# Patient Record
Sex: MALE | Race: ASIAN | NOT HISPANIC OR LATINO
[De-identification: names, ages, dates, MRNs, and addresses within clinical notes are randomized per-mention and may not be internally consistent; named-entity substitution may affect disease eponyms.]

---

## 2023-01-01 ENCOUNTER — RX RENEWAL (OUTPATIENT)
Age: 43
End: 2023-01-01

## 2023-01-01 ENCOUNTER — APPOINTMENT (OUTPATIENT)
Dept: NEUROLOGY | Facility: CLINIC | Age: 43
End: 2023-01-01
Payer: COMMERCIAL

## 2023-01-01 ENCOUNTER — INPATIENT (INPATIENT)
Facility: HOSPITAL | Age: 43
LOS: 21 days | Discharge: HOME | DRG: 57 | End: 2023-08-18
Attending: PSYCHIATRY & NEUROLOGY | Admitting: PSYCHIATRY & NEUROLOGY
Payer: COMMERCIAL

## 2023-01-01 ENCOUNTER — NON-APPOINTMENT (OUTPATIENT)
Age: 43
End: 2023-01-01

## 2023-01-01 ENCOUNTER — INPATIENT (INPATIENT)
Facility: HOSPITAL | Age: 43
LOS: 21 days | Discharge: HOME | DRG: 57 | End: 2023-12-19
Attending: STUDENT IN AN ORGANIZED HEALTH CARE EDUCATION/TRAINING PROGRAM | Admitting: STUDENT IN AN ORGANIZED HEALTH CARE EDUCATION/TRAINING PROGRAM
Payer: COMMERCIAL

## 2023-01-01 ENCOUNTER — APPOINTMENT (OUTPATIENT)
Dept: ENDOCRINOLOGY | Facility: CLINIC | Age: 43
End: 2023-01-01
Payer: COMMERCIAL

## 2023-01-01 ENCOUNTER — APPOINTMENT (OUTPATIENT)
Dept: INTERNAL MEDICINE | Facility: CLINIC | Age: 43
End: 2023-01-01
Payer: COMMERCIAL

## 2023-01-01 ENCOUNTER — APPOINTMENT (OUTPATIENT)
Dept: NEUROSURGERY | Facility: HOSPITAL | Age: 43
End: 2023-01-01

## 2023-01-01 ENCOUNTER — TRANSCRIPTION ENCOUNTER (OUTPATIENT)
Age: 43
End: 2023-01-01

## 2023-01-01 ENCOUNTER — INPATIENT (INPATIENT)
Facility: HOSPITAL | Age: 43
LOS: 2 days | Discharge: NURSING, REHAB OR LONG-TERM CARE | DRG: 65 | End: 2023-07-27
Attending: PSYCHIATRY & NEUROLOGY | Admitting: PSYCHIATRY & NEUROLOGY
Payer: COMMERCIAL

## 2023-01-01 ENCOUNTER — APPOINTMENT (OUTPATIENT)
Dept: NEUROSURGERY | Facility: CLINIC | Age: 43
End: 2023-01-01

## 2023-01-01 ENCOUNTER — INPATIENT (INPATIENT)
Facility: HOSPITAL | Age: 43
LOS: 10 days | Discharge: NURSING, REHAB OR LONG-TERM CARE | DRG: 23 | End: 2023-11-27
Attending: PSYCHIATRY & NEUROLOGY | Admitting: PSYCHIATRY & NEUROLOGY
Payer: COMMERCIAL

## 2023-01-01 ENCOUNTER — APPOINTMENT (OUTPATIENT)
Dept: MRI IMAGING | Facility: HOSPITAL | Age: 43
End: 2023-01-01

## 2023-01-01 ENCOUNTER — APPOINTMENT (OUTPATIENT)
Dept: INTERNAL MEDICINE | Facility: CLINIC | Age: 43
End: 2023-01-01

## 2023-01-01 VITALS
RESPIRATION RATE: 18 BRPM | DIASTOLIC BLOOD PRESSURE: 90 MMHG | TEMPERATURE: 100 F | OXYGEN SATURATION: 96 % | HEART RATE: 84 BPM | SYSTOLIC BLOOD PRESSURE: 138 MMHG | WEIGHT: 264.33 LBS | HEIGHT: 70 IN

## 2023-01-01 VITALS
DIASTOLIC BLOOD PRESSURE: 85 MMHG | WEIGHT: 230 LBS | HEIGHT: 69 IN | HEART RATE: 69 BPM | BODY MASS INDEX: 34.07 KG/M2 | SYSTOLIC BLOOD PRESSURE: 133 MMHG

## 2023-01-01 VITALS
HEART RATE: 76 BPM | TEMPERATURE: 98 F | OXYGEN SATURATION: 96 % | SYSTOLIC BLOOD PRESSURE: 155 MMHG | DIASTOLIC BLOOD PRESSURE: 93 MMHG | RESPIRATION RATE: 16 BRPM

## 2023-01-01 VITALS
SYSTOLIC BLOOD PRESSURE: 128 MMHG | OXYGEN SATURATION: 96 % | HEART RATE: 61 BPM | TEMPERATURE: 98 F | RESPIRATION RATE: 16 BRPM | DIASTOLIC BLOOD PRESSURE: 74 MMHG

## 2023-01-01 VITALS
OXYGEN SATURATION: 97 % | DIASTOLIC BLOOD PRESSURE: 92 MMHG | SYSTOLIC BLOOD PRESSURE: 158 MMHG | TEMPERATURE: 98 F | WEIGHT: 240.08 LBS | HEART RATE: 79 BPM | RESPIRATION RATE: 18 BRPM

## 2023-01-01 VITALS
SYSTOLIC BLOOD PRESSURE: 176 MMHG | HEART RATE: 75 BPM | BODY MASS INDEX: 36.73 KG/M2 | WEIGHT: 248 LBS | DIASTOLIC BLOOD PRESSURE: 94 MMHG | TEMPERATURE: 97.5 F | RESPIRATION RATE: 16 BRPM | HEIGHT: 69 IN | OXYGEN SATURATION: 98 %

## 2023-01-01 VITALS
HEART RATE: 72 BPM | RESPIRATION RATE: 16 BRPM | BODY MASS INDEX: 36.29 KG/M2 | SYSTOLIC BLOOD PRESSURE: 132 MMHG | HEIGHT: 69 IN | OXYGEN SATURATION: 97 % | WEIGHT: 245 LBS | TEMPERATURE: 97.9 F | DIASTOLIC BLOOD PRESSURE: 87 MMHG

## 2023-01-01 VITALS
WEIGHT: 248 LBS | HEART RATE: 77 BPM | SYSTOLIC BLOOD PRESSURE: 163 MMHG | BODY MASS INDEX: 36.73 KG/M2 | DIASTOLIC BLOOD PRESSURE: 102 MMHG | HEIGHT: 69 IN

## 2023-01-01 VITALS
HEART RATE: 85 BPM | DIASTOLIC BLOOD PRESSURE: 69 MMHG | RESPIRATION RATE: 22 BRPM | SYSTOLIC BLOOD PRESSURE: 145 MMHG | OXYGEN SATURATION: 96 %

## 2023-01-01 VITALS — SYSTOLIC BLOOD PRESSURE: 179 MMHG | DIASTOLIC BLOOD PRESSURE: 98 MMHG

## 2023-01-01 VITALS
RESPIRATION RATE: 18 BRPM | HEART RATE: 77 BPM | SYSTOLIC BLOOD PRESSURE: 144 MMHG | TEMPERATURE: 99 F | DIASTOLIC BLOOD PRESSURE: 87 MMHG | OXYGEN SATURATION: 97 %

## 2023-01-01 VITALS
RESPIRATION RATE: 16 BRPM | DIASTOLIC BLOOD PRESSURE: 75 MMHG | SYSTOLIC BLOOD PRESSURE: 117 MMHG | OXYGEN SATURATION: 95 % | HEART RATE: 67 BPM | TEMPERATURE: 98 F

## 2023-01-01 VITALS
RESPIRATION RATE: 20 BRPM | HEART RATE: 103 BPM | WEIGHT: 229.94 LBS | DIASTOLIC BLOOD PRESSURE: 111 MMHG | OXYGEN SATURATION: 98 % | TEMPERATURE: 98 F | SYSTOLIC BLOOD PRESSURE: 220 MMHG

## 2023-01-01 DIAGNOSIS — I69.314 FRONTAL LOBE AND EXECUTIVE FUNCTION DEFICIT FOLLOWING CEREBRAL INFARCTION: ICD-10-CM

## 2023-01-01 DIAGNOSIS — Z51.89 ENCOUNTER FOR OTHER SPECIFIED AFTERCARE: ICD-10-CM

## 2023-01-01 DIAGNOSIS — R47.1 DYSARTHRIA AND ANARTHRIA: ICD-10-CM

## 2023-01-01 DIAGNOSIS — R50.9 FEVER, UNSPECIFIED: ICD-10-CM

## 2023-01-01 DIAGNOSIS — I63.9 CEREBRAL INFARCTION, UNSPECIFIED: ICD-10-CM

## 2023-01-01 DIAGNOSIS — K59.00 CONSTIPATION, UNSPECIFIED: ICD-10-CM

## 2023-01-01 DIAGNOSIS — F32.9 MAJOR DEPRESSIVE DISORDER, SINGLE EPISODE, UNSPECIFIED: ICD-10-CM

## 2023-01-01 DIAGNOSIS — I69.351 HEMIPLEGIA AND HEMIPARESIS FOLLOWING CEREBRAL INFARCTION AFFECTING RIGHT DOMINANT SIDE: ICD-10-CM

## 2023-01-01 DIAGNOSIS — I69.322 DYSARTHRIA FOLLOWING CEREBRAL INFARCTION: ICD-10-CM

## 2023-01-01 DIAGNOSIS — I10 ESSENTIAL (PRIMARY) HYPERTENSION: ICD-10-CM

## 2023-01-01 DIAGNOSIS — I69.391 DYSPHAGIA FOLLOWING CEREBRAL INFARCTION: ICD-10-CM

## 2023-01-01 DIAGNOSIS — F43.23 ADJUSTMENT DISORDER WITH MIXED ANXIETY AND DEPRESSED MOOD: ICD-10-CM

## 2023-01-01 DIAGNOSIS — E11.9 TYPE 2 DIABETES MELLITUS WITHOUT COMPLICATIONS: ICD-10-CM

## 2023-01-01 DIAGNOSIS — I16.0 HYPERTENSIVE URGENCY: ICD-10-CM

## 2023-01-01 DIAGNOSIS — R20.0 ANESTHESIA OF SKIN: ICD-10-CM

## 2023-01-01 DIAGNOSIS — R27.8 OTHER LACK OF COORDINATION: ICD-10-CM

## 2023-01-01 DIAGNOSIS — Z86.79 PERSONAL HISTORY OF OTHER DISEASES OF THE CIRCULATORY SYSTEM: ICD-10-CM

## 2023-01-01 DIAGNOSIS — E78.5 HYPERLIPIDEMIA, UNSPECIFIED: ICD-10-CM

## 2023-01-01 DIAGNOSIS — R79.89 OTHER SPECIFIED ABNORMAL FINDINGS OF BLOOD CHEMISTRY: ICD-10-CM

## 2023-01-01 DIAGNOSIS — I25.10 ATHEROSCLEROTIC HEART DISEASE OF NATIVE CORONARY ARTERY WITHOUT ANGINA PECTORIS: ICD-10-CM

## 2023-01-01 DIAGNOSIS — Z86.39 PERSONAL HISTORY OF OTHER ENDOCRINE, NUTRITIONAL AND METABOLIC DISEASE: ICD-10-CM

## 2023-01-01 DIAGNOSIS — I69.310 ATTENTION AND CONCENTRATION DEFICIT FOLLOWING CEREBRAL INFARCTION: ICD-10-CM

## 2023-01-01 DIAGNOSIS — I69.398 OTHER SEQUELAE OF CEREBRAL INFARCTION: ICD-10-CM

## 2023-01-01 DIAGNOSIS — R33.9 RETENTION OF URINE, UNSPECIFIED: ICD-10-CM

## 2023-01-01 DIAGNOSIS — R53.83 OTHER FATIGUE: ICD-10-CM

## 2023-01-01 DIAGNOSIS — F41.9 ANXIETY DISORDER, UNSPECIFIED: ICD-10-CM

## 2023-01-01 DIAGNOSIS — I69.392 FACIAL WEAKNESS FOLLOWING CEREBRAL INFARCTION: ICD-10-CM

## 2023-01-01 DIAGNOSIS — G47.00 INSOMNIA, UNSPECIFIED: ICD-10-CM

## 2023-01-01 DIAGNOSIS — Z87.891 PERSONAL HISTORY OF NICOTINE DEPENDENCE: ICD-10-CM

## 2023-01-01 DIAGNOSIS — F17.210 NICOTINE DEPENDENCE, CIGARETTES, UNCOMPLICATED: ICD-10-CM

## 2023-01-01 DIAGNOSIS — G47.33 OBSTRUCTIVE SLEEP APNEA (ADULT) (PEDIATRIC): ICD-10-CM

## 2023-01-01 DIAGNOSIS — F12.90 CANNABIS USE, UNSPECIFIED, UNCOMPLICATED: ICD-10-CM

## 2023-01-01 DIAGNOSIS — Z79.82 LONG TERM (CURRENT) USE OF ASPIRIN: ICD-10-CM

## 2023-01-01 DIAGNOSIS — Z00.00 ENCOUNTER FOR GENERAL ADULT MEDICAL EXAMINATION W/OUT ABNORMAL FINDINGS: ICD-10-CM

## 2023-01-01 DIAGNOSIS — Z79.84 LONG TERM (CURRENT) USE OF ORAL HYPOGLYCEMIC DRUGS: ICD-10-CM

## 2023-01-01 DIAGNOSIS — Z86.73 PERSONAL HISTORY OF TRANSIENT ISCHEMIC ATTACK (TIA), AND CEREBRAL INFARCTION W/OUT RESIDUAL DEFICITS: ICD-10-CM

## 2023-01-01 DIAGNOSIS — I69.312 VISUOSPATIAL DEFICIT AND SPATIAL NEGLECT FOLLOWING CEREBRAL INFARCTION: ICD-10-CM

## 2023-01-01 DIAGNOSIS — I69.311 MEMORY DEFICIT FOLLOWING CEREBRAL INFARCTION: ICD-10-CM

## 2023-01-01 DIAGNOSIS — R26.89 OTHER ABNORMALITIES OF GAIT AND MOBILITY: ICD-10-CM

## 2023-01-01 DIAGNOSIS — I69.354 HEMIPLEGIA AND HEMIPARESIS FOLLOWING CEREBRAL INFARCTION AFFECTING LEFT NON-DOMINANT SIDE: ICD-10-CM

## 2023-01-01 DIAGNOSIS — Z79.02 LONG TERM (CURRENT) USE OF ANTITHROMBOTICS/ANTIPLATELETS: ICD-10-CM

## 2023-01-01 DIAGNOSIS — R45.851 SUICIDAL IDEATIONS: ICD-10-CM

## 2023-01-01 LAB
% ALBUMIN: 61.4 % — SIGNIFICANT CHANGE UP
% ALBUMIN: 61.4 % — SIGNIFICANT CHANGE UP
% ALPHA 1: 4.6 % — SIGNIFICANT CHANGE UP
% ALPHA 1: 4.6 % — SIGNIFICANT CHANGE UP
% ALPHA 2: 12.1 % — SIGNIFICANT CHANGE UP
% ALPHA 2: 12.1 % — SIGNIFICANT CHANGE UP
% BETA: 10.5 % — SIGNIFICANT CHANGE UP
% BETA: 10.5 % — SIGNIFICANT CHANGE UP
% GAMMA: 11.4 % — SIGNIFICANT CHANGE UP
% GAMMA: 11.4 % — SIGNIFICANT CHANGE UP
A1C WITH ESTIMATED AVERAGE GLUCOSE RESULT: 5.5 % — SIGNIFICANT CHANGE UP (ref 4–5.6)
A1C WITH ESTIMATED AVERAGE GLUCOSE RESULT: 5.5 % — SIGNIFICANT CHANGE UP (ref 4–5.6)
A1C WITH ESTIMATED AVERAGE GLUCOSE RESULT: 6.8 % — HIGH (ref 4–5.6)
A1C WITH ESTIMATED AVERAGE GLUCOSE RESULT: 7 % — HIGH (ref 4–5.6)
ALBUMIN SERPL ELPH-MCNC: 2.8 G/DL — LOW (ref 3.3–5)
ALBUMIN SERPL ELPH-MCNC: 2.9 G/DL — LOW (ref 3.3–5)
ALBUMIN SERPL ELPH-MCNC: 2.9 G/DL — LOW (ref 3.3–5)
ALBUMIN SERPL ELPH-MCNC: 3 G/DL — LOW (ref 3.3–5)
ALBUMIN SERPL ELPH-MCNC: 3 G/DL — LOW (ref 3.3–5)
ALBUMIN SERPL ELPH-MCNC: 3.2 G/DL — LOW (ref 3.3–5)
ALBUMIN SERPL ELPH-MCNC: 3.3 G/DL — SIGNIFICANT CHANGE UP (ref 3.3–5)
ALBUMIN SERPL ELPH-MCNC: 3.4 G/DL — SIGNIFICANT CHANGE UP (ref 3.3–5)
ALBUMIN SERPL ELPH-MCNC: 3.5 G/DL — SIGNIFICANT CHANGE UP (ref 3.3–5)
ALBUMIN SERPL ELPH-MCNC: 3.8 G/DL — SIGNIFICANT CHANGE UP (ref 3.3–5)
ALBUMIN SERPL ELPH-MCNC: 3.8 G/DL — SIGNIFICANT CHANGE UP (ref 3.6–5.5)
ALBUMIN SERPL ELPH-MCNC: 3.8 G/DL — SIGNIFICANT CHANGE UP (ref 3.6–5.5)
ALBUMIN SERPL ELPH-MCNC: 3.9 G/DL — SIGNIFICANT CHANGE UP (ref 3.3–5)
ALBUMIN SERPL ELPH-MCNC: 4.7 G/DL — SIGNIFICANT CHANGE UP (ref 3.3–5)
ALBUMIN SERPL ELPH-MCNC: 4.8 G/DL — SIGNIFICANT CHANGE UP (ref 3.3–5)
ALBUMIN SERPL ELPH-MCNC: 4.8 G/DL — SIGNIFICANT CHANGE UP (ref 3.3–5)
ALBUMIN/GLOB SERPL ELPH: 1.6 RATIO — SIGNIFICANT CHANGE UP
ALBUMIN/GLOB SERPL ELPH: 1.6 RATIO — SIGNIFICANT CHANGE UP
ALP SERPL-CCNC: 44 U/L — SIGNIFICANT CHANGE UP (ref 40–120)
ALP SERPL-CCNC: 46 U/L — SIGNIFICANT CHANGE UP (ref 40–120)
ALP SERPL-CCNC: 46 U/L — SIGNIFICANT CHANGE UP (ref 40–120)
ALP SERPL-CCNC: 47 U/L — SIGNIFICANT CHANGE UP (ref 40–120)
ALP SERPL-CCNC: 47 U/L — SIGNIFICANT CHANGE UP (ref 40–120)
ALP SERPL-CCNC: 48 U/L — SIGNIFICANT CHANGE UP (ref 40–120)
ALP SERPL-CCNC: 50 U/L — SIGNIFICANT CHANGE UP (ref 40–120)
ALP SERPL-CCNC: 51 U/L — SIGNIFICANT CHANGE UP (ref 40–120)
ALP SERPL-CCNC: 51 U/L — SIGNIFICANT CHANGE UP (ref 40–120)
ALP SERPL-CCNC: 52 U/L — SIGNIFICANT CHANGE UP (ref 40–120)
ALP SERPL-CCNC: 53 U/L — SIGNIFICANT CHANGE UP (ref 40–120)
ALP SERPL-CCNC: 53 U/L — SIGNIFICANT CHANGE UP (ref 40–120)
ALP SERPL-CCNC: 57 U/L — SIGNIFICANT CHANGE UP (ref 40–120)
ALP SERPL-CCNC: 57 U/L — SIGNIFICANT CHANGE UP (ref 40–120)
ALP SERPL-CCNC: 58 U/L — SIGNIFICANT CHANGE UP (ref 40–120)
ALP SERPL-CCNC: 58 U/L — SIGNIFICANT CHANGE UP (ref 40–120)
ALP SERPL-CCNC: 65 U/L — SIGNIFICANT CHANGE UP (ref 40–120)
ALPHA1 GLOB SERPL ELPH-MCNC: 0.3 G/DL — SIGNIFICANT CHANGE UP (ref 0.1–0.4)
ALPHA1 GLOB SERPL ELPH-MCNC: 0.3 G/DL — SIGNIFICANT CHANGE UP (ref 0.1–0.4)
ALPHA2 GLOB SERPL ELPH-MCNC: 0.8 G/DL — SIGNIFICANT CHANGE UP (ref 0.5–1)
ALPHA2 GLOB SERPL ELPH-MCNC: 0.8 G/DL — SIGNIFICANT CHANGE UP (ref 0.5–1)
ALT FLD-CCNC: 17 U/L — SIGNIFICANT CHANGE UP (ref 10–45)
ALT FLD-CCNC: 17 U/L — SIGNIFICANT CHANGE UP (ref 10–45)
ALT FLD-CCNC: 21 U/L — SIGNIFICANT CHANGE UP (ref 10–45)
ALT FLD-CCNC: 21 U/L — SIGNIFICANT CHANGE UP (ref 10–45)
ALT FLD-CCNC: 23 U/L — SIGNIFICANT CHANGE UP (ref 10–45)
ALT FLD-CCNC: 25 U/L — SIGNIFICANT CHANGE UP (ref 10–45)
ALT FLD-CCNC: 25 U/L — SIGNIFICANT CHANGE UP (ref 10–45)
ALT FLD-CCNC: 27 U/L — SIGNIFICANT CHANGE UP (ref 10–45)
ALT FLD-CCNC: 27 U/L — SIGNIFICANT CHANGE UP (ref 10–45)
ALT FLD-CCNC: 28 U/L — SIGNIFICANT CHANGE UP (ref 10–45)
ALT FLD-CCNC: 33 U/L — SIGNIFICANT CHANGE UP (ref 10–45)
ALT FLD-CCNC: 33 U/L — SIGNIFICANT CHANGE UP (ref 10–45)
ALT FLD-CCNC: 34 U/L — SIGNIFICANT CHANGE UP (ref 10–45)
ALT FLD-CCNC: 36 U/L — SIGNIFICANT CHANGE UP (ref 10–45)
ALT FLD-CCNC: 36 U/L — SIGNIFICANT CHANGE UP (ref 10–45)
ALT FLD-CCNC: 41 U/L — SIGNIFICANT CHANGE UP (ref 10–45)
ALT FLD-CCNC: 45 U/L — SIGNIFICANT CHANGE UP (ref 10–45)
ALT FLD-CCNC: 45 U/L — SIGNIFICANT CHANGE UP (ref 10–45)
ALT FLD-CCNC: 51 U/L — HIGH (ref 10–45)
ALT FLD-CCNC: 74 U/L — HIGH (ref 10–45)
ALT FLD-CCNC: 74 U/L — HIGH (ref 10–45)
ALT FLD-CCNC: 83 U/L — HIGH (ref 10–45)
ALT FLD-CCNC: 83 U/L — HIGH (ref 10–45)
AMPHET UR-MCNC: NEGATIVE NG/ML — SIGNIFICANT CHANGE UP
AMPHET UR-MCNC: NEGATIVE — SIGNIFICANT CHANGE UP
AMPHET UR-MCNC: NEGATIVE — SIGNIFICANT CHANGE UP
ANA TITR SER: NEGATIVE — SIGNIFICANT CHANGE UP
ANA TITR SER: NEGATIVE — SIGNIFICANT CHANGE UP
ANION GAP SERPL CALC-SCNC: 10 MMOL/L — SIGNIFICANT CHANGE UP (ref 5–17)
ANION GAP SERPL CALC-SCNC: 11 MMOL/L — SIGNIFICANT CHANGE UP (ref 5–17)
ANION GAP SERPL CALC-SCNC: 12 MMOL/L — SIGNIFICANT CHANGE UP (ref 5–17)
ANION GAP SERPL CALC-SCNC: 13 MMOL/L — SIGNIFICANT CHANGE UP (ref 5–17)
ANION GAP SERPL CALC-SCNC: 13 MMOL/L — SIGNIFICANT CHANGE UP (ref 5–17)
ANION GAP SERPL CALC-SCNC: 15 MMOL/L — SIGNIFICANT CHANGE UP (ref 5–17)
ANION GAP SERPL CALC-SCNC: 17 MMOL/L — SIGNIFICANT CHANGE UP (ref 5–17)
ANION GAP SERPL CALC-SCNC: 8 MMOL/L — SIGNIFICANT CHANGE UP (ref 5–17)
ANION GAP SERPL CALC-SCNC: 9 MMOL/L — SIGNIFICANT CHANGE UP (ref 5–17)
ANTI-RIBONUCLEAR PROTEIN: <0.2 AI — SIGNIFICANT CHANGE UP
ANTI-RIBONUCLEAR PROTEIN: <0.2 AI — SIGNIFICANT CHANGE UP
APAP SERPL-MCNC: <15 UG/ML — SIGNIFICANT CHANGE UP (ref 10–30)
APPEARANCE UR: CLEAR — SIGNIFICANT CHANGE UP
APTT BLD: 30.6 SEC — SIGNIFICANT CHANGE UP (ref 27.5–35.5)
APTT BLD: 31 SEC — SIGNIFICANT CHANGE UP (ref 24.5–35.6)
APTT BLD: 31 SEC — SIGNIFICANT CHANGE UP (ref 24.5–35.6)
APTT BLD: 32.2 SEC — SIGNIFICANT CHANGE UP (ref 24.5–35.6)
APTT BLD: 32.2 SEC — SIGNIFICANT CHANGE UP (ref 24.5–35.6)
APTT BLD: 32.3 SEC — SIGNIFICANT CHANGE UP (ref 24.5–35.6)
APTT BLD: 32.3 SEC — SIGNIFICANT CHANGE UP (ref 24.5–35.6)
APTT BLD: 32.8 SEC — SIGNIFICANT CHANGE UP (ref 24.5–35.6)
APTT BLD: 32.8 SEC — SIGNIFICANT CHANGE UP (ref 24.5–35.6)
APTT BLD: 33.2 SEC — SIGNIFICANT CHANGE UP (ref 24.5–35.6)
APTT BLD: 33.2 SEC — SIGNIFICANT CHANGE UP (ref 24.5–35.6)
APTT BLD: 44.3 SEC — HIGH (ref 24.5–35.6)
APTT BLD: 44.3 SEC — HIGH (ref 24.5–35.6)
APTT BLD: 49 SEC — HIGH (ref 24.5–35.6)
APTT BLD: 49 SEC — HIGH (ref 24.5–35.6)
APTT BLD: 54.6 SEC — HIGH (ref 24.5–35.6)
APTT BLD: 54.6 SEC — HIGH (ref 24.5–35.6)
APTT BLD: 69.8 SEC — HIGH (ref 24.5–35.6)
APTT BLD: 69.8 SEC — HIGH (ref 24.5–35.6)
AST SERPL-CCNC: 11 U/L — SIGNIFICANT CHANGE UP (ref 10–40)
AST SERPL-CCNC: 11 U/L — SIGNIFICANT CHANGE UP (ref 10–40)
AST SERPL-CCNC: 14 U/L — SIGNIFICANT CHANGE UP (ref 10–40)
AST SERPL-CCNC: 14 U/L — SIGNIFICANT CHANGE UP (ref 10–40)
AST SERPL-CCNC: 16 U/L — SIGNIFICANT CHANGE UP (ref 10–40)
AST SERPL-CCNC: 16 U/L — SIGNIFICANT CHANGE UP (ref 10–40)
AST SERPL-CCNC: 17 U/L — SIGNIFICANT CHANGE UP (ref 10–40)
AST SERPL-CCNC: 18 U/L — SIGNIFICANT CHANGE UP (ref 10–40)
AST SERPL-CCNC: 18 U/L — SIGNIFICANT CHANGE UP (ref 10–40)
AST SERPL-CCNC: 19 U/L — SIGNIFICANT CHANGE UP (ref 10–40)
AST SERPL-CCNC: 19 U/L — SIGNIFICANT CHANGE UP (ref 10–40)
AST SERPL-CCNC: 20 U/L — SIGNIFICANT CHANGE UP (ref 10–40)
AST SERPL-CCNC: 21 U/L — SIGNIFICANT CHANGE UP (ref 10–40)
AST SERPL-CCNC: 24 U/L — SIGNIFICANT CHANGE UP (ref 10–40)
AST SERPL-CCNC: 26 U/L — SIGNIFICANT CHANGE UP (ref 10–40)
AST SERPL-CCNC: 26 U/L — SIGNIFICANT CHANGE UP (ref 10–40)
AST SERPL-CCNC: 30 U/L — SIGNIFICANT CHANGE UP (ref 10–40)
AST SERPL-CCNC: 30 U/L — SIGNIFICANT CHANGE UP (ref 10–40)
AST SERPL-CCNC: 36 U/L — SIGNIFICANT CHANGE UP (ref 10–40)
AST SERPL-CCNC: 36 U/L — SIGNIFICANT CHANGE UP (ref 10–40)
B-GLOBULIN SERPL ELPH-MCNC: 0.7 G/DL — SIGNIFICANT CHANGE UP (ref 0.5–1)
B-GLOBULIN SERPL ELPH-MCNC: 0.7 G/DL — SIGNIFICANT CHANGE UP (ref 0.5–1)
BACTERIA # UR AUTO: NEGATIVE /HPF — SIGNIFICANT CHANGE UP
BARBITURATES UR QL SCN: NEGATIVE NG/ML — SIGNIFICANT CHANGE UP
BARBITURATES UR SCN-MCNC: NEGATIVE — SIGNIFICANT CHANGE UP
BARBITURATES UR SCN-MCNC: NEGATIVE — SIGNIFICANT CHANGE UP
BARBITURATES UR-MCNC: NEGATIVE NG/ML — SIGNIFICANT CHANGE UP
BASE EXCESS BLDV CALC-SCNC: 1.1 MMOL/L — SIGNIFICANT CHANGE UP (ref -2–3)
BASOPHILS # BLD AUTO: 0.04 K/UL — SIGNIFICANT CHANGE UP (ref 0–0.2)
BASOPHILS # BLD AUTO: 0.05 K/UL — SIGNIFICANT CHANGE UP (ref 0–0.2)
BASOPHILS # BLD AUTO: 0.05 K/UL — SIGNIFICANT CHANGE UP (ref 0–0.2)
BASOPHILS # BLD AUTO: 0.06 K/UL — SIGNIFICANT CHANGE UP (ref 0–0.2)
BASOPHILS # BLD AUTO: 0.06 K/UL — SIGNIFICANT CHANGE UP (ref 0–0.2)
BASOPHILS # BLD AUTO: 0.07 K/UL — SIGNIFICANT CHANGE UP (ref 0–0.2)
BASOPHILS # BLD AUTO: 0.07 K/UL — SIGNIFICANT CHANGE UP (ref 0–0.2)
BASOPHILS NFR BLD AUTO: 0.4 % — SIGNIFICANT CHANGE UP (ref 0–2)
BASOPHILS NFR BLD AUTO: 0.6 % — SIGNIFICANT CHANGE UP (ref 0–2)
BASOPHILS NFR BLD AUTO: 0.7 % — SIGNIFICANT CHANGE UP (ref 0–2)
BASOPHILS NFR BLD AUTO: 0.9 % — SIGNIFICANT CHANGE UP (ref 0–2)
BASOPHILS NFR BLD AUTO: 1.2 % — SIGNIFICANT CHANGE UP (ref 0–2)
BENZODIAZ UR-MCNC: NEGATIVE NG/ML — SIGNIFICANT CHANGE UP
BENZODIAZ UR-MCNC: NEGATIVE — SIGNIFICANT CHANGE UP
BENZODIAZ UR-MCNC: NEGATIVE — SIGNIFICANT CHANGE UP
BILIRUB SERPL-MCNC: 0.3 MG/DL — SIGNIFICANT CHANGE UP (ref 0.2–1.2)
BILIRUB SERPL-MCNC: 0.4 MG/DL — SIGNIFICANT CHANGE UP (ref 0.2–1.2)
BILIRUB SERPL-MCNC: 0.5 MG/DL — SIGNIFICANT CHANGE UP (ref 0.2–1.2)
BILIRUB SERPL-MCNC: 0.6 MG/DL — SIGNIFICANT CHANGE UP (ref 0.2–1.2)
BILIRUB SERPL-MCNC: 0.7 MG/DL — SIGNIFICANT CHANGE UP (ref 0.2–1.2)
BILIRUB SERPL-MCNC: 0.8 MG/DL — SIGNIFICANT CHANGE UP (ref 0.2–1.2)
BILIRUB SERPL-MCNC: 0.8 MG/DL — SIGNIFICANT CHANGE UP (ref 0.2–1.2)
BILIRUB UR-MCNC: NEGATIVE — SIGNIFICANT CHANGE UP
BLD GP AB SCN SERPL QL: NEGATIVE — SIGNIFICANT CHANGE UP
BUN SERPL-MCNC: 10 MG/DL — SIGNIFICANT CHANGE UP (ref 7–23)
BUN SERPL-MCNC: 10 MG/DL — SIGNIFICANT CHANGE UP (ref 7–23)
BUN SERPL-MCNC: 11 MG/DL — SIGNIFICANT CHANGE UP (ref 7–23)
BUN SERPL-MCNC: 12 MG/DL — SIGNIFICANT CHANGE UP (ref 7–23)
BUN SERPL-MCNC: 13 MG/DL — SIGNIFICANT CHANGE UP (ref 7–23)
BUN SERPL-MCNC: 13 MG/DL — SIGNIFICANT CHANGE UP (ref 7–23)
BUN SERPL-MCNC: 15 MG/DL — SIGNIFICANT CHANGE UP (ref 7–23)
BUN SERPL-MCNC: 17 MG/DL — SIGNIFICANT CHANGE UP (ref 7–23)
BUN SERPL-MCNC: 18 MG/DL — SIGNIFICANT CHANGE UP (ref 7–23)
BUN SERPL-MCNC: 19 MG/DL — SIGNIFICANT CHANGE UP (ref 7–23)
BUN SERPL-MCNC: 21 MG/DL — SIGNIFICANT CHANGE UP (ref 7–23)
BUN SERPL-MCNC: 21 MG/DL — SIGNIFICANT CHANGE UP (ref 7–23)
BUN SERPL-MCNC: 22 MG/DL — SIGNIFICANT CHANGE UP (ref 7–23)
BUN SERPL-MCNC: 22 MG/DL — SIGNIFICANT CHANGE UP (ref 7–23)
BUN SERPL-MCNC: 23 MG/DL — SIGNIFICANT CHANGE UP (ref 7–23)
BUN SERPL-MCNC: 24 MG/DL — HIGH (ref 7–23)
BUN SERPL-MCNC: 25 MG/DL — HIGH (ref 7–23)
BUN SERPL-MCNC: 25 MG/DL — HIGH (ref 7–23)
BUN SERPL-MCNC: 26 MG/DL — HIGH (ref 7–23)
BUN SERPL-MCNC: 27 MG/DL — HIGH (ref 7–23)
BUN SERPL-MCNC: 28 MG/DL — HIGH (ref 7–23)
BUN SERPL-MCNC: 28 MG/DL — HIGH (ref 7–23)
C3 SERPL-MCNC: 123 MG/DL — SIGNIFICANT CHANGE UP (ref 81–157)
C3 SERPL-MCNC: 123 MG/DL — SIGNIFICANT CHANGE UP (ref 81–157)
C4 SERPL-MCNC: 35 MG/DL — SIGNIFICANT CHANGE UP (ref 13–39)
C4 SERPL-MCNC: 35 MG/DL — SIGNIFICANT CHANGE UP (ref 13–39)
CA-I SERPL-SCNC: 1.2 MMOL/L — SIGNIFICANT CHANGE UP (ref 1.15–1.33)
CALCIUM SERPL-MCNC: 8.2 MG/DL — LOW (ref 8.4–10.5)
CALCIUM SERPL-MCNC: 8.2 MG/DL — LOW (ref 8.4–10.5)
CALCIUM SERPL-MCNC: 8.7 MG/DL — SIGNIFICANT CHANGE UP (ref 8.4–10.5)
CALCIUM SERPL-MCNC: 8.8 MG/DL — SIGNIFICANT CHANGE UP (ref 8.4–10.5)
CALCIUM SERPL-MCNC: 8.8 MG/DL — SIGNIFICANT CHANGE UP (ref 8.4–10.5)
CALCIUM SERPL-MCNC: 8.9 MG/DL — SIGNIFICANT CHANGE UP (ref 8.4–10.5)
CALCIUM SERPL-MCNC: 9 MG/DL — SIGNIFICANT CHANGE UP (ref 8.4–10.5)
CALCIUM SERPL-MCNC: 9 MG/DL — SIGNIFICANT CHANGE UP (ref 8.4–10.5)
CALCIUM SERPL-MCNC: 9.1 MG/DL — SIGNIFICANT CHANGE UP (ref 8.4–10.5)
CALCIUM SERPL-MCNC: 9.2 MG/DL — SIGNIFICANT CHANGE UP (ref 8.4–10.5)
CALCIUM SERPL-MCNC: 9.3 MG/DL — SIGNIFICANT CHANGE UP (ref 8.4–10.5)
CALCIUM SERPL-MCNC: 9.3 MG/DL — SIGNIFICANT CHANGE UP (ref 8.4–10.5)
CALCIUM SERPL-MCNC: 9.4 MG/DL — SIGNIFICANT CHANGE UP (ref 8.4–10.5)
CALCIUM SERPL-MCNC: 9.5 MG/DL — SIGNIFICANT CHANGE UP (ref 8.4–10.5)
CALCIUM SERPL-MCNC: 9.5 MG/DL — SIGNIFICANT CHANGE UP (ref 8.4–10.5)
CALCIUM SERPL-MCNC: 9.6 MG/DL — SIGNIFICANT CHANGE UP (ref 8.4–10.5)
CAST: 1 /LPF — SIGNIFICANT CHANGE UP (ref 0–4)
CAST: 1 /LPF — SIGNIFICANT CHANGE UP (ref 0–4)
CHLORIDE BLDV-SCNC: 104 MMOL/L — SIGNIFICANT CHANGE UP (ref 96–108)
CHLORIDE SERPL-SCNC: 102 MMOL/L — SIGNIFICANT CHANGE UP (ref 96–108)
CHLORIDE SERPL-SCNC: 103 MMOL/L — SIGNIFICANT CHANGE UP (ref 96–108)
CHLORIDE SERPL-SCNC: 104 MMOL/L — SIGNIFICANT CHANGE UP (ref 96–108)
CHLORIDE SERPL-SCNC: 105 MMOL/L — SIGNIFICANT CHANGE UP (ref 96–108)
CHLORIDE SERPL-SCNC: 106 MMOL/L — SIGNIFICANT CHANGE UP (ref 96–108)
CHLORIDE SERPL-SCNC: 107 MMOL/L — SIGNIFICANT CHANGE UP (ref 96–108)
CHLORIDE SERPL-SCNC: 108 MMOL/L — SIGNIFICANT CHANGE UP (ref 96–108)
CHLORIDE SERPL-SCNC: 109 MMOL/L — HIGH (ref 96–108)
CHLORIDE SERPL-SCNC: 110 MMOL/L — HIGH (ref 96–108)
CHOLEST SERPL-MCNC: 230 MG/DL — HIGH
CHOLEST SERPL-MCNC: 80 MG/DL — SIGNIFICANT CHANGE UP
CHOLEST SERPL-MCNC: 80 MG/DL — SIGNIFICANT CHANGE UP
CHOLEST SERPL-MCNC: 91 MG/DL — SIGNIFICANT CHANGE UP
CHOLEST SERPL-MCNC: 91 MG/DL — SIGNIFICANT CHANGE UP
CO2 BLDV-SCNC: 28 MMOL/L — HIGH (ref 22–26)
CO2 SERPL-SCNC: 19 MMOL/L — LOW (ref 22–31)
CO2 SERPL-SCNC: 21 MMOL/L — LOW (ref 22–31)
CO2 SERPL-SCNC: 22 MMOL/L — SIGNIFICANT CHANGE UP (ref 22–31)
CO2 SERPL-SCNC: 23 MMOL/L — SIGNIFICANT CHANGE UP (ref 22–31)
CO2 SERPL-SCNC: 25 MMOL/L — SIGNIFICANT CHANGE UP (ref 22–31)
CO2 SERPL-SCNC: 25 MMOL/L — SIGNIFICANT CHANGE UP (ref 22–31)
CO2 SERPL-SCNC: 26 MMOL/L — SIGNIFICANT CHANGE UP (ref 22–31)
CO2 SERPL-SCNC: 27 MMOL/L — SIGNIFICANT CHANGE UP (ref 22–31)
CO2 SERPL-SCNC: 28 MMOL/L — SIGNIFICANT CHANGE UP (ref 22–31)
CO2 SERPL-SCNC: 29 MMOL/L — SIGNIFICANT CHANGE UP (ref 22–31)
COCAINE METAB.OTHER UR-MCNC: NEGATIVE NG/ML — SIGNIFICANT CHANGE UP
COCAINE METAB.OTHER UR-MCNC: NEGATIVE — SIGNIFICANT CHANGE UP
COCAINE METAB.OTHER UR-MCNC: NEGATIVE — SIGNIFICANT CHANGE UP
COLOR SPEC: YELLOW — SIGNIFICANT CHANGE UP
CREAT SERPL-MCNC: 0.99 MG/DL — SIGNIFICANT CHANGE UP (ref 0.5–1.3)
CREAT SERPL-MCNC: 1.08 MG/DL — SIGNIFICANT CHANGE UP (ref 0.5–1.3)
CREAT SERPL-MCNC: 1.08 MG/DL — SIGNIFICANT CHANGE UP (ref 0.5–1.3)
CREAT SERPL-MCNC: 1.1 MG/DL — SIGNIFICANT CHANGE UP (ref 0.5–1.3)
CREAT SERPL-MCNC: 1.11 MG/DL — SIGNIFICANT CHANGE UP (ref 0.5–1.3)
CREAT SERPL-MCNC: 1.11 MG/DL — SIGNIFICANT CHANGE UP (ref 0.5–1.3)
CREAT SERPL-MCNC: 1.12 MG/DL — SIGNIFICANT CHANGE UP (ref 0.5–1.3)
CREAT SERPL-MCNC: 1.12 MG/DL — SIGNIFICANT CHANGE UP (ref 0.5–1.3)
CREAT SERPL-MCNC: 1.14 MG/DL — SIGNIFICANT CHANGE UP (ref 0.5–1.3)
CREAT SERPL-MCNC: 1.15 MG/DL — SIGNIFICANT CHANGE UP (ref 0.5–1.3)
CREAT SERPL-MCNC: 1.16 MG/DL — SIGNIFICANT CHANGE UP (ref 0.5–1.3)
CREAT SERPL-MCNC: 1.17 MG/DL — SIGNIFICANT CHANGE UP (ref 0.5–1.3)
CREAT SERPL-MCNC: 1.18 MG/DL — SIGNIFICANT CHANGE UP (ref 0.5–1.3)
CREAT SERPL-MCNC: 1.21 MG/DL — SIGNIFICANT CHANGE UP (ref 0.5–1.3)
CREAT SERPL-MCNC: 1.22 MG/DL — SIGNIFICANT CHANGE UP (ref 0.5–1.3)
CREAT SERPL-MCNC: 1.22 MG/DL — SIGNIFICANT CHANGE UP (ref 0.5–1.3)
CREAT SERPL-MCNC: 1.23 MG/DL — SIGNIFICANT CHANGE UP (ref 0.5–1.3)
CREAT SERPL-MCNC: 1.24 MG/DL — SIGNIFICANT CHANGE UP (ref 0.5–1.3)
CREAT SERPL-MCNC: 1.25 MG/DL — SIGNIFICANT CHANGE UP (ref 0.5–1.3)
CREAT SERPL-MCNC: 1.28 MG/DL — SIGNIFICANT CHANGE UP (ref 0.5–1.3)
CREAT SERPL-MCNC: 1.29 MG/DL — SIGNIFICANT CHANGE UP (ref 0.5–1.3)
CREAT SERPL-MCNC: 1.29 MG/DL — SIGNIFICANT CHANGE UP (ref 0.5–1.3)
CREAT SERPL-MCNC: 1.31 MG/DL — HIGH (ref 0.5–1.3)
CREAT SERPL-MCNC: 1.33 MG/DL — HIGH (ref 0.5–1.3)
CREAT SERPL-MCNC: 1.33 MG/DL — HIGH (ref 0.5–1.3)
CREAT SERPL-MCNC: 1.34 MG/DL — HIGH (ref 0.5–1.3)
CREAT SERPL-MCNC: 1.34 MG/DL — HIGH (ref 0.5–1.3)
CREAT SERPL-MCNC: 1.4 MG/DL — HIGH (ref 0.5–1.3)
CREAT SERPL-MCNC: 1.4 MG/DL — HIGH (ref 0.5–1.3)
CREATININE, URINE THERAPEUTIC: 217.9 MG/DL — SIGNIFICANT CHANGE UP (ref 20–300)
CRP SERPL-MCNC: <3 MG/L — SIGNIFICANT CHANGE UP (ref 0–4)
CRP SERPL-MCNC: <3 MG/L — SIGNIFICANT CHANGE UP (ref 0–4)
CRYOGLOB SERPL-MCNC: NEGATIVE — SIGNIFICANT CHANGE UP
CRYOGLOB SERPL-MCNC: NEGATIVE — SIGNIFICANT CHANGE UP
CULTURE RESULTS: SIGNIFICANT CHANGE UP
DIFF PNL FLD: NEGATIVE — SIGNIFICANT CHANGE UP
DRUG SCREEN, SERUM: ABNORMAL
DRUG SCREEN, SERUM: ABNORMAL
DSDNA AB FLD-ACNC: <0.2 AI — SIGNIFICANT CHANGE UP
DSDNA AB FLD-ACNC: <0.2 AI — SIGNIFICANT CHANGE UP
DSDNA AB SER-ACNC: <12 IU/ML — SIGNIFICANT CHANGE UP
DSDNA AB SER-ACNC: <12 IU/ML — SIGNIFICANT CHANGE UP
EGFR: 63 ML/MIN/1.73M2 — SIGNIFICANT CHANGE UP
EGFR: 63 ML/MIN/1.73M2 — SIGNIFICANT CHANGE UP
EGFR: 67 ML/MIN/1.73M2 — SIGNIFICANT CHANGE UP
EGFR: 68 ML/MIN/1.73M2 — SIGNIFICANT CHANGE UP
EGFR: 70 ML/MIN/1.73M2 — SIGNIFICANT CHANGE UP
EGFR: 72 ML/MIN/1.73M2 — SIGNIFICANT CHANGE UP
EGFR: 73 ML/MIN/1.73M2 — SIGNIFICANT CHANGE UP
EGFR: 74 ML/MIN/1.73M2 — SIGNIFICANT CHANGE UP
EGFR: 75 ML/MIN/1.73M2 — SIGNIFICANT CHANGE UP
EGFR: 78 ML/MIN/1.73M2 — SIGNIFICANT CHANGE UP
EGFR: 79 ML/MIN/1.73M2 — SIGNIFICANT CHANGE UP
EGFR: 79 ML/MIN/1.73M2 — SIGNIFICANT CHANGE UP
EGFR: 80 ML/MIN/1.73M2 — SIGNIFICANT CHANGE UP
EGFR: 81 ML/MIN/1.73M2 — SIGNIFICANT CHANGE UP
EGFR: 83 ML/MIN/1.73M2 — SIGNIFICANT CHANGE UP
EGFR: 84 ML/MIN/1.73M2 — SIGNIFICANT CHANGE UP
EGFR: 86 ML/MIN/1.73M2 — SIGNIFICANT CHANGE UP
EGFR: 86 ML/MIN/1.73M2 — SIGNIFICANT CHANGE UP
EGFR: 96 ML/MIN/1.73M2 — SIGNIFICANT CHANGE UP
ENA SM AB FLD QL: <0.2 AI — SIGNIFICANT CHANGE UP
ENA SM AB FLD QL: <0.2 AI — SIGNIFICANT CHANGE UP
ENA SS-A AB FLD IA-ACNC: <0.2 AI — SIGNIFICANT CHANGE UP
ENA SS-A AB FLD IA-ACNC: <0.2 AI — SIGNIFICANT CHANGE UP
EOSINOPHIL # BLD AUTO: 0.13 K/UL — SIGNIFICANT CHANGE UP (ref 0–0.5)
EOSINOPHIL # BLD AUTO: 0.13 K/UL — SIGNIFICANT CHANGE UP (ref 0–0.5)
EOSINOPHIL # BLD AUTO: 0.14 K/UL — SIGNIFICANT CHANGE UP (ref 0–0.5)
EOSINOPHIL # BLD AUTO: 0.14 K/UL — SIGNIFICANT CHANGE UP (ref 0–0.5)
EOSINOPHIL # BLD AUTO: 0.15 K/UL — SIGNIFICANT CHANGE UP (ref 0–0.5)
EOSINOPHIL # BLD AUTO: 0.15 K/UL — SIGNIFICANT CHANGE UP (ref 0–0.5)
EOSINOPHIL # BLD AUTO: 0.19 K/UL — SIGNIFICANT CHANGE UP (ref 0–0.5)
EOSINOPHIL # BLD AUTO: 0.23 K/UL — SIGNIFICANT CHANGE UP (ref 0–0.5)
EOSINOPHIL # BLD AUTO: 0.28 K/UL — SIGNIFICANT CHANGE UP (ref 0–0.5)
EOSINOPHIL # BLD AUTO: 0.31 K/UL — SIGNIFICANT CHANGE UP (ref 0–0.5)
EOSINOPHIL # BLD AUTO: 0.36 K/UL — SIGNIFICANT CHANGE UP (ref 0–0.5)
EOSINOPHIL # BLD AUTO: 0.37 K/UL — SIGNIFICANT CHANGE UP (ref 0–0.5)
EOSINOPHIL NFR BLD AUTO: 1.2 % — SIGNIFICANT CHANGE UP (ref 0–6)
EOSINOPHIL NFR BLD AUTO: 1.2 % — SIGNIFICANT CHANGE UP (ref 0–6)
EOSINOPHIL NFR BLD AUTO: 1.5 % — SIGNIFICANT CHANGE UP (ref 0–6)
EOSINOPHIL NFR BLD AUTO: 2.1 % — SIGNIFICANT CHANGE UP (ref 0–6)
EOSINOPHIL NFR BLD AUTO: 3.4 % — SIGNIFICANT CHANGE UP (ref 0–6)
EOSINOPHIL NFR BLD AUTO: 4.2 % — SIGNIFICANT CHANGE UP (ref 0–6)
EOSINOPHIL NFR BLD AUTO: 4.8 % — SIGNIFICANT CHANGE UP (ref 0–6)
EOSINOPHIL NFR BLD AUTO: 5.4 % — SIGNIFICANT CHANGE UP (ref 0–6)
EOSINOPHIL NFR BLD AUTO: 5.6 % — SIGNIFICANT CHANGE UP (ref 0–6)
EPI CELLS # UR: 0 — SIGNIFICANT CHANGE UP
EPI CELLS # UR: 0 — SIGNIFICANT CHANGE UP
ERYTHROCYTE [SEDIMENTATION RATE] IN BLOOD: 10 MM/HR — SIGNIFICANT CHANGE UP (ref 0–15)
ERYTHROCYTE [SEDIMENTATION RATE] IN BLOOD: 10 MM/HR — SIGNIFICANT CHANGE UP (ref 0–15)
ESTIMATED AVERAGE GLUCOSE: 111 MG/DL — SIGNIFICANT CHANGE UP (ref 68–114)
ESTIMATED AVERAGE GLUCOSE: 111 MG/DL — SIGNIFICANT CHANGE UP (ref 68–114)
ESTIMATED AVERAGE GLUCOSE: 148 MG/DL — HIGH (ref 68–114)
ESTIMATED AVERAGE GLUCOSE: 154 MG/DL — HIGH (ref 68–114)
ETHANOL SERPL-MCNC: <10 MG/DL — SIGNIFICANT CHANGE UP (ref 0–10)
FENTANYL RESULT, UR: NEGATIVE NG/ML — SIGNIFICANT CHANGE UP
FENTANYL UR QL SCN: NEGATIVE NG/ML — SIGNIFICANT CHANGE UP
FERRITIN SERPL-MCNC: 174 NG/ML — SIGNIFICANT CHANGE UP (ref 30–400)
FERRITIN SERPL-MCNC: 174 NG/ML — SIGNIFICANT CHANGE UP (ref 30–400)
GAMMA GLOBULIN: 0.7 G/DL — SIGNIFICANT CHANGE UP (ref 0.6–1.6)
GAMMA GLOBULIN: 0.7 G/DL — SIGNIFICANT CHANGE UP (ref 0.6–1.6)
GAMMA INTERFERON BACKGROUND BLD IA-ACNC: 0.01 IU/ML — SIGNIFICANT CHANGE UP
GAMMA INTERFERON BACKGROUND BLD IA-ACNC: 0.01 IU/ML — SIGNIFICANT CHANGE UP
GAS PNL BLDV: 135 MMOL/L — LOW (ref 136–145)
GAS PNL BLDV: SIGNIFICANT CHANGE UP
GAS PNL BLDV: SIGNIFICANT CHANGE UP
GLUCOSE BLDC GLUCOMTR-MCNC: 100 MG/DL — HIGH (ref 70–99)
GLUCOSE BLDC GLUCOMTR-MCNC: 100 MG/DL — HIGH (ref 70–99)
GLUCOSE BLDC GLUCOMTR-MCNC: 101 MG/DL — HIGH (ref 70–99)
GLUCOSE BLDC GLUCOMTR-MCNC: 101 MG/DL — HIGH (ref 70–99)
GLUCOSE BLDC GLUCOMTR-MCNC: 103 MG/DL — HIGH (ref 70–99)
GLUCOSE BLDC GLUCOMTR-MCNC: 104 MG/DL — HIGH (ref 70–99)
GLUCOSE BLDC GLUCOMTR-MCNC: 104 MG/DL — HIGH (ref 70–99)
GLUCOSE BLDC GLUCOMTR-MCNC: 106 MG/DL — HIGH (ref 70–99)
GLUCOSE BLDC GLUCOMTR-MCNC: 107 MG/DL — HIGH (ref 70–99)
GLUCOSE BLDC GLUCOMTR-MCNC: 107 MG/DL — HIGH (ref 70–99)
GLUCOSE BLDC GLUCOMTR-MCNC: 109 MG/DL — HIGH (ref 70–99)
GLUCOSE BLDC GLUCOMTR-MCNC: 110 MG/DL — HIGH (ref 70–99)
GLUCOSE BLDC GLUCOMTR-MCNC: 111 MG/DL — HIGH (ref 70–99)
GLUCOSE BLDC GLUCOMTR-MCNC: 111 MG/DL — HIGH (ref 70–99)
GLUCOSE BLDC GLUCOMTR-MCNC: 112 MG/DL — HIGH (ref 70–99)
GLUCOSE BLDC GLUCOMTR-MCNC: 113 MG/DL — HIGH (ref 70–99)
GLUCOSE BLDC GLUCOMTR-MCNC: 114 MG/DL — HIGH (ref 70–99)
GLUCOSE BLDC GLUCOMTR-MCNC: 115 MG/DL — HIGH (ref 70–99)
GLUCOSE BLDC GLUCOMTR-MCNC: 115 MG/DL — HIGH (ref 70–99)
GLUCOSE BLDC GLUCOMTR-MCNC: 116 MG/DL — HIGH (ref 70–99)
GLUCOSE BLDC GLUCOMTR-MCNC: 118 MG/DL — HIGH (ref 70–99)
GLUCOSE BLDC GLUCOMTR-MCNC: 120 MG/DL — HIGH (ref 70–99)
GLUCOSE BLDC GLUCOMTR-MCNC: 120 MG/DL — HIGH (ref 70–99)
GLUCOSE BLDC GLUCOMTR-MCNC: 121 MG/DL — HIGH (ref 70–99)
GLUCOSE BLDC GLUCOMTR-MCNC: 121 MG/DL — HIGH (ref 70–99)
GLUCOSE BLDC GLUCOMTR-MCNC: 122 MG/DL — HIGH (ref 70–99)
GLUCOSE BLDC GLUCOMTR-MCNC: 122 MG/DL — HIGH (ref 70–99)
GLUCOSE BLDC GLUCOMTR-MCNC: 123 MG/DL — HIGH (ref 70–99)
GLUCOSE BLDC GLUCOMTR-MCNC: 124 MG/DL — HIGH (ref 70–99)
GLUCOSE BLDC GLUCOMTR-MCNC: 126 MG/DL — HIGH (ref 70–99)
GLUCOSE BLDC GLUCOMTR-MCNC: 128 MG/DL — HIGH (ref 70–99)
GLUCOSE BLDC GLUCOMTR-MCNC: 129 MG/DL — HIGH (ref 70–99)
GLUCOSE BLDC GLUCOMTR-MCNC: 130 MG/DL — HIGH (ref 70–99)
GLUCOSE BLDC GLUCOMTR-MCNC: 131 MG/DL — HIGH (ref 70–99)
GLUCOSE BLDC GLUCOMTR-MCNC: 133 MG/DL — HIGH (ref 70–99)
GLUCOSE BLDC GLUCOMTR-MCNC: 133 MG/DL — HIGH (ref 70–99)
GLUCOSE BLDC GLUCOMTR-MCNC: 136 MG/DL — HIGH (ref 70–99)
GLUCOSE BLDC GLUCOMTR-MCNC: 136 MG/DL — HIGH (ref 70–99)
GLUCOSE BLDC GLUCOMTR-MCNC: 138 MG/DL — HIGH (ref 70–99)
GLUCOSE BLDC GLUCOMTR-MCNC: 138 MG/DL — HIGH (ref 70–99)
GLUCOSE BLDC GLUCOMTR-MCNC: 139 MG/DL — HIGH (ref 70–99)
GLUCOSE BLDC GLUCOMTR-MCNC: 139 MG/DL — HIGH (ref 70–99)
GLUCOSE BLDC GLUCOMTR-MCNC: 141 MG/DL — HIGH (ref 70–99)
GLUCOSE BLDC GLUCOMTR-MCNC: 141 MG/DL — HIGH (ref 70–99)
GLUCOSE BLDC GLUCOMTR-MCNC: 143 MG/DL — HIGH (ref 70–99)
GLUCOSE BLDC GLUCOMTR-MCNC: 143 MG/DL — HIGH (ref 70–99)
GLUCOSE BLDC GLUCOMTR-MCNC: 144 MG/DL — HIGH (ref 70–99)
GLUCOSE BLDC GLUCOMTR-MCNC: 146 MG/DL — HIGH (ref 70–99)
GLUCOSE BLDC GLUCOMTR-MCNC: 147 MG/DL — HIGH (ref 70–99)
GLUCOSE BLDC GLUCOMTR-MCNC: 149 MG/DL — HIGH (ref 70–99)
GLUCOSE BLDC GLUCOMTR-MCNC: 151 MG/DL — HIGH (ref 70–99)
GLUCOSE BLDC GLUCOMTR-MCNC: 151 MG/DL — HIGH (ref 70–99)
GLUCOSE BLDC GLUCOMTR-MCNC: 152 MG/DL — HIGH (ref 70–99)
GLUCOSE BLDC GLUCOMTR-MCNC: 153 MG/DL — HIGH (ref 70–99)
GLUCOSE BLDC GLUCOMTR-MCNC: 154 MG/DL — HIGH (ref 70–99)
GLUCOSE BLDC GLUCOMTR-MCNC: 155 MG/DL — HIGH (ref 70–99)
GLUCOSE BLDC GLUCOMTR-MCNC: 156 MG/DL — HIGH (ref 70–99)
GLUCOSE BLDC GLUCOMTR-MCNC: 157 MG/DL — HIGH (ref 70–99)
GLUCOSE BLDC GLUCOMTR-MCNC: 158 MG/DL — HIGH (ref 70–99)
GLUCOSE BLDC GLUCOMTR-MCNC: 159 MG/DL — HIGH (ref 70–99)
GLUCOSE BLDC GLUCOMTR-MCNC: 159 MG/DL — HIGH (ref 70–99)
GLUCOSE BLDC GLUCOMTR-MCNC: 164 MG/DL — HIGH (ref 70–99)
GLUCOSE BLDC GLUCOMTR-MCNC: 167 MG/DL — HIGH (ref 70–99)
GLUCOSE BLDC GLUCOMTR-MCNC: 167 MG/DL — HIGH (ref 70–99)
GLUCOSE BLDC GLUCOMTR-MCNC: 172 MG/DL — HIGH (ref 70–99)
GLUCOSE BLDC GLUCOMTR-MCNC: 174 MG/DL — HIGH (ref 70–99)
GLUCOSE BLDC GLUCOMTR-MCNC: 175 MG/DL — HIGH (ref 70–99)
GLUCOSE BLDC GLUCOMTR-MCNC: 178 MG/DL — HIGH (ref 70–99)
GLUCOSE BLDC GLUCOMTR-MCNC: 192 MG/DL — HIGH (ref 70–99)
GLUCOSE BLDC GLUCOMTR-MCNC: 215 MG/DL — HIGH (ref 70–99)
GLUCOSE BLDC GLUCOMTR-MCNC: 248 MG/DL — HIGH (ref 70–99)
GLUCOSE BLDC GLUCOMTR-MCNC: 86 MG/DL — SIGNIFICANT CHANGE UP (ref 70–99)
GLUCOSE BLDC GLUCOMTR-MCNC: 88 MG/DL — SIGNIFICANT CHANGE UP (ref 70–99)
GLUCOSE BLDC GLUCOMTR-MCNC: 90 MG/DL — SIGNIFICANT CHANGE UP (ref 70–99)
GLUCOSE BLDC GLUCOMTR-MCNC: 90 MG/DL — SIGNIFICANT CHANGE UP (ref 70–99)
GLUCOSE BLDC GLUCOMTR-MCNC: 92 MG/DL — SIGNIFICANT CHANGE UP (ref 70–99)
GLUCOSE BLDC GLUCOMTR-MCNC: 92 MG/DL — SIGNIFICANT CHANGE UP (ref 70–99)
GLUCOSE BLDC GLUCOMTR-MCNC: 94 MG/DL — SIGNIFICANT CHANGE UP (ref 70–99)
GLUCOSE BLDC GLUCOMTR-MCNC: 95 MG/DL — SIGNIFICANT CHANGE UP (ref 70–99)
GLUCOSE BLDC GLUCOMTR-MCNC: 95 MG/DL — SIGNIFICANT CHANGE UP (ref 70–99)
GLUCOSE BLDC GLUCOMTR-MCNC: 96 MG/DL — SIGNIFICANT CHANGE UP (ref 70–99)
GLUCOSE BLDC GLUCOMTR-MCNC: 97 MG/DL — SIGNIFICANT CHANGE UP (ref 70–99)
GLUCOSE BLDC GLUCOMTR-MCNC: 99 MG/DL — SIGNIFICANT CHANGE UP (ref 70–99)
GLUCOSE BLDV-MCNC: 162 MG/DL — HIGH (ref 70–99)
GLUCOSE SERPL-MCNC: 103 MG/DL — HIGH (ref 70–99)
GLUCOSE SERPL-MCNC: 103 MG/DL — HIGH (ref 70–99)
GLUCOSE SERPL-MCNC: 106 MG/DL — HIGH (ref 70–99)
GLUCOSE SERPL-MCNC: 108 MG/DL — HIGH (ref 70–99)
GLUCOSE SERPL-MCNC: 109 MG/DL — HIGH (ref 70–99)
GLUCOSE SERPL-MCNC: 110 MG/DL — HIGH (ref 70–99)
GLUCOSE SERPL-MCNC: 110 MG/DL — HIGH (ref 70–99)
GLUCOSE SERPL-MCNC: 113 MG/DL — HIGH (ref 70–99)
GLUCOSE SERPL-MCNC: 114 MG/DL — HIGH (ref 70–99)
GLUCOSE SERPL-MCNC: 116 MG/DL — HIGH (ref 70–99)
GLUCOSE SERPL-MCNC: 116 MG/DL — HIGH (ref 70–99)
GLUCOSE SERPL-MCNC: 119 MG/DL — HIGH (ref 70–99)
GLUCOSE SERPL-MCNC: 121 MG/DL — HIGH (ref 70–99)
GLUCOSE SERPL-MCNC: 121 MG/DL — HIGH (ref 70–99)
GLUCOSE SERPL-MCNC: 122 MG/DL — HIGH (ref 70–99)
GLUCOSE SERPL-MCNC: 123 MG/DL — HIGH (ref 70–99)
GLUCOSE SERPL-MCNC: 123 MG/DL — HIGH (ref 70–99)
GLUCOSE SERPL-MCNC: 125 MG/DL — HIGH (ref 70–99)
GLUCOSE SERPL-MCNC: 127 MG/DL — HIGH (ref 70–99)
GLUCOSE SERPL-MCNC: 127 MG/DL — HIGH (ref 70–99)
GLUCOSE SERPL-MCNC: 128 MG/DL — HIGH (ref 70–99)
GLUCOSE SERPL-MCNC: 129 MG/DL — HIGH (ref 70–99)
GLUCOSE SERPL-MCNC: 156 MG/DL — HIGH (ref 70–99)
GLUCOSE SERPL-MCNC: 167 MG/DL — HIGH (ref 70–99)
GLUCOSE SERPL-MCNC: 167 MG/DL — HIGH (ref 70–99)
GLUCOSE SERPL-MCNC: 223 MG/DL — HIGH (ref 70–99)
GLUCOSE SERPL-MCNC: 86 MG/DL — SIGNIFICANT CHANGE UP (ref 70–99)
GLUCOSE SERPL-MCNC: 86 MG/DL — SIGNIFICANT CHANGE UP (ref 70–99)
GLUCOSE SERPL-MCNC: 98 MG/DL — SIGNIFICANT CHANGE UP (ref 70–99)
GLUCOSE SERPL-MCNC: 98 MG/DL — SIGNIFICANT CHANGE UP (ref 70–99)
GLUCOSE UR QL: NEGATIVE MG/DL — SIGNIFICANT CHANGE UP
HAV IGM SER-ACNC: SIGNIFICANT CHANGE UP
HAV IGM SER-ACNC: SIGNIFICANT CHANGE UP
HBV CORE IGM SER-ACNC: SIGNIFICANT CHANGE UP
HBV CORE IGM SER-ACNC: SIGNIFICANT CHANGE UP
HBV SURFACE AG SER-ACNC: SIGNIFICANT CHANGE UP
HBV SURFACE AG SER-ACNC: SIGNIFICANT CHANGE UP
HCO3 BLDV-SCNC: 27 MMOL/L — SIGNIFICANT CHANGE UP (ref 22–29)
HCT VFR BLD CALC: 34 % — LOW (ref 39–50)
HCT VFR BLD CALC: 34 % — LOW (ref 39–50)
HCT VFR BLD CALC: 34.6 % — LOW (ref 39–50)
HCT VFR BLD CALC: 35 % — LOW (ref 39–50)
HCT VFR BLD CALC: 35 % — LOW (ref 39–50)
HCT VFR BLD CALC: 35.3 % — LOW (ref 39–50)
HCT VFR BLD CALC: 35.3 % — LOW (ref 39–50)
HCT VFR BLD CALC: 35.6 % — LOW (ref 39–50)
HCT VFR BLD CALC: 35.6 % — LOW (ref 39–50)
HCT VFR BLD CALC: 35.7 % — LOW (ref 39–50)
HCT VFR BLD CALC: 36 % — LOW (ref 39–50)
HCT VFR BLD CALC: 36 % — LOW (ref 39–50)
HCT VFR BLD CALC: 36.4 % — LOW (ref 39–50)
HCT VFR BLD CALC: 36.4 % — LOW (ref 39–50)
HCT VFR BLD CALC: 36.6 % — LOW (ref 39–50)
HCT VFR BLD CALC: 36.6 % — LOW (ref 39–50)
HCT VFR BLD CALC: 36.7 % — LOW (ref 39–50)
HCT VFR BLD CALC: 36.7 % — LOW (ref 39–50)
HCT VFR BLD CALC: 37.5 % — LOW (ref 39–50)
HCT VFR BLD CALC: 37.5 % — LOW (ref 39–50)
HCT VFR BLD CALC: 38.4 % — LOW (ref 39–50)
HCT VFR BLD CALC: 38.4 % — LOW (ref 39–50)
HCT VFR BLD CALC: 38.8 % — LOW (ref 39–50)
HCT VFR BLD CALC: 38.8 % — LOW (ref 39–50)
HCT VFR BLD CALC: 39.4 % — SIGNIFICANT CHANGE UP (ref 39–50)
HCT VFR BLD CALC: 39.4 % — SIGNIFICANT CHANGE UP (ref 39–50)
HCT VFR BLD CALC: 40.3 % — SIGNIFICANT CHANGE UP (ref 39–50)
HCT VFR BLD CALC: 40.5 % — SIGNIFICANT CHANGE UP (ref 39–50)
HCT VFR BLD CALC: 41.1 % — SIGNIFICANT CHANGE UP (ref 39–50)
HCT VFR BLD CALC: 41.4 % — SIGNIFICANT CHANGE UP (ref 39–50)
HCT VFR BLD CALC: 41.8 % — SIGNIFICANT CHANGE UP (ref 39–50)
HCT VFR BLD CALC: 42 % — SIGNIFICANT CHANGE UP (ref 39–50)
HCT VFR BLD CALC: 42.8 % — SIGNIFICANT CHANGE UP (ref 39–50)
HCT VFR BLD CALC: 43 % — SIGNIFICANT CHANGE UP (ref 39–50)
HCT VFR BLD CALC: 43 % — SIGNIFICANT CHANGE UP (ref 39–50)
HCT VFR BLD CALC: 43.6 % — SIGNIFICANT CHANGE UP (ref 39–50)
HCT VFR BLD CALC: 43.6 % — SIGNIFICANT CHANGE UP (ref 39–50)
HCT VFR BLD CALC: 43.8 % — SIGNIFICANT CHANGE UP (ref 39–50)
HCT VFR BLD CALC: 43.8 % — SIGNIFICANT CHANGE UP (ref 39–50)
HCT VFR BLD CALC: 45.3 % — SIGNIFICANT CHANGE UP (ref 39–50)
HCT VFR BLD CALC: 45.3 % — SIGNIFICANT CHANGE UP (ref 39–50)
HCT VFR BLD CALC: 45.6 % — SIGNIFICANT CHANGE UP (ref 39–50)
HCT VFR BLD CALC: 45.8 % — SIGNIFICANT CHANGE UP (ref 39–50)
HCT VFR BLD CALC: 48.1 % — SIGNIFICANT CHANGE UP (ref 39–50)
HCT VFR BLD CALC: 48.3 % — SIGNIFICANT CHANGE UP (ref 39–50)
HCT VFR BLDA CALC: 51 % — SIGNIFICANT CHANGE UP (ref 39–51)
HCV AB S/CO SERPL IA: 0.05 S/CO — SIGNIFICANT CHANGE UP (ref 0–0.99)
HCV AB S/CO SERPL IA: 0.05 S/CO — SIGNIFICANT CHANGE UP (ref 0–0.99)
HCV AB SERPL-IMP: SIGNIFICANT CHANGE UP
HCV AB SERPL-IMP: SIGNIFICANT CHANGE UP
HDLC SERPL-MCNC: 28 MG/DL — LOW
HDLC SERPL-MCNC: 28 MG/DL — LOW
HDLC SERPL-MCNC: 32 MG/DL — LOW
HGB BLD CALC-MCNC: 17.1 G/DL — SIGNIFICANT CHANGE UP (ref 12.6–17.4)
HGB BLD-MCNC: 11.6 G/DL — LOW (ref 13–17)
HGB BLD-MCNC: 11.6 G/DL — LOW (ref 13–17)
HGB BLD-MCNC: 11.7 G/DL — LOW (ref 13–17)
HGB BLD-MCNC: 11.8 G/DL — LOW (ref 13–17)
HGB BLD-MCNC: 11.8 G/DL — LOW (ref 13–17)
HGB BLD-MCNC: 12 G/DL — LOW (ref 13–17)
HGB BLD-MCNC: 12.1 G/DL — LOW (ref 13–17)
HGB BLD-MCNC: 12.4 G/DL — LOW (ref 13–17)
HGB BLD-MCNC: 12.5 G/DL — LOW (ref 13–17)
HGB BLD-MCNC: 12.5 G/DL — LOW (ref 13–17)
HGB BLD-MCNC: 12.7 G/DL — LOW (ref 13–17)
HGB BLD-MCNC: 12.7 G/DL — LOW (ref 13–17)
HGB BLD-MCNC: 13 G/DL — SIGNIFICANT CHANGE UP (ref 13–17)
HGB BLD-MCNC: 13 G/DL — SIGNIFICANT CHANGE UP (ref 13–17)
HGB BLD-MCNC: 13.1 G/DL — SIGNIFICANT CHANGE UP (ref 13–17)
HGB BLD-MCNC: 13.1 G/DL — SIGNIFICANT CHANGE UP (ref 13–17)
HGB BLD-MCNC: 13.3 G/DL — SIGNIFICANT CHANGE UP (ref 13–17)
HGB BLD-MCNC: 13.3 G/DL — SIGNIFICANT CHANGE UP (ref 13–17)
HGB BLD-MCNC: 13.7 G/DL — SIGNIFICANT CHANGE UP (ref 13–17)
HGB BLD-MCNC: 13.8 G/DL — SIGNIFICANT CHANGE UP (ref 13–17)
HGB BLD-MCNC: 14.1 G/DL — SIGNIFICANT CHANGE UP (ref 13–17)
HGB BLD-MCNC: 14.2 G/DL — SIGNIFICANT CHANGE UP (ref 13–17)
HGB BLD-MCNC: 14.2 G/DL — SIGNIFICANT CHANGE UP (ref 13–17)
HGB BLD-MCNC: 14.4 G/DL — SIGNIFICANT CHANGE UP (ref 13–17)
HGB BLD-MCNC: 14.4 G/DL — SIGNIFICANT CHANGE UP (ref 13–17)
HGB BLD-MCNC: 14.6 G/DL — SIGNIFICANT CHANGE UP (ref 13–17)
HGB BLD-MCNC: 14.9 G/DL — SIGNIFICANT CHANGE UP (ref 13–17)
HGB BLD-MCNC: 14.9 G/DL — SIGNIFICANT CHANGE UP (ref 13–17)
HGB BLD-MCNC: 15.3 G/DL — SIGNIFICANT CHANGE UP (ref 13–17)
HGB BLD-MCNC: 15.3 G/DL — SIGNIFICANT CHANGE UP (ref 13–17)
HGB BLD-MCNC: 15.6 G/DL — SIGNIFICANT CHANGE UP (ref 13–17)
HGB BLD-MCNC: 15.7 G/DL — SIGNIFICANT CHANGE UP (ref 13–17)
HGB BLD-MCNC: 15.8 G/DL — SIGNIFICANT CHANGE UP (ref 13–17)
HGB BLD-MCNC: 16.9 G/DL — SIGNIFICANT CHANGE UP (ref 13–17)
IGA FLD-MCNC: 116 MG/DL — SIGNIFICANT CHANGE UP (ref 84–499)
IGA FLD-MCNC: 116 MG/DL — SIGNIFICANT CHANGE UP (ref 84–499)
IGG FLD-MCNC: 673 MG/DL — SIGNIFICANT CHANGE UP (ref 610–1660)
IGG FLD-MCNC: 673 MG/DL — SIGNIFICANT CHANGE UP (ref 610–1660)
IGM SERPL-MCNC: 52 MG/DL — SIGNIFICANT CHANGE UP (ref 35–242)
IGM SERPL-MCNC: 52 MG/DL — SIGNIFICANT CHANGE UP (ref 35–242)
IMM GRANULOCYTES NFR BLD AUTO: 0.1 % — SIGNIFICANT CHANGE UP (ref 0–0.9)
IMM GRANULOCYTES NFR BLD AUTO: 0.2 % — SIGNIFICANT CHANGE UP (ref 0–0.9)
IMM GRANULOCYTES NFR BLD AUTO: 0.3 % — SIGNIFICANT CHANGE UP (ref 0–0.9)
INR BLD: 0.99 RATIO — SIGNIFICANT CHANGE UP (ref 0.88–1.16)
INR BLD: 1.05 RATIO — SIGNIFICANT CHANGE UP (ref 0.85–1.18)
INR BLD: 1.05 RATIO — SIGNIFICANT CHANGE UP (ref 0.85–1.18)
INR BLD: 1.14 RATIO — SIGNIFICANT CHANGE UP (ref 0.85–1.18)
INR BLD: 1.14 RATIO — SIGNIFICANT CHANGE UP (ref 0.85–1.18)
INTERPRETATION SERPL IFE-IMP: SIGNIFICANT CHANGE UP
INTERPRETATION SERPL IFE-IMP: SIGNIFICANT CHANGE UP
IRON SATN MFR SERPL: 23 % — SIGNIFICANT CHANGE UP (ref 16–55)
IRON SATN MFR SERPL: 23 % — SIGNIFICANT CHANGE UP (ref 16–55)
IRON SATN MFR SERPL: 54 UG/DL — SIGNIFICANT CHANGE UP (ref 45–165)
IRON SATN MFR SERPL: 54 UG/DL — SIGNIFICANT CHANGE UP (ref 45–165)
KAPPA LC SER QL IFE: 2.52 MG/DL — HIGH (ref 0.33–1.94)
KAPPA LC SER QL IFE: 2.52 MG/DL — HIGH (ref 0.33–1.94)
KAPPA/LAMBDA FREE LIGHT CHAIN RATIO, SERUM: 1.76 RATIO — HIGH (ref 0.26–1.65)
KAPPA/LAMBDA FREE LIGHT CHAIN RATIO, SERUM: 1.76 RATIO — HIGH (ref 0.26–1.65)
KETONES UR-MCNC: 15 MG/DL
KETONES UR-MCNC: 15 MG/DL
KETONES UR-MCNC: NEGATIVE MG/DL — SIGNIFICANT CHANGE UP
KETONES UR-MCNC: NEGATIVE MG/DL — SIGNIFICANT CHANGE UP
LACTATE BLDV-MCNC: 1.2 MMOL/L — SIGNIFICANT CHANGE UP (ref 0.5–2)
LACTATE SERPL-SCNC: 0.6 MMOL/L — LOW (ref 0.7–2)
LACTATE SERPL-SCNC: 0.6 MMOL/L — LOW (ref 0.7–2)
LAMBDA LC SER QL IFE: 1.43 MG/DL — SIGNIFICANT CHANGE UP (ref 0.57–2.63)
LAMBDA LC SER QL IFE: 1.43 MG/DL — SIGNIFICANT CHANGE UP (ref 0.57–2.63)
LDH SERPL L TO P-CCNC: 189 U/L — SIGNIFICANT CHANGE UP (ref 50–242)
LDH SERPL L TO P-CCNC: 189 U/L — SIGNIFICANT CHANGE UP (ref 50–242)
LEUKOCYTE ESTERASE UR-ACNC: ABNORMAL
LEUKOCYTE ESTERASE UR-ACNC: ABNORMAL
LEUKOCYTE ESTERASE UR-ACNC: NEGATIVE — SIGNIFICANT CHANGE UP
LEUKOCYTE ESTERASE UR-ACNC: NEGATIVE — SIGNIFICANT CHANGE UP
LIPID PNL WITH DIRECT LDL SERPL: 154 MG/DL — HIGH
LIPID PNL WITH DIRECT LDL SERPL: 36 MG/DL — SIGNIFICANT CHANGE UP
LIPID PNL WITH DIRECT LDL SERPL: 36 MG/DL — SIGNIFICANT CHANGE UP
LIPID PNL WITH DIRECT LDL SERPL: 37 MG/DL — SIGNIFICANT CHANGE UP
LIPID PNL WITH DIRECT LDL SERPL: 37 MG/DL — SIGNIFICANT CHANGE UP
LYMPHOCYTES # BLD AUTO: 1.03 K/UL — SIGNIFICANT CHANGE UP (ref 1–3.3)
LYMPHOCYTES # BLD AUTO: 1.03 K/UL — SIGNIFICANT CHANGE UP (ref 1–3.3)
LYMPHOCYTES # BLD AUTO: 1.74 K/UL — SIGNIFICANT CHANGE UP (ref 1–3.3)
LYMPHOCYTES # BLD AUTO: 1.79 K/UL — SIGNIFICANT CHANGE UP (ref 1–3.3)
LYMPHOCYTES # BLD AUTO: 1.79 K/UL — SIGNIFICANT CHANGE UP (ref 1–3.3)
LYMPHOCYTES # BLD AUTO: 1.89 K/UL — SIGNIFICANT CHANGE UP (ref 1–3.3)
LYMPHOCYTES # BLD AUTO: 1.97 K/UL — SIGNIFICANT CHANGE UP (ref 1–3.3)
LYMPHOCYTES # BLD AUTO: 1.98 K/UL — SIGNIFICANT CHANGE UP (ref 1–3.3)
LYMPHOCYTES # BLD AUTO: 18.9 % — SIGNIFICANT CHANGE UP (ref 13–44)
LYMPHOCYTES # BLD AUTO: 18.9 % — SIGNIFICANT CHANGE UP (ref 13–44)
LYMPHOCYTES # BLD AUTO: 2.07 K/UL — SIGNIFICANT CHANGE UP (ref 1–3.3)
LYMPHOCYTES # BLD AUTO: 2.22 K/UL — SIGNIFICANT CHANGE UP (ref 1–3.3)
LYMPHOCYTES # BLD AUTO: 2.22 K/UL — SIGNIFICANT CHANGE UP (ref 1–3.3)
LYMPHOCYTES # BLD AUTO: 2.26 K/UL — SIGNIFICANT CHANGE UP (ref 1–3.3)
LYMPHOCYTES # BLD AUTO: 21.2 % — SIGNIFICANT CHANGE UP (ref 13–44)
LYMPHOCYTES # BLD AUTO: 22.5 % — SIGNIFICANT CHANGE UP (ref 13–44)
LYMPHOCYTES # BLD AUTO: 22.5 % — SIGNIFICANT CHANGE UP (ref 13–44)
LYMPHOCYTES # BLD AUTO: 23.5 % — SIGNIFICANT CHANGE UP (ref 13–44)
LYMPHOCYTES # BLD AUTO: 29.6 % — SIGNIFICANT CHANGE UP (ref 13–44)
LYMPHOCYTES # BLD AUTO: 31.6 % — SIGNIFICANT CHANGE UP (ref 13–44)
LYMPHOCYTES # BLD AUTO: 33.8 % — SIGNIFICANT CHANGE UP (ref 13–44)
LYMPHOCYTES # BLD AUTO: 33.8 % — SIGNIFICANT CHANGE UP (ref 13–44)
LYMPHOCYTES # BLD AUTO: 9.3 % — LOW (ref 13–44)
LYMPHOCYTES # BLD AUTO: 9.3 % — LOW (ref 13–44)
Lab: SIGNIFICANT CHANGE UP
M TB IFN-G BLD-IMP: NEGATIVE — SIGNIFICANT CHANGE UP
M TB IFN-G BLD-IMP: NEGATIVE — SIGNIFICANT CHANGE UP
M TB IFN-G CD4+ BCKGRND COR BLD-ACNC: 0 IU/ML — SIGNIFICANT CHANGE UP
M TB IFN-G CD4+ BCKGRND COR BLD-ACNC: 0 IU/ML — SIGNIFICANT CHANGE UP
M TB IFN-G CD4+CD8+ BCKGRND COR BLD-ACNC: 0 IU/ML — SIGNIFICANT CHANGE UP
M TB IFN-G CD4+CD8+ BCKGRND COR BLD-ACNC: 0 IU/ML — SIGNIFICANT CHANGE UP
MAGNESIUM SERPL-MCNC: 1.9 MG/DL — SIGNIFICANT CHANGE UP (ref 1.6–2.6)
MAGNESIUM SERPL-MCNC: 2 MG/DL — SIGNIFICANT CHANGE UP (ref 1.6–2.6)
MAGNESIUM SERPL-MCNC: 2 MG/DL — SIGNIFICANT CHANGE UP (ref 1.6–2.6)
MAGNESIUM SERPL-MCNC: 2.2 MG/DL — SIGNIFICANT CHANGE UP (ref 1.6–2.6)
MCHC RBC-ENTMCNC: 28.4 PG — SIGNIFICANT CHANGE UP (ref 27–34)
MCHC RBC-ENTMCNC: 28.7 PG — SIGNIFICANT CHANGE UP (ref 27–34)
MCHC RBC-ENTMCNC: 28.8 PG — SIGNIFICANT CHANGE UP (ref 27–34)
MCHC RBC-ENTMCNC: 28.9 PG — SIGNIFICANT CHANGE UP (ref 27–34)
MCHC RBC-ENTMCNC: 29 PG — SIGNIFICANT CHANGE UP (ref 27–34)
MCHC RBC-ENTMCNC: 29.1 PG — SIGNIFICANT CHANGE UP (ref 27–34)
MCHC RBC-ENTMCNC: 29.1 PG — SIGNIFICANT CHANGE UP (ref 27–34)
MCHC RBC-ENTMCNC: 29.2 PG — SIGNIFICANT CHANGE UP (ref 27–34)
MCHC RBC-ENTMCNC: 29.2 PG — SIGNIFICANT CHANGE UP (ref 27–34)
MCHC RBC-ENTMCNC: 29.3 PG — SIGNIFICANT CHANGE UP (ref 27–34)
MCHC RBC-ENTMCNC: 29.4 PG — SIGNIFICANT CHANGE UP (ref 27–34)
MCHC RBC-ENTMCNC: 29.5 PG — SIGNIFICANT CHANGE UP (ref 27–34)
MCHC RBC-ENTMCNC: 29.5 PG — SIGNIFICANT CHANGE UP (ref 27–34)
MCHC RBC-ENTMCNC: 29.6 PG — SIGNIFICANT CHANGE UP (ref 27–34)
MCHC RBC-ENTMCNC: 29.7 PG — SIGNIFICANT CHANGE UP (ref 27–34)
MCHC RBC-ENTMCNC: 29.8 PG — SIGNIFICANT CHANGE UP (ref 27–34)
MCHC RBC-ENTMCNC: 29.8 PG — SIGNIFICANT CHANGE UP (ref 27–34)
MCHC RBC-ENTMCNC: 29.9 PG — SIGNIFICANT CHANGE UP (ref 27–34)
MCHC RBC-ENTMCNC: 30 PG — SIGNIFICANT CHANGE UP (ref 27–34)
MCHC RBC-ENTMCNC: 30.2 PG — SIGNIFICANT CHANGE UP (ref 27–34)
MCHC RBC-ENTMCNC: 30.3 PG — SIGNIFICANT CHANGE UP (ref 27–34)
MCHC RBC-ENTMCNC: 32.6 GM/DL — SIGNIFICANT CHANGE UP (ref 32–36)
MCHC RBC-ENTMCNC: 32.8 GM/DL — SIGNIFICANT CHANGE UP (ref 32–36)
MCHC RBC-ENTMCNC: 32.9 GM/DL — SIGNIFICANT CHANGE UP (ref 32–36)
MCHC RBC-ENTMCNC: 33.1 GM/DL — SIGNIFICANT CHANGE UP (ref 32–36)
MCHC RBC-ENTMCNC: 33.1 GM/DL — SIGNIFICANT CHANGE UP (ref 32–36)
MCHC RBC-ENTMCNC: 33.3 GM/DL — SIGNIFICANT CHANGE UP (ref 32–36)
MCHC RBC-ENTMCNC: 33.5 GM/DL — SIGNIFICANT CHANGE UP (ref 32–36)
MCHC RBC-ENTMCNC: 33.5 GM/DL — SIGNIFICANT CHANGE UP (ref 32–36)
MCHC RBC-ENTMCNC: 34 GM/DL — SIGNIFICANT CHANGE UP (ref 32–36)
MCHC RBC-ENTMCNC: 34.1 GM/DL — SIGNIFICANT CHANGE UP (ref 32–36)
MCHC RBC-ENTMCNC: 34.2 GM/DL — SIGNIFICANT CHANGE UP (ref 32–36)
MCHC RBC-ENTMCNC: 34.3 GM/DL — SIGNIFICANT CHANGE UP (ref 32–36)
MCHC RBC-ENTMCNC: 34.4 GM/DL — SIGNIFICANT CHANGE UP (ref 32–36)
MCHC RBC-ENTMCNC: 34.4 GM/DL — SIGNIFICANT CHANGE UP (ref 32–36)
MCHC RBC-ENTMCNC: 34.5 GM/DL — SIGNIFICANT CHANGE UP (ref 32–36)
MCHC RBC-ENTMCNC: 34.6 GM/DL — SIGNIFICANT CHANGE UP (ref 32–36)
MCHC RBC-ENTMCNC: 34.6 GM/DL — SIGNIFICANT CHANGE UP (ref 32–36)
MCHC RBC-ENTMCNC: 34.7 GM/DL — SIGNIFICANT CHANGE UP (ref 32–36)
MCHC RBC-ENTMCNC: 34.8 GM/DL — SIGNIFICANT CHANGE UP (ref 32–36)
MCHC RBC-ENTMCNC: 34.9 GM/DL — SIGNIFICANT CHANGE UP (ref 32–36)
MCHC RBC-ENTMCNC: 34.9 GM/DL — SIGNIFICANT CHANGE UP (ref 32–36)
MCHC RBC-ENTMCNC: 35 GM/DL — SIGNIFICANT CHANGE UP (ref 32–36)
MCHC RBC-ENTMCNC: 35.6 GM/DL — SIGNIFICANT CHANGE UP (ref 32–36)
MCHC RBC-ENTMCNC: 35.6 GM/DL — SIGNIFICANT CHANGE UP (ref 32–36)
MCV RBC AUTO: 83 FL — SIGNIFICANT CHANGE UP (ref 80–100)
MCV RBC AUTO: 83.1 FL — SIGNIFICANT CHANGE UP (ref 80–100)
MCV RBC AUTO: 83.3 FL — SIGNIFICANT CHANGE UP (ref 80–100)
MCV RBC AUTO: 84.4 FL — SIGNIFICANT CHANGE UP (ref 80–100)
MCV RBC AUTO: 84.6 FL — SIGNIFICANT CHANGE UP (ref 80–100)
MCV RBC AUTO: 84.6 FL — SIGNIFICANT CHANGE UP (ref 80–100)
MCV RBC AUTO: 85.4 FL — SIGNIFICANT CHANGE UP (ref 80–100)
MCV RBC AUTO: 85.4 FL — SIGNIFICANT CHANGE UP (ref 80–100)
MCV RBC AUTO: 85.5 FL — SIGNIFICANT CHANGE UP (ref 80–100)
MCV RBC AUTO: 85.6 FL — SIGNIFICANT CHANGE UP (ref 80–100)
MCV RBC AUTO: 85.6 FL — SIGNIFICANT CHANGE UP (ref 80–100)
MCV RBC AUTO: 85.8 FL — SIGNIFICANT CHANGE UP (ref 80–100)
MCV RBC AUTO: 85.8 FL — SIGNIFICANT CHANGE UP (ref 80–100)
MCV RBC AUTO: 85.9 FL — SIGNIFICANT CHANGE UP (ref 80–100)
MCV RBC AUTO: 86.4 FL — SIGNIFICANT CHANGE UP (ref 80–100)
MCV RBC AUTO: 86.6 FL — SIGNIFICANT CHANGE UP (ref 80–100)
MCV RBC AUTO: 86.6 FL — SIGNIFICANT CHANGE UP (ref 80–100)
MCV RBC AUTO: 86.7 FL — SIGNIFICANT CHANGE UP (ref 80–100)
MCV RBC AUTO: 86.7 FL — SIGNIFICANT CHANGE UP (ref 80–100)
MCV RBC AUTO: 86.8 FL — SIGNIFICANT CHANGE UP (ref 80–100)
MCV RBC AUTO: 86.8 FL — SIGNIFICANT CHANGE UP (ref 80–100)
MCV RBC AUTO: 86.9 FL — SIGNIFICANT CHANGE UP (ref 80–100)
MCV RBC AUTO: 86.9 FL — SIGNIFICANT CHANGE UP (ref 80–100)
MCV RBC AUTO: 87 FL — SIGNIFICANT CHANGE UP (ref 80–100)
MCV RBC AUTO: 87 FL — SIGNIFICANT CHANGE UP (ref 80–100)
MCV RBC AUTO: 87.2 FL — SIGNIFICANT CHANGE UP (ref 80–100)
MCV RBC AUTO: 87.4 FL — SIGNIFICANT CHANGE UP (ref 80–100)
MCV RBC AUTO: 87.4 FL — SIGNIFICANT CHANGE UP (ref 80–100)
MCV RBC AUTO: 87.7 FL — SIGNIFICANT CHANGE UP (ref 80–100)
MCV RBC AUTO: 87.7 FL — SIGNIFICANT CHANGE UP (ref 80–100)
MCV RBC AUTO: 87.9 FL — SIGNIFICANT CHANGE UP (ref 80–100)
MCV RBC AUTO: 87.9 FL — SIGNIFICANT CHANGE UP (ref 80–100)
MCV RBC AUTO: 88.3 FL — SIGNIFICANT CHANGE UP (ref 80–100)
MCV RBC AUTO: 88.7 FL — SIGNIFICANT CHANGE UP (ref 80–100)
MCV RBC AUTO: 88.7 FL — SIGNIFICANT CHANGE UP (ref 80–100)
MCV RBC AUTO: 89 FL — SIGNIFICANT CHANGE UP (ref 80–100)
MCV RBC AUTO: 90 FL — SIGNIFICANT CHANGE UP (ref 80–100)
MCV RBC AUTO: 90 FL — SIGNIFICANT CHANGE UP (ref 80–100)
MCV RBC AUTO: 90.1 FL — SIGNIFICANT CHANGE UP (ref 80–100)
MCV RBC AUTO: 90.1 FL — SIGNIFICANT CHANGE UP (ref 80–100)
MCV RBC AUTO: 90.4 FL — SIGNIFICANT CHANGE UP (ref 80–100)
MCV RBC AUTO: 90.4 FL — SIGNIFICANT CHANGE UP (ref 80–100)
MCV RBC AUTO: 90.6 FL — SIGNIFICANT CHANGE UP (ref 80–100)
MCV RBC AUTO: 90.6 FL — SIGNIFICANT CHANGE UP (ref 80–100)
METHADONE UR-MCNC: NEGATIVE NG/ML — SIGNIFICANT CHANGE UP
METHADONE UR-MCNC: NEGATIVE — SIGNIFICANT CHANGE UP
METHADONE UR-MCNC: NEGATIVE — SIGNIFICANT CHANGE UP
MONOCYTES # BLD AUTO: 0.37 K/UL — SIGNIFICANT CHANGE UP (ref 0–0.9)
MONOCYTES # BLD AUTO: 0.37 K/UL — SIGNIFICANT CHANGE UP (ref 0–0.9)
MONOCYTES # BLD AUTO: 0.4 K/UL — SIGNIFICANT CHANGE UP (ref 0–0.9)
MONOCYTES # BLD AUTO: 0.42 K/UL — SIGNIFICANT CHANGE UP (ref 0–0.9)
MONOCYTES # BLD AUTO: 0.47 K/UL — SIGNIFICANT CHANGE UP (ref 0–0.9)
MONOCYTES # BLD AUTO: 0.47 K/UL — SIGNIFICANT CHANGE UP (ref 0–0.9)
MONOCYTES # BLD AUTO: 0.61 K/UL — SIGNIFICANT CHANGE UP (ref 0–0.9)
MONOCYTES # BLD AUTO: 0.61 K/UL — SIGNIFICANT CHANGE UP (ref 0–0.9)
MONOCYTES # BLD AUTO: 0.79 K/UL — SIGNIFICANT CHANGE UP (ref 0–0.9)
MONOCYTES # BLD AUTO: 0.79 K/UL — SIGNIFICANT CHANGE UP (ref 0–0.9)
MONOCYTES # BLD AUTO: 0.81 K/UL — SIGNIFICANT CHANGE UP (ref 0–0.9)
MONOCYTES # BLD AUTO: 0.81 K/UL — SIGNIFICANT CHANGE UP (ref 0–0.9)
MONOCYTES NFR BLD AUTO: 4.7 % — SIGNIFICANT CHANGE UP (ref 2–14)
MONOCYTES NFR BLD AUTO: 5 % — SIGNIFICANT CHANGE UP (ref 2–14)
MONOCYTES NFR BLD AUTO: 6 % — SIGNIFICANT CHANGE UP (ref 2–14)
MONOCYTES NFR BLD AUTO: 6.2 % — SIGNIFICANT CHANGE UP (ref 2–14)
MONOCYTES NFR BLD AUTO: 6.2 % — SIGNIFICANT CHANGE UP (ref 2–14)
MONOCYTES NFR BLD AUTO: 6.3 % — SIGNIFICANT CHANGE UP (ref 2–14)
MONOCYTES NFR BLD AUTO: 7 % — SIGNIFICANT CHANGE UP (ref 2–14)
MONOCYTES NFR BLD AUTO: 7.2 % — SIGNIFICANT CHANGE UP (ref 2–14)
MONOCYTES NFR BLD AUTO: 7.3 % — SIGNIFICANT CHANGE UP (ref 2–14)
MONOCYTES NFR BLD AUTO: 7.3 % — SIGNIFICANT CHANGE UP (ref 2–14)
MONOCYTES NFR BLD AUTO: 8.4 % — SIGNIFICANT CHANGE UP (ref 2–14)
MONOCYTES NFR BLD AUTO: 8.4 % — SIGNIFICANT CHANGE UP (ref 2–14)
NEUTROPHILS # BLD AUTO: 3.13 K/UL — SIGNIFICANT CHANGE UP (ref 1.8–7.4)
NEUTROPHILS # BLD AUTO: 3.53 K/UL — SIGNIFICANT CHANGE UP (ref 1.8–7.4)
NEUTROPHILS # BLD AUTO: 3.58 K/UL — SIGNIFICANT CHANGE UP (ref 1.8–7.4)
NEUTROPHILS # BLD AUTO: 4 K/UL — SIGNIFICANT CHANGE UP (ref 1.8–7.4)
NEUTROPHILS # BLD AUTO: 4.88 K/UL — SIGNIFICANT CHANGE UP (ref 1.8–7.4)
NEUTROPHILS # BLD AUTO: 6.34 K/UL — SIGNIFICANT CHANGE UP (ref 1.8–7.4)
NEUTROPHILS # BLD AUTO: 6.66 K/UL — SIGNIFICANT CHANGE UP (ref 1.8–7.4)
NEUTROPHILS # BLD AUTO: 6.66 K/UL — SIGNIFICANT CHANGE UP (ref 1.8–7.4)
NEUTROPHILS # BLD AUTO: 6.82 K/UL — SIGNIFICANT CHANGE UP (ref 1.8–7.4)
NEUTROPHILS # BLD AUTO: 6.82 K/UL — SIGNIFICANT CHANGE UP (ref 1.8–7.4)
NEUTROPHILS # BLD AUTO: 9 K/UL — HIGH (ref 1.8–7.4)
NEUTROPHILS # BLD AUTO: 9 K/UL — HIGH (ref 1.8–7.4)
NEUTROPHILS NFR BLD AUTO: 52.8 % — SIGNIFICANT CHANGE UP (ref 43–77)
NEUTROPHILS NFR BLD AUTO: 53.7 % — SIGNIFICANT CHANGE UP (ref 43–77)
NEUTROPHILS NFR BLD AUTO: 54.5 % — SIGNIFICANT CHANGE UP (ref 43–77)
NEUTROPHILS NFR BLD AUTO: 60 % — SIGNIFICANT CHANGE UP (ref 43–77)
NEUTROPHILS NFR BLD AUTO: 66.1 % — SIGNIFICANT CHANGE UP (ref 43–77)
NEUTROPHILS NFR BLD AUTO: 69.1 % — SIGNIFICANT CHANGE UP (ref 43–77)
NEUTROPHILS NFR BLD AUTO: 69.1 % — SIGNIFICANT CHANGE UP (ref 43–77)
NEUTROPHILS NFR BLD AUTO: 70.5 % — SIGNIFICANT CHANGE UP (ref 43–77)
NEUTROPHILS NFR BLD AUTO: 70.5 % — SIGNIFICANT CHANGE UP (ref 43–77)
NEUTROPHILS NFR BLD AUTO: 71.2 % — SIGNIFICANT CHANGE UP (ref 43–77)
NEUTROPHILS NFR BLD AUTO: 81.5 % — HIGH (ref 43–77)
NEUTROPHILS NFR BLD AUTO: 81.5 % — HIGH (ref 43–77)
NITRITE UR-MCNC: NEGATIVE — SIGNIFICANT CHANGE UP
NON HDL CHOLESTEROL: 198 MG/DL — HIGH
NON HDL CHOLESTEROL: 52 MG/DL — SIGNIFICANT CHANGE UP
NON HDL CHOLESTEROL: 52 MG/DL — SIGNIFICANT CHANGE UP
NON HDL CHOLESTEROL: 59 MG/DL — SIGNIFICANT CHANGE UP
NON HDL CHOLESTEROL: 59 MG/DL — SIGNIFICANT CHANGE UP
NRBC # BLD: 0 /100 WBCS — SIGNIFICANT CHANGE UP (ref 0–0)
OPIATES UR-MCNC: NEGATIVE NG/ML — SIGNIFICANT CHANGE UP
OPIATES UR-MCNC: NEGATIVE — SIGNIFICANT CHANGE UP
OPIATES UR-MCNC: NEGATIVE — SIGNIFICANT CHANGE UP
OXYCODONE UR QL SCN: NEGATIVE NG/ML — SIGNIFICANT CHANGE UP
OXYCODONE UR-MCNC: NEGATIVE — SIGNIFICANT CHANGE UP
OXYCODONE UR-MCNC: NEGATIVE — SIGNIFICANT CHANGE UP
PCO2 BLDV: 44 MMHG — SIGNIFICANT CHANGE UP (ref 42–55)
PCP SPEC-MCNC: SIGNIFICANT CHANGE UP
PCP SPEC-MCNC: SIGNIFICANT CHANGE UP
PCP UR-MCNC: NEGATIVE NG/ML — SIGNIFICANT CHANGE UP
PCP UR-MCNC: NEGATIVE — SIGNIFICANT CHANGE UP
PCP UR-MCNC: NEGATIVE — SIGNIFICANT CHANGE UP
PH BLDV: 7.39 — SIGNIFICANT CHANGE UP (ref 7.32–7.43)
PH UR: 5.5 — SIGNIFICANT CHANGE UP (ref 5–8)
PH, URINE RESULT: 5.4 — SIGNIFICANT CHANGE UP (ref 4.5–8.9)
PHOSPHATE SERPL-MCNC: 2.5 MG/DL — SIGNIFICANT CHANGE UP (ref 2.5–4.5)
PHOSPHATE SERPL-MCNC: 2.5 MG/DL — SIGNIFICANT CHANGE UP (ref 2.5–4.5)
PHOSPHATE SERPL-MCNC: 3.5 MG/DL — SIGNIFICANT CHANGE UP (ref 2.5–4.5)
PHOSPHATE SERPL-MCNC: 3.5 MG/DL — SIGNIFICANT CHANGE UP (ref 2.5–4.5)
PHOSPHATE SERPL-MCNC: 3.8 MG/DL — SIGNIFICANT CHANGE UP (ref 2.5–4.5)
PHOSPHATE SERPL-MCNC: 3.8 MG/DL — SIGNIFICANT CHANGE UP (ref 2.5–4.5)
PHOSPHATE SERPL-MCNC: 4 MG/DL — SIGNIFICANT CHANGE UP (ref 2.5–4.5)
PHOSPHATE SERPL-MCNC: 4.6 MG/DL — HIGH (ref 2.5–4.5)
PHOSPHATE SERPL-MCNC: 4.6 MG/DL — HIGH (ref 2.5–4.5)
PLATELET # BLD AUTO: 114 K/UL — LOW (ref 150–400)
PLATELET # BLD AUTO: 177 K/UL — SIGNIFICANT CHANGE UP (ref 150–400)
PLATELET # BLD AUTO: 177 K/UL — SIGNIFICANT CHANGE UP (ref 150–400)
PLATELET # BLD AUTO: 180 K/UL — SIGNIFICANT CHANGE UP (ref 150–400)
PLATELET # BLD AUTO: 180 K/UL — SIGNIFICANT CHANGE UP (ref 150–400)
PLATELET # BLD AUTO: 181 K/UL — SIGNIFICANT CHANGE UP (ref 150–400)
PLATELET # BLD AUTO: 184 K/UL — SIGNIFICANT CHANGE UP (ref 150–400)
PLATELET # BLD AUTO: 184 K/UL — SIGNIFICANT CHANGE UP (ref 150–400)
PLATELET # BLD AUTO: 187 K/UL — SIGNIFICANT CHANGE UP (ref 150–400)
PLATELET # BLD AUTO: 187 K/UL — SIGNIFICANT CHANGE UP (ref 150–400)
PLATELET # BLD AUTO: 190 K/UL — SIGNIFICANT CHANGE UP (ref 150–400)
PLATELET # BLD AUTO: 190 K/UL — SIGNIFICANT CHANGE UP (ref 150–400)
PLATELET # BLD AUTO: 191 K/UL — SIGNIFICANT CHANGE UP (ref 150–400)
PLATELET # BLD AUTO: 191 K/UL — SIGNIFICANT CHANGE UP (ref 150–400)
PLATELET # BLD AUTO: 193 K/UL — SIGNIFICANT CHANGE UP (ref 150–400)
PLATELET # BLD AUTO: 196 K/UL — SIGNIFICANT CHANGE UP (ref 150–400)
PLATELET # BLD AUTO: 196 K/UL — SIGNIFICANT CHANGE UP (ref 150–400)
PLATELET # BLD AUTO: 198 K/UL — SIGNIFICANT CHANGE UP (ref 150–400)
PLATELET # BLD AUTO: 198 K/UL — SIGNIFICANT CHANGE UP (ref 150–400)
PLATELET # BLD AUTO: 200 K/UL — SIGNIFICANT CHANGE UP (ref 150–400)
PLATELET # BLD AUTO: 200 K/UL — SIGNIFICANT CHANGE UP (ref 150–400)
PLATELET # BLD AUTO: 205 K/UL — SIGNIFICANT CHANGE UP (ref 150–400)
PLATELET # BLD AUTO: 206 K/UL — SIGNIFICANT CHANGE UP (ref 150–400)
PLATELET # BLD AUTO: 209 K/UL — SIGNIFICANT CHANGE UP (ref 150–400)
PLATELET # BLD AUTO: 209 K/UL — SIGNIFICANT CHANGE UP (ref 150–400)
PLATELET # BLD AUTO: 210 K/UL — SIGNIFICANT CHANGE UP (ref 150–400)
PLATELET # BLD AUTO: 211 K/UL — SIGNIFICANT CHANGE UP (ref 150–400)
PLATELET # BLD AUTO: 211 K/UL — SIGNIFICANT CHANGE UP (ref 150–400)
PLATELET # BLD AUTO: 213 K/UL — SIGNIFICANT CHANGE UP (ref 150–400)
PLATELET # BLD AUTO: 213 K/UL — SIGNIFICANT CHANGE UP (ref 150–400)
PLATELET # BLD AUTO: 214 K/UL — SIGNIFICANT CHANGE UP (ref 150–400)
PLATELET # BLD AUTO: 214 K/UL — SIGNIFICANT CHANGE UP (ref 150–400)
PLATELET # BLD AUTO: 216 K/UL — SIGNIFICANT CHANGE UP (ref 150–400)
PLATELET # BLD AUTO: 222 K/UL — SIGNIFICANT CHANGE UP (ref 150–400)
PLATELET # BLD AUTO: 230 K/UL — SIGNIFICANT CHANGE UP (ref 150–400)
PLATELET # BLD AUTO: 231 K/UL — SIGNIFICANT CHANGE UP (ref 150–400)
PLATELET # BLD AUTO: 231 K/UL — SIGNIFICANT CHANGE UP (ref 150–400)
PLATELET # BLD AUTO: 232 K/UL — SIGNIFICANT CHANGE UP (ref 150–400)
PLATELET # BLD AUTO: 236 K/UL — SIGNIFICANT CHANGE UP (ref 150–400)
PLATELET # BLD AUTO: 236 K/UL — SIGNIFICANT CHANGE UP (ref 150–400)
PLATELET # BLD AUTO: 238 K/UL — SIGNIFICANT CHANGE UP (ref 150–400)
PLATELET # BLD AUTO: 238 K/UL — SIGNIFICANT CHANGE UP (ref 150–400)
PLATELET # BLD AUTO: 239 K/UL — SIGNIFICANT CHANGE UP (ref 150–400)
PLATELET # BLD AUTO: 249 K/UL — SIGNIFICANT CHANGE UP (ref 150–400)
PLATELET # BLD AUTO: 262 K/UL — SIGNIFICANT CHANGE UP (ref 150–400)
PLATELET # BLD AUTO: 262 K/UL — SIGNIFICANT CHANGE UP (ref 150–400)
PO2 BLDV: 49 MMHG — HIGH (ref 25–45)
POTASSIUM BLDV-SCNC: 4 MMOL/L — SIGNIFICANT CHANGE UP (ref 3.5–5.1)
POTASSIUM SERPL-MCNC: 3.5 MMOL/L — SIGNIFICANT CHANGE UP (ref 3.5–5.3)
POTASSIUM SERPL-MCNC: 3.5 MMOL/L — SIGNIFICANT CHANGE UP (ref 3.5–5.3)
POTASSIUM SERPL-MCNC: 3.7 MMOL/L — SIGNIFICANT CHANGE UP (ref 3.5–5.3)
POTASSIUM SERPL-MCNC: 3.8 MMOL/L — SIGNIFICANT CHANGE UP (ref 3.5–5.3)
POTASSIUM SERPL-MCNC: 3.9 MMOL/L — SIGNIFICANT CHANGE UP (ref 3.5–5.3)
POTASSIUM SERPL-MCNC: 3.9 MMOL/L — SIGNIFICANT CHANGE UP (ref 3.5–5.3)
POTASSIUM SERPL-MCNC: 4 MMOL/L — SIGNIFICANT CHANGE UP (ref 3.5–5.3)
POTASSIUM SERPL-MCNC: 4.1 MMOL/L — SIGNIFICANT CHANGE UP (ref 3.5–5.3)
POTASSIUM SERPL-MCNC: 4.2 MMOL/L — SIGNIFICANT CHANGE UP (ref 3.5–5.3)
POTASSIUM SERPL-MCNC: 4.3 MMOL/L — SIGNIFICANT CHANGE UP (ref 3.5–5.3)
POTASSIUM SERPL-MCNC: 4.4 MMOL/L — SIGNIFICANT CHANGE UP (ref 3.5–5.3)
POTASSIUM SERPL-MCNC: 4.5 MMOL/L — SIGNIFICANT CHANGE UP (ref 3.5–5.3)
POTASSIUM SERPL-MCNC: 4.7 MMOL/L — SIGNIFICANT CHANGE UP (ref 3.5–5.3)
POTASSIUM SERPL-SCNC: 3.5 MMOL/L — SIGNIFICANT CHANGE UP (ref 3.5–5.3)
POTASSIUM SERPL-SCNC: 3.5 MMOL/L — SIGNIFICANT CHANGE UP (ref 3.5–5.3)
POTASSIUM SERPL-SCNC: 3.7 MMOL/L — SIGNIFICANT CHANGE UP (ref 3.5–5.3)
POTASSIUM SERPL-SCNC: 3.8 MMOL/L — SIGNIFICANT CHANGE UP (ref 3.5–5.3)
POTASSIUM SERPL-SCNC: 3.9 MMOL/L — SIGNIFICANT CHANGE UP (ref 3.5–5.3)
POTASSIUM SERPL-SCNC: 3.9 MMOL/L — SIGNIFICANT CHANGE UP (ref 3.5–5.3)
POTASSIUM SERPL-SCNC: 4 MMOL/L — SIGNIFICANT CHANGE UP (ref 3.5–5.3)
POTASSIUM SERPL-SCNC: 4.1 MMOL/L — SIGNIFICANT CHANGE UP (ref 3.5–5.3)
POTASSIUM SERPL-SCNC: 4.2 MMOL/L — SIGNIFICANT CHANGE UP (ref 3.5–5.3)
POTASSIUM SERPL-SCNC: 4.3 MMOL/L — SIGNIFICANT CHANGE UP (ref 3.5–5.3)
POTASSIUM SERPL-SCNC: 4.4 MMOL/L — SIGNIFICANT CHANGE UP (ref 3.5–5.3)
POTASSIUM SERPL-SCNC: 4.5 MMOL/L — SIGNIFICANT CHANGE UP (ref 3.5–5.3)
POTASSIUM SERPL-SCNC: 4.7 MMOL/L — SIGNIFICANT CHANGE UP (ref 3.5–5.3)
PROCALCITONIN SERPL-MCNC: 0.04 NG/ML — SIGNIFICANT CHANGE UP
PROCALCITONIN SERPL-MCNC: 0.04 NG/ML — SIGNIFICANT CHANGE UP
PROT PATTERN SERPL ELPH-IMP: SIGNIFICANT CHANGE UP
PROT PATTERN SERPL ELPH-IMP: SIGNIFICANT CHANGE UP
PROT SERPL-MCNC: 6.1 G/DL — SIGNIFICANT CHANGE UP (ref 6–8.3)
PROT SERPL-MCNC: 6.1 G/DL — SIGNIFICANT CHANGE UP (ref 6–8.3)
PROT SERPL-MCNC: 6.2 G/DL — SIGNIFICANT CHANGE UP (ref 6–8.3)
PROT SERPL-MCNC: 6.4 G/DL — SIGNIFICANT CHANGE UP (ref 6–8.3)
PROT SERPL-MCNC: 6.5 G/DL — SIGNIFICANT CHANGE UP (ref 6–8.3)
PROT SERPL-MCNC: 6.6 G/DL — SIGNIFICANT CHANGE UP (ref 6–8.3)
PROT SERPL-MCNC: 6.7 G/DL — SIGNIFICANT CHANGE UP (ref 6–8.3)
PROT SERPL-MCNC: 6.7 G/DL — SIGNIFICANT CHANGE UP (ref 6–8.3)
PROT SERPL-MCNC: 6.8 G/DL — SIGNIFICANT CHANGE UP (ref 6–8.3)
PROT SERPL-MCNC: 7 G/DL — SIGNIFICANT CHANGE UP (ref 6–8.3)
PROT SERPL-MCNC: 7.2 G/DL — SIGNIFICANT CHANGE UP (ref 6–8.3)
PROT SERPL-MCNC: 7.4 G/DL — SIGNIFICANT CHANGE UP (ref 6–8.3)
PROT SERPL-MCNC: 7.4 G/DL — SIGNIFICANT CHANGE UP (ref 6–8.3)
PROT SERPL-MCNC: 7.5 G/DL — SIGNIFICANT CHANGE UP (ref 6–8.3)
PROT UR-MCNC: NEGATIVE MG/DL — SIGNIFICANT CHANGE UP
PROT UR-MCNC: NEGATIVE MG/DL — SIGNIFICANT CHANGE UP
PROT UR-MCNC: SIGNIFICANT CHANGE UP MG/DL
PROT UR-MCNC: SIGNIFICANT CHANGE UP MG/DL
PROTHROM AB SERPL-ACNC: 11 SEC — SIGNIFICANT CHANGE UP (ref 9.5–13)
PROTHROM AB SERPL-ACNC: 11 SEC — SIGNIFICANT CHANGE UP (ref 9.5–13)
PROTHROM AB SERPL-ACNC: 11.4 SEC — SIGNIFICANT CHANGE UP (ref 10.5–13.4)
PROTHROM AB SERPL-ACNC: 12.5 SEC — SIGNIFICANT CHANGE UP (ref 9.5–13)
PROTHROM AB SERPL-ACNC: 12.5 SEC — SIGNIFICANT CHANGE UP (ref 9.5–13)
QUANT TB PLUS MITOGEN MINUS NIL: >10 IU/ML — SIGNIFICANT CHANGE UP
QUANT TB PLUS MITOGEN MINUS NIL: >10 IU/ML — SIGNIFICANT CHANGE UP
RBC # BLD: 3.89 M/UL — LOW (ref 4.2–5.8)
RBC # BLD: 3.89 M/UL — LOW (ref 4.2–5.8)
RBC # BLD: 3.9 M/UL — LOW (ref 4.2–5.8)
RBC # BLD: 3.9 M/UL — LOW (ref 4.2–5.8)
RBC # BLD: 3.94 M/UL — LOW (ref 4.2–5.8)
RBC # BLD: 4.03 M/UL — LOW (ref 4.2–5.8)
RBC # BLD: 4.03 M/UL — LOW (ref 4.2–5.8)
RBC # BLD: 4.04 M/UL — LOW (ref 4.2–5.8)
RBC # BLD: 4.04 M/UL — LOW (ref 4.2–5.8)
RBC # BLD: 4.06 M/UL — LOW (ref 4.2–5.8)
RBC # BLD: 4.06 M/UL — LOW (ref 4.2–5.8)
RBC # BLD: 4.09 M/UL — LOW (ref 4.2–5.8)
RBC # BLD: 4.09 M/UL — LOW (ref 4.2–5.8)
RBC # BLD: 4.13 M/UL — LOW (ref 4.2–5.8)
RBC # BLD: 4.13 M/UL — LOW (ref 4.2–5.8)
RBC # BLD: 4.14 M/UL — LOW (ref 4.2–5.8)
RBC # BLD: 4.14 M/UL — LOW (ref 4.2–5.8)
RBC # BLD: 4.18 M/UL — LOW (ref 4.2–5.8)
RBC # BLD: 4.18 M/UL — LOW (ref 4.2–5.8)
RBC # BLD: 4.21 M/UL — SIGNIFICANT CHANGE UP (ref 4.2–5.8)
RBC # BLD: 4.21 M/UL — SIGNIFICANT CHANGE UP (ref 4.2–5.8)
RBC # BLD: 4.37 M/UL — SIGNIFICANT CHANGE UP (ref 4.2–5.8)
RBC # BLD: 4.37 M/UL — SIGNIFICANT CHANGE UP (ref 4.2–5.8)
RBC # BLD: 4.42 M/UL — SIGNIFICANT CHANGE UP (ref 4.2–5.8)
RBC # BLD: 4.42 M/UL — SIGNIFICANT CHANGE UP (ref 4.2–5.8)
RBC # BLD: 4.54 M/UL — SIGNIFICANT CHANGE UP (ref 4.2–5.8)
RBC # BLD: 4.54 M/UL — SIGNIFICANT CHANGE UP (ref 4.2–5.8)
RBC # BLD: 4.67 M/UL — SIGNIFICANT CHANGE UP (ref 4.2–5.8)
RBC # BLD: 4.67 M/UL — SIGNIFICANT CHANGE UP (ref 4.2–5.8)
RBC # BLD: 4.69 M/UL — SIGNIFICANT CHANGE UP (ref 4.2–5.8)
RBC # BLD: 4.71 M/UL — SIGNIFICANT CHANGE UP (ref 4.2–5.8)
RBC # BLD: 4.71 M/UL — SIGNIFICANT CHANGE UP (ref 4.2–5.8)
RBC # BLD: 4.74 M/UL — SIGNIFICANT CHANGE UP (ref 4.2–5.8)
RBC # BLD: 4.81 M/UL — SIGNIFICANT CHANGE UP (ref 4.2–5.8)
RBC # BLD: 4.82 M/UL — SIGNIFICANT CHANGE UP (ref 4.2–5.8)
RBC # BLD: 4.82 M/UL — SIGNIFICANT CHANGE UP (ref 4.2–5.8)
RBC # BLD: 4.91 M/UL — SIGNIFICANT CHANGE UP (ref 4.2–5.8)
RBC # BLD: 4.94 M/UL — SIGNIFICANT CHANGE UP (ref 4.2–5.8)
RBC # BLD: 4.94 M/UL — SIGNIFICANT CHANGE UP (ref 4.2–5.8)
RBC # BLD: 4.97 M/UL — SIGNIFICANT CHANGE UP (ref 4.2–5.8)
RBC # BLD: 4.97 M/UL — SIGNIFICANT CHANGE UP (ref 4.2–5.8)
RBC # BLD: 5.06 M/UL — SIGNIFICANT CHANGE UP (ref 4.2–5.8)
RBC # BLD: 5.06 M/UL — SIGNIFICANT CHANGE UP (ref 4.2–5.8)
RBC # BLD: 5.22 M/UL — SIGNIFICANT CHANGE UP (ref 4.2–5.8)
RBC # BLD: 5.22 M/UL — SIGNIFICANT CHANGE UP (ref 4.2–5.8)
RBC # BLD: 5.45 M/UL — SIGNIFICANT CHANGE UP (ref 4.2–5.8)
RBC # BLD: 5.49 M/UL — SIGNIFICANT CHANGE UP (ref 4.2–5.8)
RBC # BLD: 5.5 M/UL — SIGNIFICANT CHANGE UP (ref 4.2–5.8)
RBC # BLD: 5.82 M/UL — HIGH (ref 4.2–5.8)
RBC # FLD: 12.1 % — SIGNIFICANT CHANGE UP (ref 10.3–14.5)
RBC # FLD: 12.2 % — SIGNIFICANT CHANGE UP (ref 10.3–14.5)
RBC # FLD: 12.3 % — SIGNIFICANT CHANGE UP (ref 10.3–14.5)
RBC # FLD: 12.4 % — SIGNIFICANT CHANGE UP (ref 10.3–14.5)
RBC # FLD: 12.5 % — SIGNIFICANT CHANGE UP (ref 10.3–14.5)
RBC # FLD: 12.6 % — SIGNIFICANT CHANGE UP (ref 10.3–14.5)
RBC # FLD: 12.7 % — SIGNIFICANT CHANGE UP (ref 10.3–14.5)
RBC # FLD: 12.8 % — SIGNIFICANT CHANGE UP (ref 10.3–14.5)
RBC # FLD: 12.9 % — SIGNIFICANT CHANGE UP (ref 10.3–14.5)
RBC # FLD: 12.9 % — SIGNIFICANT CHANGE UP (ref 10.3–14.5)
RBC # FLD: 13 % — SIGNIFICANT CHANGE UP (ref 10.3–14.5)
RBC # FLD: 13 % — SIGNIFICANT CHANGE UP (ref 10.3–14.5)
RBC # FLD: 13.2 % — SIGNIFICANT CHANGE UP (ref 10.3–14.5)
RBC CASTS # UR COMP ASSIST: 1 /HPF — SIGNIFICANT CHANGE UP (ref 0–4)
RBC CASTS # UR COMP ASSIST: 1 /HPF — SIGNIFICANT CHANGE UP (ref 0–4)
RBC CASTS # UR COMP ASSIST: 2 /HPF — SIGNIFICANT CHANGE UP (ref 0–4)
RBC CASTS # UR COMP ASSIST: 2 /HPF — SIGNIFICANT CHANGE UP (ref 0–4)
RH IG SCN BLD-IMP: POSITIVE — SIGNIFICANT CHANGE UP
SALICYLATES SERPL-MCNC: <2 MG/DL — LOW (ref 15–30)
SAO2 % BLDV: 82.7 % — SIGNIFICANT CHANGE UP (ref 67–88)
SARS-COV-2 RNA SPEC QL NAA+PROBE: SIGNIFICANT CHANGE UP
SARS-COV-2 RNA SPEC QL NAA+PROBE: SIGNIFICANT CHANGE UP
SODIUM SERPL-SCNC: 136 MMOL/L — SIGNIFICANT CHANGE UP (ref 135–145)
SODIUM SERPL-SCNC: 138 MMOL/L — SIGNIFICANT CHANGE UP (ref 135–145)
SODIUM SERPL-SCNC: 138 MMOL/L — SIGNIFICANT CHANGE UP (ref 135–145)
SODIUM SERPL-SCNC: 140 MMOL/L — SIGNIFICANT CHANGE UP (ref 135–145)
SODIUM SERPL-SCNC: 141 MMOL/L — SIGNIFICANT CHANGE UP (ref 135–145)
SODIUM SERPL-SCNC: 142 MMOL/L — SIGNIFICANT CHANGE UP (ref 135–145)
SODIUM SERPL-SCNC: 143 MMOL/L — SIGNIFICANT CHANGE UP (ref 135–145)
SODIUM SERPL-SCNC: 144 MMOL/L — SIGNIFICANT CHANGE UP (ref 135–145)
SODIUM SERPL-SCNC: 144 MMOL/L — SIGNIFICANT CHANGE UP (ref 135–145)
SODIUM SERPL-SCNC: 145 MMOL/L — SIGNIFICANT CHANGE UP (ref 135–145)
SODIUM SERPL-SCNC: 145 MMOL/L — SIGNIFICANT CHANGE UP (ref 135–145)
SP GR SPEC: 1.02 — SIGNIFICANT CHANGE UP (ref 1–1.03)
SP GR SPEC: 1.02 — SIGNIFICANT CHANGE UP (ref 1–1.03)
SP GR SPEC: >1.03 — HIGH (ref 1–1.03)
SP GR SPEC: >1.03 — HIGH (ref 1–1.03)
SPECIMEN SOURCE: SIGNIFICANT CHANGE UP
SQUAMOUS # UR AUTO: 0 /HPF — SIGNIFICANT CHANGE UP (ref 0–5)
SQUAMOUS # UR AUTO: 0 /HPF — SIGNIFICANT CHANGE UP (ref 0–5)
THC UR QL: POSITIVE
THC UR QL: POSITIVE
THC UR QL: SIGNIFICANT CHANGE UP NG/ML
TIBC SERPL-MCNC: 234 UG/DL — SIGNIFICANT CHANGE UP (ref 220–430)
TIBC SERPL-MCNC: 234 UG/DL — SIGNIFICANT CHANGE UP (ref 220–430)
TRIGL SERPL-MCNC: 119 MG/DL — SIGNIFICANT CHANGE UP
TRIGL SERPL-MCNC: 119 MG/DL — SIGNIFICANT CHANGE UP
TRIGL SERPL-MCNC: 240 MG/DL — HIGH
TRIGL SERPL-MCNC: 73 MG/DL — SIGNIFICANT CHANGE UP
TRIGL SERPL-MCNC: 73 MG/DL — SIGNIFICANT CHANGE UP
TROPONIN T, HIGH SENSITIVITY RESULT: 12 NG/L — SIGNIFICANT CHANGE UP (ref 0–51)
TROPONIN T, HIGH SENSITIVITY RESULT: 12 NG/L — SIGNIFICANT CHANGE UP (ref 0–51)
TROPONIN T, HIGH SENSITIVITY RESULT: 8 NG/L — SIGNIFICANT CHANGE UP (ref 0–51)
TROPONIN T, HIGH SENSITIVITY RESULT: 8 NG/L — SIGNIFICANT CHANGE UP (ref 0–51)
TSH SERPL-MCNC: 1.17 UIU/ML — SIGNIFICANT CHANGE UP (ref 0.27–4.2)
TSH SERPL-MCNC: 1.33 UIU/ML — SIGNIFICANT CHANGE UP (ref 0.27–4.2)
TSH SERPL-MCNC: 1.33 UIU/ML — SIGNIFICANT CHANGE UP (ref 0.27–4.2)
UFH PPP CHRO-ACNC: <0.04 IU/ML — LOW (ref 0.3–0.7)
UFH PPP CHRO-ACNC: <0.04 IU/ML — LOW (ref 0.3–0.7)
UIBC SERPL-MCNC: 180 UG/DL — SIGNIFICANT CHANGE UP (ref 110–370)
UIBC SERPL-MCNC: 180 UG/DL — SIGNIFICANT CHANGE UP (ref 110–370)
UROBILINOGEN FLD QL: 1 E.U./DL — SIGNIFICANT CHANGE UP (ref 0.2–1)
UROBILINOGEN FLD QL: 1 E.U./DL — SIGNIFICANT CHANGE UP (ref 0.2–1)
UROBILINOGEN FLD QL: 1 MG/DL — SIGNIFICANT CHANGE UP (ref 0.2–1)
UROBILINOGEN FLD QL: 1 MG/DL — SIGNIFICANT CHANGE UP (ref 0.2–1)
VEGF SERPL-MCNC: <9 PG/ML — SIGNIFICANT CHANGE UP (ref 0–115)
VEGF SERPL-MCNC: <9 PG/ML — SIGNIFICANT CHANGE UP (ref 0–115)
WBC # BLD: 11.04 K/UL — HIGH (ref 3.8–10.5)
WBC # BLD: 11.04 K/UL — HIGH (ref 3.8–10.5)
WBC # BLD: 5.44 K/UL — SIGNIFICANT CHANGE UP (ref 3.8–10.5)
WBC # BLD: 5.44 K/UL — SIGNIFICANT CHANGE UP (ref 3.8–10.5)
WBC # BLD: 5.83 K/UL — SIGNIFICANT CHANGE UP (ref 3.8–10.5)
WBC # BLD: 6.05 K/UL — SIGNIFICANT CHANGE UP (ref 3.8–10.5)
WBC # BLD: 6.05 K/UL — SIGNIFICANT CHANGE UP (ref 3.8–10.5)
WBC # BLD: 6.14 K/UL — SIGNIFICANT CHANGE UP (ref 3.8–10.5)
WBC # BLD: 6.14 K/UL — SIGNIFICANT CHANGE UP (ref 3.8–10.5)
WBC # BLD: 6.25 K/UL — SIGNIFICANT CHANGE UP (ref 3.8–10.5)
WBC # BLD: 6.25 K/UL — SIGNIFICANT CHANGE UP (ref 3.8–10.5)
WBC # BLD: 6.28 K/UL — SIGNIFICANT CHANGE UP (ref 3.8–10.5)
WBC # BLD: 6.4 K/UL — SIGNIFICANT CHANGE UP (ref 3.8–10.5)
WBC # BLD: 6.4 K/UL — SIGNIFICANT CHANGE UP (ref 3.8–10.5)
WBC # BLD: 6.53 K/UL — SIGNIFICANT CHANGE UP (ref 3.8–10.5)
WBC # BLD: 6.53 K/UL — SIGNIFICANT CHANGE UP (ref 3.8–10.5)
WBC # BLD: 6.56 K/UL — SIGNIFICANT CHANGE UP (ref 3.8–10.5)
WBC # BLD: 6.68 K/UL — SIGNIFICANT CHANGE UP (ref 3.8–10.5)
WBC # BLD: 6.69 K/UL — SIGNIFICANT CHANGE UP (ref 3.8–10.5)
WBC # BLD: 7.24 K/UL — SIGNIFICANT CHANGE UP (ref 3.8–10.5)
WBC # BLD: 7.24 K/UL — SIGNIFICANT CHANGE UP (ref 3.8–10.5)
WBC # BLD: 7.26 K/UL — SIGNIFICANT CHANGE UP (ref 3.8–10.5)
WBC # BLD: 7.26 K/UL — SIGNIFICANT CHANGE UP (ref 3.8–10.5)
WBC # BLD: 7.33 K/UL — SIGNIFICANT CHANGE UP (ref 3.8–10.5)
WBC # BLD: 7.33 K/UL — SIGNIFICANT CHANGE UP (ref 3.8–10.5)
WBC # BLD: 7.39 K/UL — SIGNIFICANT CHANGE UP (ref 3.8–10.5)
WBC # BLD: 7.64 K/UL — SIGNIFICANT CHANGE UP (ref 3.8–10.5)
WBC # BLD: 7.67 K/UL — SIGNIFICANT CHANGE UP (ref 3.8–10.5)
WBC # BLD: 7.67 K/UL — SIGNIFICANT CHANGE UP (ref 3.8–10.5)
WBC # BLD: 7.71 K/UL — SIGNIFICANT CHANGE UP (ref 3.8–10.5)
WBC # BLD: 7.71 K/UL — SIGNIFICANT CHANGE UP (ref 3.8–10.5)
WBC # BLD: 7.75 K/UL — SIGNIFICANT CHANGE UP (ref 3.8–10.5)
WBC # BLD: 7.75 K/UL — SIGNIFICANT CHANGE UP (ref 3.8–10.5)
WBC # BLD: 8.09 K/UL — SIGNIFICANT CHANGE UP (ref 3.8–10.5)
WBC # BLD: 8.51 K/UL — SIGNIFICANT CHANGE UP (ref 3.8–10.5)
WBC # BLD: 8.63 K/UL — SIGNIFICANT CHANGE UP (ref 3.8–10.5)
WBC # BLD: 8.63 K/UL — SIGNIFICANT CHANGE UP (ref 3.8–10.5)
WBC # BLD: 8.88 K/UL — SIGNIFICANT CHANGE UP (ref 3.8–10.5)
WBC # BLD: 8.88 K/UL — SIGNIFICANT CHANGE UP (ref 3.8–10.5)
WBC # BLD: 8.91 K/UL — SIGNIFICANT CHANGE UP (ref 3.8–10.5)
WBC # BLD: 9.45 K/UL — SIGNIFICANT CHANGE UP (ref 3.8–10.5)
WBC # BLD: 9.45 K/UL — SIGNIFICANT CHANGE UP (ref 3.8–10.5)
WBC # BLD: 9.81 K/UL — SIGNIFICANT CHANGE UP (ref 3.8–10.5)
WBC # BLD: 9.81 K/UL — SIGNIFICANT CHANGE UP (ref 3.8–10.5)
WBC # BLD: 9.82 K/UL — SIGNIFICANT CHANGE UP (ref 3.8–10.5)
WBC # BLD: 9.87 K/UL — SIGNIFICANT CHANGE UP (ref 3.8–10.5)
WBC # BLD: 9.87 K/UL — SIGNIFICANT CHANGE UP (ref 3.8–10.5)
WBC # FLD AUTO: 11.04 K/UL — HIGH (ref 3.8–10.5)
WBC # FLD AUTO: 11.04 K/UL — HIGH (ref 3.8–10.5)
WBC # FLD AUTO: 5.44 K/UL — SIGNIFICANT CHANGE UP (ref 3.8–10.5)
WBC # FLD AUTO: 5.44 K/UL — SIGNIFICANT CHANGE UP (ref 3.8–10.5)
WBC # FLD AUTO: 5.83 K/UL — SIGNIFICANT CHANGE UP (ref 3.8–10.5)
WBC # FLD AUTO: 6.05 K/UL — SIGNIFICANT CHANGE UP (ref 3.8–10.5)
WBC # FLD AUTO: 6.05 K/UL — SIGNIFICANT CHANGE UP (ref 3.8–10.5)
WBC # FLD AUTO: 6.14 K/UL — SIGNIFICANT CHANGE UP (ref 3.8–10.5)
WBC # FLD AUTO: 6.14 K/UL — SIGNIFICANT CHANGE UP (ref 3.8–10.5)
WBC # FLD AUTO: 6.25 K/UL — SIGNIFICANT CHANGE UP (ref 3.8–10.5)
WBC # FLD AUTO: 6.25 K/UL — SIGNIFICANT CHANGE UP (ref 3.8–10.5)
WBC # FLD AUTO: 6.28 K/UL — SIGNIFICANT CHANGE UP (ref 3.8–10.5)
WBC # FLD AUTO: 6.4 K/UL — SIGNIFICANT CHANGE UP (ref 3.8–10.5)
WBC # FLD AUTO: 6.4 K/UL — SIGNIFICANT CHANGE UP (ref 3.8–10.5)
WBC # FLD AUTO: 6.53 K/UL — SIGNIFICANT CHANGE UP (ref 3.8–10.5)
WBC # FLD AUTO: 6.53 K/UL — SIGNIFICANT CHANGE UP (ref 3.8–10.5)
WBC # FLD AUTO: 6.56 K/UL — SIGNIFICANT CHANGE UP (ref 3.8–10.5)
WBC # FLD AUTO: 6.68 K/UL — SIGNIFICANT CHANGE UP (ref 3.8–10.5)
WBC # FLD AUTO: 6.69 K/UL — SIGNIFICANT CHANGE UP (ref 3.8–10.5)
WBC # FLD AUTO: 7.24 K/UL — SIGNIFICANT CHANGE UP (ref 3.8–10.5)
WBC # FLD AUTO: 7.24 K/UL — SIGNIFICANT CHANGE UP (ref 3.8–10.5)
WBC # FLD AUTO: 7.26 K/UL — SIGNIFICANT CHANGE UP (ref 3.8–10.5)
WBC # FLD AUTO: 7.26 K/UL — SIGNIFICANT CHANGE UP (ref 3.8–10.5)
WBC # FLD AUTO: 7.33 K/UL — SIGNIFICANT CHANGE UP (ref 3.8–10.5)
WBC # FLD AUTO: 7.33 K/UL — SIGNIFICANT CHANGE UP (ref 3.8–10.5)
WBC # FLD AUTO: 7.39 K/UL — SIGNIFICANT CHANGE UP (ref 3.8–10.5)
WBC # FLD AUTO: 7.64 K/UL — SIGNIFICANT CHANGE UP (ref 3.8–10.5)
WBC # FLD AUTO: 7.67 K/UL — SIGNIFICANT CHANGE UP (ref 3.8–10.5)
WBC # FLD AUTO: 7.67 K/UL — SIGNIFICANT CHANGE UP (ref 3.8–10.5)
WBC # FLD AUTO: 7.71 K/UL — SIGNIFICANT CHANGE UP (ref 3.8–10.5)
WBC # FLD AUTO: 7.71 K/UL — SIGNIFICANT CHANGE UP (ref 3.8–10.5)
WBC # FLD AUTO: 7.75 K/UL — SIGNIFICANT CHANGE UP (ref 3.8–10.5)
WBC # FLD AUTO: 7.75 K/UL — SIGNIFICANT CHANGE UP (ref 3.8–10.5)
WBC # FLD AUTO: 8.09 K/UL — SIGNIFICANT CHANGE UP (ref 3.8–10.5)
WBC # FLD AUTO: 8.51 K/UL — SIGNIFICANT CHANGE UP (ref 3.8–10.5)
WBC # FLD AUTO: 8.63 K/UL — SIGNIFICANT CHANGE UP (ref 3.8–10.5)
WBC # FLD AUTO: 8.63 K/UL — SIGNIFICANT CHANGE UP (ref 3.8–10.5)
WBC # FLD AUTO: 8.88 K/UL — SIGNIFICANT CHANGE UP (ref 3.8–10.5)
WBC # FLD AUTO: 8.88 K/UL — SIGNIFICANT CHANGE UP (ref 3.8–10.5)
WBC # FLD AUTO: 8.91 K/UL — SIGNIFICANT CHANGE UP (ref 3.8–10.5)
WBC # FLD AUTO: 9.45 K/UL — SIGNIFICANT CHANGE UP (ref 3.8–10.5)
WBC # FLD AUTO: 9.45 K/UL — SIGNIFICANT CHANGE UP (ref 3.8–10.5)
WBC # FLD AUTO: 9.81 K/UL — SIGNIFICANT CHANGE UP (ref 3.8–10.5)
WBC # FLD AUTO: 9.81 K/UL — SIGNIFICANT CHANGE UP (ref 3.8–10.5)
WBC # FLD AUTO: 9.82 K/UL — SIGNIFICANT CHANGE UP (ref 3.8–10.5)
WBC # FLD AUTO: 9.87 K/UL — SIGNIFICANT CHANGE UP (ref 3.8–10.5)
WBC # FLD AUTO: 9.87 K/UL — SIGNIFICANT CHANGE UP (ref 3.8–10.5)
WBC UR QL: 0 /HPF — SIGNIFICANT CHANGE UP (ref 0–5)
WBC UR QL: 0 /HPF — SIGNIFICANT CHANGE UP (ref 0–5)
WBC UR QL: 5 /HPF — SIGNIFICANT CHANGE UP (ref 0–5)
WBC UR QL: 5 /HPF — SIGNIFICANT CHANGE UP (ref 0–5)

## 2023-01-01 PROCEDURE — 97530 THERAPEUTIC ACTIVITIES: CPT

## 2023-01-01 PROCEDURE — 82947 ASSAY GLUCOSE BLOOD QUANT: CPT

## 2023-01-01 PROCEDURE — 84100 ASSAY OF PHOSPHORUS: CPT

## 2023-01-01 PROCEDURE — 82803 BLOOD GASES ANY COMBINATION: CPT

## 2023-01-01 PROCEDURE — 70496 CT ANGIOGRAPHY HEAD: CPT | Mod: MA

## 2023-01-01 PROCEDURE — 85025 COMPLETE CBC W/AUTO DIFF WBC: CPT

## 2023-01-01 PROCEDURE — 99232 SBSQ HOSP IP/OBS MODERATE 35: CPT | Mod: GC

## 2023-01-01 PROCEDURE — 93970 EXTREMITY STUDY: CPT | Mod: 26

## 2023-01-01 PROCEDURE — 99232 SBSQ HOSP IP/OBS MODERATE 35: CPT

## 2023-01-01 PROCEDURE — 99291 CRITICAL CARE FIRST HOUR: CPT

## 2023-01-01 PROCEDURE — 99239 HOSP IP/OBS DSCHRG MGMT >30: CPT

## 2023-01-01 PROCEDURE — 97116 GAIT TRAINING THERAPY: CPT

## 2023-01-01 PROCEDURE — 85730 THROMBOPLASTIN TIME PARTIAL: CPT

## 2023-01-01 PROCEDURE — 92611 MOTION FLUOROSCOPY/SWALLOW: CPT

## 2023-01-01 PROCEDURE — 99205 OFFICE O/P NEW HI 60 MIN: CPT | Mod: 25,95

## 2023-01-01 PROCEDURE — 85027 COMPLETE CBC AUTOMATED: CPT

## 2023-01-01 PROCEDURE — 99233 SBSQ HOSP IP/OBS HIGH 50: CPT | Mod: GC

## 2023-01-01 PROCEDURE — 86900 BLOOD TYPING SEROLOGIC ABO: CPT

## 2023-01-01 PROCEDURE — 94660 CPAP INITIATION&MGMT: CPT

## 2023-01-01 PROCEDURE — 36415 COLL VENOUS BLD VENIPUNCTURE: CPT

## 2023-01-01 PROCEDURE — 84145 PROCALCITONIN (PCT): CPT

## 2023-01-01 PROCEDURE — 71045 X-RAY EXAM CHEST 1 VIEW: CPT

## 2023-01-01 PROCEDURE — 99233 SBSQ HOSP IP/OBS HIGH 50: CPT

## 2023-01-01 PROCEDURE — 70544 MR ANGIOGRAPHY HEAD W/O DYE: CPT

## 2023-01-01 PROCEDURE — 70450 CT HEAD/BRAIN W/O DYE: CPT | Mod: 26,MA,59

## 2023-01-01 PROCEDURE — C8929: CPT

## 2023-01-01 PROCEDURE — 90832 PSYTX W PT 30 MINUTES: CPT

## 2023-01-01 PROCEDURE — 87040 BLOOD CULTURE FOR BACTERIA: CPT

## 2023-01-01 PROCEDURE — 86334 IMMUNOFIX E-PHORESIS SERUM: CPT

## 2023-01-01 PROCEDURE — 92523 SPEECH SOUND LANG COMPREHEN: CPT

## 2023-01-01 PROCEDURE — 80053 COMPREHEN METABOLIC PANEL: CPT

## 2023-01-01 PROCEDURE — 70544 MR ANGIOGRAPHY HEAD W/O DYE: CPT | Mod: 26,59

## 2023-01-01 PROCEDURE — 97110 THERAPEUTIC EXERCISES: CPT

## 2023-01-01 PROCEDURE — 76380 CAT SCAN FOLLOW-UP STUDY: CPT | Mod: 59

## 2023-01-01 PROCEDURE — 99205 OFFICE O/P NEW HI 60 MIN: CPT

## 2023-01-01 PROCEDURE — 93880 EXTRACRANIAL BILAT STUDY: CPT

## 2023-01-01 PROCEDURE — 70450 CT HEAD/BRAIN W/O DYE: CPT | Mod: 26

## 2023-01-01 PROCEDURE — 70498 CT ANGIOGRAPHY NECK: CPT | Mod: MA

## 2023-01-01 PROCEDURE — 36227 PLACE CATH XTRNL CAROTID: CPT

## 2023-01-01 PROCEDURE — 97535 SELF CARE MNGMENT TRAINING: CPT

## 2023-01-01 PROCEDURE — 86901 BLOOD TYPING SEROLOGIC RH(D): CPT

## 2023-01-01 PROCEDURE — 99223 1ST HOSP IP/OBS HIGH 75: CPT

## 2023-01-01 PROCEDURE — 99214 OFFICE O/P EST MOD 30 MIN: CPT

## 2023-01-01 PROCEDURE — 99203 OFFICE O/P NEW LOW 30 MIN: CPT

## 2023-01-01 PROCEDURE — 0042T: CPT | Mod: MA

## 2023-01-01 PROCEDURE — 70551 MRI BRAIN STEM W/O DYE: CPT | Mod: 26

## 2023-01-01 PROCEDURE — 92526 ORAL FUNCTION THERAPY: CPT

## 2023-01-01 PROCEDURE — 72141 MRI NECK SPINE W/O DYE: CPT | Mod: MD

## 2023-01-01 PROCEDURE — 97112 NEUROMUSCULAR REEDUCATION: CPT

## 2023-01-01 PROCEDURE — 70551 MRI BRAIN STEM W/O DYE: CPT | Mod: MD

## 2023-01-01 PROCEDURE — 83735 ASSAY OF MAGNESIUM: CPT

## 2023-01-01 PROCEDURE — C1887: CPT

## 2023-01-01 PROCEDURE — 97163 PT EVAL HIGH COMPLEX 45 MIN: CPT

## 2023-01-01 PROCEDURE — 74230 X-RAY XM SWLNG FUNCJ C+: CPT

## 2023-01-01 PROCEDURE — 82330 ASSAY OF CALCIUM: CPT

## 2023-01-01 PROCEDURE — 71045 X-RAY EXAM CHEST 1 VIEW: CPT | Mod: 26

## 2023-01-01 PROCEDURE — 93010 ELECTROCARDIOGRAM REPORT: CPT

## 2023-01-01 PROCEDURE — 84443 ASSAY THYROID STIM HORMONE: CPT

## 2023-01-01 PROCEDURE — 86235 NUCLEAR ANTIGEN ANTIBODY: CPT

## 2023-01-01 PROCEDURE — 83036 HEMOGLOBIN GLYCOSYLATED A1C: CPT

## 2023-01-01 PROCEDURE — 99255 IP/OBS CONSLTJ NEW/EST HI 80: CPT

## 2023-01-01 PROCEDURE — 84295 ASSAY OF SERUM SODIUM: CPT

## 2023-01-01 PROCEDURE — 82435 ASSAY OF BLOOD CHLORIDE: CPT

## 2023-01-01 PROCEDURE — C1760: CPT

## 2023-01-01 PROCEDURE — 93892 TCD EMBOLI DETECT W/O INJ: CPT

## 2023-01-01 PROCEDURE — 70496 CT ANGIOGRAPHY HEAD: CPT | Mod: 26,MA

## 2023-01-01 PROCEDURE — 86480 TB TEST CELL IMMUN MEASURE: CPT

## 2023-01-01 PROCEDURE — 84165 PROTEIN E-PHORESIS SERUM: CPT

## 2023-01-01 PROCEDURE — 93886 INTRACRANIAL COMPLETE STUDY: CPT

## 2023-01-01 PROCEDURE — 74230 X-RAY XM SWLNG FUNCJ C+: CPT | Mod: 26

## 2023-01-01 PROCEDURE — 83540 ASSAY OF IRON: CPT

## 2023-01-01 PROCEDURE — 70460 CT HEAD/BRAIN W/DYE: CPT | Mod: 26

## 2023-01-01 PROCEDURE — 70498 CT ANGIOGRAPHY NECK: CPT | Mod: 26,MA

## 2023-01-01 PROCEDURE — 70450 CT HEAD/BRAIN W/O DYE: CPT

## 2023-01-01 PROCEDURE — 83550 IRON BINDING TEST: CPT

## 2023-01-01 PROCEDURE — 80061 LIPID PANEL: CPT

## 2023-01-01 PROCEDURE — 99285 EMERGENCY DEPT VISIT HI MDM: CPT | Mod: 25

## 2023-01-01 PROCEDURE — 93005 ELECTROCARDIOGRAM TRACING: CPT

## 2023-01-01 PROCEDURE — 70547 MR ANGIOGRAPHY NECK W/O DYE: CPT | Mod: 26

## 2023-01-01 PROCEDURE — 76377 3D RENDER W/INTRP POSTPROCES: CPT | Mod: 26

## 2023-01-01 PROCEDURE — 36226 PLACE CATH VERTEBRAL ART: CPT

## 2023-01-01 PROCEDURE — 96116 NUBHVL XM PHYS/QHP 1ST HR: CPT

## 2023-01-01 PROCEDURE — 85014 HEMATOCRIT: CPT

## 2023-01-01 PROCEDURE — 74018 RADEX ABDOMEN 1 VIEW: CPT | Mod: 26

## 2023-01-01 PROCEDURE — 81001 URINALYSIS AUTO W/SCOPE: CPT

## 2023-01-01 PROCEDURE — C1874: CPT

## 2023-01-01 PROCEDURE — 84484 ASSAY OF TROPONIN QUANT: CPT

## 2023-01-01 PROCEDURE — 92507 TX SP LANG VOICE COMM INDIV: CPT

## 2023-01-01 PROCEDURE — 97167 OT EVAL HIGH COMPLEX 60 MIN: CPT

## 2023-01-01 PROCEDURE — 74018 RADEX ABDOMEN 1 VIEW: CPT

## 2023-01-01 PROCEDURE — 99253 IP/OBS CNSLTJ NEW/EST LOW 45: CPT

## 2023-01-01 PROCEDURE — 90792 PSYCH DIAG EVAL W/MED SRVCS: CPT

## 2023-01-01 PROCEDURE — 36226 PLACE CATH VERTEBRAL ART: CPT | Mod: 50

## 2023-01-01 PROCEDURE — 96372 THER/PROPH/DIAG INJ SC/IM: CPT

## 2023-01-01 PROCEDURE — 82728 ASSAY OF FERRITIN: CPT

## 2023-01-01 PROCEDURE — 86225 DNA ANTIBODY NATIVE: CPT

## 2023-01-01 PROCEDURE — 86850 RBC ANTIBODY SCREEN: CPT

## 2023-01-01 PROCEDURE — 61645 PERQ ART M-THROMBECT &/NFS: CPT

## 2023-01-01 PROCEDURE — C1725: CPT

## 2023-01-01 PROCEDURE — 72148 MRI LUMBAR SPINE W/O DYE: CPT | Mod: 26

## 2023-01-01 PROCEDURE — 86038 ANTINUCLEAR ANTIBODIES: CPT

## 2023-01-01 PROCEDURE — C9399: CPT

## 2023-01-01 PROCEDURE — 99213 OFFICE O/P EST LOW 20 MIN: CPT | Mod: 95

## 2023-01-01 PROCEDURE — 92610 EVALUATE SWALLOWING FUNCTION: CPT

## 2023-01-01 PROCEDURE — C1769: CPT

## 2023-01-01 PROCEDURE — 84155 ASSAY OF PROTEIN SERUM: CPT

## 2023-01-01 PROCEDURE — 82784 ASSAY IGA/IGD/IGG/IGM EACH: CPT

## 2023-01-01 PROCEDURE — 80048 BASIC METABOLIC PNL TOTAL CA: CPT

## 2023-01-01 PROCEDURE — 70547 MR ANGIOGRAPHY NECK W/O DYE: CPT

## 2023-01-01 PROCEDURE — 99239 HOSP IP/OBS DSCHRG MGMT >30: CPT | Mod: GC

## 2023-01-01 PROCEDURE — 36224 PLACE CATH CAROTD ART: CPT | Mod: 50

## 2023-01-01 PROCEDURE — 83605 ASSAY OF LACTIC ACID: CPT

## 2023-01-01 PROCEDURE — 85652 RBC SED RATE AUTOMATED: CPT

## 2023-01-01 PROCEDURE — 93306 TTE W/DOPPLER COMPLETE: CPT | Mod: 26

## 2023-01-01 PROCEDURE — C1894: CPT

## 2023-01-01 PROCEDURE — 80074 ACUTE HEPATITIS PANEL: CPT

## 2023-01-01 PROCEDURE — 96376 TX/PRO/DX INJ SAME DRUG ADON: CPT

## 2023-01-01 PROCEDURE — 82962 GLUCOSE BLOOD TEST: CPT

## 2023-01-01 PROCEDURE — 82595 ASSAY OF CRYOGLOBULIN: CPT

## 2023-01-01 PROCEDURE — 84132 ASSAY OF SERUM POTASSIUM: CPT

## 2023-01-01 PROCEDURE — 83615 LACTATE (LD) (LDH) ENZYME: CPT

## 2023-01-01 PROCEDURE — 85610 PROTHROMBIN TIME: CPT

## 2023-01-01 PROCEDURE — 36227 PLACE CATH XTRNL CAROTID: CPT | Mod: 50

## 2023-01-01 PROCEDURE — 97165 OT EVAL LOW COMPLEX 30 MIN: CPT

## 2023-01-01 PROCEDURE — 99223 1ST HOSP IP/OBS HIGH 75: CPT | Mod: GC

## 2023-01-01 PROCEDURE — 72148 MRI LUMBAR SPINE W/O DYE: CPT | Mod: MD

## 2023-01-01 PROCEDURE — 83520 IMMUNOASSAY QUANT NOS NONAB: CPT

## 2023-01-01 PROCEDURE — 97166 OT EVAL MOD COMPLEX 45 MIN: CPT

## 2023-01-01 PROCEDURE — 72141 MRI NECK SPINE W/O DYE: CPT | Mod: 26

## 2023-01-01 PROCEDURE — 86140 C-REACTIVE PROTEIN: CPT

## 2023-01-01 PROCEDURE — 80069 RENAL FUNCTION PANEL: CPT

## 2023-01-01 PROCEDURE — 36224 PLACE CATH CAROTD ART: CPT

## 2023-01-01 PROCEDURE — 93306 TTE W/DOPPLER COMPLETE: CPT

## 2023-01-01 PROCEDURE — 85520 HEPARIN ASSAY: CPT

## 2023-01-01 PROCEDURE — 93970 EXTREMITY STUDY: CPT

## 2023-01-01 PROCEDURE — 97161 PT EVAL LOW COMPLEX 20 MIN: CPT

## 2023-01-01 PROCEDURE — 99386 PREV VISIT NEW AGE 40-64: CPT | Mod: 25

## 2023-01-01 PROCEDURE — 96374 THER/PROPH/DIAG INJ IV PUSH: CPT

## 2023-01-01 PROCEDURE — 85018 HEMOGLOBIN: CPT

## 2023-01-01 PROCEDURE — 87635 SARS-COV-2 COVID-19 AMP PRB: CPT

## 2023-01-01 PROCEDURE — 92522 EVALUATE SPEECH PRODUCTION: CPT

## 2023-01-01 PROCEDURE — 86160 COMPLEMENT ANTIGEN: CPT

## 2023-01-01 PROCEDURE — 61635 INTRACRAN ANGIOPLSTY W/STENT: CPT

## 2023-01-01 PROCEDURE — 99215 OFFICE O/P EST HI 40 MIN: CPT

## 2023-01-01 PROCEDURE — 61645 PERQ ART M-THROMBECT &/NFS: CPT | Mod: 59,LT

## 2023-01-01 PROCEDURE — 70551 MRI BRAIN STEM W/O DYE: CPT

## 2023-01-01 PROCEDURE — 99285 EMERGENCY DEPT VISIT HI MDM: CPT

## 2023-01-01 PROCEDURE — 80307 DRUG TEST PRSMV CHEM ANLYZR: CPT

## 2023-01-01 RX ORDER — INSULIN LISPRO 100/ML
VIAL (ML) SUBCUTANEOUS
Refills: 0 | Status: DISCONTINUED | OUTPATIENT
Start: 2023-01-01 | End: 2023-01-01

## 2023-01-01 RX ORDER — PANTOPRAZOLE SODIUM 20 MG/1
40 TABLET, DELAYED RELEASE ORAL
Refills: 0 | Status: DISCONTINUED | OUTPATIENT
Start: 2023-01-01 | End: 2023-01-01

## 2023-01-01 RX ORDER — HYDRALAZINE HCL 50 MG
50 TABLET ORAL
Refills: 0 | Status: DISCONTINUED | OUTPATIENT
Start: 2023-01-01 | End: 2023-01-01

## 2023-01-01 RX ORDER — AMLODIPINE BESYLATE 2.5 MG/1
10 TABLET ORAL DAILY
Refills: 0 | Status: DISCONTINUED | OUTPATIENT
Start: 2023-01-01 | End: 2023-01-01

## 2023-01-01 RX ORDER — LACTULOSE 10 G/15ML
20 SOLUTION ORAL ONCE
Refills: 0 | Status: COMPLETED | OUTPATIENT
Start: 2023-01-01 | End: 2023-01-01

## 2023-01-01 RX ORDER — CARVEDILOL PHOSPHATE 80 MG/1
1 CAPSULE, EXTENDED RELEASE ORAL
Qty: 0 | Refills: 0 | DISCHARGE
Start: 2023-01-01

## 2023-01-01 RX ORDER — ACETAMINOPHEN 500 MG
2 TABLET ORAL
Qty: 0 | Refills: 0 | DISCHARGE
Start: 2023-01-01

## 2023-01-01 RX ORDER — NICOTINE POLACRILEX 2 MG
1 GUM BUCCAL DAILY
Refills: 0 | Status: DISCONTINUED | OUTPATIENT
Start: 2023-01-01 | End: 2023-01-01

## 2023-01-01 RX ORDER — HEPARIN SODIUM 5000 [USP'U]/ML
1000 INJECTION INTRAVENOUS; SUBCUTANEOUS
Qty: 25000 | Refills: 0 | Status: DISCONTINUED | OUTPATIENT
Start: 2023-01-01 | End: 2023-01-01

## 2023-01-01 RX ORDER — SODIUM,POTASSIUM PHOSPHATES 278-250MG
1 POWDER IN PACKET (EA) ORAL ONCE
Refills: 0 | Status: COMPLETED | OUTPATIENT
Start: 2023-01-01 | End: 2023-01-01

## 2023-01-01 RX ORDER — ASPIRIN/CALCIUM CARB/MAGNESIUM 324 MG
81 TABLET ORAL
Refills: 0 | Status: DISCONTINUED | OUTPATIENT
Start: 2023-01-01 | End: 2023-01-01

## 2023-01-01 RX ORDER — NIFEDIPINE 30 MG
60 TABLET, EXTENDED RELEASE 24 HR ORAL DAILY
Refills: 0 | Status: DISCONTINUED | OUTPATIENT
Start: 2023-01-01 | End: 2023-01-01

## 2023-01-01 RX ORDER — DEXTROSE 50 % IN WATER 50 %
15 SYRINGE (ML) INTRAVENOUS ONCE
Refills: 0 | Status: DISCONTINUED | OUTPATIENT
Start: 2023-01-01 | End: 2023-01-01

## 2023-01-01 RX ORDER — HYDRALAZINE HCL 50 MG
50 TABLET ORAL EVERY 8 HOURS
Refills: 0 | Status: DISCONTINUED | OUTPATIENT
Start: 2023-01-01 | End: 2023-01-01

## 2023-01-01 RX ORDER — SENNA PLUS 8.6 MG/1
2 TABLET ORAL AT BEDTIME
Refills: 0 | Status: DISCONTINUED | OUTPATIENT
Start: 2023-01-01 | End: 2023-01-01

## 2023-01-01 RX ORDER — ESCITALOPRAM OXALATE 10 MG/1
10 TABLET, FILM COATED ORAL DAILY
Refills: 0 | Status: DISCONTINUED | OUTPATIENT
Start: 2023-01-01 | End: 2023-01-01

## 2023-01-01 RX ORDER — LISINOPRIL 2.5 MG/1
40 TABLET ORAL DAILY
Refills: 0 | Status: DISCONTINUED | OUTPATIENT
Start: 2023-01-01 | End: 2023-01-01

## 2023-01-01 RX ORDER — AMLODIPINE BESYLATE 2.5 MG/1
5 TABLET ORAL DAILY
Refills: 0 | Status: DISCONTINUED | OUTPATIENT
Start: 2023-01-01 | End: 2023-01-01

## 2023-01-01 RX ORDER — INSULIN LISPRO 100/ML
VIAL (ML) SUBCUTANEOUS AT BEDTIME
Refills: 0 | Status: DISCONTINUED | OUTPATIENT
Start: 2023-01-01 | End: 2023-01-01

## 2023-01-01 RX ORDER — ATORVASTATIN CALCIUM 80 MG/1
80 TABLET, FILM COATED ORAL AT BEDTIME
Refills: 0 | Status: DISCONTINUED | OUTPATIENT
Start: 2023-01-01 | End: 2023-01-01

## 2023-01-01 RX ORDER — NICOTINE POLACRILEX 2 MG
1 GUM BUCCAL
Qty: 30 | Refills: 0
Start: 2023-01-01 | End: 2023-01-01

## 2023-01-01 RX ORDER — LISINOPRIL 2.5 MG/1
1 TABLET ORAL
Refills: 0 | DISCHARGE

## 2023-01-01 RX ORDER — LISINOPRIL 2.5 MG/1
20 TABLET ORAL ONCE
Refills: 0 | Status: COMPLETED | OUTPATIENT
Start: 2023-01-01 | End: 2023-01-01

## 2023-01-01 RX ORDER — HYDRALAZINE HCL 50 MG
25 TABLET ORAL
Refills: 0 | Status: DISCONTINUED | OUTPATIENT
Start: 2023-01-01 | End: 2023-01-01

## 2023-01-01 RX ORDER — LISINOPRIL 2.5 MG/1
1 TABLET ORAL
Qty: 0 | Refills: 0 | DISCHARGE
Start: 2023-01-01

## 2023-01-01 RX ORDER — TICAGRELOR 90 MG/1
1 TABLET ORAL
Qty: 60 | Refills: 0
Start: 2023-01-01 | End: 2024-01-01

## 2023-01-01 RX ORDER — ASPIRIN/CALCIUM CARB/MAGNESIUM 324 MG
81 TABLET ORAL DAILY
Refills: 0 | Status: DISCONTINUED | OUTPATIENT
Start: 2023-01-01 | End: 2023-01-01

## 2023-01-01 RX ORDER — TICAGRELOR 90 MG/1
90 TABLET ORAL
Refills: 0 | Status: DISCONTINUED | OUTPATIENT
Start: 2023-01-01 | End: 2023-01-01

## 2023-01-01 RX ORDER — ENOXAPARIN SODIUM 100 MG/ML
30 INJECTION SUBCUTANEOUS
Refills: 0 | Status: DISCONTINUED | OUTPATIENT
Start: 2023-01-01 | End: 2023-01-01

## 2023-01-01 RX ORDER — DEXTROSE 50 % IN WATER 50 %
25 SYRINGE (ML) INTRAVENOUS ONCE
Refills: 0 | Status: DISCONTINUED | OUTPATIENT
Start: 2023-01-01 | End: 2023-01-01

## 2023-01-01 RX ORDER — CLOPIDOGREL BISULFATE 75 MG/1
1 TABLET, FILM COATED ORAL
Qty: 0 | Refills: 0 | DISCHARGE
Start: 2023-01-01

## 2023-01-01 RX ORDER — AMLODIPINE BESYLATE 2.5 MG/1
1 TABLET ORAL
Qty: 30 | Refills: 0
Start: 2023-01-01 | End: 2023-01-01

## 2023-01-01 RX ORDER — POLYETHYLENE GLYCOL 3350 17 G/17G
17 POWDER, FOR SOLUTION ORAL
Qty: 0 | Refills: 0 | DISCHARGE
Start: 2023-01-01

## 2023-01-01 RX ORDER — LABETALOL HCL 100 MG
200 TABLET ORAL EVERY 8 HOURS
Refills: 0 | Status: DISCONTINUED | OUTPATIENT
Start: 2023-01-01 | End: 2023-01-01

## 2023-01-01 RX ORDER — POLYETHYLENE GLYCOL 3350 17 G/17G
17 POWDER, FOR SOLUTION ORAL DAILY
Refills: 0 | Status: DISCONTINUED | OUTPATIENT
Start: 2023-01-01 | End: 2023-01-01

## 2023-01-01 RX ORDER — SENNA PLUS 8.6 MG/1
2 TABLET ORAL
Qty: 0 | Refills: 0 | DISCHARGE
Start: 2023-01-01

## 2023-01-01 RX ORDER — ENOXAPARIN SODIUM 100 MG/ML
40 INJECTION SUBCUTANEOUS EVERY 24 HOURS
Refills: 0 | Status: DISCONTINUED | OUTPATIENT
Start: 2023-01-01 | End: 2023-01-01

## 2023-01-01 RX ORDER — POLYETHYLENE GLYCOL 3350 17 G/17G
17 POWDER, FOR SOLUTION ORAL
Refills: 0 | Status: DISCONTINUED | OUTPATIENT
Start: 2023-01-01 | End: 2023-01-01

## 2023-01-01 RX ORDER — ESCITALOPRAM OXALATE 10 MG/1
20 TABLET, FILM COATED ORAL DAILY
Refills: 0 | Status: DISCONTINUED | OUTPATIENT
Start: 2023-01-01 | End: 2023-01-01

## 2023-01-01 RX ORDER — CHLORHEXIDINE GLUCONATE 213 G/1000ML
1 SOLUTION TOPICAL DAILY
Refills: 0 | Status: DISCONTINUED | OUTPATIENT
Start: 2023-01-01 | End: 2023-01-01

## 2023-01-01 RX ORDER — NICOTINE 21 MG/24HR
14 PATCH, TRANSDERMAL 24 HOURS TRANSDERMAL
Qty: 28 | Refills: 0 | Status: DISCONTINUED | COMMUNITY
Start: 2023-01-01 | End: 2023-01-01

## 2023-01-01 RX ORDER — LANOLIN ALCOHOL/MO/W.PET/CERES
6 CREAM (GRAM) TOPICAL AT BEDTIME
Refills: 0 | Status: DISCONTINUED | OUTPATIENT
Start: 2023-01-01 | End: 2023-01-01

## 2023-01-01 RX ORDER — ACETAMINOPHEN 500 MG
650 TABLET ORAL EVERY 6 HOURS
Refills: 0 | Status: DISCONTINUED | OUTPATIENT
Start: 2023-01-01 | End: 2023-01-01

## 2023-01-01 RX ORDER — HEPARIN SODIUM 5000 [USP'U]/ML
1400 INJECTION INTRAVENOUS; SUBCUTANEOUS
Qty: 25000 | Refills: 0 | Status: DISCONTINUED | OUTPATIENT
Start: 2023-01-01 | End: 2023-01-01

## 2023-01-01 RX ORDER — AMLODIPINE BESYLATE 2.5 MG/1
1 TABLET ORAL
Qty: 0 | Refills: 0 | DISCHARGE
Start: 2023-01-01

## 2023-01-01 RX ORDER — ENOXAPARIN SODIUM 100 MG/ML
40 INJECTION SUBCUTANEOUS
Qty: 0 | Refills: 0 | DISCHARGE
Start: 2023-01-01

## 2023-01-01 RX ORDER — ESCITALOPRAM OXALATE 10 MG/1
1 TABLET, FILM COATED ORAL
Qty: 30 | Refills: 0
Start: 2023-01-01 | End: 2024-01-01

## 2023-01-01 RX ORDER — MAGNESIUM SULFATE 500 MG/ML
1 VIAL (ML) INJECTION ONCE
Refills: 0 | Status: COMPLETED | OUTPATIENT
Start: 2023-01-01 | End: 2023-01-01

## 2023-01-01 RX ORDER — ATORVASTATIN CALCIUM 80 MG/1
1 TABLET, FILM COATED ORAL
Qty: 30 | Refills: 0
Start: 2023-01-01 | End: 2024-01-01

## 2023-01-01 RX ORDER — LABETALOL HCL 100 MG
300 TABLET ORAL EVERY 8 HOURS
Refills: 0 | Status: DISCONTINUED | OUTPATIENT
Start: 2023-01-01 | End: 2023-01-01

## 2023-01-01 RX ORDER — SODIUM CHLORIDE 9 MG/ML
10 INJECTION INTRAMUSCULAR; INTRAVENOUS; SUBCUTANEOUS ONCE
Refills: 0 | Status: COMPLETED | OUTPATIENT
Start: 2023-01-01 | End: 2023-01-01

## 2023-01-01 RX ORDER — AMLODIPINE BESYLATE 2.5 MG/1
1 TABLET ORAL
Refills: 0 | DISCHARGE

## 2023-01-01 RX ORDER — TICAGRELOR 90 MG/1
1 TABLET ORAL
Qty: 0 | Refills: 0 | DISCHARGE
Start: 2023-01-01

## 2023-01-01 RX ORDER — AMLODIPINE BESYLATE 2.5 MG/1
10 TABLET ORAL ONCE
Refills: 0 | Status: COMPLETED | OUTPATIENT
Start: 2023-01-01 | End: 2023-01-01

## 2023-01-01 RX ORDER — SODIUM CHLORIDE 9 MG/ML
1000 INJECTION, SOLUTION INTRAVENOUS
Refills: 0 | Status: DISCONTINUED | OUTPATIENT
Start: 2023-01-01 | End: 2023-01-01

## 2023-01-01 RX ORDER — SODIUM CHLORIDE 9 MG/ML
1000 INJECTION INTRAMUSCULAR; INTRAVENOUS; SUBCUTANEOUS
Refills: 0 | Status: DISCONTINUED | OUTPATIENT
Start: 2023-01-01 | End: 2023-01-01

## 2023-01-01 RX ORDER — TAMSULOSIN HYDROCHLORIDE 0.4 MG/1
1 CAPSULE ORAL
Qty: 30 | Refills: 0
Start: 2023-01-01 | End: 2024-01-01

## 2023-01-01 RX ORDER — CARVEDILOL PHOSPHATE 80 MG/1
25 CAPSULE, EXTENDED RELEASE ORAL ONCE
Refills: 0 | Status: COMPLETED | OUTPATIENT
Start: 2023-01-01 | End: 2023-01-01

## 2023-01-01 RX ORDER — CARVEDILOL PHOSPHATE 80 MG/1
25 CAPSULE, EXTENDED RELEASE ORAL
Refills: 0 | Status: DISCONTINUED | OUTPATIENT
Start: 2023-01-01 | End: 2023-01-01

## 2023-01-01 RX ORDER — ONDANSETRON 8 MG/1
4 TABLET, FILM COATED ORAL ONCE
Refills: 0 | Status: COMPLETED | OUTPATIENT
Start: 2023-01-01 | End: 2023-01-01

## 2023-01-01 RX ORDER — TRAZODONE HCL 50 MG
0.5 TABLET ORAL
Qty: 15 | Refills: 0
Start: 2023-01-01

## 2023-01-01 RX ORDER — TRAZODONE HCL 50 MG
50 TABLET ORAL AT BEDTIME
Refills: 0 | Status: DISCONTINUED | OUTPATIENT
Start: 2023-01-01 | End: 2023-01-01

## 2023-01-01 RX ORDER — HYDRALAZINE HCL 50 MG
5 TABLET ORAL EVERY 4 HOURS
Refills: 0 | Status: DISCONTINUED | OUTPATIENT
Start: 2023-01-01 | End: 2023-01-01

## 2023-01-01 RX ORDER — AMLODIPINE BESYLATE 2.5 MG/1
1 TABLET ORAL
Qty: 30 | Refills: 0
Start: 2023-01-01 | End: 2024-01-01

## 2023-01-01 RX ORDER — HYDRALAZINE HCL 50 MG
1 TABLET ORAL
Qty: 60 | Refills: 0
Start: 2023-01-01 | End: 2023-01-01

## 2023-01-01 RX ORDER — CARVEDILOL PHOSPHATE 80 MG/1
1 CAPSULE, EXTENDED RELEASE ORAL
Refills: 0 | DISCHARGE

## 2023-01-01 RX ORDER — INSULIN LISPRO 100/ML
VIAL (ML) SUBCUTANEOUS EVERY 6 HOURS
Refills: 0 | Status: DISCONTINUED | OUTPATIENT
Start: 2023-01-01 | End: 2023-01-01

## 2023-01-01 RX ORDER — CLOPIDOGREL BISULFATE 75 MG/1
1 TABLET, FILM COATED ORAL
Qty: 30 | Refills: 0
Start: 2023-01-01 | End: 2023-01-01

## 2023-01-01 RX ORDER — HYDRALAZINE HCL 50 MG
1 TABLET ORAL
Qty: 0 | Refills: 0 | DISCHARGE
Start: 2023-01-01

## 2023-01-01 RX ORDER — HYDRALAZINE HCL 50 MG
5 TABLET ORAL EVERY 6 HOURS
Refills: 0 | Status: DISCONTINUED | OUTPATIENT
Start: 2023-01-01 | End: 2023-01-01

## 2023-01-01 RX ORDER — CARVEDILOL PHOSPHATE 80 MG/1
25 CAPSULE, EXTENDED RELEASE ORAL EVERY 12 HOURS
Refills: 0 | Status: DISCONTINUED | OUTPATIENT
Start: 2023-01-01 | End: 2023-01-01

## 2023-01-01 RX ORDER — BLOOD SUGAR DIAGNOSTIC
STRIP MISCELLANEOUS
Qty: 100 | Refills: 0 | Status: ACTIVE | COMMUNITY
Start: 2023-01-01 | End: 1900-01-01

## 2023-01-01 RX ORDER — LISINOPRIL 2.5 MG/1
40 TABLET ORAL
Refills: 0 | Status: DISCONTINUED | OUTPATIENT
Start: 2023-01-01 | End: 2023-01-01

## 2023-01-01 RX ORDER — HEPARIN SODIUM 5000 [USP'U]/ML
1200 INJECTION INTRAVENOUS; SUBCUTANEOUS
Qty: 25000 | Refills: 0 | Status: DISCONTINUED | OUTPATIENT
Start: 2023-01-01 | End: 2023-01-01

## 2023-01-01 RX ORDER — TRAZODONE HCL 50 MG
1 TABLET ORAL
Qty: 0 | Refills: 0 | DISCHARGE
Start: 2023-01-01

## 2023-01-01 RX ORDER — CLOPIDOGREL BISULFATE 75 MG/1
75 TABLET, FILM COATED ORAL DAILY
Refills: 0 | Status: DISCONTINUED | OUTPATIENT
Start: 2023-01-01 | End: 2023-01-01

## 2023-01-01 RX ORDER — POTASSIUM CHLORIDE 20 MEQ
20 PACKET (EA) ORAL ONCE
Refills: 0 | Status: COMPLETED | OUTPATIENT
Start: 2023-01-01 | End: 2023-01-01

## 2023-01-01 RX ORDER — HYDRALAZINE HYDROCHLORIDE 50 MG/1
TABLET ORAL
Refills: 0 | Status: ACTIVE | COMMUNITY

## 2023-01-01 RX ORDER — ESCITALOPRAM OXALATE 10 MG/1
1 TABLET, FILM COATED ORAL
Qty: 0 | Refills: 0 | DISCHARGE
Start: 2023-01-01

## 2023-01-01 RX ORDER — ASPIRIN/CALCIUM CARB/MAGNESIUM 324 MG
1 TABLET ORAL
Qty: 0 | Refills: 0 | DISCHARGE
Start: 2023-01-01

## 2023-01-01 RX ORDER — ATORVASTATIN CALCIUM 80 MG/1
1 TABLET, FILM COATED ORAL
Qty: 0 | Refills: 0 | DISCHARGE
Start: 2023-01-01

## 2023-01-01 RX ORDER — TRAZODONE HCL 50 MG
25 TABLET ORAL AT BEDTIME
Refills: 0 | Status: DISCONTINUED | OUTPATIENT
Start: 2023-01-01 | End: 2023-01-01

## 2023-01-01 RX ORDER — EPTIFIBATIDE 2 MG/ML
1 INJECTION, SOLUTION INTRAVENOUS
Qty: 75 | Refills: 0 | Status: DISCONTINUED | OUTPATIENT
Start: 2023-01-01 | End: 2023-01-01

## 2023-01-01 RX ORDER — CLOPIDOGREL BISULFATE 75 MG/1
300 TABLET, FILM COATED ORAL ONCE
Refills: 0 | Status: COMPLETED | OUTPATIENT
Start: 2023-01-01 | End: 2023-01-01

## 2023-01-01 RX ORDER — METOCLOPRAMIDE HCL 10 MG
5 TABLET ORAL ONCE
Refills: 0 | Status: COMPLETED | OUTPATIENT
Start: 2023-01-01 | End: 2023-01-01

## 2023-01-01 RX ORDER — TENECTEPLASE 50 MG
25 KIT INTRAVENOUS ONCE
Refills: 0 | Status: COMPLETED | OUTPATIENT
Start: 2023-01-01 | End: 2023-01-01

## 2023-01-01 RX ORDER — LABETALOL HCL 100 MG
200 TABLET ORAL THREE TIMES A DAY
Refills: 0 | Status: DISCONTINUED | OUTPATIENT
Start: 2023-01-01 | End: 2023-01-01

## 2023-01-01 RX ORDER — LABETALOL HCL 100 MG
1 TABLET ORAL
Qty: 0 | Refills: 0 | DISCHARGE
Start: 2023-01-01

## 2023-01-01 RX ORDER — IBUPROFEN 200 MG
400 TABLET ORAL ONCE
Refills: 0 | Status: COMPLETED | OUTPATIENT
Start: 2023-01-01 | End: 2023-01-01

## 2023-01-01 RX ORDER — HEPARIN SODIUM 5000 [USP'U]/ML
1300 INJECTION INTRAVENOUS; SUBCUTANEOUS
Qty: 25000 | Refills: 0 | Status: DISCONTINUED | OUTPATIENT
Start: 2023-01-01 | End: 2023-01-01

## 2023-01-01 RX ORDER — DEXTROSE 50 % IN WATER 50 %
12.5 SYRINGE (ML) INTRAVENOUS ONCE
Refills: 0 | Status: DISCONTINUED | OUTPATIENT
Start: 2023-01-01 | End: 2023-01-01

## 2023-01-01 RX ORDER — LISINOPRIL 2.5 MG/1
1 TABLET ORAL
Qty: 30 | Refills: 0
Start: 2023-01-01 | End: 2024-01-01

## 2023-01-01 RX ORDER — POTASSIUM CHLORIDE 20 MEQ
40 PACKET (EA) ORAL ONCE
Refills: 0 | Status: COMPLETED | OUTPATIENT
Start: 2023-01-01 | End: 2023-01-01

## 2023-01-01 RX ORDER — TAMSULOSIN HYDROCHLORIDE 0.4 MG/1
0.4 CAPSULE ORAL AT BEDTIME
Refills: 0 | Status: DISCONTINUED | OUTPATIENT
Start: 2023-01-01 | End: 2023-01-01

## 2023-01-01 RX ORDER — LABETALOL HCL 100 MG
1 TABLET ORAL
Qty: 90 | Refills: 0
Start: 2023-01-01 | End: 2024-01-01

## 2023-01-01 RX ORDER — NICARDIPINE HYDROCHLORIDE 30 MG/1
5 CAPSULE, EXTENDED RELEASE ORAL
Qty: 40 | Refills: 0 | Status: DISCONTINUED | OUTPATIENT
Start: 2023-01-01 | End: 2023-01-01

## 2023-01-01 RX ORDER — LABETALOL HCL 100 MG
100 TABLET ORAL THREE TIMES A DAY
Refills: 0 | Status: DISCONTINUED | OUTPATIENT
Start: 2023-01-01 | End: 2023-01-01

## 2023-01-01 RX ORDER — PANTOPRAZOLE SODIUM 20 MG/1
1 TABLET, DELAYED RELEASE ORAL
Qty: 0 | Refills: 0 | DISCHARGE
Start: 2023-01-01

## 2023-01-01 RX ORDER — INFLUENZA VIRUS VACCINE 15; 15; 15; 15 UG/.5ML; UG/.5ML; UG/.5ML; UG/.5ML
0.5 SUSPENSION INTRAMUSCULAR ONCE
Refills: 0 | Status: DISCONTINUED | OUTPATIENT
Start: 2023-01-01 | End: 2023-01-01

## 2023-01-01 RX ORDER — LABETALOL HCL 100 MG
10 TABLET ORAL ONCE
Refills: 0 | Status: COMPLETED | OUTPATIENT
Start: 2023-01-01 | End: 2023-01-01

## 2023-01-01 RX ORDER — NICOTINE POLACRILEX 2 MG
1 GUM BUCCAL
Qty: 0 | Refills: 0 | DISCHARGE
Start: 2023-01-01

## 2023-01-01 RX ORDER — BLOOD SUGAR DIAGNOSTIC
STRIP MISCELLANEOUS
Qty: 1 | Refills: 0 | Status: ACTIVE | COMMUNITY
Start: 2023-01-01 | End: 1900-01-01

## 2023-01-01 RX ORDER — CEFTRIAXONE 500 MG/1
1000 INJECTION, POWDER, FOR SOLUTION INTRAMUSCULAR; INTRAVENOUS EVERY 24 HOURS
Refills: 0 | Status: DISCONTINUED | OUTPATIENT
Start: 2023-01-01 | End: 2023-01-01

## 2023-01-01 RX ORDER — HYDRALAZINE HCL 50 MG
25 TABLET ORAL ONCE
Refills: 0 | Status: COMPLETED | OUTPATIENT
Start: 2023-01-01 | End: 2023-01-01

## 2023-01-01 RX ORDER — METFORMIN HYDROCHLORIDE 850 MG/1
1 TABLET ORAL
Refills: 0 | DISCHARGE

## 2023-01-01 RX ORDER — TICAGRELOR 90 MG/1
180 TABLET ORAL ONCE
Refills: 0 | Status: COMPLETED | OUTPATIENT
Start: 2023-01-01 | End: 2023-01-01

## 2023-01-01 RX ORDER — ASPIRIN/CALCIUM CARB/MAGNESIUM 324 MG
300 TABLET ORAL ONCE
Refills: 0 | Status: COMPLETED | OUTPATIENT
Start: 2023-01-01 | End: 2023-01-01

## 2023-01-01 RX ORDER — INSULIN LISPRO 100/ML
0 VIAL (ML) SUBCUTANEOUS
Qty: 0 | Refills: 0 | DISCHARGE
Start: 2023-01-01

## 2023-01-01 RX ORDER — LABETALOL HCL 100 MG
100 TABLET ORAL ONCE
Refills: 0 | Status: DISCONTINUED | OUTPATIENT
Start: 2023-01-01 | End: 2023-01-01

## 2023-01-01 RX ORDER — ACETAMINOPHEN 500 MG
1000 TABLET ORAL ONCE
Refills: 0 | Status: COMPLETED | OUTPATIENT
Start: 2023-01-01 | End: 2023-01-01

## 2023-01-01 RX ORDER — LANOLIN ALCOHOL/MO/W.PET/CERES
1 CREAM (GRAM) TOPICAL
Qty: 0 | Refills: 0 | DISCHARGE
Start: 2023-01-01

## 2023-01-01 RX ORDER — LANOLIN ALCOHOL/MO/W.PET/CERES
5 CREAM (GRAM) TOPICAL AT BEDTIME
Refills: 0 | Status: DISCONTINUED | OUTPATIENT
Start: 2023-01-01 | End: 2023-01-01

## 2023-01-01 RX ORDER — SODIUM CHLORIDE 5 G/100ML
500 INJECTION, SOLUTION INTRAVENOUS
Refills: 0 | Status: DISCONTINUED | OUTPATIENT
Start: 2023-01-01 | End: 2023-01-01

## 2023-01-01 RX ORDER — METFORMIN HYDROCHLORIDE 850 MG/1
500 TABLET ORAL
Refills: 0 | Status: DISCONTINUED | OUTPATIENT
Start: 2023-01-01 | End: 2023-01-01

## 2023-01-01 RX ORDER — SOD SULF/SODIUM/NAHCO3/KCL/PEG
4000 SOLUTION, RECONSTITUTED, ORAL ORAL ONCE
Refills: 0 | Status: COMPLETED | OUTPATIENT
Start: 2023-01-01 | End: 2023-01-01

## 2023-01-01 RX ORDER — LISINOPRIL 2.5 MG/1
1 TABLET ORAL
Qty: 30 | Refills: 0
Start: 2023-01-01 | End: 2023-01-01

## 2023-01-01 RX ORDER — ESCITALOPRAM OXALATE 10 MG/1
1 TABLET, FILM COATED ORAL
Qty: 30 | Refills: 0
Start: 2023-01-01 | End: 2023-01-01

## 2023-01-01 RX ORDER — TICAGRELOR 90 MG/1
90 TABLET ORAL EVERY 12 HOURS
Refills: 0 | Status: DISCONTINUED | OUTPATIENT
Start: 2023-01-01 | End: 2023-01-01

## 2023-01-01 RX ORDER — ESCITALOPRAM OXALATE 10 MG/1
5 TABLET, FILM COATED ORAL DAILY
Refills: 0 | Status: DISCONTINUED | OUTPATIENT
Start: 2023-01-01 | End: 2023-01-01

## 2023-01-01 RX ORDER — METFORMIN HYDROCHLORIDE 850 MG/1
1 TABLET ORAL
Qty: 60 | Refills: 0
Start: 2023-01-01 | End: 2023-01-01

## 2023-01-01 RX ORDER — SODIUM CHLORIDE 9 MG/ML
500 INJECTION INTRAMUSCULAR; INTRAVENOUS; SUBCUTANEOUS ONCE
Refills: 0 | Status: COMPLETED | OUTPATIENT
Start: 2023-01-01 | End: 2023-01-01

## 2023-01-01 RX ORDER — ENOXAPARIN SODIUM 100 MG/ML
40 INJECTION SUBCUTANEOUS
Refills: 0 | Status: DISCONTINUED | OUTPATIENT
Start: 2023-01-01 | End: 2023-01-01

## 2023-01-01 RX ORDER — HYDRALAZINE HCL 50 MG
10 TABLET ORAL
Refills: 0 | Status: DISCONTINUED | OUTPATIENT
Start: 2023-01-01 | End: 2023-01-01

## 2023-01-01 RX ORDER — GLUCAGON INJECTION, SOLUTION 0.5 MG/.1ML
1 INJECTION, SOLUTION SUBCUTANEOUS ONCE
Refills: 0 | Status: DISCONTINUED | OUTPATIENT
Start: 2023-01-01 | End: 2023-01-01

## 2023-01-01 RX ORDER — PANTOPRAZOLE SODIUM 20 MG/1
40 TABLET, DELAYED RELEASE ORAL ONCE
Refills: 0 | Status: COMPLETED | OUTPATIENT
Start: 2023-01-01 | End: 2023-01-01

## 2023-01-01 RX ORDER — LABETALOL HCL 100 MG
400 TABLET ORAL EVERY 8 HOURS
Refills: 0 | Status: DISCONTINUED | OUTPATIENT
Start: 2023-01-01 | End: 2023-01-01

## 2023-01-01 RX ORDER — ENOXAPARIN SODIUM 100 MG/ML
30 INJECTION SUBCUTANEOUS
Qty: 0 | Refills: 0 | DISCHARGE
Start: 2023-01-01

## 2023-01-01 RX ORDER — HEPARIN SODIUM 5000 [USP'U]/ML
1450 INJECTION INTRAVENOUS; SUBCUTANEOUS
Qty: 25000 | Refills: 0 | Status: DISCONTINUED | OUTPATIENT
Start: 2023-01-01 | End: 2023-01-01

## 2023-01-01 RX ORDER — LABETALOL HCL 100 MG
300 TABLET ORAL THREE TIMES A DAY
Refills: 0 | Status: DISCONTINUED | OUTPATIENT
Start: 2023-01-01 | End: 2023-01-01

## 2023-01-01 RX ORDER — AMLODIPINE BESYLATE 2.5 MG/1
10 TABLET ORAL
Refills: 0 | Status: DISCONTINUED | OUTPATIENT
Start: 2023-01-01 | End: 2023-01-01

## 2023-01-01 RX ORDER — TRAZODONE HCL 50 MG
50 TABLET ORAL
Qty: 15 | Refills: 0 | DISCHARGE
Start: 2023-01-01 | End: 2023-01-01

## 2023-01-01 RX ORDER — LANOLIN ALCOHOL/MO/W.PET/CERES
2 CREAM (GRAM) TOPICAL
Qty: 0 | Refills: 0 | DISCHARGE
Start: 2023-01-01

## 2023-01-01 RX ORDER — MINERAL OIL
133 OIL (ML) MISCELLANEOUS ONCE
Refills: 0 | Status: COMPLETED | OUTPATIENT
Start: 2023-01-01 | End: 2023-01-01

## 2023-01-01 RX ORDER — LISINOPRIL 2.5 MG/1
20 TABLET ORAL
Refills: 0 | Status: DISCONTINUED | OUTPATIENT
Start: 2023-01-01 | End: 2023-01-01

## 2023-01-01 RX ORDER — CLOPIDOGREL BISULFATE 75 MG/1
75 TABLET, FILM COATED ORAL EVERY 24 HOURS
Refills: 0 | Status: DISCONTINUED | OUTPATIENT
Start: 2023-01-01 | End: 2023-01-01

## 2023-01-01 RX ORDER — HYDRALAZINE HCL 50 MG
50 TABLET ORAL EVERY 12 HOURS
Refills: 0 | Status: DISCONTINUED | OUTPATIENT
Start: 2023-01-01 | End: 2023-01-01

## 2023-01-01 RX ORDER — HYDRALAZINE HYDROCHLORIDE 50 MG/1
50 TABLET ORAL 3 TIMES DAILY
Qty: 90 | Refills: 3 | Status: ACTIVE | COMMUNITY
Start: 2023-01-01 | End: 1900-01-01

## 2023-01-01 RX ORDER — ATORVASTATIN CALCIUM 80 MG/1
1 TABLET, FILM COATED ORAL
Qty: 30 | Refills: 0
Start: 2023-01-01 | End: 2023-01-01

## 2023-01-01 RX ORDER — TRAZODONE HCL 50 MG
25 TABLET ORAL
Qty: 15 | Refills: 0
Start: 2023-01-01 | End: 2023-01-01

## 2023-01-01 RX ORDER — METFORMIN HYDROCHLORIDE 500 MG/1
500 TABLET, COATED ORAL
Refills: 0 | Status: ACTIVE | COMMUNITY

## 2023-01-01 RX ORDER — HYDRALAZINE HCL 50 MG
5 TABLET ORAL ONCE
Refills: 0 | Status: COMPLETED | OUTPATIENT
Start: 2023-01-01 | End: 2023-01-01

## 2023-01-01 RX ORDER — FENTANYL CITRATE 50 UG/ML
25 INJECTION INTRAVENOUS ONCE
Refills: 0 | Status: DISCONTINUED | OUTPATIENT
Start: 2023-01-01 | End: 2023-01-01

## 2023-01-01 RX ORDER — CARVEDILOL PHOSPHATE 80 MG/1
1 CAPSULE, EXTENDED RELEASE ORAL
Qty: 60 | Refills: 0
Start: 2023-01-01 | End: 2023-01-01

## 2023-01-01 RX ORDER — PHENYLEPHRINE HCL 10 MG
7 TABLET ORAL DAILY
Qty: 30 | Refills: 1 | Status: ACTIVE | COMMUNITY
Start: 2023-01-01 | End: 1900-01-01

## 2023-01-01 RX ADMIN — ATORVASTATIN CALCIUM 80 MILLIGRAM(S): 80 TABLET, FILM COATED ORAL at 21:28

## 2023-01-01 RX ADMIN — LISINOPRIL 40 MILLIGRAM(S): 2.5 TABLET ORAL at 06:55

## 2023-01-01 RX ADMIN — TICAGRELOR 90 MILLIGRAM(S): 90 TABLET ORAL at 06:16

## 2023-01-01 RX ADMIN — Medication 81 MILLIGRAM(S): at 12:27

## 2023-01-01 RX ADMIN — ENOXAPARIN SODIUM 30 MILLIGRAM(S): 100 INJECTION SUBCUTANEOUS at 17:56

## 2023-01-01 RX ADMIN — SENNA PLUS 2 TABLET(S): 8.6 TABLET ORAL at 21:36

## 2023-01-01 RX ADMIN — Medication 2: at 12:17

## 2023-01-01 RX ADMIN — ENOXAPARIN SODIUM 40 MILLIGRAM(S): 100 INJECTION SUBCUTANEOUS at 17:56

## 2023-01-01 RX ADMIN — Medication 400 MILLIGRAM(S): at 14:09

## 2023-01-01 RX ADMIN — TICAGRELOR 90 MILLIGRAM(S): 90 TABLET ORAL at 05:55

## 2023-01-01 RX ADMIN — ENOXAPARIN SODIUM 30 MILLIGRAM(S): 100 INJECTION SUBCUTANEOUS at 17:55

## 2023-01-01 RX ADMIN — CLOPIDOGREL BISULFATE 75 MILLIGRAM(S): 75 TABLET, FILM COATED ORAL at 11:53

## 2023-01-01 RX ADMIN — LISINOPRIL 40 MILLIGRAM(S): 2.5 TABLET ORAL at 08:00

## 2023-01-01 RX ADMIN — TICAGRELOR 90 MILLIGRAM(S): 90 TABLET ORAL at 17:20

## 2023-01-01 RX ADMIN — Medication 200 MILLIGRAM(S): at 06:09

## 2023-01-01 RX ADMIN — Medication 1 ENEMA: at 13:05

## 2023-01-01 RX ADMIN — CLOPIDOGREL BISULFATE 75 MILLIGRAM(S): 75 TABLET, FILM COATED ORAL at 11:20

## 2023-01-01 RX ADMIN — TICAGRELOR 90 MILLIGRAM(S): 90 TABLET ORAL at 17:28

## 2023-01-01 RX ADMIN — TICAGRELOR 90 MILLIGRAM(S): 90 TABLET ORAL at 05:54

## 2023-01-01 RX ADMIN — Medication 300 MILLIGRAM(S): at 06:08

## 2023-01-01 RX ADMIN — TICAGRELOR 90 MILLIGRAM(S): 90 TABLET ORAL at 17:09

## 2023-01-01 RX ADMIN — EPTIFIBATIDE 8.94 MICROGRAM(S)/KG/MIN: 2 INJECTION, SOLUTION INTRAVENOUS at 07:15

## 2023-01-01 RX ADMIN — ATORVASTATIN CALCIUM 80 MILLIGRAM(S): 80 TABLET, FILM COATED ORAL at 21:37

## 2023-01-01 RX ADMIN — METFORMIN HYDROCHLORIDE 500 MILLIGRAM(S): 850 TABLET ORAL at 07:25

## 2023-01-01 RX ADMIN — ATORVASTATIN CALCIUM 80 MILLIGRAM(S): 80 TABLET, FILM COATED ORAL at 22:02

## 2023-01-01 RX ADMIN — CLOPIDOGREL BISULFATE 75 MILLIGRAM(S): 75 TABLET, FILM COATED ORAL at 13:05

## 2023-01-01 RX ADMIN — Medication 10 MILLIGRAM(S): at 21:28

## 2023-01-01 RX ADMIN — ESCITALOPRAM OXALATE 10 MILLIGRAM(S): 10 TABLET, FILM COATED ORAL at 11:50

## 2023-01-01 RX ADMIN — HEPARIN SODIUM 10 UNIT(S)/HR: 5000 INJECTION INTRAVENOUS; SUBCUTANEOUS at 16:30

## 2023-01-01 RX ADMIN — Medication 81 MILLIGRAM(S): at 12:12

## 2023-01-01 RX ADMIN — CARVEDILOL PHOSPHATE 25 MILLIGRAM(S): 80 CAPSULE, EXTENDED RELEASE ORAL at 17:20

## 2023-01-01 RX ADMIN — ENOXAPARIN SODIUM 30 MILLIGRAM(S): 100 INJECTION SUBCUTANEOUS at 17:26

## 2023-01-01 RX ADMIN — PANTOPRAZOLE SODIUM 40 MILLIGRAM(S): 20 TABLET, DELAYED RELEASE ORAL at 06:36

## 2023-01-01 RX ADMIN — Medication 81 MILLIGRAM(S): at 06:52

## 2023-01-01 RX ADMIN — SENNA PLUS 2 TABLET(S): 8.6 TABLET ORAL at 21:24

## 2023-01-01 RX ADMIN — TICAGRELOR 90 MILLIGRAM(S): 90 TABLET ORAL at 06:52

## 2023-01-01 RX ADMIN — LISINOPRIL 40 MILLIGRAM(S): 2.5 TABLET ORAL at 09:04

## 2023-01-01 RX ADMIN — Medication 81 MILLIGRAM(S): at 11:55

## 2023-01-01 RX ADMIN — Medication 200 MILLIGRAM(S): at 06:26

## 2023-01-01 RX ADMIN — ENOXAPARIN SODIUM 30 MILLIGRAM(S): 100 INJECTION SUBCUTANEOUS at 06:16

## 2023-01-01 RX ADMIN — Medication 200 MILLIGRAM(S): at 14:21

## 2023-01-01 RX ADMIN — ATORVASTATIN CALCIUM 80 MILLIGRAM(S): 80 TABLET, FILM COATED ORAL at 22:39

## 2023-01-01 RX ADMIN — CLOPIDOGREL BISULFATE 75 MILLIGRAM(S): 75 TABLET, FILM COATED ORAL at 11:38

## 2023-01-01 RX ADMIN — Medication 1 PATCH: at 12:17

## 2023-01-01 RX ADMIN — Medication 50 MILLIGRAM(S): at 08:30

## 2023-01-01 RX ADMIN — Medication 1 PATCH: at 11:52

## 2023-01-01 RX ADMIN — Medication 25 MILLIGRAM(S): at 22:18

## 2023-01-01 RX ADMIN — SENNA PLUS 2 TABLET(S): 8.6 TABLET ORAL at 22:52

## 2023-01-01 RX ADMIN — Medication 300 MILLIGRAM(S): at 06:47

## 2023-01-01 RX ADMIN — CLOPIDOGREL BISULFATE 75 MILLIGRAM(S): 75 TABLET, FILM COATED ORAL at 11:55

## 2023-01-01 RX ADMIN — Medication 1 PATCH: at 19:25

## 2023-01-01 RX ADMIN — LISINOPRIL 40 MILLIGRAM(S): 2.5 TABLET ORAL at 05:54

## 2023-01-01 RX ADMIN — Medication 81 MILLIGRAM(S): at 06:20

## 2023-01-01 RX ADMIN — ENOXAPARIN SODIUM 30 MILLIGRAM(S): 100 INJECTION SUBCUTANEOUS at 23:22

## 2023-01-01 RX ADMIN — Medication 1 PATCH: at 08:34

## 2023-01-01 RX ADMIN — CARVEDILOL PHOSPHATE 25 MILLIGRAM(S): 80 CAPSULE, EXTENDED RELEASE ORAL at 05:53

## 2023-01-01 RX ADMIN — TICAGRELOR 90 MILLIGRAM(S): 90 TABLET ORAL at 09:11

## 2023-01-01 RX ADMIN — TENECTEPLASE 3600 MILLIGRAM(S): KIT at 19:47

## 2023-01-01 RX ADMIN — Medication 50 MILLIGRAM(S): at 05:19

## 2023-01-01 RX ADMIN — Medication 650 MILLIGRAM(S): at 00:12

## 2023-01-01 RX ADMIN — METFORMIN HYDROCHLORIDE 500 MILLIGRAM(S): 850 TABLET ORAL at 17:38

## 2023-01-01 RX ADMIN — CLOPIDOGREL BISULFATE 75 MILLIGRAM(S): 75 TABLET, FILM COATED ORAL at 11:27

## 2023-01-01 RX ADMIN — SENNA PLUS 2 TABLET(S): 8.6 TABLET ORAL at 21:14

## 2023-01-01 RX ADMIN — POLYETHYLENE GLYCOL 3350 17 GRAM(S): 17 POWDER, FOR SOLUTION ORAL at 19:01

## 2023-01-01 RX ADMIN — Medication 200 MILLIGRAM(S): at 06:06

## 2023-01-01 RX ADMIN — Medication 1 PATCH: at 12:00

## 2023-01-01 RX ADMIN — TAMSULOSIN HYDROCHLORIDE 0.4 MILLIGRAM(S): 0.4 CAPSULE ORAL at 21:38

## 2023-01-01 RX ADMIN — PANTOPRAZOLE SODIUM 40 MILLIGRAM(S): 20 TABLET, DELAYED RELEASE ORAL at 05:55

## 2023-01-01 RX ADMIN — Medication 6 MILLIGRAM(S): at 22:16

## 2023-01-01 RX ADMIN — Medication 25 MILLIGRAM(S): at 21:23

## 2023-01-01 RX ADMIN — ATORVASTATIN CALCIUM 80 MILLIGRAM(S): 80 TABLET, FILM COATED ORAL at 21:55

## 2023-01-01 RX ADMIN — POLYETHYLENE GLYCOL 3350 17 GRAM(S): 17 POWDER, FOR SOLUTION ORAL at 17:50

## 2023-01-01 RX ADMIN — Medication 81 MILLIGRAM(S): at 06:16

## 2023-01-01 RX ADMIN — EPTIFIBATIDE 8.94 MICROGRAM(S)/KG/MIN: 2 INJECTION, SOLUTION INTRAVENOUS at 20:53

## 2023-01-01 RX ADMIN — Medication 25 MILLIGRAM(S): at 22:03

## 2023-01-01 RX ADMIN — ATORVASTATIN CALCIUM 80 MILLIGRAM(S): 80 TABLET, FILM COATED ORAL at 21:23

## 2023-01-01 RX ADMIN — POLYETHYLENE GLYCOL 3350 17 GRAM(S): 17 POWDER, FOR SOLUTION ORAL at 06:12

## 2023-01-01 RX ADMIN — Medication 50 MILLIGRAM(S): at 20:13

## 2023-01-01 RX ADMIN — CLOPIDOGREL BISULFATE 75 MILLIGRAM(S): 75 TABLET, FILM COATED ORAL at 12:51

## 2023-01-01 RX ADMIN — METFORMIN HYDROCHLORIDE 500 MILLIGRAM(S): 850 TABLET ORAL at 16:34

## 2023-01-01 RX ADMIN — TAMSULOSIN HYDROCHLORIDE 0.4 MILLIGRAM(S): 0.4 CAPSULE ORAL at 22:52

## 2023-01-01 RX ADMIN — Medication 6 MILLIGRAM(S): at 22:09

## 2023-01-01 RX ADMIN — Medication 100 GRAM(S): at 01:16

## 2023-01-01 RX ADMIN — Medication 81 MILLIGRAM(S): at 05:56

## 2023-01-01 RX ADMIN — POLYETHYLENE GLYCOL 3350 17 GRAM(S): 17 POWDER, FOR SOLUTION ORAL at 08:28

## 2023-01-01 RX ADMIN — Medication 6 MILLIGRAM(S): at 22:51

## 2023-01-01 RX ADMIN — ENOXAPARIN SODIUM 30 MILLIGRAM(S): 100 INJECTION SUBCUTANEOUS at 17:21

## 2023-01-01 RX ADMIN — Medication 50 MILLIGRAM(S): at 21:38

## 2023-01-01 RX ADMIN — Medication 1 PATCH: at 19:42

## 2023-01-01 RX ADMIN — Medication 1 PATCH: at 12:47

## 2023-01-01 RX ADMIN — CARVEDILOL PHOSPHATE 25 MILLIGRAM(S): 80 CAPSULE, EXTENDED RELEASE ORAL at 21:10

## 2023-01-01 RX ADMIN — LISINOPRIL 20 MILLIGRAM(S): 2.5 TABLET ORAL at 06:47

## 2023-01-01 RX ADMIN — AMLODIPINE BESYLATE 10 MILLIGRAM(S): 2.5 TABLET ORAL at 05:16

## 2023-01-01 RX ADMIN — SENNA PLUS 2 TABLET(S): 8.6 TABLET ORAL at 21:45

## 2023-01-01 RX ADMIN — Medication 200 MILLIGRAM(S): at 22:07

## 2023-01-01 RX ADMIN — ENOXAPARIN SODIUM 30 MILLIGRAM(S): 100 INJECTION SUBCUTANEOUS at 18:04

## 2023-01-01 RX ADMIN — Medication 1 PATCH: at 12:15

## 2023-01-01 RX ADMIN — Medication 81 MILLIGRAM(S): at 13:05

## 2023-01-01 RX ADMIN — Medication 200 MILLIGRAM(S): at 13:21

## 2023-01-01 RX ADMIN — METFORMIN HYDROCHLORIDE 500 MILLIGRAM(S): 850 TABLET ORAL at 10:05

## 2023-01-01 RX ADMIN — ATORVASTATIN CALCIUM 80 MILLIGRAM(S): 80 TABLET, FILM COATED ORAL at 22:44

## 2023-01-01 RX ADMIN — Medication 2: at 08:07

## 2023-01-01 RX ADMIN — Medication 25 MILLIGRAM(S): at 22:38

## 2023-01-01 RX ADMIN — Medication 650 MILLIGRAM(S): at 22:43

## 2023-01-01 RX ADMIN — TICAGRELOR 90 MILLIGRAM(S): 90 TABLET ORAL at 17:57

## 2023-01-01 RX ADMIN — POLYETHYLENE GLYCOL 3350 17 GRAM(S): 17 POWDER, FOR SOLUTION ORAL at 11:37

## 2023-01-01 RX ADMIN — Medication 650 MILLIGRAM(S): at 22:01

## 2023-01-01 RX ADMIN — CARVEDILOL PHOSPHATE 25 MILLIGRAM(S): 80 CAPSULE, EXTENDED RELEASE ORAL at 22:51

## 2023-01-01 RX ADMIN — Medication 200 MILLIGRAM(S): at 05:55

## 2023-01-01 RX ADMIN — Medication 50 MILLIGRAM(S): at 21:37

## 2023-01-01 RX ADMIN — Medication 25 MILLIGRAM(S): at 21:32

## 2023-01-01 RX ADMIN — AMLODIPINE BESYLATE 10 MILLIGRAM(S): 2.5 TABLET ORAL at 05:21

## 2023-01-01 RX ADMIN — Medication 50 MILLIGRAM(S): at 20:04

## 2023-01-01 RX ADMIN — LISINOPRIL 20 MILLIGRAM(S): 2.5 TABLET ORAL at 06:03

## 2023-01-01 RX ADMIN — LISINOPRIL 40 MILLIGRAM(S): 2.5 TABLET ORAL at 06:09

## 2023-01-01 RX ADMIN — CARVEDILOL PHOSPHATE 25 MILLIGRAM(S): 80 CAPSULE, EXTENDED RELEASE ORAL at 18:58

## 2023-01-01 RX ADMIN — Medication 200 MILLIGRAM(S): at 13:42

## 2023-01-01 RX ADMIN — Medication 650 MILLIGRAM(S): at 14:30

## 2023-01-01 RX ADMIN — TICAGRELOR 90 MILLIGRAM(S): 90 TABLET ORAL at 06:08

## 2023-01-01 RX ADMIN — Medication 1 PATCH: at 07:00

## 2023-01-01 RX ADMIN — LISINOPRIL 20 MILLIGRAM(S): 2.5 TABLET ORAL at 08:29

## 2023-01-01 RX ADMIN — Medication 200 MILLIGRAM(S): at 05:29

## 2023-01-01 RX ADMIN — ENOXAPARIN SODIUM 40 MILLIGRAM(S): 100 INJECTION SUBCUTANEOUS at 21:57

## 2023-01-01 RX ADMIN — ENOXAPARIN SODIUM 30 MILLIGRAM(S): 100 INJECTION SUBCUTANEOUS at 19:00

## 2023-01-01 RX ADMIN — Medication 10 MILLIGRAM(S): at 12:54

## 2023-01-01 RX ADMIN — Medication 6 MILLIGRAM(S): at 21:45

## 2023-01-01 RX ADMIN — ATORVASTATIN CALCIUM 80 MILLIGRAM(S): 80 TABLET, FILM COATED ORAL at 22:22

## 2023-01-01 RX ADMIN — AMLODIPINE BESYLATE 10 MILLIGRAM(S): 2.5 TABLET ORAL at 06:04

## 2023-01-01 RX ADMIN — TICAGRELOR 90 MILLIGRAM(S): 90 TABLET ORAL at 06:15

## 2023-01-01 RX ADMIN — TICAGRELOR 90 MILLIGRAM(S): 90 TABLET ORAL at 11:10

## 2023-01-01 RX ADMIN — ENOXAPARIN SODIUM 30 MILLIGRAM(S): 100 INJECTION SUBCUTANEOUS at 17:42

## 2023-01-01 RX ADMIN — CLOPIDOGREL BISULFATE 75 MILLIGRAM(S): 75 TABLET, FILM COATED ORAL at 12:05

## 2023-01-01 RX ADMIN — ENOXAPARIN SODIUM 40 MILLIGRAM(S): 100 INJECTION SUBCUTANEOUS at 18:17

## 2023-01-01 RX ADMIN — LISINOPRIL 40 MILLIGRAM(S): 2.5 TABLET ORAL at 06:50

## 2023-01-01 RX ADMIN — Medication 81 MILLIGRAM(S): at 11:24

## 2023-01-01 RX ADMIN — Medication 6 MILLIGRAM(S): at 22:18

## 2023-01-01 RX ADMIN — POLYETHYLENE GLYCOL 3350 17 GRAM(S): 17 POWDER, FOR SOLUTION ORAL at 20:13

## 2023-01-01 RX ADMIN — CARVEDILOL PHOSPHATE 25 MILLIGRAM(S): 80 CAPSULE, EXTENDED RELEASE ORAL at 08:30

## 2023-01-01 RX ADMIN — ATORVASTATIN CALCIUM 80 MILLIGRAM(S): 80 TABLET, FILM COATED ORAL at 22:17

## 2023-01-01 RX ADMIN — METFORMIN HYDROCHLORIDE 500 MILLIGRAM(S): 850 TABLET ORAL at 18:07

## 2023-01-01 RX ADMIN — ESCITALOPRAM OXALATE 10 MILLIGRAM(S): 10 TABLET, FILM COATED ORAL at 12:32

## 2023-01-01 RX ADMIN — AMLODIPINE BESYLATE 5 MILLIGRAM(S): 2.5 TABLET ORAL at 06:08

## 2023-01-01 RX ADMIN — Medication 5 MILLIGRAM(S): at 01:03

## 2023-01-01 RX ADMIN — ONDANSETRON 4 MILLIGRAM(S): 8 TABLET, FILM COATED ORAL at 01:30

## 2023-01-01 RX ADMIN — ENOXAPARIN SODIUM 30 MILLIGRAM(S): 100 INJECTION SUBCUTANEOUS at 06:15

## 2023-01-01 RX ADMIN — Medication 81 MILLIGRAM(S): at 12:30

## 2023-01-01 RX ADMIN — ATORVASTATIN CALCIUM 80 MILLIGRAM(S): 80 TABLET, FILM COATED ORAL at 21:57

## 2023-01-01 RX ADMIN — PANTOPRAZOLE SODIUM 40 MILLIGRAM(S): 20 TABLET, DELAYED RELEASE ORAL at 06:09

## 2023-01-01 RX ADMIN — Medication 1 PATCH: at 11:53

## 2023-01-01 RX ADMIN — TICAGRELOR 90 MILLIGRAM(S): 90 TABLET ORAL at 17:56

## 2023-01-01 RX ADMIN — ATORVASTATIN CALCIUM 80 MILLIGRAM(S): 80 TABLET, FILM COATED ORAL at 22:04

## 2023-01-01 RX ADMIN — POLYETHYLENE GLYCOL 3350 17 GRAM(S): 17 POWDER, FOR SOLUTION ORAL at 07:01

## 2023-01-01 RX ADMIN — AMLODIPINE BESYLATE 10 MILLIGRAM(S): 2.5 TABLET ORAL at 05:54

## 2023-01-01 RX ADMIN — AMLODIPINE BESYLATE 10 MILLIGRAM(S): 2.5 TABLET ORAL at 08:33

## 2023-01-01 RX ADMIN — ATORVASTATIN CALCIUM 80 MILLIGRAM(S): 80 TABLET, FILM COATED ORAL at 21:19

## 2023-01-01 RX ADMIN — Medication 81 MILLIGRAM(S): at 06:05

## 2023-01-01 RX ADMIN — ENOXAPARIN SODIUM 30 MILLIGRAM(S): 100 INJECTION SUBCUTANEOUS at 18:16

## 2023-01-01 RX ADMIN — CARVEDILOL PHOSPHATE 25 MILLIGRAM(S): 80 CAPSULE, EXTENDED RELEASE ORAL at 20:04

## 2023-01-01 RX ADMIN — Medication 50 MILLIGRAM(S): at 21:29

## 2023-01-01 RX ADMIN — Medication 25 MILLIGRAM(S): at 21:51

## 2023-01-01 RX ADMIN — POLYETHYLENE GLYCOL 3350 17 GRAM(S): 17 POWDER, FOR SOLUTION ORAL at 17:56

## 2023-01-01 RX ADMIN — ENOXAPARIN SODIUM 30 MILLIGRAM(S): 100 INJECTION SUBCUTANEOUS at 17:34

## 2023-01-01 RX ADMIN — ENOXAPARIN SODIUM 30 MILLIGRAM(S): 100 INJECTION SUBCUTANEOUS at 06:21

## 2023-01-01 RX ADMIN — Medication 650 MILLIGRAM(S): at 22:39

## 2023-01-01 RX ADMIN — ATORVASTATIN CALCIUM 80 MILLIGRAM(S): 80 TABLET, FILM COATED ORAL at 21:16

## 2023-01-01 RX ADMIN — Medication 50 MILLIGRAM(S): at 08:33

## 2023-01-01 RX ADMIN — AMLODIPINE BESYLATE 10 MILLIGRAM(S): 2.5 TABLET ORAL at 05:02

## 2023-01-01 RX ADMIN — TICAGRELOR 90 MILLIGRAM(S): 90 TABLET ORAL at 06:54

## 2023-01-01 RX ADMIN — Medication 650 MILLIGRAM(S): at 23:30

## 2023-01-01 RX ADMIN — Medication 50 MILLIGRAM(S): at 05:16

## 2023-01-01 RX ADMIN — PANTOPRAZOLE SODIUM 40 MILLIGRAM(S): 20 TABLET, DELAYED RELEASE ORAL at 03:39

## 2023-01-01 RX ADMIN — Medication 200 MILLIGRAM(S): at 06:55

## 2023-01-01 RX ADMIN — ESCITALOPRAM OXALATE 20 MILLIGRAM(S): 10 TABLET, FILM COATED ORAL at 12:05

## 2023-01-01 RX ADMIN — Medication 81 MILLIGRAM(S): at 12:05

## 2023-01-01 RX ADMIN — ATORVASTATIN CALCIUM 80 MILLIGRAM(S): 80 TABLET, FILM COATED ORAL at 21:36

## 2023-01-01 RX ADMIN — Medication 6 MILLIGRAM(S): at 21:24

## 2023-01-01 RX ADMIN — POLYETHYLENE GLYCOL 3350 17 GRAM(S): 17 POWDER, FOR SOLUTION ORAL at 11:51

## 2023-01-01 RX ADMIN — TICAGRELOR 90 MILLIGRAM(S): 90 TABLET ORAL at 19:01

## 2023-01-01 RX ADMIN — TICAGRELOR 90 MILLIGRAM(S): 90 TABLET ORAL at 05:29

## 2023-01-01 RX ADMIN — ENOXAPARIN SODIUM 40 MILLIGRAM(S): 100 INJECTION SUBCUTANEOUS at 18:26

## 2023-01-01 RX ADMIN — ATORVASTATIN CALCIUM 80 MILLIGRAM(S): 80 TABLET, FILM COATED ORAL at 22:07

## 2023-01-01 RX ADMIN — LISINOPRIL 40 MILLIGRAM(S): 2.5 TABLET ORAL at 06:16

## 2023-01-01 RX ADMIN — Medication 400 MILLIGRAM(S): at 17:37

## 2023-01-01 RX ADMIN — ESCITALOPRAM OXALATE 20 MILLIGRAM(S): 10 TABLET, FILM COATED ORAL at 13:11

## 2023-01-01 RX ADMIN — METFORMIN HYDROCHLORIDE 500 MILLIGRAM(S): 850 TABLET ORAL at 17:29

## 2023-01-01 RX ADMIN — Medication 1 PATCH: at 08:17

## 2023-01-01 RX ADMIN — Medication 10 MILLIGRAM(S): at 05:07

## 2023-01-01 RX ADMIN — CARVEDILOL PHOSPHATE 25 MILLIGRAM(S): 80 CAPSULE, EXTENDED RELEASE ORAL at 05:02

## 2023-01-01 RX ADMIN — Medication 650 MILLIGRAM(S): at 23:05

## 2023-01-01 RX ADMIN — Medication 25 MILLIGRAM(S): at 06:21

## 2023-01-01 RX ADMIN — SODIUM CHLORIDE 10 MILLILITER(S): 9 INJECTION INTRAMUSCULAR; INTRAVENOUS; SUBCUTANEOUS at 19:47

## 2023-01-01 RX ADMIN — METFORMIN HYDROCHLORIDE 500 MILLIGRAM(S): 850 TABLET ORAL at 07:39

## 2023-01-01 RX ADMIN — POLYETHYLENE GLYCOL 3350 17 GRAM(S): 17 POWDER, FOR SOLUTION ORAL at 17:43

## 2023-01-01 RX ADMIN — TAMSULOSIN HYDROCHLORIDE 0.4 MILLIGRAM(S): 0.4 CAPSULE ORAL at 22:02

## 2023-01-01 RX ADMIN — POLYETHYLENE GLYCOL 3350 17 GRAM(S): 17 POWDER, FOR SOLUTION ORAL at 08:17

## 2023-01-01 RX ADMIN — METFORMIN HYDROCHLORIDE 500 MILLIGRAM(S): 850 TABLET ORAL at 08:08

## 2023-01-01 RX ADMIN — ESCITALOPRAM OXALATE 20 MILLIGRAM(S): 10 TABLET, FILM COATED ORAL at 14:20

## 2023-01-01 RX ADMIN — ATORVASTATIN CALCIUM 80 MILLIGRAM(S): 80 TABLET, FILM COATED ORAL at 21:29

## 2023-01-01 RX ADMIN — METFORMIN HYDROCHLORIDE 500 MILLIGRAM(S): 850 TABLET ORAL at 08:59

## 2023-01-01 RX ADMIN — Medication 1 PATCH: at 13:32

## 2023-01-01 RX ADMIN — PANTOPRAZOLE SODIUM 40 MILLIGRAM(S): 20 TABLET, DELAYED RELEASE ORAL at 06:31

## 2023-01-01 RX ADMIN — AMLODIPINE BESYLATE 5 MILLIGRAM(S): 2.5 TABLET ORAL at 06:10

## 2023-01-01 RX ADMIN — Medication 200 MILLIGRAM(S): at 22:02

## 2023-01-01 RX ADMIN — Medication 81 MILLIGRAM(S): at 05:55

## 2023-01-01 RX ADMIN — Medication 0.5 MILLIGRAM(S): at 01:05

## 2023-01-01 RX ADMIN — Medication 200 MILLIGRAM(S): at 21:29

## 2023-01-01 RX ADMIN — CARVEDILOL PHOSPHATE 25 MILLIGRAM(S): 80 CAPSULE, EXTENDED RELEASE ORAL at 05:59

## 2023-01-01 RX ADMIN — Medication 0.5 MILLIGRAM(S): at 00:40

## 2023-01-01 RX ADMIN — CLOPIDOGREL BISULFATE 75 MILLIGRAM(S): 75 TABLET, FILM COATED ORAL at 12:39

## 2023-01-01 RX ADMIN — ATORVASTATIN CALCIUM 80 MILLIGRAM(S): 80 TABLET, FILM COATED ORAL at 21:46

## 2023-01-01 RX ADMIN — METFORMIN HYDROCHLORIDE 500 MILLIGRAM(S): 850 TABLET ORAL at 17:31

## 2023-01-01 RX ADMIN — AMLODIPINE BESYLATE 5 MILLIGRAM(S): 2.5 TABLET ORAL at 06:36

## 2023-01-01 RX ADMIN — Medication 1 PATCH: at 11:19

## 2023-01-01 RX ADMIN — ESCITALOPRAM OXALATE 10 MILLIGRAM(S): 10 TABLET, FILM COATED ORAL at 11:21

## 2023-01-01 RX ADMIN — ENOXAPARIN SODIUM 30 MILLIGRAM(S): 100 INJECTION SUBCUTANEOUS at 06:20

## 2023-01-01 RX ADMIN — AMLODIPINE BESYLATE 10 MILLIGRAM(S): 2.5 TABLET ORAL at 09:04

## 2023-01-01 RX ADMIN — LISINOPRIL 20 MILLIGRAM(S): 2.5 TABLET ORAL at 17:41

## 2023-01-01 RX ADMIN — ONDANSETRON 4 MILLIGRAM(S): 8 TABLET, FILM COATED ORAL at 01:52

## 2023-01-01 RX ADMIN — TAMSULOSIN HYDROCHLORIDE 0.4 MILLIGRAM(S): 0.4 CAPSULE ORAL at 22:03

## 2023-01-01 RX ADMIN — AMLODIPINE BESYLATE 5 MILLIGRAM(S): 2.5 TABLET ORAL at 05:55

## 2023-01-01 RX ADMIN — ENOXAPARIN SODIUM 30 MILLIGRAM(S): 100 INJECTION SUBCUTANEOUS at 06:53

## 2023-01-01 RX ADMIN — Medication 1 PATCH: at 09:54

## 2023-01-01 RX ADMIN — ATORVASTATIN CALCIUM 80 MILLIGRAM(S): 80 TABLET, FILM COATED ORAL at 21:38

## 2023-01-01 RX ADMIN — Medication 50 MILLIGRAM(S): at 05:37

## 2023-01-01 RX ADMIN — Medication 300 MILLIGRAM(S): at 06:13

## 2023-01-01 RX ADMIN — Medication 1000 MILLIGRAM(S): at 18:40

## 2023-01-01 RX ADMIN — Medication 81 MILLIGRAM(S): at 12:14

## 2023-01-01 RX ADMIN — Medication 81 MILLIGRAM(S): at 13:42

## 2023-01-01 RX ADMIN — AMLODIPINE BESYLATE 10 MILLIGRAM(S): 2.5 TABLET ORAL at 05:44

## 2023-01-01 RX ADMIN — Medication 1 PATCH: at 12:20

## 2023-01-01 RX ADMIN — AMLODIPINE BESYLATE 5 MILLIGRAM(S): 2.5 TABLET ORAL at 06:06

## 2023-01-01 RX ADMIN — CLOPIDOGREL BISULFATE 75 MILLIGRAM(S): 75 TABLET, FILM COATED ORAL at 11:17

## 2023-01-01 RX ADMIN — METFORMIN HYDROCHLORIDE 500 MILLIGRAM(S): 850 TABLET ORAL at 08:00

## 2023-01-01 RX ADMIN — SENNA PLUS 2 TABLET(S): 8.6 TABLET ORAL at 22:01

## 2023-01-01 RX ADMIN — CLOPIDOGREL BISULFATE 75 MILLIGRAM(S): 75 TABLET, FILM COATED ORAL at 12:31

## 2023-01-01 RX ADMIN — Medication 200 MILLIGRAM(S): at 14:33

## 2023-01-01 RX ADMIN — ESCITALOPRAM OXALATE 10 MILLIGRAM(S): 10 TABLET, FILM COATED ORAL at 11:51

## 2023-01-01 RX ADMIN — TAMSULOSIN HYDROCHLORIDE 0.4 MILLIGRAM(S): 0.4 CAPSULE ORAL at 22:17

## 2023-01-01 RX ADMIN — METFORMIN HYDROCHLORIDE 500 MILLIGRAM(S): 850 TABLET ORAL at 16:49

## 2023-01-01 RX ADMIN — Medication 25 MILLIGRAM(S): at 21:24

## 2023-01-01 RX ADMIN — ENOXAPARIN SODIUM 40 MILLIGRAM(S): 100 INJECTION SUBCUTANEOUS at 18:03

## 2023-01-01 RX ADMIN — Medication 81 MILLIGRAM(S): at 06:36

## 2023-01-01 RX ADMIN — Medication 81 MILLIGRAM(S): at 06:08

## 2023-01-01 RX ADMIN — Medication 200 MILLIGRAM(S): at 07:02

## 2023-01-01 RX ADMIN — Medication 1 PATCH: at 13:03

## 2023-01-01 RX ADMIN — Medication 1000 MILLIGRAM(S): at 14:20

## 2023-01-01 RX ADMIN — Medication 650 MILLIGRAM(S): at 21:21

## 2023-01-01 RX ADMIN — PANTOPRAZOLE SODIUM 40 MILLIGRAM(S): 20 TABLET, DELAYED RELEASE ORAL at 06:20

## 2023-01-01 RX ADMIN — Medication 2: at 17:43

## 2023-01-01 RX ADMIN — PANTOPRAZOLE SODIUM 40 MILLIGRAM(S): 20 TABLET, DELAYED RELEASE ORAL at 06:12

## 2023-01-01 RX ADMIN — Medication 25 MILLIGRAM(S): at 05:23

## 2023-01-01 RX ADMIN — ENOXAPARIN SODIUM 30 MILLIGRAM(S): 100 INJECTION SUBCUTANEOUS at 06:10

## 2023-01-01 RX ADMIN — Medication 25 MILLIGRAM(S): at 06:09

## 2023-01-01 RX ADMIN — Medication 25 MILLIGRAM(S): at 05:59

## 2023-01-01 RX ADMIN — AMLODIPINE BESYLATE 10 MILLIGRAM(S): 2.5 TABLET ORAL at 06:09

## 2023-01-01 RX ADMIN — CEFTRIAXONE 100 MILLIGRAM(S): 500 INJECTION, POWDER, FOR SOLUTION INTRAMUSCULAR; INTRAVENOUS at 06:32

## 2023-01-01 RX ADMIN — ATORVASTATIN CALCIUM 80 MILLIGRAM(S): 80 TABLET, FILM COATED ORAL at 21:45

## 2023-01-01 RX ADMIN — Medication 200 MILLIGRAM(S): at 13:26

## 2023-01-01 RX ADMIN — Medication 6 MILLIGRAM(S): at 21:14

## 2023-01-01 RX ADMIN — POLYETHYLENE GLYCOL 3350 17 GRAM(S): 17 POWDER, FOR SOLUTION ORAL at 18:02

## 2023-01-01 RX ADMIN — ENOXAPARIN SODIUM 30 MILLIGRAM(S): 100 INJECTION SUBCUTANEOUS at 18:17

## 2023-01-01 RX ADMIN — CARVEDILOL PHOSPHATE 25 MILLIGRAM(S): 80 CAPSULE, EXTENDED RELEASE ORAL at 18:02

## 2023-01-01 RX ADMIN — ENOXAPARIN SODIUM 30 MILLIGRAM(S): 100 INJECTION SUBCUTANEOUS at 06:08

## 2023-01-01 RX ADMIN — ESCITALOPRAM OXALATE 10 MILLIGRAM(S): 10 TABLET, FILM COATED ORAL at 12:30

## 2023-01-01 RX ADMIN — Medication 1 PATCH: at 12:34

## 2023-01-01 RX ADMIN — SODIUM CHLORIDE 100 MILLILITER(S): 9 INJECTION INTRAMUSCULAR; INTRAVENOUS; SUBCUTANEOUS at 13:31

## 2023-01-01 RX ADMIN — CARVEDILOL PHOSPHATE 25 MILLIGRAM(S): 80 CAPSULE, EXTENDED RELEASE ORAL at 06:13

## 2023-01-01 RX ADMIN — Medication 50 MILLIGRAM(S): at 09:04

## 2023-01-01 RX ADMIN — ESCITALOPRAM OXALATE 5 MILLIGRAM(S): 10 TABLET, FILM COATED ORAL at 11:40

## 2023-01-01 RX ADMIN — Medication 25 MILLIGRAM(S): at 21:45

## 2023-01-01 RX ADMIN — LISINOPRIL 40 MILLIGRAM(S): 2.5 TABLET ORAL at 05:55

## 2023-01-01 RX ADMIN — ESCITALOPRAM OXALATE 10 MILLIGRAM(S): 10 TABLET, FILM COATED ORAL at 12:39

## 2023-01-01 RX ADMIN — POLYETHYLENE GLYCOL 3350 17 GRAM(S): 17 POWDER, FOR SOLUTION ORAL at 12:06

## 2023-01-01 RX ADMIN — Medication 1 PATCH: at 11:27

## 2023-01-01 RX ADMIN — Medication 81 MILLIGRAM(S): at 11:53

## 2023-01-01 RX ADMIN — Medication 50 MILLIGRAM(S): at 21:36

## 2023-01-01 RX ADMIN — ENOXAPARIN SODIUM 40 MILLIGRAM(S): 100 INJECTION SUBCUTANEOUS at 17:26

## 2023-01-01 RX ADMIN — PANTOPRAZOLE SODIUM 40 MILLIGRAM(S): 20 TABLET, DELAYED RELEASE ORAL at 06:54

## 2023-01-01 RX ADMIN — Medication 1 ENEMA: at 19:02

## 2023-01-01 RX ADMIN — Medication 81 MILLIGRAM(S): at 12:32

## 2023-01-01 RX ADMIN — ESCITALOPRAM OXALATE 10 MILLIGRAM(S): 10 TABLET, FILM COATED ORAL at 13:08

## 2023-01-01 RX ADMIN — Medication 200 MILLIGRAM(S): at 22:01

## 2023-01-01 RX ADMIN — Medication 1 PATCH: at 12:14

## 2023-01-01 RX ADMIN — Medication 200 MILLIGRAM(S): at 22:45

## 2023-01-01 RX ADMIN — CEFTRIAXONE 100 MILLIGRAM(S): 500 INJECTION, POWDER, FOR SOLUTION INTRAMUSCULAR; INTRAVENOUS at 06:21

## 2023-01-01 RX ADMIN — POLYETHYLENE GLYCOL 3350 17 GRAM(S): 17 POWDER, FOR SOLUTION ORAL at 12:29

## 2023-01-01 RX ADMIN — Medication 50 MILLIGRAM(S): at 20:03

## 2023-01-01 RX ADMIN — POLYETHYLENE GLYCOL 3350 17 GRAM(S): 17 POWDER, FOR SOLUTION ORAL at 17:27

## 2023-01-01 RX ADMIN — ATORVASTATIN CALCIUM 80 MILLIGRAM(S): 80 TABLET, FILM COATED ORAL at 22:54

## 2023-01-01 RX ADMIN — ATORVASTATIN CALCIUM 80 MILLIGRAM(S): 80 TABLET, FILM COATED ORAL at 22:03

## 2023-01-01 RX ADMIN — ESCITALOPRAM OXALATE 10 MILLIGRAM(S): 10 TABLET, FILM COATED ORAL at 12:17

## 2023-01-01 RX ADMIN — SODIUM CHLORIDE 75 MILLILITER(S): 9 INJECTION INTRAMUSCULAR; INTRAVENOUS; SUBCUTANEOUS at 05:12

## 2023-01-01 RX ADMIN — POLYETHYLENE GLYCOL 3350 17 GRAM(S): 17 POWDER, FOR SOLUTION ORAL at 05:30

## 2023-01-01 RX ADMIN — CARVEDILOL PHOSPHATE 25 MILLIGRAM(S): 80 CAPSULE, EXTENDED RELEASE ORAL at 17:40

## 2023-01-01 RX ADMIN — Medication 1 PATCH: at 11:24

## 2023-01-01 RX ADMIN — ESCITALOPRAM OXALATE 20 MILLIGRAM(S): 10 TABLET, FILM COATED ORAL at 11:46

## 2023-01-01 RX ADMIN — Medication 1 PATCH: at 11:28

## 2023-01-01 RX ADMIN — Medication 1 PATCH: at 11:21

## 2023-01-01 RX ADMIN — Medication 1 ENEMA: at 19:00

## 2023-01-01 RX ADMIN — ESCITALOPRAM OXALATE 20 MILLIGRAM(S): 10 TABLET, FILM COATED ORAL at 12:53

## 2023-01-01 RX ADMIN — Medication 400 MILLIGRAM(S): at 06:47

## 2023-01-01 RX ADMIN — POLYETHYLENE GLYCOL 3350 17 GRAM(S): 17 POWDER, FOR SOLUTION ORAL at 12:03

## 2023-01-01 RX ADMIN — Medication 1 PATCH: at 20:16

## 2023-01-01 RX ADMIN — ENOXAPARIN SODIUM 30 MILLIGRAM(S): 100 INJECTION SUBCUTANEOUS at 05:55

## 2023-01-01 RX ADMIN — Medication 1 PATCH: at 11:31

## 2023-01-01 RX ADMIN — LISINOPRIL 40 MILLIGRAM(S): 2.5 TABLET ORAL at 08:32

## 2023-01-01 RX ADMIN — ESCITALOPRAM OXALATE 20 MILLIGRAM(S): 10 TABLET, FILM COATED ORAL at 12:32

## 2023-01-01 RX ADMIN — AMLODIPINE BESYLATE 5 MILLIGRAM(S): 2.5 TABLET ORAL at 06:51

## 2023-01-01 RX ADMIN — LISINOPRIL 20 MILLIGRAM(S): 2.5 TABLET ORAL at 06:08

## 2023-01-01 RX ADMIN — ESCITALOPRAM OXALATE 20 MILLIGRAM(S): 10 TABLET, FILM COATED ORAL at 11:26

## 2023-01-01 RX ADMIN — AMLODIPINE BESYLATE 5 MILLIGRAM(S): 2.5 TABLET ORAL at 12:29

## 2023-01-01 RX ADMIN — ESCITALOPRAM OXALATE 10 MILLIGRAM(S): 10 TABLET, FILM COATED ORAL at 13:07

## 2023-01-01 RX ADMIN — SODIUM CHLORIDE 125 MILLILITER(S): 9 INJECTION INTRAMUSCULAR; INTRAVENOUS; SUBCUTANEOUS at 22:50

## 2023-01-01 RX ADMIN — AMLODIPINE BESYLATE 10 MILLIGRAM(S): 2.5 TABLET ORAL at 08:29

## 2023-01-01 RX ADMIN — LISINOPRIL 20 MILLIGRAM(S): 2.5 TABLET ORAL at 06:21

## 2023-01-01 RX ADMIN — TICAGRELOR 90 MILLIGRAM(S): 90 TABLET ORAL at 06:36

## 2023-01-01 RX ADMIN — Medication 50 MILLIGRAM(S): at 12:06

## 2023-01-01 RX ADMIN — Medication 25 MILLIGRAM(S): at 06:08

## 2023-01-01 RX ADMIN — METFORMIN HYDROCHLORIDE 500 MILLIGRAM(S): 850 TABLET ORAL at 08:09

## 2023-01-01 RX ADMIN — Medication 300 MILLIGRAM(S): at 09:30

## 2023-01-01 RX ADMIN — ENOXAPARIN SODIUM 30 MILLIGRAM(S): 100 INJECTION SUBCUTANEOUS at 17:57

## 2023-01-01 RX ADMIN — Medication 1 PATCH: at 11:22

## 2023-01-01 RX ADMIN — TICAGRELOR 90 MILLIGRAM(S): 90 TABLET ORAL at 18:13

## 2023-01-01 RX ADMIN — Medication 650 MILLIGRAM(S): at 21:36

## 2023-01-01 RX ADMIN — Medication 81 MILLIGRAM(S): at 12:31

## 2023-01-01 RX ADMIN — ATORVASTATIN CALCIUM 80 MILLIGRAM(S): 80 TABLET, FILM COATED ORAL at 21:50

## 2023-01-01 RX ADMIN — AMLODIPINE BESYLATE 10 MILLIGRAM(S): 2.5 TABLET ORAL at 08:30

## 2023-01-01 RX ADMIN — Medication 50 MILLIGRAM(S): at 21:57

## 2023-01-01 RX ADMIN — CHLORHEXIDINE GLUCONATE 1 APPLICATION(S): 213 SOLUTION TOPICAL at 22:09

## 2023-01-01 RX ADMIN — CARVEDILOL PHOSPHATE 25 MILLIGRAM(S): 80 CAPSULE, EXTENDED RELEASE ORAL at 17:11

## 2023-01-01 RX ADMIN — SENNA PLUS 2 TABLET(S): 8.6 TABLET ORAL at 21:38

## 2023-01-01 RX ADMIN — AMLODIPINE BESYLATE 10 MILLIGRAM(S): 2.5 TABLET ORAL at 05:19

## 2023-01-01 RX ADMIN — POLYETHYLENE GLYCOL 3350 17 GRAM(S): 17 POWDER, FOR SOLUTION ORAL at 17:11

## 2023-01-01 RX ADMIN — Medication 6 MILLIGRAM(S): at 21:32

## 2023-01-01 RX ADMIN — NICARDIPINE HYDROCHLORIDE 25 MG/HR: 30 CAPSULE, EXTENDED RELEASE ORAL at 19:48

## 2023-01-01 RX ADMIN — CARVEDILOL PHOSPHATE 25 MILLIGRAM(S): 80 CAPSULE, EXTENDED RELEASE ORAL at 08:20

## 2023-01-01 RX ADMIN — CARVEDILOL PHOSPHATE 25 MILLIGRAM(S): 80 CAPSULE, EXTENDED RELEASE ORAL at 14:15

## 2023-01-01 RX ADMIN — Medication 1 PATCH: at 11:54

## 2023-01-01 RX ADMIN — TICAGRELOR 90 MILLIGRAM(S): 90 TABLET ORAL at 18:04

## 2023-01-01 RX ADMIN — Medication 50 MILLIGRAM(S): at 20:40

## 2023-01-01 RX ADMIN — Medication 50 MILLIGRAM(S): at 17:50

## 2023-01-01 RX ADMIN — Medication 50 MILLIGRAM(S): at 22:35

## 2023-01-01 RX ADMIN — Medication 81 MILLIGRAM(S): at 06:27

## 2023-01-01 RX ADMIN — LISINOPRIL 40 MILLIGRAM(S): 2.5 TABLET ORAL at 08:17

## 2023-01-01 RX ADMIN — CEFTRIAXONE 100 MILLIGRAM(S): 500 INJECTION, POWDER, FOR SOLUTION INTRAMUSCULAR; INTRAVENOUS at 08:46

## 2023-01-01 RX ADMIN — ATORVASTATIN CALCIUM 80 MILLIGRAM(S): 80 TABLET, FILM COATED ORAL at 21:04

## 2023-01-01 RX ADMIN — TICAGRELOR 90 MILLIGRAM(S): 90 TABLET ORAL at 06:09

## 2023-01-01 RX ADMIN — LISINOPRIL 40 MILLIGRAM(S): 2.5 TABLET ORAL at 06:30

## 2023-01-01 RX ADMIN — Medication 1 PATCH: at 08:42

## 2023-01-01 RX ADMIN — Medication 25 MILLIGRAM(S): at 22:23

## 2023-01-01 RX ADMIN — LISINOPRIL 40 MILLIGRAM(S): 2.5 TABLET ORAL at 05:16

## 2023-01-01 RX ADMIN — CARVEDILOL PHOSPHATE 25 MILLIGRAM(S): 80 CAPSULE, EXTENDED RELEASE ORAL at 17:26

## 2023-01-01 RX ADMIN — Medication 200 MILLIGRAM(S): at 06:50

## 2023-01-01 RX ADMIN — SENNA PLUS 2 TABLET(S): 8.6 TABLET ORAL at 20:03

## 2023-01-01 RX ADMIN — TICAGRELOR 90 MILLIGRAM(S): 90 TABLET ORAL at 17:47

## 2023-01-01 RX ADMIN — CLOPIDOGREL BISULFATE 75 MILLIGRAM(S): 75 TABLET, FILM COATED ORAL at 11:19

## 2023-01-01 RX ADMIN — TICAGRELOR 90 MILLIGRAM(S): 90 TABLET ORAL at 18:01

## 2023-01-01 RX ADMIN — Medication 2: at 08:25

## 2023-01-01 RX ADMIN — Medication 81 MILLIGRAM(S): at 11:38

## 2023-01-01 RX ADMIN — CARVEDILOL PHOSPHATE 25 MILLIGRAM(S): 80 CAPSULE, EXTENDED RELEASE ORAL at 20:12

## 2023-01-01 RX ADMIN — Medication 1 PATCH: at 08:43

## 2023-01-01 RX ADMIN — Medication 6 MILLIGRAM(S): at 21:16

## 2023-01-01 RX ADMIN — ENOXAPARIN SODIUM 30 MILLIGRAM(S): 100 INJECTION SUBCUTANEOUS at 17:40

## 2023-01-01 RX ADMIN — NICARDIPINE HYDROCHLORIDE 25 MG/HR: 30 CAPSULE, EXTENDED RELEASE ORAL at 20:53

## 2023-01-01 RX ADMIN — Medication 25 MILLIGRAM(S): at 18:23

## 2023-01-01 RX ADMIN — PANTOPRAZOLE SODIUM 40 MILLIGRAM(S): 20 TABLET, DELAYED RELEASE ORAL at 06:50

## 2023-01-01 RX ADMIN — ESCITALOPRAM OXALATE 20 MILLIGRAM(S): 10 TABLET, FILM COATED ORAL at 11:14

## 2023-01-01 RX ADMIN — LISINOPRIL 40 MILLIGRAM(S): 2.5 TABLET ORAL at 05:29

## 2023-01-01 RX ADMIN — CARVEDILOL PHOSPHATE 25 MILLIGRAM(S): 80 CAPSULE, EXTENDED RELEASE ORAL at 05:07

## 2023-01-01 RX ADMIN — HEPARIN SODIUM 14.5 UNIT(S)/HR: 5000 INJECTION INTRAVENOUS; SUBCUTANEOUS at 22:35

## 2023-01-01 RX ADMIN — LISINOPRIL 20 MILLIGRAM(S): 2.5 TABLET ORAL at 17:18

## 2023-01-01 RX ADMIN — POLYETHYLENE GLYCOL 3350 17 GRAM(S): 17 POWDER, FOR SOLUTION ORAL at 17:25

## 2023-01-01 RX ADMIN — AMLODIPINE BESYLATE 10 MILLIGRAM(S): 2.5 TABLET ORAL at 06:03

## 2023-01-01 RX ADMIN — Medication 1 PATCH: at 11:47

## 2023-01-01 RX ADMIN — ESCITALOPRAM OXALATE 20 MILLIGRAM(S): 10 TABLET, FILM COATED ORAL at 12:03

## 2023-01-01 RX ADMIN — SODIUM CHLORIDE 100 MILLILITER(S): 9 INJECTION INTRAMUSCULAR; INTRAVENOUS; SUBCUTANEOUS at 05:53

## 2023-01-01 RX ADMIN — SENNA PLUS 2 TABLET(S): 8.6 TABLET ORAL at 21:10

## 2023-01-01 RX ADMIN — Medication 1 PATCH: at 12:01

## 2023-01-01 RX ADMIN — CARVEDILOL PHOSPHATE 25 MILLIGRAM(S): 80 CAPSULE, EXTENDED RELEASE ORAL at 08:33

## 2023-01-01 RX ADMIN — Medication 1 PATCH: at 12:05

## 2023-01-01 RX ADMIN — ENOXAPARIN SODIUM 30 MILLIGRAM(S): 100 INJECTION SUBCUTANEOUS at 07:01

## 2023-01-01 RX ADMIN — METFORMIN HYDROCHLORIDE 500 MILLIGRAM(S): 850 TABLET ORAL at 09:03

## 2023-01-01 RX ADMIN — Medication 6 MILLIGRAM(S): at 21:55

## 2023-01-01 RX ADMIN — ENOXAPARIN SODIUM 30 MILLIGRAM(S): 100 INJECTION SUBCUTANEOUS at 06:51

## 2023-01-01 RX ADMIN — CARVEDILOL PHOSPHATE 25 MILLIGRAM(S): 80 CAPSULE, EXTENDED RELEASE ORAL at 18:46

## 2023-01-01 RX ADMIN — AMLODIPINE BESYLATE 10 MILLIGRAM(S): 2.5 TABLET ORAL at 06:41

## 2023-01-01 RX ADMIN — LISINOPRIL 40 MILLIGRAM(S): 2.5 TABLET ORAL at 06:20

## 2023-01-01 RX ADMIN — HEPARIN SODIUM 12 UNIT(S)/HR: 5000 INJECTION INTRAVENOUS; SUBCUTANEOUS at 23:30

## 2023-01-01 RX ADMIN — PANTOPRAZOLE SODIUM 40 MILLIGRAM(S): 20 TABLET, DELAYED RELEASE ORAL at 06:18

## 2023-01-01 RX ADMIN — Medication 25 MILLIGRAM(S): at 06:03

## 2023-01-01 RX ADMIN — LISINOPRIL 20 MILLIGRAM(S): 2.5 TABLET ORAL at 05:44

## 2023-01-01 RX ADMIN — LISINOPRIL 40 MILLIGRAM(S): 2.5 TABLET ORAL at 06:17

## 2023-01-01 RX ADMIN — ENOXAPARIN SODIUM 40 MILLIGRAM(S): 100 INJECTION SUBCUTANEOUS at 17:12

## 2023-01-01 RX ADMIN — ENOXAPARIN SODIUM 40 MILLIGRAM(S): 100 INJECTION SUBCUTANEOUS at 17:13

## 2023-01-01 RX ADMIN — Medication 1 PATCH: at 19:33

## 2023-01-01 RX ADMIN — LISINOPRIL 20 MILLIGRAM(S): 2.5 TABLET ORAL at 17:31

## 2023-01-01 RX ADMIN — Medication 50 MILLIGRAM(S): at 06:04

## 2023-01-01 RX ADMIN — Medication 1 PATCH: at 12:03

## 2023-01-01 RX ADMIN — AMLODIPINE BESYLATE 5 MILLIGRAM(S): 2.5 TABLET ORAL at 06:20

## 2023-01-01 RX ADMIN — CARVEDILOL PHOSPHATE 25 MILLIGRAM(S): 80 CAPSULE, EXTENDED RELEASE ORAL at 05:16

## 2023-01-01 RX ADMIN — ATORVASTATIN CALCIUM 80 MILLIGRAM(S): 80 TABLET, FILM COATED ORAL at 21:24

## 2023-01-01 RX ADMIN — Medication 50 MILLIGRAM(S): at 17:25

## 2023-01-01 RX ADMIN — ATORVASTATIN CALCIUM 80 MILLIGRAM(S): 80 TABLET, FILM COATED ORAL at 22:01

## 2023-01-01 RX ADMIN — CLOPIDOGREL BISULFATE 75 MILLIGRAM(S): 75 TABLET, FILM COATED ORAL at 11:24

## 2023-01-01 RX ADMIN — ENOXAPARIN SODIUM 30 MILLIGRAM(S): 100 INJECTION SUBCUTANEOUS at 05:29

## 2023-01-01 RX ADMIN — Medication 50 MILLIGRAM(S): at 06:41

## 2023-01-01 RX ADMIN — METFORMIN HYDROCHLORIDE 500 MILLIGRAM(S): 850 TABLET ORAL at 08:20

## 2023-01-01 RX ADMIN — ATORVASTATIN CALCIUM 80 MILLIGRAM(S): 80 TABLET, FILM COATED ORAL at 21:14

## 2023-01-01 RX ADMIN — ESCITALOPRAM OXALATE 10 MILLIGRAM(S): 10 TABLET, FILM COATED ORAL at 11:37

## 2023-01-01 RX ADMIN — ENOXAPARIN SODIUM 30 MILLIGRAM(S): 100 INJECTION SUBCUTANEOUS at 17:09

## 2023-01-01 RX ADMIN — Medication 300 MILLIGRAM(S): at 06:41

## 2023-01-01 RX ADMIN — LISINOPRIL 20 MILLIGRAM(S): 2.5 TABLET ORAL at 05:07

## 2023-01-01 RX ADMIN — Medication 1 PATCH: at 11:45

## 2023-01-01 RX ADMIN — Medication 81 MILLIGRAM(S): at 12:17

## 2023-01-01 RX ADMIN — CLOPIDOGREL BISULFATE 75 MILLIGRAM(S): 75 TABLET, FILM COATED ORAL at 12:16

## 2023-01-01 RX ADMIN — POLYETHYLENE GLYCOL 3350 17 GRAM(S): 17 POWDER, FOR SOLUTION ORAL at 17:54

## 2023-01-01 RX ADMIN — Medication 5 MILLIGRAM(S): at 02:45

## 2023-01-01 RX ADMIN — Medication 200 MILLIGRAM(S): at 06:16

## 2023-01-01 RX ADMIN — SENNA PLUS 2 TABLET(S): 8.6 TABLET ORAL at 21:32

## 2023-01-01 RX ADMIN — Medication 1 PATCH: at 07:01

## 2023-01-01 RX ADMIN — POLYETHYLENE GLYCOL 3350 17 GRAM(S): 17 POWDER, FOR SOLUTION ORAL at 20:03

## 2023-01-01 RX ADMIN — Medication 650 MILLIGRAM(S): at 23:03

## 2023-01-01 RX ADMIN — Medication 200 MILLIGRAM(S): at 22:04

## 2023-01-01 RX ADMIN — ATORVASTATIN CALCIUM 80 MILLIGRAM(S): 80 TABLET, FILM COATED ORAL at 21:09

## 2023-01-01 RX ADMIN — Medication 1 PATCH: at 19:38

## 2023-01-01 RX ADMIN — ATORVASTATIN CALCIUM 80 MILLIGRAM(S): 80 TABLET, FILM COATED ORAL at 22:06

## 2023-01-01 RX ADMIN — SENNA PLUS 2 TABLET(S): 8.6 TABLET ORAL at 21:55

## 2023-01-01 RX ADMIN — METFORMIN HYDROCHLORIDE 500 MILLIGRAM(S): 850 TABLET ORAL at 16:17

## 2023-01-01 RX ADMIN — Medication 200 MILLIGRAM(S): at 13:25

## 2023-01-01 RX ADMIN — CARVEDILOL PHOSPHATE 25 MILLIGRAM(S): 80 CAPSULE, EXTENDED RELEASE ORAL at 21:38

## 2023-01-01 RX ADMIN — SENNA PLUS 2 TABLET(S): 8.6 TABLET ORAL at 21:23

## 2023-01-01 RX ADMIN — PANTOPRAZOLE SODIUM 40 MILLIGRAM(S): 20 TABLET, DELAYED RELEASE ORAL at 06:10

## 2023-01-01 RX ADMIN — ENOXAPARIN SODIUM 40 MILLIGRAM(S): 100 INJECTION SUBCUTANEOUS at 17:46

## 2023-01-01 RX ADMIN — Medication 81 MILLIGRAM(S): at 13:30

## 2023-01-01 RX ADMIN — LISINOPRIL 20 MILLIGRAM(S): 2.5 TABLET ORAL at 18:22

## 2023-01-01 RX ADMIN — Medication 2: at 07:58

## 2023-01-01 RX ADMIN — Medication 6 MILLIGRAM(S): at 21:28

## 2023-01-01 RX ADMIN — Medication 300 MILLIGRAM(S): at 21:37

## 2023-01-01 RX ADMIN — ENOXAPARIN SODIUM 40 MILLIGRAM(S): 100 INJECTION SUBCUTANEOUS at 17:48

## 2023-01-01 RX ADMIN — Medication 650 MILLIGRAM(S): at 07:55

## 2023-01-01 RX ADMIN — Medication 25 MILLIGRAM(S): at 21:29

## 2023-01-01 RX ADMIN — Medication 25 MILLIGRAM(S): at 21:55

## 2023-01-01 RX ADMIN — Medication 81 MILLIGRAM(S): at 12:39

## 2023-01-01 RX ADMIN — SENNA PLUS 2 TABLET(S): 8.6 TABLET ORAL at 22:44

## 2023-01-01 RX ADMIN — Medication 81 MILLIGRAM(S): at 13:07

## 2023-01-01 RX ADMIN — TICAGRELOR 90 MILLIGRAM(S): 90 TABLET ORAL at 17:34

## 2023-01-01 RX ADMIN — Medication 650 MILLIGRAM(S): at 22:09

## 2023-01-01 RX ADMIN — Medication 1 PATCH: at 19:43

## 2023-01-01 RX ADMIN — Medication 50 MILLIGRAM(S): at 21:20

## 2023-01-01 RX ADMIN — SODIUM CHLORIDE 75 MILLILITER(S): 9 INJECTION INTRAMUSCULAR; INTRAVENOUS; SUBCUTANEOUS at 17:04

## 2023-01-01 RX ADMIN — ESCITALOPRAM OXALATE 20 MILLIGRAM(S): 10 TABLET, FILM COATED ORAL at 11:44

## 2023-01-01 RX ADMIN — Medication 81 MILLIGRAM(S): at 11:17

## 2023-01-01 RX ADMIN — POLYETHYLENE GLYCOL 3350 17 GRAM(S): 17 POWDER, FOR SOLUTION ORAL at 17:58

## 2023-01-01 RX ADMIN — Medication 200 MILLIGRAM(S): at 06:22

## 2023-01-01 RX ADMIN — Medication 133 MILLILITER(S): at 17:09

## 2023-01-01 RX ADMIN — ESCITALOPRAM OXALATE 20 MILLIGRAM(S): 10 TABLET, FILM COATED ORAL at 12:28

## 2023-01-01 RX ADMIN — POLYETHYLENE GLYCOL 3350 17 GRAM(S): 17 POWDER, FOR SOLUTION ORAL at 13:08

## 2023-01-01 RX ADMIN — Medication 1 PATCH: at 07:38

## 2023-01-01 RX ADMIN — ENOXAPARIN SODIUM 30 MILLIGRAM(S): 100 INJECTION SUBCUTANEOUS at 06:11

## 2023-01-01 RX ADMIN — ENOXAPARIN SODIUM 30 MILLIGRAM(S): 100 INJECTION SUBCUTANEOUS at 17:47

## 2023-01-01 RX ADMIN — TICAGRELOR 90 MILLIGRAM(S): 90 TABLET ORAL at 20:14

## 2023-01-01 RX ADMIN — ENOXAPARIN SODIUM 40 MILLIGRAM(S): 100 INJECTION SUBCUTANEOUS at 17:33

## 2023-01-01 RX ADMIN — LISINOPRIL 20 MILLIGRAM(S): 2.5 TABLET ORAL at 14:15

## 2023-01-01 RX ADMIN — Medication 81 MILLIGRAM(S): at 11:51

## 2023-01-01 RX ADMIN — ATORVASTATIN CALCIUM 80 MILLIGRAM(S): 80 TABLET, FILM COATED ORAL at 22:52

## 2023-01-01 RX ADMIN — Medication 50 MILLIGRAM(S): at 22:04

## 2023-01-01 RX ADMIN — Medication 300 MILLIGRAM(S): at 22:02

## 2023-01-01 RX ADMIN — Medication 50 MILLIGRAM(S): at 08:20

## 2023-01-01 RX ADMIN — Medication 650 MILLIGRAM(S): at 08:55

## 2023-01-01 RX ADMIN — Medication 25 MILLIGRAM(S): at 22:01

## 2023-01-01 RX ADMIN — PANTOPRAZOLE SODIUM 40 MILLIGRAM(S): 20 TABLET, DELAYED RELEASE ORAL at 09:12

## 2023-01-01 RX ADMIN — CARVEDILOL PHOSPHATE 25 MILLIGRAM(S): 80 CAPSULE, EXTENDED RELEASE ORAL at 06:21

## 2023-01-01 RX ADMIN — ENOXAPARIN SODIUM 30 MILLIGRAM(S): 100 INJECTION SUBCUTANEOUS at 17:14

## 2023-01-01 RX ADMIN — PANTOPRAZOLE SODIUM 40 MILLIGRAM(S): 20 TABLET, DELAYED RELEASE ORAL at 06:06

## 2023-01-01 RX ADMIN — Medication 200 MILLIGRAM(S): at 22:03

## 2023-01-01 RX ADMIN — Medication 81 MILLIGRAM(S): at 12:53

## 2023-01-01 RX ADMIN — Medication 1 PATCH: at 19:21

## 2023-01-01 RX ADMIN — SENNA PLUS 2 TABLET(S): 8.6 TABLET ORAL at 21:20

## 2023-01-01 RX ADMIN — ONDANSETRON 4 MILLIGRAM(S): 8 TABLET, FILM COATED ORAL at 21:29

## 2023-01-01 RX ADMIN — TICAGRELOR 90 MILLIGRAM(S): 90 TABLET ORAL at 08:09

## 2023-01-01 RX ADMIN — CLOPIDOGREL BISULFATE 75 MILLIGRAM(S): 75 TABLET, FILM COATED ORAL at 12:03

## 2023-01-01 RX ADMIN — TICAGRELOR 90 MILLIGRAM(S): 90 TABLET ORAL at 05:36

## 2023-01-01 RX ADMIN — ENOXAPARIN SODIUM 40 MILLIGRAM(S): 100 INJECTION SUBCUTANEOUS at 19:43

## 2023-01-01 RX ADMIN — Medication 2: at 12:05

## 2023-01-01 RX ADMIN — ENOXAPARIN SODIUM 40 MILLIGRAM(S): 100 INJECTION SUBCUTANEOUS at 19:37

## 2023-01-01 RX ADMIN — ESCITALOPRAM OXALATE 5 MILLIGRAM(S): 10 TABLET, FILM COATED ORAL at 13:04

## 2023-01-01 RX ADMIN — Medication 81 MILLIGRAM(S): at 12:50

## 2023-01-01 RX ADMIN — Medication 200 MILLIGRAM(S): at 22:18

## 2023-01-01 RX ADMIN — CARVEDILOL PHOSPHATE 25 MILLIGRAM(S): 80 CAPSULE, EXTENDED RELEASE ORAL at 17:41

## 2023-01-01 RX ADMIN — METFORMIN HYDROCHLORIDE 500 MILLIGRAM(S): 850 TABLET ORAL at 17:32

## 2023-01-01 RX ADMIN — CARVEDILOL PHOSPHATE 25 MILLIGRAM(S): 80 CAPSULE, EXTENDED RELEASE ORAL at 17:32

## 2023-01-01 RX ADMIN — TICAGRELOR 90 MILLIGRAM(S): 90 TABLET ORAL at 17:30

## 2023-01-01 RX ADMIN — ESCITALOPRAM OXALATE 20 MILLIGRAM(S): 10 TABLET, FILM COATED ORAL at 11:34

## 2023-01-01 RX ADMIN — ENOXAPARIN SODIUM 40 MILLIGRAM(S): 100 INJECTION SUBCUTANEOUS at 17:27

## 2023-01-01 RX ADMIN — Medication 1 PATCH: at 12:25

## 2023-01-01 RX ADMIN — PANTOPRAZOLE SODIUM 40 MILLIGRAM(S): 20 TABLET, DELAYED RELEASE ORAL at 06:52

## 2023-01-01 RX ADMIN — TICAGRELOR 90 MILLIGRAM(S): 90 TABLET ORAL at 17:31

## 2023-01-01 RX ADMIN — ENOXAPARIN SODIUM 30 MILLIGRAM(S): 100 INJECTION SUBCUTANEOUS at 17:27

## 2023-01-01 RX ADMIN — PANTOPRAZOLE SODIUM 40 MILLIGRAM(S): 20 TABLET, DELAYED RELEASE ORAL at 06:17

## 2023-01-01 RX ADMIN — Medication 1 PATCH: at 19:32

## 2023-01-01 RX ADMIN — LISINOPRIL 20 MILLIGRAM(S): 2.5 TABLET ORAL at 05:03

## 2023-01-01 RX ADMIN — ENOXAPARIN SODIUM 30 MILLIGRAM(S): 100 INJECTION SUBCUTANEOUS at 05:54

## 2023-01-01 RX ADMIN — Medication 1 PATCH: at 08:04

## 2023-01-01 RX ADMIN — CARVEDILOL PHOSPHATE 25 MILLIGRAM(S): 80 CAPSULE, EXTENDED RELEASE ORAL at 05:44

## 2023-01-01 RX ADMIN — TICAGRELOR 90 MILLIGRAM(S): 90 TABLET ORAL at 06:05

## 2023-01-01 RX ADMIN — ATORVASTATIN CALCIUM 80 MILLIGRAM(S): 80 TABLET, FILM COATED ORAL at 21:44

## 2023-01-01 RX ADMIN — Medication 2: at 08:01

## 2023-01-01 RX ADMIN — Medication 1: at 12:47

## 2023-01-01 RX ADMIN — LISINOPRIL 40 MILLIGRAM(S): 2.5 TABLET ORAL at 06:13

## 2023-01-01 RX ADMIN — Medication 1 PATCH: at 19:01

## 2023-01-01 RX ADMIN — Medication 81 MILLIGRAM(S): at 11:27

## 2023-01-01 RX ADMIN — Medication 100 GRAM(S): at 00:58

## 2023-01-01 RX ADMIN — Medication 50 MILLIGRAM(S): at 22:06

## 2023-01-01 RX ADMIN — METFORMIN HYDROCHLORIDE 500 MILLIGRAM(S): 850 TABLET ORAL at 08:17

## 2023-01-01 RX ADMIN — Medication 25 MILLIGRAM(S): at 05:02

## 2023-01-01 RX ADMIN — Medication 200 MILLIGRAM(S): at 22:06

## 2023-01-01 RX ADMIN — Medication 50 MILLIGRAM(S): at 21:13

## 2023-01-01 RX ADMIN — TICAGRELOR 90 MILLIGRAM(S): 90 TABLET ORAL at 06:23

## 2023-01-01 RX ADMIN — AMLODIPINE BESYLATE 5 MILLIGRAM(S): 2.5 TABLET ORAL at 06:55

## 2023-01-01 RX ADMIN — Medication 1 PATCH: at 12:16

## 2023-01-01 RX ADMIN — AMLODIPINE BESYLATE 5 MILLIGRAM(S): 2.5 TABLET ORAL at 06:52

## 2023-01-01 RX ADMIN — ESCITALOPRAM OXALATE 10 MILLIGRAM(S): 10 TABLET, FILM COATED ORAL at 11:54

## 2023-01-01 RX ADMIN — Medication 81 MILLIGRAM(S): at 12:47

## 2023-01-01 RX ADMIN — ENOXAPARIN SODIUM 30 MILLIGRAM(S): 100 INJECTION SUBCUTANEOUS at 06:09

## 2023-01-01 RX ADMIN — ATORVASTATIN CALCIUM 80 MILLIGRAM(S): 80 TABLET, FILM COATED ORAL at 21:30

## 2023-01-01 RX ADMIN — ENOXAPARIN SODIUM 30 MILLIGRAM(S): 100 INJECTION SUBCUTANEOUS at 18:14

## 2023-01-01 RX ADMIN — Medication 5 MILLIGRAM(S): at 21:28

## 2023-01-01 RX ADMIN — ENOXAPARIN SODIUM 30 MILLIGRAM(S): 100 INJECTION SUBCUTANEOUS at 06:50

## 2023-01-01 RX ADMIN — SODIUM CHLORIDE 100 MILLILITER(S): 9 INJECTION INTRAMUSCULAR; INTRAVENOUS; SUBCUTANEOUS at 17:27

## 2023-01-01 RX ADMIN — PANTOPRAZOLE SODIUM 40 MILLIGRAM(S): 20 TABLET, DELAYED RELEASE ORAL at 06:16

## 2023-01-01 RX ADMIN — Medication 81 MILLIGRAM(S): at 12:16

## 2023-01-01 RX ADMIN — POLYETHYLENE GLYCOL 3350 17 GRAM(S): 17 POWDER, FOR SOLUTION ORAL at 18:22

## 2023-01-01 RX ADMIN — POLYETHYLENE GLYCOL 3350 17 GRAM(S): 17 POWDER, FOR SOLUTION ORAL at 18:27

## 2023-01-01 RX ADMIN — SENNA PLUS 2 TABLET(S): 8.6 TABLET ORAL at 22:04

## 2023-01-01 RX ADMIN — LISINOPRIL 40 MILLIGRAM(S): 2.5 TABLET ORAL at 06:06

## 2023-01-01 RX ADMIN — Medication 1 PATCH: at 08:01

## 2023-01-01 RX ADMIN — Medication 25 MILLIGRAM(S): at 22:16

## 2023-01-01 RX ADMIN — Medication 1 PATCH: at 07:19

## 2023-01-01 RX ADMIN — LISINOPRIL 20 MILLIGRAM(S): 2.5 TABLET ORAL at 17:39

## 2023-01-01 RX ADMIN — Medication 1 PATCH: at 07:20

## 2023-01-01 RX ADMIN — LISINOPRIL 40 MILLIGRAM(S): 2.5 TABLET ORAL at 06:08

## 2023-01-01 RX ADMIN — AMLODIPINE BESYLATE 5 MILLIGRAM(S): 2.5 TABLET ORAL at 06:16

## 2023-01-01 RX ADMIN — LISINOPRIL 20 MILLIGRAM(S): 2.5 TABLET ORAL at 18:45

## 2023-01-01 RX ADMIN — TAMSULOSIN HYDROCHLORIDE 0.4 MILLIGRAM(S): 0.4 CAPSULE ORAL at 21:58

## 2023-01-01 RX ADMIN — Medication 1 PATCH: at 19:40

## 2023-01-01 RX ADMIN — ESCITALOPRAM OXALATE 20 MILLIGRAM(S): 10 TABLET, FILM COATED ORAL at 12:29

## 2023-01-01 RX ADMIN — Medication 25 MILLIGRAM(S): at 09:39

## 2023-01-01 RX ADMIN — Medication 1 PATCH: at 21:04

## 2023-01-01 RX ADMIN — METFORMIN HYDROCHLORIDE 500 MILLIGRAM(S): 850 TABLET ORAL at 17:41

## 2023-01-01 RX ADMIN — Medication 2: at 11:25

## 2023-01-01 RX ADMIN — Medication 1 PATCH: at 11:30

## 2023-01-01 RX ADMIN — Medication 2: at 08:31

## 2023-01-01 RX ADMIN — Medication 200 MILLIGRAM(S): at 13:34

## 2023-01-01 RX ADMIN — CARVEDILOL PHOSPHATE 25 MILLIGRAM(S): 80 CAPSULE, EXTENDED RELEASE ORAL at 06:47

## 2023-01-01 RX ADMIN — ATORVASTATIN CALCIUM 80 MILLIGRAM(S): 80 TABLET, FILM COATED ORAL at 20:03

## 2023-01-01 RX ADMIN — Medication 1 PATCH: at 11:01

## 2023-01-01 RX ADMIN — TICAGRELOR 90 MILLIGRAM(S): 90 TABLET ORAL at 06:27

## 2023-01-01 RX ADMIN — SENNA PLUS 2 TABLET(S): 8.6 TABLET ORAL at 22:07

## 2023-01-01 RX ADMIN — PANTOPRAZOLE SODIUM 40 MILLIGRAM(S): 20 TABLET, DELAYED RELEASE ORAL at 06:23

## 2023-01-01 RX ADMIN — Medication 6 MILLIGRAM(S): at 21:50

## 2023-01-01 RX ADMIN — ATORVASTATIN CALCIUM 80 MILLIGRAM(S): 80 TABLET, FILM COATED ORAL at 21:10

## 2023-01-01 RX ADMIN — Medication 1 PATCH: at 12:32

## 2023-01-01 RX ADMIN — ESCITALOPRAM OXALATE 10 MILLIGRAM(S): 10 TABLET, FILM COATED ORAL at 11:01

## 2023-01-01 RX ADMIN — Medication 1 PATCH: at 11:50

## 2023-01-01 RX ADMIN — Medication 50 MILLIGRAM(S): at 21:10

## 2023-01-01 RX ADMIN — PANTOPRAZOLE SODIUM 40 MILLIGRAM(S): 20 TABLET, DELAYED RELEASE ORAL at 06:13

## 2023-01-01 RX ADMIN — Medication 50 MILLIGRAM(S): at 21:04

## 2023-01-01 RX ADMIN — SENNA PLUS 2 TABLET(S): 8.6 TABLET ORAL at 21:50

## 2023-01-01 RX ADMIN — TICAGRELOR 90 MILLIGRAM(S): 90 TABLET ORAL at 17:11

## 2023-01-01 RX ADMIN — AMLODIPINE BESYLATE 10 MILLIGRAM(S): 2.5 TABLET ORAL at 05:38

## 2023-01-01 RX ADMIN — AMLODIPINE BESYLATE 10 MILLIGRAM(S): 2.5 TABLET ORAL at 06:16

## 2023-01-01 RX ADMIN — Medication 25 MILLIGRAM(S): at 06:13

## 2023-01-01 RX ADMIN — ENOXAPARIN SODIUM 40 MILLIGRAM(S): 100 INJECTION SUBCUTANEOUS at 18:46

## 2023-01-01 RX ADMIN — AMLODIPINE BESYLATE 10 MILLIGRAM(S): 2.5 TABLET ORAL at 05:23

## 2023-01-01 RX ADMIN — CARVEDILOL PHOSPHATE 25 MILLIGRAM(S): 80 CAPSULE, EXTENDED RELEASE ORAL at 08:17

## 2023-01-01 RX ADMIN — Medication 650 MILLIGRAM(S): at 20:14

## 2023-01-01 RX ADMIN — POLYETHYLENE GLYCOL 3350 17 GRAM(S): 17 POWDER, FOR SOLUTION ORAL at 18:04

## 2023-01-01 RX ADMIN — CARVEDILOL PHOSPHATE 25 MILLIGRAM(S): 80 CAPSULE, EXTENDED RELEASE ORAL at 09:56

## 2023-01-01 RX ADMIN — Medication 650 MILLIGRAM(S): at 22:03

## 2023-01-01 RX ADMIN — LISINOPRIL 20 MILLIGRAM(S): 2.5 TABLET ORAL at 06:13

## 2023-01-01 RX ADMIN — SENNA PLUS 2 TABLET(S): 8.6 TABLET ORAL at 21:09

## 2023-01-01 RX ADMIN — CARVEDILOL PHOSPHATE 25 MILLIGRAM(S): 80 CAPSULE, EXTENDED RELEASE ORAL at 09:04

## 2023-01-01 RX ADMIN — METFORMIN HYDROCHLORIDE 500 MILLIGRAM(S): 850 TABLET ORAL at 17:00

## 2023-01-01 RX ADMIN — SODIUM CHLORIDE 60 MILLILITER(S): 9 INJECTION INTRAMUSCULAR; INTRAVENOUS; SUBCUTANEOUS at 18:27

## 2023-01-01 RX ADMIN — LISINOPRIL 40 MILLIGRAM(S): 2.5 TABLET ORAL at 06:40

## 2023-01-01 RX ADMIN — Medication 200 MILLIGRAM(S): at 21:10

## 2023-01-01 RX ADMIN — Medication 2: at 12:15

## 2023-01-01 RX ADMIN — METFORMIN HYDROCHLORIDE 500 MILLIGRAM(S): 850 TABLET ORAL at 18:27

## 2023-01-01 RX ADMIN — METFORMIN HYDROCHLORIDE 500 MILLIGRAM(S): 850 TABLET ORAL at 18:21

## 2023-01-01 RX ADMIN — AMLODIPINE BESYLATE 10 MILLIGRAM(S): 2.5 TABLET ORAL at 05:15

## 2023-01-01 RX ADMIN — Medication 50 MILLIGRAM(S): at 08:17

## 2023-01-01 RX ADMIN — POLYETHYLENE GLYCOL 3350 17 GRAM(S): 17 POWDER, FOR SOLUTION ORAL at 06:06

## 2023-01-01 RX ADMIN — METFORMIN HYDROCHLORIDE 500 MILLIGRAM(S): 850 TABLET ORAL at 18:02

## 2023-01-01 RX ADMIN — ESCITALOPRAM OXALATE 10 MILLIGRAM(S): 10 TABLET, FILM COATED ORAL at 12:05

## 2023-01-01 RX ADMIN — Medication 200 MILLIGRAM(S): at 06:53

## 2023-01-01 RX ADMIN — ENOXAPARIN SODIUM 40 MILLIGRAM(S): 100 INJECTION SUBCUTANEOUS at 16:17

## 2023-01-01 RX ADMIN — NICARDIPINE HYDROCHLORIDE 25 MG/HR: 30 CAPSULE, EXTENDED RELEASE ORAL at 07:15

## 2023-01-01 RX ADMIN — Medication 1 PATCH: at 11:51

## 2023-01-01 RX ADMIN — POLYETHYLENE GLYCOL 3350 17 GRAM(S): 17 POWDER, FOR SOLUTION ORAL at 08:30

## 2023-01-01 RX ADMIN — Medication 81 MILLIGRAM(S): at 12:21

## 2023-01-01 RX ADMIN — ESCITALOPRAM OXALATE 5 MILLIGRAM(S): 10 TABLET, FILM COATED ORAL at 12:03

## 2023-01-01 RX ADMIN — Medication 1 PATCH: at 18:00

## 2023-01-01 RX ADMIN — Medication 81 MILLIGRAM(S): at 12:02

## 2023-01-01 RX ADMIN — Medication 25 MILLIGRAM(S): at 05:26

## 2023-01-01 RX ADMIN — CARVEDILOL PHOSPHATE 25 MILLIGRAM(S): 80 CAPSULE, EXTENDED RELEASE ORAL at 20:40

## 2023-01-01 RX ADMIN — LISINOPRIL 40 MILLIGRAM(S): 2.5 TABLET ORAL at 06:23

## 2023-01-01 RX ADMIN — TICAGRELOR 90 MILLIGRAM(S): 90 TABLET ORAL at 18:16

## 2023-01-01 RX ADMIN — ENOXAPARIN SODIUM 30 MILLIGRAM(S): 100 INJECTION SUBCUTANEOUS at 06:06

## 2023-01-01 RX ADMIN — SENNA PLUS 2 TABLET(S): 8.6 TABLET ORAL at 22:54

## 2023-01-01 RX ADMIN — METFORMIN HYDROCHLORIDE 500 MILLIGRAM(S): 850 TABLET ORAL at 08:33

## 2023-01-01 RX ADMIN — ENOXAPARIN SODIUM 30 MILLIGRAM(S): 100 INJECTION SUBCUTANEOUS at 17:28

## 2023-01-01 RX ADMIN — TICAGRELOR 90 MILLIGRAM(S): 90 TABLET ORAL at 22:05

## 2023-01-01 RX ADMIN — TICAGRELOR 90 MILLIGRAM(S): 90 TABLET ORAL at 06:21

## 2023-01-01 RX ADMIN — Medication 50 MILLIGRAM(S): at 18:02

## 2023-01-01 RX ADMIN — AMLODIPINE BESYLATE 10 MILLIGRAM(S): 2.5 TABLET ORAL at 22:03

## 2023-01-01 RX ADMIN — Medication 200 MILLIGRAM(S): at 06:10

## 2023-01-01 RX ADMIN — TAMSULOSIN HYDROCHLORIDE 0.4 MILLIGRAM(S): 0.4 CAPSULE ORAL at 21:29

## 2023-01-01 RX ADMIN — Medication 1 PATCH: at 07:23

## 2023-01-01 RX ADMIN — CARVEDILOL PHOSPHATE 25 MILLIGRAM(S): 80 CAPSULE, EXTENDED RELEASE ORAL at 06:05

## 2023-01-01 RX ADMIN — Medication 2: at 12:03

## 2023-01-01 RX ADMIN — Medication 650 MILLIGRAM(S): at 01:06

## 2023-01-01 RX ADMIN — ESCITALOPRAM OXALATE 10 MILLIGRAM(S): 10 TABLET, FILM COATED ORAL at 11:10

## 2023-01-01 RX ADMIN — Medication 1 PATCH: at 18:29

## 2023-01-01 RX ADMIN — Medication 200 MILLIGRAM(S): at 14:25

## 2023-01-01 RX ADMIN — Medication 1 PATCH: at 07:05

## 2023-01-01 RX ADMIN — CARVEDILOL PHOSPHATE 25 MILLIGRAM(S): 80 CAPSULE, EXTENDED RELEASE ORAL at 17:39

## 2023-01-01 RX ADMIN — Medication 81 MILLIGRAM(S): at 05:54

## 2023-01-01 RX ADMIN — Medication 1 PATCH: at 18:59

## 2023-01-01 RX ADMIN — Medication 81 MILLIGRAM(S): at 11:01

## 2023-01-01 RX ADMIN — TAMSULOSIN HYDROCHLORIDE 0.4 MILLIGRAM(S): 0.4 CAPSULE ORAL at 21:24

## 2023-01-01 RX ADMIN — Medication 50 MILLIGRAM(S): at 21:09

## 2023-01-01 RX ADMIN — LISINOPRIL 20 MILLIGRAM(S): 2.5 TABLET ORAL at 05:59

## 2023-01-01 RX ADMIN — Medication 1 PATCH: at 19:47

## 2023-01-01 RX ADMIN — METFORMIN HYDROCHLORIDE 500 MILLIGRAM(S): 850 TABLET ORAL at 16:55

## 2023-01-01 RX ADMIN — Medication 200 MILLIGRAM(S): at 15:17

## 2023-01-01 RX ADMIN — Medication 1 PATCH: at 19:59

## 2023-01-01 RX ADMIN — LISINOPRIL 40 MILLIGRAM(S): 2.5 TABLET ORAL at 06:27

## 2023-01-01 RX ADMIN — TICAGRELOR 180 MILLIGRAM(S): 90 TABLET ORAL at 12:32

## 2023-01-01 RX ADMIN — Medication 81 MILLIGRAM(S): at 12:29

## 2023-01-01 RX ADMIN — AMLODIPINE BESYLATE 10 MILLIGRAM(S): 2.5 TABLET ORAL at 05:07

## 2023-01-01 RX ADMIN — Medication 6 MILLIGRAM(S): at 22:22

## 2023-01-01 RX ADMIN — POLYETHYLENE GLYCOL 3350 17 GRAM(S): 17 POWDER, FOR SOLUTION ORAL at 06:09

## 2023-01-01 RX ADMIN — ESCITALOPRAM OXALATE 10 MILLIGRAM(S): 10 TABLET, FILM COATED ORAL at 11:27

## 2023-01-01 RX ADMIN — ENOXAPARIN SODIUM 30 MILLIGRAM(S): 100 INJECTION SUBCUTANEOUS at 17:31

## 2023-01-01 RX ADMIN — SODIUM CHLORIDE 100 MILLILITER(S): 9 INJECTION INTRAMUSCULAR; INTRAVENOUS; SUBCUTANEOUS at 20:53

## 2023-01-01 RX ADMIN — Medication 81 MILLIGRAM(S): at 06:09

## 2023-01-01 RX ADMIN — SODIUM CHLORIDE 75 MILLILITER(S): 9 INJECTION INTRAMUSCULAR; INTRAVENOUS; SUBCUTANEOUS at 06:10

## 2023-01-01 RX ADMIN — ATORVASTATIN CALCIUM 80 MILLIGRAM(S): 80 TABLET, FILM COATED ORAL at 21:56

## 2023-01-01 RX ADMIN — Medication 200 MILLIGRAM(S): at 06:08

## 2023-01-01 RX ADMIN — Medication 50 MILLIGRAM(S): at 22:01

## 2023-01-01 RX ADMIN — TICAGRELOR 90 MILLIGRAM(S): 90 TABLET ORAL at 06:50

## 2023-01-01 RX ADMIN — PANTOPRAZOLE SODIUM 40 MILLIGRAM(S): 20 TABLET, DELAYED RELEASE ORAL at 07:02

## 2023-01-01 RX ADMIN — Medication 650 MILLIGRAM(S): at 17:49

## 2023-01-01 RX ADMIN — Medication 650 MILLIGRAM(S): at 16:49

## 2023-01-01 RX ADMIN — Medication 2: at 12:04

## 2023-01-01 RX ADMIN — SODIUM CHLORIDE 1000 MILLILITER(S): 9 INJECTION INTRAMUSCULAR; INTRAVENOUS; SUBCUTANEOUS at 14:53

## 2023-01-01 RX ADMIN — Medication 50 MILLIGRAM(S): at 21:56

## 2023-01-01 RX ADMIN — Medication 1 PATCH: at 12:40

## 2023-01-01 RX ADMIN — Medication 200 MILLIGRAM(S): at 17:54

## 2023-01-01 RX ADMIN — ENOXAPARIN SODIUM 30 MILLIGRAM(S): 100 INJECTION SUBCUTANEOUS at 06:40

## 2023-01-01 RX ADMIN — TICAGRELOR 90 MILLIGRAM(S): 90 TABLET ORAL at 17:54

## 2023-01-01 RX ADMIN — CARVEDILOL PHOSPHATE 25 MILLIGRAM(S): 80 CAPSULE, EXTENDED RELEASE ORAL at 20:03

## 2023-01-01 RX ADMIN — Medication 650 MILLIGRAM(S): at 23:00

## 2023-01-01 RX ADMIN — ENOXAPARIN SODIUM 40 MILLIGRAM(S): 100 INJECTION SUBCUTANEOUS at 17:53

## 2023-01-01 RX ADMIN — LISINOPRIL 40 MILLIGRAM(S): 2.5 TABLET ORAL at 06:15

## 2023-01-01 RX ADMIN — Medication 650 MILLIGRAM(S): at 22:46

## 2023-01-01 RX ADMIN — CEFTRIAXONE 100 MILLIGRAM(S): 500 INJECTION, POWDER, FOR SOLUTION INTRAMUSCULAR; INTRAVENOUS at 06:55

## 2023-01-01 RX ADMIN — Medication 25 MILLIGRAM(S): at 17:12

## 2023-01-01 RX ADMIN — ENOXAPARIN SODIUM 30 MILLIGRAM(S): 100 INJECTION SUBCUTANEOUS at 05:36

## 2023-01-01 RX ADMIN — Medication 6 MILLIGRAM(S): at 22:38

## 2023-01-01 RX ADMIN — Medication 200 MILLIGRAM(S): at 13:11

## 2023-01-01 RX ADMIN — ATORVASTATIN CALCIUM 80 MILLIGRAM(S): 80 TABLET, FILM COATED ORAL at 21:33

## 2023-01-01 RX ADMIN — LISINOPRIL 40 MILLIGRAM(S): 2.5 TABLET ORAL at 06:36

## 2023-01-01 RX ADMIN — Medication 200 MILLIGRAM(S): at 14:10

## 2023-01-01 RX ADMIN — ESCITALOPRAM OXALATE 20 MILLIGRAM(S): 10 TABLET, FILM COATED ORAL at 11:13

## 2023-01-01 RX ADMIN — Medication 50 MILLIGRAM(S): at 20:12

## 2023-01-01 RX ADMIN — Medication 50 MILLIGRAM(S): at 08:00

## 2023-01-01 RX ADMIN — Medication 81 MILLIGRAM(S): at 11:19

## 2023-01-01 RX ADMIN — Medication 25 MILLIGRAM(S): at 22:07

## 2023-01-01 RX ADMIN — CLOPIDOGREL BISULFATE 75 MILLIGRAM(S): 75 TABLET, FILM COATED ORAL at 13:34

## 2023-01-01 RX ADMIN — POLYETHYLENE GLYCOL 3350 17 GRAM(S): 17 POWDER, FOR SOLUTION ORAL at 05:59

## 2023-01-01 RX ADMIN — Medication 25 MILLIGRAM(S): at 17:11

## 2023-01-01 RX ADMIN — LISINOPRIL 40 MILLIGRAM(S): 2.5 TABLET ORAL at 06:52

## 2023-01-01 RX ADMIN — METFORMIN HYDROCHLORIDE 500 MILLIGRAM(S): 850 TABLET ORAL at 09:39

## 2023-01-01 RX ADMIN — ENOXAPARIN SODIUM 40 MILLIGRAM(S): 100 INJECTION SUBCUTANEOUS at 19:27

## 2023-01-01 RX ADMIN — ESCITALOPRAM OXALATE 10 MILLIGRAM(S): 10 TABLET, FILM COATED ORAL at 11:24

## 2023-01-01 RX ADMIN — Medication 50 MILLIGRAM(S): at 08:28

## 2023-01-01 RX ADMIN — Medication 50 MILLIGRAM(S): at 17:40

## 2023-01-01 RX ADMIN — Medication 50 MILLIGRAM(S): at 22:53

## 2023-01-01 RX ADMIN — Medication 200 MILLIGRAM(S): at 15:06

## 2023-01-01 RX ADMIN — METFORMIN HYDROCHLORIDE 500 MILLIGRAM(S): 850 TABLET ORAL at 08:30

## 2023-01-01 RX ADMIN — CARVEDILOL PHOSPHATE 25 MILLIGRAM(S): 80 CAPSULE, EXTENDED RELEASE ORAL at 17:42

## 2023-01-01 RX ADMIN — Medication 50 MILLIGRAM(S): at 14:09

## 2023-01-01 RX ADMIN — Medication 6 MILLIGRAM(S): at 21:23

## 2023-01-01 RX ADMIN — AMLODIPINE BESYLATE 5 MILLIGRAM(S): 2.5 TABLET ORAL at 06:15

## 2023-01-01 RX ADMIN — ONDANSETRON 4 MILLIGRAM(S): 8 TABLET, FILM COATED ORAL at 21:20

## 2023-01-01 RX ADMIN — Medication 5 MILLIGRAM(S): at 14:15

## 2023-01-01 RX ADMIN — Medication 200 MILLIGRAM(S): at 14:54

## 2023-01-01 RX ADMIN — AMLODIPINE BESYLATE 10 MILLIGRAM(S): 2.5 TABLET ORAL at 06:22

## 2023-01-01 RX ADMIN — ENOXAPARIN SODIUM 40 MILLIGRAM(S): 100 INJECTION SUBCUTANEOUS at 17:40

## 2023-01-01 RX ADMIN — Medication 25 MILLIGRAM(S): at 22:22

## 2023-01-01 RX ADMIN — AMLODIPINE BESYLATE 5 MILLIGRAM(S): 2.5 TABLET ORAL at 06:22

## 2023-01-01 RX ADMIN — Medication 25 MILLIGRAM(S): at 05:44

## 2023-01-01 RX ADMIN — LISINOPRIL 40 MILLIGRAM(S): 2.5 TABLET ORAL at 08:19

## 2023-01-01 RX ADMIN — ESCITALOPRAM OXALATE 20 MILLIGRAM(S): 10 TABLET, FILM COATED ORAL at 13:42

## 2023-01-01 RX ADMIN — TICAGRELOR 90 MILLIGRAM(S): 90 TABLET ORAL at 17:42

## 2023-01-01 RX ADMIN — LISINOPRIL 40 MILLIGRAM(S): 2.5 TABLET ORAL at 11:37

## 2023-01-01 RX ADMIN — Medication 25 MILLIGRAM(S): at 18:45

## 2023-01-01 RX ADMIN — SENNA PLUS 2 TABLET(S): 8.6 TABLET ORAL at 20:12

## 2023-01-01 RX ADMIN — Medication 1 PATCH: at 11:55

## 2023-01-01 RX ADMIN — Medication 400 MILLIGRAM(S): at 22:16

## 2023-01-01 RX ADMIN — PANTOPRAZOLE SODIUM 40 MILLIGRAM(S): 20 TABLET, DELAYED RELEASE ORAL at 06:08

## 2023-01-01 RX ADMIN — Medication 1: at 12:33

## 2023-01-01 RX ADMIN — Medication 200 MILLIGRAM(S): at 21:38

## 2023-01-01 RX ADMIN — CLOPIDOGREL BISULFATE 75 MILLIGRAM(S): 75 TABLET, FILM COATED ORAL at 11:50

## 2023-01-01 RX ADMIN — Medication 1 PATCH: at 19:51

## 2023-01-01 RX ADMIN — ATORVASTATIN CALCIUM 80 MILLIGRAM(S): 80 TABLET, FILM COATED ORAL at 20:12

## 2023-01-01 RX ADMIN — METFORMIN HYDROCHLORIDE 500 MILLIGRAM(S): 850 TABLET ORAL at 17:25

## 2023-01-01 RX ADMIN — POLYETHYLENE GLYCOL 3350 17 GRAM(S): 17 POWDER, FOR SOLUTION ORAL at 17:12

## 2023-01-01 RX ADMIN — Medication 40 MILLIEQUIVALENT(S): at 06:41

## 2023-01-01 RX ADMIN — CARVEDILOL PHOSPHATE 25 MILLIGRAM(S): 80 CAPSULE, EXTENDED RELEASE ORAL at 06:03

## 2023-01-01 RX ADMIN — Medication 1: at 17:05

## 2023-01-01 RX ADMIN — TICAGRELOR 90 MILLIGRAM(S): 90 TABLET ORAL at 18:38

## 2023-01-01 RX ADMIN — ESCITALOPRAM OXALATE 20 MILLIGRAM(S): 10 TABLET, FILM COATED ORAL at 12:07

## 2023-01-01 RX ADMIN — Medication 60 MILLIGRAM(S): at 09:10

## 2023-01-01 RX ADMIN — Medication 6 MILLIGRAM(S): at 22:00

## 2023-01-01 RX ADMIN — LISINOPRIL 40 MILLIGRAM(S): 2.5 TABLET ORAL at 06:05

## 2023-01-01 RX ADMIN — ENOXAPARIN SODIUM 30 MILLIGRAM(S): 100 INJECTION SUBCUTANEOUS at 06:13

## 2023-01-01 RX ADMIN — POLYETHYLENE GLYCOL 3350 17 GRAM(S): 17 POWDER, FOR SOLUTION ORAL at 08:00

## 2023-01-01 RX ADMIN — Medication 200 MILLIGRAM(S): at 06:36

## 2023-01-01 RX ADMIN — Medication 25 MILLIGRAM(S): at 17:39

## 2023-01-01 RX ADMIN — ESCITALOPRAM OXALATE 10 MILLIGRAM(S): 10 TABLET, FILM COATED ORAL at 16:09

## 2023-01-01 RX ADMIN — SODIUM CHLORIDE 50 MILLILITER(S): 9 INJECTION INTRAMUSCULAR; INTRAVENOUS; SUBCUTANEOUS at 23:43

## 2023-01-01 RX ADMIN — ENOXAPARIN SODIUM 40 MILLIGRAM(S): 100 INJECTION SUBCUTANEOUS at 18:06

## 2023-01-01 RX ADMIN — ESCITALOPRAM OXALATE 10 MILLIGRAM(S): 10 TABLET, FILM COATED ORAL at 12:14

## 2023-01-01 RX ADMIN — Medication 200 MILLIGRAM(S): at 21:56

## 2023-01-01 RX ADMIN — CHLORHEXIDINE GLUCONATE 1 APPLICATION(S): 213 SOLUTION TOPICAL at 13:39

## 2023-01-01 RX ADMIN — LACTULOSE 20 GRAM(S): 10 SOLUTION ORAL at 06:14

## 2023-01-01 RX ADMIN — TICAGRELOR 90 MILLIGRAM(S): 90 TABLET ORAL at 06:14

## 2023-01-01 RX ADMIN — Medication 81 MILLIGRAM(S): at 13:33

## 2023-01-01 RX ADMIN — TAMSULOSIN HYDROCHLORIDE 0.4 MILLIGRAM(S): 0.4 CAPSULE ORAL at 22:45

## 2023-01-01 RX ADMIN — AMLODIPINE BESYLATE 10 MILLIGRAM(S): 2.5 TABLET ORAL at 08:01

## 2023-01-01 RX ADMIN — ATORVASTATIN CALCIUM 80 MILLIGRAM(S): 80 TABLET, FILM COATED ORAL at 22:35

## 2023-01-01 RX ADMIN — TAMSULOSIN HYDROCHLORIDE 0.4 MILLIGRAM(S): 0.4 CAPSULE ORAL at 21:56

## 2023-01-01 RX ADMIN — AMLODIPINE BESYLATE 10 MILLIGRAM(S): 2.5 TABLET ORAL at 06:47

## 2023-01-01 RX ADMIN — Medication 6 MILLIGRAM(S): at 21:46

## 2023-01-01 RX ADMIN — POLYETHYLENE GLYCOL 3350 17 GRAM(S): 17 POWDER, FOR SOLUTION ORAL at 18:38

## 2023-01-01 RX ADMIN — Medication 200 MILLIGRAM(S): at 14:34

## 2023-01-01 RX ADMIN — CARVEDILOL PHOSPHATE 25 MILLIGRAM(S): 80 CAPSULE, EXTENDED RELEASE ORAL at 06:09

## 2023-01-01 RX ADMIN — Medication 50 MILLIGRAM(S): at 22:03

## 2023-01-01 RX ADMIN — POLYETHYLENE GLYCOL 3350 17 GRAM(S): 17 POWDER, FOR SOLUTION ORAL at 05:37

## 2023-01-01 RX ADMIN — Medication 650 MILLIGRAM(S): at 21:46

## 2023-01-01 RX ADMIN — AMLODIPINE BESYLATE 10 MILLIGRAM(S): 2.5 TABLET ORAL at 08:20

## 2023-01-01 RX ADMIN — Medication 10 MILLIGRAM(S): at 15:09

## 2023-01-01 RX ADMIN — Medication 5 MILLIGRAM(S): at 22:21

## 2023-01-01 RX ADMIN — Medication 50 MILLIGRAM(S): at 20:43

## 2023-01-01 RX ADMIN — CLOPIDOGREL BISULFATE 75 MILLIGRAM(S): 75 TABLET, FILM COATED ORAL at 12:17

## 2023-01-01 RX ADMIN — TICAGRELOR 90 MILLIGRAM(S): 90 TABLET ORAL at 17:40

## 2023-01-01 RX ADMIN — TICAGRELOR 90 MILLIGRAM(S): 90 TABLET ORAL at 06:12

## 2023-01-01 RX ADMIN — CEFTRIAXONE 100 MILLIGRAM(S): 500 INJECTION, POWDER, FOR SOLUTION INTRAMUSCULAR; INTRAVENOUS at 06:13

## 2023-01-01 RX ADMIN — POLYETHYLENE GLYCOL 3350 17 GRAM(S): 17 POWDER, FOR SOLUTION ORAL at 21:08

## 2023-01-01 RX ADMIN — ATORVASTATIN CALCIUM 80 MILLIGRAM(S): 80 TABLET, FILM COATED ORAL at 20:43

## 2023-01-01 RX ADMIN — ENOXAPARIN SODIUM 30 MILLIGRAM(S): 100 INJECTION SUBCUTANEOUS at 06:36

## 2023-01-01 RX ADMIN — Medication 50 MILLIGRAM(S): at 21:23

## 2023-01-01 RX ADMIN — Medication 650 MILLIGRAM(S): at 21:51

## 2023-01-01 RX ADMIN — Medication 1 PATCH: at 09:26

## 2023-01-01 RX ADMIN — Medication 650 MILLIGRAM(S): at 23:01

## 2023-01-01 RX ADMIN — Medication 650 MILLIGRAM(S): at 15:25

## 2023-01-01 RX ADMIN — CLOPIDOGREL BISULFATE 75 MILLIGRAM(S): 75 TABLET, FILM COATED ORAL at 11:01

## 2023-01-01 RX ADMIN — Medication 81 MILLIGRAM(S): at 11:20

## 2023-01-01 RX ADMIN — Medication 1 PATCH: at 20:01

## 2023-01-01 RX ADMIN — Medication 81 MILLIGRAM(S): at 12:04

## 2023-01-01 RX ADMIN — Medication 25 MILLIGRAM(S): at 17:32

## 2023-01-01 RX ADMIN — LISINOPRIL 40 MILLIGRAM(S): 2.5 TABLET ORAL at 05:37

## 2023-01-01 RX ADMIN — Medication 650 MILLIGRAM(S): at 01:36

## 2023-01-01 RX ADMIN — POLYETHYLENE GLYCOL 3350 17 GRAM(S): 17 POWDER, FOR SOLUTION ORAL at 17:40

## 2023-01-01 RX ADMIN — ENOXAPARIN SODIUM 40 MILLIGRAM(S): 100 INJECTION SUBCUTANEOUS at 21:36

## 2023-01-01 RX ADMIN — CARVEDILOL PHOSPHATE 25 MILLIGRAM(S): 80 CAPSULE, EXTENDED RELEASE ORAL at 18:22

## 2023-01-01 RX ADMIN — CARVEDILOL PHOSPHATE 25 MILLIGRAM(S): 80 CAPSULE, EXTENDED RELEASE ORAL at 17:50

## 2023-01-01 RX ADMIN — SODIUM CHLORIDE 100 MILLILITER(S): 9 INJECTION INTRAMUSCULAR; INTRAVENOUS; SUBCUTANEOUS at 19:48

## 2023-01-01 RX ADMIN — METFORMIN HYDROCHLORIDE 500 MILLIGRAM(S): 850 TABLET ORAL at 17:33

## 2023-01-01 RX ADMIN — POLYETHYLENE GLYCOL 3350 17 GRAM(S): 17 POWDER, FOR SOLUTION ORAL at 20:04

## 2023-01-01 RX ADMIN — Medication 650 MILLIGRAM(S): at 06:00

## 2023-01-01 RX ADMIN — Medication 650 MILLIGRAM(S): at 22:35

## 2023-01-01 RX ADMIN — ESCITALOPRAM OXALATE 5 MILLIGRAM(S): 10 TABLET, FILM COATED ORAL at 12:51

## 2023-01-01 RX ADMIN — ESCITALOPRAM OXALATE 10 MILLIGRAM(S): 10 TABLET, FILM COATED ORAL at 11:17

## 2023-01-01 RX ADMIN — TICAGRELOR 90 MILLIGRAM(S): 90 TABLET ORAL at 17:21

## 2023-01-01 RX ADMIN — Medication 20 MILLIEQUIVALENT(S): at 01:17

## 2023-01-01 RX ADMIN — ATORVASTATIN CALCIUM 80 MILLIGRAM(S): 80 TABLET, FILM COATED ORAL at 22:20

## 2023-01-01 RX ADMIN — ENOXAPARIN SODIUM 40 MILLIGRAM(S): 100 INJECTION SUBCUTANEOUS at 17:32

## 2023-01-01 RX ADMIN — SENNA PLUS 2 TABLET(S): 8.6 TABLET ORAL at 22:03

## 2023-01-01 RX ADMIN — ESCITALOPRAM OXALATE 10 MILLIGRAM(S): 10 TABLET, FILM COATED ORAL at 11:52

## 2023-01-01 RX ADMIN — CARVEDILOL PHOSPHATE 25 MILLIGRAM(S): 80 CAPSULE, EXTENDED RELEASE ORAL at 08:28

## 2023-01-01 RX ADMIN — Medication 81 MILLIGRAM(S): at 11:10

## 2023-01-01 RX ADMIN — Medication 200 MILLIGRAM(S): at 22:23

## 2023-01-01 RX ADMIN — TAMSULOSIN HYDROCHLORIDE 0.4 MILLIGRAM(S): 0.4 CAPSULE ORAL at 22:06

## 2023-01-01 RX ADMIN — LISINOPRIL 40 MILLIGRAM(S): 2.5 TABLET ORAL at 07:02

## 2023-01-01 RX ADMIN — Medication 650 MILLIGRAM(S): at 23:12

## 2023-01-01 RX ADMIN — ENOXAPARIN SODIUM 30 MILLIGRAM(S): 100 INJECTION SUBCUTANEOUS at 17:48

## 2023-01-01 RX ADMIN — Medication 50 MILLIGRAM(S): at 22:45

## 2023-01-01 RX ADMIN — AMLODIPINE BESYLATE 5 MILLIGRAM(S): 2.5 TABLET ORAL at 06:26

## 2023-01-01 RX ADMIN — POLYETHYLENE GLYCOL 3350 17 GRAM(S): 17 POWDER, FOR SOLUTION ORAL at 12:28

## 2023-01-01 RX ADMIN — Medication 50 MILLIGRAM(S): at 22:18

## 2023-01-01 RX ADMIN — POLYETHYLENE GLYCOL 3350 17 GRAM(S): 17 POWDER, FOR SOLUTION ORAL at 20:42

## 2023-01-01 RX ADMIN — AMLODIPINE BESYLATE 10 MILLIGRAM(S): 2.5 TABLET ORAL at 14:15

## 2023-01-01 RX ADMIN — Medication 25 MILLIGRAM(S): at 17:57

## 2023-01-01 RX ADMIN — Medication 200 MILLIGRAM(S): at 21:23

## 2023-01-01 RX ADMIN — POLYETHYLENE GLYCOL 3350 17 GRAM(S): 17 POWDER, FOR SOLUTION ORAL at 08:33

## 2023-01-01 RX ADMIN — CARVEDILOL PHOSPHATE 25 MILLIGRAM(S): 80 CAPSULE, EXTENDED RELEASE ORAL at 05:38

## 2023-01-01 RX ADMIN — Medication 4000 MILLILITER(S): at 17:47

## 2023-01-01 RX ADMIN — SODIUM CHLORIDE 500 MILLILITER(S): 9 INJECTION INTRAMUSCULAR; INTRAVENOUS; SUBCUTANEOUS at 09:24

## 2023-01-01 RX ADMIN — Medication 50 MILLIGRAM(S): at 21:30

## 2023-01-01 RX ADMIN — Medication 1 PATCH: at 07:28

## 2023-01-01 RX ADMIN — HEPARIN SODIUM 13 UNIT(S)/HR: 5000 INJECTION INTRAVENOUS; SUBCUTANEOUS at 06:20

## 2023-01-01 RX ADMIN — AMLODIPINE BESYLATE 5 MILLIGRAM(S): 2.5 TABLET ORAL at 07:01

## 2023-01-01 RX ADMIN — EPTIFIBATIDE 8.94 MICROGRAM(S)/KG/MIN: 2 INJECTION, SOLUTION INTRAVENOUS at 13:30

## 2023-01-01 RX ADMIN — Medication 200 MILLIGRAM(S): at 21:27

## 2023-01-01 RX ADMIN — Medication 6 MILLIGRAM(S): at 22:59

## 2023-01-01 RX ADMIN — SODIUM CHLORIDE 100 MILLILITER(S): 9 INJECTION INTRAMUSCULAR; INTRAVENOUS; SUBCUTANEOUS at 07:15

## 2023-01-01 RX ADMIN — SENNA PLUS 2 TABLET(S): 8.6 TABLET ORAL at 21:43

## 2023-01-01 RX ADMIN — Medication 400 MILLIGRAM(S): at 13:51

## 2023-01-01 RX ADMIN — AMLODIPINE BESYLATE 10 MILLIGRAM(S): 2.5 TABLET ORAL at 06:13

## 2023-01-01 RX ADMIN — Medication 650 MILLIGRAM(S): at 22:00

## 2023-01-01 RX ADMIN — ESCITALOPRAM OXALATE 10 MILLIGRAM(S): 10 TABLET, FILM COATED ORAL at 13:31

## 2023-01-01 RX ADMIN — ONDANSETRON 4 MILLIGRAM(S): 8 TABLET, FILM COATED ORAL at 06:00

## 2023-01-01 RX ADMIN — CLOPIDOGREL BISULFATE 75 MILLIGRAM(S): 75 TABLET, FILM COATED ORAL at 12:04

## 2023-01-01 RX ADMIN — TICAGRELOR 90 MILLIGRAM(S): 90 TABLET ORAL at 17:10

## 2023-01-01 RX ADMIN — Medication 1 PATCH: at 08:06

## 2023-01-01 RX ADMIN — CARVEDILOL PHOSPHATE 25 MILLIGRAM(S): 80 CAPSULE, EXTENDED RELEASE ORAL at 17:18

## 2023-01-01 RX ADMIN — TICAGRELOR 90 MILLIGRAM(S): 90 TABLET ORAL at 06:30

## 2023-01-01 RX ADMIN — CARVEDILOL PHOSPHATE 25 MILLIGRAM(S): 80 CAPSULE, EXTENDED RELEASE ORAL at 17:28

## 2023-01-01 RX ADMIN — ESCITALOPRAM OXALATE 10 MILLIGRAM(S): 10 TABLET, FILM COATED ORAL at 11:19

## 2023-01-01 RX ADMIN — TICAGRELOR 180 MILLIGRAM(S): 90 TABLET ORAL at 12:11

## 2023-01-01 RX ADMIN — CARVEDILOL PHOSPHATE 25 MILLIGRAM(S): 80 CAPSULE, EXTENDED RELEASE ORAL at 06:16

## 2023-01-01 RX ADMIN — TAMSULOSIN HYDROCHLORIDE 0.4 MILLIGRAM(S): 0.4 CAPSULE ORAL at 22:01

## 2023-01-01 RX ADMIN — CHLORHEXIDINE GLUCONATE 1 APPLICATION(S): 213 SOLUTION TOPICAL at 21:14

## 2023-01-01 RX ADMIN — Medication 650 MILLIGRAM(S): at 06:30

## 2023-01-01 RX ADMIN — ENOXAPARIN SODIUM 40 MILLIGRAM(S): 100 INJECTION SUBCUTANEOUS at 18:23

## 2023-01-01 RX ADMIN — Medication 6 MILLIGRAM(S): at 21:35

## 2023-01-01 RX ADMIN — CLOPIDOGREL BISULFATE 75 MILLIGRAM(S): 75 TABLET, FILM COATED ORAL at 12:21

## 2023-01-01 RX ADMIN — CARVEDILOL PHOSPHATE 25 MILLIGRAM(S): 80 CAPSULE, EXTENDED RELEASE ORAL at 05:23

## 2023-01-01 RX ADMIN — Medication 50 MILLIGRAM(S): at 22:02

## 2023-01-01 RX ADMIN — TAMSULOSIN HYDROCHLORIDE 0.4 MILLIGRAM(S): 0.4 CAPSULE ORAL at 22:18

## 2023-01-01 RX ADMIN — LISINOPRIL 40 MILLIGRAM(S): 2.5 TABLET ORAL at 05:19

## 2023-01-01 RX ADMIN — Medication 81 MILLIGRAM(S): at 06:22

## 2023-01-01 RX ADMIN — CLOPIDOGREL BISULFATE 75 MILLIGRAM(S): 75 TABLET, FILM COATED ORAL at 12:47

## 2023-01-01 RX ADMIN — Medication 6 MILLIGRAM(S): at 22:03

## 2023-01-01 RX ADMIN — Medication 50 MILLIGRAM(S): at 09:03

## 2023-01-01 RX ADMIN — Medication 81 MILLIGRAM(S): at 06:54

## 2023-01-01 RX ADMIN — CLOPIDOGREL BISULFATE 75 MILLIGRAM(S): 75 TABLET, FILM COATED ORAL at 12:14

## 2023-01-01 RX ADMIN — ATORVASTATIN CALCIUM 80 MILLIGRAM(S): 80 TABLET, FILM COATED ORAL at 22:00

## 2023-01-01 RX ADMIN — POLYETHYLENE GLYCOL 3350 17 GRAM(S): 17 POWDER, FOR SOLUTION ORAL at 20:40

## 2023-01-01 RX ADMIN — PANTOPRAZOLE SODIUM 40 MILLIGRAM(S): 20 TABLET, DELAYED RELEASE ORAL at 05:36

## 2023-01-01 RX ADMIN — CARVEDILOL PHOSPHATE 25 MILLIGRAM(S): 80 CAPSULE, EXTENDED RELEASE ORAL at 05:24

## 2023-01-01 RX ADMIN — ENOXAPARIN SODIUM 30 MILLIGRAM(S): 100 INJECTION SUBCUTANEOUS at 06:22

## 2023-01-01 RX ADMIN — POLYETHYLENE GLYCOL 3350 17 GRAM(S): 17 POWDER, FOR SOLUTION ORAL at 09:03

## 2023-01-01 RX ADMIN — Medication 200 MILLIGRAM(S): at 06:20

## 2023-01-01 RX ADMIN — TICAGRELOR 90 MILLIGRAM(S): 90 TABLET ORAL at 20:20

## 2023-01-01 RX ADMIN — Medication 400 MILLIGRAM(S): at 08:00

## 2023-01-01 RX ADMIN — AMLODIPINE BESYLATE 10 MILLIGRAM(S): 2.5 TABLET ORAL at 05:59

## 2023-01-01 RX ADMIN — Medication 1 PATCH: at 13:06

## 2023-01-01 RX ADMIN — AMLODIPINE BESYLATE 10 MILLIGRAM(S): 2.5 TABLET ORAL at 08:17

## 2023-01-01 RX ADMIN — ENOXAPARIN SODIUM 30 MILLIGRAM(S): 100 INJECTION SUBCUTANEOUS at 18:37

## 2023-01-01 RX ADMIN — ENOXAPARIN SODIUM 40 MILLIGRAM(S): 100 INJECTION SUBCUTANEOUS at 17:20

## 2023-01-01 RX ADMIN — Medication 25 MILLIGRAM(S): at 05:16

## 2023-01-01 RX ADMIN — LISINOPRIL 40 MILLIGRAM(S): 2.5 TABLET ORAL at 05:23

## 2023-01-01 RX ADMIN — Medication 1 PATCH: at 11:39

## 2023-01-01 RX ADMIN — METFORMIN HYDROCHLORIDE 500 MILLIGRAM(S): 850 TABLET ORAL at 17:11

## 2023-01-01 RX ADMIN — Medication 200 MILLIGRAM(S): at 06:31

## 2023-01-01 RX ADMIN — POLYETHYLENE GLYCOL 3350 17 GRAM(S): 17 POWDER, FOR SOLUTION ORAL at 13:03

## 2023-01-01 RX ADMIN — Medication 200 MILLIGRAM(S): at 13:29

## 2023-01-01 RX ADMIN — Medication 1 PATCH: at 18:46

## 2023-01-01 RX ADMIN — Medication 25 MILLIGRAM(S): at 21:15

## 2023-01-01 RX ADMIN — Medication 50 MILLIGRAM(S): at 21:14

## 2023-01-01 RX ADMIN — Medication 650 MILLIGRAM(S): at 16:00

## 2023-01-01 RX ADMIN — Medication 200 MILLIGRAM(S): at 22:52

## 2023-01-01 RX ADMIN — Medication 1 PATCH: at 17:33

## 2023-01-01 RX ADMIN — SENNA PLUS 2 TABLET(S): 8.6 TABLET ORAL at 22:06

## 2023-01-01 RX ADMIN — POLYETHYLENE GLYCOL 3350 17 GRAM(S): 17 POWDER, FOR SOLUTION ORAL at 05:44

## 2023-01-01 RX ADMIN — ENOXAPARIN SODIUM 40 MILLIGRAM(S): 100 INJECTION SUBCUTANEOUS at 18:07

## 2023-01-01 RX ADMIN — CARVEDILOL PHOSPHATE 25 MILLIGRAM(S): 80 CAPSULE, EXTENDED RELEASE ORAL at 18:27

## 2023-01-01 RX ADMIN — METFORMIN HYDROCHLORIDE 500 MILLIGRAM(S): 850 TABLET ORAL at 08:28

## 2023-01-01 RX ADMIN — LISINOPRIL 20 MILLIGRAM(S): 2.5 TABLET ORAL at 05:24

## 2023-01-01 RX ADMIN — ATORVASTATIN CALCIUM 80 MILLIGRAM(S): 80 TABLET, FILM COATED ORAL at 21:20

## 2023-01-01 RX ADMIN — ESCITALOPRAM OXALATE 20 MILLIGRAM(S): 10 TABLET, FILM COATED ORAL at 13:23

## 2023-01-01 RX ADMIN — ENOXAPARIN SODIUM 40 MILLIGRAM(S): 100 INJECTION SUBCUTANEOUS at 17:25

## 2023-01-01 RX ADMIN — Medication 1 PACKET(S): at 06:41

## 2023-01-01 RX ADMIN — Medication 650 MILLIGRAM(S): at 12:56

## 2023-01-01 RX ADMIN — Medication 1 PATCH: at 12:51

## 2023-01-01 RX ADMIN — TAMSULOSIN HYDROCHLORIDE 0.4 MILLIGRAM(S): 0.4 CAPSULE ORAL at 22:05

## 2023-01-01 RX ADMIN — TAMSULOSIN HYDROCHLORIDE 0.4 MILLIGRAM(S): 0.4 CAPSULE ORAL at 21:11

## 2023-01-01 RX ADMIN — LISINOPRIL 40 MILLIGRAM(S): 2.5 TABLET ORAL at 08:30

## 2023-01-01 RX ADMIN — LISINOPRIL 20 MILLIGRAM(S): 2.5 TABLET ORAL at 17:28

## 2023-01-01 RX ADMIN — ENOXAPARIN SODIUM 40 MILLIGRAM(S): 100 INJECTION SUBCUTANEOUS at 17:50

## 2023-01-01 RX ADMIN — ENOXAPARIN SODIUM 40 MILLIGRAM(S): 100 INJECTION SUBCUTANEOUS at 18:54

## 2023-01-01 RX ADMIN — SENNA PLUS 2 TABLET(S): 8.6 TABLET ORAL at 22:34

## 2023-01-01 RX ADMIN — Medication 1 PATCH: at 19:08

## 2023-01-01 RX ADMIN — POLYETHYLENE GLYCOL 3350 17 GRAM(S): 17 POWDER, FOR SOLUTION ORAL at 06:31

## 2023-01-01 RX ADMIN — Medication 25 MILLIGRAM(S): at 17:42

## 2023-01-01 RX ADMIN — LACTULOSE 20 GRAM(S): 10 SOLUTION ORAL at 09:09

## 2023-01-01 RX ADMIN — POLYETHYLENE GLYCOL 3350 17 GRAM(S): 17 POWDER, FOR SOLUTION ORAL at 12:52

## 2023-01-01 RX ADMIN — LISINOPRIL 40 MILLIGRAM(S): 2.5 TABLET ORAL at 08:28

## 2023-01-01 RX ADMIN — CLOPIDOGREL BISULFATE 75 MILLIGRAM(S): 75 TABLET, FILM COATED ORAL at 11:51

## 2023-01-01 RX ADMIN — Medication 200 MILLIGRAM(S): at 22:17

## 2023-01-01 RX ADMIN — Medication 1 PATCH: at 07:30

## 2023-01-01 RX ADMIN — LISINOPRIL 40 MILLIGRAM(S): 2.5 TABLET ORAL at 22:03

## 2023-01-01 RX ADMIN — Medication 25 MILLIGRAM(S): at 22:51

## 2023-01-01 RX ADMIN — TICAGRELOR 90 MILLIGRAM(S): 90 TABLET ORAL at 20:06

## 2023-01-01 RX ADMIN — TICAGRELOR 90 MILLIGRAM(S): 90 TABLET ORAL at 07:49

## 2023-01-01 RX ADMIN — TICAGRELOR 90 MILLIGRAM(S): 90 TABLET ORAL at 17:27

## 2023-01-01 RX ADMIN — Medication 25 MILLIGRAM(S): at 21:37

## 2023-01-01 RX ADMIN — ENOXAPARIN SODIUM 30 MILLIGRAM(S): 100 INJECTION SUBCUTANEOUS at 06:31

## 2023-01-01 RX ADMIN — POLYETHYLENE GLYCOL 3350 17 GRAM(S): 17 POWDER, FOR SOLUTION ORAL at 05:21

## 2023-01-01 RX ADMIN — ESCITALOPRAM OXALATE 10 MILLIGRAM(S): 10 TABLET, FILM COATED ORAL at 12:21

## 2023-01-01 RX ADMIN — Medication 1: at 11:56

## 2023-01-01 RX ADMIN — Medication 1 PATCH: at 20:10

## 2023-01-01 RX ADMIN — ESCITALOPRAM OXALATE 20 MILLIGRAM(S): 10 TABLET, FILM COATED ORAL at 14:35

## 2023-01-01 RX ADMIN — TICAGRELOR 90 MILLIGRAM(S): 90 TABLET ORAL at 07:02

## 2023-01-01 RX ADMIN — Medication 50 MILLIGRAM(S): at 21:24

## 2023-01-01 RX ADMIN — CARVEDILOL PHOSPHATE 25 MILLIGRAM(S): 80 CAPSULE, EXTENDED RELEASE ORAL at 20:14

## 2023-01-01 RX ADMIN — Medication 25 MILLIGRAM(S): at 18:27

## 2023-01-01 RX ADMIN — POLYETHYLENE GLYCOL 3350 17 GRAM(S): 17 POWDER, FOR SOLUTION ORAL at 17:38

## 2023-01-01 RX ADMIN — Medication 25 MILLIGRAM(S): at 17:17

## 2023-01-01 RX ADMIN — ENOXAPARIN SODIUM 30 MILLIGRAM(S): 100 INJECTION SUBCUTANEOUS at 18:00

## 2023-01-01 RX ADMIN — SENNA PLUS 2 TABLET(S): 8.6 TABLET ORAL at 21:28

## 2023-01-01 RX ADMIN — TAMSULOSIN HYDROCHLORIDE 0.4 MILLIGRAM(S): 0.4 CAPSULE ORAL at 21:23

## 2023-01-01 RX ADMIN — ESCITALOPRAM OXALATE 10 MILLIGRAM(S): 10 TABLET, FILM COATED ORAL at 12:06

## 2023-01-01 RX ADMIN — Medication 650 MILLIGRAM(S): at 00:05

## 2023-01-01 RX ADMIN — Medication 10 MILLIGRAM(S): at 11:37

## 2023-01-01 RX ADMIN — CLOPIDOGREL BISULFATE 300 MILLIGRAM(S): 75 TABLET, FILM COATED ORAL at 17:53

## 2023-01-01 RX ADMIN — Medication 81 MILLIGRAM(S): at 07:02

## 2023-01-01 RX ADMIN — SODIUM CHLORIDE 1000 MILLILITER(S): 9 INJECTION INTRAMUSCULAR; INTRAVENOUS; SUBCUTANEOUS at 13:32

## 2023-01-01 RX ADMIN — LISINOPRIL 20 MILLIGRAM(S): 2.5 TABLET ORAL at 17:57

## 2023-01-01 RX ADMIN — Medication 50 MILLIGRAM(S): at 06:16

## 2023-01-01 RX ADMIN — Medication 50 MILLIGRAM(S): at 22:16

## 2023-01-01 RX ADMIN — ESCITALOPRAM OXALATE 20 MILLIGRAM(S): 10 TABLET, FILM COATED ORAL at 11:42

## 2023-01-01 RX ADMIN — ESCITALOPRAM OXALATE 20 MILLIGRAM(S): 10 TABLET, FILM COATED ORAL at 12:02

## 2023-01-01 RX ADMIN — Medication 1 PATCH: at 07:51

## 2023-01-01 RX ADMIN — ESCITALOPRAM OXALATE 10 MILLIGRAM(S): 10 TABLET, FILM COATED ORAL at 13:33

## 2023-01-01 RX ADMIN — Medication 1: at 08:18

## 2023-01-01 RX ADMIN — Medication 650 MILLIGRAM(S): at 23:13

## 2023-01-01 RX ADMIN — ESCITALOPRAM OXALATE 5 MILLIGRAM(S): 10 TABLET, FILM COATED ORAL at 12:06

## 2023-01-01 RX ADMIN — ESCITALOPRAM OXALATE 10 MILLIGRAM(S): 10 TABLET, FILM COATED ORAL at 12:12

## 2023-01-01 RX ADMIN — POLYETHYLENE GLYCOL 3350 17 GRAM(S): 17 POWDER, FOR SOLUTION ORAL at 12:05

## 2023-01-01 RX ADMIN — PANTOPRAZOLE SODIUM 40 MILLIGRAM(S): 20 TABLET, DELAYED RELEASE ORAL at 06:21

## 2023-01-01 RX ADMIN — METFORMIN HYDROCHLORIDE 500 MILLIGRAM(S): 850 TABLET ORAL at 08:02

## 2023-01-01 RX ADMIN — CARVEDILOL PHOSPHATE 25 MILLIGRAM(S): 80 CAPSULE, EXTENDED RELEASE ORAL at 05:19

## 2023-01-01 RX ADMIN — Medication 50 MILLIGRAM(S): at 17:42

## 2023-01-01 RX ADMIN — Medication 25 MILLIGRAM(S): at 21:16

## 2023-01-12 ENCOUNTER — NON-APPOINTMENT (OUTPATIENT)
Age: 43
End: 2023-01-12

## 2023-07-24 NOTE — H&P ADULT - ASSESSMENT
ROB OSHEA is a 43y (1980) man with a PMHx significant for T2DM, HTN, previous CVA (per patient 8 years ago, treated w/ TPA at [facility], p/w L hemiplegia, facial droop and dysarthria w/ minimal residual deficits), current smoker who presented to [facility] ED after two weeks of intermittent left lower extremity weakness with accompanying back pain similar to patient's sciatica pain, and 25+ hours of dysarthria.     Impression: dysarthria and L lower extremity weakness/discomfort (subjective but difficult to appreciate on exam for true motor weakness) ongoing for over 25 hours possibly 2/2 to new stroke vs recrudescence of prior stroke vs toxic/metabolic/infectious etiology vs sciatica. Given hyperreflexia and +crisostomo, will also eval for cervical stenosis.     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 Severe stenosis of proximal L vertebral artery segment. No core/penumbra.     LKW: 7/10 (2 weeks ago)  NIHSS: 1  mRS: 0  Not a Tenecteplase candidate given out of time window.   Not thrombectomy candidate given suspicion occlusion is chronic and no current significant acute neurologic life disabiling deficits that would be explained by CTA findings.     Recommendations:  [ ] Recommend aspirin 81mg daily and 3 weeks of plavix 75mg daily (one time 300mg load) for secondary stroke prevention   [ ] Atorvastatin 40-80mg daily (titrate to LDL <70)  [x] CTH, CTA, CTP   [ ] HbA1C and Lipid Panel.  [ ] TTE with bubble  [ ] MRI brain w/o con, MR lumbar spine w/o con, MR cervical spine w/o con    [ ] Permissive HTN up to 220/120 mmHg for first 24 hours after symptom onset followed by gradual normotension.   [ ] BG goal <180, avoid hypoglycemia  [ ] NPO until clears dysphagia screen, otherwise swallow evaluation  [ ] Fall, aspiration precautions  [ ] PT/OT eval  [ ] DVT ppx lovenox    Patient case discussed with stroke fellow under the supervision of stroke attending at time of patient evaluation. Patient care discussed with ED team as well. Final recommendations regarding management to be confirmed upon attending attestation/ sign of note.

## 2023-07-24 NOTE — ED PROVIDER NOTE - CLINICAL SUMMARY MEDICAL DECISION MAKING FREE TEXT BOX
Medical decision making:   History and physical concerning for missed stroke outside of tPA window.  Code stroke will not be called at this time.      But patient will still need stroke work-up with CTA head and neck imaging.  Likely MRI as an inpatient.      Blood pressure 220/110 warrants control.  10 mg labetalol IV x1.  Neurology team paged for consult.    ECG directly visualized by me and shows normal sinus rhythm, rate 86, , , QTc 466.  No ST elevations or depressions.

## 2023-07-24 NOTE — CONSULT NOTE ADULT - ASSESSMENT
ROB OSHEA is a 43y (1980) man with a PMHx significant for T2DM, HTN, previous CVA (per patient 8 years ago, treated w/ TPA at [facility], p/w L hemiplegia, facial droop and dysarthria w/ minimal residual deficits), current smoker who presented to [facility] ED after two weeks of intermittent left lower extremity weakness with accompanying back pain similar to patient's sciatica pain, and 25+ hours of dysarthria.     Impression: dysarthria and L lower extremity weakness/discomfort ongoing for over 25 hours possibly 2/2 to new stroke vs recrudescence of prior stroke vs toxic/metabolic/infectious etiology vs sciatica    Patient is not a Tenecteplase candidate given out of time window.     Recommendations:    [] Obtain CT Head Non Con to rule out acute intracranial pathology  [] CTA Head & Neck to look at the cerebral vasculature   [] HbA1C and Lipid Panel.  [] Permissive HTN up to 220/120 mmHg for first 24 hours after symptom onset followed by gradual normotension. Patient s/p 1 push of 10 mg labetalol, will continue to monitor and edit BP guidelines pending CT/CTA Head and Neck   [] BG goal <180, avoid hypoglycemia  [] NPO until clears dysphagia screen, otherwise swallow evaluation  [] Fall, aspiration precautions  [] PT/OT eval  [] Stroke education provided    Final recommendations regarding management to be confirmed upon attending attestation.      ROB OSHEA is a 43y (1980) man with a PMHx significant for T2DM, HTN, previous CVA (per patient 8 years ago, treated w/ TPA at [facility], p/w L hemiplegia, facial droop and dysarthria w/ minimal residual deficits), current smoker who presented to [facility] ED after two weeks of intermittent left lower extremity weakness with accompanying back pain similar to patient's sciatica pain, and 25+ hours of dysarthria.     Impression: dysarthria and L lower extremity weakness/discomfort ongoing for over 25 hours possibly 2/2 to new stroke vs recrudescence of prior stroke vs toxic/metabolic/infectious etiology vs sciatica    Patient is not a Tenecteplase candidate given out of time window.     Initial NIHSS: 1    Recommendations:    [] Obtain CT Head Non Con to rule out acute intracranial pathology  [] CTA Head & Neck to look at the cerebral vasculature   [] HbA1C and Lipid Panel.  [] Permissive HTN up to 220/120 mmHg for first 24 hours after symptom onset followed by gradual normotension. Patient s/p 1 push of 10 mg labetalol, will continue to monitor and edit BP guidelines pending CT/CTA Head and Neck   [] BG goal <180, avoid hypoglycemia  [] NPO until clears dysphagia screen, otherwise swallow evaluation  [] Fall, aspiration precautions  [] PT/OT eval  [] Stroke education provided    Final recommendations regarding management to be confirmed upon attending attestation.      ROB OSHEA is a 43y (1980) man with a PMHx significant for T2DM, HTN, previous CVA (per patient 8 years ago, treated w/ TPA at [facility], p/w L hemiplegia, facial droop and dysarthria w/ minimal residual deficits), current smoker who presented to [facility] ED after two weeks of intermittent left lower extremity weakness with accompanying back pain similar to patient's sciatica pain, and 25+ hours of dysarthria.     Impression: dysarthria and L lower extremity weakness/discomfort ongoing for over 25 hours possibly 2/2 to new stroke vs recrudescence of prior stroke vs toxic/metabolic/infectious etiology vs sciatica    Patient is not a Tenecteplase candidate given out of time window.     Initial NIHSS: 1    Recommendations:    [] Obtain CT Head Non Con to rule out acute intracranial pathology  [] CTA Head & Neck to look at the cerebral vasculature   [] HbA1C and Lipid Panel.  [] Permissive HTN up to 220/120 mmHg for first 24 hours after symptom onset followed by gradual normotension. Patient s/p 1 push of 10 mg labetalol, will continue to monitor and edit BP guidelines pending CT/CTA Head and Neck   [] BG goal <180, avoid hypoglycemia  [] NPO until clears dysphagia screen, otherwise swallow evaluation  [] Fall, aspiration precautions  [] PT/OT eval  [] If pain persists, consider MR Lumbar spine     Final recommendations regarding management to be confirmed upon attending attestation.      ROB OSHEA is a 43y (1980) man with a PMHx significant for T2DM, HTN, previous CVA (per patient 8 years ago, treated w/ TPA at [facility], p/w L hemiplegia, facial droop and dysarthria w/ minimal residual deficits), current smoker who presented to [facility] ED after two weeks of intermittent left lower extremity weakness with accompanying back pain similar to patient's sciatica pain, and 25+ hours of dysarthria.     Impression: dysarthria and L lower extremity weakness/discomfort ongoing for over 25 hours possibly 2/2 to new stroke vs recrudescence of prior stroke vs toxic/metabolic/infectious etiology vs sciatica    Initial NIHSS: 1  mRS: 0  Not a Tenecteplase candidate given out of time window.     Recommendations:    [] Obtain CT Head Non Con to rule out acute intracranial pathology  [] CTA Head & Neck to look at the cerebral vasculature   [] HbA1C and Lipid Panel.  [] Permissive HTN up to 220/120 mmHg for first 24 hours after symptom onset followed by gradual normotension. Patient s/p 1 push of 10 mg labetalol, will continue to monitor and edit BP guidelines pending CT/CTA Head and Neck   [] BG goal <180, avoid hypoglycemia  [] NPO until clears dysphagia screen, otherwise swallow evaluation  [] Fall, aspiration precautions  [] PT/OT eval  [] If pain persists, consider MR Lumbar spine     Final recommendations regarding management to be confirmed upon attending attestation.      ROB OSHEA is a 43y (1980) man with a PMHx significant for T2DM, HTN, previous CVA (per patient 8 years ago, treated w/ TPA at [facility], p/w L hemiplegia, facial droop and dysarthria w/ minimal residual deficits), current smoker who presented to [facility] ED after two weeks of intermittent left lower extremity weakness with accompanying back pain similar to patient's sciatica pain, and 25+ hours of dysarthria.     Impression: dysarthria and L lower extremity weakness/discomfort ongoing for over 25 hours possibly 2/2 to new stroke vs recrudescence of prior stroke vs toxic/metabolic/infectious etiology vs sciatica    Initial NIHSS: 1  mRS: 0  Not a Tenecteplase candidate given out of time window.     Recommendations:    [] Obtain CT Head Non Con to rule out acute intracranial pathology  [] CTA Head & Neck to look at the cerebral vasculature   [] HbA1C and Lipid Panel.  [] Permissive HTN up to 220/120 mmHg for first 24 hours after symptom onset followed by gradual normotension.   [] BG goal <180, avoid hypoglycemia  [] NPO until clears dysphagia screen, otherwise swallow evaluation  [] Fall, aspiration precautions  [] PT/OT eval  [] If pain persists, consider MR Lumbar spine     Final recommendations regarding management to be confirmed upon attending attestation.      ROB OSHEA is a 43y (1980) man with a PMHx significant for T2DM, HTN, previous CVA (per patient 8 years ago, treated w/ TPA at [facility], p/w L hemiplegia, facial droop and dysarthria w/ minimal residual deficits), current smoker who presented to [facility] ED after two weeks of intermittent left lower extremity weakness with accompanying back pain similar to patient's sciatica pain, and 25+ hours of dysarthria.     Impression: dysarthria and L lower extremity weakness/discomfort ongoing for over 25 hours possibly 2/2 to new stroke vs recrudescence of prior stroke vs toxic/metabolic/infectious etiology vs sciatica    LKW: 7/10 (2 weeks ago)  Initial NIHSS: 1  mRS: 0  Not a Tenecteplase candidate given out of time window.     Recommendations:    [] Obtain CT Head Non Con to rule out acute intracranial pathology  [] CTA Head & Neck to look at the cerebral vasculature   [] HbA1C and Lipid Panel.  [] Permissive HTN up to 220/120 mmHg for first 24 hours after symptom onset followed by gradual normotension.   [] BG goal <180, avoid hypoglycemia  [] NPO until clears dysphagia screen, otherwise swallow evaluation  [] Fall, aspiration precautions  [] PT/OT eval  [] If pain persists, consider MR Lumbar spine     Final recommendations regarding management to be confirmed upon attending attestation.      ROB OSHEA is a 43y (1980) man with a PMHx significant for T2DM, HTN, previous CVA (per patient 8 years ago, treated w/ TPA at [facility], p/w L hemiplegia, facial droop and dysarthria w/ minimal residual deficits), current smoker who presented to [facility] ED after two weeks of intermittent left lower extremity weakness with accompanying back pain similar to patient's sciatica pain, and 25+ hours of dysarthria.     Impression: dysarthria and L lower extremity weakness/discomfort ongoing for over 25 hours possibly 2/2 to new stroke vs recrudescence of prior stroke vs toxic/metabolic/infectious etiology vs sciatica    LKW: 7/10 (2 weeks ago)  Initial NIHSS: 1  mRS: 0  Not a Tenecteplase candidate given out of time window.     Recommendations:    [] Obtain CT Head Non Con to rule out acute intracranial pathology  [] CTA Head & Neck to look at the cerebral vasculature   [] HbA1C and Lipid Panel.  [] Permissive HTN up to 220/120 mmHg for first 24 hours after symptom onset followed by gradual normotension.   [] BG goal <180, avoid hypoglycemia  [] NPO until clears dysphagia screen, otherwise swallow evaluation  [] Fall, aspiration precautions  [] PT/OT eval  [] If pain persists, consider MR Lumbar spine   [] Given hx of prior stroke and workup pending for possible 2nd stroke, patient recommended to be started on ASA and statin    Final recommendations regarding management to be confirmed upon attending attestation.      ROB OSHEA is a 43y (1980) man with a PMHx significant for T2DM, HTN, previous CVA (per patient 8 years ago, treated w/ TPA at [facility], p/w L hemiplegia, facial droop and dysarthria w/ minimal residual deficits), current smoker who presented to [facility] ED after two weeks of intermittent left lower extremity weakness with accompanying back pain similar to patient's sciatica pain, and 25+ hours of dysarthria.     Impression: dysarthria and L lower extremity weakness/discomfort ongoing for over 25 hours possibly 2/2 to new stroke vs recrudescence of prior stroke vs toxic/metabolic/infectious etiology vs sciatica    LKW: 7/10 (2 weeks ago)  Initial NIHSS: 1  mRS: 0  Not a Tenecteplase candidate given out of time window.     Recommendations:    [] Obtain CT Head Non Con to rule out acute intracranial pathology  [] CTA Head & Neck to look at the cerebral vasculature   [] HbA1C and Lipid Panel.  [] Permissive HTN up to 220/120 mmHg for first 24 hours after symptom onset followed by gradual normotension.   [] BG goal <180, avoid hypoglycemia  [] NPO until clears dysphagia screen, otherwise swallow evaluation  [] Fall, aspiration precautions  [] PT/OT eval  [] If pain persists, consider MR Lumbar spine   [] Given hx of prior stroke and workup pending for possible 2nd stroke, patient recommended to be started on ASA and statin unless acute bleed is found.     Final recommendations regarding management to be confirmed upon attending attestation.      ROB OSHEA is a 43y (1980) man with a PMHx significant for T2DM, HTN, previous CVA (per patient 8 years ago, treated w/ TPA at [facility], p/w L hemiplegia, facial droop and dysarthria w/ minimal residual deficits), current smoker who presented to [facility] ED after two weeks of intermittent left lower extremity weakness with accompanying back pain similar to patient's sciatica pain, and 25+ hours of dysarthria.     Impression: dysarthria and L lower extremity weakness/discomfort ongoing for over 25 hours possibly 2/2 to new stroke vs recrudescence of prior stroke vs toxic/metabolic/infectious etiology vs sciatica    LKW: 7/10 (2 weeks ago)  Initial NIHSS: 1  mRS: 0  Not a Tenecteplase candidate given out of time window.     Recommendations:  [] Given CTA revealing right sided vessel occlusion will admit to stroke service, as discussed with stroke fellow  [x] CT Head Non Con pending official read  [x] CTA Head & Neck to look at the cerebral vasculature   [] HbA1C and Lipid Panel.  [] Permissive HTN up to 220/120 mmHg for first 24 hours after symptom onset followed by gradual normotension.   [] BG goal <180, avoid hypoglycemia  [] NPO until clears dysphagia screen, otherwise swallow evaluation  [] Fall, aspiration precautions  [] PT/OT eval  [] If pain persists, consider MR Lumbar spine   [] Given hx of prior stroke and workup pending for possible 2nd stroke, patient recommended to be started on ASA, Plavix and statin unless acute bleed is found.   [] Consider hypercoagulability workup given patient had stroke at young age (31)  [] TTE with bubble    Patient case discussed with stroke fellow under the supervision of stroke attending.  Final recommendations regarding management to be confirmed upon attending attestation.   Final recommendations regarding management to be confirmed upon attending attestation.      ROB OSHEA is a 43y (1980) man with a PMHx significant for T2DM, HTN, previous CVA (per patient 8 years ago, treated w/ TPA at [facility], p/w L hemiplegia, facial droop and dysarthria w/ minimal residual deficits), current smoker who presented to [facility] ED after two weeks of intermittent left lower extremity weakness with accompanying back pain similar to patient's sciatica pain, and 25+ hours of dysarthria.     Impression: dysarthria and L lower extremity weakness/discomfort ongoing for over 25 hours possibly 2/2 to new stroke vs recrudescence of prior stroke vs toxic/metabolic/infectious etiology vs sciatica    LKW: 7/10 (2 weeks ago)  Initial NIHSS: 1  mRS: 0  Not a Tenecteplase candidate given out of time window.     Recommendations:  [] CT Head Non Con pending official read  [] CTA Head & Neck to look at the cerebral vasculature   [] HbA1C and Lipid Panel.  [] Permissive HTN up to 220/120 mmHg for first 24 hours after symptom onset followed by gradual normotension.   [] BG goal <180, avoid hypoglycemia  [] NPO until clears dysphagia screen, otherwise swallow evaluation  [] Fall, aspiration precautions  [] PT/OT eval  [] If pain persists, consider MR Lumbar spine   [] Given hx of prior stroke and workup pending for possible 2nd stroke, patient recommended to be started on ASA, Plavix and statin unless acute bleed is found.   [] Consider hypercoagulability workup given patient had stroke at young age (31)  [] TTE with bubble    Patient case discussed with stroke fellow under the supervision of stroke attending.  Final recommendations regarding management to be confirmed upon attending attestation.   Final recommendations regarding management to be confirmed upon attending attestation.      ORB OSHEA is a 43y (1980) man with a PMHx significant for T2DM, HTN, previous CVA (per patient 8 years ago, treated w/ TPA at [facility], p/w L hemiplegia, facial droop and dysarthria w/ minimal residual deficits), current smoker who presented to [facility] ED after two weeks of intermittent left lower extremity weakness with accompanying back pain similar to patient's sciatica pain, and 25+ hours of dysarthria.     Impression: dysarthria and L lower extremity weakness/discomfort (subjective but difficult to appreciate on exam for true motor weakness) ongoing for over 25 hours possibly 2/2 to new stroke vs recrudescence of prior stroke vs toxic/metabolic/infectious etiology vs sciatica    CTH/ CTA/ CTP:  occlusion of proximal intradural R vertebral artery segment. Severe stenosis of proximal L vertebral artery segment. No core/penumbra.     LKW: 7/10 (2 weeks ago)  NIHSS: 1  mRS: 0  Not a Tenecteplase candidate given out of time window.   Not thrombectomy candidate given suspicion occlusion is chronic and no current significant acute neurologic life disabiling deficits that would be explained by CTA findings.     Recommendations:  [ ] Recommend aspirin 81mg daily and 3 weeks of plavix 75mg daily for secondary stroke prevention   [ ] Atorvastatin 40-80mg daily (titrate to LDL <70)  [x] CTH, CTA, CTP   [ ] HbA1C and Lipid Panel.  [ ] TTE with bubble  [ ] MRI brain w/o con, MR lumbar spine w/o con    [ ] Permissive HTN up to 220/120 mmHg for first 24 hours after symptom onset followed by gradual normotension.   [ ] BG goal <180, avoid hypoglycemia  [ ] NPO until clears dysphagia screen, otherwise swallow evaluation  [ ] Fall, aspiration precautions  [ ] PT/OT eval    Patient case discussed with stroke fellow under the supervision of stroke attending at time of patient evaluation. Patient care discussed with ED team as well. Final recommendations regarding management to be confirmed upon attending attestation/ sign of note.

## 2023-07-24 NOTE — ED PROVIDER NOTE - ATTENDING APP SHARED VISIT CONTRIBUTION OF CARE
MD Mcdonald:  patient seen and evaluated with the PA.  I was present for key portions of the History and Physical, and I agree with the Impression and Plan.      Patient is a 43-year-old male complaining of 2 weeks intermittent slurred speech episodes.  Quality similar to when he had a stroke many years ago that presented with slurred speech and hemiplegia.  Patient delayed coming to the ED until the symptoms persisted for 24 hours, onset yesterday at 11 AM.    Quality: Slurred speech left facial droop left lower extremity weakness.    Associated symptoms: No blurry vision, no double vision, no neck pain, no headache    Vital signs: Hypertension appreciated, heart rate 103  General: Adult male, obvious slurred speech appreciated.  Neurologic: Awake alert and oriented x3, strength 5 out of 5 bilaterally throughout, gait is unstable.  No dysmetria appreciated  VS: wnl.   Resp: No distress.  Ext: no deformities.  Skin: warm and dry as visualized.    Psych: normal affect    Medical decision making:   History and physical concerning for missed stroke outside of tPA window.  Code stroke will not be called at this time.  But patient will still need stroke work-up with CTA head and neck imaging.  Likely MRI as an inpatient.  Blood pressure 220/110 warrants control.  10 mg labetalol IV x1.  Neurology team paged for consult. MD Mcdonald:  patient seen and evaluated with the PA.  I was present for key portions of the History and Physical, and I agree with the Impression and Plan.      Patient is a 43-year-old male complaining of 2 weeks intermittent slurred speech episodes.  Quality similar to when he had a stroke many years ago that presented with slurred speech and hemiplegia.  Patient delayed coming to the ED until the symptoms persisted for 24 hours, onset yesterday at 11 AM.    Quality: Slurred speech left facial droop left lower extremity weakness.    Associated symptoms: No blurry vision, no double vision, no neck pain, no headache    Vital signs: Hypertension appreciated, heart rate 103  General: Adult male, obvious slurred speech appreciated.  Neurologic: Awake alert and oriented x3, strength 5 out of 5 bilaterally throughout, gait is unstable.  No dysmetria appreciated  VS: wnl.   Resp: No distress.  Ext: no deformities.  Skin: warm and dry as visualized.    Psych: normal affect    Medical decision making:   History and physical concerning for missed stroke outside of tPA window.  Code stroke will not be called at this time.  But patient will still need stroke work-up with CTA head and neck imaging.  Likely MRI as an inpatient.  Blood pressure 220/110 warrants control.  10 mg labetalol IV x1.  Neurology team paged for consult.    ECG directly visualized by me and shows normal sinus rhythm, rate 86, , , QTc 466.  No ST elevations or depressions. MD Mcdonald:  patient seen and evaluated with the PA.  I was present for key portions of the History and Physical, and I agree with the Impression and Plan.      Patient is a 43-year-old male complaining of 2 weeks intermittent slurred speech episodes.  Quality similar to when he had a stroke many years ago that presented with slurred speech and hemiplegia.  Patient delayed coming to the ED until the symptoms persisted for 24 hours, onset yesterday at 11 AM.    Quality: Slurred speech left facial droop left lower extremity weakness.    Associated symptoms: No blurry vision, no double vision, no neck pain, no headache    Vital signs: Hypertension appreciated, heart rate 103  General: Adult male, obvious slurred speech appreciated.  Neurologic: Awake alert and oriented x3, strength 5 out of 5 bilaterally throughout, gait is unstable.  No dysmetria appreciated  VS: wnl.   Resp: No distress.  Ext: no deformities.  Skin: warm and dry as visualized.    Psych: normal affect    Medical decision making:   History and physical concerning for missed stroke outside of tPA window.  Code stroke will not be called at this time.  But patient will still need stroke work-up with CTA head and neck imaging.  Likely MRI as an inpatient.  Blood pressure 220/110 warrants control.  10 mg labetalol IV x1.      Neurology team paged for consult.    ECG directly visualized by me and shows normal sinus rhythm, rate 86, , , QTc 466.  No ST elevations or depressions.

## 2023-07-24 NOTE — ED ADULT NURSE NOTE - NS ED NURSE DISCH DISPOSITION
Scheduled Colonoscopy on 2.24.2023 at [facility] on [address]  Admitted

## 2023-07-24 NOTE — ED ADULT NURSE REASSESSMENT NOTE - NS ED NURSE REASSESS COMMENT FT1
Belongings secured by security.
pt pending CT >2 hours now, remains hypertensive despite multiple medications, pt now more drowsy, PA Whitten at bedside. PT telepacked and brought to CT for STAT CT head.

## 2023-07-24 NOTE — H&P ADULT - NSHPPHYSICALEXAM_GEN_ALL_CORE
Physical Examination:   General - NAD   Head normocephalic  Eyes: clear sclera  Resp breathing regularly     Neurologic Exam:  Mental status - Awake, Alert, Oriented to person, place, time and situation. Speech dysarthric more gutteral. Repetition and naming intact. Follows simple and complex commands. Attention/concentration, recent and remote memory (including registration and recall), and fund of knowledge intact    Cranial nerves - PERRL no apd, VFF, EOMI, face sensation (V1-V3) intact b/l, facial strength intact without asymmetry b/l, hearing intact b/l, palate with symmetric elevation, trapezius 5/5 strength b/l, tongue midline on protrusion with full lateral movement    Motor - Normal bulk and tone throughout. No pronator drift.  Strength testing            Deltoid      Biceps      Triceps     Wrist Extension    Wrist Flexion     Interossei         R            5                 5               5                     5                              5                        5                 5  L             5                 5               5                     5                              5                        5                 5              Hip Flexion    Hip Extension    Knee Flexion         Dorsiflexion    Plantar Flexion  R              4                           4 +                     5                           -                            5                          5  L              4+                          4+                        5                          -                            5                          5    Sensation - Light touch, vib, temp intact throughout    DTR's -             Biceps      Triceps     Brachioradialis             Patellar    Ankle    Toes/plantar response  R             3+             2+                  3+                       3+            3+                 Up  L             3+             2+                 3+                        3+           3+                 Up    +crisostomo b/l  + pectoral b/l  Coordination - Finger to Nose intact b/l. HTS unable to test.   Gait and station - Appears to have a lurching gait with preference slightly towards right side. Physical Examination:   General - NAD   Head normocephalic  Eyes: clear sclera  Resp breathing regularly     Neurologic Exam:  Mental status - Awake, Alert, Oriented to person, place, time and situation. Speech dysarthric more gutteral. Repetition and naming intact. Follows simple and complex commands. Attention/concentration, recent and remote memory (including registration and recall), and fund of knowledge intact    Cranial nerves - PERRL no apd, VFF, EOMI, face sensation (V1-V3) intact b/l, facial strength intact without asymmetry b/l, hearing intact b/l, palate with symmetric elevation, trapezius 5/5 strength b/l, tongue midline on protrusion with full lateral movement    Motor - Normal bulk and tone throughout. No pronator drift.  Strength testing            Deltoid      Biceps      Triceps     Wrist Extension    Wrist Flexion     Interossei         R            5                 5               5                     5                              5                        5                 5  L             5                 5               5                     5                              5                        5                 5              Hip Flexion    Hip Extension    Knee Flexion         Dorsiflexion    Plantar Flexion  R              4                           4 +                     5                           -                            5                          5  L              4+                          4+                        5                          -                            5                          5    Sensation - Light touch, vib, temp intact throughout    DTR's -             Biceps      Triceps     Brachioradialis             Patellar    Ankle    Toes/plantar response  R             3+             2+                  3+                       3+            3+                 Up  L             3+             2+                 3+                        3+           3+                 Up    +crisostomo L  +Cross adductors   Coordination - Finger to Nose intact b/l. HTS unable to test.   Gait and station - Appears to have a lurching gait with preference slightly towards right side.

## 2023-07-24 NOTE — CONSULT NOTE ADULT - SUBJECTIVE AND OBJECTIVE BOX
Neurology - Consult Note    -  Spectra: [phone] ([facility]), [phone] ([facility])  -    HPI: Patient ROB OSHEA is a 43y (1980) man with a PMHx significant for T2DM, HTN, previous CVA (per patient 8 years ago, treated w/ TPA at [facility] [place], p/w L hemiplegia, facial droop and dysarthria w/ minimal residual deficits), current smoker who presented to [facility] ED after two weeks of intermittent left lower extremity weakness with accompanying back pain similar to patient's sciatica pain, and 25+ hours of dysarthria.     On examination, patient has mild dysarthria, accompanied by wife who states that patient's speech is noticeably more slurred than his baseline. Says that his previous stroke 8 years ago presented similarly, but was much more severe (weakness + speech changes/aphasia). Patient denies any recent fevers/chills, dysuria/hematuria, signs of infection, travel, sick contacts or trauma.     Review of Systems:     CONSTITUTIONAL: No fevers or chills  EYES AND ENT: No visual changes or no throat pain   NECK: No pain or stiffness  RESPIRATORY: No hemoptysis or shortness of breath  CARDIOVASCULAR: No chest pain or palpitations  GASTROINTESTINAL: No melena or hematochezia  GENITOURINARY: No dysuria or hematuria  NEUROLOGICAL: +As stated in HPI above  SKIN: No itching, burning, rashes, or lesions   All other review of systems is negative unless indicated above.    Allergies:  No Known Allergies      PMHx/PSHx/Family Hx: As above, otherwise see below       Social Hx:  Daily smoker, 1/2 ppd for over 25 years  Daily marijuana user (joints)  Lives with wife  Works as an     Home Medications:  Lisinopril 20 mg, 1 tablet twice a day  Metformin HCL  mg tablet, 1 tablet once a day with dinner  Amlodipine Besylate 10 mg tab, 1 tablet once a day  Carvedilol 25 mg tablet, 1 tablet twice a day        Vitals:  T(C): 36.7 (07-24-23 @ 12:50), Max: 36.7 (07-24-23 @ 12:16)  HR: 89 (07-24-23 @ 13:10) (89 - 104)  BP: 201/145 (07-24-23 @ 13:20) (194/122 - 220/111)  RR: 23 (07-24-23 @ 13:10) (20 - 34)  SpO2: 96% (07-24-23 @ 13:10) (96% - 100%)    Physical Examination:   General - NAD  Cardiovascular - Peripheral pulses palpable, no edema    Neurologic Exam:  Mental status - Awake, Alert, Oriented to person, place, time and situation. Speech dysarthric. Repetition and naming intact. Follows simple and complex commands. Attention/concentration, recent and remote memory (including registration and recall), and fund of knowledge intact    Cranial nerves - PERRLA, VFF, EOMI, face sensation (V1-V3) intact b/l, facial strength intact without asymmetry b/l, hearing intact b/l, palate with symmetric elevation, trapezius 5/5 strength b/l, tongue midline on protrusion with full lateral movement    Motor - Normal bulk and tone throughout. No pronator drift.  Strength testing            Deltoid      Biceps      Triceps     Wrist Extension    Wrist Flexion     Interossei         R            5                 5               5                     5                              5                        5                 5  L             5                 5               5                     5                              5                        5                 5              Hip Flexion    Hip Extension    Knee Flexion    Knee Extension    Dorsiflexion    Plantar Flexion  R              5                           5                       5                           5                            5                          5  L              5                           5                        5                           5                            5                          5    Sensation - Light touch intact throughout    DTR's -             Biceps      Triceps     Brachioradialis             Patellar    Ankle    Toes/plantar response  R             3+             2+                  2+                       3+            3+                 Down  L             3+             2+                 2+                        3+           3+                 Down    Coordination - Finger to Nose intact b/l. No tremors appreciated    Gait and station - Appears to have a lurching gait with preference slightly towards right side.  Labs:                        16.9   8.91  )-----------( 249      ( 24 Jul 2023 12:59 )             48.3           CAPILLARY BLOOD GLUCOSE      POCT Blood Glucose.: 166 mg/dL (24 Jul 2023 12:14)        PT/INR - ( 24 Jul 2023 12:59 )   PT: 11.4 sec;   INR: 0.99 ratio         PTT - ( 24 Jul 2023 12:59 )  PTT:30.6 sec  CSF:                  Radiology:     Neurology - Consult Note    -  Spectra: [phone] ([facility]), [phone] ([facility])  -    HPI: Patient ROB OSHEA is a 43y (1980) man with a PMHx significant for T2DM, HTN, previous CVA (per patient 8 years ago, treated w/ TPA at [facility], p/w L hemiplegia, facial droop and dysarthria w/ minimal residual deficits), current smoker who presented to [facility] ED after two weeks of intermittent left lower extremity weakness with accompanying back pain similar to patient's sciatica pain, and 25+ hours of dysarthria.     On examination, patient has mild dysarthria, accompanied by wife who states that patient's speech is noticeably more slurred than his baseline. Says that his previous stroke 8 years ago presented similarly, but was much more severe (weakness + speech changes/aphasia). Patient denies any recent fevers/chills, dysuria/hematuria, signs of infection, travel, sick contacts or trauma.     Initial NIHSS: 1    Review of Systems:     CONSTITUTIONAL: No fevers or chills  EYES AND ENT: No visual changes or no throat pain   NECK: No pain or stiffness  RESPIRATORY: No hemoptysis or shortness of breath  CARDIOVASCULAR: No chest pain or palpitations  GASTROINTESTINAL: No melena or hematochezia  GENITOURINARY: No dysuria or hematuria  NEUROLOGICAL: +As stated in HPI above  SKIN: No itching, burning, rashes, or lesions   All other review of systems is negative unless indicated above.    Allergies:  No Known Allergies      PMHx/PSHx/Family Hx: As above, otherwise see below       Social Hx:  Daily smoker, 1/2 ppd for over 25 years  Daily marijuana user (joints)  Lives with wife  Works as an     Home Medications:  Lisinopril 20 mg, 1 tablet twice a day  Metformin HCL  mg tablet, 1 tablet once a day with dinner  Amlodipine Besylate 10 mg tab, 1 tablet once a day  Carvedilol 25 mg tablet, 1 tablet twice a day        Vitals:  T(C): 36.7 (07-24-23 @ 12:50), Max: 36.7 (07-24-23 @ 12:16)  HR: 89 (07-24-23 @ 13:10) (89 - 104)  BP: 201/145 (07-24-23 @ 13:20) (194/122 - 220/111)  RR: 23 (07-24-23 @ 13:10) (20 - 34)  SpO2: 96% (07-24-23 @ 13:10) (96% - 100%)    Physical Examination:   General - NAD  Cardiovascular - Peripheral pulses palpable, no edema    Neurologic Exam:  Mental status - Awake, Alert, Oriented to person, place, time and situation. Speech dysarthric. Repetition and naming intact. Follows simple and complex commands. Attention/concentration, recent and remote memory (including registration and recall), and fund of knowledge intact    Cranial nerves - PERRLA, VFF, EOMI, face sensation (V1-V3) intact b/l, facial strength intact without asymmetry b/l, hearing intact b/l, palate with symmetric elevation, trapezius 5/5 strength b/l, tongue midline on protrusion with full lateral movement    Motor - Normal bulk and tone throughout. No pronator drift.  Strength testing            Deltoid      Biceps      Triceps     Wrist Extension    Wrist Flexion     Interossei         R            5                 5               5                     5                              5                        5                 5  L             5                 5               5                     5                              5                        5                 5              Hip Flexion    Hip Extension    Knee Flexion    Knee Extension    Dorsiflexion    Plantar Flexion  R              5                           5                       5                           5                            5                          5  L              5                           5                        5                           5                            5                          5    Sensation - Light touch intact throughout    DTR's -             Biceps      Triceps     Brachioradialis             Patellar    Ankle    Toes/plantar response  R             3+             2+                  2+                       3+            3+                 Down  L             3+             2+                 2+                        3+           3+                 Down    Coordination - Finger to Nose intact b/l. No tremors appreciated    Gait and station - Appears to have a lurching gait with preference slightly towards right side.  Labs:                        16.9   8.91  )-----------( 249      ( 24 Jul 2023 12:59 )             48.3           CAPILLARY BLOOD GLUCOSE      POCT Blood Glucose.: 166 mg/dL (24 Jul 2023 12:14)        PT/INR - ( 24 Jul 2023 12:59 )   PT: 11.4 sec;   INR: 0.99 ratio         PTT - ( 24 Jul 2023 12:59 )  PTT:30.6 sec  CSF:                  Radiology:     Neurology - Consult Note    -  Spectra: [phone] ([facility]), [phone] ([facility])  -    HPI: Patient ROB OSHEA is a 43y (1980) man with a PMHx significant for T2DM, HTN, previous CVA (per patient 8 years ago, treated w/ TPA at [facility], p/w L hemiplegia, facial droop and dysarthria w/ minimal residual deficits), current smoker who presented to [facility] ED after two weeks of intermittent left lower extremity weakness with accompanying back pain similar to patient's sciatica pain, and 25+ hours of dysarthria.     On examination, patient has mild dysarthria, accompanied by wife who states that patient's speech is noticeably more slurred than his baseline. Says that his previous stroke 8 years ago presented similarly, but was much more severe (weakness + speech changes/aphasia). Patient denies any recent fevers/chills, dysuria/hematuria, signs of infection, travel, sick contacts or trauma.     v  Review of Systems:     CONSTITUTIONAL: No fevers or chills  EYES AND ENT: No visual changes or no throat pain   NECK: No pain or stiffness  RESPIRATORY: No hemoptysis or shortness of breath  CARDIOVASCULAR: No chest pain or palpitations  GASTROINTESTINAL: No melena or hematochezia  GENITOURINARY: No dysuria or hematuria  NEUROLOGICAL: +As stated in HPI above  SKIN: No itching, burning, rashes, or lesions   All other review of systems is negative unless indicated above.    Allergies:  No Known Allergies      PMHx/PSHx/Family Hx: As above, otherwise see below       Social Hx:  Daily smoker, 1/2 ppd for over 25 years  Daily marijuana user (joints)  Lives with wife  Works as an     Home Medications:  Lisinopril 20 mg, 1 tablet twice a day  Metformin HCL  mg tablet, 1 tablet once a day with dinner  Amlodipine Besylate 10 mg tab, 1 tablet once a day  Carvedilol 25 mg tablet, 1 tablet twice a day        Vitals:  T(C): 36.7 (07-24-23 @ 12:50), Max: 36.7 (07-24-23 @ 12:16)  HR: 89 (07-24-23 @ 13:10) (89 - 104)  BP: 201/145 (07-24-23 @ 13:20) (194/122 - 220/111)  RR: 23 (07-24-23 @ 13:10) (20 - 34)  SpO2: 96% (07-24-23 @ 13:10) (96% - 100%)    Physical Examination:   General - NAD  Cardiovascular - Peripheral pulses palpable, no edema    Neurologic Exam:  Mental status - Awake, Alert, Oriented to person, place, time and situation. Speech dysarthric. Repetition and naming intact. Follows simple and complex commands. Attention/concentration, recent and remote memory (including registration and recall), and fund of knowledge intact    Cranial nerves - PERRLA, VFF, EOMI, face sensation (V1-V3) intact b/l, facial strength intact without asymmetry b/l, hearing intact b/l, palate with symmetric elevation, trapezius 5/5 strength b/l, tongue midline on protrusion with full lateral movement    Motor - Normal bulk and tone throughout. No pronator drift.  Strength testing            Deltoid      Biceps      Triceps     Wrist Extension    Wrist Flexion     Interossei         R            5                 5               5                     5                              5                        5                 5  L             5                 5               5                     5                              5                        5                 5              Hip Flexion    Hip Extension    Knee Flexion    Knee Extension    Dorsiflexion    Plantar Flexion  R              5                           5                       5                           5                            5                          5  L              5                           5                        5                           5                            5                          5    Sensation - Light touch intact throughout    DTR's -             Biceps      Triceps     Brachioradialis             Patellar    Ankle    Toes/plantar response  R             3+             2+                  2+                       3+            3+                 Down  L             3+             2+                 2+                        3+           3+                 Down    Coordination - Finger to Nose intact b/l. No tremors appreciated    Gait and station - Appears to have a lurching gait with preference slightly towards right side.  Labs:                        16.9   8.91  )-----------( 249      ( 24 Jul 2023 12:59 )             48.3           CAPILLARY BLOOD GLUCOSE      POCT Blood Glucose.: 166 mg/dL (24 Jul 2023 12:14)        PT/INR - ( 24 Jul 2023 12:59 )   PT: 11.4 sec;   INR: 0.99 ratio         PTT - ( 24 Jul 2023 12:59 )  PTT:30.6 sec  CSF:                  Radiology:     Neurology - Consult Note    -  Spectra: [phone] ([facility]), [phone] ([facility])  -    HPI: Patient ROB OSHEA is a 43y (1980) man with a PMHx significant for T2DM, HTN, previous CVA (per patient 8 years ago, treated w/ TPA at [facility], p/w L hemiplegia, facial droop and dysarthria w/ minimal residual deficits), current smoker who presented to [facility] ED after two weeks of intermittent left lower extremity weakness with accompanying back pain similar to patient's sciatica pain, and 25+ hours of dysarthria.     On examination, patient has mild dysarthria, accompanied by wife who states that patient's speech is noticeably more slurred than his baseline. Says that his previous stroke 8 years ago presented similarly, but was much more severe (weakness + speech changes/aphasia). Patient denies any recent fevers/chills, dysuria/hematuria, signs of infection, travel, sick contacts or trauma.     LKW: 7/10 (2 weeks ago)  Initial NIHSS: 1  mRS: 0  Not a Tenecteplase candidate given out of time window.     Review of Systems:     CONSTITUTIONAL: No fevers or chills  EYES AND ENT: No visual changes or no throat pain   NECK: No pain or stiffness  RESPIRATORY: No hemoptysis or shortness of breath  CARDIOVASCULAR: No chest pain or palpitations  GASTROINTESTINAL: No melena or hematochezia  GENITOURINARY: No dysuria or hematuria  NEUROLOGICAL: +As stated in HPI above  SKIN: No itching, burning, rashes, or lesions   All other review of systems is negative unless indicated above.    Allergies:  No Known Allergies      PMHx/PSHx/Family Hx: As above, otherwise see below       Social Hx:  Daily smoker, 1/2 ppd for over 25 years  Daily marijuana user (joints)  Lives with wife  Works as an     Home Medications:  Lisinopril 20 mg, 1 tablet twice a day  Metformin HCL  mg tablet, 1 tablet once a day with dinner  Amlodipine Besylate 10 mg tab, 1 tablet once a day  Carvedilol 25 mg tablet, 1 tablet twice a day        Vitals:  T(C): 36.7 (07-24-23 @ 12:50), Max: 36.7 (07-24-23 @ 12:16)  HR: 89 (07-24-23 @ 13:10) (89 - 104)  BP: 201/145 (07-24-23 @ 13:20) (194/122 - 220/111)  RR: 23 (07-24-23 @ 13:10) (20 - 34)  SpO2: 96% (07-24-23 @ 13:10) (96% - 100%)    Physical Examination:   General - NAD  Cardiovascular - Peripheral pulses palpable, no edema    Neurologic Exam:  Mental status - Awake, Alert, Oriented to person, place, time and situation. Speech dysarthric. Repetition and naming intact. Follows simple and complex commands. Attention/concentration, recent and remote memory (including registration and recall), and fund of knowledge intact    Cranial nerves - PERRLA, VFF, EOMI, face sensation (V1-V3) intact b/l, facial strength intact without asymmetry b/l, hearing intact b/l, palate with symmetric elevation, trapezius 5/5 strength b/l, tongue midline on protrusion with full lateral movement    Motor - Normal bulk and tone throughout. No pronator drift.  Strength testing            Deltoid      Biceps      Triceps     Wrist Extension    Wrist Flexion     Interossei         R            5                 5               5                     5                              5                        5                 5  L             5                 5               5                     5                              5                        5                 5              Hip Flexion    Hip Extension    Knee Flexion    Knee Extension    Dorsiflexion    Plantar Flexion  R              5                           5                       5                           5                            5                          5  L              5                           5                        5                           5                            5                          5    Sensation - Light touch intact throughout    DTR's -             Biceps      Triceps     Brachioradialis             Patellar    Ankle    Toes/plantar response  R             3+             2+                  2+                       3+            3+                 Down  L             3+             2+                 2+                        3+           3+                 Down    Coordination - Finger to Nose intact b/l. No tremors appreciated    Gait and station - Appears to have a lurching gait with preference slightly towards right side.  Labs:                        16.9   8.91  )-----------( 249      ( 24 Jul 2023 12:59 )             48.3           CAPILLARY BLOOD GLUCOSE      POCT Blood Glucose.: 166 mg/dL (24 Jul 2023 12:14)        PT/INR - ( 24 Jul 2023 12:59 )   PT: 11.4 sec;   INR: 0.99 ratio         PTT - ( 24 Jul 2023 12:59 )  PTT:30.6 sec  CSF:                  Radiology:     Neurology - Consult Note    -  Spectra: [phone] ([facility]), [phone] ([facility])  -    HPI: Patient ROB OSHEA is a 43y (1980) man with a PMHx significant for T2DM, HTN, previous CVA (per patient 8 years ago, treated w/ TPA at [facility], p/w L hemiplegia, facial droop and dysarthria w/ minimal residual deficits), current smoker who presented to [facility] ED after two weeks of intermittent left lower extremity weakness with accompanying back pain similar to patient's sciatica pain, and 25+ hours of dysarthria.     On examination, patient has mild dysarthria, accompanied by wife who states that patient's speech is noticeably more slurred than his baseline. Says that his previous stroke 8 years ago presented similarly, but was much more severe (weakness + speech changes/aphasia). Patient denies any recent fevers/chills, dysuria/hematuria, signs of infection, travel, sick contacts or trauma.     LKW: 7/10 (2 weeks ago)  Initial NIHSS: 1  mRS: 0  Not a Tenecteplase candidate given out of time window.     Review of Systems:     CONSTITUTIONAL: No fevers or chills  EYES AND ENT: No visual changes or no throat pain   NECK: No pain or stiffness  RESPIRATORY: No hemoptysis or shortness of breath  CARDIOVASCULAR: No chest pain or palpitations  GASTROINTESTINAL: No melena or hematochezia  GENITOURINARY: No dysuria or hematuria  NEUROLOGICAL: +As stated in HPI above  SKIN: No itching, burning, rashes, or lesions   All other review of systems is negative unless indicated above.    Allergies:  No Known Allergies      PMHx/PSHx/Family Hx: As above, otherwise see below       Social Hx:  Daily smoker, 1/2 ppd for over 25 years  Daily marijuana user (joints)  Lives with wife  Works as an     Home Medications:  Lisinopril 20 mg, 1 tablet twice a day  Metformin HCL  mg tablet, 1 tablet once a day with dinner  Amlodipine Besylate 10 mg tab, 1 tablet once a day  Carvedilol 25 mg tablet, 1 tablet twice a day    Allergies:  No known drug allergies.       Vitals:  T(C): 36.7 (07-24-23 @ 12:50), Max: 36.7 (07-24-23 @ 12:16)  HR: 89 (07-24-23 @ 13:10) (89 - 104)  BP: 201/145 (07-24-23 @ 13:20) (194/122 - 220/111)  RR: 23 (07-24-23 @ 13:10) (20 - 34)  SpO2: 96% (07-24-23 @ 13:10) (96% - 100%)    Physical Examination:   General - NAD  Cardiovascular - Peripheral pulses palpable, no edema    Neurologic Exam:  Mental status - Awake, Alert, Oriented to person, place, time and situation. Speech dysarthric. Repetition and naming intact. Follows simple and complex commands. Attention/concentration, recent and remote memory (including registration and recall), and fund of knowledge intact    Cranial nerves - PERRLA, VFF, EOMI, face sensation (V1-V3) intact b/l, facial strength intact without asymmetry b/l, hearing intact b/l, palate with symmetric elevation, trapezius 5/5 strength b/l, tongue midline on protrusion with full lateral movement    Motor - Normal bulk and tone throughout. No pronator drift.  Strength testing            Deltoid      Biceps      Triceps     Wrist Extension    Wrist Flexion     Interossei         R            5                 5               5                     5                              5                        5                 5  L             5                 5               5                     5                              5                        5                 5              Hip Flexion    Hip Extension    Knee Flexion    Knee Extension    Dorsiflexion    Plantar Flexion  R              5                           5                       5                           5                            5                          5  L              5                           5                        5                           5                            5                          5    Sensation - Light touch intact throughout    DTR's -             Biceps      Triceps     Brachioradialis             Patellar    Ankle    Toes/plantar response  R             3+             2+                  2+                       3+            3+                 Down  L             3+             2+                 2+                        3+           3+                 Down    Coordination - Finger to Nose intact b/l. No tremors appreciated    Gait and station - Appears to have a lurching gait with preference slightly towards right side.  Labs:                        16.9   8.91  )-----------( 249      ( 24 Jul 2023 12:59 )             48.3           CAPILLARY BLOOD GLUCOSE      POCT Blood Glucose.: 166 mg/dL (24 Jul 2023 12:14)        PT/INR - ( 24 Jul 2023 12:59 )   PT: 11.4 sec;   INR: 0.99 ratio         PTT - ( 24 Jul 2023 12:59 )  PTT:30.6 sec  CSF:                  Radiology:     Neurology - Consult Note    -  Spectra: [phone] ([facility]), [phone] ([facility])  -    HPI: Patient ROB OSHEA is a 43y (1980) man with a PMHx significant for T2DM, HTN, previous CVA (per patient 8 years ago, treated w/ TPA at [facility], p/w L hemiplegia, facial droop and dysarthria w/ minimal residual deficits), current smoker who presented to [facility] ED after two weeks of intermittent left lower extremity weakness with accompanying back pain similar to patient's sciatica pain, and 25+ hours of dysarthria.     On examination, patient has mild dysarthria, accompanied by wife who states that patient's speech is noticeably more slurred than his baseline. Says that his previous stroke 8 years ago presented similarly, but was much more severe (weakness + speech changes/aphasia). Patient denies any recent fevers/chills, dysuria/hematuria, signs of infection, travel, sick contacts or trauma.     LKW: 7/10 (2 weeks ago)  Initial NIHSS: 1  mRS: 0  Not a Tenecteplase candidate given out of time window.     Review of Systems:     CONSTITUTIONAL: No fevers or chills  EYES AND ENT: No visual changes or no throat pain   NECK: No pain or stiffness  RESPIRATORY: No hemoptysis or shortness of breath  CARDIOVASCULAR: No chest pain or palpitations  GASTROINTESTINAL: No melena or hematochezia  GENITOURINARY: No dysuria or hematuria  NEUROLOGICAL: +As stated in HPI above  SKIN: No itching, burning, rashes, or lesions   All other review of systems is negative unless indicated above.    Allergies:  No Known Allergies      PMHx/PSHx/Family Hx: As above, otherwise see below       Social Hx:  Daily smoker, 1/2 ppd for over 25 years  Daily marijuana user (joints)  Lives with wife  Works as an     Home Medications:  Lisinopril 20 mg, 1 tablet twice a day  Metformin HCL  mg tablet, 1 tablet once a day with dinner  Amlodipine Besylate 10 mg tab, 1 tablet once a day  Carvedilol 25 mg tablet, 1 tablet twice a day    Allergies:  No known drug allergies.       Vitals:  T(C): 36.7 (07-24-23 @ 12:50), Max: 36.7 (07-24-23 @ 12:16)  HR: 89 (07-24-23 @ 13:10) (89 - 104)  BP: 201/145 (07-24-23 @ 13:20) (194/122 - 220/111)  RR: 23 (07-24-23 @ 13:10) (20 - 34)  SpO2: 96% (07-24-23 @ 13:10) (96% - 100%)    Physical Examination:   General - NAD  Cardiovascular - Peripheral pulses palpable, no edema    Neurologic Exam:  Mental status - Awake, Alert, Oriented to person, place, time and situation. Speech dysarthric. Repetition and naming intact. Follows simple and complex commands. Attention/concentration, recent and remote memory (including registration and recall), and fund of knowledge intact    Cranial nerves - PERRLA, VFF, EOMI, face sensation (V1-V3) intact b/l, facial strength intact without asymmetry b/l, hearing intact b/l, palate with symmetric elevation, trapezius 5/5 strength b/l, tongue midline on protrusion with full lateral movement    Motor - Normal bulk and tone throughout. No pronator drift.  Strength testing            Deltoid      Biceps      Triceps     Wrist Extension    Wrist Flexion     Interossei         R            5                 5               5                     5                              5                        5                 5  L             5                 5               5                     5                              5                        5                 5              Hip Flexion    Hip Extension    Knee Flexion    Knee Extension    Dorsiflexion    Plantar Flexion  R              5                           5                       5                           5                            5                          5  L              5                           5                        5                           5                            5                          5    Sensation - Light touch intact throughout    DTR's -             Biceps      Triceps     Brachioradialis             Patellar    Ankle    Toes/plantar response  R             3+             2+                  2+                       3+            3+                 Down  L             3+             2+                 2+                        3+           3+                 Down    Coordination - Finger to Nose intact b/l. No tremors appreciated    Gait and station - Appears to have a lurching gait with preference slightly towards right side.  Labs:                        16.9   8.91  )-----------( 249      ( 24 Jul 2023 12:59 )             48.3           CAPILLARY BLOOD GLUCOSE      POCT Blood Glucose.: 166 mg/dL (24 Jul 2023 12:14)        PT/INR - ( 24 Jul 2023 12:59 )   PT: 11.4 sec;   INR: 0.99 ratio         PTT - ( 24 Jul 2023 12:59 )  PTT:30.6 sec  CSF:    Radiology:    < from: CT Angio Neck w/ IV Cont (07.24.23 @ 16:06) >    ACC: [id number] EXAM:  CT ANGIO BRAIN (W)AW IC   ORDERED BY: OPAL COLEMAN     ACC: [id number] EXAM:  CT ANGIO NECK (W)AW IC   ORDERED BY: OPAL COLEMAN     ACC: [id number] EXAM:  CT BRAIN   ORDERED BY: OPAL COLEMAN     ACC: [id number] EXAM:  CT BRAIN PERFUSION MAPS STROKE   ORDERED BY:   OPAL COLEMAN     PROCEDURE DATE:  07/24/2023          INTERPRETATION:  HEAD CT, CT PERFUSION, CTA OF THE [place] AND   NECK:    INDICATIONS: Slurred speech. Left-sided weakness.    TECHNIQUE:    HEAD CT:    Serial axial images were obtained from the skull base to the vertex   without the use of intravenous contrast. RAPID artificial intelligence   was used for perfusion analysis and for preliminary evaluation of   intracranial hemorrhage.    CTA [place]:    After the intravenous power injection of non-ionic contrast material,   serial thin sections were obtained through the intracranial circulation   on a multislice CT scanner.  Images were reformatted using a dedicated 3D   software package and viewed on a dedicated workstation in multiple   planes. MIP image analysis was performed on a separate workstation.    CTA NECK:    After the intravenous power injection of non-ionic contrast material,   serial thin sections were obtained through the cervical circulation on a   multislice CT scanner.  Images were reformatted using a dedicated 3D   software package and viewed on a dedicated workstation in multiple   planes. MIP image analysis was performed on a separate workstation.    CONTRAST: 120 cc Omnipaque 350 was administered. 80 cc was discarded.      COMPARISON EXAMINATION: None.    FINDINGS:    VENTRICLES AND SULCI: Ventricles and sulci are unremarkable for patient   age.  INTRA-AXIAL: No intracranial mass, acute hemorrhage, or midline shift is   present.  EXTRA-AXIAL: No extra-axial fluid collection is present.  INTRACRANIAL HEMORRHAGE: None.    VISUALIZED SINUSES: No air-fluid levels are identified.  VISUALIZED MASTOIDS:  Clear  CALVARIUM:  Intact      CT RAPID PERFUSION:  None.    INFARCT CORE: 0 mL.    TISSUE AT RISK: 0 mL.    MISMATCH RATIO: None.      CTA [place]:    ANTERIOR CIRCULATION    ICA  CAVERNOUS, SUPRACLINOID, BIFURCATION SEGMENTS: Patent without flow   limiting stenosis.    ANTERIOR CEREBRAL ARTERIES: Bilateral A1, anterior communicating and A2   anterior cerebral arteries are unremarkable in course and caliber without   flow limiting stenosis.    MIDDLE CEREBRAL ARTERIES: Patent bilateral M1, M2, and distal MCA   branches without flow limiting stenosis.    POSTERIOR CIRCULATION:    VERTEBRAL ARTERIES: There is abrupt loss of opacification at the proximal   intradural right vertebral artery segment consistent with occlusion. Flow   is present within the right posterior inferiorcerebellar artery. There   is atherosclerotic calcification at the proximal intradural left   vertebral artery segment which contributes to severe stenosis.    BASILAR ARTERY: Patent no flow limiting stenosis.    POSTERIOR CEREBRAL ARTERIES: Patent without flow limiting stenosis.    CTA NECK:    GREAT VESSELS: Visualized segments are patent, no flow limiting stenosis.    COMMON CAROTID ARTERIES:  RIGHT: Patent without flow limiting stenosis  LEFT: Patent without flow limiting stenosis    INTERNALCAROTID ARTERIES: Calcified scattered plaque at the carotid   bifurcations bilaterally. RIGHT: Patent no evidence for any   hemodynamically significant stenosis at the ICA origin by NASCET criteria.  LEFT: Patent no evidence for any hemodynamically significant stenosis at   the ICA origin by NASCET criteria.    VERTEBRAL ARTERIES:    RIGHT: Patent no evidence for any flow limiting stenosis.  LEFT: Patent no evidence for any flow limiting stenosis.      SOFT TISSUES: Unremarkable    BONES: Unremarkable    IMPRESSION:    HEAD CT: No acute intracranial hemorrhage or acute territorial infarction.    CT PERFUSION demonstrated: No asymmetric or territorial perfusion   abnormality.    If symptoms persist consider follow-up imaging with MRI of the brain if   no contraindications.    CTA COW: There is occlusion of the intradural right vertebral artery   segment and high-grade stenosis involving the intradural left vertebral   artery segment. Posterior circulation appears intact distally.    CTA NECK: Patent, ECAs, ICAs, no  hemodynamically significant stenosis at    ICA origins by NASCET criteria.  Bilateral vertebral arteries are patent without flow limiting stenosis.      Notification to clinician of alert:  Physician assistant Opal Coleman was notified about the above findings   at 4:18 PM on 7/24/2020 with readback confirmation. The opportunity for   questions was provided and all questions asked were answered.    --- End of Report ---

## 2023-07-24 NOTE — ED PROVIDER NOTE - PROGRESS NOTE DETAILS
pt endorsing feeling depressed today, "does not wish to live like this any more" no plan for self harm or harm for others, has never harmed himself previously. d/w neuro, asking for psych consult, will get psych labs, keep 1:1 and call psych. -Opal Whitten PA-C paged psych 2x. -CHARLES MendietaC spoke with psych, will evaluate pt. -Opal Whitten PA-C

## 2023-07-24 NOTE — ED ADULT NURSE NOTE - OBJECTIVE STATEMENT
42 y/o male with PMH CVA, HTN presenting to ED for slurred speech since yesterday 11AM. pt reports that he noted slurred speech and left arm weakness for the past couple of days intermittently, but came back yesterday and did not resolve. Upon exam pt A&Ox3, speech is slurred but answering questions appropriately, flat affect noted, minimal eye contact, s1s2 heart sounds heard, pulses x 4, padilla x4, abdomen soft nontender nondistended, skin intact, strength equal in all extremities, no loss in sensation, 4mm PERRLA. pt denies chest pain, sob, ha, n/v/d, abdominal pain, f/c, urinary symptoms, hematuria. pt endorsed SI with RN, 1:1 initiated, pending security to wand/ secure belongings. Wife at bedside.

## 2023-07-24 NOTE — ED PROVIDER NOTE - OBJECTIVE STATEMENT
44 yo male with pmh htn, CVA 7-8 years ago on aspirin 81 mg here for intermittent slurred speech and L sided weakness x 2 weeks. Pt states episodes would like approx 1 hour and then he would return completely back to baseline. Has been endorsing generalized weakness. Yesterday he forgot to take his HTN meds, 25 hours PTA he again noticed L sided facial droop, slurred speech, and L sided weakness which has not yet resolved. NO change sin symptoms, no headaches, changes in vision, numbness or tingling. No recent fevers, chills, chest pain or shortness of breath.

## 2023-07-24 NOTE — ED PROVIDER NOTE - PHYSICAL EXAMINATION
A&Ox3, NAD, well appearing  NCAT. PERRL, EOMI.  Lungs CTAB. No w/r/r  Cardiac +S1S2, RRR,  Abd soft, NT/ND,   Extremities: cap refill <2, pulses in distal extremities 4+, no edema.   Skin without rash.   No focal Deficits, Strength 5/5 UE/LE. unsteady gait, no pronator drift, no dysmetria.

## 2023-07-24 NOTE — H&P ADULT - ATTENDING COMMENTS
I reviewed available diagnostic studies, and reviewed images personally. I agree with resident's history, exam, orders placed, and plan of care. Medical issues needing to be addressed include: Evaluation for cerebral ischemia, B/L lower ext weakness, dysarthria, back pain, hyperreflexia. Pt reports some worsening today of dysarthria, will obtain CTH. MRI planned for tomorrow. DAPT loaded, cont. atorvastatin. SBP parameter 120-190 now. Exam as above. mild weakness in R side, R hip flexor limited by pain, hyperreflexia on L. Reports increasing difficulty walking for the past few weeks. No fasciculations or atrophy of muscles noted. Utox, TSH, A1C, Lipids, echo, MRI brain and CTL. Service [id number] on 7/25/2023. I reviewed available diagnostic studies, and reviewed images personally. I agree with resident's history, exam, orders placed, and plan of care. Medical issues needing to be addressed include: HTN, DM2, Evaluation for cerebral ischemia, B/L lower ext weakness, dysarthria, back pain, hyperreflexia. Pt reports some worsening today of dysarthria, will obtain CTH. MRI planned for tomorrow. DAPT loaded, cont. atorvastatin. SBP parameter 120-190 now. Exam as above. mild weakness in R side, R hip flexor limited by pain, hyperreflexia on L. Reports increasing difficulty walking for the past few weeks. No fasciculations or atrophy of muscles noted. Utox, TSH, A1C, Lipids, echo, MRI brain and CTL. Service provided on 7/25/2023.

## 2023-07-24 NOTE — H&P ADULT - HISTORY OF PRESENT ILLNESS
HPI: Patient ROB OSHEA is a 43y (1980) man with a PMHx significant for T2DM, HTN, previous CVA (per patient 8 years ago, treated w/ TPA at [facility], p/w L hemiplegia, facial droop and dysarthria w/ minimal residual deficits), current smoker who presented to [facility] ED after two weeks of intermittent left lower extremity weakness with accompanying back pain similar to patient's sciatica pain, and 25+ hours of dysarthria. He stated he had episode of similar dysarthria 2 weeks ago as well that improved.     On examination, patient has mild dysarthria, accompanied by wife who states that patient's speech is noticeably more slurred than his baseline. Says that his previous stroke 8 years ago presented similarly, but was much more severe (weakness + speech changes/aphasia). Patient denies any recent fevers/chills, dysuria/hematuria, signs of infection, travel, sick contacts or trauma.     LKW: 7/10 (2 weeks ago)  Initial NIHSS: 1  mRS: 0  Not a Tenecteplase candidate given out of time window.

## 2023-07-24 NOTE — ED ADULT TRIAGE NOTE - CHIEF COMPLAINT QUOTE
pt presents to ED after 24 hours of slurred speech and left sided weakness.  per pt spouse on and off for weeks.  Pt .  Outside window of 24 hours.

## 2023-07-24 NOTE — H&P ADULT - NSHPREVIEWOFSYSTEMS_GEN_ALL_CORE
otherwise no headaches, dizziness, diplopia, word finding difficulty, neck pain, extremity numbness/weakness that is new.

## 2023-07-24 NOTE — ED PROVIDER NOTE - NS ED ATTENDING STATEMENT MOD
This was a shared visit with the DAE. I reviewed and verified the documentation and independently performed the documented:

## 2023-07-24 NOTE — ED ADULT NURSE NOTE - NSFALLRISKINTERV_ED_ALL_ED

## 2023-07-25 NOTE — BH CONSULTATION LIAISON ASSESSMENT NOTE - CURRENT MEDICATION
MEDICATIONS  (STANDING):  amLODIPine   Tablet 10 milliGRAM(s) Oral daily  aspirin enteric coated 81 milliGRAM(s) Oral daily  atorvastatin 80 milliGRAM(s) Oral at bedtime  carvedilol 25 milliGRAM(s) Oral every 12 hours  clopidogrel Tablet 75 milliGRAM(s) Oral every 24 hours  dextrose 5%. 1000 milliLiter(s) (50 mL/Hr) IV Continuous <Continuous>  dextrose 5%. 1000 milliLiter(s) (100 mL/Hr) IV Continuous <Continuous>  dextrose 50% Injectable 25 Gram(s) IV Push once  dextrose 50% Injectable 12.5 Gram(s) IV Push once  dextrose 50% Injectable 25 Gram(s) IV Push once  enoxaparin Injectable 40 milliGRAM(s) SubCutaneous every 24 hours  glucagon  Injectable 1 milliGRAM(s) IntraMuscular once  insulin lispro (ADMELOG) corrective regimen sliding scale   SubCutaneous three times a day before meals  insulin lispro (ADMELOG) corrective regimen sliding scale   SubCutaneous at bedtime  lisinopril 20 milliGRAM(s) Oral two times a day  melatonin 5 milliGRAM(s) Oral at bedtime  nicotine -  14 mG/24Hr(s) Patch 1 Patch Transdermal daily    MEDICATIONS  (PRN):  acetaminophen     Tablet .. 650 milliGRAM(s) Oral every 6 hours PRN Moderate Pain (4 - 6), Severe Pain (7 - 10)  dextrose Oral Gel 15 Gram(s) Oral once PRN Blood Glucose LESS THAN 70 milliGRAM(s)/deciliter  hydrALAZINE Injectable 5 milliGRAM(s) IV Push every 6 hours PRN SBP > 180

## 2023-07-25 NOTE — PHYSICAL THERAPY INITIAL EVALUATION ADULT - GAIT DEVIATIONS NOTED, PT EVAL
decrease in heel strike and decrease in knee extension on the RLE/decreased tahira/decreased step length

## 2023-07-25 NOTE — BH CONSULTATION LIAISON ASSESSMENT NOTE - DIFFERENTIAL
Adjustment Disorder with anxiety and depression  Acute stress disorder Adjustment Disorder with anxiety and depression

## 2023-07-25 NOTE — BH CONSULTATION LIAISON ASSESSMENT NOTE - HPI (INCLUDE ILLNESS QUALITY, SEVERITY, DURATION, TIMING, CONTEXT, MODIFYING FACTORS, ASSOCIATED SIGNS AND SYMPTOMS)
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 Psych consulted on 7/25 for SI and depression.    On interview, patient is AOx4 and cooperative. Patient expresses sad mood after having suffered a stroke yesterday at home. He feels stressed by what will happen to his family since he is the breadwinner. He has had the chance to process the aftermath of the stroke with his wife. At times, he feels upset/irritated when having conversations with others due to his dysarthric speech. Patient clarified that his residual speech deficits from stroke 8 years prior were mild. He also suffered from poor sleep last night; before hospitalization, his sleep was adequate. His appetite is mildly reduced. He still endorses volition and hope to get better for his daughter, Oralia, and his wife. He expressed passive SI to staff yesterday as the physical and emotional stress from the stroke yesterday was too much too bear; but currently he denies SI/plan and expresses his desire to live for his family. He denies prior attempts. He denies thoughts of wanting to harm himself. He expresses that things that can help him through this difficult time include his Judaism gume, listening to music including rap, aromatherapy, and meditation. Patient also denies HI and psychotic sx including AVH and paranoia.    Furthermore, patient endorses some anxiety sx. He is worried about his debt, his prognosis, and how his family will manage financially given his currently hospitalization. He had insomnia yesterday night due to his inability to stop worrying. He endorses frequent restlessness and increased muscle tension throughout the day. He denies psychosomatic pain including chest pain, abdominal pain and headache. Denies tremors. 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 Psych consulted on 7/25 for SI and depression.    On interview, patient is AOx4 and cooperative. Patient expresses sad mood after having suffered a stroke yesterday at home. He feels stressed by what will happen to his family since he is the breadwinner. He has had the chance to process the aftermath of the stroke with his wife. At times, he feels upset/irritated when having conversations with others due to his dysarthric speech. Patient clarified that his residual speech deficits from stroke 8 years prior were mild. He also suffered from poor sleep last night; before hospitalization, his sleep was adequate. His appetite is mildly reduced. He still endorses volition and hope to get better for his daughter, Oralia, and his wife. He expressed passive SI to staff and wife yesterday stating "I can't do this anymore, you deserve better"; but currently he denies SI/plan and expresses his desire to live for his family. He denies prior attempts. He denies thoughts of wanting to harm himself. Denies access to guns. He expresses things that can help him through this difficult time include his loving family, U[place] gume, listening to music including rap, aromatherapy, and meditation. Patient also denies HI and psychotic sx including AVH and paranoia.    Furthermore, patient endorses some anxiety sx. He is worried about his debt, his prognosis, and how his family will manage financially given his currently hospitalization. He had insomnia yesterday night due to his inability to stop worrying. He endorses frequent restlessness and increased muscle tension throughout the day. He denies psychosomatic pain including chest pain, abdominal pain and headache. Denies tremors.    Spoke with pt's wife with permission from patient. Wife's account is consistent with patient's account; she says he never made statements that he would kill himself. Patient expressed passive SI yesterday as he felt like a burden to his family. Pt had made a suicidal statement during his last stroke 8 years ago but no SI since then. Wife has no acute safety concerns for patient. Denies pt has access to guns. 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 Psych consulted on 7/25 for SI and depression.    On interview, patient is AOx4 and cooperative. Patient expresses sad mood after having suffered a stroke yesterday at home. He feels stressed by what will happen to his family since he is the breadwinner. He has had the chance to process the aftermath of the stroke with his wife. At times, he feels upset/irritated when having conversations with others due to his dysarthric speech. Patient clarified that his residual speech deficits from stroke 8 years prior were mild. He also suffered from poor sleep last night; before hospitalization, his sleep was adequate. His appetite is mildly reduced. He still endorses volition and hope to get better for his daughter, Oralia, and his wife. He expressed passive SI to staff and wife yesterday stating "I can't do this anymore, you deserve better"; but currently he denies SI/plan and expresses his desire to live for his family. He denies prior attempts. He denies thoughts of wanting to harm himself. Denies access to guns. He expresses things that can help him through this difficult time include his loving family, Zoroastrian gume, listening to music including rap, aromatherapy, and meditation. Patient also denies HI and psychotic sx including AVH and paranoia.    Furthermore, patient endorses some anxiety sx. He is worried about his debt, his prognosis, and how his family will manage financially given his currently hospitalization. He had insomnia yesterday night due to his inability to stop worrying. He endorses frequent restlessness and increased muscle tension throughout the day. He denies psychosomatic pain including chest pain, abdominal pain and headache. Denies tremors.    Spoke with pt's wife with permission from patient. Wife's account is consistent with patient's account; she says he never made statements that he would kill himself. Patient expressed passive SI yesterday as he felt like a burden to his family. Pt had made a suicidal statement during his last stroke 8 years ago but no SI since then. Wife has no acute safety concerns for patient, does not believe pt would harm himself. Denies pt has access to guns.

## 2023-07-25 NOTE — SPEECH LANGUAGE PATHOLOGY EVALUATION - SLP GENERAL OBSERVATIONS
Patient received seated upright in chair at bedside, on room air, A&Ox4, with wife present at bedside. Patient presents with hypophonic vocal quality with 2-3 words per breath group. Patient required frequent breaks throughout evaluation due to fatigue from speaking.

## 2023-07-25 NOTE — BH CONSULTATION LIAISON ASSESSMENT NOTE - NSUNABLEASSESSPROTRISKCOMMENT_PSY_ALL_CORE
multiple protective factors including loving wife and daughter, Oralia, stable housing, support from extended family (mom/dad)

## 2023-07-25 NOTE — BH CONSULTATION LIAISON ASSESSMENT NOTE - DESCRIPTION
Patient identifies with Zoroastrianism gume and characterizes himself as spiritual. Hobbies including playing tennis, listening to music, and playing with daughter. Patient has had to deal with debt with thought of filing for bankruptcy in the past. He receives financial support from his parents to pay off debt. He is employed as a  is electrical supply sales.

## 2023-07-25 NOTE — BH CONSULTATION LIAISON ASSESSMENT NOTE - NSBHATTESTCOMMENTATTENDFT_PSY_A_CORE
43M , domiciled with wife and 7y/o dtr, employed, with no formal PPHx, no prior SA or psych admissions, active cannabis use, with PMH significant for  T2DM, HTN, previous CVA, current smoker who presented to [facility] ED after two weeks of intermittent left lower extremity weakness with accompanying back pain similar to patient's sciatica pain, and 25+ hours of dysarthria, psychiatry consulted after pt reported SI in s/o CVA yesterday.  Pt states he is feeling much better today, but yesterday was upset and depressed due to his medical condition and told staff "I want to harm myself."  States he is feeling much better today, denies active or passive SI/HI, cites multiple protective factors including his young daughter and family to live for.  Denies access to guns or prior SA/SIB.  Denies substance abuse apart from cannabis.  Denies legal issues.  Denies depression, andrea, or psychosis.  Collateral obtained with permission from wife at bedside who states pt was similarly upset after his first stroke years ago, and feelings of sadness resolved with time.  Denies concerns for pt being a risk of harm to self/others or access to guns.  Dx: Adjustment disorder.  Recs: Can d/c 1:1, start Ativan 0.5mg PO TID PRN anxiety/insomnia.  Agree with trainee's assessment and plan as above.

## 2023-07-25 NOTE — BH CONSULTATION LIAISON ASSESSMENT NOTE - VIOLENCE PROTECTIVE FACTORS:
Residential stability/Relationship stability/Employment stability Residential stability/Relationship stability/Employment stability/Sobriety

## 2023-07-25 NOTE — SPEECH LANGUAGE PATHOLOGY EVALUATION - COMMENTS
HEAD CT (7/24) No acute intracranial hemorrhage or acute territorial infarction.  CTA COW: There is occlusion of the intradural right vertebral artery segment and high-grade stenosis involving the intradural left vertebral artery segment. Posterior circulation appears intact distally.  Passed dysphagia screen on 7/24/23 at 20:56.   Consulted (7/25) for evaluation of suicidality, depression. Patient on 1:1 due to suicidal ideation  Cardiology (7/25) consulted due to hypertensive to 190s. Patient presenting with dysarthria and L lower extremity weakness/discomfort ongoing for over 25 hours possibly 2/2 to new stroke vs recrudescence of prior stroke vs toxic/metabolic/infectious etiology vs sciatica.

## 2023-07-25 NOTE — SPEECH LANGUAGE PATHOLOGY EVALUATION - H & P REVIEW
; 43y man with a PMHx significant for T2DM, HTN, previous CVA (per patient 8 years ago, treated w/ TPA at [facility], p/w L hemiplegia, facial droop and dysarthria w/ minimal residual deficits), and smoking. Patient presented to [facility] ED after two weeks of intermittent left lower extremity weakness with accompanying back pain similar to patient's sciatica pain, and 25+ hours of dysarthria. He stated he had episode of similar dysarthria 2 weeks ago as well that improved./yes

## 2023-07-25 NOTE — OCCUPATIONAL THERAPY INITIAL EVALUATION ADULT - FINE MOTOR COORDINATION, RIGHT HAND, MANIPULATE OBJECTS, OT EVAL
Pharmacy Heart Failure Clinic Evaluation - Transplant    Bhavesh Bishop, MRN: [id number]   68 year old Male  : 1954   Author: Alec Eduardo RPH     Primary Assessment  Assessment date: 2023   Person instructed: Patient  Primary Language: English  Method of Instruction: Explanation  Topic: Medication  Evaluation: Able to verbalize  Instructor: Alec Eduardo RPH     Reason for pharmacy consult:  Transplant follow up  12 week pharmacist follow-up  Date of transplant: 23  Current immunosuppression goals: tacrolimus goal 9-12, mycophenolate 1000mg po BID.    Tacrolimus (ng/mL)   Date Value   2023 14.1     No results found for: WBCYCL  No results found for: SIROLIMUS  Recent medication changes: stopped prednisone (therapy completed)    Compliance and strategies:  Pharmacy for prescription fills: [facility] on [address] for acute fills, [facility] required by insurance.  Adherence method: Patient using 7x4 pill box.  Patient reported compliance: Denies any missed doses.  No financial barriers to medications identified  No compliance issues identified    Medication issues identified/recommendations:  OHT 23.     Immunosuppression:  Mycophenolate 1000mg po BID, tacrolimus 5mg BID, level (p) today.  Scr up to 2.29.  CrCl ~ 33ml/min.    CAV px: asa 81mg daily and pravastatin 20mg daily  Infection px:  PJP - bactrim SS 1 po daily x 6 months (stop in August)             CMV R+/D+ - valganciclovir 450mg daily x 3 months -  stop today           Hep B / C donor +.  entecavir 0.5mg every other day.  Followed up with Dr Burns's office (hepatology) . Messaged hepatology clinic to discuss dose adjustment considerations with CrCl ~33 ml/min.  Will recheck Scr next week, if worsened will change entecavir to q72 hours.       /77,  - on amlodipine 10mg daily     GI prophylaxis - on famotidine 20mg po daily.  Patient states no issues with GERD prior to transplant however patient on  apixaban and aspirin so will continue famotidine per Dr Ruggiero.     Steroid induced hyperglycemia - started on insulin NPH 18 units qam and humalog 4 units TID meals - adjusting based on BG, Now off steroids.   denied any lows.   Patient to follow up with Dr. Mayfield's office on 5/15.     Hx of PE / antiphospholipid syndrome / + lupus anticoagulant - switched from warfarin to apixaban 5mg po BID after transplant.  Plan indefinitely    Hypomagnesia - Mg level 2.1 today on magnesium oxide 400mg daily (patient taking at noon)    - Education: Counseled patient on all heart transplant medications including indications, dosing, timing, side effects, and importance of compliance.  - All recommendations discussed with heart failure team.  - Verified that AVS was accurate prior to patient leaving clinic.  - Total face-to-face time spent with patient: 15 minutes    Follow up:  Patient was instructed to call with further medication questions. Pharmacist will continue to see.   normal performance

## 2023-07-25 NOTE — PHYSICAL THERAPY INITIAL EVALUATION ADULT - PLANNED THERAPY INTERVENTIONS, PT EVAL
GOAL: Stair Negotiation Training: Patient will be able to negotiate up & down 1 flight of stairs with unilateral rail, step through gait pattern independently, in 4 weeks./balance training/bed mobility training/gait training/strengthening/transfer training

## 2023-07-25 NOTE — CONSULT NOTE ADULT - ASSESSMENT
43y (1980) man with a PMHx significant for T2DM, HTN, previous CVA (per patient 8 years ago, treated w/ TPA at [facility], p/w L hemiplegia, facial droop and dysarthria w/ minimal residual deficits), current smoker who presented to [facility] ED after two weeks of intermittent left lower extremity weakness with accompanying back pain similar to patient's sciatica pain, and 25+ hours of dysarthria.     Impression: dysarthria and L lower extremity weakness/discomfort (subjective but difficult to appreciate on exam for true motor weakness) ongoing for over 25 hours possibly 2/2 to new stroke vs recrudescence of prior stroke vs toxic/metabolic/infectious etiology vs sciatica. Given hyperreflexia and +crisostomo, will also eval for cervical stenosis.     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 Severe stenosis of proximal L vertebral artery segment. No core/penumbra.     LKW: 7/10 (2 weeks ago)  NIHSS: 1  mRS: 0  Not a Tenecteplase candidate given out of time window.   Has been hypertensive to 190s

## 2023-07-25 NOTE — BH CONSULTATION LIAISON ASSESSMENT NOTE - NSBHCHARTREVIEWVS_PSY_A_CORE FT
Vital Signs Last 24 Hrs  T(C): 36.7 (25 Jul 2023 09:02), Max: 36.9 (25 Jul 2023 00:00)  T(F): 98 (25 Jul 2023 09:02), Max: 98.4 (25 Jul 2023 00:00)  HR: 77 (25 Jul 2023 09:08) (77 - 104)  BP: 158/95 (25 Jul 2023 09:08) (144/99 - 220/111)  BP(mean): 114 (24 Jul 2023 18:42) (114 - 133)  RR: 18 (25 Jul 2023 09:02) (18 - 34)  SpO2: 96% (25 Jul 2023 09:08) (96% - 100%)    Parameters below as of 25 Jul 2023 09:08  Patient On (Oxygen Delivery Method): room air

## 2023-07-25 NOTE — SPEECH LANGUAGE PATHOLOGY EVALUATION - SLP DIAGNOSIS
43y man with a PMHx significant for T2DM, HTN, smoking, previous CVA (8 years ago w/ minimal residual deficits). Presenting to [facility] ED after two weeks of intermittent left lower extremity weakness, back pain, and dysarthria. Speech language evaluation completed revealing cognitive, expressive, and receptive skills to be within functional limits and moderate-severe dysarthria. Patient presents with hypophonic vocal quality, slurred speech, and poor respiratory support and coordination. Patient with good insight to speech intelligibility as evidenced by attempts made to repeat utterances slower with louder speech. 43y man with a PMHx significant for T2DM, HTN, smoking, previous CVA (8 years ago w/ minimal residual deficits). Presenting to [facility] ED after two weeks of intermittent left lower extremity weakness, back pain, and dysarthria. Speech language evaluation completed revealing cognitive and receptive skills to be within functional limits, mild expressive deficits, and moderate-severe dysarthria. Expressive deficits noted on fluency and repetition at the sentence level. Patient presents with hypophonic vocal quality, slurred speech, and poor respiratory support and coordination resulting in reduced overall speech intelligibility. Patient with good insight to speech intelligibility as evidenced by attempts made to repeat utterances slower with louder speech.

## 2023-07-25 NOTE — PHYSICAL THERAPY INITIAL EVALUATION ADULT - ADDITIONAL COMMENTS
Pt states he lives on the first floor of a house with 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 with bilateral hand rails. Pt states he owns no assistive devices and his wife is present at home to provide assistance for ADLs and functional mobility if necessary. Pt states he drives and wears eyeglasses.

## 2023-07-25 NOTE — PHYSICAL THERAPY INITIAL EVALUATION ADULT - PERTINENT HX OF CURRENT PROBLEM, REHAB EVAL
Pt  is a 43y man with a PMHx significant for T2DM, HTN, previous CVA (per patient 8 years ago, treated w/ TPA at [facility], p/w L hemiplegia, facial droop and dysarthria w/ minimal residual deficits), current smoker who presented to [facility] ED after two weeks of intermittent left lower extremity weakness with accompanying back pain similar to patient's sciatica pain, and 25+ hours of dysarthria. He stated he had episode of similar dysarthria 2 weeks ago as well that improved. On examination, patient has mild dysarthria, accompanied by wife who states that patient's speech is noticeably more slurred than his baseline. Says that his previous stroke 8 years ago presented similarly, but was much more severe (weakness + speech changes/aphasia). Patient denies any recent fevers/chills, dysuria/hematuria, signs of infection, travel, sick contacts or trauma. 7/24 CTA COW: There is occlusion of the intradural right vertebral artery segment and high-grade stenosis involving the intradural left vertebral artery segment. Posterior circulation appears intact distally. 7/24 CT Head: (-). 7/24 CT Perfusion: (-). 7/24 CT Neck: (-).

## 2023-07-25 NOTE — CHART NOTE - NSCHARTNOTEFT_GEN_A_CORE
***Vascular Neurology Follow-up Chart Note***    Patient seen today with attending on rounds. Please also refer to attending addendum on initial H&P note dated 7/24/23    Exam stable except:   INCOMPLETE      Impression:   High risk TIA with ABCD2=2, stroke recrudescence in setting of hypertensive emergency, possible acute infarct  Weakness ddx    Plan:    [x] aspirin 81mg daily and 3 weeks of plavix 75mg daily (one time 300mg load) for secondary stroke prevention   [x ] Atorvastatin 80mg daily (titrate to LDL <70)  [ ] MRI brain w/o con, MR lumbar spine w/o con, MR cervical spine w/o con  [x] CTH, CTA, CTP   [ ] HbA1C and Lipid Panel  [ ] TTE with bubble  [ ] SBP goal <140  [ ] BG goal <180, avoid hypoglycemia  [ ] Fall, aspiration precautions  [x] DVT ppx lovenox        [x]  Physical therapy/OT/: Acute inpatient rehab   [] Speech Language. NPO until clears dysphagia screen, otherwise swallow evaluation  [] Psych consulted     Case & Plan discussed with patient. All questions answered.     CORE MEASURES:         Admission NIHSS: 1     ICH score: 0     Courtney and Ruiz Score:  N/A     Tenecteplase: [] YES [X] NO     Statin Therapy: [X] YES [] NO     Smoking [X] YES [] NO     Afib [] YES [X] NO     Stroke Education [x] YES [] NO    Dysphagia screen : [] Passed [] Failed- S&S pending [] Pending  (Ensure medications are ordered via appropriate route)    DVT ppx: Venodynes [] Heparin s.c [x] LMWH [] Not on chemical DVT ppx due to:

## 2023-07-25 NOTE — OCCUPATIONAL THERAPY INITIAL EVALUATION ADULT - PERTINENT HX OF CURRENT PROBLEM, REHAB EVAL
43y man with a PMHx significant for T2DM, HTN, previous CVA (per patient 8 years ago, treated w/ TPA at [facility], p/w L hemiplegia, facial droop and dysarthria w/ minimal residual deficits), current smoker who presented to [facility] ED after two weeks of intermittent left lower extremity weakness with accompanying back pain similar to patient's sciatica pain, and 25+ hours of dysarthria. He stated he had episode of similar dysarthria 2 weeks ago as well that improved. On examination, patient has mild dysarthria, accompanied by wife who states that patient's speech is noticeably more slurred than his baseline. Says that his previous stroke 8 years ago presented similarly, but was much more severe (weakness + speech changes/aphasia). Patient denies any recent fevers/chills, dysuria/hematuria, signs of infection, travel, sick contacts or trauma. 7/24 CTA COW: There is occlusion of the intradural right vertebral artery segment and high-grade stenosis involving the intradural left vertebral artery segment. Posterior circulation appears intact distally. 7/24 CT Head: (-). 7/24 CT Perfusion: (-). 7/24 CT Neck: (-). MRI Pending.

## 2023-07-25 NOTE — BH CONSULTATION LIAISON ASSESSMENT NOTE - DETAILS
pt expressed passive SI to staff yesterday due to physical/emotional stress from stroke yesterday. Thoughts of SI have no recurred. pt expressed passive SI to staff yesterday due to physical/emotional stress from stroke yesterday. Thoughts of SI have resolved.

## 2023-07-25 NOTE — BH CONSULTATION LIAISON ASSESSMENT NOTE - NSBHCONSULTFOLLOWAFTERCARE_PSY_A_CORE FT
f/u at [facility] if needed ([phone]) Pt may f/u at [facility] Adult Outpatient Psychiatry Department- [phone] or [facility] Outpatient [facility]iatry- [phone]  [facility] Crisis clinic - [phone]

## 2023-07-25 NOTE — PHYSICAL THERAPY INITIAL EVALUATION ADULT - GENERAL OBSERVATIONS, REHAB EVAL
Pt received seated in recliner, on a one to one, A&Ox4, +IVL, +cardiac monitor, +pulse ox and in NAD.

## 2023-07-25 NOTE — BH CONSULTATION LIAISON ASSESSMENT NOTE - NSBHCONSULTRECOMMENDOTHER_PSY_A_CORE FT
Plan:  -Can discontinue 1:1 observation  -Begin Remeron 7.5 mg qhs for insomnia  -Risks, benefits, and alternatives for pharmacologic measures explained  -Recommend aromatherapy and pet therapy  -Provided supportive psychotherapy and psychoeducation   Plan:  -Recommend aromatherapy and pet therapy  -Provided supportive psychotherapy and psychoeducation   Plan:  -Can discontinue 1:1 observation  -Can start Ativan 0.5 mg TID PO prn for anxiety  -Risks/benefits and alternative for medications explained  -Recommend aromatherapy and pet therapy  -Provided supportive psychotherapy and psychoeducation   Plan:  -Can discontinue 1:1 observation  -Can start Ativan 0.5mg PO TID prn anxiety  -Risks/benefits and alternative for medications explained  -Recommend aromatherapy and pet therapy  -Provided supportive psychotherapy and psychoeducation

## 2023-07-25 NOTE — BH CONSULTATION LIAISON ASSESSMENT NOTE - NSBHCHARTREVIEWLAB_PSY_A_CORE FT
Comprehensive Metabolic Panel (07.24.23 @ 12:59)   Sodium: 138 mmol/L  Potassium: 4.5 mmol/L  Chloride: 104 mmol/L  Carbon Dioxide: 19 mmol/L  Anion Gap: 15 mmol/L  Blood Urea Nitrogen: 18 mg/dL  Creatinine: 0.99 mg/dL  Glucose: 167 mg/dL  Calcium: 9.6 mg/dL  Protein Total: 7.5 g/dL  Albumin: 4.7 g/dL  Bilirubin Total: 0.6 mg/dL  Alkaline Phosphatase: 65 U/L  Aspartate Aminotransferase (AST/SGOT): 24: Mild hemolysis results may be falsely elevated U/L  Alanine Aminotransferase (ALT/SGPT): 23 U/L  eGFR: 96    Complete Blood Count + Automated Diff (07.24.23 @ 12:59)   WBC Count: 8.91 K/uL  RBC Count: 5.82 M/uL  Hemoglobin: 16.9 g/dL  Hematocrit: 48.3 %  Mean Cell Volume: 83.0 fl  Mean Cell Hemoglobin: 29.0 pg  Mean Cell Hemoglobin Conc: 35.0 gm/dL  Red Cell Distrib Width: 13.2 %  Platelet Count - Automated: 249 K/uL

## 2023-07-25 NOTE — BH CONSULTATION LIAISON ASSESSMENT NOTE - RISK ASSESSMENT
Risk factors: static (male, medical illness), modifiable (depressed mood)  Protective factors: responsibility to family including daughter, financial and emotional support from family, stable housing/employment, spiritual gume, and motivation to recover.   Access to lethal means: no  Overall risk: low acute suicide risk Risk factors: static (male, medical illness), modifiable (depressed mood)  Protective factors: responsibility to family including daughter, financial and emotional support from family, stable housing/employment, spiritual gume, and motivation to recover.   Access to lethal means: no  Overall risk: low acute suicide risk given multiple protective factors and no acute safety concerns expressed by collateral

## 2023-07-25 NOTE — PHYSICAL THERAPY INITIAL EVALUATION ADULT - MANUAL MUSCLE TESTING RESULTS, REHAB EVAL
3/5 for RUE, LUE and LLE t/o and 2+/5 for the RLE hip flexion, knee extension and dorsiflexion 3/5 for RUE, LUE and LLE throughout and 2+/5 for the RLE hip flexion, knee extension and dorsiflexion

## 2023-07-25 NOTE — SPEECH LANGUAGE PATHOLOGY EVALUATION - SLP REPETITION
Intact at the word and phrase level. Impaired at the sentence level. Patient noted to produce 2-3 words per breath group.

## 2023-07-25 NOTE — SPEECH LANGUAGE PATHOLOGY EVALUATION - SLP CONVERSATIONAL SPEECH
Patient responded to conversational prompts via complete sentences. Patient noted to produce 2-3 words per breath group.

## 2023-07-25 NOTE — CONSULT NOTE ADULT - SUBJECTIVE AND OBJECTIVE BOX
HPI:  HPI: Patient ROB OSHEA is a 43y (1980) man with a PMHx significant for T2DM, HTN, previous CVA (per patient 8 years ago, treated w/ TPA at [facility] [place], p/w L hemiplegia, facial droop and dysarthria w/ minimal residual deficits), current smoker who presented to [facility] ED after two weeks of intermittent left lower extremity weakness with accompanying back pain similar to patient's sciatica pain, and 25+ hours of dysarthria. He stated he had episode of similar dysarthria 2 weeks ago as well that improved.     On examination, patient has mild dysarthria, accompanied by wife who states that patient's speech is noticeably more slurred than his baseline. Says that his previous stroke 8 years ago presented similarly, but was much more severe (weakness + speech changes/aphasia). Patient denies any recent fevers/chills, dysuria/hematuria, signs of infection, travel, sick contacts or trauma.     LKW: 7/10 (2 weeks ago)  Initial NIHSS: 1  mRS: 0  Not a Tenecteplase candidate given out of time window.      (24 Jul 2023 17:17)    Patient was admitted on 7/24, seen today, reports he went to inpatient rehab x one month after CVA 7 years ago.     REVIEW OF SYSTEMS  Constitutional - No fever, No weight loss, No fatigue  HEENT - No eye pain, No visual disturbances, No difficulty hearing, No tinnitus, No vertigo, No neck pain  Respiratory - No cough, No wheezing, No shortness of breath  Cardiovascular - No chest pain, No palpitations  Gastrointestinal - No abdominal pain, No nausea, No vomiting, No diarrhea, No constipation  Genitourinary - No dysuria, No frequency, No hematuria, No incontinence  Psychiatric - No depression, No anxiety    VITALS  T(C): 36.7 (07-25-23 @ 09:02), Max: 36.9 (07-25-23 @ 00:00)  HR: 77 (07-25-23 @ 09:08) (77 - 104)  BP: 158/95 (07-25-23 @ 09:08) (144/99 - 220/111)  RR: 18 (07-25-23 @ 09:02) (18 - 34)  SpO2: 96% (07-25-23 @ 09:08) (96% - 100%)  Wt(kg): --    PAST MEDICAL & SURGICAL HISTORY  see HPI    SOCIAL HISTORY  Smoking - see HPI   EtOH - Denied   Drugs - Denied    FUNCTIONAL HISTORY  Lives with wife, works in wholesale/sales, 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    Independent AMB and ADLs PTA     CURRENT FUNCTIONAL STATUS  7/25 PT  transfers min assist  gait min assist x 20 feet with RW  RLE buckling    FAMILY HISTORY   no pertinent history in first degree relatives     RECENT LABS/IMAGING  CBC Full  -  ( 24 Jul 2023 12:59 )  WBC Count : 8.91 K/uL  RBC Count : 5.82 M/uL  Hemoglobin : 16.9 g/dL  Hematocrit : 48.3 %  Platelet Count - Automated : 249 K/uL  Mean Cell Volume : 83.0 fl  Mean Cell Hemoglobin : 29.0 pg  Mean Cell Hemoglobin Concentration : 35.0 gm/dL  Auto Neutrophil # : 6.34 K/uL  Auto Lymphocyte # : 1.89 K/uL  Auto Monocyte # : 0.42 K/uL  Auto Eosinophil # : 0.19 K/uL  Auto Basophil # : 0.05 K/uL  Auto Neutrophil % : 71.2 %  Auto Lymphocyte % : 21.2 %  Auto Monocyte % : 4.7 %  Auto Eosinophil % : 2.1 %  Auto Basophil % : 0.6 %    07-24    138  |  104  |  18  ----------------------------<  167<H>  4.5   |  19<L>  |  0.99    Ca    9.6      24 Jul 2023 12:59    TPro  7.5  /  Alb  4.7  /  TBili  0.6  /  DBili  x   /  AST  24  /  ALT  23  /  AlkPhos  65  07-24    Urinalysis Basic - ( 24 Jul 2023 12:59 )    Color: x / Appearance: x / SG: x / pH: x  Gluc: 167 mg/dL / Ketone: x  / Bili: x / Urobili: x   Blood: x / Protein: x / Nitrite: x   Leuk Esterase: x / RBC: x / WBC x   Sq Epi: x / Non Sq Epi: x / Bacteria: x    < from: CT Head No Cont (07.24.23 @ 16:06) >    IMPRESSION:    HEAD CT: No acute intracranial hemorrhage or acute territorial infarction.    CT PERFUSION demonstrated: No asymmetric or territorial perfusion   abnormality.    If symptoms persist consider follow-up imaging with MRI of the brain if   no contraindications.    CTA COW: There is occlusion of the intradural right vertebral artery   segment and high-grade stenosis involving the intradural left vertebral   artery segment. Posterior circulation appears intact distally.    CTA NECK: Patent, ECAs, ICAs, no  hemodynamically significant stenosis at    ICA origins by NASCET criteria.  Bilateral vertebral arteries are patent without flow limiting stenosis.        < end of copied text >      ALLERGIES  No Known Allergies      MEDICATIONS   acetaminophen     Tablet .. 650 milliGRAM(s) Oral every 6 hours PRN  amLODIPine   Tablet 10 milliGRAM(s) Oral daily  aspirin enteric coated 81 milliGRAM(s) Oral daily  atorvastatin 80 milliGRAM(s) Oral at bedtime  carvedilol 25 milliGRAM(s) Oral every 12 hours  clopidogrel Tablet 75 milliGRAM(s) Oral every 24 hours  dextrose 5%. 1000 milliLiter(s) IV Continuous <Continuous>  dextrose 5%. 1000 milliLiter(s) IV Continuous <Continuous>  dextrose 50% Injectable 25 Gram(s) IV Push once  dextrose 50% Injectable 12.5 Gram(s) IV Push once  dextrose 50% Injectable 25 Gram(s) IV Push once  dextrose Oral Gel 15 Gram(s) Oral once PRN  enoxaparin Injectable 40 milliGRAM(s) SubCutaneous every 24 hours  glucagon  Injectable 1 milliGRAM(s) IntraMuscular once  hydrALAZINE Injectable 5 milliGRAM(s) IV Push every 6 hours PRN  insulin lispro (ADMELOG) corrective regimen sliding scale   SubCutaneous three times a day before meals  insulin lispro (ADMELOG) corrective regimen sliding scale   SubCutaneous at bedtime  lisinopril 20 milliGRAM(s) Oral two times a day  melatonin 5 milliGRAM(s) Oral at bedtime  nicotine -  14 mG/24Hr(s) Patch 1 Patch Transdermal daily      ----------------------------------------------------------------------------------------  PHYSICAL EXAM  Constitutional - NAD, Comfortable, sitting in chair   Chest - Breathing comfortably, room air   Cardiovascular - S1S2   Abdomen - Soft   Extremities - No C/C/E, No calf tenderness   Neurologic Exam -     follows commands                Cognitive - Awake, Alert, AAO to self, place, date, year, situation     Communication - Fluent, + dysarthria     Cranial Nerves - CN 2-12 intact     Motor - +RUE drift                     LEFT    UE - ShAB 5/5, EF 5/5, EE 5/5, WE 5/5,  5/5                    RIGHT UE - ShAB 5/5, EF 5/5, EE 5/5, WE 5/5,  5/5                    LEFT    LE - HF 5/5, KE 5/5, DF 5/5, PF 5/5                    RIGHT LE - HF 5/5, KE 5/5, DF 5/5, PF 5/5        Sensory - Intact to LT     Psychiatric - Mood stable, Affect WNL  ----------------------------------------------------------------------------------------  ASSESSMENT/PLAN  43yMale h/o DM, HTN, CVA with functional deficits after suspected CVA with dysarthria  MRI pending   Pain - Tylenol  DVT PPX - SCDs lovenox   Rehab - Will continue to follow for ongoing rehab needs and recommendations.   patient requires min assist with mobility, RLE buckling, awaiting OT/speech evals   Recommend ACUTE inpatient rehabilitation for the functional deficits consisting of 3 hours of therapy/day & 24 hour RN/daily PMR physician for comorbid medical management. Patient will be able to tolerate 3 hours a day.

## 2023-07-25 NOTE — BH CONSULTATION LIAISON ASSESSMENT NOTE - SUMMARY
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 Psych consulted on 7/25 for SI and depression.    Patient presents with depressive/anxiety sx having suffered from stroke with mild-moderate residual deficits including dysarthric speech and LLE weakness. Although patient expressed SI yesterday due to emotional/physical stress from stroke, he denies current SI/plan. Patient expresses multiple protective factors including responsibility to family especially daughter, financial and emotional support from family, stable housing/employment, spiritual gume, and motivation to recover. Patient has insomnia x 1 day, mildly reduced appetite, and depressed mood which can be addressed with Remeron. Patient also would benefit form aromatherapy and pet therapy to help manage anxiety/depressive sx.  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 Psych consulted on 7/25 for SI and depression.    Patient presents with depressive/anxiety sx having suffered from stroke with mild-moderate residual deficits including dysarthric speech and LLE weakness. Although patient expressed SI yesterday due to emotional/physical stress from stroke, he denies current SI/plan. Collateral is without safety concerns. Patient expresses multiple protective factors including responsibility to family especially daughter, financial and emotional support from family, stable housing/employment, spiritual gume, and motivation to recover. Patient has insomnia x 1 day, mildly reduced appetite, and depressed mood which can be addressed with Remeron. Patient also would benefit form aromatherapy and pet therapy to help manage anxiety/depressive sx.  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 Psych consulted on 7/25 for SI and depression.    Patient presents with depressive/anxiety sx having suffered from stroke with mild-moderate residual deficits including dysarthric speech and LLE weakness. Although patient expressed SI yesterday in s/o emotional distress from stroke, he denies current SI/plan. Collateral is without safety concerns. Patient expresses multiple protective factors including responsibility to family especially daughter, financial and emotional support from family, stable housing/employment, spiritual gume, and motivation to recover. Patient has insomnia x 1 day, mildly reduced appetite, and depressed mood which can be addressed with Remeron.

## 2023-07-25 NOTE — CONSULT NOTE ADULT - SUBJECTIVE AND OBJECTIVE BOX
CHIEF COMPLAINT:    HISTORY OF PRESENT ILLNESS:  43y (1980) man with a PMHx significant for T2DM, HTN, previous CVA (per patient 8 years ago, treated w/ TPA at [facility], p/w L hemiplegia, facial droop and dysarthria w/ minimal residual deficits), current smoker who presented to [facility] ED after two weeks of intermittent left lower extremity weakness with accompanying back pain similar to patient's sciatica pain, and 25+ hours of dysarthria. He stated he had episode of similar dysarthria 2 weeks ago as well that improved.     On examination, patient has mild dysarthria, accompanied by wife who states that patient's speech is noticeably more slurred than his baseline. Says that his previous stroke 8 years ago presented similarly, but was much more severe (weakness + speech changes/aphasia). Patient denies any recent fevers/chills, dysuria/hematuria, signs of infection, travel, sick contacts or trauma.     LKW: 7/10 (2 weeks ago)  Initial NIHSS: 1  mRS: 0  Not a Tenecteplase candidate given out of time window.     Has been hypertensive to 190s.   Hes currently on 1:1 due to suicidal ideation.      PAST MEDICAL & SURGICAL HISTORY:          MEDICATIONS:  amLODIPine   Tablet 10 milliGRAM(s) Oral daily  aspirin enteric coated 81 milliGRAM(s) Oral daily  carvedilol 25 milliGRAM(s) Oral every 12 hours  clopidogrel Tablet 75 milliGRAM(s) Oral every 24 hours  enoxaparin Injectable 40 milliGRAM(s) SubCutaneous every 24 hours  hydrALAZINE Injectable 5 milliGRAM(s) IV Push every 6 hours PRN  lisinopril 20 milliGRAM(s) Oral two times a day        acetaminophen     Tablet .. 650 milliGRAM(s) Oral every 6 hours PRN  melatonin 5 milliGRAM(s) Oral at bedtime      atorvastatin 80 milliGRAM(s) Oral at bedtime  dextrose 50% Injectable 25 Gram(s) IV Push once  dextrose 50% Injectable 12.5 Gram(s) IV Push once  dextrose 50% Injectable 25 Gram(s) IV Push once  dextrose Oral Gel 15 Gram(s) Oral once PRN  glucagon  Injectable 1 milliGRAM(s) IntraMuscular once  insulin lispro (ADMELOG) corrective regimen sliding scale   SubCutaneous three times a day before meals  insulin lispro (ADMELOG) corrective regimen sliding scale   SubCutaneous at bedtime    dextrose 5%. 1000 milliLiter(s) IV Continuous <Continuous>  dextrose 5%. 1000 milliLiter(s) IV Continuous <Continuous>      FAMILY HISTORY:      SOCIAL HISTORY:    [ ] Non-smoker  [ ] Smoker  [ ] Alcohol    Allergies    No Known Allergies    Intolerances    	    REVIEW OF SYSTEMS:  CONSTITUTIONAL: No fever, weight loss, + fatigue  EYES: No eye pain, visual disturbances, or discharge  ENMT:  No difficulty hearing, tinnitus, vertigo; No sinus or throat pain  NECK: No pain or stiffness  RESPIRATORY: No cough, wheezing, chills or hemoptysis; No Shortness of Breath  CARDIOVASCULAR: No chest pain, palpitations, passing out, dizziness, or leg swelling  GASTROINTESTINAL: No abdominal or epigastric pain. No nausea, vomiting, or hematemesis; No diarrhea or constipation. No melena or hematochezia.  GENITOURINARY: No dysuria, frequency, hematuria, or incontinence  NEUROLOGICAL: No headaches, memory loss, loss of strength, numbness, or tremors  SKIN: No itching, burning, rashes, or lesions   LYMPH Nodes: No enlarged glands  ENDOCRINE: No heat or cold intolerance; No hair loss  MUSCULOSKELETAL: No joint pain or swelling; No muscle, back, or extremity pain  PSYCHIATRIC: No depression, anxiety, mood swings, or difficulty sleeping  HEME/LYMPH: No easy bruising, or bleeding gums  ALLERY AND IMMUNOLOGIC: No hives or eczema	    [ ] All others negative	  [ ] Unable to obtain    PHYSICAL EXAM:  T(C): 36.7 (07-25-23 @ 09:02), Max: 36.9 (07-25-23 @ 00:00)  HR: 77 (07-25-23 @ 09:08) (77 - 104)  BP: 158/95 (07-25-23 @ 09:08) (144/99 - 220/111)  RR: 18 (07-25-23 @ 09:02) (18 - 34)  SpO2: 96% (07-25-23 @ 09:08) (96% - 100%)  Wt(kg): --  I&O's Summary    24 Jul 2023 07:01  -  25 Jul 2023 07:00  --------------------------------------------------------  IN: 120 mL / OUT: 600 mL / NET: -480 mL        Appearance: NAD  HEENT:   Dry oral mucosa, PERRL, EOMI	  Lymphatic: No lymphadenopathy  Cardiovascular: Normal S1 S2, No JVD, No murmurs, No edema  Respiratory: decreased bs   Psychiatry: A & O x 3, Mood & affect appropriate  Gastrointestinal:  Soft, Non-tender, + BS	  Skin: No rashes, No ecchymoses, No cyanosis	  Neurologic Exam:  Mental status - Awake, Alert, Oriented to person, place, time and situation. Speech dysarthric more gutteral. Repetition and naming intact. Follows simple and complex commands. Attention/concentration, recent and remote memory (including registration and recall), and fund of knowledge intact    Cranial nerves - PERRL no apd, VFF, EOMI, face sensation (V1-V3) intact b/l, facial strength intact without asymmetry b/l, hearing intact b/l, palate with symmetric elevation, trapezius 5/5 strength b/l, tongue midline on protrusion with full lateral movement    Motor - Normal bulk and tone throughout. No pronator drift.  Strength testing            Deltoid      Biceps      Triceps     Wrist Extension    Wrist Flexion     Interossei         R            5                 5               5                     5                              5                        5                 5  L             5                 5               5                     5                              5                        5                 5              Hip Flexion    Hip Extension    Knee Flexion         Dorsiflexion    Plantar Flexion  R              4                           4 +                     5                           -                            5                          5  L              4+                          4+                        5                          -                            5                          5    Sensation - Light touch, vib, temp intact throughout    DTR's -             Biceps      Triceps     Brachioradialis             Patellar    Ankle    Toes/plantar response  R             3+             2+                  3+                       3+            3+                 Up  L             3+             2+                 3+                        3+           3+                 Up    +crisostomo b/l  + pectoral b/l  Extremities: Normal range of motion, No clubbing, cyanosis or edema  Vascular: Peripheral pulses palpable 2+ bilaterally    TELEMETRY: 	SR    ECG:  	  RADIOLOGY:  < from: CT Brain Perfusion Maps Stroke (07.24.23 @ 16:06) >  ACC: [id number] EXAM:  CT ANGIO BRAIN (W)AW IC   ORDERED BY: OPAL COLEMAN     ACC: [id number] EXAM:  CT ANGIO NECK (W)AW IC   ORDERED BY: OPAL COLEMAN     ACC: [id number] EXAM:  CT BRAIN   ORDERED BY: OPAL COLEMAN     ACC: [id number] EXAM:  CT BRAIN PERFUSION MAPS STROKE   ORDERED BY:   OPAL COLEMAN     PROCEDURE DATE:  07/24/2023          INTERPRETATION:  HEAD CT, CT PERFUSION, CTA OF THE [place] OF GARCIA AND   NECK:    INDICATIONS: Slurred speech. Left-sided weakness.    TECHNIQUE:    HEAD CT:    Serial axial images were obtained from the skull base to the vertex   without the use of intravenous contrast. RAPID artificial intelligence   was used for perfusion analysis and for preliminary evaluation of   intracranial hemorrhage.    CTA [place] OF GARCIA:    After the intravenous power injection of non-ionic contrast material,   serial thin sections were obtained through the intracranial circulation   on a multislice CT scanner.  Images were reformatted using a dedicated 3D   software package and viewed on a dedicated workstation in multiple   planes. MIP image analysis was performed on a separate workstation.    CTA NECK:    After the intravenous power injection of non-ionic contrast material,   serial thin sections were obtained through the cervical circulation on a   multislice CT scanner.  Images were reformatted using a dedicated 3D   software package and viewed on a dedicated workstation in multiple   planes. MIP image analysis was performed on a separate workstation.    CONTRAST: 120 cc Omnipaque 350 was administered. 80 cc was discarded.      COMPARISON EXAMINATION: None.    FINDINGS:    VENTRICLES AND SULCI: Ventricles and sulci are unremarkable for patient   age.  INTRA-AXIAL: No intracranial mass, acute hemorrhage, or midline shift is   present.  EXTRA-AXIAL: No extra-axial fluid collection is present.  INTRACRANIAL HEMORRHAGE: None.    VISUALIZED SINUSES: No air-fluid levels are identified.  VISUALIZED MASTOIDS:  Clear  CALVARIUM:  Intact      CT RAPID PERFUSION:  None.    INFARCT CORE: 0 mL.    TISSUE AT RISK: 0 mL.    MISMATCH RATIO: None.      CTA [place] OF GARCIA:    ANTERIOR CIRCULATION    ICA  CAVERNOUS, SUPRACLINOID, BIFURCATION SEGMENTS: Patent without flow   limiting stenosis.    ANTERIOR CEREBRAL ARTERIES: Bilateral A1, anterior communicating and A2   anterior cerebral arteries are unremarkable in course and caliber without   flow limiting stenosis.    MIDDLE CEREBRAL ARTERIES: Patent bilateral M1, M2, and distal MCA   branches without flow limiting stenosis.    POSTERIOR CIRCULATION:    VERTEBRAL ARTERIES: There is abrupt loss of opacification at the proximal   intradural right vertebral artery segment consistent with occlusion. Flow   is present within the right posterior inferiorcerebellar artery. There   is atherosclerotic calcification at the proximal intradural left   vertebral artery segment which contributes to severe stenosis.    BASILAR ARTERY: Patent no flow limiting stenosis.    POSTERIOR CEREBRAL ARTERIES: Patent without flow limiting stenosis.    CTA NECK:    GREAT VESSELS: Visualized segments are patent, no flow limiting stenosis.    COMMON CAROTID ARTERIES:  RIGHT: Patent without flow limiting stenosis  LEFT: Patent without flow limiting stenosis    INTERNALCAROTID ARTERIES: Calcified scattered plaque at the carotid   bifurcations bilaterally. RIGHT: Patent no evidence for any   hemodynamically significant stenosis at the ICA origin by NASCET criteria.  LEFT: Patent no evidence for any hemodynamically significant stenosis at   the ICA origin by NASCET criteria.    VERTEBRAL ARTERIES:    RIGHT: Patent no evidence for any flow limiting stenosis.  LEFT: Patent no evidence for any flow limiting stenosis.      SOFT TISSUES: Unremarkable    BONES: Unremarkable    IMPRESSION:    HEAD CT: No acute intracranial hemorrhage or acute territorial infarction.    CT PERFUSION demonstrated: No asymmetric or territorial perfusion   abnormality.    If symptoms persist consider follow-up imaging with MRI of the brain if   no contraindications.    CTA COW: There is occlusion of the intradural right vertebral artery   segment and high-grade stenosis involving the intradural left vertebral   artery segment. Posterior circulation appears intact distally.    CTA NECK: Patent, ECAs, ICAs, no  hemodynamically significant stenosis at    ICA origins by NASCET criteria.  Bilateral vertebral arteries are patent without flow limiting stenosis.      Notification to clinician of alert:  Physician assistant Opal Coleman was notified about the above findings   at 4:18 PM on 7/24/2020 with readback confirmation. The opportunity for   questions was provided and all questions asked were answered.    --- End of Report ---              < end of copied text >    OTHER: 	  	  LABS:	 	    CARDIAC MARKERS:                                  16.9   8.91  )-----------( 249      ( 24 Jul 2023 12:59 )             48.3     07-24    138  |  104  |  18  ----------------------------<  167<H>  4.5   |  19<L>  |  0.99    Ca    9.6      24 Jul 2023 12:59    TPro  7.5  /  Alb  4.7  /  TBili  0.6  /  DBili  x   /  AST  24  /  ALT  23  /  AlkPhos  65  07-24    proBNP:   Lipid Profile:   HgA1c:   TSH:

## 2023-07-26 NOTE — DISCHARGE NOTE PROVIDER - NSDCMRMEDTOKEN_GEN_ALL_CORE_FT
amLODIPine 10 mg oral tablet: 1 tab(s) orally once a day  carvedilol 25 mg oral tablet: 1 tab(s) orally 2 times a day  lisinopril 20 mg oral tablet: 1 tab(s) orally 2 times a day  metFORMIN 500 mg oral tablet, extended release: 1 tab(s) orally once a day with dinner   amLODIPine 10 mg oral tablet: 1 tab(s) orally once a day  aspirin 81 mg oral delayed release tablet: 1 tab(s) orally once a day  atorvastatin 80 mg oral tablet: 1 tab(s) orally once a day (at bedtime)  carvedilol 25 mg oral tablet: 1 tab(s) orally every 12 hours  clopidogrel 75 mg oral tablet: 1 tab(s) orally every 24 hours  enoxaparin: 40 milligram(s) subcutaneous once a day (at bedtime)  hydrALAZINE 25 mg oral tablet: 1 tab(s) orally 2 times a day  insulin lispro 100 units/mL injectable solution: injectable 3 times a day (before meals) 1 Unit(s) if Glucose 151 - 200  2 Unit(s) if Glucose 201 - 250  3 Unit(s) if Glucose 251 - 300  4 Unit(s) if Glucose 301 - 350  5 Unit(s) if Glucose 351 - 400  6 Unit(s) if Glucose Greater Than 400  Three times a day before meals  insulin lispro 100 units/mL injectable solution: injectable once a day (at bedtime) 0 Unit(s) if Glucose 61 - 250  2 Unit(s) if Glucose 251 - 300  4 Unit(s) if Glucose 301 - 350  6 Unit(s) if Glucose 351 - 400  8 Unit(s) if Glucose Greater Than 400  before bedtime  lisinopril 20 mg oral tablet: 1 tab(s) orally 2 times a day  melatonin 5 mg oral tablet: 1 tab(s) orally once a day (at bedtime)  nicotine 14 mg/24 hr transdermal film, extended release: 1 patch transdermal once a day

## 2023-07-26 NOTE — PROGRESS NOTE ADULT - ASSESSMENT
43y (1980) man with a PMHx significant for T2DM, HTN, previous CVA (per patient 8 years ago, treated w/ TPA at [facility], p/w L hemiplegia, facial droop and dysarthria w/ minimal residual deficits), current smoker who presented to [facility] ED after two weeks of intermittent left lower extremity weakness with accompanying back pain similar to patient's sciatica pain, and 25+ hours of dysarthria.

## 2023-07-26 NOTE — DISCHARGE NOTE PROVIDER - PROVIDER TOKENS
PROVIDER:[TOKEN:[[id number]],FOLLOWUP:[2 weeks]],PROVIDER:[TOKEN:[[id number]],FOLLOWUP:[1 month]]

## 2023-07-26 NOTE — PROGRESS NOTE ADULT - SUBJECTIVE AND OBJECTIVE BOX
Subjective: Patient seen and examined. No new events except as noted.     REVIEW OF SYSTEMS:    CONSTITUTIONAL: + weakness, fevers or chills  EYES/ENT: No visual changes;  No vertigo or throat pain   NECK: No pain or stiffness  RESPIRATORY: No cough, wheezing, hemoptysis; No shortness of breath  CARDIOVASCULAR: No chest pain or palpitations  GASTROINTESTINAL: No abdominal or epigastric pain. No nausea, vomiting, or hematemesis; No diarrhea or constipation. No melena or hematochezia.  GENITOURINARY: No dysuria, frequency or hematuria  NEUROLOGICAL: No numbness or weakness  SKIN: No itching, burning, rashes, or lesions   All other review of systems is negative unless indicated above.    MEDICATIONS:  MEDICATIONS  (STANDING):  amLODIPine   Tablet 10 milliGRAM(s) Oral daily  aspirin enteric coated 81 milliGRAM(s) Oral daily  atorvastatin 80 milliGRAM(s) Oral at bedtime  carvedilol 25 milliGRAM(s) Oral every 12 hours  clopidogrel Tablet 75 milliGRAM(s) Oral every 24 hours  dextrose 5%. 1000 milliLiter(s) (100 mL/Hr) IV Continuous <Continuous>  dextrose 5%. 1000 milliLiter(s) (50 mL/Hr) IV Continuous <Continuous>  dextrose 50% Injectable 25 Gram(s) IV Push once  dextrose 50% Injectable 25 Gram(s) IV Push once  dextrose 50% Injectable 12.5 Gram(s) IV Push once  enoxaparin Injectable 40 milliGRAM(s) SubCutaneous every 24 hours  glucagon  Injectable 1 milliGRAM(s) IntraMuscular once  hydrALAZINE 10 milliGRAM(s) Oral two times a day  insulin lispro (ADMELOG) corrective regimen sliding scale   SubCutaneous at bedtime  insulin lispro (ADMELOG) corrective regimen sliding scale   SubCutaneous three times a day before meals  lisinopril 20 milliGRAM(s) Oral two times a day  melatonin 5 milliGRAM(s) Oral at bedtime  nicotine -  14 mG/24Hr(s) Patch 1 Patch Transdermal daily    PHYSICAL EXAM:  T(C): 36.6 (07-26-23 @ 04:57), Max: 37.2 (07-25-23 @ 12:43)  HR: 90 (07-26-23 @ 05:00) (67 - 90)  BP: 172/108 (07-26-23 @ 04:57) (154/99 - 172/108)  RR: 18 (07-26-23 @ 04:57) (17 - 18)  SpO2: 97% (07-26-23 @ 05:00) (96% - 98%)  Wt(kg): --  I&O's Summary    25 Jul 2023 07:01  -  26 Jul 2023 07:00  --------------------------------------------------------  IN: 240 mL / OUT: 1100 mL / NET: -860 mL    Appearance: Normal	  HEENT:   Normal oral mucosa, PERRL, EOMI	  Lymphatic: No lymphadenopathy , no edema  Cardiovascular: Normal S1 S2, No JVD, No murmurs , Peripheral pulses palpable 2+ bilaterally  Respiratory: Lungs clear to auscultation, normal effort 	  Gastrointestinal:  Soft, Non-tender, + BS	  Skin: No rashes, No ecchymoses, No cyanosis, warm to touch  NEUROLOGICAL EXAM:  Mental status: Awake, alert, oriented x3, hypophonic, repetition intact, no neglect, normal memory, follows simple and complex commands  Cranial Nerves: No facial asymmetry, no nystagmus, mild dysarthria with gurgled speech,  tongue midline  Motor exam: Normal tone, RUE and RLE drifts 4/5, LUE 5/5, LLE 5/5  Sensation: Intact to light touch   Coordination/ Gait: No dysmetria, GIOVANNI intact and symmetric bilaterally  Ext: No edema    LABS:    CARDIAC MARKERS:                        15.8   9.82  )-----------( 239      ( 26 Jul 2023 05:44 )             45.8     07-26    142  |  102  |  25<H>  ----------------------------<  125<H>  4.0   |  23  |  1.25    Ca    9.6      26 Jul 2023 05:45  Phos  4.0     07-25  Mg     2.2     07-25    TPro  7.5  /  Alb  4.7  /  TBili  0.6  /  DBili  x   /  AST  24  /  ALT  23  /  AlkPhos  65  07-24    proBNP:   Lipid Profile:   HgA1c:   TSH:     TELEMETRY: SR	    ECG:  	  RADIOLOGY:   DIAGNOSTIC TESTING:  [ ] Echocardiogram:  [ ]  Catheterization:  [ ] Stress Test:    OTHER:

## 2023-07-26 NOTE — PROGRESS NOTE ADULT - SUBJECTIVE AND OBJECTIVE BOX
THE PATIENT WAS SEEN AND EXAMINED BY ME WITH THE HOUSESTAFF AND STROKE TEAM DURING MORNING ROUNDS.   HPI: Patient ROB OSHEA is a 43y (1980) man with a PMHx significant for T2DM, HTN, previous CVA (per patient 8 years ago, treated w/ TPA at [facility], p/w L hemiplegia, facial droop and dysarthria w/ minimal residual deficits), current smoker who presented to [facility] ED after two weeks of intermittent left lower extremity weakness with accompanying back pain similar to patient's sciatica pain, and 25+ hours of dysarthria. He stated he had episode of similar dysarthria 2 weeks ago as well that improved.     On examination, patient has mild dysarthria, accompanied by wife who states that patient's speech is noticeably more slurred than his baseline. Says that his previous stroke 8 years ago presented similarly, but was much more severe (weakness + speech changes/aphasia). Patient denies any recent fevers/chills, dysuria/hematuria, signs of infection, travel, sick contacts or trauma.     LKW: 7/10  Initial NIHSS: 1  mRS: 0  Not a Tenecteplase candidate given out of time window.     SUBJECTIVE: No events overnight.  No new neurologic complaints.  ROS negative unless otherwise stated.    acetaminophen     Tablet .. 650 milliGRAM(s) Oral every 6 hours PRN  amLODIPine   Tablet 10 milliGRAM(s) Oral daily  aspirin enteric coated 81 milliGRAM(s) Oral daily  atorvastatin 80 milliGRAM(s) Oral at bedtime  carvedilol 25 milliGRAM(s) Oral every 12 hours  clopidogrel Tablet 75 milliGRAM(s) Oral every 24 hours  dextrose 5%. 1000 milliLiter(s) IV Continuous <Continuous>  dextrose 5%. 1000 milliLiter(s) IV Continuous <Continuous>  dextrose 50% Injectable 25 Gram(s) IV Push once  dextrose 50% Injectable 12.5 Gram(s) IV Push once  dextrose 50% Injectable 25 Gram(s) IV Push once  dextrose Oral Gel 15 Gram(s) Oral once PRN  enoxaparin Injectable 40 milliGRAM(s) SubCutaneous every 24 hours  glucagon  Injectable 1 milliGRAM(s) IntraMuscular once  hydrALAZINE 10 milliGRAM(s) Oral two times a day  hydrALAZINE Injectable 5 milliGRAM(s) IV Push every 6 hours PRN  insulin lispro (ADMELOG) corrective regimen sliding scale   SubCutaneous at bedtime  insulin lispro (ADMELOG) corrective regimen sliding scale   SubCutaneous three times a day before meals  lisinopril 20 milliGRAM(s) Oral two times a day  melatonin 5 milliGRAM(s) Oral at bedtime  nicotine -  14 mG/24Hr(s) Patch 1 Patch Transdermal daily      PHYSICAL EXAM:   Vital Signs Last 24 Hrs  T(C): 36.6 (26 Jul 2023 04:57), Max: 37.2 (25 Jul 2023 12:43)  T(F): 97.8 (26 Jul 2023 04:57), Max: 99 (25 Jul 2023 12:43)  HR: 90 (26 Jul 2023 05:00) (67 - 90)  BP: 172/108 (26 Jul 2023 04:57) (154/99 - 172/108)  BP(mean): --  RR: 18 (26 Jul 2023 04:57) (17 - 18)  SpO2: 97% (26 Jul 2023 05:00) (96% - 98%)    Parameters below as of 26 Jul 2023 04:57  Patient On (Oxygen Delivery Method): nasal cannula    General: No acute distress  HEENT: EOM intact, visual fields full  Abdomen: Soft, nontender, nondistended   Extremities: No edema    NEUROLOGICAL EXAM:  Mental status: Awake, alert, oriented x3, hypophonic, repetition intact, no neglect, normal memory, follows simple and complex commands  Cranial Nerves: No facial asymmetry, no nystagmus, mild dysarthria with gurgled speech,  tongue midline  Motor exam: Normal tone, RUE and RLE drifts 4/5, LUE 5/5, LLE 5/5  Sensation: Intact to light touch   Coordination/ Gait: No dysmetria, GIOVANNI intact and symmetric bilaterally    LABS:                        15.8   9.82  )-----------( 239      ( 26 Jul 2023 05:44 )             45.8    07-26    142  |  102  |  25<H>  ----------------------------<  125<H>  4.0   |  23  |  1.25    Ca    9.6      26 Jul 2023 05:45  Phos  4.0     07-25  Mg     2.2     07-25    TPro  7.5  /  Alb  4.7  /  TBili  0.6  /  DBili  x   /  AST  24  /  ALT  23  /  AlkPhos  65  07-24  PT/INR - ( 24 Jul 2023 12:59 )   PT: 11.4 sec;   INR: 0.99 ratio         PTT - ( 24 Jul 2023 12:59 )  PTT:30.6 sec      IMAGING: Reviewed by me.     CT Head 7/24: No acute intracranial hemorrhage or acute territorial infarction.    CT Perfusion 7/24: No asymmetric or territorial perfusion   abnormality.    CTA Head 7/24: There is occlusion of the intradural right vertebral artery   segment and high-grade stenosis involving the intradural left vertebral   artery segment. Posterior circulation appears intact distally.    CTA Neck 7/24: Patent, ECAs, ICAs, no  hemodynamically significant stenosis at    ICA origins by NASCET criteria.  Bilateral vertebral arteries are patent without flow limiting stenosis.   THE PATIENT WAS SEEN AND EXAMINED BY ME WITH THE HOUSESTAFF AND STROKE TEAM DURING MORNING ROUNDS.   HPI: Patient ROB OSHEA is a 43y (1980) man with a PMHx significant for T2DM, HTN, previous CVA (per patient 8 years ago, treated w/ TPA at [facility], p/w L hemiplegia, facial droop and dysarthria w/ minimal residual deficits), current smoker who presented to [facility] ED after two weeks of intermittent left lower extremity weakness with accompanying back pain similar to patient's sciatica pain, and 25+ hours of dysarthria. He stated he had episode of similar dysarthria 2 weeks ago as well that improved.     On examination, patient has mild dysarthria, accompanied by wife who states that patient's speech is noticeably more slurred than his baseline. Says that his previous stroke 8 years ago presented similarly, but was much more severe (weakness + speech changes/aphasia). Patient denies any recent fevers/chills, dysuria/hematuria, signs of infection, travel, sick contacts or trauma.     LKW: 7/10  Initial NIHSS: 1  mRS: 0  Not a Tenecteplase candidate given out of time window.     SUBJECTIVE: No events overnight.  No new neurologic complaints.  ROS negative unless otherwise stated.    acetaminophen     Tablet .. 650 milliGRAM(s) Oral every 6 hours PRN  amLODIPine   Tablet 10 milliGRAM(s) Oral daily  aspirin enteric coated 81 milliGRAM(s) Oral daily  atorvastatin 80 milliGRAM(s) Oral at bedtime  carvedilol 25 milliGRAM(s) Oral every 12 hours  clopidogrel Tablet 75 milliGRAM(s) Oral every 24 hours  dextrose 5%. 1000 milliLiter(s) IV Continuous <Continuous>  dextrose 5%. 1000 milliLiter(s) IV Continuous <Continuous>  dextrose 50% Injectable 25 Gram(s) IV Push once  dextrose 50% Injectable 12.5 Gram(s) IV Push once  dextrose 50% Injectable 25 Gram(s) IV Push once  dextrose Oral Gel 15 Gram(s) Oral once PRN  enoxaparin Injectable 40 milliGRAM(s) SubCutaneous every 24 hours  glucagon  Injectable 1 milliGRAM(s) IntraMuscular once  hydrALAZINE 10 milliGRAM(s) Oral two times a day  hydrALAZINE Injectable 5 milliGRAM(s) IV Push every 6 hours PRN  insulin lispro (ADMELOG) corrective regimen sliding scale   SubCutaneous at bedtime  insulin lispro (ADMELOG) corrective regimen sliding scale   SubCutaneous three times a day before meals  lisinopril 20 milliGRAM(s) Oral two times a day  melatonin 5 milliGRAM(s) Oral at bedtime  nicotine -  14 mG/24Hr(s) Patch 1 Patch Transdermal daily      PHYSICAL EXAM:   Vital Signs Last 24 Hrs  T(C): 36.6 (26 Jul 2023 04:57), Max: 37.2 (25 Jul 2023 12:43)  T(F): 97.8 (26 Jul 2023 04:57), Max: 99 (25 Jul 2023 12:43)  HR: 90 (26 Jul 2023 05:00) (67 - 90)  BP: 172/108 (26 Jul 2023 04:57) (154/99 - 172/108)  BP(mean): --  RR: 18 (26 Jul 2023 04:57) (17 - 18)  SpO2: 97% (26 Jul 2023 05:00) (96% - 98%)    Parameters below as of 26 Jul 2023 04:57  Patient On (Oxygen Delivery Method): nasal cannula    General: No acute distress  HEENT: EOM intact, visual fields full  Abdomen: Soft, nontender, nondistended   Extremities: No edema    NEUROLOGICAL EXAM:  Mental status: Awake, alert, oriented x3, hypophonic, repetition intact, no neglect, normal memory, follows simple and complex commands  Cranial Nerves: No facial asymmetry, no nystagmus, mild dysarthria with gurgled speech, tongue midline  Motor exam: Normal tone, RUE and RLE drifts 4/5, LUE 5/5, LLE 5/5  Sensation: Intact to light touch   Coordination/ Gait: No dysmetria, GIOVANNI intact and symmetric bilaterally    LABS:                        15.8   9.82  )-----------( 239      ( 26 Jul 2023 05:44 )             45.8    07-26    142  |  102  |  25<H>  ----------------------------<  125<H>  4.0   |  23  |  1.25    Ca    9.6      26 Jul 2023 05:45  Phos  4.0     07-25  Mg     2.2     07-25    TPro  7.5  /  Alb  4.7  /  TBili  0.6  /  DBili  x   /  AST  24  /  ALT  23  /  AlkPhos  65  07-24  PT/INR - ( 24 Jul 2023 12:59 )   PT: 11.4 sec;   INR: 0.99 ratio         PTT - ( 24 Jul 2023 12:59 )  PTT:30.6 sec      IMAGING: Reviewed by me.     CT Head 7/24: No acute intracranial hemorrhage or acute territorial infarction.    CT Perfusion 7/24: No asymmetric or territorial perfusion   abnormality.    CTA Head 7/24: There is occlusion of the intradural right vertebral artery   segment and high-grade stenosis involving the intradural left vertebral   artery segment. Posterior circulation appears intact distally.    CTA Neck 7/24: Patent, ECAs, ICAs, no  hemodynamically significant stenosis at    ICA origins by NASCET criteria.  Bilateral vertebral arteries are patent without flow limiting stenosis.   THE PATIENT WAS SEEN AND EXAMINED BY ME WITH THE HOUSESTAFF AND STROKE TEAM DURING MORNING ROUNDS.   HPI: Patient ROB OSHEA is a 43y (1980) man with a PMHx significant for T2DM, HTN, previous CVA (per patient 8 years ago, treated w/ TPA at [facility], p/w L hemiplegia, facial droop and dysarthria w/ minimal residual deficits), current smoker who presented to [facility] ED after two weeks of intermittent left lower extremity weakness with accompanying back pain similar to patient's sciatica pain, and 25+ hours of dysarthria. He stated he had episode of similar dysarthria 2 weeks ago as well that improved.     On examination, patient has mild dysarthria, accompanied by wife who states that patient's speech is noticeably more slurred than his baseline. Says that his previous stroke 8 years ago presented similarly, but was much more severe (weakness + speech changes/aphasia). Patient denies any recent fevers/chills, dysuria/hematuria, signs of infection, travel, sick contacts or trauma.     LKW: 7/10  Initial NIHSS: 1  mRS: 0  Not a Tenecteplase candidate given out of time window.     SUBJECTIVE: No events overnight.  No new neurologic complaints.  ROS negative unless otherwise stated.    acetaminophen     Tablet .. 650 milliGRAM(s) Oral every 6 hours PRN  amLODIPine   Tablet 10 milliGRAM(s) Oral daily  aspirin enteric coated 81 milliGRAM(s) Oral daily  atorvastatin 80 milliGRAM(s) Oral at bedtime  carvedilol 25 milliGRAM(s) Oral every 12 hours  clopidogrel Tablet 75 milliGRAM(s) Oral every 24 hours  dextrose 5%. 1000 milliLiter(s) IV Continuous <Continuous>  dextrose 5%. 1000 milliLiter(s) IV Continuous <Continuous>  dextrose 50% Injectable 25 Gram(s) IV Push once  dextrose 50% Injectable 12.5 Gram(s) IV Push once  dextrose 50% Injectable 25 Gram(s) IV Push once  dextrose Oral Gel 15 Gram(s) Oral once PRN  enoxaparin Injectable 40 milliGRAM(s) SubCutaneous every 24 hours  glucagon  Injectable 1 milliGRAM(s) IntraMuscular once  hydrALAZINE 10 milliGRAM(s) Oral two times a day  hydrALAZINE Injectable 5 milliGRAM(s) IV Push every 6 hours PRN  insulin lispro (ADMELOG) corrective regimen sliding scale   SubCutaneous at bedtime  insulin lispro (ADMELOG) corrective regimen sliding scale   SubCutaneous three times a day before meals  lisinopril 20 milliGRAM(s) Oral two times a day  melatonin 5 milliGRAM(s) Oral at bedtime  nicotine -  14 mG/24Hr(s) Patch 1 Patch Transdermal daily      PHYSICAL EXAM:   Vital Signs Last 24 Hrs  T(C): 36.6 (26 Jul 2023 04:57), Max: 37.2 (25 Jul 2023 12:43)  T(F): 97.8 (26 Jul 2023 04:57), Max: 99 (25 Jul 2023 12:43)  HR: 90 (26 Jul 2023 05:00) (67 - 90)  BP: 172/108 (26 Jul 2023 04:57) (154/99 - 172/108)  BP(mean): --  RR: 18 (26 Jul 2023 04:57) (17 - 18)  SpO2: 97% (26 Jul 2023 05:00) (96% - 98%)    Parameters below as of 26 Jul 2023 04:57  Patient On (Oxygen Delivery Method): nasal cannula    General: No acute distress  HEENT: EOM intact, visual fields full  Abdomen: Soft, nontender, nondistended   Extremities: No edema    NEUROLOGICAL EXAM:  Mental status: Awake, alert, oriented x3, hypophonic, repetition intact, no neglect, normal memory, follows simple and complex commands  Cranial Nerves: No facial asymmetry, no nystagmus, mild dysarthria with gurgled speech, tongue midline  Motor exam: Normal tone, RUE and RLE drifts 4/5, LUE 5/5, LLE 5/5  Reflexes: Hyperreflexia on the left  Sensation: Intact to light touch   Coordination/ Gait: No dysmetria, GIOVANNI intact and symmetric bilaterally    LABS:                        15.8   9.82  )-----------( 239      ( 26 Jul 2023 05:44 )             45.8    07-26    142  |  102  |  25<H>  ----------------------------<  125<H>  4.0   |  23  |  1.25    Ca    9.6      26 Jul 2023 05:45  Phos  4.0     07-25  Mg     2.2     07-25    TPro  7.5  /  Alb  4.7  /  TBili  0.6  /  DBili  x   /  AST  24  /  ALT  23  /  AlkPhos  65  07-24  PT/INR - ( 24 Jul 2023 12:59 )   PT: 11.4 sec;   INR: 0.99 ratio         PTT - ( 24 Jul 2023 12:59 )  PTT:30.6 sec      IMAGING: Reviewed by me.     MRI BRAIN 7/26  1. Evidence of a 1.1 cm acute infarct left inferior adrienne as well as smaller subcentimeter acute infarcts bilateral parieto-occipital juxtacortical white matter. No definitive evidence of associated hemorrhage.  2. Bilateral subcentimeter chronic lacunar infarcts with pontine and bihemispheric altered white matter signal; a nonspecific finding. Diagnostic considerations include, but are not limited to, chronic microvascular ischemic change, demyelinating and inflammatory etiologies.  3. Additional findings described in detail above.    MRI CERVICAL SPINE 7/26  1. No definitive evidence of demyelinating disease, intrathecal or paraspinal mass.  2. Straightening of the cervical lordosis can be seen in the setting of muscle spasm.  3. Multilevel cervical disc bulges. No cervical disc herniation or cord compression.  4. Additional findings above.    MRI LUMBAR SPINE 7/26  1. L4-L5 broad-based left foraminal disc herniation superimposed upon a disc bulge, resulting in mild central canal, bilateral lateral recess and left greater than right neural foramen stenosis with facet arthrosis, abutting the left L4 nerve roots.  2. Partially imaged T11-T12 broad-based right paracentral disc herniation, impinging upon the right ventral cord. Consider dedicated imaging of the thoracic spine as warranted.  3. No clumping of the cauda equina nerve roots, intrathecal or paraspinal mass visualized.  4. Additional findings above.    CT Head 7/24: No acute intracranial hemorrhage or acute territorial infarction.    CT Perfusion 7/24: No asymmetric or territorial perfusion   abnormality.    CTA Head 7/24: There is occlusion of the intradural right vertebral artery   segment and high-grade stenosis involving the intradural left vertebral   artery segment. Posterior circulation appears intact distally.    CTA Neck 7/24: Patent, ECAs, ICAs, no  hemodynamically significant stenosis at    ICA origins by NASCET criteria.  Bilateral vertebral arteries are patent without flow limiting stenosis.

## 2023-07-26 NOTE — PROGRESS NOTE ADULT - SUBJECTIVE AND OBJECTIVE BOX
no new complaints     REVIEW OF SYSTEMS  Constitutional - No fever,  No fatigue  HEENT - No vertigo, No neck pain  Neurological - No headaches, No memory loss, No loss of strength, No numbness, No tremors  Skin - No rashes, No lesions   Musculoskeletal - No joint pain, No joint swelling, No muscle pain  Psychiatric - No depression, No anxiety    FUNCTIONAL PROGRESS  7/25 SLP  mod to severe dysarthria    7/25 PT  min assist, right knee blocked  gait min assist x 20 feet with RW    7/25 OT  transfers min assist with RW      VITALS  T(C): 37.1 (07-26-23 @ 13:55), Max: 37.1 (07-26-23 @ 00:17)  HR: 86 (07-26-23 @ 13:55) (67 - 90)  BP: 165/102 (07-26-23 @ 13:55) (154/99 - 172/108)  RR: 18 (07-26-23 @ 13:55) (17 - 18)  SpO2: 100% (07-26-23 @ 13:55) (96% - 100%)  Wt(kg): --    MEDICATIONS   acetaminophen     Tablet .. 650 milliGRAM(s) every 6 hours PRN  amLODIPine   Tablet 10 milliGRAM(s) daily  aspirin enteric coated 81 milliGRAM(s) daily  atorvastatin 80 milliGRAM(s) at bedtime  carvedilol 25 milliGRAM(s) every 12 hours  clopidogrel Tablet 75 milliGRAM(s) every 24 hours  dextrose 5%. 1000 milliLiter(s) <Continuous>  dextrose 5%. 1000 milliLiter(s) <Continuous>  dextrose 50% Injectable 25 Gram(s) once  dextrose 50% Injectable 12.5 Gram(s) once  dextrose 50% Injectable 25 Gram(s) once  dextrose Oral Gel 15 Gram(s) once PRN  enoxaparin Injectable 40 milliGRAM(s) every 24 hours  glucagon  Injectable 1 milliGRAM(s) once  hydrALAZINE 10 milliGRAM(s) two times a day  hydrALAZINE Injectable 5 milliGRAM(s) every 6 hours PRN  insulin lispro (ADMELOG) corrective regimen sliding scale   at bedtime  insulin lispro (ADMELOG) corrective regimen sliding scale   three times a day before meals  lisinopril 20 milliGRAM(s) two times a day  melatonin 5 milliGRAM(s) at bedtime  nicotine -  14 mG/24Hr(s) Patch 1 Patch daily      RECENT LABS - Reviewed                        15.8   9.82  )-----------( 239      ( 26 Jul 2023 05:44 )             45.8     07-26    142  |  102  |  25<H>  ----------------------------<  125<H>  4.0   |  23  |  1.25    Ca    9.6      26 Jul 2023 05:45  Phos  4.0     07-25  Mg     2.2     07-25        Urinalysis Basic - ( 26 Jul 2023 05:45 )    Color: x / Appearance: x / SG: x / pH: x  Gluc: 125 mg/dL / Ketone: x  / Bili: x / Urobili: x   Blood: x / Protein: x / Nitrite: x   Leuk Esterase: x / RBC: x / WBC x   Sq Epi: x / Non Sq Epi: x / Bacteria: x      < from: CT Head No Cont (07.26.23 @ 10:04) >    IMPRESSION:  Mild chronic microvascular changes without evidence of an   acute transcortical infarction or hemorrhage.      < end of copied text >    ----------------------------------------------------------------------------------------  PHYSICAL EXAM  Constitutional - NAD, Comfortable, sitting in chair   Chest - Breathing comfortably, room air   Cardiovascular - S1S2   Abdomen - Soft   Extremities - No C/C/E, No calf tenderness   Neurologic Exam -     follows commands                Cognitive - Awake, Alert, AAO to self, place, date, year, situation     Communication - Fluent, + dysarthria     Cranial Nerves - CN 2-12 intact     Motor - +RUE drift                     LEFT    UE - ShAB 5/5, EF 5/5, EE 5/5, WE 5/5,  5/5                    RIGHT UE - ShAB 5/5, EF 5/5, EE 5/5, WE 5/5,  5/5                    LEFT    LE - HF 5/5, KE 5/5, DF 5/5, PF 5/5                    RIGHT LE - HF 5/5, KE 5/5, DF 5/5, PF 5/5        Sensory - Intact to LT     Psychiatric - Mood stable, Affect WNL  ----------------------------------------------------------------------------------------  ASSESSMENT/PLAN  43yMale h/o DM, HTN, CVA with functional deficits after suspected CVA with dysarthria  MRI pending   Pain - Tylenol  DVT PPX - SCDs lovenox   Rehab - Will continue to follow for ongoing rehab needs and recommendations.   patient requires min assist with mobility, RLE buckling, awaiting OT/speech evals   Recommend ACUTE inpatient rehabilitation for the functional deficits consisting of 3 hours of therapy/day & 24 hour RN/daily PMR physician for comorbid medical management. Patient will be able to tolerate 3 hours a day.

## 2023-07-26 NOTE — PROGRESS NOTE ADULT - NS ATTEND AMEND GEN_ALL_CORE FT
I reviewed available diagnostic studies, and reviewed images personally. I agree with resident's history, exam, orders placed, and plan of care. Medical issues needing to be addressed include: HTN, DM2, Cerebral infarct, B/L lower ext weakness, dysarthria, back pain, hyperreflexia.   DAPT loaded, cont. atorvastatin.SBP parameter no 100-180 syst. Exam improved today. mild weakness in R side, R hip flexor limited by pain, hyperreflexia on L. Reports increasing difficulty walking for the past couple weeks. MRI today.

## 2023-07-26 NOTE — DISCHARGE NOTE PROVIDER - CARE PROVIDER_API CALL
Colleen Benito  NP in Family Health  [address], [address]  [place] [address]  Phone: [phone]  Fax: [phone]  Follow Up Time: 2 weeks    Talib Dsouza  [address], [address]  [place] [address]  Phone: [phone]  Fax: [phone]  Follow Up Time: 1 month

## 2023-07-26 NOTE — DISCHARGE NOTE PROVIDER - NSFOLLOWUPCLINICS_GEN_ALL_ED_FT
[facility]  Psychiatry  [address] [place] [address]  Phone: [phone]  Fax:   Follow Up Time: 1 month

## 2023-07-26 NOTE — DISCHARGE NOTE PROVIDER - NSDCCPCAREPLAN_GEN_ALL_CORE_FT
PRINCIPAL DISCHARGE DIAGNOSIS  Diagnosis: Ischemic stroke  Assessment and Plan of Treatment: .Please follow up with neurologist in 1 week. Continue taking medications as prescribed. Monitor your blood pressure. Reduce fat, cholesterol and salt in your diet. Increase intake of fruits and vegetables. Limit alcohol to minimum and do not smoke. You may be at risk for falling, make changes to your home to help you walk easier. Keep up to date on vaccinations.  If you experience any symptoms of facial drooping, slurred speech, arm or leg weakness, severe headache, vision changes or any worsening symptoms, notify provider immediatley and return to ER.     PRINCIPAL DISCHARGE DIAGNOSIS  Diagnosis: Ischemic stroke  Assessment and Plan of Treatment: .Please follow up with neurologist after being discharged from rehab. Continue taking medications as prescribed. Monitor your blood pressure. Reduce fat, cholesterol and salt in your diet. Increase intake of fruits and vegetables. Limit alcohol to minimum and do not smoke. You may be at risk for falling, make changes to your home to help you walk easier. Keep up to date on vaccinations.  If you experience any symptoms of facial drooping, slurred speech, arm or leg weakness, severe headache, vision changes or any worsening symptoms, notify provider immediatley and return to ER.

## 2023-07-26 NOTE — PROGRESS NOTE ADULT - ASSESSMENT
ASSESSMENT: Patient ROB OSHEA is a 43y (1980) man with a PMHx significant for T2DM, HTN, previous CVA (per patient 8 years ago, treated w/ TPA at [facility], p/w L hemiplegia, facial droop and dysarthria w/ minimal residual deficits), current smoker who presented to [facility] ED after two weeks of intermittent left lower extremity weakness with accompanying back pain similar to patient's sciatica pain and 25+ hours of dysarthria. He stated he had episode of similar dysarthria 2 weeks ago as well that improved.     LKW: 7/10. Initial NIHSS: 1. mRS: 0. Not a Tenecteplase candidate given out of time window.     NEURO: Neurologic examination stable. Worsening dysarthria on 7/25, pending repeat CTH. Continue close monitoring for neurologic deterioration. Keep permissive HTN. , continue atorvastatin 80mg qhs,  titrate statin to LDL goal less than 70. MRI Brain w/o pending.  Physical therapy/OT/speech eval recommend AR.     ANTITHROMBOTIC THERAPY: Aspirin 81mg and Plavix 75mg daily x3 weeks, then Asa 81mg daily indefinitely     PULMONARY:  protecting airway, saturating well     CARDIOVASCULAR: TTE: EF 58%, normal LA. Continue cardiac monitoring with no events. Cardiology, Dr Dsouza, consulted for BP management, recommendations appreciated.                            SBP goal: 120-190    GASTROINTESTINAL:  dysphagia screen passed      Diet: pureed    RENAL: BUN/Cr stable, good urine output      Na Goal: Greater than 135    HEMATOLOGY: H/H stable, Platelets normal      DVT ppx: Heparin s.c [] LMWH []     ID: afebrile, no leukocytosis     OTHER: Plan discussed with patient and family (patient's wife) at bedside, all questions and concerns were addressed. Psych consulted for depression/suicidality, recommend ativan 0.5mg TID prn for anxiety and outpatient follow up.    DISPOSITION: Rehab or home depending on PT eval once stable and workup is complete      CORE MEASURES:        Admission NIHSS: 1     TPA: [] YES [x] NO      LDL/HDL: 154/32     Depression Screen: n/a     Statin Therapy: yes     Dysphagia Screen: [x] PASS [] FAIL     Smoking [x] YES [] NO      Afib [] YES [x] NO     Stroke Education [x] YES [] NO    Obtain screening lower extremity venous ultrasound in patients who meet 1 or more of the following criteria as patient is high risk for DVT/PE on admission:   [] History of DVT/PE  []Hypercoagulable states (Factor V Leiden, Cancer, OCP, etc. )  []Prolonged immobility (hemiplegia/hemiparesis/post operative or any other extended immobilization)  [] Transferred from outside facility (Rehab or Long term care)  [] Age </= to 50 ASSESSMENT: Patient ROB OSHEA is a 43y (1980) man with a PMHx significant for T2DM, HTN, previous CVA (per patient 8 years ago, treated w/ TPA at [facility], p/w L hemiplegia, facial droop and dysarthria w/ minimal residual deficits), current smoker who presented to [facility] ED after two weeks of intermittent left lower extremity weakness with accompanying back pain similar to patient's sciatica pain and 25+ hours of dysarthria. He stated he had episode of similar dysarthria 2 weeks ago as well that improved.     LKW: 7/10. Initial NIHSS: 1. mRS: 0. Not a Tenecteplase candidate given out of time window.     NEURO: Neurologic examination stable. Worsening dysarthria on 7/25, repeat CTH stable. Continue close monitoring for neurologic deterioration. Keep permissive HTN. , continue atorvastatin 80mg qhs,  titrate statin to LDL goal less than 70. MRI Brain w/o pending.  Physical therapy/OT/speech eval recommend AR.     ANTITHROMBOTIC THERAPY: Aspirin 81mg and Plavix 75mg daily x3 weeks, then Asa 81mg daily indefinitely     PULMONARY:  protecting airway, saturating well     CARDIOVASCULAR: TTE: EF 58%, normal LA. Continue cardiac monitoring with no events. Cardiology, Dr Dsouza, consulted for BP management, recommendations appreciated.                            SBP goal: 120-190    GASTROINTESTINAL:  dysphagia screen passed      Diet: pureed    RENAL: BUN/Cr stable, good urine output      Na Goal: Greater than 135    HEMATOLOGY: H/H stable, Platelets normal      DVT ppx:  LMWH     ID: afebrile, no leukocytosis     OTHER: Plan discussed with patient and family (patient's wife) at bedside, all questions and concerns were addressed. Psych consulted for depression/suicidality, recommend ativan 0.5mg TID prn for anxiety and outpatient follow up.    DISPOSITION: AR per PT/OT eval once stable and workup is complete      CORE MEASURES:        Admission NIHSS: 1     TPA: [] YES [x] NO      LDL/HDL: 154/32     Depression Screen: n/a     Statin Therapy: yes     Dysphagia Screen: [x] PASS [] FAIL     Smoking [x] YES [] NO      Afib [] YES [x] NO     Stroke Education [x] YES [] NO    Obtain screening lower extremity venous ultrasound in patients who meet 1 or more of the following criteria as patient is high risk for DVT/PE on admission:   [] History of DVT/PE  []Hypercoagulable states (Factor V Leiden, Cancer, OCP, etc. )  []Prolonged immobility (hemiplegia/hemiparesis/post operative or any other extended immobilization)  [] Transferred from outside facility (Rehab or Long term care)  [] Age </= to 50 ASSESSMENT: Patient ROB OSHEA is a 43y (1980) man with a PMHx significant for T2DM, HTN, previous CVA (per patient 8 years ago, treated w/ TPA at [facility], p/w L hemiplegia, facial droop and dysarthria w/ minimal residual deficits), current smoker who presented to [facility] ED after two weeks of intermittent left lower extremity weakness with accompanying back pain similar to patient's sciatica pain and 25+ hours of dysarthria. He stated he had episode of similar dysarthria 2 weeks ago as well that improved.     LKW: 7/10. Initial NIHSS: 1. mRS: 0. Not a Tenecteplase candidate given out of time window.     NEURO: Neurologic examination stable. Worsening dysarthria on 7/25, repeat CTH stable. Continue close monitoring for neurologic deterioration. Keep permissive HTN. , continue atorvastatin 80mg qhs,  titrate statin to LDL goal less than 70. MRI Brain w/o pending.  Physical therapy/OT/speech eval recommend AR.     ANTITHROMBOTIC THERAPY: Aspirin 81mg and Plavix 75mg daily x3 weeks, then Asa 81mg daily indefinitely     PULMONARY:  protecting airway, saturating well     CARDIOVASCULAR: TTE: EF 58%, normal LA. Continue cardiac monitoring with no events. Cardiology, Dr Dsouza, consulted for BP management, recommendations appreciated.                            SBP goal: 100-180    GASTROINTESTINAL:  dysphagia screen passed      Diet: pureed    RENAL: BUN/Cr stable, good urine output      Na Goal: Greater than 135    HEMATOLOGY: H/H stable, Platelets normal      DVT ppx:  LMWH     ID: afebrile, no leukocytosis     OTHER: Plan discussed with patient and family (patient's wife) at bedside, all questions and concerns were addressed. Psych consulted for depression/suicidality, recommend ativan 0.5mg TID prn for anxiety and outpatient follow up.    DISPOSITION: AR per PT/OT eval once stable and workup is complete      CORE MEASURES:        Admission NIHSS: 1     TPA: [] YES [x] NO      LDL/HDL: 154/32     Depression Screen: n/a     Statin Therapy: yes     Dysphagia Screen: [x] PASS [] FAIL     Smoking [x] YES [] NO      Afib [] YES [x] NO     Stroke Education [x] YES [] NO    Obtain screening lower extremity venous ultrasound in patients who meet 1 or more of the following criteria as patient is high risk for DVT/PE on admission:   [] History of DVT/PE  []Hypercoagulable states (Factor V Leiden, Cancer, OCP, etc. )  []Prolonged immobility (hemiplegia/hemiparesis/post operative or any other extended immobilization)  [] Transferred from outside facility (Rehab or Long term care)  [] Age </= to 50 ASSESSMENT: Patient ROB OSHEA is a 43y (1980) man with a PMHx significant for T2DM, HTN, previous CVA (per patient 8 years ago, treated w/ TPA at [facility], p/w L hemiplegia, facial droop and dysarthria w/ minimal residual deficits), current smoker who presented to [facility] ED after two weeks of intermittent left lower extremity weakness with accompanying back pain similar to patient's sciatica pain and 25+ hours of dysarthria. He stated he had episode of similar dysarthria 2 weeks ago as well that improved.     LKW: 7/10. Initial NIHSS: 1. mRS: 0. Not a Tenecteplase candidate given out of time window.     Impression: Dysarthria and RLE weakness due to acute infarct in left inferior adrienne and smaller acute infarcts in bilateral parieto-occipital juxtacortical. Mechanism large artery atherosclerosis    NEURO: Neurologic examination stable. Worsening dysarthria on 7/25, repeat CTH stable. Continue close monitoring for neurologic deterioration. Keep permissive HTN. , continue atorvastatin 80mg qhs,  titrate statin to LDL goal less than 70. MRI Brain w/o pending.  Physical therapy/OT/speech eval recommend AR.     ANTITHROMBOTIC THERAPY: Aspirin 81mg and Plavix 75mg daily x3 weeks, then Asa 81mg daily indefinitely     PULMONARY:  protecting airway, saturating well     CARDIOVASCULAR: TTE: EF 58%, normal LA. Continue cardiac monitoring with no events. Cardiology, Dr Dsouza, consulted for BP management, recommendations appreciated.                            SBP goal: 100-180    GASTROINTESTINAL:  dysphagia screen passed      Diet: pureed    RENAL: BUN/Cr stable, good urine output      Na Goal: Greater than 135    HEMATOLOGY: H/H stable, Platelets normal      DVT ppx:  LMWH     ID: afebrile, no leukocytosis     OTHER: Plan discussed with patient and family (patient's wife) at bedside, all questions and concerns were addressed. Psych consulted for depression/suicidality, recommend ativan 0.5mg TID prn for anxiety and outpatient follow up.    DISPOSITION: AR per PT/OT eval once stable and workup is complete      CORE MEASURES:        Admission NIHSS: 1     TPA: [] YES [x] NO      LDL/HDL: 154/32     Depression Screen: n/a     Statin Therapy: yes     Dysphagia Screen: [x] PASS [] FAIL     Smoking [x] YES [] NO      Afib [] YES [x] NO     Stroke Education [x] YES [] NO    Obtain screening lower extremity venous ultrasound in patients who meet 1 or more of the following criteria as patient is high risk for DVT/PE on admission:   [] History of DVT/PE  []Hypercoagulable states (Factor V Leiden, Cancer, OCP, etc. )  []Prolonged immobility (hemiplegia/hemiparesis/post operative or any other extended immobilization)  [] Transferred from outside facility (Rehab or Long term care)  [] Age </= to 50

## 2023-07-26 NOTE — DISCHARGE NOTE PROVIDER - HOSPITAL COURSE
HPI: Patient ROB OSHEA is a 43y (1980) man with a PMHx significant for T2DM, HTN, previous CVA (per patient 8 years ago, treated w/ TPA at [facility], p/w L hemiplegia, facial droop and dysarthria w/ minimal residual deficits), current smoker who presented to [facility] ED after two weeks of intermittent left lower extremity weakness with accompanying back pain similar to patient's sciatica pain, and 25+ hours of dysarthria. He stated he had episode of similar dysarthria 2 weeks ago as well that improved.     On examination, patient has mild dysarthria, accompanied by wife who states that patient's speech is noticeably more slurred than his baseline. Says that his previous stroke 8 years ago presented similarly, but was much more severe (weakness + speech changes/aphasia). Patient denies any recent fevers/chills, dysuria/hematuria, signs of infection, travel, sick contacts or trauma.   LKW: 7/10. Initial NIHSS: 1. mRS: 0  Not a Tenecteplase candidate given out of time window.     Imaging:     MRI Brain 7/26:     CT Head 7/24: No acute intracranial hemorrhage or acute territorial infarction.    CT Perfusion 7/24: No asymmetric or territorial perfusion   abnormality.    CTA Head 7/24: There is occlusion of the intradural right vertebral artery   segment and high-grade stenosis involving the intradural left vertebral   artery segment. Posterior circulation appears intact distally.    CTA Neck 7/24: Patent, ECAs, ICAs, no  hemodynamically significant stenosis at    ICA origins by NASCET criteria.  Bilateral vertebral arteries are patent without flow limiting stenosis.    Impression:   TTE: EF 58%, normal LA  , continue atorvastatin 80mg qhs,  titrate statin to LDL goal less than 70.    Cardiology consulted for blood pressure management, follow up outpatient to slowly taper from permissive hypertension to normotension (Lower SBP 10-20mmHg per week)    Seen by psychology for depression/suicidality (now resolved), f/u outpatient at [facility]     Disposition: Medically stable for acute rehab Patient ROB OSHEA is a 43y (1980) man with a PMHx significant for T2DM, HTN, previous CVA (per patient 8 years ago, treated w/ TPA at [facility], p/w L hemiplegia, facial droop and dysarthria w/ minimal residual deficits), current smoker who presented to [facility] ED after two weeks of intermittent left lower extremity weakness with accompanying back pain similar to patient's sciatica pain, and 25+ hours of dysarthria. He stated he had episode of similar dysarthria 2 weeks ago as well that improved.     On examination, patient has mild dysarthria, accompanied by wife who states that patient's speech is noticeably more slurred than his baseline. Says that his previous stroke 8 years ago presented similarly, but was much more severe (weakness + speech changes/aphasia). Patient denies any recent fevers/chills, dysuria/hematuria, signs of infection, travel, sick contacts or trauma.   LKW: 7/10. Initial NIHSS: 1. mRS: 0  Not a Tenecteplase candidate given out of time window.     Imaging:     MRI Brain 7/26:  MRI BRAIN  1. Evidence of a 1.1 cm acute infarct left inferior adrienne as well as   smaller subcentimeter acute infarcts bilateral parieto-occipital   juxtacortical white matter. No definitive evidence of associated   hemorrhage.  2. Bilateral subcentimeter chronic lacunar infarcts with pontine and   bihemispheric altered white matter signal; a nonspecific finding.   Diagnostic considerations include, but are not limited to, chronic   microvascular ischemic change, demyelinating and inflammatory etiologies.  3. Additional findings described in detail above.    MRI CERVICAL SPINE  1. No definitive evidence of demyelinating disease, intrathecal or   paraspinal mass.  2. Straightening of the cervical lordosis can be seen in the setting of   muscle spasm.  3. Multilevel cervical disc bulges. No cervical disc herniation or cord   compression.  4. Additional findings above.    MRI LUMBAR SPINE  1. L4-L5 broad-based left foraminal disc herniation superimposed upon a   disc bulge, resulting in mild central canal, bilateral lateral recess and   left greater than right neural foramen stenosis with facet arthrosis,   abutting the left L4 nerve roots.  2. Partially imaged T11-T12 broad-based right paracentral disc   herniation, impinging upon the right ventral cord. Consider dedicated   imaging of the thoracic spine as warranted.  3. No clumping of the cauda equina nerve roots, intrathecal or paraspinal   mass visualized.    CT Head 7/24: No acute intracranial hemorrhage or acute territorial infarction.    CT Perfusion 7/24: No asymmetric or territorial perfusion   abnormality.    CTA Head 7/24: There is occlusion of the intradural right vertebral artery   segment and high-grade stenosis involving the intradural left vertebral   artery segment. Posterior circulation appears intact distally.    CTA Neck 7/24: Patent, ECAs, ICAs, no  hemodynamically significant stenosis at    ICA origins by NASCET criteria.  Bilateral vertebral arteries are patent without flow limiting stenosis.    Impression:  Dysarthria and RLE weakness due to acute infarct in left inferior adrienne and smaller acute infarcts in bilateral parieto-occipital juxtacortical. Mechanism large artery atherosclerosis  ANTITHROMBOTIC THERAPY: Aspirin 81mg and Plavix 75mg daily for 3 months as per [facility],  then Asa 81mg daily indefinitely     TTE: EF 58%, normal LA  , continue atorvastatin 80mg qhs,  titrate statin to LDL goal less than 70.    Cardiology consulted for blood pressure management, follow up outpatient to slowly taper from permissive hypertension to normotension (Lower SBP 10-20mmHg per week)    Seen by psychology for depression/suicidality (now resolved), f/u outpatient at [facility]   smoking cessation education provided.   Disposition: Medically stable for acute rehab

## 2023-07-27 NOTE — PROGRESS NOTE ADULT - REASON FOR ADMISSION
speech and difficulty walking

## 2023-07-27 NOTE — H&P ADULT - ASSESSMENT
ASSESSMENT/PLAN  Patient  is a 44 YO man with a PMH of T2DM, HTN, previous CVA with minimal residual deficits, current smoker who presented to [facility] ED on 7/24 after two weeks of intermittent left lower extremity weakness with accompanying back pain,  and dysarthria. He was found to have acute infarct in left inferior adrienne and smaller acute infarcts in bilateral parieto-occipital juxtacortical . Hospital course s/f HTN, depression and suicidal ideation seen and cleared by psychologist. Patient now with gait Instability, ADL impairments and Functional impairments.    #CVA   - Dysarthria and RLE weakness due to acute infarct in left inferior adrienne and smaller acute infarcts in bilateral parieto-occipital juxtacortical. Mechanism large artery atherosclerosis  - Start Comprehensive Rehab Program: PT/OT/ST, 3hours daily and 5 days weekly  - PT: Focused on improving strength, endurance, coordination, balance, functional mobility, and transfers  - OT: Focused on improving strength, fine motor skills, coordination, posture and ADLs.    - ST: to diagnose and treat deficits in swallowing, cognition and communication.   - ASA 81mg and Plavix 75mg daily for 3 months, then ASA alone    #HTN  - Carvedilol 25mg Q 12hrs  - Amlodipine 10mg daily  - Hydralazine 25mg BID  - Lisinopril 20mg daily    #HLD  - Lipitor 80mg daily    #DM II  - ISS and FS  - Admelog and Lantus  - A1c 7.0 on 7/25     #Pain management  - Tylenol PRN    #DVT ppx  - Lovenox, SCD, TEDs    #Bowel Regimen  - Senna, miralax PRN    #Bladder management  - BS on admission, and q 8 hours (SC if > 400)  - Monitor UO    #FEN   - Diet: Pureed    #Skin:  - Skin on admission: ***  - Pressure injury/Skin: Turn Q2hrs while in bed, OOB to Chair, PT/OT     #Current Smoker  - Nicotine patch daily  - smoking cessation education    #Dysphagia    - SLP: evaluation and treatment    #Mood/Cognition  - Depression with suicidal ideation while in acute hospital  - Neuropsychology consult PRN  - outpatient at [facility].    #Sleep:   - Maintain quiet hours and low stim environment.  - Melatonin PRN to maximize participation in therapy during the day.     #Precaution  - Fall, Aspiration, cardiac    #GOC  CODE STATUS: FULL CODE    Outpatient Follow-up (Specialty/Name of physician):    Colleen Benito  NP in [facility]  [address], [address]  [place] [address]  Phone: [phone]  Fax: [phone]  Follow Up Time: 2 weeks    Talib Dosuza  Cardiology  [address], [address]  [place] [address]  Phone: [phone]  Fax: [phone]  Follow Up Time: 1 month    [facility]  Psychiatry  [address] [place] [address]  Phone: [phone]  Fax:   Follow Up Time: 1 month      MEDICAL PROGNOSIS: GOOD            REHAB POTENTIAL: GOOD             ESTIMATED DISPOSITION: HOME WITH HOME CARE            ELOS: 10-14 Days   EXPECTED THERAPY:     P.T. 1hr/day       O.T. 1hr/day      S.L.P. 1hr/day     P&O Unnecessary     EXP FREQUENCY: 5 days per 7 day period     PRESCREEN COMPARISON:   I have reviewed the prescreen information and I have found no relevant changes between the preadmission screening and my post admission evaluation     RATIONALE FOR INPATIENT ADMISSION - Patient demonstrates the following: (check all that apply)  [X] Medically appropriate for rehabilitation admission  [X] Has attainable rehab goals with an appropriate initial discharge plan  [X] Has rehabilitation potential (expected to make a significant improvement within a reasonable period of time)   [X] Requires close medical management by a rehab physician, rehab nursing care, Hospitalist and comprehensive interdisciplinary team (including PT, OT, & or SLP, Prosthetics and Orthotics)   ASSESSMENT/PLAN  Patient  is a 44 YO man with a PMH of T2DM, HTN, previous CVA with minimal residual deficits, current smoker who presented to [facility] ED on 7/24 after two weeks of intermittent left lower extremity weakness with accompanying back pain,  and dysarthria. He was found to have acute infarct in left inferior adrienne and smaller acute infarcts in bilateral parieto-occipital juxtacortical . Hospital course s/f HTN, depression and suicidal ideation seen and cleared by psychologist. Patient now with gait Instability, ADL impairments and Functional impairments.    #CVA   - Dysarthria and RLE weakness due to acute infarct in left inferior adrienne and smaller acute infarcts in bilateral parieto-occipital juxtacortical. Mechanism large artery atherosclerosis  - Start Comprehensive Rehab Program: PT/OT/ST, 3hours daily and 5 days weekly  - PT: Focused on improving strength, endurance, coordination, balance, functional mobility, and transfers  - OT: Focused on improving strength, fine motor skills, coordination, posture and ADLs.    - ST: to diagnose and treat deficits in swallowing, cognition and communication.   - ASA 81mg and Plavix 75mg daily for 3 months, then ASA alone    #HTN  - Carvedilol 25mg Q 12hrs  - Amlodipine 10mg daily  - Hydralazine 25mg BID  - Lisinopril 20mg daily    #HLD  - Lipitor 80mg daily    #DM II  - ISS and FS  - Admelog and Lantus  - A1c 7.0 on 7/25     #Pain management  - Tylenol PRN    #DVT ppx  - Lovenox, SCD, TEDs    #Bowel Regimen  - Senna, miralax PRN    #Bladder management  - BS on admission, and q 8 hours (SC if > 400)  - Monitor UO    #FEN   - Diet: Pureed    #Skin:  - Skin on admission: grossly intact  - Pressure injury/Skin: Turn Q2hrs while in bed, OOB to Chair, PT/OT     #Current Smoker  - Nicotine patch daily  - smoking cessation education    #Dysphagia    - SLP: evaluation and treatment    #Mood/Cognition  - Depression with suicidal ideation while in acute hospital  - Neuropsychology consult PRN  - outpatient at [facility].    #Sleep:   - Maintain quiet hours and low stim environment.  - Melatonin PRN to maximize participation in therapy during the day.     #Precaution  - Fall, Aspiration, cardiac    #GOC  CODE STATUS: FULL CODE    Outpatient Follow-up (Specialty/Name of physician):    Colleen Benito  NP in [facility]  [address], [address]  [place] [address]  Phone: [phone]  Fax: [phone]  Follow Up Time: 2 weeks    Talib Dsouza  Cardiology  [address], [address]  [place] [address]  Phone: [phone]  Fax: [phone]  Follow Up Time: 1 month    [facility]  Psychiatry  [address] [place] [address]  Phone: [phone]  Fax:   Follow Up Time: 1 month      MEDICAL PROGNOSIS: GOOD            REHAB POTENTIAL: GOOD             ESTIMATED DISPOSITION: HOME WITH HOME CARE            ELOS: 10-14 Days   EXPECTED THERAPY:     P.T. 1hr/day       O.T. 1hr/day      S.L.P. 1hr/day     P&O Unnecessary     EXP FREQUENCY: 5 days per 7 day period     PRESCREEN COMPARISON:   I have reviewed the prescreen information and I have found no relevant changes between the preadmission screening and my post admission evaluation     RATIONALE FOR INPATIENT ADMISSION - Patient demonstrates the following: (check all that apply)  [X] Medically appropriate for rehabilitation admission  [X] Has attainable rehab goals with an appropriate initial discharge plan  [X] Has rehabilitation potential (expected to make a significant improvement within a reasonable period of time)   [X] Requires close medical management by a rehab physician, rehab nursing care, Hospitalist and comprehensive interdisciplinary team (including PT, OT, & or SLP, Prosthetics and Orthotics)

## 2023-07-27 NOTE — H&P ADULT - NSHPSOCIALHISTORY_GEN_ALL_CORE
Smoking -  EtOH -   Drugs -     Marital status:   Occupation: Electrical [employer]    Patient lives on the 1st  floor of a house with 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 with bilateral hand rails.  PTA: Independent in ADLs and ambulation     CURRENT FUNCTIONAL STATUS  Date: 7/27  Transfers: Min A, 1 person  Gait: Min a, 1 person, 20ft x 2

## 2023-07-27 NOTE — PROGRESS NOTE ADULT - ASSESSMENT
Patient ROB OSHEA is a 43y (1980) man with a PMHx significant for T2DM, HTN, previous CVA (per patient 8 years ago, treated w/ TPA at [facility], p/w L hemiplegia, facial droop and dysarthria w/ minimal residual deficits), current smoker who presented to [facility] ED after two weeks of intermittent left lower extremity weakness with accompanying back pain similar to patient's sciatica pain and 25+ hours of dysarthria. He stated he had episode of similar dysarthria 2 weeks ago as well that improved.     LKW: 7/10. Initial NIHSS: 1. mRS: 0. Not a Tenecteplase candidate given out of time window.     Impression: Dysarthria and RLE weakness due to acute infarct in left inferior adrienne and smaller acute infarcts in bilateral parieto-occipital juxtacortical. Mechanism large artery atherosclerosis    NEURO: Neurologic examination stable. Worsening dysarthria on 7/25, repeat CTH stable. Continue close monitoring for neurologic deterioration. Keep permissive HTN. , continue atorvastatin 80mg qhs,  titrate statin to LDL goal less than 70. MRI Brain w/o pending.  Physical therapy/OT/speech eval recommend AR.     ANTITHROMBOTIC THERAPY: Aspirin 81mg and Plavix 75mg daily x3 weeks, then Asa 81mg daily indefinitely     PULMONARY:  protecting airway, saturating well     CARDIOVASCULAR: TTE: EF 58%, normal LA. Continue cardiac monitoring with no events. Cardiology, Dr Dsouza, consulted for BP management, recommendations appreciated.                            SBP goal: 100-180    GASTROINTESTINAL:  dysphagia screen passed      Diet: pureed    RENAL: BUN/Cr stable, good urine output      Na Goal: Greater than 135    HEMATOLOGY: H/H stable, Platelets normal      DVT ppx:  LMWH     ID: afebrile, no leukocytosis     OTHER: Plan discussed with patient and family (patient's wife) at bedside, all questions and concerns were addressed. Psych consulted for depression/suicidality, recommend ativan 0.5mg TID prn for anxiety and outpatient follow up.    DISPOSITION: AR, medically stable      CORE MEASURES:        Admission NIHSS: 1     TPA: [] YES [x] NO      LDL/HDL: 154/32     Depression Screen: n/a     Statin Therapy: yes     Dysphagia Screen: [x] PASS [] FAIL     Smoking [x] YES [] NO      Afib [] YES [x] NO     Stroke Education [x] YES [] NO    Obtain screening lower extremity venous ultrasound in patients who meet 1 or more of the following criteria as patient is high risk for DVT/PE on admission:   [] History of DVT/PE  []Hypercoagulable states (Factor V Leiden, Cancer, OCP, etc. )  []Prolonged immobility (hemiplegia/hemiparesis/post operative or any other extended immobilization)  [] Transferred from outside facility (Rehab or Long term care)  [] Age </= to 50 Patient ROB OSHEA is a 43y (1980) man with a PMHx significant for T2DM, HTN, previous CVA (per patient 8 years ago, treated w/ TPA at [facility], p/w L hemiplegia, facial droop and dysarthria w/ minimal residual deficits), current smoker who presented to [facility] ED after two weeks of intermittent left lower extremity weakness with accompanying back pain similar to patient's sciatica pain and 25+ hours of dysarthria. He stated he had episode of similar dysarthria 2 weeks ago as well that improved.     LKW: 7/10. Initial NIHSS: 1. mRS: 0. Not a Tenecteplase candidate given out of time window.     Impression: Dysarthria and RLE weakness due to acute infarct in left inferior adrienne and smaller acute infarcts in bilateral parieto-occipital juxtacortical. Mechanism large artery atherosclerosis    NEURO: Neurologic examination stable. Worsening dysarthria on 7/25, repeat CTH stable. Continue close monitoring for neurologic deterioration. Keep permissive HTN. , continue atorvastatin 80mg qhs,  titrate statin to LDL goal less than 70. MRI Brain w/o pending.  Physical therapy/OT/speech eval recommend AR.     ANTITHROMBOTIC THERAPY: Aspirin 81mg and Plavix 75mg daily x3 weeks, then Asa 81mg daily indefinitely     PULMONARY:  protecting airway, saturating well     CARDIOVASCULAR: TTE: EF 58%, normal LA. Continue cardiac monitoring with no events. Cardiology, Dr Dsouza, consulted for BP management, recommendations appreciated.                            SBP goal: 100-180    GASTROINTESTINAL:  dysphagia screen passed      Diet: pureed    RENAL: BUN/Cr stable, good urine output      Na Goal: Greater than 135    HEMATOLOGY: H/H stable, Platelets normal      DVT ppx:  LMWH     ID: afebrile, no leukocytosis     OTHER: Plan discussed with patient and family (patient's wife) at bedside, all questions and concerns were addressed. Psych consulted for depression/suicidality, recommend ativan 0.5mg TID prn for anxiety and outpatient follow up.  Current everyday smoker, nicotine patch ordered    DISPOSITION: AR, medically stable      CORE MEASURES:        Admission NIHSS: 1     TPA: [] YES [x] NO      LDL/HDL: 154/32     Depression Screen: n/a     Statin Therapy: yes     Dysphagia Screen: [x] PASS [] FAIL     Smoking [x] YES [] NO      Afib [] YES [x] NO     Stroke Education [x] YES [] NO    Obtain screening lower extremity venous ultrasound in patients who meet 1 or more of the following criteria as patient is high risk for DVT/PE on admission:   [] History of DVT/PE  []Hypercoagulable states (Factor V Leiden, Cancer, OCP, etc. )  []Prolonged immobility (hemiplegia/hemiparesis/post operative or any other extended immobilization)  [] Transferred from outside facility (Rehab or Long term care)  [] Age </= to 50

## 2023-07-27 NOTE — PROGRESS NOTE ADULT - PROBLEM SELECTOR PLAN 2
SBP   within parameters for now  c/w meds as ordered  low salt diet  continue to monitor
SBP   within parameters for now  c/w meds as ordered  low salt diet  continue to monitor

## 2023-07-27 NOTE — PROGRESS NOTE ADULT - SUBJECTIVE AND OBJECTIVE BOX
Subjective: Patient seen and examined. No new events except as noted.   OOB in chair.  Going home today.    REVIEW OF SYSTEMS:    CONSTITUTIONAL: + weakness, fevers or chills  EYES/ENT: No visual changes;  No vertigo or throat pain   NECK: No pain or stiffness  RESPIRATORY: No cough, wheezing, hemoptysis; No shortness of breath  CARDIOVASCULAR: No chest pain or palpitations  GASTROINTESTINAL: No abdominal or epigastric pain. No nausea, vomiting, or hematemesis; No diarrhea or constipation. No melena or hematochezia.  GENITOURINARY: No dysuria, frequency or hematuria  NEUROLOGICAL: No numbness or weakness  SKIN: No itching, burning, rashes, or lesions   All other review of systems is negative unless indicated above.    MEDICATIONS:  MEDICATIONS  (STANDING):  amLODIPine   Tablet 10 milliGRAM(s) Oral daily  aspirin enteric coated 81 milliGRAM(s) Oral daily  atorvastatin 80 milliGRAM(s) Oral at bedtime  carvedilol 25 milliGRAM(s) Oral every 12 hours  clopidogrel Tablet 75 milliGRAM(s) Oral every 24 hours  dextrose 5%. 1000 milliLiter(s) (100 mL/Hr) IV Continuous <Continuous>  dextrose 5%. 1000 milliLiter(s) (50 mL/Hr) IV Continuous <Continuous>  dextrose 50% Injectable 25 Gram(s) IV Push once  dextrose 50% Injectable 25 Gram(s) IV Push once  dextrose 50% Injectable 12.5 Gram(s) IV Push once  enoxaparin Injectable 40 milliGRAM(s) SubCutaneous every 24 hours  glucagon  Injectable 1 milliGRAM(s) IntraMuscular once  hydrALAZINE 10 milliGRAM(s) Oral two times a day  insulin lispro (ADMELOG) corrective regimen sliding scale   SubCutaneous at bedtime  insulin lispro (ADMELOG) corrective regimen sliding scale   SubCutaneous three times a day before meals  lisinopril 20 milliGRAM(s) Oral two times a day  melatonin 5 milliGRAM(s) Oral at bedtime  nicotine -  14 mG/24Hr(s) Patch 1 Patch Transdermal daily    PHYSICAL EXAM:  Vital Signs Last 24 Hrs  T(C): 36.4 (27 Jul 2023 11:31), Max: 37.1 (26 Jul 2023 13:55)  T(F): 97.6 (27 Jul 2023 11:31), Max: 98.8 (27 Jul 2023 00:17)  HR: 67 (27 Jul 2023 11:31) (66 - 92)  BP: 134/87 (27 Jul 2023 11:31) (134/87 - 177/102)  BP(mean): --  RR: 18 (27 Jul 2023 11:31) (17 - 20)  SpO2: 98% (27 Jul 2023 11:31) (93% - 100%)    Parameters below as of 27 Jul 2023 11:31  Patient On (Oxygen Delivery Method): room air    I&O's Summary    26 Jul 2023 07:01  -  27 Jul 2023 07:00  --------------------------------------------------------  IN: 0 mL / OUT: 520 mL / NET: -520 mL    Appearance: Normal	  HEENT:   Normal oral mucosa, PERRL, EOMI	  Lymphatic: No lymphadenopathy , no edema  Cardiovascular: Normal S1 S2, No JVD, No murmurs , Peripheral pulses palpable 2+ bilaterally  Respiratory: Lungs clear to auscultation, normal effort 	  Gastrointestinal:  Soft, Non-tender, + BS	  Skin: No rashes, No ecchymoses, No cyanosis, warm to touch  NEUROLOGICAL EXAM:  Mental status: Awake, alert, oriented x3, hypophonic, repetition intact, no neglect, normal memory, follows simple and complex commands  Cranial Nerves: No facial asymmetry, no nystagmus, mild dysarthria with gurgled speech,  tongue midline  Motor exam: Normal tone, RUE and RLE drifts 4/5, LUE 5/5, LLE 5/5  Sensation: Intact to light touch   Coordination/ Gait: No dysmetria, GIOVANNI intact and symmetric bilaterally  Ext: No edema    LABS:    CARDIAC MARKERS:                         15.7   7.64  )-----------( 230      ( 27 Jul 2023 05:11 )             48.1     07-27    141  |  106  |  25<H>  ----------------------------<  125<H>  3.8   |  23  |  1.28    Ca    9.0      27 Jul 2023 05:11    proBNP:   Lipid Profile:   HgA1c:   TSH:     TELEMETRY: SR	    ECG:  	  RADIOLOGY:   DIAGNOSTIC TESTING:  [ ] Echocardiogram:  [ ]  Catheterization:  [ ] Stress Test:    OTHER:

## 2023-07-27 NOTE — H&P ADULT - NS ATTEND AMEND GEN_ALL_CORE FT
I have personally seen and examined the patient with the NP. Medical records reviewed. I have made amendments to the documentation where necessary and adjusted the history, physical examination, and plan as documented by the NP.    44 yo morbidly obese RHM with multiple uncontrolled vascular risk factors - HTN, HLLD, DM, smoker, prior stroke, with recent acute left pontine infarct and right motor and sensory deficits - right sided hemiparesis, gait impairment , facial weakness with dysarthria and decreased sensation to LT on the right.     Admit to neurorehabilitation program  DAPT and statin for stroke ppx  Goal LDL<70  Goal HbA1c <7  BP control  Smoking cessation  Nutritional evaluation  Medicine input   Fall precautions

## 2023-07-27 NOTE — DISCHARGE NOTE NURSING/CASE MANAGEMENT/SOCIAL WORK - NSDCPEFALRISK_GEN_ALL_CORE
For information on Fall & Injury Prevention, visit: [email or web address] OR  [email or web address] OR  https://www.cdc.gov/steadi/patient.html

## 2023-07-27 NOTE — H&P ADULT - NSHPREVIEWOFSYSTEMS_GEN_ALL_CORE
REVIEW OF SYSTEMS  Constitutional: No fever, No Chills, No fatigue  HEENT: No eye pain, No visual disturbances, No difficulty hearing  Pulm: No cough,  No shortness of breath  Cardio: No chest pain, No palpitations  GI:  No abdominal pain, No nausea, No vomiting, No diarrhea, +constipation  : No dysuria, No frequency, No hematuria  Neuro: No headaches, No memory loss, +loss of strength, No numbness, No tremors  Skin: No itching, No rashes, No lesions   Endo: No temperature intolerance  MSK: No joint pain, No joint swelling, No muscle pain, No Neck or back pain  Psych:  No depression, No anxiety

## 2023-07-27 NOTE — H&P ADULT - NSHPLABSRESULTS_GEN_ALL_CORE
RECENT LABS/IMAGING                        15.7   7.64  )-----------( 230      ( 27 Jul 2023 05:11 )             48.1     07-27    141  |  106  |  25<H>  ----------------------------<  125<H>  3.8   |  23  |  1.28    Ca    9.0      27 Jul 2023 05:11        Urinalysis Basic - ( 27 Jul 2023 05:11 )    Color: x / Appearance: x / SG: x / pH: x  Gluc: 125 mg/dL / Ketone: x  / Bili: x / Urobili: x   Blood: x / Protein: x / Nitrite: x   Leuk Esterase: x / RBC: x / WBC x   Sq Epi: x / Non Sq Epi: x / Bacteria: x     MR Lumbar Spine No Cont (07.26.23 @ 17:06)     IMPRESSION:    MRI BRAIN  1. Evidence of a 1.1 cm acuteinfarct left inferior adrienne as well as smaller subcentimeter acute infarcts bilateral parieto-occipital juxtacortical white matter. No definitive evidence of associated hemorrhage.  2. Bilateral subcentimeter chronic lacunar infarcts with pontine and bihemispheric altered white matter signal; a nonspecific finding. Diagnostic considerations include, but are not limited to, chronic microvascular ischemic change, demyelinating and inflammatory etiologies.  3. Additional findings described in detail above.    MRI CERVICAL SPINE  1. No definitive evidence of demyelinating disease, intrathecal or paraspinal mass.  2. Straightening of the cervical lordosis can be seen in the setting of muscle spasm.  3. Multilevel cervical disc bulges. No cervical disc herniation or cord compression.  4. Additional findings above.    MRI LUMBAR SPINE  1. L4-L5 broad-based left foraminal disc herniation superimposed upon a   disc bulge, resulting in mild central canal, bilateral lateral recess and   left greater than right neural foramen stenosis with facet arthrosis,   abutting the left L4 nerve roots.  2. Partially imaged T11-T12 broad-based right paracentral disc   herniation, impinging upon the right ventral cord. Consider dedicated   imaging of the thoracic spine as warranted.  3. No clumping of the cauda equina nerve roots, intrathecal or paraspinal   mass visualized.  4. Additional findings above.

## 2023-07-27 NOTE — PROGRESS NOTE ADULT - PROBLEM SELECTOR PLAN 1
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 Severe stenosis of proximal L vertebral artery segment. No core/penumbra  pending MRI H  ASA/Plavix/Statin   ECHO - EF 58%  monitor on tele   management as per stroke team
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 Severe stenosis of proximal L vertebral artery segment. No core/penumbra  pending MRI H  ASA/Plavix/Statin   ECHO - EF 58%  monitor on tele   management as per stroke team

## 2023-07-27 NOTE — PROGRESS NOTE ADULT - SUBJECTIVE AND OBJECTIVE BOX
THE PATIENT WAS SEEN AND EXAMINED BY ME WITH THE HOUSESTAFF AND STROKE TEAM DURING MORNING ROUNDS.   HPI: Patient ROB OSHEA is a 43y (1980) man with a PMHx significant for T2DM, HTN, previous CVA (per patient 8 years ago, treated w/ TPA at [facility], p/w L hemiplegia, facial droop and dysarthria w/ minimal residual deficits), current smoker who presented to [facility] ED after two weeks of intermittent left lower extremity weakness with accompanying back pain similar to patient's sciatica pain, and 25+ hours of dysarthria. He stated he had episode of similar dysarthria 2 weeks ago as well that improved.     On examination, patient has mild dysarthria, accompanied by wife who states that patient's speech is noticeably more slurred than his baseline. Says that his previous stroke 8 years ago presented similarly, but was much more severe (weakness + speech changes/aphasia). Patient denies any recent fevers/chills, dysuria/hematuria, signs of infection, travel, sick contacts or trauma.     LKW: 7/10  Initial NIHSS: 1  mRS: 0  Not a Tenecteplase candidate given out of time window.       SUBJECTIVE: No events overnight.  No new neurologic complaints.      acetaminophen     Tablet .. 650 milliGRAM(s) Oral every 6 hours PRN  amLODIPine   Tablet 10 milliGRAM(s) Oral daily  aspirin enteric coated 81 milliGRAM(s) Oral daily  atorvastatin 80 milliGRAM(s) Oral at bedtime  carvedilol 25 milliGRAM(s) Oral every 12 hours  clopidogrel Tablet 75 milliGRAM(s) Oral every 24 hours  dextrose 5%. 1000 milliLiter(s) IV Continuous <Continuous>  dextrose 5%. 1000 milliLiter(s) IV Continuous <Continuous>  dextrose 50% Injectable 12.5 Gram(s) IV Push once  dextrose 50% Injectable 25 Gram(s) IV Push once  dextrose 50% Injectable 25 Gram(s) IV Push once  dextrose Oral Gel 15 Gram(s) Oral once PRN  enoxaparin Injectable 40 milliGRAM(s) SubCutaneous every 24 hours  glucagon  Injectable 1 milliGRAM(s) IntraMuscular once  hydrALAZINE 25 milliGRAM(s) Oral two times a day  insulin lispro (ADMELOG) corrective regimen sliding scale   SubCutaneous three times a day before meals  insulin lispro (ADMELOG) corrective regimen sliding scale   SubCutaneous at bedtime  lisinopril 20 milliGRAM(s) Oral two times a day  melatonin 5 milliGRAM(s) Oral at bedtime  nicotine -  14 mG/24Hr(s) Patch 1 Patch Transdermal daily      PHYSICAL EXAM:   Vital Signs Last 24 Hrs  T(C): 37 (27 Jul 2023 04:47), Max: 37.1 (26 Jul 2023 13:55)  T(F): 98.6 (27 Jul 2023 04:47), Max: 98.8 (27 Jul 2023 00:17)  HR: 87 (27 Jul 2023 06:00) (66 - 92)  BP: 160/89 (27 Jul 2023 04:47) (160/89 - 177/102)  RR: 18 (27 Jul 2023 04:47) (17 - 20)  SpO2: 96% (27 Jul 2023 06:00) (96% - 100%)    Parameters below as of 27 Jul 2023 04:47  Patient On (Oxygen Delivery Method): room air        General: No acute distress  HEENT: EOM intact, visual fields full  Abdomen: Soft, nontender, nondistended   Extremities: No edema    NEUROLOGICAL EXAM:  Mental status: Awake, alert, oriented x3, hypophonic, repetition intact, no neglect, normal memory, follows simple and complex commands  Cranial Nerves: No facial asymmetry, no nystagmus, mild dysarthria with gurgled speech, tongue midline  Motor exam: Normal tone, RUE and RLE drifts 4/5, LUE 5/5, LLE 5/5  Reflexes: Hyperreflexia on the left  Sensation: Intact to light touch   Coordination/ Gait: No dysmetria, GIOVANNI intact and symmetric bilaterally    LABS:                        15.7   7.64  )-----------( 230      ( 27 Jul 2023 05:11 )             48.1    07-27    141  |  106  |  25<H>  ----------------------------<  125<H>  3.8   |  23  |  1.28    Ca    9.0      27 Jul 2023 05:11  Phos  4.0     07-25  Mg     2.2     07-25          IMAGING: Reviewed by me.     MRI BRAIN 7/26  1. Evidence of a 1.1 cm acute infarct left inferior adrienne as well as smaller subcentimeter acute infarcts bilateral parieto-occipital juxtacortical white matter. No definitive evidence of associated hemorrhage.  2. Bilateral subcentimeter chronic lacunar infarcts with pontine and bihemispheric altered white matter signal; a nonspecific finding. Diagnostic considerations include, but are not limited to, chronic microvascular ischemic change, demyelinating and inflammatory etiologies.  3. Additional findings described in detail above.    MRI CERVICAL SPINE 7/26  1. No definitive evidence of demyelinating disease, intrathecal or paraspinal mass.  2. Straightening of the cervical lordosis can be seen in the setting of muscle spasm.  3. Multilevel cervical disc bulges. No cervical disc herniation or cord compression.  4. Additional findings above.    MRI LUMBAR SPINE 7/26  1. L4-L5 broad-based left foraminal disc herniation superimposed upon a disc bulge, resulting in mild central canal, bilateral lateral recess and left greater than right neural foramen stenosis with facet arthrosis, abutting the left L4 nerve roots.  2. Partially imaged T11-T12 broad-based right paracentral disc herniation, impinging upon the right ventral cord. Consider dedicated imaging of the thoracic spine as warranted.  3. No clumping of the cauda equina nerve roots, intrathecal or paraspinal mass visualized.  4. Additional findings above.    CT Head 7/24: No acute intracranial hemorrhage or acute territorial infarction.    CT Perfusion 7/24: No asymmetric or territorial perfusion   abnormality.    CTA Head 7/24: There is occlusion of the intradural right vertebral artery   segment and high-grade stenosis involving the intradural left vertebral   artery segment. Posterior circulation appears intact distally.    CTA Neck 7/24: Patent, ECAs, ICAs, no  hemodynamically significant stenosis at    ICA origins by NASCET criteria.  Bilateral vertebral arteries are patent without flow limiting stenosis.

## 2023-07-27 NOTE — PROGRESS NOTE ADULT - ASSESSMENT
43y (1980) man with a PMHx significant for T2DM, HTN, previous CVA (per patient 8 years ago, treated w/ TPA at [facility], p/w L hemiplegia, facial droop and dysarthria w/ minimal residual deficits), current smoker who presented to [facility] ED after two weeks of intermittent left lower extremity weakness with accompanying back pain similar to patient's sciatica pain, and 25+ hours of dysarthria.

## 2023-07-27 NOTE — H&P ADULT - HISTORY OF PRESENT ILLNESS
Patient  is a 44 YO man with a PMH of T2DM, HTN, previous CVA (per patient 8 years ago, treated with TPA at [facility], p/w L hemiplegia, facial droop and dysarthria with minimal residual deficits), current smoker who presented to [facility] ED on 7/24 after two weeks of intermittent left lower extremity weakness with accompanying back pain similar to patient's sciatica pain, and 25+ hours of dysarthria. He stated he had episode of similar dysarthria 2 weeks ago as well that improved.     On examination, patient has mild dysarthria, accompanied by wife who states that patient's speech is noticeably more slurred than his baseline. Says that his previous stroke 8 years ago presented similarly, but was much more severe (weakness + speech changes/aphasia).     Not a Tenecteplase candidate given out of time window. MRI Brain on 7/26 showed 1.1 cm acute infarct left inferior adrienne as well as smaller subcentimeter acute infarcts bilateral parieto-occipital juxtacortical white matter.    Impression:  Dysarthria and RLE weakness due to acute infarct in left inferior adrienne and smaller acute infarcts in bilateral parieto-occipital juxtacortical. Mechanism large artery atherosclerosis    Aspirin 81mg and Plavix 75mg daily for 3 months as per SAMMPRIS,  then Asa 81mg daily indefinitely     TTE: EF 58%, normal LA. , continue atorvastatin 80mg qhs,  titrate statin to LDL goal less than 70.  Cardiology consulted for blood pressure management, follow up outpatient to slowly taper from permissive hypertension to normotension (Lower SBP 10-20mmHg per week). Seen by psychology for depression/suicidality (now resolved), f/u outpatient at [facility].    Patient was evaluated by PM&R and therapy for functional deficits, gait/ADL impairments and acute rehabilitation was recommended. Patient was medically optimized for discharge to [facility] IRU on 7/27/23.     Patient  is a 42 YO man with a PMH of T2DM, HTN, previous CVA (per patient 8 years ago, treated with TPA at [facility], p/w L hemiplegia, facial droop and dysarthria with minimal residual deficits), current smoker who presented to [facility] ED on 7/24 after two weeks of intermittent left lower extremity weakness with accompanying back pain similar to patient's sciatica pain, and 25+ hours of dysarthria. He stated he had episode of similar dysarthria 2 weeks ago as well that improved.     On examination, patient has mild dysarthria, accompanied by wife who states that patient's speech is noticeably more slurred than his baseline. Says that his previous stroke 8 years ago presented similarly, but was much more severe (weakness + speech changes/aphasia).     Not a Tenecteplase candidate given out of time window. MRI Brain on 7/26 showed 1.1 cm acute infarct left inferior adrienne as well as smaller subcentimeter acute infarcts bilateral parieto-occipital juxtacortical white matter.    Impression:  Dysarthria and RLE weakness due to acute infarct in left inferior adrienne and smaller acute infarcts in bilateral parieto-occipital juxtacortical. Mechanism large artery atherosclerosis    Aspirin 81mg and Plavix 75mg daily for 3 months as per SAMMPRIS,  then Asa 81mg daily indefinitely     TTE: EF 58%, normal LA. , continue atorvastatin 80mg qhs,  titrate statin to LDL goal less than 70.  Cardiology consulted for blood pressure management, follow up outpatient to slowly taper from permissive hypertension to normotension (Lower SBP 10-20mmHg per week). Seen by psychology for depression/suicidality (now resolved), f/u outpatient at [facility].    Patient was evaluated by PM&R and therapy for functional deficits, gait/ADL impairments and acute rehabilitation was recommended. Patient was medically optimized for discharge to [facility] IRU on 7/27/23.

## 2023-07-27 NOTE — PATIENT PROFILE ADULT - FALL HARM RISK - RISK INTERVENTIONS
Assistance OOB with selected safe patient handling equipment/Assistance with ambulation/Communicate Fall Risk and Risk Factors to all staff, patient, and family/Discuss with provider need for PT consult/Monitor gait and stability/Provide patient with walking aids - walker, cane, crutches/Reinforce activity limits and safety measures with patient and family/Review medications for side effects contributing to fall risk/Sit up slowly, dangle for a short time, stand at bedside before walking/Toileting schedule using arm’s reach rule for commode and bathroom/Visual Cue: Yellow wristband/Bed in lowest position, wheels locked, appropriate side rails in place/Call bell, personal items and telephone in reach/Instruct patient to call for assistance before getting out of bed or chair/Non-slip footwear when patient is out of bed/[place] to call system/Physically safe environment - no spills, clutter or unnecessary equipment/Purposeful Proactive Rounding/Room/bathroom lighting operational, light cord in reach

## 2023-07-27 NOTE — H&P ADULT - NSHPPHYSICALEXAM_GEN_ALL_CORE
PHYSICAL EXAMINATION   VItals: T(C): 37.1 (07-27-23 @ 13:35), Max: 37.1 (07-27-23 @ 00:17)  HR: 77 (07-27-23 @ 13:35) (66 - 92)  BP: 144/87 (07-27-23 @ 13:35) (134/87 - 177/102)  RR: 18 (07-27-23 @ 13:35) (17 - 20)  SpO2: 97% (07-27-23 @ 13:35) (93% - 98%)    General: NAD, Resting Comfortable,                                  HEENT: NC/AT, EOM I, PERRLA, Normal Conjunctivae  Cardio: RRR, Normal S1-S2, No M/G/R                              Pulm: No Respiratory Distress,  Lungs CTAB                        Abdomen: ND/NT, Soft, BS+                                                MSK: No joint swelling, Decreased ROM  on right arm/leg                                  Ext: No C/C/E, Pulses 2+ throughout, No calf tenderness    Skin:  all skin intact                                                                 Wounds: none  Decubitus Ulcers: None Present     Neurological Examination    Cognitive: AAO x 3                                                                         Attention: Intact   Judgment: Good evidence of judgement                               Memory: Recall 3 objects immediate and only 2 objects at 3 min later      Mood/Affect: wnl                                                                           Communication:  Fluent,  +dysarthria, hypophonic   Swallow: intact  Coordination: FTN/HTS intact  on left arm/leg, impaired on right arm/leg                                                                            Sensory: Intact to light touch                                                                                             Tone: normal Throughout   Balance: impaired    Motor    LEFT    UE: SF [5/5], EF [5/5], EE [5/5], WE [5/5],  [wnl]  RIGHT UE: SF [4/5], EF [4/5], EE [4/5], WE [4/5],  4/5  LEFT    LE:  HF [5/5], KE [5/5], DF [5/5], EHL [5/5],  PF [5/5]  RIGHT LE:  HF [4/5], KE [4/5], DF [3/5], EHL [3/5],  PF [2/5]      Reflex:  2 + thoroughout

## 2023-07-27 NOTE — DISCHARGE NOTE NURSING/CASE MANAGEMENT/SOCIAL WORK - PATIENT PORTAL LINK FT
You can access the FollowMyHealth Patient Portal offered by [facility] by registering at the following website: [email or web address]. By joining [employer]’s FollowMyHealth portal, you will also be able to view your health information using other applications (apps) compatible with our system.

## 2023-07-28 NOTE — PROGRESS NOTE ADULT - ASSESSMENT
ASSESSMENT/PLAN  Patient  is a 44 YO man with a PMH of T2DM, HTN, previous CVA with minimal residual deficits, current smoker who presented to [facility] ED on 7/24 after two weeks of intermittent left lower extremity weakness with accompanying back pain,  and dysarthria. He was found to have acute infarct in left inferior adrienne and smaller acute infarcts in bilateral parieto-occipital juxtacortical . Hospital course s/f HTN, depression and suicidal ideation seen and cleared by psychologist. Patient now with gait Instability, ADL impairments and Functional impairments.    #CVA   - Dysarthria and RLE weakness due to acute infarct in left inferior adrienne and smaller acute infarcts in bilateral parieto-occipital juxtacortical. Mechanism large artery atherosclerosis  - Start Comprehensive Rehab Program: PT/OT/ST, 3hours daily and 5 days weekly  - PT: Focused on improving strength, endurance, coordination, balance, functional mobility, and transfers  - OT: Focused on improving strength, fine motor skills, coordination, posture and ADLs.    - ST: to diagnose and treat deficits in swallowing, cognition and communication.   - ASA 81mg and Plavix 75mg daily for 3 months, then ASA alone    #HTN  - Carvedilol 25mg Q 12hrs  - Amlodipine 10mg daily  - Hydralazine 25mg BID  - Lisinopril 20mg daily    #HLD  - Lipitor 80mg daily    #DM II  - ISS and FS  - Admelog and Lantus  - A1c 7.0 on 7/25     #Pain management  - Tylenol PRN    #DVT ppx  - Lovenox, SCD, TEDs    #Bowel Regimen  - Senna, miralax PRN    #Bladder management  - BS on admission, and q 8 hours (SC if > 400)  - Monitor UO    #FEN   - Diet: Pureed    #Skin:  - Skin on admission: grossly intact  - Pressure injury/Skin: Turn Q2hrs while in bed, OOB to Chair, PT/OT     #Current Smoker  - Nicotine patch daily  - smoking cessation education    #Dysphagia    - SLP: evaluation and treatment    #Mood/Cognition  - Depression with suicidal ideation while in acute hospital  - Neuropsychology consult   - Psychiatry evaluation called   - outpatient at [facility].    #Sleep:   - Maintain quiet hours and low stim environment.  - Melatonin PRN to maximize participation in therapy during the day.     #Precaution  - Fall, Aspiration, cardiac     ASSESSMENT/PLAN  Patient  is a 44 YO man with a PMH of T2DM, HTN, previous CVA with minimal residual deficits, current smoker who presented to [facility] ED on 7/24 after two weeks of intermittent left lower extremity weakness with accompanying back pain,  and dysarthria. He was found to have acute infarct in left inferior adrienne and smaller acute infarcts in bilateral parieto-occipital juxtacortical . Hospital course s/f HTN, depression and suicidal ideation seen and cleared by psychologist. Patient now with gait Instability, ADL impairments and Functional impairments.    #CVA   - Dysarthria and RLE weakness due to acute infarct in left inferior adrienne and smaller acute infarcts in bilateral parieto-occipital juxtacortical. Mechanism large artery atherosclerosis  - Start Comprehensive Rehab Program: PT/OT/ST, 3hours daily and 5 days weekly  - PT: Focused on improving strength, endurance, coordination, balance, functional mobility, and transfers  - OT: Focused on improving strength, fine motor skills, coordination, posture and ADLs.    - ST: to diagnose and treat deficits in swallowing, cognition and communication.   - ASA 81mg and Plavix 75mg daily for 3 months, then ASA alone    #HTN  - Carvedilol 25mg Q 12hrs  - Amlodipine 10mg daily  - Hydralazine 25mg BID  - Lisinopril 20mg daily    #HLD  - Lipitor 80mg daily    #DM II  - ISS and FS  - Admelog and Lantus  - A1c 7.0 on 7/25     #Pain management  - Tylenol PRN    #DVT ppx  - Lovenox, SCD, TEDs    #Bowel Regimen  - Senna, miralax PRN    #Bladder management  - BS on admission, and q 8 hours (SC if > 400)  - Monitor UO    #FEN   - Diet: Pureed    #Skin:  - Skin on admission: grossly intact  - Pressure injury/Skin: Turn Q2hrs while in bed, OOB to Chair, PT/OT     #Current Smoker  - Nicotine patch daily  - smoking cessation education    #Dysphagia    - SLP: evaluation and treatment    #Mood/Cognition  - Depression with suicidal ideation while in acute hospital  - Neuropsychology consult   - Psychiatry evaluation called - case discussed   - emotional support provided,  Lexapro started for enhanced motor recovery and mood modification   - outpatient at [facility].    #Sleep:   - Maintain quiet hours and low stim environment.  - Melatonin PRN to maximize participation in therapy during the day.     #Precaution  - Fall, Aspiration, cardiac      Case discussed with Medicine, input appreciated will follow recommendations

## 2023-07-28 NOTE — BH CONSULTATION LIAISON ASSESSMENT NOTE - NSSUICPROTFACT_PSY_ALL_CORE
Responsibility to children, family, or others/Identifies reasons for living/Supportive social network of family or friends/Cultural, spiritual and/or moral attitudes against suicide/Engaged in work or school/Positive therapeutic relationships/Ability to cope with stress/Adventism beliefs

## 2023-07-28 NOTE — DIETITIAN INITIAL EVALUATION ADULT - FLUID ACCUMULATION
No edema noted per nursing flowsheets  Reassured the patient that her acne looks to be well controlled. Explained the importance of being diligent with the topical medications. As the patient’s acne is well controlled, advised her to discontinue use of Bactrim DS as this time. Should the patient notice her acne getting worse she can initiate Bactrim DS until clear. The patient should not layer her topical medication. She will continue use of Benzaclin in am and Tretinoin at hs. Detail Level: Zone

## 2023-07-28 NOTE — BH CONSULTATION LIAISON ASSESSMENT NOTE - CURRENT MEDICATION
MEDICATIONS  (STANDING):  amLODIPine   Tablet 10 milliGRAM(s) Oral daily  aspirin enteric coated 81 milliGRAM(s) Oral daily  atorvastatin 80 milliGRAM(s) Oral at bedtime  carvedilol 25 milliGRAM(s) Oral every 12 hours  clopidogrel Tablet 75 milliGRAM(s) Oral daily  dextrose 5%. 1000 milliLiter(s) (50 mL/Hr) IV Continuous <Continuous>  dextrose 5%. 1000 milliLiter(s) (100 mL/Hr) IV Continuous <Continuous>  dextrose 50% Injectable 25 Gram(s) IV Push once  dextrose 50% Injectable 12.5 Gram(s) IV Push once  dextrose 50% Injectable 25 Gram(s) IV Push once  enoxaparin Injectable 40 milliGRAM(s) SubCutaneous every 24 hours  escitalopram 5 milliGRAM(s) Oral daily  glucagon  Injectable 1 milliGRAM(s) IntraMuscular once  hydrALAZINE 25 milliGRAM(s) Oral two times a day  insulin lispro (ADMELOG) corrective regimen sliding scale   SubCutaneous at bedtime  insulin lispro (ADMELOG) corrective regimen sliding scale   SubCutaneous three times a day before meals  lisinopril 20 milliGRAM(s) Oral two times a day  melatonin 6 milliGRAM(s) Oral at bedtime  nicotine -  14 mG/24Hr(s) Patch 1 Patch Transdermal daily  polyethylene glycol 3350 17 Gram(s) Oral daily  senna 2 Tablet(s) Oral at bedtime    MEDICATIONS  (PRN):  acetaminophen     Tablet .. 650 milliGRAM(s) Oral every 6 hours PRN Moderate Pain (4 - 6), Severe Pain (7 - 10)  dextrose Oral Gel 15 Gram(s) Oral once PRN Blood Glucose LESS THAN 70 milliGRAM(s)/deciliter

## 2023-07-28 NOTE — BH CONSULTATION LIAISON ASSESSMENT NOTE - NSBHCHARTREVIEWVS_PSY_A_CORE FT
OFFICE VISIT      Patient: Ammy Chris Date of Service: 2022    : 1967 MRN: [id number]     SUBJECTIVE:     Chief Complaint   Patient presents with   • Annual Exam     Physical w/pap       HISTORY OF PRESENT ILLNESS:  Ammy Chris is a 55 year old female who presents today for annual physical exam.    Patient has given consent to record this visit for documentation in their clinical record.    Screening for colorectal cancer   Due for colon cancer screening.    Visit for screening mammogram   Due for mammogram.    Polycystic kidney; Frequent UTI   Had recurrent 3 episodes of UTI recently. Had vomiting on 2022 followed by body aches. Had CVA tenderness. Had IV fluids, Zofran and antibiotics at the hospital. Baseline creatinine runs between 1.5-1.9. No new complaints now. Has scheduled appointment to see nephrologist. Has lost alot of weight.     Physical exam, routine   Cervical cancer screening: due for Pap smear.    Bipolar affective disorder, remission status unspecified (CMS/HCC)  Is on Bupropion 300 mg, Lamictal 300 mg, Seroquel 100 mg.      PAST MEDICAL HISTORY:  Past Medical History:   Diagnosis Date   • Polycystic kidney disease        MEDICATIONS:  Current Outpatient Medications   Medication Sig   • ondansetron (Zofran ODT) 4 MG disintegrating tablet Place 1 tablet onto the tongue every 6 hours.   • cholecalciferol (VITAMIN D) 25 mcg (1,000 units) tablet    • estradiol (ESTRACE) 0.1 MG/GM vaginal cream USE VAGINALLY 2 TIMES A WEEK AS DIRECTED   • nitrofurantoin, macrocrystal-monohydrate, (MACROBID) 100 MG capsule Take 1 capsule by mouth 2 times daily.   • LamoTRIgine (LAMICTAL XR) 300 MG 24 hr tablet    • buPROPion (WELLBUTRIN XL) 300 MG 24 hr tablet Take 300 mg by mouth daily.   • QUEtiapine (SEROQUEL) 100 MG tablet Take 100 mg by mouth 2 times daily.     No current facility-administered medications for this visit.       ALLERGIES:  ALLERGIES:  No Known Allergies    PAST SURGICAL HISTORY:  No  past surgical history on file.    FAMILY HISTORY:  No family history on file.    SOCIAL HISTORY:  Social History     Tobacco Use   Smoking Status Never Smoker   Smokeless Tobacco Never Used     Social History     Substance and Sexual Activity   Alcohol Use No       Review of Systems        OBJECTIVE:     Visit Vitals  /64   Pulse 92   Temp 96.9 °F (36.1 °C)   Ht 4' 11\"   Wt 70.3 kg (155 lb)   LMP  (LMP Unknown)   SpO2 99%   BMI 31.31 kg/m²       Physical Exam      Constitutional: alert, in no acute distress and current vital signs reviewed.   Head and Face: atraumatic, no deformities, normocephalic, normal facies.   Eyes: no discharge, normal conjunctiva, no eyelid swelling, no ptosis and the sclerae were normal. Pupils equal, round and reactive to light and accommodation, conjugate gaze and extraocular movements were intact.   ENT: normal appearing outer ear, normal appearing nose. Examination of the tympanic membrane showed normal landmarks, normal appearing external canal. Nasal mucosa moist and pink, no nasal discharge. Normal lips. Oral mucosa pink and moist, no oral lesions.   Neck: normal appearing neck, supple neck and no mass was seen. Thyroid not enlarged and no thyroid nodules. No lymphadenopathy.   Pulmonary: no respiratory distress, normal respiratory rate and effort and no accessory muscle use. Breath sounds clear to auscultation bilaterally.   Cardiovascular: normal rate, no murmurs were heard, regular rhythm, normal S1 and normal S2. Edema was not present in the lower extremities.   Abdomen: soft, nontender, nondistended, normal bowel sounds and no abdominal mass. No hepatomegaly and no splenomegaly. No umbilical hernia was discovered.   Musculoskeletal: normal gait. No musculoskeletal erythema was seen, no joint swelling seen and no joint tenderness was elicited. No scoliosis. Normal range of motion. There was no joint instability noted. Muscle strength and tone were normal.   Neurologic:  cranial nerves grossly intact. Normal DTRs. No sensory deficits noted. No coordination deficits.  Psychiatric: oriented to person, oriented to place and oriented to time. Alert and awake, interactive and mood/affect were appropriate. Judgment not impaired. Normal attention span. Short term memory intact.    Skin, Hair, Nails: normal skin color and pigmentation and no rash. No foot ulcers and no skin ulcer was seen. Normal skin turgor. No clubbing or cyanosis of the fingernails.  Pelvic: No inguinal adenopathy. External genitalia normal female, BUS are normal. Vagina without lesion, cervix without lesion. Bimanual exam reveals the uterus to be normal size, mobile, nontender. Ovaries palpably normal. No obvious adnexal masses or tenderness.      DIAGNOSTIC STUDIES:   LAB RESULTS:    History on 09/08/2022   Component Date Value Ref Range Status   • HM COLONOSCOPY 10/20/2016 Normal   Final         ASSESSMENT AND PLAN:   This is a 55 year old year-old female who presents with     1. Screening for colorectal cancer    2. Visit for screening mammogram    3. Polycystic kidney    4. Physical exam, routine    5. Bipolar affective disorder, remission status unspecified (CMS/HCC)    6. Frequent UTI      Screening for colorectal cancer   Advised undergo colonoscopy.    Visit for screening mammogram   Ordered bilateral screening mammogram.    Polycystic kidney; Frequent UTI   Reviewed and discussed previous reports.       Ordered USG kidney bilateral.  Referred to urologist.  Advised to drink plenty of water.  Follow-up with nephrologist.  After sexual activity urinate and clean it with wet wipes.  She is prone to UTI due to cyst in kidney.  Advised to be on low carb diet.    Physical exam, routine   Ordered and collected Pap smear.    Bipolar affective disorder, remission status unspecified (CMS/HCC)  Controlled.  Continue Bupropion 300 mg, Lamictal 300 mg, Seroquel 100 mg.       Return in about 1 year (around  9/8/2023).    Instructions provided as documented in the AVS.  Medication use,effects and side effects discussed in detail with patient.  The patient indicated understanding of the diagnosis and agreed with the plan of care.  Medical compliance with plan discussed and risks of non-compliance reviewed.    Patient education completed on disease process, etiology & prognosis.    Patient expresses understanding of the plan.    Proper usage and side effects of medications reviewed & discussed.    Refer to orders.    Return to clinic as clinically indicated as discussed with patient who verbalized understanding of & agreement with the plan.      IHannah, have created a visit summary document based on the audio recording between Teodora Parnell MD and this patient for the physician to review, edit as needed, and authenticate.  Creation Date: 9/9/2022  I have reviewed and edited the visit summary above and attest that it is accurate.  I am the author of this note       Vital Signs Last 24 Hrs  T(C): 36.7 (28 Jul 2023 09:15), Max: 37.1 (27 Jul 2023 13:35)  T(F): 98 (28 Jul 2023 09:15), Max: 98.7 (27 Jul 2023 13:35)  HR: 72 (28 Jul 2023 09:15) (65 - 90)  BP: 143/94 (28 Jul 2023 09:15) (135/85 - 155/93)  BP(mean): --  RR: 16 (28 Jul 2023 09:15) (16 - 18)  SpO2: 98% (28 Jul 2023 09:15) (95% - 98%)    Parameters below as of 28 Jul 2023 09:15  Patient On (Oxygen Delivery Method): room air

## 2023-07-28 NOTE — BH CONSULTATION LIAISON ASSESSMENT NOTE - NSICDXBHSECONDARYDX_PSY_ALL_CORE
Acute cerebrovascular accident (CVA)   I63.9   Acute cerebrovascular accident (CVA)   I63.9  Continuous cannabis use   F12.90  Cigarette smoker   F17.210

## 2023-07-28 NOTE — BH CONSULTATION LIAISON ASSESSMENT NOTE - NSBHCHARTREVIEWINVESTIGATE_PSY_A_CORE FT
< from: 12 Lead ECG (07.26.23 @ 11:47) >  Ventricular Rate 80 BPM  Atrial Rate 80 BPM  P-R Interval 194 ms  QRS Duration 104 ms  Q-T Interval 412 ms  QTC Calculation(Bazett) 475 ms  P Axis 42 degrees  R Axis 8 degrees  T Axis 65 degrees  Diagnosis Line NORMAL SINUS RHYTHM  POSSIBLE LEFT ATRIAL ENLARGEMENT  MINIMAL VOLTAGE CRITERIA FOR LVH, MAY BE NORMAL VARIANT ( [employer] product )  SEPTAL INFARCT , AGE UNDETERMINED  ABNORMAL ECG  WHEN COMPARED WITH ECG OF 24-JUL-2023 12:57, (UNCONFIRMED)  NO SIGNIFICANT CHANGE WAS FOUND  Confirmed by MD RASHI, FALLON ([id number]) on 7/27/2023 8:59:32 PM  < end of copied text >    < from: MR Head No Cont (07.26.23 @ 17:06) >  IMPRESSION:  MRI BRAIN  1. Evidence of a 1.1 cm acuteinfarct left inferior adrienne as well as   smaller subcentimeter acute infarcts bilateral parieto-occipital   juxtacortical white matter. No definitive evidence of associated   hemorrhage.  2. Bilateral subcentimeter chronic lacunar infarcts with pontine and   bihemispheric altered white matter signal; a nonspecific finding.   Diagnostic considerations include, but are not limited to, chronic   microvascular ischemic change, demyelinating and inflammatory etiologies.  3. Additional findings described in detail above.  < end of copied text >

## 2023-07-28 NOTE — BH CONSULTATION LIAISON ASSESSMENT NOTE - DESCRIPTION
Patient identifies with Taoist gume and characterizes himself as spiritual. Hobbies including playing tennis, listening to music and playing with daughter. Patient has had to deal with debt with thought of filing for bankruptcy in the past. He receives financial support from his parents to pay off debt. He is employed as a  in electrical supply sales.

## 2023-07-28 NOTE — BH CONSULTATION LIAISON ASSESSMENT NOTE - NSBHCONSULTRECOMMENDOTHER_PSY_A_CORE FT
Agree with Lexapro 5mg PO Q daily for brain recovery, depression and anxiety sx. Can increase to 10mg PO Q daily after 1 week if tolerated. Monitor for SE (nausea, hyponatremia). Provided supportive psychotherapy and psychoeducation.     Pt could benefit from recreational therapy if OKAY with primary rehab team.    Agree with Lexapro 5mg PO Q daily for brain recovery, depression and anxiety sx. Can increase to 10mg PO Q daily after 1 week if tolerated. Monitor for SE (nausea, hyponatremia). Trazodone 25mg-50mg PO Q HS PRN for insomnia. If no response to Trazodone, trial Mirtazapine 7.5mg PO Q HS Provided supportive psychotherapy and psychoeducation.     Pt could benefit from recreational therapy if OKAY with primary rehab team.    Agree with Nicotine patch.   Agree with Lexapro 5mg PO Q daily for brain recovery, depression and anxiety sx. Can increase to 10mg PO Q daily after 1 week if tolerated. Monitor for SE (nausea, hyponatremia). Trazodone 25mg-50mg PO Q HS PRN for insomnia. If no response to Trazodone, trial Mirtazapine 7.5mg PO Q HS Provided supportive psychotherapy and psychoeducation.     Pt could benefit from recreational therapy if OKAY with primary rehab team.

## 2023-07-28 NOTE — DIETITIAN INITIAL EVALUATION ADULT - ORAL INTAKE PTA/DIET HISTORY
PTA patient reports good appetite/intake eating 3 balanced meals a day. Does not follow a Consistent Carbohydrate diet at home. NKFA nor food intolerances reported

## 2023-07-28 NOTE — DIETITIAN INITIAL EVALUATION ADULT - PERTINENT LABORATORY DATA
07-27    141  |  106  |  25<H>  ----------------------------<  125<H>  3.8   |  23  |  1.28    Ca    9.0      27 Jul 2023 05:11    POCT Blood Glucose.: 146 mg/dL (07-28-23 @ 12:10)  A1C with Estimated Average Glucose Result: 7.0 % (07-25-23 @ 10:18)  A1C with Estimated Average Glucose Result: 6.8 % (07-25-23 @ 07:12)

## 2023-07-28 NOTE — CHART NOTE - NSCHARTNOTEFT_GEN_A_CORE
REHABILITATION DIAGNOSIS/IMPAIRMENT GROUP CODE:  CVA    COMORBIDITIES/COMPLICATING CONDITIONS IMPACTING REHABILITATION:  HEALTH ISSUES - PROBLEM Dx:        PAST MEDICAL & SURGICAL HISTORY:  DM (diabetes mellitus)      HTN (hypertension)      CVA (cerebrovascular accident)          Based upon consideration of the patient's impairments, functional status, complicating conditions and any other contributing factors and after information garnered from the assessments of all therapy disciplines involved in treating the patient and other pertinent clinicians:    INTERDISCIPLINARY REHABILITATION INTERVENTIONS:    [ X  ] Transfer Training  [X   ] Bed Mobility  [ X  ] Therapeutic Exercise  [ X  ] Balance/Coordination Exercises  [ X  ] Locomotion retraining  [ X  ] Stairs  [ X  ] Functional Transfer Training  [ X  ] Bowel/Bladder program  [  X ] Pain Management  [ X  ] Skin/Wound Care  [X   ] Visual/Perceptual Training  [X   ] Therapeutic Recreation Activities  [  X ] Neuromuscular Re-education  [  X ] Activities of Daily Living  [ X  ] Speech Exercise  [   ] Swallowing Exercises  [   ] Vital Stim  [   ] Dietary Supplements  [ X  ] Calorie Count  [X   ] Cognitive Exercises  [  X ] Congnitive/Linguistic Treatment  [   ] Behavior Program  [  X ] Neuropsych Therapy  [ X  ] Patient/Family Counseling  [ X  ] Family Training  [ X  ] Community Re-entry  [   ] Orthotic Evaluation  [   ] Prosthetic Eval/Training    MEDICAL PROGNOSIS:  Good     REHAB POTENTIAL:  Good     EXPECTED DAILY THERAPY:         PT: 1hr/day       OT: 1hr/day       ST: 1hr/day        EXPECTED INTENSITY OF PROGRAM:  3 hrs/day    EXPECTED FREQUENCY OF PROGRAM:  5 days/week    ESTIMATED LOS:  10-14 days    ESTIMATED DISPOSITION:  home    INTERDISCIPLINARY FUNCTIONAL OUTCOMES/GOALS:           Gait/Mobility: Mod I       Transfers: Supervision       ADLs: Supervision       Functional Transfers: Min Assist       Medication Management: Independent       Communication: Supervision       Cognitive: Min Assist       Dysphagia: Supervision       Bladder: Supervision       Bowel: Supervision

## 2023-07-28 NOTE — BH CONSULTATION LIAISON ASSESSMENT NOTE - RISK ASSESSMENT
Risk factors: static (male, medical illness), modifiable (depressed mood)  Protective factors: responsibility to family including daughter, financial and emotional support from family, stable housing/employment, spiritual gume, and motivation to recover.   Access to lethal means: no  Overall risk: low acute suicide risk given multiple protective factors and no acute safety concerns expressed by collateral

## 2023-07-28 NOTE — PROGRESS NOTE ADULT - SUBJECTIVE AND OBJECTIVE BOX
CHIEF COMPLAINT: no acute events overnight, emotional , no new complaints , adjusting well to the program       HISTORY OF PRESENT ILLNESS    Patient  is a 44 YO man with a PMH of T2DM, HTN, previous CVA (per patient 8 years ago, treated with TPA at [facility], p/w L hemiplegia, facial droop and dysarthria with minimal residual deficits), current smoker who presented to [facility] ED on 7/24 after two weeks of intermittent left lower extremity weakness with accompanying back pain similar to patient's sciatica pain, and 25+ hours of dysarthria. He stated he had episode of similar dysarthria 2 weeks ago as well that improved.     On examination, patient has mild dysarthria, accompanied by wife who states that patient's speech is noticeably more slurred than his baseline. Says that his previous stroke 8 years ago presented similarly, but was much more severe (weakness + speech changes/aphasia).     Not a Tenecteplase candidate given out of time window. MRI Brain on 7/26 showed 1.1 cm acute infarct left inferior adrienne as well as smaller subcentimeter acute infarcts bilateral parieto-occipital juxtacortical white matter.    Impression:  Dysarthria and RLE weakness due to acute infarct in left inferior adrienne and smaller acute infarcts in bilateral parieto-occipital juxtacortical. Mechanism large artery atherosclerosis    Aspirin 81mg and Plavix 75mg daily for 3 months as per AMARJITMPRIS,  then Asa 81mg daily indefinitely     TTE: EF 58%, normal LA. , continue atorvastatin 80mg qhs,  titrate statin to LDL goal less than 70.  Cardiology consulted for blood pressure management, follow up outpatient to slowly taper from permissive hypertension to normotension (Lower SBP 10-20mmHg per week). Seen by psychology for depression/suicidality (now resolved), f/u outpatient at [facility].    Patient was evaluated by PM&R and therapy for functional deficits, gait/ADL impairments and acute rehabilitation was recommended. Patient was medically optimized for discharge to [facility] IRU on 7/27/23.     (27 Jul 2023 13:26)        PAST MEDICAL & SURGICAL HISTORY:  DM (diabetes mellitus)      HTN (hypertension)      CVA (cerebrovascular accident)        VITALS  Vital Signs Last 24 Hrs  T(C): 36.7 (28 Jul 2023 09:15), Max: 37.1 (27 Jul 2023 13:35)  T(F): 98 (28 Jul 2023 09:15), Max: 98.7 (27 Jul 2023 13:35)  HR: 72 (28 Jul 2023 09:15) (65 - 90)  BP: 143/94 (28 Jul 2023 09:15) (134/87 - 155/93)  BP(mean): --  RR: 16 (28 Jul 2023 09:15) (16 - 18)  SpO2: 98% (28 Jul 2023 09:15) (95% - 98%)    Parameters below as of 28 Jul 2023 09:15  Patient On (Oxygen Delivery Method): room air          PHYSICAL EXAM  Constitutional - NAD, Comfortable  HEENT - NCAT, EOMI  Neck - Supple, No limited ROM  Chest - CTA bilaterally  Cardiovascular - RRR, S1S2  Abdomen -  Soft, NTND  Extremities - No calf tenderness   Skin-no rash  Neurologic Exam - no new focal deficits                       RECENT LABS                        15.7   7.64  )-----------( 230      ( 27 Jul 2023 05:11 )             48.1     07-27    141  |  106  |  25<H>  ----------------------------<  125<H>  3.8   |  23  |  1.28    Ca    9.0      27 Jul 2023 05:11          Urinalysis Basic - ( 27 Jul 2023 05:11 )    Color: x / Appearance: x / SG: x / pH: x  Gluc: 125 mg/dL / Ketone: x  / Bili: x / Urobili: x   Blood: x / Protein: x / Nitrite: x   Leuk Esterase: x / RBC: x / WBC x   Sq Epi: x / Non Sq Epi: x / Bacteria: x                  Urinalysis Basic - ( 27 Jul 2023 05:11 )    Color: x / Appearance: x / SG: x / pH: x  Gluc: 125 mg/dL / Ketone: x  / Bili: x / Urobili: x   Blood: x / Protein: x / Nitrite: x   Leuk Esterase: x / RBC: x / WBC x   Sq Epi: x / Non Sq Epi: x / Bacteria: x        CURRENT MEDICATIONS  MEDICATIONS  (STANDING):  amLODIPine   Tablet 10 milliGRAM(s) Oral daily  aspirin enteric coated 81 milliGRAM(s) Oral daily  atorvastatin 80 milliGRAM(s) Oral at bedtime  carvedilol 25 milliGRAM(s) Oral every 12 hours  clopidogrel Tablet 75 milliGRAM(s) Oral daily  dextrose 5%. 1000 milliLiter(s) (50 mL/Hr) IV Continuous <Continuous>  dextrose 5%. 1000 milliLiter(s) (100 mL/Hr) IV Continuous <Continuous>  dextrose 50% Injectable 25 Gram(s) IV Push once  dextrose 50% Injectable 12.5 Gram(s) IV Push once  dextrose 50% Injectable 25 Gram(s) IV Push once  enoxaparin Injectable 40 milliGRAM(s) SubCutaneous every 24 hours  glucagon  Injectable 1 milliGRAM(s) IntraMuscular once  hydrALAZINE 25 milliGRAM(s) Oral two times a day  insulin lispro (ADMELOG) corrective regimen sliding scale   SubCutaneous at bedtime  insulin lispro (ADMELOG) corrective regimen sliding scale   SubCutaneous three times a day before meals  lisinopril 20 milliGRAM(s) Oral two times a day  melatonin 6 milliGRAM(s) Oral at bedtime  nicotine -  14 mG/24Hr(s) Patch 1 Patch Transdermal daily  polyethylene glycol 3350 17 Gram(s) Oral daily  senna 2 Tablet(s) Oral at bedtime    MEDICATIONS  (PRN):  acetaminophen     Tablet .. 650 milliGRAM(s) Oral every 6 hours PRN Moderate Pain (4 - 6), Severe Pain (7 - 10)  dextrose Oral Gel 15 Gram(s) Oral once PRN Blood Glucose LESS THAN 70 milliGRAM(s)/deciliter

## 2023-07-28 NOTE — BH CONSULTATION LIAISON ASSESSMENT NOTE - SUMMARY
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 Psych consulted on 7/25 for SI and depression at [facility].   Pt now transferred to [facility] for acute rehab services and psychiatry was asked to see patient for depression.   Primary rehab team started patient on Lexapro 5mg, writer agrees with same.

## 2023-07-28 NOTE — CONSULT NOTE ADULT - SUBJECTIVE AND OBJECTIVE BOX
Patient  is a 42 YO man with a PMH of T2DM, HTN, previous CVA (per patient 8 years ago, treated with TPA at [facility], p/w L hemiplegia, facial droop and dysarthria with minimal residual deficits), current smoker who presented to [facility] ED on 7/24 after two weeks of intermittent left lower extremity weakness with accompanying back pain similar to patient's sciatica pain, and 25+ hours of dysarthria. He stated he had episode of similar dysarthria 2 weeks ago as well that improved.    MRI Brain on 7/26 showed 1.1 cm acute infarct left inferior adrienne as well as smaller subcentimeter acute infarcts bilateral parieto-occipital juxtacortical white matter.  Cardiology consulted for blood pressure management. Seen by psychology for depression/suicidality (now resolved), f/u outpatient at [facility].      PAST MEDICAL & SURGICAL HISTORY:  DM (diabetes mellitus)  HTN (hypertension)  CVA (cerebrovascular accident)    FAMILY HISTORY  Father: - at age - with history of   Mother: - at age - with history of     SOCIAL HISTORY  Substance Use (street drugs): (  ) never used  (  ) other:  Tobacco Usage:  (   ) never smoked   (   ) former smoker   (   ) current smoker  (     ) pack year  Alcohol Usage:  Sexual History:   Recent Travel:    Allergies  No Known Allergies  Intolerances    polyethylene glycol 3350 17 Gram(s) Oral daily    REVIEW OF SYSTEMS:  CONSTITUTIONAL: No fever, weight loss, or fatigue  EYES: No eye pain, visual disturbances, or discharge  ENMT:  No difficulty hearing, tinnitus, vertigo; No sinus or throat pain  NECK: No pain or stiffness  BREASTS: No pain, masses, or nipple discharge  RESPIRATORY: No cough, wheezing, chills or hemoptysis; No shortness of breath  CARDIOVASCULAR: No chest pain, palpitations, dizziness, or leg swelling  GASTROINTESTINAL: No abdominal or epigastric pain. No nausea, vomiting, or hematemesis; No diarrhea or constipation. No melena or hematochezia.  GENITOURINARY: No dysuria, frequency, hematuria, or incontinence  NEUROLOGICAL: No headaches, memory loss, loss of strength, numbness, or tremors  SKIN: No itching, burning, rashes, or lesions   LYMPH NODES: No enlarged glands  ENDOCRINE: No heat or cold intolerance; No hair loss  MUSCULOSKELETAL: No joint pain or swelling; No muscle, back, or extremity pain  PSYCHIATRIC: No depression, anxiety, mood swings, or difficulty sleeping  HEME/LYMPH: No easy bruising, or bleeding gums  ALLERY AND IMMUNOLOGIC: No hives or eczema    ALL ROS REVIEWED AND NORMAL EXCEPT AS STATED ABOVE    T(C): 36.7 (07-27-23 @ 20:07), Max: 37.1 (07-27-23 @ 13:35)  HR: 65 (07-28-23 @ 06:08) (65 - 90)  BP: 135/85 (07-28-23 @ 06:08) (134/87 - 155/93)  RR: 16 (07-27-23 @ 20:07) (16 - 18)  SpO2: 98% (07-28-23 @ 04:11) (93% - 98%)  Wt(kg): --Vital Signs Last 24 Hrs  T(C): 36.7 (27 Jul 2023 20:07), Max: 37.1 (27 Jul 2023 13:35)  T(F): 98 (27 Jul 2023 20:07), Max: 98.7 (27 Jul 2023 13:35)  HR: 65 (28 Jul 2023 06:08) (65 - 90)  BP: 135/85 (28 Jul 2023 06:08) (134/87 - 155/93)  BP(mean): --  RR: 16 (27 Jul 2023 20:07) (16 - 18)  SpO2: 98% (28 Jul 2023 04:11) (93% - 98%)    Parameters below as of 27 Jul 2023 20:07  Patient On (Oxygen Delivery Method): room air    PHYSICAL EXAM:  GENERAL: NAD, well-groomed, well-developed  HEAD:  Atraumatic, Normocephalic  EYES: EOMI, PERRLA, conjunctiva and sclera clear  ENMT: No tonsillar erythema, exudates, or enlargement; Moist mucous membranes, Good dentition, No lesions  NECK: Supple, No JVD, Normal thyroid  NERVOUS SYSTEM:  Alert & Oriented X3, Good concentration; Motor Strength 5/5 B/L upper and lower extremities; DTRs 2+ intact and symmetric  CHEST/LUNG: Clear to percussion bilaterally; No rales, rhonchi, wheezing, or rubs  HEART: Regular rate and rhythm; No murmurs, rubs, or gallops  ABDOMEN: Soft, Nontender, Nondistended; Bowel sounds present  EXTREMITIES:  2+ Peripheral Pulses, No clubbing, cyanosis, or edema  LYMPH: No lymphadenopathy noted  SKIN: No rashes or lesions    LABS:                        15.7   7.64  )-----------( 230      ( 27 Jul 2023 05:11 )             48.1     07-27    141  |  106  |  25<H>  ----------------------------<  125<H>  3.8   |  23  |  1.28    Ca    9.0      27 Jul 2023 05:11        Urinalysis Basic - ( 27 Jul 2023 05:11 )    Color: x / Appearance: x / SG: x / pH: x  Gluc: 125 mg/dL / Ketone: x  / Bili: x / Urobili: x   Blood: x / Protein: x / Nitrite: x   Leuk Esterase: x / RBC: x / WBC x   Sq Epi: x / Non Sq Epi: x / Bacteria: x    CAPILLARY BLOOD GLUCOSE    POCT Blood Glucose.: 144 mg/dL (27 Jul 2023 22:56)  POCT Blood Glucose.: 106 mg/dL (27 Jul 2023 15:59)  POCT Blood Glucose.: 215 mg/dL (27 Jul 2023 11:45)  POCT Blood Glucose.: 143 mg/dL (27 Jul 2023 07:38)    Urinalysis Basic - ( 27 Jul 2023 05:11 )    Color: x / Appearance: x / SG: x / pH: x  Gluc: 125 mg/dL / Ketone: x  / Bili: x / Urobili: x   Blood: x / Protein: x / Nitrite: x   Leuk Esterase: x / RBC: x / WBC x   Sq Epi: x / Non Sq Epi: x / Bacteria: x    RADIOLOGY & ADDITIONAL TESTS:   MR Lumbar Spine No Cont (07.26.23 @ 17:06) >  IMPRESSION:    MRI BRAIN  1. Evidence of a 1.1 cm acuteinfarct left inferior adrienne as well as   smaller subcentimeter acute infarcts bilateral parieto-occipital   juxtacortical white matter. No definitive evidence of associated   hemorrhage.  2. Bilateral subcentimeter chronic lacunar infarcts with pontine and   bihemispheric altered white matter signal; a nonspecific finding.   Diagnostic considerations include, but are not limited to, chronic   microvascular ischemic change, demyelinating and inflammatory etiologies.  3. Additional findings described in detail above.    MRI CERVICAL SPINE  1. No definitive evidence of demyelinating disease, intrathecal or   paraspinal mass.  2. Straightening of the cervical lordosis can be seen in the setting of   muscle spasm.  3. Multilevel cervical disc bulges. No cervical disc herniation or cord   compression.  4. Additional findings above.    MRI LUMBAR SPINE  1. L4-L5 broad-based left foraminal disc herniation superimposed upon a   disc bulge, resulting in mild central canal, bilateral lateral recess and   left greater than right neural foramen stenosis with facet arthrosis,   abutting the left L4 nerve roots.  2. Partially imaged T11-T12 broad-based right paracentral disc   herniation, impinging upon the right ventral cord. Consider dedicated   imaging of the thoracic spine as warranted.  3. No clumping of the cauda equina nerve roots, intrathecal or paraspinal   mass visualized.  4. Additional findings above.    Findings discussed in detail with TERI Belcher at the time of this   interpretation.    < end of copied text >      Consultant(s) Notes Reviewed:  [x ] YES  [ ] NO  Care Discussed with Consultants/Other Providers [ x] YES  [ ] NO  Imaging Personally Reviewed:  [ ] YES  [ x] NO Patient  is a 44 YO man with a PMH of T2DM, HTN, previous CVA (per patient 8 years ago, treated with TPA at [facility], p/w L hemiplegia, facial droop and dysarthria with minimal residual deficits), current smoker who presented to [facility] ED on 7/24 after two weeks of intermittent left lower extremity weakness with accompanying back pain similar to patient's sciatica pain, and 25+ hours of dysarthria. He stated he had episode of similar dysarthria 2 weeks ago as well that improved.    MRI Brain on 7/26 showed 1.1 cm acute infarct left inferior adrienne as well as smaller subcentimeter acute infarcts bilateral parieto-occipital juxtacortical white matter.  Cardiology consulted for blood pressure management. Seen by psychology for depression/suicidality (now resolved), f/u outpatient at [facility].    No events overnight  Denies chest pain, SOB      PAST MEDICAL & SURGICAL HISTORY:  DM (diabetes mellitus)  HTN (hypertension)  CVA (cerebrovascular accident)    FAMILY HISTORY  Denies FH of CAD, DM, HTN, HLD    SOCIAL HISTORY  Substance Use (street drugs): (  x) never used  (  ) other:  Tobacco Usage:  ( x  ) never smoked   (   ) former smoker   (   ) current smoker  (     ) pack year  Alcohol Usage:Denies  Sexual History: Denies  Recent Travel:Denies    Allergies  No Known Allergies  Intolerances    polyethylene glycol 3350 17 Gram(s) Oral daily    REVIEW OF SYSTEMS:  CONSTITUTIONAL: No fever, weight loss, or fatigue  EYES: No eye pain, visual disturbances, or discharge  ENMT:  No difficulty hearing, tinnitus, vertigo; No sinus or throat pain  NECK: No pain or stiffness  BREASTS: No pain, masses, or nipple discharge  RESPIRATORY: No cough, wheezing, chills or hemoptysis; No shortness of breath  CARDIOVASCULAR: No chest pain, palpitations, dizziness, or leg swelling  GASTROINTESTINAL: No abdominal or epigastric pain. No nausea, vomiting, or hematemesis; No diarrhea or constipation. No melena or hematochezia.  GENITOURINARY: No dysuria, frequency, hematuria, or incontinence  NEUROLOGICAL: No headaches, memory loss, loss of strength, numbness, or tremors  SKIN: No itching, burning, rashes, or lesions   LYMPH NODES: No enlarged glands  ENDOCRINE: No heat or cold intolerance; No hair loss  MUSCULOSKELETAL: No joint pain or swelling; No muscle, back, or extremity pain  PSYCHIATRIC: No depression, anxiety, mood swings, or difficulty sleeping  HEME/LYMPH: No easy bruising, or bleeding gums  ALLERY AND IMMUNOLOGIC: No hives or eczema    ALL ROS REVIEWED AND NORMAL EXCEPT AS STATED ABOVE    T(C): 36.7 (07-27-23 @ 20:07), Max: 37.1 (07-27-23 @ 13:35)  HR: 65 (07-28-23 @ 06:08) (65 - 90)  BP: 135/85 (07-28-23 @ 06:08) (134/87 - 155/93)  RR: 16 (07-27-23 @ 20:07) (16 - 18)  SpO2: 98% (07-28-23 @ 04:11) (93% - 98%)  Wt(kg): --Vital Signs Last 24 Hrs  T(C): 36.7 (27 Jul 2023 20:07), Max: 37.1 (27 Jul 2023 13:35)  T(F): 98 (27 Jul 2023 20:07), Max: 98.7 (27 Jul 2023 13:35)  HR: 65 (28 Jul 2023 06:08) (65 - 90)  BP: 135/85 (28 Jul 2023 06:08) (134/87 - 155/93)  BP(mean): --  RR: 16 (27 Jul 2023 20:07) (16 - 18)  SpO2: 98% (28 Jul 2023 04:11) (93% - 98%)    Parameters below as of 27 Jul 2023 20:07  Patient On (Oxygen Delivery Method): room air    PHYSICAL EXAM:  GENERAL: NAD, well-groomed, well-developed  HEAD:  Atraumatic, Normocephalic  EYES: EOMI, PERRLA, conjunctiva and sclera clear  ENMT: No tonsillar erythema, exudates, or enlargement; Moist mucous membranes, Good dentition, No lesions  NECK: Supple, No JVD, Normal thyroid  NERVOUS SYSTEM:  Alert & Oriented X3  CHEST/LUNG: Clear to percussion bilaterally; No rales, rhonchi, wheezing, or rubs  HEART: Regular rate and rhythm; No murmurs, rubs, or gallops  ABDOMEN: Soft, Nontender, Nondistended; Bowel sounds present  EXTREMITIES:  2+ Peripheral Pulses, No clubbing, cyanosis, or edema  LYMPH: No lymphadenopathy noted  SKIN: No rashes or lesions    LABS:                        15.7   7.64  )-----------( 230      ( 27 Jul 2023 05:11 )             48.1     07-27    141  |  106  |  25<H>  ----------------------------<  125<H>  3.8   |  23  |  1.28    Ca    9.0      27 Jul 2023 05:11        Urinalysis Basic - ( 27 Jul 2023 05:11 )    Color: x / Appearance: x / SG: x / pH: x  Gluc: 125 mg/dL / Ketone: x  / Bili: x / Urobili: x   Blood: x / Protein: x / Nitrite: x   Leuk Esterase: x / RBC: x / WBC x   Sq Epi: x / Non Sq Epi: x / Bacteria: x    CAPILLARY BLOOD GLUCOSE    POCT Blood Glucose.: 144 mg/dL (27 Jul 2023 22:56)  POCT Blood Glucose.: 106 mg/dL (27 Jul 2023 15:59)  POCT Blood Glucose.: 215 mg/dL (27 Jul 2023 11:45)  POCT Blood Glucose.: 143 mg/dL (27 Jul 2023 07:38)    Urinalysis Basic - ( 27 Jul 2023 05:11 )    Color: x / Appearance: x / SG: x / pH: x  Gluc: 125 mg/dL / Ketone: x  / Bili: x / Urobili: x   Blood: x / Protein: x / Nitrite: x   Leuk Esterase: x / RBC: x / WBC x   Sq Epi: x / Non Sq Epi: x / Bacteria: x    RADIOLOGY & ADDITIONAL TESTS:   MR Lumbar Spine No Cont (07.26.23 @ 17:06) >  IMPRESSION:    MRI BRAIN  1. Evidence of a 1.1 cm acuteinfarct left inferior adrienne as well as   smaller subcentimeter acute infarcts bilateral parieto-occipital   juxtacortical white matter. No definitive evidence of associated   hemorrhage.  2. Bilateral subcentimeter chronic lacunar infarcts with pontine and   bihemispheric altered white matter signal; a nonspecific finding.   Diagnostic considerations include, but are not limited to, chronic   microvascular ischemic change, demyelinating and inflammatory etiologies.  3. Additional findings described in detail above.    MRI CERVICAL SPINE  1. No definitive evidence of demyelinating disease, intrathecal or   paraspinal mass.  2. Straightening of the cervical lordosis can be seen in the setting of   muscle spasm.  3. Multilevel cervical disc bulges. No cervical disc herniation or cord   compression.  4. Additional findings above.    MRI LUMBAR SPINE  1. L4-L5 broad-based left foraminal disc herniation superimposed upon a   disc bulge, resulting in mild central canal, bilateral lateral recess and   left greater than right neural foramen stenosis with facet arthrosis,   abutting the left L4 nerve roots.  2. Partially imaged T11-T12 broad-based right paracentral disc   herniation, impinging upon the right ventral cord. Consider dedicated   imaging of the thoracic spine as warranted.  3. No clumping of the cauda equina nerve roots, intrathecal or paraspinal   mass visualized.  4. Additional findings above.    Findings discussed in detail with TERI Belcher at the time of this   interpretation.    Consultant(s) Notes Reviewed:  [x ] YES  [ ] NO  Care Discussed with Consultants/Other Providers [ x] YES  [ ] NO  Imaging Personally Reviewed:  [ ] YES  [ x] NO

## 2023-07-28 NOTE — BH CONSULTATION LIAISON ASSESSMENT NOTE - OTHER PAST PSYCHIATRIC HISTORY (INCLUDE DETAILS REGARDING ONSET, COURSE OF ILLNESS, INPATIENT/OUTPATIENT TREATMENT)
Was seen by the C/L team at [facility] for passive suicidal ideations that resolved during that evaluation.   Pt with no other psych hx. Denied previous SA or SIB or previous inpatient stays.

## 2023-07-28 NOTE — CONSULT NOTE ADULT - ASSESSMENT
44 YO man with a PMH of T2DM, HTN, previous CVA with minimal residual deficits, current smoker who presented to [facility] ED on 7/24 after two weeks of intermittent left lower extremity weakness with accompanying back pain,  and dysarthria. He was found to have acute infarct in left inferior adrienne and smaller acute infarcts in bilateral parieto-occipital juxtacortical . Hospital course s/f HTN, depression and suicidal ideation seen and cleared by psychologist. Patient now with gait Instability, ADL impairments and Functional impairments.    #H/o CVA, infarct in left inferior adrienne and smaller acute infarcts in bilateral parieto-occipital juxtacortical. Mechanism large artery atherosclerosis  - ASA 81mg and Plavix 75mg daily for 3 months, then ASA alone  - Lipitor 80mg qhs    #HTN  - Carvedilol 25mg Q 12hrs  - Amlodipine 10mg daily  - Hydralazine 25mg BID  - Lisinopril 20mg daily    #HLD  - Lipitor 80mg daily    #DM II  - ISS and FS  - A1c 7.0 on 7/25     DVT ppx Lovenox

## 2023-07-28 NOTE — BH CONSULTATION LIAISON ASSESSMENT NOTE - NSBHCHARTREVIEWLAB_PSY_A_CORE FT
15.7   7.64  )-----------( 230      ( 27 Jul 2023 05:11 )             48.1   07-27    141  |  106  |  25<H>  ----------------------------<  125<H>  3.8   |  23  |  1.28    Ca    9.0      27 Jul 2023 05:11

## 2023-07-28 NOTE — BH CONSULTATION LIAISON ASSESSMENT NOTE - NSBHCONSULTFOLLOWAFTERCARE_PSY_A_CORE FT
Pt may f/u at [facility] Adult [facility] Department- [phone] or [facility]- [phone]  [facility] Crisis clinic - [phone]

## 2023-07-28 NOTE — BH CONSULTATION LIAISON ASSESSMENT NOTE - HPI (INCLUDE ILLNESS QUALITY, SEVERITY, DURATION, TIMING, CONTEXT, MODIFYING FACTORS, ASSOCIATED SIGNS AND SYMPTOMS)
Patient  is a 42 YO man with a PMH of T2DM, HTN, previous CVA (per patient 8 years ago, treated with TPA at [facility], p/w L hemiplegia, facial droop and dysarthria with minimal residual deficits), current smoker who presented to [facility] ED on 7/24 after two weeks of intermittent left lower extremity weakness with accompanying back pain similar to patient's sciatica pain, and 25+ hours of dysarthria. He stated he had episode of similar dysarthria 2 weeks ago as well that improved. On examination, patient has mild dysarthria, accompanied by wife who states that patient's speech is noticeably more slurred than his baseline. Says that his previous stroke 8 years ago presented similarly, but was much more severe (weakness + speech changes/aphasia). Not a Tenecteplase candidate given out of time window. MRI Brain on 7/26 showed 1.1 cm acute infarct left inferior adrienne as well as smaller subcentimeter acute infarcts bilateral parieto-occipital juxtacortical white matter. Impression:  Dysarthria and RLE weakness due to acute infarct in left inferior adrienne and smaller acute infarcts in bilateral parieto-occipital juxtacortical. Mechanism large artery atherosclerosis. Aspirin 81mg and Plavix 75mg daily for 3 months as per [facility],  then Asa 81mg daily indefinitely   TTE: EF 58%, normal LA. , continue atorvastatin 80mg qhs,  titrate statin to LDL goal less than 70.  Cardiology consulted for blood pressure management, follow up outpatient to slowly taper from permissive hypertension to normotension (Lower SBP 10-20mmHg per week). Seen by psychology for depression/suicidality (now resolved), f/u outpatient at [facility].  Patient was evaluated by PM&R and therapy for functional deficits, gait/ADL impairments and acute rehabilitation was recommended. Patient was medically optimized for discharge to [facility] IRU on 7/27/23.  (27 Jul 2023 13:26)   Patient  is a 44 YO man with a PMH of T2DM, HTN, previous CVA (per patient 8 years ago, treated with TPA at [facility], p/w L hemiplegia, facial droop and dysarthria with minimal residual deficits), current smoker who presented to [facility] ED on 7/24 after two weeks of intermittent left lower extremity weakness with accompanying back pain similar to patient's sciatica pain, and 25+ hours of dysarthria. He stated he had episode of similar dysarthria 2 weeks ago as well that improved. On examination, patient has mild dysarthria, accompanied by wife who states that patient's speech is noticeably more slurred than his baseline. Says that his previous stroke 8 years ago presented similarly, but was much more severe (weakness + speech changes/aphasia). Not a Tenecteplase candidate given out of time window. MRI Brain on 7/26 showed 1.1 cm acute infarct left inferior adrienne as well as smaller subcentimeter acute infarcts bilateral parieto-occipital juxtacortical white matter. Impression:  Dysarthria and RLE weakness due to acute infarct in left inferior adrienne and smaller acute infarcts in bilateral parieto-occipital juxtacortical. Mechanism large artery atherosclerosis. Aspirin 81mg and Plavix 75mg daily for 3 months as per [facility],  then Asa 81mg daily indefinitely   TTE: EF 58%, normal LA. , continue atorvastatin 80mg qhs,  titrate statin to LDL goal less than 70.  Cardiology consulted for blood pressure management, follow up outpatient to slowly taper from permissive hypertension to normotension (Lower SBP 10-20mmHg per week). Seen by psychology for depression/suicidality (now resolved), f/u outpatient at [facility].  Patient was evaluated by PM&R and therapy for functional deficits, gait/ADL impairments and acute rehabilitation was recommended. Patient was medically optimized for discharge to [facility] IRU on 7/27/23.  (27 Jul 2023 13:26)    Psychiatry C/L: Behavioral health team was asked to F/U on patient for depression. Pt was seen by the C/L team several days ago at [facility] for passive SI, depression and anxiety which has resolved (see note in sunrise). Pt seen and evaluated at bedside, he is pleasant, calm & cooperative. Pt reports today his mood is “fine” but he suffers from insomnia and did not sleep well last night. He states his appetite is “good” despite the food being pureed.  Pt c/o having worrisome thoughts, particularly about when and if he will be able to return to work. Pt denied feeling hopeless or helpless and states he is future oriented and motivated to “work hard” in acute rehab to reach his goals. Pt discussed that he was an active smoker and “just quit for good” several days ago. Pt discussed that this is his second CVA, last one was 8 years ago which was “much worse” and he “fully recovered after 1 year”. Pt reports he is spiritual and sees this CVA as a “eye opener” to quit smoking. He was thankful for the consult. He is not found to be suicidal, homicidal, manic or psychotic. Pt agreed to trial meds for insomnia, see recs below. See rest of MSE below.     Patient  is a 44 YO man with a PMH of T2DM, HTN, previous CVA (per patient 8 years ago, treated with TPA at [facility], p/w L hemiplegia, facial droop and dysarthria with minimal residual deficits), current smoker who presented to [facility] ED on 7/24 after two weeks of intermittent left lower extremity weakness with accompanying back pain similar to patient's sciatica pain, and 25+ hours of dysarthria. He stated he had episode of similar dysarthria 2 weeks ago as well that improved. On examination, patient has mild dysarthria, accompanied by wife who states that patient's speech is noticeably more slurred than his baseline. Says that his previous stroke 8 years ago presented similarly, but was much more severe (weakness + speech changes/aphasia). Not a Tenecteplase candidate given out of time window. MRI Brain on 7/26 showed 1.1 cm acute infarct left inferior adrienne as well as smaller subcentimeter acute infarcts bilateral parieto-occipital juxtacortical white matter. Impression:  Dysarthria and RLE weakness due to acute infarct in left inferior adrienne and smaller acute infarcts in bilateral parieto-occipital juxtacortical. Mechanism large artery atherosclerosis. Aspirin 81mg and Plavix 75mg daily for 3 months as per [facility],  then Asa 81mg daily indefinitely   TTE: EF 58%, normal LA. , continue atorvastatin 80mg qhs,  titrate statin to LDL goal less than 70.  Cardiology consulted for blood pressure management, follow up outpatient to slowly taper from permissive hypertension to normotension (Lower SBP 10-20mmHg per week). Seen by psychology for depression/suicidality (now resolved), f/u outpatient at [facility].  Patient was evaluated by PM&R and therapy for functional deficits, gait/ADL impairments and acute rehabilitation was recommended. Patient was medically optimized for discharge to [facility] IRU on 7/27/23.  (27 Jul 2023 13:26)    Psychiatry C/L: Behavioral health team was asked to F/U on patient for depression. Pt was seen by the C/L team several days ago at [facility] for passive SI, depression and anxiety which has resolved (see note in sunrise). Pt seen and evaluated at bedside, he is pleasant, calm & cooperative. Pt reports today his mood is “fine” but he suffers from insomnia and did not sleep well last night. He states his appetite is “good” despite the food being pureed.  Pt c/o having worrisome thoughts, particularly about when and if he will be able to return to work. Pt denied feeling hopeless or helpless and states he is future oriented and motivated to “work hard” in acute rehab to reach his goals. Pt discussed that he was an active smoker and “just quit for good” several days ago. Pt discussed that this is his second CVA, last one was 8 years ago which was “much worse” and he “fully recovered after 1 year”. Pt reports he is spiritual and sees this CVA as an “eye opener” to quit smoking. He is on a Nicotine Patch and states he has no cravings at present. He was thankful for the consult. He is not found to be suicidal, homicidal, manic or psychotic. Pt agreed to trial meds for insomnia, see recs below. See rest of MSE below.

## 2023-07-28 NOTE — DIETITIAN INITIAL EVALUATION ADULT - OTHER INFO
Patient  is a 44 YO man with a PMH of T2DM, HTN, previous CVA (per patient 8 years ago, treated with TPA at [facility], p/w L hemiplegia, facial droop and dysarthria with minimal residual deficits), current smoker who presented to [facility] ED on 7/24 after two weeks of intermittent left lower extremity weakness with accompanying back pain similar to patient's sciatica pain, and 25+ hours of dysarthria. He stated he had episode of similar dysarthria 2 weeks ago as well that improved.     On examination, patient has mild dysarthria, accompanied by wife who states that patient's speech is noticeably more slurred than his baseline. Says that his previous stroke 8 years ago presented similarly, but was much more severe (weakness + speech changes/aphasia).     Not a Tenecteplase candidate given out of time window. MRI Brain on 7/26 showed 1.1 cm acute infarct left inferior adrienne as well as smaller subcentimeter acute infarcts bilateral parieto-occipital juxtacortical white matter.    Impression:  Dysarthria and RLE weakness due to acute infarct in left inferior adrienne and smaller acute infarcts in bilateral parieto-occipital juxtacortical. Mechanism large artery atherosclerosis    Aspirin 81mg and Plavix 75mg daily for 3 months as per [facility],  then Asa 81mg daily indefinitely     TTE: EF 58%, normal LA. , continue atorvastatin 80mg qhs,  titrate statin to LDL goal less than 70.  Cardiology consulted for blood pressure management, follow up outpatient to slowly taper from permissive hypertension to normotension (Lower SBP 10-20mmHg per week). Seen by psychology for depression/suicidality (now resolved), f/u outpatient at [facility].    Patient was evaluated by PM&R and therapy for functional deficits, gait/ADL impairments and acute rehabilitation was recommended. Patient was medically optimized for discharge to [facility] IRU on 7/27/23.    At this time patient tolerating diet w/ adequate appetite/intake consuming % of meals. Denies N/V/C/D, last BM 7/27 Per nursing flowsheets    Patient  is a 44 YO man with a PMH of T2DM, HTN, previous CVA (per patient 8 years ago, treated with TPA at [facility], p/w L hemiplegia, facial droop and dysarthria with minimal residual deficits), current smoker who presented to [facility] ED on 7/24 after two weeks of intermittent left lower extremity weakness with accompanying back pain similar to patient's sciatica pain, and 25+ hours of dysarthria. He stated he had episode of similar dysarthria 2 weeks ago as well that improved.     On examination, patient has mild dysarthria, accompanied by wife who states that patient's speech is noticeably more slurred than his baseline. Says that his previous stroke 8 years ago presented similarly, but was much more severe (weakness + speech changes/aphasia).     Not a Tenecteplase candidate given out of time window. MRI Brain on 7/26 showed 1.1 cm acute infarct left inferior adrienne as well as smaller subcentimeter acute infarcts bilateral parieto-occipital juxtacortical white matter.    Impression:  Dysarthria and RLE weakness due to acute infarct in left inferior adrienne and smaller acute infarcts in bilateral parieto-occipital juxtacortical. Mechanism large artery atherosclerosis    Aspirin 81mg and Plavix 75mg daily for 3 months as per [facility],  then Asa 81mg daily indefinitely     TTE: EF 58%, normal LA. , continue atorvastatin 80mg qhs,  titrate statin to LDL goal less than 70.  Cardiology consulted for blood pressure management, follow up outpatient to slowly taper from permissive hypertension to normotension (Lower SBP 10-20mmHg per week). Seen by psychology for depression/suicidality (now resolved), f/u outpatient at [facility].    Patient was evaluated by PM&R and therapy for functional deficits, gait/ADL impairments and acute rehabilitation was recommended. Patient was medically optimized for discharge to [facility] IRU on 7/27/23.    At this time patient tolerating diet w/ adequate appetite/intake consuming % of meals. No issues w/ dentition or chewing and swallowing current pureed diet texture. Plans to trail Minced and Moist tray at lunch tomorrow (7/29). Denies N/V/C/D, last BM 7/27 Per nursing flowsheets. Patient reports weight stable, 220lb. Noted A1c: 7.0%, POCT 106-215mg/dL in last 24 hours. Provided Consistent Carbohydrate Heart-Healthy DASH/TLC diet education. Recommend Consistent Carbohydrate diet at this time, monitor need for DASH/TLC diet as diet advances.

## 2023-07-28 NOTE — DIETITIAN INITIAL EVALUATION ADULT - SIGNS/SYMPTOMS
as evidenced by elevated POCT and A1c 7.0% as evidenced by need for mechanically altered diet texture

## 2023-07-28 NOTE — BH CONSULTATION LIAISON ASSESSMENT NOTE - NSBHMSEINTELL_PSY_A_CORE
Patient Seen in: [facility] 0sw-a    History   Patient presents with:  Syncope (cardiovascular, neurologic)    Stated Complaint: Syncope, unspecified syncope type    HPI    Patient comes in after syncopal episode.   She apparently had a syncopal epis 1 drop into both eyes 2 (two) times daily. History reviewed. No pertinent family history.       Smoking Status: Never Smoker                      Alcohol Use: No                Review of Systems    Positive for stated complaint: Syncope, unspecified s Normal   CBC WITH DIFFERENTIAL WITH PLATELET    Narrative: The following orders were created for panel order CBC WITH DIFFERENTIAL WITH PLATELET.   Procedure                               Abnormality         Status                     --------- Plan     Clinical Impression:  Syncope, unspecified syncope type  (primary encounter diagnosis)    Disposition:  There is no disposition on file for this visit. Follow-up:  No follow-up provider specified.     Medications Prescribed:  Current Discharge M Average

## 2023-07-28 NOTE — BH CONSULTATION LIAISON ASSESSMENT NOTE - NSBHCONSULTMEDSLEEP_PSY_A_CORE FT
Trazodone 25mg-50mg PO Q HS PRN for insomnia. If no response to Trazodone, trial Mirtazapine 7.5mg PO Q HS

## 2023-07-28 NOTE — DIETITIAN INITIAL EVALUATION ADULT - ADD RECOMMEND
1. Recommend continue puree,  Recommend Consistent Carbohydrate diet at this time, monitor need for DASH/TLC diet as diet advances.    2. Monitor PO intake, GI tolerance, skin integrity, labs, weight, and bowel movement regularity.   3. Honor food preferences as feasible.   4. Follow SLP recommendations  5. Provide ongoing diet education as needed  6. RD remains available upon request and will follow-up per protocol

## 2023-07-28 NOTE — BH CONSULTATION LIAISON ASSESSMENT NOTE - NSUNABLEASSESSPROTRISKCOMMENT_PSY_ALL_CORE
multiple protective factors including loving wife and daughter(Oralia), stable housing, support from extended family (mom/dad) and mother in law.

## 2023-07-28 NOTE — DIETITIAN INITIAL EVALUATION ADULT - PERSON TAUGHT/METHOD
Consistent Carbohydrate DASH/TLC IDDSI pureed diet texture/verbal instruction/teach back - (Patient repeats in own words)/patient instructed

## 2023-07-29 NOTE — PROGRESS NOTE ADULT - SUBJECTIVE AND OBJECTIVE BOX
Pt. seen and examined at bedside.  No overnight events.      REVIEW OF SYSTEMS  Constitutional - No fever,  No fatigue  Neurological - No headaches, ++ loss of strength  Musculoskeletal - No joint pain, No joint swelling, No muscle pain    VITALS  T(C): 36.5 (07-29-23 @ 08:08), Max: 37.2 (07-28-23 @ 21:25)  HR: 68 (07-29-23 @ 08:08) (66 - 88)  BP: 160/84 (07-29-23 @ 08:08) (142/93 - 169/99)  RR: 16 (07-29-23 @ 08:08) (16 - 16)  SpO2: 95% (07-29-23 @ 08:08) (95% - 98%)  Wt(kg): --       MEDICATIONS   acetaminophen     Tablet .. 650 milliGRAM(s) every 6 hours PRN  amLODIPine   Tablet 10 milliGRAM(s) daily  aspirin enteric coated 81 milliGRAM(s) daily  atorvastatin 80 milliGRAM(s) at bedtime  carvedilol 25 milliGRAM(s) every 12 hours  clopidogrel Tablet 75 milliGRAM(s) daily  dextrose 5%. 1000 milliLiter(s) <Continuous>  dextrose 5%. 1000 milliLiter(s) <Continuous>  dextrose 50% Injectable 25 Gram(s) once  dextrose 50% Injectable 12.5 Gram(s) once  dextrose 50% Injectable 25 Gram(s) once  dextrose Oral Gel 15 Gram(s) once PRN  enoxaparin Injectable 40 milliGRAM(s) every 24 hours  escitalopram 5 milliGRAM(s) daily  glucagon  Injectable 1 milliGRAM(s) once  hydrALAZINE 25 milliGRAM(s) two times a day  insulin lispro (ADMELOG) corrective regimen sliding scale   at bedtime  insulin lispro (ADMELOG) corrective regimen sliding scale   three times a day before meals  lisinopril 20 milliGRAM(s) two times a day  melatonin 6 milliGRAM(s) at bedtime  nicotine -  14 mG/24Hr(s) Patch 1 Patch daily  polyethylene glycol 3350 17 Gram(s) daily  senna 2 Tablet(s) at bedtime  traZODone 25 milliGRAM(s) at bedtime      RECENT LABS/IMAGING                      POCT Blood Glucose.: 156 mg/dL (07-29-23 @ 08:10)  POCT Blood Glucose.: 139 mg/dL (07-28-23 @ 21:52)  POCT Blood Glucose.: 115 mg/dL (07-28-23 @ 17:00)  POCT Blood Glucose.: 146 mg/dL (07-28-23 @ 12:10)    ---------  PHYSICAL EXAM  Constitutional - NAD, Comfortable  Pulm - Breathing comfortably, No wheezing  Abd - Obese, soft, NTND  Extremities - No edema, No calf tenderness  Neurologic Exam -                    Cognitive - GROGGY     Communication - DYSARTHRIA     Motor - RIGHT HEMIPARESIS     Sensory - Intact to LT  Psychiatric - Mood WNL, Affect WNL    ASSESSMENT/PLAN  43y Male with functional deficits s/p recurrent stroke -> R HEMIPARESIS  Continue current medical management  Pain - Tylenol PRN  DVT PPX - enoxaparin Injectable 40 milliGRAM(s) every 24 hours  Active issues - none  Continue 3hrs a day of comprehensive rehab program.

## 2023-07-30 NOTE — PROGRESS NOTE ADULT - SUBJECTIVE AND OBJECTIVE BOX
Pt. seen and examined at bedside.  No overnight events.      REVIEW OF SYSTEMS  Constitutional - No fever,  No fatigue  Neurological - No headaches, ++ loss of strength  Musculoskeletal - No joint pain, No joint swelling, No muscle pain    VITALS  T(C): 36.7 (07-29-23 @ 21:05), Max: 36.7 (07-29-23 @ 21:05)  HR: 68 (07-30-23 @ 06:01) (68 - 87)  BP: 146/88 (07-30-23 @ 06:01) (146/88 - 164/99)  RR: 16 (07-30-23 @ 06:01) (16 - 16)  SpO2: 98% (07-30-23 @ 06:01) (96% - 98%)  Wt(kg): --       MEDICATIONS   acetaminophen     Tablet .. 650 milliGRAM(s) every 6 hours PRN  amLODIPine   Tablet 10 milliGRAM(s) daily  aspirin enteric coated 81 milliGRAM(s) daily  atorvastatin 80 milliGRAM(s) at bedtime  bisacodyl Suppository 10 milliGRAM(s) daily PRN  carvedilol 25 milliGRAM(s) every 12 hours  clopidogrel Tablet 75 milliGRAM(s) daily  dextrose 5%. 1000 milliLiter(s) <Continuous>  dextrose 5%. 1000 milliLiter(s) <Continuous>  dextrose 50% Injectable 25 Gram(s) once  dextrose 50% Injectable 12.5 Gram(s) once  dextrose 50% Injectable 25 Gram(s) once  dextrose Oral Gel 15 Gram(s) once PRN  enoxaparin Injectable 40 milliGRAM(s) every 24 hours  escitalopram 5 milliGRAM(s) daily  glucagon  Injectable 1 milliGRAM(s) once  hydrALAZINE 25 milliGRAM(s) two times a day  insulin lispro (ADMELOG) corrective regimen sliding scale   at bedtime  insulin lispro (ADMELOG) corrective regimen sliding scale   three times a day before meals  lisinopril 20 milliGRAM(s) two times a day  melatonin 6 milliGRAM(s) at bedtime  nicotine -  14 mG/24Hr(s) Patch 1 Patch daily  polyethylene glycol 3350 17 Gram(s) daily  senna 2 Tablet(s) at bedtime  traZODone 25 milliGRAM(s) at bedtime      RECENT LABS/IMAGING                      POCT Blood Glucose.: 152 mg/dL (07-30-23 @ 07:56)  POCT Blood Glucose.: 175 mg/dL (07-29-23 @ 21:13)  POCT Blood Glucose.: 123 mg/dL (07-29-23 @ 17:04)  POCT Blood Glucose.: 149 mg/dL (07-29-23 @ 12:01)    ---------  PHYSICAL EXAM  Constitutional - NAD, Comfortable  Pulm - Breathing comfortably, No wheezing  Abd - Obese, soft, NTND  Extremities - No edema, No calf tenderness  Neurologic Exam -                    Cognitive - Awake, Alert     Communication - APHASIA     Motor - RIGHT HEMIPARESIS     Sensory - Intact to LT  Psychiatric - Mood WNL, Affect WNL    ASSESSMENT/PLAN  43y Male with functional deficits after recurrent CVA -> RIGHT HP.  Continue current medical management  Pain - Tylenol PRN  DVT PPX - enoxaparin Injectable 40 milliGRAM(s) every 24 hours  Active issues - none  Continue 3hrs a day of comprehensive rehab program.        Pt. seen and examined at bedside.  No overnight events.  Diet upgraded to minced.      REVIEW OF SYSTEMS  Constitutional - No fever,  No fatigue  Neurological - No headaches, ++ loss of strength  Musculoskeletal - No joint pain, No joint swelling, No muscle pain    VITALS  T(C): 36.7 (07-29-23 @ 21:05), Max: 36.7 (07-29-23 @ 21:05)  HR: 68 (07-30-23 @ 06:01) (68 - 87)  BP: 146/88 (07-30-23 @ 06:01) (146/88 - 164/99)  RR: 16 (07-30-23 @ 06:01) (16 - 16)  SpO2: 98% (07-30-23 @ 06:01) (96% - 98%)  Wt(kg): --       MEDICATIONS   acetaminophen     Tablet .. 650 milliGRAM(s) every 6 hours PRN  amLODIPine   Tablet 10 milliGRAM(s) daily  aspirin enteric coated 81 milliGRAM(s) daily  atorvastatin 80 milliGRAM(s) at bedtime  bisacodyl Suppository 10 milliGRAM(s) daily PRN  carvedilol 25 milliGRAM(s) every 12 hours  clopidogrel Tablet 75 milliGRAM(s) daily  dextrose 5%. 1000 milliLiter(s) <Continuous>  dextrose 5%. 1000 milliLiter(s) <Continuous>  dextrose 50% Injectable 25 Gram(s) once  dextrose 50% Injectable 12.5 Gram(s) once  dextrose 50% Injectable 25 Gram(s) once  dextrose Oral Gel 15 Gram(s) once PRN  enoxaparin Injectable 40 milliGRAM(s) every 24 hours  escitalopram 5 milliGRAM(s) daily  glucagon  Injectable 1 milliGRAM(s) once  hydrALAZINE 25 milliGRAM(s) two times a day  insulin lispro (ADMELOG) corrective regimen sliding scale   at bedtime  insulin lispro (ADMELOG) corrective regimen sliding scale   three times a day before meals  lisinopril 20 milliGRAM(s) two times a day  melatonin 6 milliGRAM(s) at bedtime  nicotine -  14 mG/24Hr(s) Patch 1 Patch daily  polyethylene glycol 3350 17 Gram(s) daily  senna 2 Tablet(s) at bedtime  traZODone 25 milliGRAM(s) at bedtime      RECENT LABS/IMAGING                      POCT Blood Glucose.: 152 mg/dL (07-30-23 @ 07:56)  POCT Blood Glucose.: 175 mg/dL (07-29-23 @ 21:13)  POCT Blood Glucose.: 123 mg/dL (07-29-23 @ 17:04)  POCT Blood Glucose.: 149 mg/dL (07-29-23 @ 12:01)    ---------  PHYSICAL EXAM  Constitutional - NAD, Comfortable  Pulm - Breathing comfortably, No wheezing  Abd - Obese, soft, NTND  Extremities - No edema, No calf tenderness  Neurologic Exam -                    Cognitive - Awake, Alert     Communication - APHASIA     Motor - RIGHT HEMIPARESIS     Sensory - Intact to LT  Psychiatric - Mood WNL, Affect WNL    ASSESSMENT/PLAN  43y Male with functional deficits after recurrent CVA -> RIGHT HP.  Continue current medical management  Pain - Tylenol PRN  DVT PPX - enoxaparin Injectable 40 milliGRAM(s) every 24 hours  Active issues - none  Continue 3hrs a day of comprehensive rehab program.

## 2023-07-31 NOTE — PROGRESS NOTE ADULT - ASSESSMENT
ASSESSMENT/PLAN  Patient  is a 42 YO man with a PMH of T2DM, HTN, previous CVA with minimal residual deficits, current smoker who presented to [facility] ED on 7/24 after two weeks of intermittent left lower extremity weakness with accompanying back pain,  and dysarthria. He was found to have acute infarct in left inferior adrienne and smaller acute infarcts in bilateral parieto-occipital juxtacortical . Hospital course s/f HTN, depression and suicidal ideation seen and cleared by psychologist. Patient now with gait Instability, ADL impairments and Functional impairments.    #CVA   - Dysarthria and RLE weakness due to acute infarct in left inferior adrienne and smaller acute infarcts in bilateral parieto-occipital juxtacortical. Mechanism large artery atherosclerosis  - Continue Comprehensive Rehab Program: PT/OT/ST, 3hours daily and 5 days weekly.   - ASA 81mg and Plavix 75mg daily for 3 months, then ASA alone    #Mood/sleep  -Continue Lexapro 5mg daily for neurorecovery  -Continue trazodone 25mg at bedtime  - Depression with suicidal ideation while in acute hospital  - Neuropsychology consult   - outpatient at [facility].    #HTN  - Carvedilol 25mg Q 12hrs  - Amlodipine 10mg daily  - Hydralazine 25mg BID  - Lisinopril 20mg daily    #HLD  - Lipitor 80mg daily    #DM II  - ISS and FS  - Admelog and Lantus  - A1c 7.0 on 7/25     #Pain management  - Tylenol PRN    #Smoker  -continue nicotine patch  - smoking cessation education    #DVT ppx  - Lovenox  - TEDs    #Bowel Regimen  - Senna  - miralax BID  -Enema x1 7/31  -Dulcolax PRN    #Bladder management  - BS on admission, and q 8 hours (SC if > 400)  - Monitor UO    #FEN   - Diet: Soft/bite sized with thins     #Skin:  - Skin on admission: grossly intact  - Pressure injury/Skin: Turn Q2hrs while in bed, OOB to Chair, PT/OT     #Precaution  - Fall, Aspiration, cardiac

## 2023-07-31 NOTE — PROGRESS NOTE ADULT - SUBJECTIVE AND OBJECTIVE BOX
SUBJECTIVE/ROS: Patient evaluated while in the chair and again in therapy. He states he is feeling well today. He notes that he has not had a BM since admission and is requesting an enema to assist with BM. He denies chest pain, fever, chills, nausea, vomiting, abdominal pain, headache, or BLE pain.     HPI:  Patient  is a 44 YO man with a PMH of T2DM, HTN, previous CVA (per patient 8 years ago, treated with TPA at [facility], p/w L hemiplegia, facial droop and dysarthria with minimal residual deficits), current smoker who presented to [facility] ED on 7/24 after two weeks of intermittent left lower extremity weakness with accompanying back pain similar to patient's sciatica pain, and 25+ hours of dysarthria. He stated he had episode of similar dysarthria 2 weeks ago as well that improved.     On examination, patient has mild dysarthria, accompanied by wife who states that patient's speech is noticeably more slurred than his baseline. Says that his previous stroke 8 years ago presented similarly, but was much more severe (weakness + speech changes/aphasia).     Not a Tenecteplase candidate given out of time window. MRI Brain on 7/26 showed 1.1 cm acute infarct left inferior adrienne as well as smaller subcentimeter acute infarcts bilateral parieto-occipital juxtacortical white matter.    Impression:  Dysarthria and RLE weakness due to acute infarct in left inferior adrienne and smaller acute infarcts in bilateral parieto-occipital juxtacortical. Mechanism large artery atherosclerosis    Aspirin 81mg and Plavix 75mg daily for 3 months as per [facility],  then Asa 81mg daily indefinitely     TTE: EF 58%, normal LA. , continue atorvastatin 80mg qhs,  titrate statin to LDL goal less than 70.  Cardiology consulted for blood pressure management, follow up outpatient to slowly taper from permissive hypertension to normotension (Lower SBP 10-20mmHg per week). Seen by psychology for depression/suicidality (now resolved), f/u outpatient at [facility] Adult [facility].    Patient was evaluated by PM&R and therapy for functional deficits, gait/ADL impairments and acute rehabilitation was recommended. Patient was medically optimized for discharge to [facility] IRU on 7/27/23.      Vital Signs Last 24 Hrs  T(C): 36.9 (31 Jul 2023 09:30), Max: 37 (30 Jul 2023 20:51)  T(F): 98.5 (31 Jul 2023 09:30), Max: 98.6 (30 Jul 2023 20:51)  HR: 70 (31 Jul 2023 09:30) (60 - 77)  BP: 138/84 (31 Jul 2023 09:30) (138/84 - 147/97)  BP(mean): --  RR: 16 (31 Jul 2023 09:30) (16 - 16)  SpO2: 99% (31 Jul 2023 09:30) (96% - 99%)    Parameters below as of 31 Jul 2023 09:30  Patient On (Oxygen Delivery Method): room air        PHYSICAL EXAM  Constitutional - NAD, Comfortable  HEENT - NCAT, EOMI  Neck - Supple, No limited ROM  Chest - CTA bilaterally  Cardiovascular - RRR, S1S2  Abdomen -BS+, Soft, NTND  Extremities - No C/C/E, No calf tenderness   Neurologic Exam - aox3, dysarthria, RU/RL 4/5, DEBRA/LL 5/5    RECENT LABS:                          14.6   6.28  )-----------( 205      ( 31 Jul 2023 06:41 )             42.0     07-31    141  |  105  |  23  ----------------------------<  129<H>  4.1   |  28  |  1.31<H>    Ca    9.1      31 Jul 2023 06:41    TPro  6.8  /  Alb  3.5  /  TBili  0.6  /  DBili  x   /  AST  30  /  ALT  51<H>  /  AlkPhos  52  07-31    LIVER FUNCTIONS - ( 31 Jul 2023 06:41 )  Alb: 3.5 g/dL / Pro: 6.8 g/dL / ALK PHOS: 52 U/L / ALT: 51 U/L / AST: 30 U/L / GGT: x             Urinalysis Basic - ( 31 Jul 2023 06:41 )    Color: x / Appearance: x / SG: x / pH: x  Gluc: 129 mg/dL / Ketone: x  / Bili: x / Urobili: x   Blood: x / Protein: x / Nitrite: x   Leuk Esterase: x / RBC: x / WBC x   Sq Epi: x / Non Sq Epi: x / Bacteria: x          CAPILLARY BLOOD GLUCOSE      POCT Blood Glucose.: 164 mg/dL (31 Jul 2023 12:00)  POCT Blood Glucose.: 138 mg/dL (31 Jul 2023 08:30)  POCT Blood Glucose.: 138 mg/dL (30 Jul 2023 21:08)  POCT Blood Glucose.: 113 mg/dL (30 Jul 2023 17:09)      MEDICATIONS  (STANDING):  amLODIPine   Tablet 10 milliGRAM(s) Oral daily  aspirin enteric coated 81 milliGRAM(s) Oral daily  atorvastatin 80 milliGRAM(s) Oral at bedtime  carvedilol 25 milliGRAM(s) Oral every 12 hours  clopidogrel Tablet 75 milliGRAM(s) Oral daily  dextrose 5%. 1000 milliLiter(s) (50 mL/Hr) IV Continuous <Continuous>  dextrose 5%. 1000 milliLiter(s) (100 mL/Hr) IV Continuous <Continuous>  dextrose 50% Injectable 25 Gram(s) IV Push once  dextrose 50% Injectable 12.5 Gram(s) IV Push once  dextrose 50% Injectable 25 Gram(s) IV Push once  enoxaparin Injectable 40 milliGRAM(s) SubCutaneous every 24 hours  escitalopram 5 milliGRAM(s) Oral daily  glucagon  Injectable 1 milliGRAM(s) IntraMuscular once  hydrALAZINE 25 milliGRAM(s) Oral two times a day  insulin lispro (ADMELOG) corrective regimen sliding scale   SubCutaneous at bedtime  insulin lispro (ADMELOG) corrective regimen sliding scale   SubCutaneous three times a day before meals  lisinopril 20 milliGRAM(s) Oral two times a day  melatonin 6 milliGRAM(s) Oral at bedtime  nicotine -  14 mG/24Hr(s) Patch 1 Patch Transdermal daily  polyethylene glycol 3350 17 Gram(s) Oral two times a day  saline laxative (FLEET) Rectal Enema 1 Enema Rectal once  senna 2 Tablet(s) Oral at bedtime  traZODone 25 milliGRAM(s) Oral at bedtime    MEDICATIONS  (PRN):  acetaminophen     Tablet .. 650 milliGRAM(s) Oral every 6 hours PRN Moderate Pain (4 - 6), Severe Pain (7 - 10)  bisacodyl Suppository 10 milliGRAM(s) Rectal daily PRN Constipation  dextrose Oral Gel 15 Gram(s) Oral once PRN Blood Glucose LESS THAN 70 milliGRAM(s)/deciliter

## 2023-08-01 NOTE — PROGRESS NOTE ADULT - SUBJECTIVE AND OBJECTIVE BOX
SUBJECTIVE/ROS: Patient evaluated while in the chair and again in therapy. He states that he slept well last night and has no new complaints. He denies chest pain, fever, chills, nausea, vomiting, abdominal pain, headache, or BLE pain.     HPI:  Patient  is a 42 YO man with a PMH of T2DM, HTN, previous CVA (per patient 8 years ago, treated with TPA at [facility], p/w L hemiplegia, facial droop and dysarthria with minimal residual deficits), current smoker who presented to [facility] ED on 7/24 after two weeks of intermittent left lower extremity weakness with accompanying back pain similar to patient's sciatica pain, and 25+ hours of dysarthria. He stated he had episode of similar dysarthria 2 weeks ago as well that improved. On examination, patient has mild dysarthria, accompanied by wife who states that patient's speech is noticeably more slurred than his baseline. Says that his previous stroke 8 years ago presented similarly, but was much more severe (weakness + speech changes/aphasia). Not a Tenecteplase candidate given out of time window. MRI Brain on 7/26 showed 1.1 cm acute infarct left inferior adrienne as well as smaller subcentimeter acute infarcts bilateral parieto-occipital juxtacortical white matter. Patient was evaluated by PM&R and therapy for functional deficits, gait/ADL impairments and acute rehabilitation was recommended. Patient was medically optimized for discharge to [facility] IRU on 7/27/23.      Vital Signs Last 24 Hrs  T(C): 36.6 (01 Aug 2023 08:40), Max: 36.8 (31 Jul 2023 20:30)  T(F): 97.8 (01 Aug 2023 08:40), Max: 98.3 (31 Jul 2023 20:30)  HR: 66 (01 Aug 2023 08:40) (65 - 76)  BP: 126/80 (01 Aug 2023 08:40) (126/80 - 156/92)  BP(mean): --  RR: 15 (01 Aug 2023 08:40) (15 - 16)  SpO2: 95% (01 Aug 2023 08:40) (95% - 98%)    Parameters below as of 01 Aug 2023 08:40  Patient On (Oxygen Delivery Method): room air            PHYSICAL EXAM  Constitutional - NAD, Comfortable  HEENT - NCAT, EOMI  Neck - Supple, No limited ROM  Chest - CTA bilaterally  Cardiovascular - RRR, S1S2  Abdomen -BS+, Soft, NTND  Extremities - No C/C/E, No calf tenderness   Neurologic Exam - aox3, dysarthria, RU/RL 4/5, DEBRA/LL 5/5    RECENT LABS:                          14.6   6.28  )-----------( 205      ( 31 Jul 2023 06:41 )             42.0     07-31    141  |  105  |  23  ----------------------------<  129<H>  4.1   |  28  |  1.31<H>    Ca    9.1      31 Jul 2023 06:41    TPro  6.8  /  Alb  3.5  /  TBili  0.6  /  DBili  x   /  AST  30  /  ALT  51<H>  /  AlkPhos  52  07-31    LIVER FUNCTIONS - ( 31 Jul 2023 06:41 )  Alb: 3.5 g/dL / Pro: 6.8 g/dL / ALK PHOS: 52 U/L / ALT: 51 U/L / AST: 30 U/L / GGT: x             Urinalysis Basic - ( 31 Jul 2023 06:41 )    Color: x / Appearance: x / SG: x / pH: x  Gluc: 129 mg/dL / Ketone: x  / Bili: x / Urobili: x   Blood: x / Protein: x / Nitrite: x   Leuk Esterase: x / RBC: x / WBC x   Sq Epi: x / Non Sq Epi: x / Bacteria: x        CAPILLARY BLOOD GLUCOSE      POCT Blood Glucose.: 141 mg/dL (01 Aug 2023 08:19)  POCT Blood Glucose.: 155 mg/dL (31 Jul 2023 20:33)  POCT Blood Glucose.: 153 mg/dL (31 Jul 2023 17:52)  POCT Blood Glucose.: 164 mg/dL (31 Jul 2023 12:00)        MEDICATIONS  (STANDING):  amLODIPine   Tablet 10 milliGRAM(s) Oral daily  aspirin enteric coated 81 milliGRAM(s) Oral daily  atorvastatin 80 milliGRAM(s) Oral at bedtime  carvedilol 25 milliGRAM(s) Oral every 12 hours  clopidogrel Tablet 75 milliGRAM(s) Oral daily  dextrose 5%. 1000 milliLiter(s) (50 mL/Hr) IV Continuous <Continuous>  dextrose 5%. 1000 milliLiter(s) (100 mL/Hr) IV Continuous <Continuous>  dextrose 50% Injectable 25 Gram(s) IV Push once  dextrose 50% Injectable 12.5 Gram(s) IV Push once  dextrose 50% Injectable 25 Gram(s) IV Push once  enoxaparin Injectable 40 milliGRAM(s) SubCutaneous every 24 hours  escitalopram 5 milliGRAM(s) Oral daily  glucagon  Injectable 1 milliGRAM(s) IntraMuscular once  hydrALAZINE 25 milliGRAM(s) Oral two times a day  insulin lispro (ADMELOG) corrective regimen sliding scale   SubCutaneous at bedtime  insulin lispro (ADMELOG) corrective regimen sliding scale   SubCutaneous three times a day before meals  lisinopril 20 milliGRAM(s) Oral two times a day  melatonin 6 milliGRAM(s) Oral at bedtime  nicotine -  14 mG/24Hr(s) Patch 1 Patch Transdermal daily  polyethylene glycol 3350 17 Gram(s) Oral two times a day  saline laxative (FLEET) Rectal Enema 1 Enema Rectal once  senna 2 Tablet(s) Oral at bedtime  traZODone 25 milliGRAM(s) Oral at bedtime    MEDICATIONS  (PRN):  acetaminophen     Tablet .. 650 milliGRAM(s) Oral every 6 hours PRN Moderate Pain (4 - 6), Severe Pain (7 - 10)  bisacodyl Suppository 10 milliGRAM(s) Rectal daily PRN Constipation  dextrose Oral Gel 15 Gram(s) Oral once PRN Blood Glucose LESS THAN 70 milliGRAM(s)/deciliter

## 2023-08-01 NOTE — PROGRESS NOTE ADULT - ASSESSMENT
42 YO man with a PMH of T2DM, HTN, previous CVA with minimal residual deficits, current smoker who presented to [facility] ED on 7/24 after two weeks of intermittent left lower extremity weakness with accompanying back pain,  and dysarthria. He was found to have acute infarct in left inferior adrienne and smaller acute infarcts in bilateral parieto-occipital juxtacortical . Hospital course s/f HTN, depression and suicidal ideation seen and cleared by psychologist. Patient now with gait Instability, ADL impairments and Functional impairments.    #H/o CVA, infarct in left inferior adrienne and smaller acute infarcts in bilateral parieto-occipital juxtacortical. Mechanism large artery atherosclerosis  - ASA 81mg and Plavix 75mg daily for 3 months, then ASA alone  - Lipitor 80mg qhs    #HTN  - BP acceptable  - Carvedilol 25mg Q 12hrs  - Amlodipine 10mg daily  - Hydralazine 25mg BID  - Lisinopril 20mg daily    #HLD  - Lipitor 80mg daily    #DM II  - ISS and FS  - A1c 7.0 on 7/25   - start metformin 500mg BID    DVT ppx Lovenox

## 2023-08-01 NOTE — PROGRESS NOTE ADULT - SUBJECTIVE AND OBJECTIVE BOX
Patient is a 43y old  Male who presents with a chief complaint of CVA (01 Aug 2023 11:14)      Subjective and overnight events:  Patient seen and examined at bedside. no complaints. no headache, dizziness, sob, cp, abd pain. slept well last night.     ALLERGIES:  No Known Allergies    MEDICATIONS  (STANDING):  amLODIPine   Tablet 10 milliGRAM(s) Oral daily  aspirin enteric coated 81 milliGRAM(s) Oral daily  atorvastatin 80 milliGRAM(s) Oral at bedtime  carvedilol 25 milliGRAM(s) Oral every 12 hours  clopidogrel Tablet 75 milliGRAM(s) Oral daily  dextrose 5%. 1000 milliLiter(s) (50 mL/Hr) IV Continuous <Continuous>  dextrose 5%. 1000 milliLiter(s) (100 mL/Hr) IV Continuous <Continuous>  dextrose 50% Injectable 25 Gram(s) IV Push once  dextrose 50% Injectable 12.5 Gram(s) IV Push once  dextrose 50% Injectable 25 Gram(s) IV Push once  enoxaparin Injectable 40 milliGRAM(s) SubCutaneous every 24 hours  escitalopram 10 milliGRAM(s) Oral daily  glucagon  Injectable 1 milliGRAM(s) IntraMuscular once  hydrALAZINE 25 milliGRAM(s) Oral two times a day  insulin lispro (ADMELOG) corrective regimen sliding scale   SubCutaneous at bedtime  insulin lispro (ADMELOG) corrective regimen sliding scale   SubCutaneous three times a day before meals  lisinopril 20 milliGRAM(s) Oral two times a day  melatonin 6 milliGRAM(s) Oral at bedtime  nicotine -  14 mG/24Hr(s) Patch 1 Patch Transdermal daily  polyethylene glycol 3350 17 Gram(s) Oral two times a day  senna 2 Tablet(s) Oral at bedtime  traZODone 25 milliGRAM(s) Oral at bedtime    MEDICATIONS  (PRN):  acetaminophen     Tablet .. 650 milliGRAM(s) Oral every 6 hours PRN Moderate Pain (4 - 6), Severe Pain (7 - 10)  bisacodyl Suppository 10 milliGRAM(s) Rectal daily PRN Constipation  dextrose Oral Gel 15 Gram(s) Oral once PRN Blood Glucose LESS THAN 70 milliGRAM(s)/deciliter    Vital Signs Last 24 Hrs  T(F): 97.8 (01 Aug 2023 08:40), Max: 98.3 (31 Jul 2023 20:30)  HR: 66 (01 Aug 2023 08:40) (65 - 76)  BP: 126/80 (01 Aug 2023 08:40) (126/80 - 156/92)  RR: 15 (01 Aug 2023 08:40) (15 - 16)  SpO2: 95% (01 Aug 2023 08:40) (95% - 98%)  I&O's Summary    PHYSICAL EXAM:  General: NAD, A/O x 3  ENT: MMM  Neck: Supple, No JVD  Lungs: Clear to auscultation bilaterally  Cardio: RRR, S1/S2, No murmurs  Abdomen: Soft, Nontender, Nondistended; Bowel sounds present  Extremities: No calf tenderness, No pitting edema    LABS:                        14.6   6.28  )-----------( 205      ( 31 Jul 2023 06:41 )             42.0     07-31    141  |  105  |  23  ----------------------------<  129  4.1   |  28  |  1.31    Ca    9.1      31 Jul 2023 06:41    TPro  6.8  /  Alb  3.5  /  TBili  0.6  /  DBili  x   /  AST  30  /  ALT  51  /  AlkPhos  52  07-31      07-25 Chol 230 mg/dL LDL -- HDL 32 mg/dL Trig 240 mg/dL      POCT Blood Glucose.: 124 mg/dL (01 Aug 2023 11:36)  POCT Blood Glucose.: 141 mg/dL (01 Aug 2023 08:19)  POCT Blood Glucose.: 155 mg/dL (31 Jul 2023 20:33)  POCT Blood Glucose.: 153 mg/dL (31 Jul 2023 17:52)      Urinalysis Basic - ( 31 Jul 2023 06:41 )    Color: x / Appearance: x / SG: x / pH: x  Gluc: 129 mg/dL / Ketone: x  / Bili: x / Urobili: x   Blood: x / Protein: x / Nitrite: x   Leuk Esterase: x / RBC: x / WBC x   Sq Epi: x / Non Sq Epi: x / Bacteria: x            RADIOLOGY & ADDITIONAL TESTS:    Care Discussed with Consultants/Other Providers:

## 2023-08-01 NOTE — PROGRESS NOTE ADULT - NS ATTEND AMEND GEN_ALL_CORE FT
I have personally seen and examined the patient with the NP. Medical records reviewed. I have made amendments to the documentation where necessary and adjusted the history, physical examination, and plan as documented by the NP. I have personally seen and examined the patient with the NP. Medical records reviewed. I have made amendments to the documentation where necessary and adjusted the history, physical examination, and plan as documented by the NP.    Multidisciplinary team meeting today:  patient's functional goals and needs, functional and clinical  progress were discussed, barriers to discharge were identified. Anticipate discharge home with home care, 24/7 supervision for safety.    EDOD 8/18/23    Spoke with wife at bedside, detailed update given    57 min spent

## 2023-08-01 NOTE — PROGRESS NOTE ADULT - ASSESSMENT
ASSESSMENT/PLAN  Patient  is a 44 YO man with a PMH of T2DM, HTN, previous CVA with minimal residual deficits, current smoker who presented to [facility] ED on 7/24 after two weeks of intermittent left lower extremity weakness with accompanying back pain,  and dysarthria. He was found to have acute infarct in left inferior adrienne and smaller acute infarcts in bilateral parieto-occipital juxtacortical . Hospital course s/f HTN, depression and suicidal ideation seen and cleared by psychologist. Patient now with gait Instability, ADL impairments and Functional impairments.    #CVA now with Dysarthria and RLE weakness due to acute infarct in left inferior adrienne and smaller acute infarcts in bilateral parieto-occipital juxtacortical. Mechanism large artery atherosclerosis  - Continue Comprehensive Rehab Program: PT/OT/ST, 3hours daily and 5 days weekly.   - Continue ASA 81mg and Plavix 75mg daily for 3 months, then ASA alone    #Mood/sleep  -Continue Lexapro 5mg daily for neurorecovery  -Continue trazodone 25mg at bedtime  - Depression with suicidal ideation while in acute hospital - now resolved   - Neuropsychology consult   - outpatient at [facility] Adult [facility]patient [facility]iat[facility].    #HTN  - Carvedilol 25mg Q 12hrs  - Amlodipine 10mg daily  - Hydralazine 25mg BID  - Lisinopril 20mg daily  -monitor vital signs    #HLD  - Lipitor 80mg daily    #DM II  - ISS and FS  - A1c 7.0 on 7/25     #Pain management  - Tylenol PRN    #Smoker  -continue nicotine patch  - smoking cessation education    #DVT ppx  - Lovenox  - TEDs    #Bowel Regimen  - Senna  - miralax BID  -Enema x1 7/31  -Dulcolax PRN    #Bladder management  - BS on admission, and q 8 hours (SC if > 400)  - Monitor UO    #FEN   - Diet: Soft/bite sized with thins     #Skin:  - Skin on admission: grossly intact  - Pressure injury/Skin: Turn Q2hrs while in bed, OOB to Chair, PT/OT     #Precaution  - Fall, Aspiration, cardiac

## 2023-08-02 NOTE — PROGRESS NOTE ADULT - SUBJECTIVE AND OBJECTIVE BOX
Patient is a 43y old  Male who presents with a chief complaint of CVA (02 Aug 2023 13:35)      Subjective and overnight events:  Patient seen and examined at bedside. no fever, chills, sob, cp, abd pain.     ALLERGIES:  No Known Allergies    MEDICATIONS  (STANDING):  amLODIPine   Tablet 10 milliGRAM(s) Oral daily  aspirin enteric coated 81 milliGRAM(s) Oral daily  atorvastatin 80 milliGRAM(s) Oral at bedtime  carvedilol 25 milliGRAM(s) Oral every 12 hours  clopidogrel Tablet 75 milliGRAM(s) Oral daily  dextrose 5%. 1000 milliLiter(s) (50 mL/Hr) IV Continuous <Continuous>  dextrose 5%. 1000 milliLiter(s) (100 mL/Hr) IV Continuous <Continuous>  dextrose 50% Injectable 25 Gram(s) IV Push once  dextrose 50% Injectable 25 Gram(s) IV Push once  dextrose 50% Injectable 12.5 Gram(s) IV Push once  enoxaparin Injectable 40 milliGRAM(s) SubCutaneous every 24 hours  escitalopram 10 milliGRAM(s) Oral daily  glucagon  Injectable 1 milliGRAM(s) IntraMuscular once  hydrALAZINE 25 milliGRAM(s) Oral two times a day  insulin lispro (ADMELOG) corrective regimen sliding scale   SubCutaneous at bedtime  insulin lispro (ADMELOG) corrective regimen sliding scale   SubCutaneous three times a day before meals  lisinopril 20 milliGRAM(s) Oral two times a day  melatonin 6 milliGRAM(s) Oral at bedtime  metFORMIN 500 milliGRAM(s) Oral two times a day with meals  nicotine -  14 mG/24Hr(s) Patch 1 Patch Transdermal daily  polyethylene glycol 3350 17 Gram(s) Oral two times a day  senna 2 Tablet(s) Oral at bedtime  traZODone 25 milliGRAM(s) Oral at bedtime    MEDICATIONS  (PRN):  acetaminophen     Tablet .. 650 milliGRAM(s) Oral every 6 hours PRN Moderate Pain (4 - 6), Severe Pain (7 - 10)  bisacodyl Suppository 10 milliGRAM(s) Rectal daily PRN Constipation  dextrose Oral Gel 15 Gram(s) Oral once PRN Blood Glucose LESS THAN 70 milliGRAM(s)/deciliter    Vital Signs Last 24 Hrs  T(F): 97.8 (02 Aug 2023 08:11), Max: 98.1 (01 Aug 2023 19:30)  HR: 64 (02 Aug 2023 08:11) (64 - 78)  BP: 134/84 (02 Aug 2023 08:11) (134/84 - 152/94)  RR: 16 (02 Aug 2023 08:11) (16 - 16)  SpO2: 97% (02 Aug 2023 08:11) (95% - 97%)  I&O's Summary    01 Aug 2023 07:01  -  02 Aug 2023 07:00  --------------------------------------------------------  IN: 0 mL / OUT: 1 mL / NET: -1 mL      PHYSICAL EXAM:  General: NAD, A/O x 3  ENT: MMM  Neck: Supple, No JVD  Lungs: Clear to auscultation bilaterally  Cardio: RRR, S1/S2, No murmurs  Abdomen: Soft, Nontender, Nondistended; Bowel sounds present  Extremities: No calf tenderness, No pitting edema    LABS:                        14.6   6.28  )-----------( 205      ( 31 Jul 2023 06:41 )             42.0     07-31    141  |  105  |  23  ----------------------------<  129  4.1   |  28  |  1.31    Ca    9.1      31 Jul 2023 06:41    TPro  6.8  /  Alb  3.5  /  TBili  0.6  /  DBili  x   /  AST  30  /  ALT  51  /  AlkPhos  52  07-31        07-25 Chol 230 mg/dL LDL -- HDL 32 mg/dL Trig 240 mg/dL        POCT Blood Glucose.: 111 mg/dL (02 Aug 2023 11:51)  POCT Blood Glucose.: 174 mg/dL (02 Aug 2023 08:04)  POCT Blood Glucose.: 143 mg/dL (01 Aug 2023 21:42)  POCT Blood Glucose.: 114 mg/dL (01 Aug 2023 16:45)      Urinalysis Basic - ( 31 Jul 2023 06:41 )    Color: x / Appearance: x / SG: x / pH: x  Gluc: 129 mg/dL / Ketone: x  / Bili: x / Urobili: x   Blood: x / Protein: x / Nitrite: x   Leuk Esterase: x / RBC: x / WBC x   Sq Epi: x / Non Sq Epi: x / Bacteria: x            RADIOLOGY & ADDITIONAL TESTS:    Care Discussed with Consultants/Other Providers:

## 2023-08-02 NOTE — PROGRESS NOTE ADULT - SUBJECTIVE AND OBJECTIVE BOX
SUBJECTIVE/ROS: Patient evaluated while in the chair and again in therapy. No acute events overnight and he is participating well in therapy. He denies chest pain, fever, chills, nausea, vomiting, abdominal pain, headache, or BLE pain.     HPI:  Patient  is a 44 YO man with a PMH of T2DM, HTN, previous CVA (per patient 8 years ago, treated with TPA at [facility], p/w L hemiplegia, facial droop and dysarthria with minimal residual deficits), current smoker who presented to [facility] ED on 7/24 after two weeks of intermittent left lower extremity weakness with accompanying back pain similar to patient's sciatica pain, and 25+ hours of dysarthria. He stated he had episode of similar dysarthria 2 weeks ago as well that improved. On examination, patient has mild dysarthria, accompanied by wife who states that patient's speech is noticeably more slurred than his baseline. Says that his previous stroke 8 years ago presented similarly, but was much more severe (weakness + speech changes/aphasia). Not a Tenecteplase candidate given out of time window. MRI Brain on 7/26 showed 1.1 cm acute infarct left inferior adrienne as well as smaller subcentimeter acute infarcts bilateral parieto-occipital juxtacortical white matter. Patient was evaluated by PM&R and therapy for functional deficits, gait/ADL impairments and acute rehabilitation was recommended. Patient was medically optimized for discharge to [facility] IRU on 7/27/23.      Vital Signs Last 24 Hrs  T(C): 36.6 (02 Aug 2023 08:11), Max: 36.7 (01 Aug 2023 19:30)  T(F): 97.8 (02 Aug 2023 08:11), Max: 98.1 (01 Aug 2023 19:30)  HR: 64 (02 Aug 2023 08:11) (64 - 78)  BP: 134/84 (02 Aug 2023 08:11) (134/84 - 152/94)  BP(mean): --  RR: 16 (02 Aug 2023 08:11) (16 - 16)  SpO2: 97% (02 Aug 2023 08:11) (95% - 97%)    Parameters below as of 02 Aug 2023 08:11  Patient On (Oxygen Delivery Method): room air              PHYSICAL EXAM  Constitutional - NAD, Comfortable  HEENT - NCAT, EOMI  Neck - Supple, No limited ROM  Chest - CTA bilaterally  Cardiovascular - RRR, S1S2  Abdomen -BS+, Soft, NTND  Extremities - No C/C/E, No calf tenderness   Neurologic Exam - aox3, dysarthria, RU/RL 4/5, DEBRA/LL 5/5    RECENT LABS:                          14.6   6.28  )-----------( 205      ( 31 Jul 2023 06:41 )             42.0     07-31    141  |  105  |  23  ----------------------------<  129<H>  4.1   |  28  |  1.31<H>    Ca    9.1      31 Jul 2023 06:41    TPro  6.8  /  Alb  3.5  /  TBili  0.6  /  DBili  x   /  AST  30  /  ALT  51<H>  /  AlkPhos  52  07-31    LIVER FUNCTIONS - ( 31 Jul 2023 06:41 )  Alb: 3.5 g/dL / Pro: 6.8 g/dL / ALK PHOS: 52 U/L / ALT: 51 U/L / AST: 30 U/L / GGT: x             Urinalysis Basic - ( 31 Jul 2023 06:41 )    Color: x / Appearance: x / SG: x / pH: x  Gluc: 129 mg/dL / Ketone: x  / Bili: x / Urobili: x   Blood: x / Protein: x / Nitrite: x   Leuk Esterase: x / RBC: x / WBC x   Sq Epi: x / Non Sq Epi: x / Bacteria: x      CAPILLARY BLOOD GLUCOSE      POCT Blood Glucose.: 111 mg/dL (02 Aug 2023 11:51)  POCT Blood Glucose.: 174 mg/dL (02 Aug 2023 08:04)  POCT Blood Glucose.: 143 mg/dL (01 Aug 2023 21:42)  POCT Blood Glucose.: 114 mg/dL (01 Aug 2023 16:45)        MEDICATIONS  (STANDING):  amLODIPine   Tablet 10 milliGRAM(s) Oral daily  aspirin enteric coated 81 milliGRAM(s) Oral daily  atorvastatin 80 milliGRAM(s) Oral at bedtime  carvedilol 25 milliGRAM(s) Oral every 12 hours  clopidogrel Tablet 75 milliGRAM(s) Oral daily  dextrose 5%. 1000 milliLiter(s) (50 mL/Hr) IV Continuous <Continuous>  dextrose 5%. 1000 milliLiter(s) (100 mL/Hr) IV Continuous <Continuous>  dextrose 50% Injectable 25 Gram(s) IV Push once  dextrose 50% Injectable 12.5 Gram(s) IV Push once  dextrose 50% Injectable 25 Gram(s) IV Push once  enoxaparin Injectable 40 milliGRAM(s) SubCutaneous every 24 hours  escitalopram 10 milliGRAM(s) Oral daily  glucagon  Injectable 1 milliGRAM(s) IntraMuscular once  hydrALAZINE 25 milliGRAM(s) Oral two times a day  insulin lispro (ADMELOG) corrective regimen sliding scale   SubCutaneous at bedtime  insulin lispro (ADMELOG) corrective regimen sliding scale   SubCutaneous three times a day before meals  lisinopril 20 milliGRAM(s) Oral two times a day  melatonin 6 milliGRAM(s) Oral at bedtime  metFORMIN 500 milliGRAM(s) Oral two times a day with meals  nicotine -  14 mG/24Hr(s) Patch 1 Patch Transdermal daily  polyethylene glycol 3350 17 Gram(s) Oral two times a day  senna 2 Tablet(s) Oral at bedtime  traZODone 25 milliGRAM(s) Oral at bedtime    MEDICATIONS  (PRN):  acetaminophen     Tablet .. 650 milliGRAM(s) Oral every 6 hours PRN Moderate Pain (4 - 6), Severe Pain (7 - 10)  bisacodyl Suppository 10 milliGRAM(s) Rectal daily PRN Constipation  dextrose Oral Gel 15 Gram(s) Oral once PRN Blood Glucose LESS THAN 70 milliGRAM(s)/deciliter

## 2023-08-02 NOTE — PROGRESS NOTE ADULT - ASSESSMENT
ASSESSMENT/PLAN  Patient  is a 42 YO man with a PMH of T2DM, HTN, previous CVA with minimal residual deficits, current smoker who presented to [facility] ED on 7/24 after two weeks of intermittent left lower extremity weakness with accompanying back pain,  and dysarthria. He was found to have acute infarct in left inferior adrienne and smaller acute infarcts in bilateral parieto-occipital juxtacortical . Hospital course s/f HTN, depression and suicidal ideation seen and cleared by psychologist. Patient now with gait Instability, ADL impairments and Functional impairments.    #CVA now with Dysarthria and RLE weakness due to acute infarct in left inferior adrienne and smaller acute infarcts in bilateral parieto-occipital juxtacortical. Mechanism large artery atherosclerosis  - Continue Comprehensive Rehab Program: PT/OT/ST, 3hours daily and 5 days weekly.   - Continue ASA 81mg and Plavix 75mg daily for 3 months, then ASA alone    #Mood/sleep  -Continue Lexapro 10mg daily for neurorecovery 8/2  -Continue trazodone 25mg at bedtime  - Depression with suicidal ideation while in acute hospital - now resolved   - Neuropsychology consult   - outpatient at [facility].    #HTN  - Carvedilol 25mg Q 12hrs  - Amlodipine 10mg daily  - Hydralazine 25mg BID  - Lisinopril 20mg daily  -monitor vital signs    #HLD  - Lipitor 80mg daily    #DM II  - ISS and FS  - A1c 7.0 on 7/25     #Pain management  - Tylenol PRN    #Smoker  -continue nicotine patch  - smoking cessation education    #DVT ppx  - Lovenox  - TEDs    #Bowel Regimen  - Senna  - miralax BID  -Enema x1 7/31  -Dulcolax PRN    #Bladder management  - BS on admission, and q 8 hours (SC if > 400)  - Monitor UO    #FEN   - Diet: Soft/bite sized with thins     #Skin:  - Skin on admission: grossly intact  - Pressure injury/Skin: Turn Q2hrs while in bed, OOB to Chair, PT/OT     #Precaution  - Fall, Aspiration, cardiac    TEAM MEETING 8/2/23  SW:  lives with wife in apartment with 3 lynda  OT: sv for adls, min for transfers  PT: CG-SV for transfers, 120' with RW and right ace wrap, 12 steps 2HR  SLP: dysarthria, hypophonia  barriers: dec coordination, dysarthria, dysmetria  EDOD: Home 8/18

## 2023-08-03 NOTE — PROGRESS NOTE ADULT - ASSESSMENT
42 YO man with a PMH of T2DM, HTN, previous CVA with minimal residual deficits, current smoker who presented to [facility] ED on 7/24 after two weeks of intermittent left lower extremity weakness with accompanying back pain,  and dysarthria. He was found to have acute infarct in left inferior adrienne and smaller acute infarcts in bilateral parieto-occipital juxtacortical . Hospital course s/f HTN, depression and suicidal ideation seen and cleared by psychologist. Patient now with gait Instability, ADL impairments and Functional impairments.    #H/o CVA, infarct in left inferior adrienne and smaller acute infarcts in bilateral parieto-occipital juxtacortical. Mechanism large artery atherosclerosis  - ASA 81mg and Plavix 75mg daily for 3 months, then ASA alone  - Lipitor 80mg qhs    #HTN  - SBP persistently >150 in the evening. increase lisinopril to 40mg daily  - Carvedilol 25mg Q 12hrs  - Amlodipine 10mg daily  - Hydralazine 25mg BID    #HLD  - Lipitor 80mg daily    #DM II  - ISS and FS  - A1c 7.0 on 7/25   - cont with metformin 500mg BID    DVT ppx Lovenox

## 2023-08-03 NOTE — PROGRESS NOTE ADULT - ASSESSMENT
ASSESSMENT/PLAN  Patient  is a 42 YO man with a PMH of T2DM, HTN, previous CVA with minimal residual deficits, current smoker who presented to [facility] ED on 7/24 after two weeks of intermittent left lower extremity weakness with accompanying back pain,  and dysarthria. He was found to have acute infarct in left inferior adrienne and smaller acute infarcts in bilateral parieto-occipital juxtacortical . Hospital course s/f HTN, depression and suicidal ideation seen and cleared by psychologist. Patient now with gait Instability, ADL impairments and Functional impairments.    #CVA now with Dysarthria and RLE weakness due to acute infarct in left inferior adrienne and smaller acute infarcts in bilateral parieto-occipital juxtacortical. Mechanism large artery atherosclerosis  - Continue Comprehensive Rehab Program: PT/OT/ST, 3hours daily and 5 days weekly.   - Continue ASA 81mg and Plavix 75mg daily for 3 months, then ASA alone    #Mood/sleep  -Continue Lexapro 10mg daily for neurorecovery 8/2  -Continue trazodone 25mg at bedtime  - Depression with suicidal ideation while in acute hospital - now resolved   - Neuropsychology consult   - outpatient at [facility].    #HTN  - Carvedilol 25mg Q 12hrs  - Amlodipine 10mg daily  - Hydralazine 25mg BID  - Lisinopril 20mg daily  -monitor vital signs    #HLD  - Lipitor 80mg daily    #DM II  - ISS and FS  - A1c 7.0 on 7/25     #Pain management  - Tylenol PRN    #Smoker  -continue nicotine patch  - smoking cessation education    #DVT ppx  - Lovenox  - TEDs    #Bowel Regimen  - Senna  - miralax BID  -Enema pRN   -Dulcolax PRN    #Bladder management  - BS on admission, and q 8 hours (SC if > 400)  - Monitor UO    #FEN   - Diet: Soft/bite sized with thins     #Skin:  - Skin on admission: grossly intact  - Pressure injury/Skin: Turn Q2hrs while in bed, OOB to Chair, PT/OT     #Precaution  - Fall, Aspiration, cardiac    TEAM MEETING 8/2/23  SW:  lives with wife in apartment with 3 lynda  OT: sv for adls, min for transfers  PT: CG-SV for transfers, 120' with RW and right ace wrap, 12 steps 2HR  SLP: dysarthria, hypophonia  barriers: dec coordination, dysarthria, dysmetria  EDOD: Home 8/18

## 2023-08-03 NOTE — BH CONSULTATION LIAISON PROGRESS NOTE - NSBHCHARTREVIEWINVESTIGATE_PSY_A_CORE FT
< from: 12 Lead ECG (07.26.23 @ 11:47) >      Ventricular Rate 80 BPM    Atrial Rate 80 BPM    P-R Interval 194 ms    QRS Duration 104 ms    Q-T Interval 412 ms    QTC Calculation(Bazett) 475 ms    P Axis 42 degrees    R Axis 8 degrees    T Axis 65 degrees    Diagnosis Line NORMAL SINUS RHYTHM  POSSIBLE LEFT ATRIAL ENLARGEMENT  MINIMAL VOLTAGE CRITERIA FOR LVH, MAY BE NORMAL VARIANT ( [facility] product )  SEPTAL INFARCT , AGE UNDETERMINED  ABNORMAL ECG  WHEN COMPARED WITH ECG OF 24-JUL-2023 12:57, (UNCONFIRMED)  NO SIGNIFICANT CHANGE WAS FOUND    Confirmed by MD RASHI, FALLON ([id number]) on 7/27/2023 8:59:32 PM    < end of copied text >

## 2023-08-03 NOTE — PROGRESS NOTE ADULT - SUBJECTIVE AND OBJECTIVE BOX
Patient is a 43y old  Male who presents with a chief complaint of CVA (03 Aug 2023 12:35)      Subjective and overnight events:  Patient seen and examined at bedside. no complaints. slept better last night. no fever, chills, sob, cp, abd pain.     ALLERGIES:  No Known Allergies    MEDICATIONS  (STANDING):  amLODIPine   Tablet 10 milliGRAM(s) Oral daily  aspirin enteric coated 81 milliGRAM(s) Oral daily  atorvastatin 80 milliGRAM(s) Oral at bedtime  carvedilol 25 milliGRAM(s) Oral every 12 hours  clopidogrel Tablet 75 milliGRAM(s) Oral daily  dextrose 5%. 1000 milliLiter(s) (50 mL/Hr) IV Continuous <Continuous>  dextrose 5%. 1000 milliLiter(s) (100 mL/Hr) IV Continuous <Continuous>  dextrose 50% Injectable 25 Gram(s) IV Push once  dextrose 50% Injectable 12.5 Gram(s) IV Push once  dextrose 50% Injectable 25 Gram(s) IV Push once  enoxaparin Injectable 40 milliGRAM(s) SubCutaneous every 24 hours  escitalopram 10 milliGRAM(s) Oral daily  glucagon  Injectable 1 milliGRAM(s) IntraMuscular once  hydrALAZINE 25 milliGRAM(s) Oral two times a day  insulin lispro (ADMELOG) corrective regimen sliding scale   SubCutaneous three times a day before meals  insulin lispro (ADMELOG) corrective regimen sliding scale   SubCutaneous at bedtime  melatonin 6 milliGRAM(s) Oral at bedtime  metFORMIN 500 milliGRAM(s) Oral two times a day with meals  nicotine -  14 mG/24Hr(s) Patch 1 Patch Transdermal daily  polyethylene glycol 3350 17 Gram(s) Oral two times a day  senna 2 Tablet(s) Oral at bedtime  traZODone 25 milliGRAM(s) Oral at bedtime    MEDICATIONS  (PRN):  acetaminophen     Tablet .. 650 milliGRAM(s) Oral every 6 hours PRN Moderate Pain (4 - 6), Severe Pain (7 - 10)  bisacodyl Suppository 10 milliGRAM(s) Rectal daily PRN Constipation  dextrose Oral Gel 15 Gram(s) Oral once PRN Blood Glucose LESS THAN 70 milliGRAM(s)/deciliter    Vital Signs Last 24 Hrs  T(F): 98 (03 Aug 2023 08:19), Max: 98.5 (02 Aug 2023 21:41)  HR: 55 (03 Aug 2023 08:19) (55 - 80)  BP: 135/85 (03 Aug 2023 08:19) (130/76 - 155/98)  RR: 16 (03 Aug 2023 08:19) (15 - 16)  SpO2: 100% (03 Aug 2023 08:19) (95% - 100%)  I&O's Summary    PHYSICAL EXAM:  General: NAD, A/O x 3  ENT: MMM  Neck: Supple, No JVD  Lungs: Clear to auscultation bilaterally  Cardio: RRR, S1/S2, No murmurs  Abdomen: Soft, Nontender, Nondistended; Bowel sounds present  Extremities: No calf tenderness, No pitting edema    LABS:                        14.1   5.83  )-----------( 114      ( 03 Aug 2023 07:05 )             42.8     08-03    136  |  106  |  26  ----------------------------<  125  4.3   |  22  |  1.23    Ca    9.0      03 Aug 2023 07:05    TPro  6.6  /  Alb  2.8  /  TBili  0.6  /  DBili  x   /  AST  30  /  ALT  41  /  AlkPhos  48  08-03      07-25 Chol 230 mg/dL LDL -- HDL 32 mg/dL Trig 240 mg/dL      POCT Blood Glucose.: 118 mg/dL (03 Aug 2023 11:48)  POCT Blood Glucose.: 131 mg/dL (03 Aug 2023 08:22)  POCT Blood Glucose.: 126 mg/dL (02 Aug 2023 21:52)  POCT Blood Glucose.: 157 mg/dL (02 Aug 2023 17:41)      Urinalysis Basic - ( 03 Aug 2023 07:05 )    Color: x / Appearance: x / SG: x / pH: x  Gluc: 125 mg/dL / Ketone: x  / Bili: x / Urobili: x   Blood: x / Protein: x / Nitrite: x   Leuk Esterase: x / RBC: x / WBC x   Sq Epi: x / Non Sq Epi: x / Bacteria: x            RADIOLOGY & ADDITIONAL TESTS:    Care Discussed with Consultants/Other Providers:

## 2023-08-03 NOTE — PROGRESS NOTE ADULT - SUBJECTIVE AND OBJECTIVE BOX
SUBJECTIVE/ROS: Patient evaluated while in the chair and again in therapy. He states he slept well last night and has been having more regular BMs. He denies chest pain, fever, chills, nausea, vomiting, abdominal pain, headache, or BLE pain.     HPI:  Patient  is a 44 YO man with a PMH of T2DM, HTN, previous CVA (per patient 8 years ago, treated with TPA at [facility], p/w L hemiplegia, facial droop and dysarthria with minimal residual deficits), current smoker who presented to [facility] ED on 7/24 after two weeks of intermittent left lower extremity weakness with accompanying back pain similar to patient's sciatica pain, and 25+ hours of dysarthria. He stated he had episode of similar dysarthria 2 weeks ago as well that improved. On examination, patient has mild dysarthria, accompanied by wife who states that patient's speech is noticeably more slurred than his baseline. Says that his previous stroke 8 years ago presented similarly, but was much more severe (weakness + speech changes/aphasia). Not a Tenecteplase candidate given out of time window. MRI Brain on 7/26 showed 1.1 cm acute infarct left inferior adrienne as well as smaller subcentimeter acute infarcts bilateral parieto-occipital juxtacortical white matter. Patient was evaluated by PM&R and therapy for functional deficits, gait/ADL impairments and acute rehabilitation was recommended. Patient was medically optimized for discharge to [facility] IRU on 7/27/23.      Vital Signs Last 24 Hrs  T(C): 36.7 (03 Aug 2023 08:19), Max: 36.9 (02 Aug 2023 21:41)  T(F): 98 (03 Aug 2023 08:19), Max: 98.5 (02 Aug 2023 21:41)  HR: 55 (03 Aug 2023 08:19) (55 - 80)  BP: 135/85 (03 Aug 2023 08:19) (130/76 - 155/98)  BP(mean): --  RR: 16 (03 Aug 2023 08:19) (15 - 16)  SpO2: 100% (03 Aug 2023 08:19) (95% - 100%)    Parameters below as of 03 Aug 2023 08:19  Patient On (Oxygen Delivery Method): BiPAP/CPAP                PHYSICAL EXAM  Constitutional - NAD, Comfortable  HEENT - NCAT, EOMI  Neck - Supple, No limited ROM  Chest - CTA bilaterally  Cardiovascular - RRR, S1S2  Abdomen -BS+, Soft, NTND  Extremities - No C/C/E, No calf tenderness   Neurologic Exam - aox3, dysarthria, RU/RL 4/5, DEBRA/LL 5/5          LABS:                          14.1   5.83  )-----------( 114      ( 03 Aug 2023 07:05 )             42.8     08-03    136  |  106  |  26<H>  ----------------------------<  125<H>  4.3   |  22  |  1.23    Ca    9.0      03 Aug 2023 07:05    TPro  6.6  /  Alb  2.8<L>  /  TBili  0.6  /  DBili  x   /  AST  30  /  ALT  41  /  AlkPhos  48  08-03    LIVER FUNCTIONS - ( 03 Aug 2023 07:05 )  Alb: 2.8 g/dL / Pro: 6.6 g/dL / ALK PHOS: 48 U/L / ALT: 41 U/L / AST: 30 U/L / GGT: x             Urinalysis Basic - ( 03 Aug 2023 07:05 )    Color: x / Appearance: x / SG: x / pH: x  Gluc: 125 mg/dL / Ketone: x  / Bili: x / Urobili: x   Blood: x / Protein: x / Nitrite: x   Leuk Esterase: x / RBC: x / WBC x   Sq Epi: x / Non Sq Epi: x / Bacteria: x          CAPILLARY BLOOD GLUCOSE      POCT Blood Glucose.: 118 mg/dL (03 Aug 2023 11:48)  POCT Blood Glucose.: 131 mg/dL (03 Aug 2023 08:22)  POCT Blood Glucose.: 126 mg/dL (02 Aug 2023 21:52)  POCT Blood Glucose.: 157 mg/dL (02 Aug 2023 17:41)      MEDICATIONS  (STANDING):  amLODIPine   Tablet 10 milliGRAM(s) Oral daily  aspirin enteric coated 81 milliGRAM(s) Oral daily  atorvastatin 80 milliGRAM(s) Oral at bedtime  carvedilol 25 milliGRAM(s) Oral every 12 hours  clopidogrel Tablet 75 milliGRAM(s) Oral daily  dextrose 5%. 1000 milliLiter(s) (50 mL/Hr) IV Continuous <Continuous>  dextrose 5%. 1000 milliLiter(s) (100 mL/Hr) IV Continuous <Continuous>  dextrose 50% Injectable 25 Gram(s) IV Push once  dextrose 50% Injectable 12.5 Gram(s) IV Push once  dextrose 50% Injectable 25 Gram(s) IV Push once  enoxaparin Injectable 40 milliGRAM(s) SubCutaneous every 24 hours  escitalopram 10 milliGRAM(s) Oral daily  glucagon  Injectable 1 milliGRAM(s) IntraMuscular once  hydrALAZINE 25 milliGRAM(s) Oral two times a day  insulin lispro (ADMELOG) corrective regimen sliding scale   SubCutaneous three times a day before meals  insulin lispro (ADMELOG) corrective regimen sliding scale   SubCutaneous at bedtime  melatonin 6 milliGRAM(s) Oral at bedtime  metFORMIN 500 milliGRAM(s) Oral two times a day with meals  nicotine -  14 mG/24Hr(s) Patch 1 Patch Transdermal daily  polyethylene glycol 3350 17 Gram(s) Oral two times a day  senna 2 Tablet(s) Oral at bedtime  traZODone 25 milliGRAM(s) Oral at bedtime    MEDICATIONS  (PRN):  acetaminophen     Tablet .. 650 milliGRAM(s) Oral every 6 hours PRN Moderate Pain (4 - 6), Severe Pain (7 - 10)  bisacodyl Suppository 10 milliGRAM(s) Rectal daily PRN Constipation  dextrose Oral Gel 15 Gram(s) Oral once PRN Blood Glucose LESS THAN 70 milliGRAM(s)/deciliter

## 2023-08-04 PROBLEM — I63.9 CEREBRAL INFARCTION, UNSPECIFIED: Chronic | Status: ACTIVE | Noted: 2023-01-01

## 2023-08-04 PROBLEM — I10 ESSENTIAL (PRIMARY) HYPERTENSION: Chronic | Status: ACTIVE | Noted: 2023-01-01

## 2023-08-04 PROBLEM — E11.9 TYPE 2 DIABETES MELLITUS WITHOUT COMPLICATIONS: Chronic | Status: ACTIVE | Noted: 2023-01-01

## 2023-08-04 NOTE — PROGRESS NOTE ADULT - SUBJECTIVE AND OBJECTIVE BOX
CHIEF COMPLAINT: no new complaints, uneventful night, motivated in therapy       HISTORY OF PRESENT ILLNESS    Patient  is a 44 YO man with a PMH of T2DM, HTN, previous CVA (per patient 8 years ago, treated with TPA at [facility], p/w L hemiplegia, facial droop and dysarthria with minimal residual deficits), current smoker who presented to [facility] ED on 7/24 after two weeks of intermittent left lower extremity weakness with accompanying back pain similar to patient's sciatica pain, and 25+ hours of dysarthria. He stated he had episode of similar dysarthria 2 weeks ago as well that improved.     On examination, patient has mild dysarthria, accompanied by wife who states that patient's speech is noticeably more slurred than his baseline. Says that his previous stroke 8 years ago presented similarly, but was much more severe (weakness + speech changes/aphasia).     Not a Tenecteplase candidate given out of time window. MRI Brain on 7/26 showed 1.1 cm acute infarct left inferior adrienne as well as smaller subcentimeter acute infarcts bilateral parieto-occipital juxtacortical white matter.    Impression:  Dysarthria and RLE weakness due to acute infarct in left inferior adrienne and smaller acute infarcts in bilateral parieto-occipital juxtacortical. Mechanism large artery atherosclerosis    Aspirin 81mg and Plavix 75mg daily for 3 months as per UMAIR,  then Asa 81mg daily indefinitely     TTE: EF 58%, normal LA. , continue atorvastatin 80mg qhs,  titrate statin to LDL goal less than 70.  Cardiology consulted for blood pressure management, follow up outpatient to slowly taper from permissive hypertension to normotension (Lower SBP 10-20mmHg per week). Seen by psychology for depression/suicidality (now resolved), f/u outpatient at [facility].    Patient was evaluated by PM&R and therapy for functional deficits, gait/ADL impairments and acute rehabilitation was recommended. Patient was medically optimized for discharge to [facility] IRU on 7/27/23.     (27 Jul 2023 13:26)        PAST MEDICAL & SURGICAL HISTORY:  DM (diabetes mellitus)      HTN (hypertension)      CVA (cerebrovascular accident)      VITALS  Vital Signs Last 24 Hrs  T(C): 36.3 (04 Aug 2023 09:00), Max: 36.9 (03 Aug 2023 19:30)  T(F): 97.4 (04 Aug 2023 09:00), Max: 98.5 (03 Aug 2023 19:30)  HR: 65 (04 Aug 2023 09:00) (65 - 72)  BP: 143/82 (04 Aug 2023 09:00) (143/82 - 151/89)  BP(mean): --  RR: 16 (04 Aug 2023 09:00) (16 - 18)  SpO2: 98% (04 Aug 2023 09:00) (95% - 99%)    Parameters below as of 04 Aug 2023 09:00  Patient On (Oxygen Delivery Method): room air          PHYSICAL EXAM  Constitutional - NAD, Comfortable  HEENT - NCAT, EOMI  Neck - Supple, No limited ROM  Chest - CTA bilaterally  Cardiovascular - RRR, S1S2  Abdomen -  Soft, NTND  Extremities -  No calf tenderness   Neurologic Exam - no new focal deficits                        RECENT LABS                        14.1   5.83  )-----------( 114      ( 03 Aug 2023 07:05 )             42.8     08-03    136  |  106  |  26<H>  ----------------------------<  125<H>  4.3   |  22  |  1.23    Ca    9.0      03 Aug 2023 07:05    TPro  6.6  /  Alb  2.8<L>  /  TBili  0.6  /  DBili  x   /  AST  30  /  ALT  41  /  AlkPhos  48  08-03      LIVER FUNCTIONS - ( 03 Aug 2023 07:05 )  Alb: 2.8 g/dL / Pro: 6.6 g/dL / ALK PHOS: 48 U/L / ALT: 41 U/L / AST: 30 U/L / GGT: x           Urinalysis Basic - ( 03 Aug 2023 07:05 )    Color: x / Appearance: x / SG: x / pH: x  Gluc: 125 mg/dL / Ketone: x  / Bili: x / Urobili: x   Blood: x / Protein: x / Nitrite: x   Leuk Esterase: x / RBC: x / WBC x   Sq Epi: x / Non Sq Epi: x / Bacteria: x                  Urinalysis Basic - ( 03 Aug 2023 07:05 )    Color: x / Appearance: x / SG: x / pH: x  Gluc: 125 mg/dL / Ketone: x  / Bili: x / Urobili: x   Blood: x / Protein: x / Nitrite: x   Leuk Esterase: x / RBC: x / WBC x   Sq Epi: x / Non Sq Epi: x / Bacteria: x            CURRENT MEDICATIONS  MEDICATIONS  (STANDING):  amLODIPine   Tablet 10 milliGRAM(s) Oral daily  aspirin enteric coated 81 milliGRAM(s) Oral daily  atorvastatin 80 milliGRAM(s) Oral at bedtime  carvedilol 25 milliGRAM(s) Oral every 12 hours  clopidogrel Tablet 75 milliGRAM(s) Oral daily  dextrose 5%. 1000 milliLiter(s) (50 mL/Hr) IV Continuous <Continuous>  dextrose 5%. 1000 milliLiter(s) (100 mL/Hr) IV Continuous <Continuous>  dextrose 50% Injectable 25 Gram(s) IV Push once  dextrose 50% Injectable 12.5 Gram(s) IV Push once  dextrose 50% Injectable 25 Gram(s) IV Push once  enoxaparin Injectable 40 milliGRAM(s) SubCutaneous every 24 hours  escitalopram 10 milliGRAM(s) Oral daily  glucagon  Injectable 1 milliGRAM(s) IntraMuscular once  hydrALAZINE 25 milliGRAM(s) Oral two times a day  insulin lispro (ADMELOG) corrective regimen sliding scale   SubCutaneous three times a day before meals  insulin lispro (ADMELOG) corrective regimen sliding scale   SubCutaneous at bedtime  lisinopril 40 milliGRAM(s) Oral daily  melatonin 6 milliGRAM(s) Oral at bedtime  metFORMIN 500 milliGRAM(s) Oral two times a day with meals  nicotine -  14 mG/24Hr(s) Patch 1 Patch Transdermal daily  polyethylene glycol 3350 17 Gram(s) Oral two times a day  senna 2 Tablet(s) Oral at bedtime  traZODone 25 milliGRAM(s) Oral at bedtime    MEDICATIONS  (PRN):  acetaminophen     Tablet .. 650 milliGRAM(s) Oral every 6 hours PRN Moderate Pain (4 - 6), Severe Pain (7 - 10)  bisacodyl Suppository 10 milliGRAM(s) Rectal daily PRN Constipation  dextrose Oral Gel 15 Gram(s) Oral once PRN Blood Glucose LESS THAN 70 milliGRAM(s)/deciliter

## 2023-08-04 NOTE — PROGRESS NOTE ADULT - SUBJECTIVE AND OBJECTIVE BOX
Patient is a 43y old  Male who presents with a chief complaint of CVA (03 Aug 2023 14:07)      Subjective and overnight events:  Patient seen and examined at bedside. no complaints. no fever, chills, sob, cp, abd pain.    ALLERGIES:  No Known Allergies    MEDICATIONS  (STANDING):  amLODIPine   Tablet 10 milliGRAM(s) Oral daily  aspirin enteric coated 81 milliGRAM(s) Oral daily  atorvastatin 80 milliGRAM(s) Oral at bedtime  carvedilol 25 milliGRAM(s) Oral every 12 hours  clopidogrel Tablet 75 milliGRAM(s) Oral daily  dextrose 5%. 1000 milliLiter(s) (100 mL/Hr) IV Continuous <Continuous>  dextrose 5%. 1000 milliLiter(s) (50 mL/Hr) IV Continuous <Continuous>  dextrose 50% Injectable 25 Gram(s) IV Push once  dextrose 50% Injectable 12.5 Gram(s) IV Push once  dextrose 50% Injectable 25 Gram(s) IV Push once  enoxaparin Injectable 40 milliGRAM(s) SubCutaneous every 24 hours  escitalopram 10 milliGRAM(s) Oral daily  glucagon  Injectable 1 milliGRAM(s) IntraMuscular once  hydrALAZINE 25 milliGRAM(s) Oral two times a day  insulin lispro (ADMELOG) corrective regimen sliding scale   SubCutaneous three times a day before meals  insulin lispro (ADMELOG) corrective regimen sliding scale   SubCutaneous at bedtime  lisinopril 40 milliGRAM(s) Oral daily  melatonin 6 milliGRAM(s) Oral at bedtime  metFORMIN 500 milliGRAM(s) Oral two times a day with meals  nicotine -  14 mG/24Hr(s) Patch 1 Patch Transdermal daily  polyethylene glycol 3350 17 Gram(s) Oral two times a day  senna 2 Tablet(s) Oral at bedtime  traZODone 25 milliGRAM(s) Oral at bedtime    MEDICATIONS  (PRN):  acetaminophen     Tablet .. 650 milliGRAM(s) Oral every 6 hours PRN Moderate Pain (4 - 6), Severe Pain (7 - 10)  bisacodyl Suppository 10 milliGRAM(s) Rectal daily PRN Constipation  dextrose Oral Gel 15 Gram(s) Oral once PRN Blood Glucose LESS THAN 70 milliGRAM(s)/deciliter    Vital Signs Last 24 Hrs  T(F): 97.4 (04 Aug 2023 09:00), Max: 98.5 (03 Aug 2023 19:30)  HR: 65 (04 Aug 2023 09:00) (65 - 72)  BP: 143/82 (04 Aug 2023 09:00) (143/82 - 151/89)  RR: 16 (04 Aug 2023 09:00) (16 - 18)  SpO2: 98% (04 Aug 2023 09:00) (95% - 99%)  I&O's Summary    PHYSICAL EXAM:  General: NAD, A/O x 3  ENT: MMM  Neck: Supple, No JVD  Lungs: Clear to auscultation bilaterally  Cardio: RRR, S1/S2, No murmurs  Abdomen: Soft, Nontender, Nondistended; Bowel sounds present  Extremities: No calf tenderness, No pitting edema    LABS:                        14.1   5.83  )-----------( 114      ( 03 Aug 2023 07:05 )             42.8     08-03    136  |  106  |  26  ----------------------------<  125  4.3   |  22  |  1.23    Ca    9.0      03 Aug 2023 07:05    TPro  6.6  /  Alb  2.8  /  TBili  0.6  /  DBili  x   /  AST  30  /  ALT  41  /  AlkPhos  48  08-03      07-25 Chol 230 mg/dL LDL -- HDL 32 mg/dL Trig 240 mg/dL      POCT Blood Glucose.: 120 mg/dL (04 Aug 2023 08:42)  POCT Blood Glucose.: 122 mg/dL (03 Aug 2023 21:31)  POCT Blood Glucose.: 118 mg/dL (03 Aug 2023 17:09)  POCT Blood Glucose.: 118 mg/dL (03 Aug 2023 11:48)      Urinalysis Basic - ( 03 Aug 2023 07:05 )    Color: x / Appearance: x / SG: x / pH: x  Gluc: 125 mg/dL / Ketone: x  / Bili: x / Urobili: x   Blood: x / Protein: x / Nitrite: x   Leuk Esterase: x / RBC: x / WBC x   Sq Epi: x / Non Sq Epi: x / Bacteria: x            RADIOLOGY & ADDITIONAL TESTS:    Care Discussed with Consultants/Other Providers:

## 2023-08-04 NOTE — PROGRESS NOTE ADULT - ASSESSMENT
42 YO man with a PMH of T2DM, HTN, previous CVA with minimal residual deficits, current smoker who presented to [facility] ED on 7/24 after two weeks of intermittent left lower extremity weakness with accompanying back pain,  and dysarthria. He was found to have acute infarct in left inferior adrienne and smaller acute infarcts in bilateral parieto-occipital juxtacortical . Hospital course s/f HTN, depression and suicidal ideation seen and cleared by psychologist. Patient now with gait Instability, ADL impairments and Functional impairments.    #H/o CVA, infarct in left inferior adrienne and smaller acute infarcts in bilateral parieto-occipital juxtacortical. Mechanism large artery atherosclerosis  - ASA 81mg and Plavix 75mg daily for 3 months, then ASA alone  - Lipitor 80mg qhs    #HTN  - SBP persistently >150 in the evening. increased lisinopril to 40mg daily 8/3  - Carvedilol 25mg Q 12hrs  - Amlodipine 10mg daily  - Hydralazine 25mg BID    #HLD  - Lipitor 80mg daily    #DM II  - ISS and FS  - A1c 7.0 on 7/25   - cont with metformin 500mg BID    DVT ppx Lovenox

## 2023-08-04 NOTE — PROGRESS NOTE ADULT - ASSESSMENT
ASSESSMENT/PLAN  Patient  is a 44 YO man with a PMH of T2DM, HTN, previous CVA with minimal residual deficits, current smoker who presented to [facility] ED on 7/24 after two weeks of intermittent left lower extremity weakness with accompanying back pain,  and dysarthria. He was found to have acute infarct in left inferior adrienne and smaller acute infarcts in bilateral parieto-occipital juxtacortical . Hospital course s/f HTN, depression and suicidal ideation seen and cleared by psychologist. Patient now with gait Instability, ADL impairments and Functional impairments.    #CVA now with Dysarthria and RLE weakness due to acute infarct in left inferior adrienne and smaller acute infarcts in bilateral parieto-occipital juxtacortical. Mechanism large artery atherosclerosis  - Continue Comprehensive Rehab Program: PT/OT/ST, 3hours daily and 5 days weekly.   - Continue ASA 81mg and Plavix 75mg daily for 3 months, then ASA alone    #Mood/sleep  -Continue Lexapro 10mg daily for neurorecovery 8/2  -Continue trazodone 25mg at bedtime  - Depression with suicidal ideation while in acute hospital - now resolved   - Neuropsychology consult   - outpatient at [facility].    #HTN  - Carvedilol 25mg Q 12hrs  - Amlodipine 10mg daily  - Hydralazine 25mg BID  - Lisinopril 20mg daily  -monitor vital signs    #HLD  - Lipitor 80mg daily    #DM II  - ISS and FS  - A1c 7.0 on 7/25     #Pain management  - Tylenol PRN    #Smoker  -continue nicotine patch  - smoking cessation education    #DVT ppx  - Lovenox  - TEDs    #Bowel Regimen  - Senna  - miralax BID  -Enema pRN   -Dulcolax PRN    #Bladder management  - BS on admission, and q 8 hours (SC if > 400)  - Monitor UO    #FEN   - Diet: Soft/bite sized with thins     #Skin:  - Skin on admission: grossly intact  - Pressure injury/Skin: Turn Q2hrs while in bed, OOB to Chair, PT/OT     #Precaution  - Fall, Aspiration, cardiac    TEAM MEETING 8/2/23  SW:  lives with wife in apartment with 3 lynda  OT: sv for adls, min for transfers  PT: CG-SV for transfers, 120' with RW and right ace wrap, 12 steps 2HR  SLP: dysarthria, hypophonia  barriers: dec coordination, dysarthria, dysmetria  EDOD: Home 8/18

## 2023-08-04 NOTE — CHART NOTE - NSCHARTNOTEFT_GEN_A_CORE
Nutrition Follow Up Note  Hospital Course   (Per Electronic Medical Record)    Source:  Patient [X]  Family Member [X] Wife  Nursing Staff [X]   Medical Record [X]      Diet: Diet, Soft and Bite Sized:   No Straws  Extra Sauces Gravies (07-31-23 @ 11:20) [Active]    At this time patient tolerating diet w/ adequate appetite/intake consuming % of meals. Diet texture upgraded to Soft and Bite Sized 7/31. No issues w/ chewing and swallowing current diet texture. Recommend Consistent Carbohydrate DASH/TLC diet at this time. Denies N/V/C, however w/ constipation managed w/ polyethylene glycol & senna. Encouraged fluid/fiber at meals to assist w/ regular BM. Will send prunes TID. Noted A1c: 7.0% (7/25), POCT: 118-131mg/dL.      Enteral/Parenteral Nutrition: Not Applicable    Current Weight: 243.3lb on 7/29    Pertinent Medications: MEDICATIONS  (STANDING):  amLODIPine   Tablet 10 milliGRAM(s) Oral daily  aspirin enteric coated 81 milliGRAM(s) Oral daily  atorvastatin 80 milliGRAM(s) Oral at bedtime  carvedilol 25 milliGRAM(s) Oral every 12 hours  clopidogrel Tablet 75 milliGRAM(s) Oral daily  dextrose 5%. 1000 milliLiter(s) (100 mL/Hr) IV Continuous <Continuous>  dextrose 5%. 1000 milliLiter(s) (50 mL/Hr) IV Continuous <Continuous>  dextrose 50% Injectable 25 Gram(s) IV Push once  dextrose 50% Injectable 12.5 Gram(s) IV Push once  dextrose 50% Injectable 25 Gram(s) IV Push once  enoxaparin Injectable 40 milliGRAM(s) SubCutaneous every 24 hours  escitalopram 10 milliGRAM(s) Oral daily  glucagon  Injectable 1 milliGRAM(s) IntraMuscular once  hydrALAZINE 25 milliGRAM(s) Oral two times a day  insulin lispro (ADMELOG) corrective regimen sliding scale   SubCutaneous three times a day before meals  insulin lispro (ADMELOG) corrective regimen sliding scale   SubCutaneous at bedtime  lisinopril 40 milliGRAM(s) Oral daily  melatonin 6 milliGRAM(s) Oral at bedtime  metFORMIN 500 milliGRAM(s) Oral two times a day with meals  nicotine -  14 mG/24Hr(s) Patch 1 Patch Transdermal daily  polyethylene glycol 3350 17 Gram(s) Oral two times a day  senna 2 Tablet(s) Oral at bedtime  traZODone 25 milliGRAM(s) Oral at bedtime    MEDICATIONS  (PRN):  acetaminophen     Tablet .. 650 milliGRAM(s) Oral every 6 hours PRN Moderate Pain (4 - 6), Severe Pain (7 - 10)  bisacodyl Suppository 10 milliGRAM(s) Rectal daily PRN Constipation  dextrose Oral Gel 15 Gram(s) Oral once PRN Blood Glucose LESS THAN 70 milliGRAM(s)/deciliter    Pertinent Labs:  08-03 Na136 mmol/L Glu 125 mg/dL<H> K+ 4.3 mmol/L Cr  1.23 mg/dL BUN 26 mg/dL<H> 08-03 Alb 2.8 g/dL<L> 07-25 Chol 230 mg/dL<H> LDL --    HDL 32 mg/dL<L> Trig 240 mg/dL<H>    Skin: No pressure injury per nursing flowsheets    Edema: No edema noted per nursing flowsheet    Last Bowel Movement: on 8/2    Estimated Needs:   [X] No Change Since Previous Assessment    Previous Nutrition Diagnosis:   Swallowing Difficulty   related to CVA   as evidenced by need for mechanically altered diet texture     Nutrition Diagnosis is [X] Ongoing - addressed w/ Soft and Bite Sized IDDSI diet upgraded 7/31.     Altered Nutrition Related Lab Values   Related to T2DM   as evidenced by elevated POCT and A1c 7.0%    Nutrition Diagnosis is [X] Ongoing - Addressed w/ Heart-Healthy Consistent Carbohydrate diet     New Nutrition Diagnosis: [X] Not Applicable    Interventions:   1. Recommend continue Soft and Bite sized, Consistent Carbohydrate DASH/TLC diet at this time.    2. Monitor PO intake, GI tolerance, skin integrity, labs, weight, and bowel movement regularity.   3. Honor food preferences as feasible.   4. Follow SLP recommendations  5. Provide ongoing diet education as needed  6. RD remains available upon request and will follow-up per protocol       Monitoring & Evaluation:   [X] Weights   [X] PO Intake   [X] Skin Integrity   [X] Follow Up (Per Protocol)  [X] Tolerance to Diet Prescription   [X] Other: Labs & POCT    Registered Dietitian/Nutritionist Remains Available.  Staci Story RD, MS, CDN    Phone# [phone]

## 2023-08-05 NOTE — PROGRESS NOTE ADULT - SUBJECTIVE AND OBJECTIVE BOX
Chief complaint: no new complaints     Patient is a 43y old  Male who presents with a chief complaint of CVA (05 Aug 2023 10:05)        HEALTH ISSUES - PROBLEM Dx:  Acute cerebrovascular accident (CVA)    Continuous cannabis use    Cigarette smoker        PAST MEDICAL & SURGICAL HISTORY:  DM (diabetes mellitus)      HTN (hypertension)      CVA (cerebrovascular accident)          VITALS  Vital Signs Last 24 Hrs  T(C): 37 (05 Aug 2023 08:14), Max: 37 (05 Aug 2023 08:14)  T(F): 98.6 (05 Aug 2023 08:14), Max: 98.6 (05 Aug 2023 08:14)  HR: 57 (05 Aug 2023 08:14) (57 - 74)  BP: 152/93 (05 Aug 2023 08:14) (149/88 - 158/103)  BP(mean): --  RR: 16 (05 Aug 2023 08:14) (15 - 16)  SpO2: 99% (05 Aug 2023 08:14) (96% - 99%)    Parameters below as of 05 Aug 2023 08:14  Patient On (Oxygen Delivery Method): room air          PHYSICAL EXAM  Constitutional - NAD, Comfortable  HEENT - NCAT, EOMI  Neck - Supple, No limited ROM  Chest - CTA bilaterally  Cardiovascular - RRR, S1S2  Abdomen -  Soft, NTND  Extremities -  No calf tenderness   Neurologic Exam -                    Cognitive - AAO X3     No new focal deficits             CURRENT MEDICATIONS    MEDICATIONS  (STANDING):  amLODIPine   Tablet 10 milliGRAM(s) Oral daily  aspirin enteric coated 81 milliGRAM(s) Oral daily  atorvastatin 80 milliGRAM(s) Oral at bedtime  carvedilol 25 milliGRAM(s) Oral every 12 hours  clopidogrel Tablet 75 milliGRAM(s) Oral daily  dextrose 5%. 1000 milliLiter(s) (50 mL/Hr) IV Continuous <Continuous>  dextrose 5%. 1000 milliLiter(s) (100 mL/Hr) IV Continuous <Continuous>  dextrose 50% Injectable 25 Gram(s) IV Push once  dextrose 50% Injectable 12.5 Gram(s) IV Push once  dextrose 50% Injectable 25 Gram(s) IV Push once  enoxaparin Injectable 40 milliGRAM(s) SubCutaneous every 24 hours  escitalopram 10 milliGRAM(s) Oral daily  glucagon  Injectable 1 milliGRAM(s) IntraMuscular once  hydrALAZINE 50 milliGRAM(s) Oral two times a day  insulin lispro (ADMELOG) corrective regimen sliding scale   SubCutaneous three times a day before meals  insulin lispro (ADMELOG) corrective regimen sliding scale   SubCutaneous at bedtime  lisinopril 40 milliGRAM(s) Oral daily  melatonin 6 milliGRAM(s) Oral at bedtime  metFORMIN 500 milliGRAM(s) Oral two times a day with meals  nicotine -  14 mG/24Hr(s) Patch 1 Patch Transdermal daily  polyethylene glycol 3350 17 Gram(s) Oral two times a day  senna 2 Tablet(s) Oral at bedtime  traZODone 25 milliGRAM(s) Oral at bedtime    MEDICATIONS  (PRN):  acetaminophen     Tablet .. 650 milliGRAM(s) Oral every 6 hours PRN Moderate Pain (4 - 6), Severe Pain (7 - 10)  bisacodyl Suppository 10 milliGRAM(s) Rectal daily PRN Constipation  dextrose Oral Gel 15 Gram(s) Oral once PRN Blood Glucose LESS THAN 70 milliGRAM(s)/deciliter    ASSESSMENT & PLAN          GI/Bowel Management - Dulcolax PRN, Fleet PRN   Management - Toilet Q2  Skin - Turn Q2  Pain - Tylenol PRN  DVT PPX - Lovenox      Continue comprehensive acute rehab program consisting of 3hrs/day of OT/PT and SLP.

## 2023-08-05 NOTE — PROGRESS NOTE ADULT - ASSESSMENT
44 YO man with a PMH of T2DM, HTN, previous CVA with minimal residual deficits, current smoker who presented to [facility] ED on 7/24 after two weeks of intermittent left lower extremity weakness with accompanying back pain,  and dysarthria. He was found to have acute infarct in left inferior adrienne and smaller acute infarcts in bilateral parieto-occipital juxtacortical . Hospital course s/f HTN, depression and suicidal ideation seen and cleared by psychologist. Patient now with gait Instability, ADL impairments and Functional impairments.    #H/o CVA, infarct in left inferior adrienne and smaller acute infarcts in bilateral parieto-occipital juxtacortical. Mechanism large artery atherosclerosis  - ASA 81mg and Plavix 75mg daily for 3 months, then ASA alone  - Lipitor 80mg qhs    #HTN  - SBP persistently >150 intermittently   - lisinopril increased to 40mg   - increase hydralazine to 50mg BID 8/5  - Carvedilol 25mg Q 12hrs  - Amlodipine 10mg daily  - Hydralazine 25mg BID    #HLD  - Lipitor 80mg daily    #DM II  - ISS and FS  - A1c 7.0 on 7/25   - cont with metformin 500mg BID    DVT ppx Lovenox

## 2023-08-05 NOTE — PROGRESS NOTE ADULT - SUBJECTIVE AND OBJECTIVE BOX
Patient is a 43y old  Male who presents with a chief complaint of CVA (04 Aug 2023 15:02)      Subjective and overnight events:  Patient seen and examined at bedside. no new complaints. no acute event overnight. no fever, chills, headache, dizziness, sob, cp.    ALLERGIES:  No Known Allergies    MEDICATIONS  (STANDING):  amLODIPine   Tablet 10 milliGRAM(s) Oral daily  aspirin enteric coated 81 milliGRAM(s) Oral daily  atorvastatin 80 milliGRAM(s) Oral at bedtime  carvedilol 25 milliGRAM(s) Oral every 12 hours  clopidogrel Tablet 75 milliGRAM(s) Oral daily  dextrose 5%. 1000 milliLiter(s) (50 mL/Hr) IV Continuous <Continuous>  dextrose 5%. 1000 milliLiter(s) (100 mL/Hr) IV Continuous <Continuous>  dextrose 50% Injectable 25 Gram(s) IV Push once  dextrose 50% Injectable 12.5 Gram(s) IV Push once  dextrose 50% Injectable 25 Gram(s) IV Push once  enoxaparin Injectable 40 milliGRAM(s) SubCutaneous every 24 hours  escitalopram 10 milliGRAM(s) Oral daily  glucagon  Injectable 1 milliGRAM(s) IntraMuscular once  hydrALAZINE 50 milliGRAM(s) Oral two times a day  insulin lispro (ADMELOG) corrective regimen sliding scale   SubCutaneous three times a day before meals  insulin lispro (ADMELOG) corrective regimen sliding scale   SubCutaneous at bedtime  lisinopril 40 milliGRAM(s) Oral daily  melatonin 6 milliGRAM(s) Oral at bedtime  metFORMIN 500 milliGRAM(s) Oral two times a day with meals  nicotine -  14 mG/24Hr(s) Patch 1 Patch Transdermal daily  polyethylene glycol 3350 17 Gram(s) Oral two times a day  senna 2 Tablet(s) Oral at bedtime  traZODone 25 milliGRAM(s) Oral at bedtime    MEDICATIONS  (PRN):  acetaminophen     Tablet .. 650 milliGRAM(s) Oral every 6 hours PRN Moderate Pain (4 - 6), Severe Pain (7 - 10)  bisacodyl Suppository 10 milliGRAM(s) Rectal daily PRN Constipation  dextrose Oral Gel 15 Gram(s) Oral once PRN Blood Glucose LESS THAN 70 milliGRAM(s)/deciliter    Vital Signs Last 24 Hrs  T(F): 98.6 (05 Aug 2023 08:14), Max: 98.6 (05 Aug 2023 08:14)  HR: 57 (05 Aug 2023 08:14) (57 - 74)  BP: 152/93 (05 Aug 2023 08:14) (149/88 - 158/103)  RR: 16 (05 Aug 2023 08:14) (15 - 16)  SpO2: 99% (05 Aug 2023 08:14) (96% - 99%)  I&O's Summary    PHYSICAL EXAM:  General: NAD, A/O x 3  ENT: MMM  Neck: Supple, No JVD  Lungs: Clear to auscultation bilaterally  Cardio: RRR, S1/S2, No murmurs  Abdomen: Soft, Nontender, Nondistended; Bowel sounds present  Extremities: No calf tenderness, No pitting edema    LABS:                        14.1   5.83  )-----------( 114      ( 03 Aug 2023 07:05 )             42.8     08-03    136  |  106  |  26  ----------------------------<  125  4.3   |  22  |  1.23    Ca    9.0      03 Aug 2023 07:05    TPro  6.6  /  Alb  2.8  /  TBili  0.6  /  DBili  x   /  AST  30  /  ALT  41  /  AlkPhos  48  08-03          07-25 Chol 230 mg/dL LDL -- HDL 32 mg/dL Trig 240 mg/dL          POCT Blood Glucose.: 120 mg/dL (05 Aug 2023 08:13)  POCT Blood Glucose.: 128 mg/dL (04 Aug 2023 21:22)  POCT Blood Glucose.: 141 mg/dL (04 Aug 2023 16:43)  POCT Blood Glucose.: 155 mg/dL (04 Aug 2023 12:04)      Urinalysis Basic - ( 03 Aug 2023 07:05 )    Color: x / Appearance: x / SG: x / pH: x  Gluc: 125 mg/dL / Ketone: x  / Bili: x / Urobili: x   Blood: x / Protein: x / Nitrite: x   Leuk Esterase: x / RBC: x / WBC x   Sq Epi: x / Non Sq Epi: x / Bacteria: x            RADIOLOGY & ADDITIONAL TESTS:    Care Discussed with Consultants/Other Providers:

## 2023-08-06 NOTE — PROGRESS NOTE ADULT - SUBJECTIVE AND OBJECTIVE BOX
Chief complaint: no new complaints     Patient is a 43y old  Male who presents with a chief complaint of CVA (06 Aug 2023 09:39)        HEALTH ISSUES - PROBLEM Dx:  Acute cerebrovascular accident (CVA)    Continuous cannabis use    Cigarette smoker    Hypertension    ASHD (arteriosclerotic heart disease)    HLD (hyperlipidemia)    Type 2 diabetes mellitus      PAST MEDICAL & SURGICAL HISTORY:  DM (diabetes mellitus)      HTN (hypertension)      CVA (cerebrovascular accident)      Vital Signs Last 24 Hrs  T(C): 36.4 (06 Aug 2023 07:18), Max: 37.1 (05 Aug 2023 21:27)  T(F): 97.6 (06 Aug 2023 07:18), Max: 98.7 (05 Aug 2023 21:27)  HR: 68 (06 Aug 2023 07:18) (66 - 91)  BP: 131/78 (06 Aug 2023 07:18) (128/82 - 149/98)  BP(mean): --  RR: 16 (06 Aug 2023 07:18) (15 - 16)  SpO2: 100% (06 Aug 2023 07:18) (95% - 100%)    Parameters below as of 06 Aug 2023 07:18  Patient On (Oxygen Delivery Method): room air          PHYSICAL EXAM  Constitutional - NAD, Comfortable  HEENT - NCAT, EOMI  Neck - Supple, No limited ROM  Chest - CTA bilaterally  Cardiovascular - RRR, S1S2  Abdomen -  Soft, NTND  Extremities -  No calf tenderness   Neurologic Exam -                    Cognitive - Awake, Alert     No new focal deficits                    CURRENT MEDICATIONS    MEDICATIONS  (STANDING):  amLODIPine   Tablet 10 milliGRAM(s) Oral daily  aspirin enteric coated 81 milliGRAM(s) Oral daily  atorvastatin 80 milliGRAM(s) Oral at bedtime  carvedilol 25 milliGRAM(s) Oral every 12 hours  clopidogrel Tablet 75 milliGRAM(s) Oral daily  dextrose 5%. 1000 milliLiter(s) (100 mL/Hr) IV Continuous <Continuous>  dextrose 5%. 1000 milliLiter(s) (50 mL/Hr) IV Continuous <Continuous>  dextrose 50% Injectable 25 Gram(s) IV Push once  dextrose 50% Injectable 12.5 Gram(s) IV Push once  dextrose 50% Injectable 25 Gram(s) IV Push once  enoxaparin Injectable 40 milliGRAM(s) SubCutaneous every 24 hours  escitalopram 10 milliGRAM(s) Oral daily  glucagon  Injectable 1 milliGRAM(s) IntraMuscular once  hydrALAZINE 50 milliGRAM(s) Oral two times a day  insulin lispro (ADMELOG) corrective regimen sliding scale   SubCutaneous three times a day before meals  insulin lispro (ADMELOG) corrective regimen sliding scale   SubCutaneous at bedtime  lisinopril 40 milliGRAM(s) Oral daily  melatonin 6 milliGRAM(s) Oral at bedtime  metFORMIN 500 milliGRAM(s) Oral two times a day with meals  nicotine -  14 mG/24Hr(s) Patch 1 Patch Transdermal daily  polyethylene glycol 3350 17 Gram(s) Oral two times a day  senna 2 Tablet(s) Oral at bedtime  traZODone 25 milliGRAM(s) Oral at bedtime    MEDICATIONS  (PRN):  acetaminophen     Tablet .. 650 milliGRAM(s) Oral every 6 hours PRN Moderate Pain (4 - 6), Severe Pain (7 - 10)  bisacodyl Suppository 10 milliGRAM(s) Rectal daily PRN Constipation  dextrose Oral Gel 15 Gram(s) Oral once PRN Blood Glucose LESS THAN 70 milliGRAM(s)/deciliter    ASSESSMENT & PLAN          GI/Bowel Management - Dulcolax PRN, Fleet PRN   Management - Toilet Q2  Skin - Turn Q2  Pain - Tylenol PRN  DVT PPX - Lovenox       Continue comprehensive acute rehab program consisting of 3hrs/day of OT/PT and SLP.

## 2023-08-06 NOTE — PROGRESS NOTE ADULT - SUBJECTIVE AND OBJECTIVE BOX
Patient is a 43y old  Male who presents with a chief complaint of CVA (05 Aug 2023 11:16)      History, interim events and clinically pertinent issues reviewed; patient interviewed and examined.   He was resting w/ CPAP on without complaints states "I want to rest"  REVIEW OF SYMPTOMS: patient denies HA's, CP, palpitations, shortness of breath or upper respiratory symptoms, nausea, vomiting, diarrhea, constipation, dysuria, bruising/bleeding and all other systems were reviewed as negative      ALLERGIES:  No Known Allergies    MEDICATIONS  (STANDING):  amLODIPine   Tablet 10 milliGRAM(s) Oral daily  aspirin enteric coated 81 milliGRAM(s) Oral daily  atorvastatin 80 milliGRAM(s) Oral at bedtime  carvedilol 25 milliGRAM(s) Oral every 12 hours  clopidogrel Tablet 75 milliGRAM(s) Oral daily  dextrose 5%. 1000 milliLiter(s) (50 mL/Hr) IV Continuous <Continuous>  dextrose 5%. 1000 milliLiter(s) (100 mL/Hr) IV Continuous <Continuous>  dextrose 50% Injectable 25 Gram(s) IV Push once  dextrose 50% Injectable 12.5 Gram(s) IV Push once  dextrose 50% Injectable 25 Gram(s) IV Push once  enoxaparin Injectable 40 milliGRAM(s) SubCutaneous every 24 hours  escitalopram 10 milliGRAM(s) Oral daily  glucagon  Injectable 1 milliGRAM(s) IntraMuscular once  hydrALAZINE 50 milliGRAM(s) Oral two times a day  insulin lispro (ADMELOG) corrective regimen sliding scale   SubCutaneous three times a day before meals  insulin lispro (ADMELOG) corrective regimen sliding scale   SubCutaneous at bedtime  lisinopril 40 milliGRAM(s) Oral daily  melatonin 6 milliGRAM(s) Oral at bedtime  metFORMIN 500 milliGRAM(s) Oral two times a day with meals  nicotine -  14 mG/24Hr(s) Patch 1 Patch Transdermal daily  polyethylene glycol 3350 17 Gram(s) Oral two times a day  senna 2 Tablet(s) Oral at bedtime  traZODone 25 milliGRAM(s) Oral at bedtime    MEDICATIONS  (PRN):  acetaminophen     Tablet .. 650 milliGRAM(s) Oral every 6 hours PRN Moderate Pain (4 - 6), Severe Pain (7 - 10)  bisacodyl Suppository 10 milliGRAM(s) Rectal daily PRN Constipation  dextrose Oral Gel 15 Gram(s) Oral once PRN Blood Glucose LESS THAN 70 milliGRAM(s)/deciliter    Vital Signs Last 24 Hrs  T(F): 97.6 (06 Aug 2023 07:18), Max: 98.7 (05 Aug 2023 21:27)  HR: 68 (06 Aug 2023 07:18) (66 - 91)  BP: 131/78 (06 Aug 2023 07:18) (128/82 - 149/98)  RR: 16 (06 Aug 2023 07:18) (15 - 16)  SpO2: 100% (06 Aug 2023 07:18) (95% - 100%)  I&O's Summary            POCT Blood Glucose.: 122 mg/dL (06 Aug 2023 07:22)  POCT Blood Glucose.: 110 mg/dL (05 Aug 2023 21:23)  POCT Blood Glucose.: 106 mg/dL (05 Aug 2023 16:27)  POCT Blood Glucose.: 139 mg/dL (05 Aug 2023 11:49)    PHYSICAL EXAM:  General: NAD, A/O x 3  ENT: MMM  Neck: Supple, No JVD  Lungs: Clear to auscultation bilaterally  Cardio: RRR, S1/S2, No murmurs  Abdomen: Soft, obese Nontender, Nondistended; Bowel sounds present  Extremities: No calf tenderness, No pitting edema      LABS: There are no new laboratory or radiologic studies resulted at the time this progress note was authored                          07-25 Chol 230 mg/dL LDL -- HDL 32 mg/dL Trig 240 mg/dL

## 2023-08-06 NOTE — PROGRESS NOTE ADULT - ASSESSMENT
42 YO man with a PMH of T2DM, HTN, previous CVA with minimal residual deficits, current smoker who presented to [facility] ED on 7/24 after two weeks of intermittent left lower extremity weakness with accompanying back pain,  and dysarthria. He was found to have acute infarct in left inferior adrienne and smaller acute infarcts in bilateral parieto-occipital juxtacortical . Hospital course s/f HTN, depression and suicidal ideation seen and cleared by psychologist. Patient now with gait Instability, ADL impairments and Functional impairments.    #H/o CVA, infarct in left inferior adrienne and smaller acute infarcts in bilateral parieto-occipital juxtacortical. Mechanism large artery atherosclerosis  - ASA 81mg and Plavix 75mg daily for 3 months, then ASA alone  - Lipitor 80mg qhs    #HTN  - SBP persistently >150 intermittently   - lisinopril increased to 40mg   - increase hydralazine to 50mg BID 8/5  - Carvedilol 25mg Q 12hrs  - Amlodipine 10mg daily  - Hydralazine 25mg BID    #HLD  - Lipitor 80mg daily    #DM II  - ISS and FS  - A1c 7.0 on 7/25   - cont with metformin 500mg BID    DVT ppx Lovenox

## 2023-08-07 NOTE — PROGRESS NOTE ADULT - SUBJECTIVE AND OBJECTIVE BOX
SUBJECTIVE/ROS: Patient evaluated while in the bed this morning. He states he slept well last night and is making improvement ins therapy. He denies chest pain, fever, chills, nausea, vomiting, abdominal pain, headache, or BLE pain.     HPI:  Patient  is a 42 YO man with a PMH of T2DM, HTN, previous CVA (per patient 8 years ago, treated with TPA at [facility], p/w L hemiplegia, facial droop and dysarthria with minimal residual deficits), current smoker who presented to [facility] ED on 7/24 after two weeks of intermittent left lower extremity weakness with accompanying back pain similar to patient's sciatica pain, and 25+ hours of dysarthria. He stated he had episode of similar dysarthria 2 weeks ago as well that improved. On examination, patient has mild dysarthria, accompanied by wife who states that patient's speech is noticeably more slurred than his baseline. Says that his previous stroke 8 years ago presented similarly, but was much more severe (weakness + speech changes/aphasia). Not a Tenecteplase candidate given out of time window. MRI Brain on 7/26 showed 1.1 cm acute infarct left inferior adrienne as well as smaller subcentimeter acute infarcts bilateral parieto-occipital juxtacortical white matter. Patient was evaluated by PM&R and therapy for functional deficits, gait/ADL impairments and acute rehabilitation was recommended. Patient was medically optimized for discharge to [facility] IRU on 7/27/23.      Vital Signs Last 24 Hrs  T(C): 36.8 (07 Aug 2023 08:22), Max: 36.8 (06 Aug 2023 20:30)  T(F): 98.3 (07 Aug 2023 08:22), Max: 98.3 (06 Aug 2023 20:30)  HR: 64 (07 Aug 2023 08:22) (60 - 82)  BP: 110/60 (07 Aug 2023 08:22) (110/60 - 150/97)  BP(mean): --  RR: 16 (07 Aug 2023 08:22) (15 - 16)  SpO2: 95% (07 Aug 2023 08:22) (95% - 97%)    Parameters below as of 07 Aug 2023 08:22  Patient On (Oxygen Delivery Method): room air        PHYSICAL EXAM  Constitutional - NAD, Comfortable  HEENT - NCAT, EOMI  Neck - Supple, No limited ROM  Chest - CTA bilaterally  Cardiovascular - RRR, S1S2  Abdomen -BS+, Soft, NTND  Extremities - No C/C/E, No calf tenderness   Neurologic Exam - aox3, dysarthria, RU/RL 4/5, DEBRA/LL 5/5        LABS:                          13.7   6.69  )-----------( 206      ( 07 Aug 2023 06:52 )             41.1     08-07    140  |  105  |  27<H>  ----------------------------<  114<H>  4.1   |  26  |  1.16    Ca    9.1      07 Aug 2023 06:52    TPro  6.7  /  Alb  3.5  /  TBili  0.6  /  DBili  x   /  AST  19  /  ALT  34  /  AlkPhos  46  08-07    LIVER FUNCTIONS - ( 07 Aug 2023 06:52 )  Alb: 3.5 g/dL / Pro: 6.7 g/dL / ALK PHOS: 46 U/L / ALT: 34 U/L / AST: 19 U/L / GGT: x             Urinalysis Basic - ( 07 Aug 2023 06:52 )    Color: x / Appearance: x / SG: x / pH: x  Gluc: 114 mg/dL / Ketone: x  / Bili: x / Urobili: x   Blood: x / Protein: x / Nitrite: x   Leuk Esterase: x / RBC: x / WBC x   Sq Epi: x / Non Sq Epi: x / Bacteria: x      CAPILLARY BLOOD GLUCOSE      POCT Blood Glucose.: 154 mg/dL (07 Aug 2023 08:00)  POCT Blood Glucose.: 130 mg/dL (06 Aug 2023 21:27)  POCT Blood Glucose.: 86 mg/dL (06 Aug 2023 16:28)  POCT Blood Glucose.: 172 mg/dL (06 Aug 2023 11:23)        MEDICATIONS  (STANDING):  amLODIPine   Tablet 10 milliGRAM(s) Oral daily  aspirin enteric coated 81 milliGRAM(s) Oral daily  atorvastatin 80 milliGRAM(s) Oral at bedtime  carvedilol 25 milliGRAM(s) Oral every 12 hours  clopidogrel Tablet 75 milliGRAM(s) Oral daily  dextrose 5%. 1000 milliLiter(s) (50 mL/Hr) IV Continuous <Continuous>  dextrose 5%. 1000 milliLiter(s) (100 mL/Hr) IV Continuous <Continuous>  dextrose 50% Injectable 25 Gram(s) IV Push once  dextrose 50% Injectable 12.5 Gram(s) IV Push once  dextrose 50% Injectable 25 Gram(s) IV Push once  enoxaparin Injectable 40 milliGRAM(s) SubCutaneous every 24 hours  escitalopram 10 milliGRAM(s) Oral daily  glucagon  Injectable 1 milliGRAM(s) IntraMuscular once  hydrALAZINE 50 milliGRAM(s) Oral two times a day  insulin lispro (ADMELOG) corrective regimen sliding scale   SubCutaneous three times a day before meals  insulin lispro (ADMELOG) corrective regimen sliding scale   SubCutaneous at bedtime  lisinopril 40 milliGRAM(s) Oral daily  melatonin 6 milliGRAM(s) Oral at bedtime  metFORMIN 500 milliGRAM(s) Oral two times a day with meals  nicotine -  14 mG/24Hr(s) Patch 1 Patch Transdermal daily  polyethylene glycol 3350 17 Gram(s) Oral two times a day  senna 2 Tablet(s) Oral at bedtime  traZODone 25 milliGRAM(s) Oral at bedtime    MEDICATIONS  (PRN):  acetaminophen     Tablet .. 650 milliGRAM(s) Oral every 6 hours PRN Moderate Pain (4 - 6), Severe Pain (7 - 10)  bisacodyl Suppository 10 milliGRAM(s) Rectal daily PRN Constipation  dextrose Oral Gel 15 Gram(s) Oral once PRN Blood Glucose LESS THAN 70 milliGRAM(s)/deciliter

## 2023-08-07 NOTE — PROGRESS NOTE ADULT - ASSESSMENT
44 YO man with a PMH of T2DM, HTN, previous CVA with minimal residual deficits, current smoker who presented to [facility] ED on 7/24 after two weeks of intermittent left lower extremity weakness with accompanying back pain,  and dysarthria. He was found to have acute infarct in left inferior adrienne and smaller acute infarcts in bilateral parieto-occipital juxtacortical . Hospital course s/f HTN, depression and suicidal ideation seen and cleared by psychologist. Patient now with gait Instability, ADL impairments and Functional impairments.    #H/o CVA, infarct in left inferior adrienne and smaller acute infarcts in bilateral parieto-occipital juxtacortical. Mechanism large artery atherosclerosis  - ASA 81mg and Plavix 75mg daily for 3 months, then ASA alone  - Lipitor 80mg qhs    #HTN  - SBP persistently >150 intermittently   - lisinopril increased to 40mg   - increase hydralazine to 50mg BID 8/5  - Carvedilol 25mg Q 12hrs  - Amlodipine 10mg daily    #HLD  - Lipitor 80mg daily    #DM II  - ISS and FS  - A1c 7.0 on 7/25   - cont with metformin 500mg BID    DVT ppx Lovenox

## 2023-08-07 NOTE — PROGRESS NOTE ADULT - ASSESSMENT
ASSESSMENT/PLAN  Patient  is a 44 YO man with a PMH of T2DM, HTN, previous CVA with minimal residual deficits, current smoker who presented to [facility] ED on 7/24 after two weeks of intermittent left lower extremity weakness with accompanying back pain,  and dysarthria. He was found to have acute infarct in left inferior adrienne and smaller acute infarcts in bilateral parieto-occipital juxtacortical . Hospital course s/f HTN, depression and suicidal ideation seen and cleared by psychologist. Patient now with gait Instability, ADL impairments and Functional impairments.    #CVA now with Dysarthria and RLE weakness due to acute infarct in left inferior adrienne and smaller acute infarcts in bilateral parieto-occipital juxtacortical. Mechanism large artery atherosclerosis  - Continue Comprehensive Rehab Program: PT/OT/ST, 3hours daily and 5 days weekly.   - Continue ASA 81mg and Plavix 75mg daily for 3 months, then ASA alone    #Mood/sleep  -Continue Lexapro 10mg daily for neurorecovery 8/2  -Continue trazodone 25mg at bedtime  - Depression with suicidal ideation while in acute hospital - now resolved   - Neuropsychology consult   - outpatient at [facility].    #HTN  - Carvedilol 25mg Q 12hrs  - Amlodipine 10mg daily  - Hydralazine 25mg BID  - Lisinopril 20mg daily  -monitor vital signs    #HLD  - Lipitor 80mg daily    #DM II  - ISS and FS  - A1c 7.0 on 7/25   -Continue metformin 500mg BID    #Pain management  - Tylenol PRN    #Smoker  -continue nicotine patch  - smoking cessation education    #DVT ppx  - Lovenox  - TEDs    #Bowel Regimen  - Senna  - miralax BID  -Enema pRN   -Dulcolax PRN    #Bladder management  - BS on admission, and q 8 hours (SC if > 400)  - Monitor UO    #FEN   - Diet: Soft/bite sized with thins     #Skin:  - Skin on admission: grossly intact  - Pressure injury/Skin: Turn Q2hrs while in bed, OOB to Chair, PT/OT     #Precaution  - Fall, Aspiration, cardiac    TEAM MEETING 8/2/23  SW:  lives with wife in apartment with 3 lynda  OT: sv for adls, min for transfers  PT: CG-SV for transfers, 120' with RW and right ace wrap, 12 steps 2HR  SLP: dysarthria, hypophonia  barriers: dec coordination, dysarthria, dysmetria  EDOD: Home 8/18

## 2023-08-07 NOTE — PROGRESS NOTE ADULT - ASSESSMENT
44 YO man with a PMH of T2DM, HTN, previous CVA with minimal residual deficits, current smoker who presented to [facility] ED on 7/24 after two weeks of intermittent left lower extremity weakness with accompanying back pain,  and dysarthria. He was found to have acute infarct in left inferior adrienne and smaller acute infarcts in bilateral parieto-occipital juxtacortical . Hospital course s/f HTN, depression and suicidal ideation seen and cleared by psychologist. Patient now with gait Instability, ADL impairments and Functional impairments.    #H/o CVA, infarct in left inferior adrienne and smaller acute infarcts in bilateral parieto-occipital juxtacortical. Mechanism large artery atherosclerosis  - ASA 81mg and Plavix 75mg daily for 3 months, then ASA alone  - Lipitor 80mg qhs    #HTN  - lisinopril increased to 40mg   - increase hydralazine to 50mg BID 8/5  - Carvedilol 25mg Q 12hrs  - Amlodipine 10mg daily  - BP now well controlled, 110/60 this morning.    #HLD  - Lipitor 80mg daily    #DM II  - ISS and FS  - A1c 7.0 on 7/25   - cont with metformin 500mg BID    DVT ppx Lovenox

## 2023-08-07 NOTE — PROGRESS NOTE ADULT - SUBJECTIVE AND OBJECTIVE BOX
Patient is a 43y old  Male who presents with a chief complaint of CVA (07 Aug 2023 07:22)      Subjective and overnight events:  Patient seen and examined at bedside. pt reports feeling fine. no new complaints. no fever, chills, sob, cp, abd pain.     ALLERGIES:  No Known Allergies    MEDICATIONS  (STANDING):  amLODIPine   Tablet 10 milliGRAM(s) Oral daily  aspirin enteric coated 81 milliGRAM(s) Oral daily  atorvastatin 80 milliGRAM(s) Oral at bedtime  carvedilol 25 milliGRAM(s) Oral every 12 hours  clopidogrel Tablet 75 milliGRAM(s) Oral daily  dextrose 5%. 1000 milliLiter(s) (50 mL/Hr) IV Continuous <Continuous>  dextrose 5%. 1000 milliLiter(s) (100 mL/Hr) IV Continuous <Continuous>  dextrose 50% Injectable 25 Gram(s) IV Push once  dextrose 50% Injectable 12.5 Gram(s) IV Push once  dextrose 50% Injectable 25 Gram(s) IV Push once  enoxaparin Injectable 40 milliGRAM(s) SubCutaneous every 24 hours  escitalopram 10 milliGRAM(s) Oral daily  glucagon  Injectable 1 milliGRAM(s) IntraMuscular once  hydrALAZINE 50 milliGRAM(s) Oral two times a day  insulin lispro (ADMELOG) corrective regimen sliding scale   SubCutaneous three times a day before meals  insulin lispro (ADMELOG) corrective regimen sliding scale   SubCutaneous at bedtime  lisinopril 40 milliGRAM(s) Oral daily  melatonin 6 milliGRAM(s) Oral at bedtime  metFORMIN 500 milliGRAM(s) Oral two times a day with meals  nicotine -  14 mG/24Hr(s) Patch 1 Patch Transdermal daily  polyethylene glycol 3350 17 Gram(s) Oral two times a day  senna 2 Tablet(s) Oral at bedtime  traZODone 25 milliGRAM(s) Oral at bedtime    MEDICATIONS  (PRN):  acetaminophen     Tablet .. 650 milliGRAM(s) Oral every 6 hours PRN Moderate Pain (4 - 6), Severe Pain (7 - 10)  bisacodyl Suppository 10 milliGRAM(s) Rectal daily PRN Constipation  dextrose Oral Gel 15 Gram(s) Oral once PRN Blood Glucose LESS THAN 70 milliGRAM(s)/deciliter    Vital Signs Last 24 Hrs  T(F): 98.3 (07 Aug 2023 08:22), Max: 98.3 (06 Aug 2023 20:30)  HR: 64 (07 Aug 2023 08:22) (60 - 82)  BP: 110/60 (07 Aug 2023 08:22) (110/60 - 150/97)  RR: 16 (07 Aug 2023 08:22) (15 - 16)  SpO2: 95% (07 Aug 2023 08:22) (95% - 97%)  I&O's Summary    PHYSICAL EXAM:  General: NAD, A/O x 3  ENT: MMM  Neck: Supple, No JVD  Lungs: Clear to auscultation bilaterally  Cardio: RRR, S1/S2, No murmurs  Abdomen: Soft, Nontender, Nondistended; Bowel sounds present  Extremities: No calf tenderness, No pitting edema    LABS:                        13.7   6.69  )-----------( 206      ( 07 Aug 2023 06:52 )             41.1     08-07    140  |  105  |  27  ----------------------------<  114  4.1   |  26  |  1.16    Ca    9.1      07 Aug 2023 06:52    TPro  6.7  /  Alb  3.5  /  TBili  0.6  /  DBili  x   /  AST  19  /  ALT  34  /  AlkPhos  46  08-07        07-25 Chol 230 mg/dL LDL -- HDL 32 mg/dL Trig 240 mg/dL          POCT Blood Glucose.: 154 mg/dL (07 Aug 2023 08:00)  POCT Blood Glucose.: 130 mg/dL (06 Aug 2023 21:27)  POCT Blood Glucose.: 86 mg/dL (06 Aug 2023 16:28)  POCT Blood Glucose.: 172 mg/dL (06 Aug 2023 11:23)      Urinalysis Basic - ( 07 Aug 2023 06:52 )    Color: x / Appearance: x / SG: x / pH: x  Gluc: 114 mg/dL / Ketone: x  / Bili: x / Urobili: x   Blood: x / Protein: x / Nitrite: x   Leuk Esterase: x / RBC: x / WBC x   Sq Epi: x / Non Sq Epi: x / Bacteria: x            RADIOLOGY & ADDITIONAL TESTS:    Care Discussed with Consultants/Other Providers:

## 2023-08-08 NOTE — PROGRESS NOTE ADULT - SUBJECTIVE AND OBJECTIVE BOX
Patient is a 43y old  Male who presents with a chief complaint of CVA (07 Aug 2023 10:50)      Subjective and overnight events:  Patient seen and examined at bedside. no complaints. no fever, chills, sob, cp, abd pain.     ALLERGIES:  No Known Allergies    MEDICATIONS  (STANDING):  amLODIPine   Tablet 10 milliGRAM(s) Oral daily  aspirin enteric coated 81 milliGRAM(s) Oral daily  atorvastatin 80 milliGRAM(s) Oral at bedtime  carvedilol 25 milliGRAM(s) Oral every 12 hours  clopidogrel Tablet 75 milliGRAM(s) Oral daily  dextrose 5%. 1000 milliLiter(s) (100 mL/Hr) IV Continuous <Continuous>  dextrose 5%. 1000 milliLiter(s) (50 mL/Hr) IV Continuous <Continuous>  dextrose 50% Injectable 25 Gram(s) IV Push once  dextrose 50% Injectable 12.5 Gram(s) IV Push once  dextrose 50% Injectable 25 Gram(s) IV Push once  enoxaparin Injectable 40 milliGRAM(s) SubCutaneous every 24 hours  escitalopram 10 milliGRAM(s) Oral daily  glucagon  Injectable 1 milliGRAM(s) IntraMuscular once  hydrALAZINE 50 milliGRAM(s) Oral two times a day  insulin lispro (ADMELOG) corrective regimen sliding scale   SubCutaneous three times a day before meals  insulin lispro (ADMELOG) corrective regimen sliding scale   SubCutaneous at bedtime  lisinopril 40 milliGRAM(s) Oral daily  melatonin 6 milliGRAM(s) Oral at bedtime  metFORMIN 500 milliGRAM(s) Oral two times a day with meals  nicotine -  14 mG/24Hr(s) Patch 1 Patch Transdermal daily  polyethylene glycol 3350 17 Gram(s) Oral two times a day  senna 2 Tablet(s) Oral at bedtime  traZODone 25 milliGRAM(s) Oral at bedtime    MEDICATIONS  (PRN):  acetaminophen     Tablet .. 650 milliGRAM(s) Oral every 6 hours PRN Moderate Pain (4 - 6), Severe Pain (7 - 10)  bisacodyl Suppository 10 milliGRAM(s) Rectal daily PRN Constipation  dextrose Oral Gel 15 Gram(s) Oral once PRN Blood Glucose LESS THAN 70 milliGRAM(s)/deciliter    Vital Signs Last 24 Hrs  T(F): 98.1 (08 Aug 2023 07:35), Max: 98.6 (07 Aug 2023 21:54)  HR: 65 (08 Aug 2023 07:35) (59 - 75)  BP: 129/81 (08 Aug 2023 07:35) (129/77 - 144/94)  RR: 16 (08 Aug 2023 07:35) (16 - 16)  SpO2: 100% (08 Aug 2023 07:35) (96% - 100%)  I&O's Summary    PHYSICAL EXAM:  General: NAD, A/O x 3  ENT: MMM  Neck: Supple, No JVD  Lungs: Clear to auscultation bilaterally  Cardio: RRR, S1/S2, No murmurs  Abdomen: Soft, Nontender, Nondistended; Bowel sounds present  Extremities: No calf tenderness, No pitting edema    LABS:                        13.7   6.69  )-----------( 206      ( 07 Aug 2023 06:52 )             41.1     08-07    140  |  105  |  27  ----------------------------<  114  4.1   |  26  |  1.16    Ca    9.1      07 Aug 2023 06:52    TPro  6.7  /  Alb  3.5  /  TBili  0.6  /  DBili  x   /  AST  19  /  ALT  34  /  AlkPhos  46  08-07      07-25 Chol 230 mg/dL LDL -- HDL 32 mg/dL Trig 240 mg/dL        POCT Blood Glucose.: 133 mg/dL (08 Aug 2023 11:18)  POCT Blood Glucose.: 123 mg/dL (08 Aug 2023 07:37)  POCT Blood Glucose.: 109 mg/dL (07 Aug 2023 21:44)  POCT Blood Glucose.: 115 mg/dL (07 Aug 2023 17:22)  POCT Blood Glucose.: 114 mg/dL (07 Aug 2023 12:13)      Urinalysis Basic - ( 07 Aug 2023 06:52 )    Color: x / Appearance: x / SG: x / pH: x  Gluc: 114 mg/dL / Ketone: x  / Bili: x / Urobili: x   Blood: x / Protein: x / Nitrite: x   Leuk Esterase: x / RBC: x / WBC x   Sq Epi: x / Non Sq Epi: x / Bacteria: x            RADIOLOGY & ADDITIONAL TESTS:    Care Discussed with Consultants/Other Providers:

## 2023-08-08 NOTE — PROGRESS NOTE ADULT - ASSESSMENT
42 YO man with a PMH of T2DM, HTN, previous CVA with minimal residual deficits, current smoker who presented to [facility] ED on 7/24 after two weeks of intermittent left lower extremity weakness with accompanying back pain,  and dysarthria. He was found to have acute infarct in left inferior adrienne and smaller acute infarcts in bilateral parieto-occipital juxtacortical . Hospital course s/f HTN, depression and suicidal ideation seen and cleared by psychologist. Patient now with gait Instability, ADL impairments and Functional impairments.    #H/o CVA, infarct in left inferior adrienne and smaller acute infarcts in bilateral parieto-occipital juxtacortical. Mechanism large artery atherosclerosis  - ASA 81mg and Plavix 75mg daily for 3 months, then ASA alone  - Lipitor 80mg qhs    #HTN  - BP now controlled  - lisinopril increased to 40mg   - increase hydralazine to 50mg BID 8/5  - Carvedilol 25mg Q 12hrs  - Amlodipine 10mg daily    #HLD  - Lipitor 80mg daily    #DM II  - ISS and FS  - A1c 7.0 on 7/25   - cont with metformin 500mg BID    DVT ppx Lovenox

## 2023-08-08 NOTE — PROGRESS NOTE ADULT - ASSESSMENT
ASSESSMENT/PLAN  Patient  is a 44 YO man with a PMH of T2DM, HTN, previous CVA with minimal residual deficits, current smoker who presented to [facility] ED on 7/24 after two weeks of intermittent left lower extremity weakness with accompanying back pain,  and dysarthria. He was found to have acute infarct in left inferior adrienne and smaller acute infarcts in bilateral parieto-occipital juxtacortical . Hospital course s/f HTN, depression and suicidal ideation seen and cleared by psychologist. Patient now with gait Instability, ADL impairments and Functional impairments.    #CVA now with Dysarthria and RLE weakness due to acute infarct in left inferior adrienne and smaller acute infarcts in bilateral parieto-occipital juxtacortical. Mechanism large artery atherosclerosis  - Continue Comprehensive Rehab Program: PT/OT/ST, 3hours daily and 5 days weekly.   - Continue ASA 81mg and Plavix 75mg daily for 3 months, then ASA alone    #Mood/sleep  -Continue Lexapro 10mg daily for neurorecovery 8/2  -Continue trazodone 25mg at bedtime  - Depression with suicidal ideation while in acute hospital - now resolved   - Neuropsychology consult   - outpatient at [facility].    #HTN  - Carvedilol 25mg Q 12hrs  - Amlodipine 10mg daily  - Hydralazine 25mg BID  - Lisinopril 20mg daily  -monitor vital signs    #HLD  - Lipitor 80mg daily    #DM II  - ISS and FS  - A1c 7.0 on 7/25   -Continue metformin 500mg BID    #Pain management  - Tylenol PRN    #Smoker  -continue nicotine patch  - smoking cessation education    #DVT ppx  - Lovenox  - TEDs    #Bowel Regimen  - Senna  - miralax BID  -Enema PRN   -Dulcolax PRN    #Bladder management  - BS on admission, and q 8 hours (SC if > 400)  - Monitor UO    #FEN   - Diet: Soft/bite sized with thins     #Skin:  - Skin on admission: grossly intact  - Pressure injury/Skin: Turn Q2hrs while in bed, OOB to Chair, PT/OT     #Precaution  - Fall, Aspiration, cardiac    TEAM MEETING 8/2/23  SW:  lives with wife in apartment with 3 lynda  OT: sv for adls, min for transfers  PT: CG-SV for transfers, 120' with RW and right ace wrap, 12 steps 2HR  SLP: dysarthria, hypophonia  barriers: dec coordination, dysarthria, dysmetria  EDOD: Home 8/18

## 2023-08-08 NOTE — PROGRESS NOTE ADULT - SUBJECTIVE AND OBJECTIVE BOX
SUBJECTIVE/ROS: Patient evaluated while in the bed this morning. No acute events overnight. He denies chest pain, fever, chills, nausea, vomiting, abdominal pain, headache, or BLE pain.     HPI:  Patient  is a 44 YO man with a PMH of T2DM, HTN, previous CVA (per patient 8 years ago, treated with TPA at [facility], p/w L hemiplegia, facial droop and dysarthria with minimal residual deficits), current smoker who presented to [facility] ED on 7/24 after two weeks of intermittent left lower extremity weakness with accompanying back pain similar to patient's sciatica pain, and 25+ hours of dysarthria. He stated he had episode of similar dysarthria 2 weeks ago as well that improved. On examination, patient has mild dysarthria, accompanied by wife who states that patient's speech is noticeably more slurred than his baseline. Says that his previous stroke 8 years ago presented similarly, but was much more severe (weakness + speech changes/aphasia). Not a Tenecteplase candidate given out of time window. MRI Brain on 7/26 showed 1.1 cm acute infarct left inferior adrienne as well as smaller subcentimeter acute infarcts bilateral parieto-occipital juxtacortical white matter. Patient was evaluated by PM&R and therapy for functional deficits, gait/ADL impairments and acute rehabilitation was recommended. Patient was medically optimized for discharge to [facility] IRU on 7/27/23.      Vital Signs Last 24 Hrs  T(C): 36.7 (08 Aug 2023 07:35), Max: 37 (07 Aug 2023 21:54)  T(F): 98.1 (08 Aug 2023 07:35), Max: 98.6 (07 Aug 2023 21:54)  HR: 65 (08 Aug 2023 07:35) (59 - 75)  BP: 129/81 (08 Aug 2023 07:35) (129/77 - 144/94)  BP(mean): --  RR: 16 (08 Aug 2023 07:35) (16 - 16)  SpO2: 100% (08 Aug 2023 07:35) (96% - 100%)    Parameters below as of 08 Aug 2023 07:35  Patient On (Oxygen Delivery Method): room air          PHYSICAL EXAM  Constitutional - NAD, Comfortable  HEENT - NCAT, EOMI  Neck - Supple, No limited ROM  Chest - CTA bilaterally  Cardiovascular - RRR, S1S2  Abdomen -BS+, Soft, NTND  Extremities - No C/C/E, No calf tenderness   Neurologic Exam - aox3, dysarthria, RU/RL 4/5, DEBRA/LL 5/5        LABS:                          13.7   6.69  )-----------( 206      ( 07 Aug 2023 06:52 )             41.1     08-07    140  |  105  |  27<H>  ----------------------------<  114<H>  4.1   |  26  |  1.16    Ca    9.1      07 Aug 2023 06:52    TPro  6.7  /  Alb  3.5  /  TBili  0.6  /  DBili  x   /  AST  19  /  ALT  34  /  AlkPhos  46  08-07    LIVER FUNCTIONS - ( 07 Aug 2023 06:52 )  Alb: 3.5 g/dL / Pro: 6.7 g/dL / ALK PHOS: 46 U/L / ALT: 34 U/L / AST: 19 U/L / GGT: x             Urinalysis Basic - ( 07 Aug 2023 06:52 )    Color: x / Appearance: x / SG: x / pH: x  Gluc: 114 mg/dL / Ketone: x  / Bili: x / Urobili: x   Blood: x / Protein: x / Nitrite: x   Leuk Esterase: x / RBC: x / WBC x   Sq Epi: x / Non Sq Epi: x / Bacteria: x      CAPILLARY BLOOD GLUCOSE      POCT Blood Glucose.: 133 mg/dL (08 Aug 2023 11:18)  POCT Blood Glucose.: 123 mg/dL (08 Aug 2023 07:37)  POCT Blood Glucose.: 109 mg/dL (07 Aug 2023 21:44)  POCT Blood Glucose.: 115 mg/dL (07 Aug 2023 17:22)  POCT Blood Glucose.: 114 mg/dL (07 Aug 2023 12:13)      MEDICATIONS  (STANDING):  amLODIPine   Tablet 10 milliGRAM(s) Oral daily  aspirin enteric coated 81 milliGRAM(s) Oral daily  atorvastatin 80 milliGRAM(s) Oral at bedtime  carvedilol 25 milliGRAM(s) Oral every 12 hours  clopidogrel Tablet 75 milliGRAM(s) Oral daily  dextrose 5%. 1000 milliLiter(s) (100 mL/Hr) IV Continuous <Continuous>  dextrose 5%. 1000 milliLiter(s) (50 mL/Hr) IV Continuous <Continuous>  dextrose 50% Injectable 25 Gram(s) IV Push once  dextrose 50% Injectable 12.5 Gram(s) IV Push once  dextrose 50% Injectable 25 Gram(s) IV Push once  enoxaparin Injectable 40 milliGRAM(s) SubCutaneous every 24 hours  escitalopram 10 milliGRAM(s) Oral daily  glucagon  Injectable 1 milliGRAM(s) IntraMuscular once  hydrALAZINE 50 milliGRAM(s) Oral two times a day  insulin lispro (ADMELOG) corrective regimen sliding scale   SubCutaneous at bedtime  insulin lispro (ADMELOG) corrective regimen sliding scale   SubCutaneous three times a day before meals  lisinopril 40 milliGRAM(s) Oral daily  melatonin 6 milliGRAM(s) Oral at bedtime  metFORMIN 500 milliGRAM(s) Oral two times a day with meals  nicotine -  14 mG/24Hr(s) Patch 1 Patch Transdermal daily  polyethylene glycol 3350 17 Gram(s) Oral two times a day  senna 2 Tablet(s) Oral at bedtime  traZODone 25 milliGRAM(s) Oral at bedtime    MEDICATIONS  (PRN):  acetaminophen     Tablet .. 650 milliGRAM(s) Oral every 6 hours PRN Moderate Pain (4 - 6), Severe Pain (7 - 10)  bisacodyl Suppository 10 milliGRAM(s) Rectal daily PRN Constipation  dextrose Oral Gel 15 Gram(s) Oral once PRN Blood Glucose LESS THAN 70 milliGRAM(s)/deciliter

## 2023-08-09 NOTE — PROGRESS NOTE ADULT - SUBJECTIVE AND OBJECTIVE BOX
CHIEF COMPLAINT: no new complaints       HISTORY OF PRESENT ILLNESS    Patient  is a 44 YO man with a PMH of T2DM, HTN, previous CVA (per patient 8 years ago, treated with TPA at [facility], p/w L hemiplegia, facial droop and dysarthria with minimal residual deficits), current smoker who presented to [facility] ED on 7/24 after two weeks of intermittent left lower extremity weakness with accompanying back pain similar to patient's sciatica pain, and 25+ hours of dysarthria. He stated he had episode of similar dysarthria 2 weeks ago as well that improved.     On examination, patient has mild dysarthria, accompanied by wife who states that patient's speech is noticeably more slurred than his baseline. Says that his previous stroke 8 years ago presented similarly, but was much more severe (weakness + speech changes/aphasia).     Not a Tenecteplase candidate given out of time window. MRI Brain on 7/26 showed 1.1 cm acute infarct left inferior adrienne as well as smaller subcentimeter acute infarcts bilateral parieto-occipital juxtacortical white matter.    Impression:  Dysarthria and RLE weakness due to acute infarct in left inferior adrienne and smaller acute infarcts in bilateral parieto-occipital juxtacortical. Mechanism large artery atherosclerosis    Aspirin 81mg and Plavix 75mg daily for 3 months as per UMAIR,  then Asa 81mg daily indefinitely     TTE: EF 58%, normal LA. , continue atorvastatin 80mg qhs,  titrate statin to LDL goal less than 70.  Cardiology consulted for blood pressure management, follow up outpatient to slowly taper from permissive hypertension to normotension (Lower SBP 10-20mmHg per week). Seen by psychology for depression/suicidality (now resolved), f/u outpatient at [facility].    Patient was evaluated by PM&R and therapy for functional deficits, gait/ADL impairments and acute rehabilitation was recommended. Patient was medically optimized for discharge to [facility] IRU on 7/27/23.     (27 Jul 2023 13:26)        PAST MEDICAL & SURGICAL HISTORY:  DM (diabetes mellitus)      HTN (hypertension)      CVA (cerebrovascular accident)            VITALS  Vital Signs Last 24 Hrs  T(C): 36.4 (09 Aug 2023 07:21), Max: 36.5 (08 Aug 2023 21:15)  T(F): 97.6 (09 Aug 2023 07:21), Max: 97.7 (08 Aug 2023 21:15)  HR: 60 (09 Aug 2023 07:21) (60 - 78)  BP: 130/89 (09 Aug 2023 07:21) (130/89 - 152/99)  BP(mean): --  RR: 16 (09 Aug 2023 07:21) (16 - 16)  SpO2: 99% (09 Aug 2023 07:21) (95% - 99%)    Parameters below as of 09 Aug 2023 07:21  Patient On (Oxygen Delivery Method): room air          PHYSICAL EXAM  Constitutional - NAD, Comfortable  HEENT - NCAT, EOMI  Neck - Supple, No limited ROM  Chest - CTA bilaterally  Cardiovascular - RRR, S1S2  Abdomen -  Soft, NTND  Extremities -  No calf tenderness   Neurologic Exam - improving  left hemiparesis ,and gait                    CURRENT MEDICATIONS  MEDICATIONS  (STANDING):  amLODIPine   Tablet 10 milliGRAM(s) Oral daily  aspirin enteric coated 81 milliGRAM(s) Oral daily  atorvastatin 80 milliGRAM(s) Oral at bedtime  carvedilol 25 milliGRAM(s) Oral every 12 hours  clopidogrel Tablet 75 milliGRAM(s) Oral daily  dextrose 5%. 1000 milliLiter(s) (50 mL/Hr) IV Continuous <Continuous>  dextrose 5%. 1000 milliLiter(s) (100 mL/Hr) IV Continuous <Continuous>  dextrose 50% Injectable 25 Gram(s) IV Push once  dextrose 50% Injectable 12.5 Gram(s) IV Push once  dextrose 50% Injectable 25 Gram(s) IV Push once  enoxaparin Injectable 40 milliGRAM(s) SubCutaneous every 24 hours  escitalopram 10 milliGRAM(s) Oral daily  glucagon  Injectable 1 milliGRAM(s) IntraMuscular once  hydrALAZINE 50 milliGRAM(s) Oral two times a day  insulin lispro (ADMELOG) corrective regimen sliding scale   SubCutaneous at bedtime  insulin lispro (ADMELOG) corrective regimen sliding scale   SubCutaneous three times a day before meals  lisinopril 40 milliGRAM(s) Oral daily  melatonin 6 milliGRAM(s) Oral at bedtime  metFORMIN 500 milliGRAM(s) Oral two times a day with meals  nicotine -  14 mG/24Hr(s) Patch 1 Patch Transdermal daily  polyethylene glycol 3350 17 Gram(s) Oral two times a day  senna 2 Tablet(s) Oral at bedtime  traZODone 25 milliGRAM(s) Oral at bedtime    MEDICATIONS  (PRN):  acetaminophen     Tablet .. 650 milliGRAM(s) Oral every 6 hours PRN Moderate Pain (4 - 6), Severe Pain (7 - 10)  bisacodyl Suppository 10 milliGRAM(s) Rectal daily PRN Constipation  dextrose Oral Gel 15 Gram(s) Oral once PRN Blood Glucose LESS THAN 70 milliGRAM(s)/deciliter     CHIEF COMPLAINT: no new complaints       HISTORY OF PRESENT ILLNESS    Patient  is a 44 YO man with a PMH of T2DM, HTN, previous CVA (per patient 8 years ago, treated with TPA at [facility], p/w L hemiplegia, facial droop and dysarthria with minimal residual deficits), current smoker who presented to [facility] ED on 7/24 after two weeks of intermittent left lower extremity weakness with accompanying back pain similar to patient's sciatica pain, and 25+ hours of dysarthria. He stated he had episode of similar dysarthria 2 weeks ago as well that improved.     On examination, patient has mild dysarthria, accompanied by wife who states that patient's speech is noticeably more slurred than his baseline. Says that his previous stroke 8 years ago presented similarly, but was much more severe (weakness + speech changes/aphasia).     Not a Tenecteplase candidate given out of time window. MRI Brain on 7/26 showed 1.1 cm acute infarct left inferior adrienne as well as smaller subcentimeter acute infarcts bilateral parieto-occipital juxtacortical white matter.    Impression:  Dysarthria and RLE weakness due to acute infarct in left inferior adrienne and smaller acute infarcts in bilateral parieto-occipital juxtacortical. Mechanism large artery atherosclerosis    Aspirin 81mg and Plavix 75mg daily for 3 months as per UMAIR,  then Asa 81mg daily indefinitely     TTE: EF 58%, normal LA. , continue atorvastatin 80mg qhs,  titrate statin to LDL goal less than 70.  Cardiology consulted for blood pressure management, follow up outpatient to slowly taper from permissive hypertension to normotension (Lower SBP 10-20mmHg per week). Seen by psychology for depression/suicidality (now resolved), f/u outpatient at [facility].    Patient was evaluated by PM&R and therapy for functional deficits, gait/ADL impairments and acute rehabilitation was recommended. Patient was medically optimized for discharge to [facility] IRU on 7/27/23.     (27 Jul 2023 13:26)        PAST MEDICAL & SURGICAL HISTORY:  DM (diabetes mellitus)      HTN (hypertension)      CVA (cerebrovascular accident)            VITALS  Vital Signs Last 24 Hrs  T(C): 36.4 (09 Aug 2023 07:21), Max: 36.5 (08 Aug 2023 21:15)  T(F): 97.6 (09 Aug 2023 07:21), Max: 97.7 (08 Aug 2023 21:15)  HR: 60 (09 Aug 2023 07:21) (60 - 78)  BP: 130/89 (09 Aug 2023 07:21) (130/89 - 152/99)  BP(mean): --  RR: 16 (09 Aug 2023 07:21) (16 - 16)  SpO2: 99% (09 Aug 2023 07:21) (95% - 99%)    Parameters below as of 09 Aug 2023 07:21  Patient On (Oxygen Delivery Method): room air          PHYSICAL EXAM  Constitutional - NAD, Comfortable  HEENT - NCAT, EOMI  Neck - Supple, No limited ROM  Chest - CTA bilaterally  Cardiovascular - RRR, S1S2  Abdomen -  Soft, NTND  Extremities -  No calf tenderness   Neurologic Exam - improving  right  hemiparesis ,and gait                    CURRENT MEDICATIONS  MEDICATIONS  (STANDING):  amLODIPine   Tablet 10 milliGRAM(s) Oral daily  aspirin enteric coated 81 milliGRAM(s) Oral daily  atorvastatin 80 milliGRAM(s) Oral at bedtime  carvedilol 25 milliGRAM(s) Oral every 12 hours  clopidogrel Tablet 75 milliGRAM(s) Oral daily  dextrose 5%. 1000 milliLiter(s) (50 mL/Hr) IV Continuous <Continuous>  dextrose 5%. 1000 milliLiter(s) (100 mL/Hr) IV Continuous <Continuous>  dextrose 50% Injectable 25 Gram(s) IV Push once  dextrose 50% Injectable 12.5 Gram(s) IV Push once  dextrose 50% Injectable 25 Gram(s) IV Push once  enoxaparin Injectable 40 milliGRAM(s) SubCutaneous every 24 hours  escitalopram 10 milliGRAM(s) Oral daily  glucagon  Injectable 1 milliGRAM(s) IntraMuscular once  hydrALAZINE 50 milliGRAM(s) Oral two times a day  insulin lispro (ADMELOG) corrective regimen sliding scale   SubCutaneous at bedtime  insulin lispro (ADMELOG) corrective regimen sliding scale   SubCutaneous three times a day before meals  lisinopril 40 milliGRAM(s) Oral daily  melatonin 6 milliGRAM(s) Oral at bedtime  metFORMIN 500 milliGRAM(s) Oral two times a day with meals  nicotine -  14 mG/24Hr(s) Patch 1 Patch Transdermal daily  polyethylene glycol 3350 17 Gram(s) Oral two times a day  senna 2 Tablet(s) Oral at bedtime  traZODone 25 milliGRAM(s) Oral at bedtime    MEDICATIONS  (PRN):  acetaminophen     Tablet .. 650 milliGRAM(s) Oral every 6 hours PRN Moderate Pain (4 - 6), Severe Pain (7 - 10)  bisacodyl Suppository 10 milliGRAM(s) Rectal daily PRN Constipation  dextrose Oral Gel 15 Gram(s) Oral once PRN Blood Glucose LESS THAN 70 milliGRAM(s)/deciliter

## 2023-08-09 NOTE — PROGRESS NOTE ADULT - ASSESSMENT
ASSESSMENT/PLAN  Patient  is a 44 YO man with a PMH of T2DM, HTN, previous CVA with minimal residual deficits, current smoker who presented to [facility] ED on 7/24 after two weeks of intermittent left lower extremity weakness with accompanying back pain,  and dysarthria. He was found to have acute infarct in left inferior adrienne and smaller acute infarcts in bilateral parieto-occipital juxtacortical . Hospital course s/f HTN, depression and suicidal ideation seen and cleared by psychologist. Patient now with gait Instability, ADL impairments and Functional impairments.    #CVA now with Dysarthria and RLE weakness due to acute infarct in left inferior adrienne and smaller acute infarcts in bilateral parieto-occipital juxtacortical. Mechanism large artery atherosclerosis  - Continue Comprehensive Rehab Program: PT/OT/ST, 3hours daily and 5 days weekly.   - Continue ASA 81mg and Plavix 75mg daily for 3 months, then ASA alone    #Mood/sleep  -Continue Lexapro 10mg daily for neurorecovery 8/2  -Continue trazodone 25mg at bedtime  - Depression with suicidal ideation while in acute hospital - now resolved   - Neuropsychology consult   - outpatient at [facility].    #HTN  - Carvedilol 25mg Q 12hrs  - Amlodipine 10mg daily  - Hydralazine 25mg BID  - Lisinopril 20mg daily  -monitor vital signs    #HLD  - Lipitor 80mg daily    #DM II  - ISS and FS  - A1c 7.0 on 7/25   -Continue metformin 500mg BID    #Pain management  - Tylenol PRN    #Smoker  -continue nicotine patch  - smoking cessation education    #DVT ppx  - Lovenox  - TEDs    #Bowel Regimen  - Senna  - miralax BID  -Enema PRN   -Dulcolax PRN    #Bladder management  - BS on admission, and q 8 hours (SC if > 400)  - Monitor UO    #FEN   - Diet: Soft/bite sized with thins     #Skin:  - Skin on admission: grossly intact  - Pressure injury/Skin: Turn Q2hrs while in bed, OOB to Chair, PT/OT     #Precaution  - Fall, Aspiration, cardiac    TEAM MEETING 8/9/23  SW:  lives with wife in apartment with 3 lynda  OT: sv for adls, min for transfers  PT: CG-SV for transfers, 120' with RW and right ace wrap, 12 steps 2HR  SLP: dysarthria, hypophonia  barriers: dec coordination, dysarthria, dysmetria  EDOD: Home 8/18      55 min spent

## 2023-08-10 NOTE — BH CONSULTATION LIAISON PROGRESS NOTE - NSBHATTESTBILLING_PSY_A_CORE
15819-Oyjoceqcbh OBS or IP - low complexity OR 25-34 mins
80616-Sldmfjefvl OBS or IP - moderate complexity OR 35-49 mins

## 2023-08-10 NOTE — BH CONSULTATION LIAISON PROGRESS NOTE - NSBHFUPINTERVALHXFT_PSY_A_CORE
HPI:  Patient  is a 42 YO man with a PMH of T2DM, HTN, previous CVA (per patient 8 years ago, treated with TPA at [facility] [place], p/w L hemiplegia, facial droop and dysarthria with minimal residual deficits), current smoker who presented to [facility] ED on 7/24 after two weeks of intermittent left lower extremity weakness with accompanying back pain similar to patient's sciatica pain, and 25+ hours of dysarthria. He stated he had episode of similar dysarthria 2 weeks ago as well that improved. On examination, patient has mild dysarthria, accompanied by wife who states that patient's speech is noticeably more slurred than his baseline. Says that his previous stroke 8 years ago presented similarly, but was much more severe (weakness + speech changes/aphasia). Not a Tenecteplase candidate given out of time window. MRI Brain on 7/26 showed 1.1 cm acute infarct left inferior adrienne as well as smaller subcentimeter acute infarcts bilateral parieto-occipital juxtacortical white matter. Impression:  Dysarthria and RLE weakness due to acute infarct in left inferior adrienne and smaller acute infarcts in bilateral parieto-occipital juxtacortical. Mechanism large artery atherosclerosis. Aspirin 81mg and Plavix 75mg daily for 3 months as per SAMMPRIS,  then Asa 81mg daily indefinitely   TTE: EF 58%, normal LA. , continue atorvastatin 80mg qhs,  titrate statin to LDL goal less than 70.  Cardiology consulted for blood pressure management, follow up outpatient to slowly taper from permissive hypertension to normotension (Lower SBP 10-20mmHg per week). Seen by psychology for depression/suicidality (now resolved), f/u outpatient at [facility] Adult Outpatient [facility]iatry.  Patient was evaluated by PM&R and therapy for functional deficits, gait/ADL impairments and acute rehabilitation was recommended. Patient was medically optimized for discharge to [facility] IRU on 7/27/23.  (27 Jul 2023 13:26)    Psychiatry C/L: Behavioral health team was asked to F/U on patient for depression. Pt was seen by the C/L team several days ago at [facility] for passive SI, depression and anxiety which has resolved (see note in sunrise). Pt seen and evaluated at bedside, he is pleasant, calm & cooperative. Pt reports today his mood is “fine” but he suffers from insomnia and did not sleep well last night. He states his appetite is “good” despite the food being pureed.  Pt c/o having worrisome thoughts, particularly about when and if he will be able to return to work. Pt denied feeling hopeless or helpless and states he is future oriented and motivated to “work hard” in acute rehab to reach his goals. Pt discussed that he was an active smoker and “just quit for good” several days ago. Pt discussed that this is his second CVA, last one was 8 years ago which was “much worse” and he “fully recovered after 1 year”. Pt reports he is spiritual and sees this CVA as an “eye opener” to quit smoking. He is on a Nicotine Patch and states he has no cravings at present. He was thankful for the consult. He is not found to be suicidal, homicidal, manic or psychotic. Pt agreed to trial meds for insomnia, see recs below. See rest of MSE below.     (28 Jul 2023 12:34)    Psychiatry C/L: Pt reports his mood is fine and he is sleeping better on a low dose of Trazodone. He states therapy is going "well" and he offers no new complaints. Supportive therapy provided at bedside. 
HPI:  Patient  is a 42 YO man with a PMH of T2DM, HTN, previous CVA (per patient 8 years ago, treated with TPA at [facility] [place], p/w L hemiplegia, facial droop and dysarthria with minimal residual deficits), current smoker who presented to [facility] ED on 7/24 after two weeks of intermittent left lower extremity weakness with accompanying back pain similar to patient's sciatica pain, and 25+ hours of dysarthria. He stated he had episode of similar dysarthria 2 weeks ago as well that improved. On examination, patient has mild dysarthria, accompanied by wife who states that patient's speech is noticeably more slurred than his baseline. Says that his previous stroke 8 years ago presented similarly, but was much more severe (weakness + speech changes/aphasia). Not a Tenecteplase candidate given out of time window. MRI Brain on 7/26 showed 1.1 cm acute infarct left inferior adrienne as well as smaller subcentimeter acute infarcts bilateral parieto-occipital juxtacortical white matter. Impression:  Dysarthria and RLE weakness due to acute infarct in left inferior adrienne and smaller acute infarcts in bilateral parieto-occipital juxtacortical. Mechanism large artery atherosclerosis. Aspirin 81mg and Plavix 75mg daily for 3 months as per SAMMPRIS,  then Asa 81mg daily indefinitely   TTE: EF 58%, normal LA. , continue atorvastatin 80mg qhs,  titrate statin to LDL goal less than 70.  Cardiology consulted for blood pressure management, follow up outpatient to slowly taper from permissive hypertension to normotension (Lower SBP 10-20mmHg per week). Seen by psychology for depression/suicidality (now resolved), f/u outpatient at [facility].  Patient was evaluated by PM&R and therapy for functional deficits, gait/ADL impairments and acute rehabilitation was recommended. Patient was medically optimized for discharge to [facility] IRU on 7/27/23.  (27 Jul 2023 13:26)    Psychiatry C/L: Behavioral health team was asked to F/U on patient for depression. Pt was seen by the C/L team several days ago at [facility] for passive SI, depression and anxiety which has resolved (see note in sunrise). Pt seen and evaluated at bedside, he is pleasant, calm & cooperative. Pt reports today his mood is “fine” but he suffers from insomnia and did not sleep well last night. He states his appetite is “good” despite the food being pureed.  Pt c/o having worrisome thoughts, particularly about when and if he will be able to return to work. Pt denied feeling hopeless or helpless and states he is future oriented and motivated to “work hard” in acute rehab to reach his goals. Pt discussed that he was an active smoker and “just quit for good” several days ago. Pt discussed that this is his second CVA, last one was 8 years ago which was “much worse” and he “fully recovered after 1 year”. Pt reports he is spiritual and sees this CVA as an “eye opener” to quit smoking. He is on a Nicotine Patch and states he has no cravings at present. He was thankful for the consult. He is not found to be suicidal, homicidal, manic or psychotic. Pt agreed to trial meds for insomnia, see recs below. See rest of MSE below.     (28 Jul 2023 12:34)    Psychiatry C/L Note: Follow up on patient who was initially seen for sleep disturbance. Pt reports his Physical therapy is going well, offers no new complaints, states he is tolerating sleep medications and mood , energy, concentration, and appetite are all wnl. Plan to continue medications as per treatment team.  Will sign off, please reconsult if there are further questions. Rest of psychiatric ROS unremarkable or as documented below.

## 2023-08-10 NOTE — PROGRESS NOTE ADULT - ASSESSMENT
ASSESSMENT/PLAN  Patient  is a 42 YO man with a PMH of T2DM, HTN, previous CVA with minimal residual deficits, current smoker who presented to [facility] ED on 7/24 after two weeks of intermittent left lower extremity weakness with accompanying back pain,  and dysarthria. He was found to have acute infarct in left inferior adrienne and smaller acute infarcts in bilateral parieto-occipital juxtacortical . Hospital course s/f HTN, depression and suicidal ideation seen and cleared by psychologist. Patient now with gait Instability, ADL impairments and Functional impairments.    #CVA now with Dysarthria and RLE weakness due to acute infarct in left inferior adrienne and smaller acute infarcts in bilateral parieto-occipital juxtacortical. Mechanism large artery atherosclerosis  - Continue Comprehensive Rehab Program: PT/OT/ST, 3hours daily and 5 days weekly.   - Continue ASA 81mg and Plavix 75mg daily for 3 months, then ASA alone    #Mood/sleep  -Continue Lexapro 10mg daily for neurorecovery 8/2  -Continue trazodone 25mg at bedtime  - Depression with suicidal ideation while in acute hospital - now resolved   - Neuropsychology consult   - outpatient at [facility] Out[facility]iatry.    #HTN  - Carvedilol 25mg Q 12hrs  - Amlodipine 10mg daily  - Hydralazine 25mg BID  - Lisinopril 20mg daily  -monitor vital signs    #HLD  - Lipitor 80mg daily    #DM II  -Stop ISS and FS checks as FS have been <180 - to monitor serum glucose   - A1c 7.0 on 7/25   -Continue metformin 500mg BID    #Pain management  - Tylenol PRN    #Smoker  -continue nicotine patch  - smoking cessation education    #DVT ppx  - Lovenox  - TEDs    #Bowel Regimen  - Senna  - miralax BID  -Enema PRN   -Dulcolax PRN    #Bladder management  - BS on admission, and q 8 hours (SC if > 400)  - Monitor UO    #FEN   - Diet: Soft/bite sized with thins     #Skin:  - Skin on admission: grossly intact  - Pressure injury/Skin: Turn Q2hrs while in bed, OOB to Chair, PT/OT     #Precaution  - Fall, Aspiration, cardiac    TEAM MEETING 8/9/23  SW:  lives with wife in apartment with 3 lynda  OT: sv for adls, min for transfers  PT: CG-SV for transfers, 120' with RW and right ace wrap, 12 steps 2HR  SLP: dysarthria, hypophonia  barriers: dec coordination, dysarthria, dysmetria  EDOD: Home 8/18

## 2023-08-10 NOTE — BH CONSULTATION LIAISON PROGRESS NOTE - NSICDXBHPRIMARYDX_PSY_ALL_CORE
Acute adjustment disorder with mixed anxiety and depressed mood   F43.23  
Acute adjustment disorder with mixed anxiety and depressed mood   F43.23

## 2023-08-10 NOTE — BH CONSULTATION LIAISON PROGRESS NOTE - NSBHCONSULTRECOMMENDOTHER_PSY_A_CORE FT
Agree with Nicotine patch.   Continue Lexapro 10mg PO Q daily and monitor for SE (nausea, hyponatremia).   Trazodone 25mg-50mg PO Q HS PRN for insomnia    Pt could benefit from recreational therapy if OKAY with primary rehab team.   
Agree with Nicotine patch.   Continue Lexapro 10mg PO Q daily and monitor for SE (nausea, hyponatremia).   Trazodone 25mg-50mg PO Q HS PRN for insomnia    Pt could benefit from recreational therapy if OKAY with primary rehab team.

## 2023-08-10 NOTE — PROGRESS NOTE ADULT - SUBJECTIVE AND OBJECTIVE BOX
SUBJECTIVE/ROS: He was evaluated while in therapy today. He states that he is feeling well. His finger sticks have been stable and he requested to reduce checks. He denies chest pain, fever, chills, nausea, vomiting, abdominal pain, headache, or BLE pain.     HPI:  Patient  is a 42 YO man with a PMH of T2DM, HTN, previous CVA (per patient 8 years ago, treated with TPA at [facility], p/w L hemiplegia, facial droop and dysarthria with minimal residual deficits), current smoker who presented to [facility] ED on 7/24 after two weeks of intermittent left lower extremity weakness with accompanying back pain similar to patient's sciatica pain, and 25+ hours of dysarthria. He stated he had episode of similar dysarthria 2 weeks ago as well that improved.     On examination, patient has mild dysarthria, accompanied by wife who states that patient's speech is noticeably more slurred than his baseline. Says that his previous stroke 8 years ago presented similarly, but was much more severe (weakness + speech changes/aphasia).     Not a Tenecteplase candidate given out of time window. MRI Brain on 7/26 showed 1.1 cm acute infarct left inferior adrienne as well as smaller subcentimeter acute infarcts bilateral parieto-occipital juxtacortical white matter.    Impression:  Dysarthria and RLE weakness due to acute infarct in left inferior adrienne and smaller acute infarcts in bilateral parieto-occipital juxtacortical. Mechanism large artery atherosclerosis    Aspirin 81mg and Plavix 75mg daily for 3 months as per SAMMPRIS,  then Asa 81mg daily indefinitely     TTE: EF 58%, normal LA. , continue atorvastatin 80mg qhs,  titrate statin to LDL goal less than 70.  Cardiology consulted for blood pressure management, follow up outpatient to slowly taper from permissive hypertension to normotension (Lower SBP 10-20mmHg per week). Seen by psychology for depression/suicidality (now resolved), f/u outpatient at [facility] Adult Out[facility].    Patient was evaluated by PM&R and therapy for functional deficits, gait/ADL impairments and acute rehabilitation was recommended. Patient was medically optimized for discharge to [facility] IRU on 7/27/23.      PHYSICAL EXAM    Vital Signs Last 24 Hrs  T(C): 36.6 (09 Aug 2023 20:42), Max: 36.6 (09 Aug 2023 20:42)  T(F): 97.8 (09 Aug 2023 20:42), Max: 97.8 (09 Aug 2023 20:42)  HR: 71 (10 Aug 2023 04:25) (70 - 79)  BP: 142/88 (09 Aug 2023 20:42) (142/88 - 158/91)  BP(mean): --  RR: 16 (09 Aug 2023 20:42) (16 - 16)  SpO2: 95% (10 Aug 2023 04:25) (95% - 98%)    Parameters below as of 09 Aug 2023 20:42  Patient On (Oxygen Delivery Method): room air        PHYSICAL EXAM  Constitutional - NAD, Comfortable  HEENT - NCAT, EOMI  Neck - Supple, No limited ROM  Chest - CTA bilaterally  Cardiovascular - RRR, S1S2  Abdomen -BS+, Soft, NTND  Extremities - No C/C/E, No calf tenderness   Neurologic Exam - aox3, dysarthria, RU/RL 4/5, DEBRA/LL 5/5    RECENT LABS:                          13.8   6.56  )-----------( 210      ( 10 Aug 2023 06:47 )             40.5     08-10    141  |  105  |  23  ----------------------------<  109<H>  4.1   |  28  |  1.15    Ca    9.2      10 Aug 2023 06:47    TPro  6.7  /  Alb  3.4  /  TBili  0.4  /  DBili  x   /  AST  21  /  ALT  33  /  AlkPhos  44  08-10    LIVER FUNCTIONS - ( 10 Aug 2023 06:47 )  Alb: 3.4 g/dL / Pro: 6.7 g/dL / ALK PHOS: 44 U/L / ALT: 33 U/L / AST: 21 U/L / GGT: x               CAPILLARY BLOOD GLUCOSE      POCT Blood Glucose.: 88 mg/dL (10 Aug 2023 11:59)  POCT Blood Glucose.: 111 mg/dL (10 Aug 2023 08:06)  POCT Blood Glucose.: 133 mg/dL (09 Aug 2023 22:03)  POCT Blood Glucose.: 99 mg/dL (09 Aug 2023 16:48)      MEDICATIONS  (STANDING):  amLODIPine   Tablet 10 milliGRAM(s) Oral daily  aspirin enteric coated 81 milliGRAM(s) Oral daily  atorvastatin 80 milliGRAM(s) Oral at bedtime  carvedilol 25 milliGRAM(s) Oral every 12 hours  clopidogrel Tablet 75 milliGRAM(s) Oral daily  dextrose 5%. 1000 milliLiter(s) (50 mL/Hr) IV Continuous <Continuous>  dextrose 5%. 1000 milliLiter(s) (100 mL/Hr) IV Continuous <Continuous>  dextrose 50% Injectable 25 Gram(s) IV Push once  dextrose 50% Injectable 12.5 Gram(s) IV Push once  dextrose 50% Injectable 25 Gram(s) IV Push once  enoxaparin Injectable 40 milliGRAM(s) SubCutaneous every 24 hours  escitalopram 10 milliGRAM(s) Oral daily  glucagon  Injectable 1 milliGRAM(s) IntraMuscular once  hydrALAZINE 50 milliGRAM(s) Oral two times a day  lisinopril 40 milliGRAM(s) Oral daily  melatonin 6 milliGRAM(s) Oral at bedtime  metFORMIN 500 milliGRAM(s) Oral two times a day with meals  nicotine -  14 mG/24Hr(s) Patch 1 Patch Transdermal daily  polyethylene glycol 3350 17 Gram(s) Oral two times a day  senna 2 Tablet(s) Oral at bedtime  traZODone 25 milliGRAM(s) Oral at bedtime    MEDICATIONS  (PRN):  acetaminophen     Tablet .. 650 milliGRAM(s) Oral every 6 hours PRN Moderate Pain (4 - 6), Severe Pain (7 - 10)  bisacodyl Suppository 10 milliGRAM(s) Rectal daily PRN Constipation  dextrose Oral Gel 15 Gram(s) Oral once PRN Blood Glucose LESS THAN 70 milliGRAM(s)/deciliter

## 2023-08-10 NOTE — BH CONSULTATION LIAISON PROGRESS NOTE - NSICDXBHSECONDARYDX_PSY_ALL_CORE
Acute cerebrovascular accident (CVA)   I63.9  Continuous cannabis use   F12.90  Cigarette smoker   F17.210  
Acute cerebrovascular accident (CVA)   I63.9  Continuous cannabis use   F12.90  Cigarette smoker   F17.210

## 2023-08-10 NOTE — BH CONSULTATION LIAISON PROGRESS NOTE - NSBHCHARTREVIEWLAB_PSY_A_CORE FT
08-10    141  |  105  |  23  ----------------------------<  109<H>  4.1   |  28  |  1.15    Ca    9.2      10 Aug 2023 06:47    TPro  6.7  /  Alb  3.4  /  TBili  0.4  /  DBili  x   /  AST  21  /  ALT  33  /  AlkPhos  44  08-10                            13.8   6.56  )-----------( 210      ( 10 Aug 2023 06:47 )             40.5     
                      14.1   5.83  )-----------( 114      ( 03 Aug 2023 07:05 )             42.8   08-03    136  |  106  |  26<H>  ----------------------------<  125<H>  4.3   |  22  |  1.23    Ca    9.0      03 Aug 2023 07:05    TPro  6.6  /  Alb  2.8<L>  /  TBili  0.6  /  DBili  x   /  AST  30  /  ALT  41  /  AlkPhos  48  08-03

## 2023-08-10 NOTE — BH CONSULTATION LIAISON PROGRESS NOTE - NSBHCONSULTFOLLOWAFTERCARE_PSY_A_CORE FT
Pt may f/u at [facility] Adult Outpatient Psychiatry Department- [phone] or [facility]- [phone]  [facility] Crisis clinic - [phone]
Pt may f/u at [facility] Adult Outpatient Psychiatry Department- [phone] or [facility]- [phone]  [facility] Crisis clinic - [phone]

## 2023-08-10 NOTE — BH CONSULTATION LIAISON PROGRESS NOTE - NSBHASSESSMENTFT_PSY_ALL_CORE
42 yo male who is domiciled in [place] with wife and eight-year old daughter, employed as salesman in electrical [employer] with no PPHx, no prior SI/SA/violence hx, SUHx significant for nicotine and cannabis use, and PMHx significant for T2DM, HTN, prior CVA (8 years ago with TPA with mild residual deficits including L hemiplegia and dysarthria) who initially presented on 7/24 with LLE weakness and dysarthria. Psych consulted on 7/25 for SI and depression at [facility].   Pt now transferred to [facility] for acute rehab services and psychiatry was asked to see patient for depression. Pt started on Lexapro and Trazodone , tolerating well without incident, no further return of depressive sx or suicidal /homicidal ideation , intent or plan.

## 2023-08-10 NOTE — BH CONSULTATION LIAISON PROGRESS NOTE - NSBHCHARTREVIEWVS_PSY_A_CORE FT
Vital Signs Last 24 Hrs  T(C): 36.7 (03 Aug 2023 08:19), Max: 36.9 (02 Aug 2023 21:41)  T(F): 98 (03 Aug 2023 08:19), Max: 98.5 (02 Aug 2023 21:41)  HR: 55 (03 Aug 2023 08:19) (55 - 80)  BP: 135/85 (03 Aug 2023 08:19) (130/76 - 155/98)  BP(mean): --  RR: 16 (03 Aug 2023 08:19) (15 - 16)  SpO2: 100% (03 Aug 2023 08:19) (95% - 100%)    Parameters below as of 03 Aug 2023 08:19  Patient On (Oxygen Delivery Method): BiPAP/CPAP    
Vital Signs Last 24 Hrs  T(C): 36.6 (10 Aug 2023 14:56), Max: 36.6 (09 Aug 2023 20:42)  T(F): 97.9 (10 Aug 2023 14:56), Max: 97.9 (10 Aug 2023 14:56)  HR: 72 (10 Aug 2023 14:56) (70 - 79)  BP: 121/76 (10 Aug 2023 14:56) (121/76 - 158/91)  BP(mean): --  RR: 16 (10 Aug 2023 14:56) (16 - 16)  SpO2: 96% (10 Aug 2023 14:56) (95% - 98%)    Parameters below as of 10 Aug 2023 14:56  Patient On (Oxygen Delivery Method): room air

## 2023-08-10 NOTE — BH CONSULTATION LIAISON PROGRESS NOTE - CURRENT MEDICATION
MEDICATIONS  (STANDING):  amLODIPine   Tablet 10 milliGRAM(s) Oral daily  aspirin enteric coated 81 milliGRAM(s) Oral daily  atorvastatin 80 milliGRAM(s) Oral at bedtime  carvedilol 25 milliGRAM(s) Oral every 12 hours  clopidogrel Tablet 75 milliGRAM(s) Oral daily  dextrose 5%. 1000 milliLiter(s) (50 mL/Hr) IV Continuous <Continuous>  dextrose 5%. 1000 milliLiter(s) (100 mL/Hr) IV Continuous <Continuous>  dextrose 50% Injectable 25 Gram(s) IV Push once  dextrose 50% Injectable 12.5 Gram(s) IV Push once  dextrose 50% Injectable 25 Gram(s) IV Push once  enoxaparin Injectable 40 milliGRAM(s) SubCutaneous every 24 hours  escitalopram 10 milliGRAM(s) Oral daily  glucagon  Injectable 1 milliGRAM(s) IntraMuscular once  hydrALAZINE 25 milliGRAM(s) Oral two times a day  insulin lispro (ADMELOG) corrective regimen sliding scale   SubCutaneous three times a day before meals  insulin lispro (ADMELOG) corrective regimen sliding scale   SubCutaneous at bedtime  melatonin 6 milliGRAM(s) Oral at bedtime  metFORMIN 500 milliGRAM(s) Oral two times a day with meals  nicotine -  14 mG/24Hr(s) Patch 1 Patch Transdermal daily  polyethylene glycol 3350 17 Gram(s) Oral two times a day  senna 2 Tablet(s) Oral at bedtime  traZODone 25 milliGRAM(s) Oral at bedtime    MEDICATIONS  (PRN):  acetaminophen     Tablet .. 650 milliGRAM(s) Oral every 6 hours PRN Moderate Pain (4 - 6), Severe Pain (7 - 10)  bisacodyl Suppository 10 milliGRAM(s) Rectal daily PRN Constipation  dextrose Oral Gel 15 Gram(s) Oral once PRN Blood Glucose LESS THAN 70 milliGRAM(s)/deciliter  
MEDICATIONS  (STANDING):  amLODIPine   Tablet 10 milliGRAM(s) Oral <User Schedule>  aspirin enteric coated 81 milliGRAM(s) Oral daily  atorvastatin 80 milliGRAM(s) Oral at bedtime  carvedilol 25 milliGRAM(s) Oral <User Schedule>  clopidogrel Tablet 75 milliGRAM(s) Oral daily  dextrose 5%. 1000 milliLiter(s) (50 mL/Hr) IV Continuous <Continuous>  dextrose 5%. 1000 milliLiter(s) (100 mL/Hr) IV Continuous <Continuous>  dextrose 50% Injectable 25 Gram(s) IV Push once  dextrose 50% Injectable 12.5 Gram(s) IV Push once  dextrose 50% Injectable 25 Gram(s) IV Push once  enoxaparin Injectable 40 milliGRAM(s) SubCutaneous every 24 hours  escitalopram 10 milliGRAM(s) Oral daily  glucagon  Injectable 1 milliGRAM(s) IntraMuscular once  hydrALAZINE 50 milliGRAM(s) Oral <User Schedule>  lisinopril 40 milliGRAM(s) Oral <User Schedule>  melatonin 6 milliGRAM(s) Oral at bedtime  metFORMIN 500 milliGRAM(s) Oral two times a day with meals  nicotine -  14 mG/24Hr(s) Patch 1 Patch Transdermal daily  polyethylene glycol 3350 17 Gram(s) Oral <User Schedule>  senna 2 Tablet(s) Oral at bedtime  traZODone 25 milliGRAM(s) Oral at bedtime    MEDICATIONS  (PRN):  acetaminophen     Tablet .. 650 milliGRAM(s) Oral every 6 hours PRN Moderate Pain (4 - 6), Severe Pain (7 - 10)  bisacodyl Suppository 10 milliGRAM(s) Rectal daily PRN Constipation  dextrose Oral Gel 15 Gram(s) Oral once PRN Blood Glucose LESS THAN 70 milliGRAM(s)/deciliter

## 2023-08-11 NOTE — PROGRESS NOTE ADULT - SUBJECTIVE AND OBJECTIVE BOX
SUBJECTIVE/ROS: He was evaluated while in bed this morning. He states that he is sleeping well and had a good night overall. He denies chest pain, fever, chills, nausea, vomiting, abdominal pain, headache, or BLE pain.     HPI:  Patient  is a 42 YO man with a PMH of T2DM, HTN, previous CVA (per patient 8 years ago, treated with TPA at [facility], p/w L hemiplegia, facial droop and dysarthria with minimal residual deficits), current smoker who presented to [facility] ED on 7/24 after two weeks of intermittent left lower extremity weakness with accompanying back pain similar to patient's sciatica pain, and 25+ hours of dysarthria. He stated he had episode of similar dysarthria 2 weeks ago as well that improved.     On examination, patient has mild dysarthria, accompanied by wife who states that patient's speech is noticeably more slurred than his baseline. Says that his previous stroke 8 years ago presented similarly, but was much more severe (weakness + speech changes/aphasia).     Not a Tenecteplase candidate given out of time window. MRI Brain on 7/26 showed 1.1 cm acute infarct left inferior adrienne as well as smaller subcentimeter acute infarcts bilateral parieto-occipital juxtacortical white matter.    Impression:  Dysarthria and RLE weakness due to acute infarct in left inferior adrienne and smaller acute infarcts in bilateral parieto-occipital juxtacortical. Mechanism large artery atherosclerosis    Aspirin 81mg and Plavix 75mg daily for 3 months as per SAMMPRIS,  then Asa 81mg daily indefinitely     TTE: EF 58%, normal LA. , continue atorvastatin 80mg qhs,  titrate statin to LDL goal less than 70.  Cardiology consulted for blood pressure management, follow up outpatient to slowly taper from permissive hypertension to normotension (Lower SBP 10-20mmHg per week). Seen by psychology for depression/suicidality (now resolved), f/u outpatient at [facility].    Patient was evaluated by PM&R and therapy for functional deficits, gait/ADL impairments and acute rehabilitation was recommended. Patient was medically optimized for discharge to [facility] IRU on 7/27/23.      Vital Signs Last 24 Hrs  T(C): 36.8 (11 Aug 2023 08:27), Max: 36.8 (11 Aug 2023 08:27)  T(F): 98.2 (11 Aug 2023 08:27), Max: 98.2 (11 Aug 2023 08:27)  HR: 64 (11 Aug 2023 08:27) (64 - 76)  BP: 143/83 (11 Aug 2023 08:27) (121/76 - 144/92)  BP(mean): --  RR: 16 (11 Aug 2023 08:27) (16 - 16)  SpO2: 98% (11 Aug 2023 08:27) (95% - 98%)    Parameters below as of 11 Aug 2023 08:27  Patient On (Oxygen Delivery Method): room air            PHYSICAL EXAM  Constitutional - NAD, Comfortable  HEENT - NCAT, EOMI  Neck - Supple, No limited ROM  Chest - CTA bilaterally  Cardiovascular - RRR, S1S2  Abdomen -BS+, Soft, NTND  Extremities - No C/C/E, No calf tenderness   Neurologic Exam - aox3, dysarthria, RU/RL 4/5, DEBRA/LL 5/5    RECENT LABS:                          13.8   6.56  )-----------( 210      ( 10 Aug 2023 06:47 )             40.5     08-10    141  |  105  |  23  ----------------------------<  109<H>  4.1   |  28  |  1.15    Ca    9.2      10 Aug 2023 06:47    TPro  6.7  /  Alb  3.4  /  TBili  0.4  /  DBili  x   /  AST  21  /  ALT  33  /  AlkPhos  44  08-10    LIVER FUNCTIONS - ( 10 Aug 2023 06:47 )  Alb: 3.4 g/dL / Pro: 6.7 g/dL / ALK PHOS: 44 U/L / ALT: 33 U/L / AST: 21 U/L / GGT: x             CAPILLARY BLOOD GLUCOSE      POCT Blood Glucose.: 88 mg/dL (10 Aug 2023 11:59)        MEDICATIONS  (STANDING):  amLODIPine   Tablet 10 milliGRAM(s) Oral <User Schedule>  aspirin enteric coated 81 milliGRAM(s) Oral daily  atorvastatin 80 milliGRAM(s) Oral at bedtime  carvedilol 25 milliGRAM(s) Oral <User Schedule>  clopidogrel Tablet 75 milliGRAM(s) Oral daily  dextrose 5%. 1000 milliLiter(s) (50 mL/Hr) IV Continuous <Continuous>  dextrose 5%. 1000 milliLiter(s) (100 mL/Hr) IV Continuous <Continuous>  dextrose 50% Injectable 25 Gram(s) IV Push once  dextrose 50% Injectable 12.5 Gram(s) IV Push once  dextrose 50% Injectable 25 Gram(s) IV Push once  enoxaparin Injectable 40 milliGRAM(s) SubCutaneous every 24 hours  escitalopram 10 milliGRAM(s) Oral daily  glucagon  Injectable 1 milliGRAM(s) IntraMuscular once  hydrALAZINE 50 milliGRAM(s) Oral <User Schedule>  lisinopril 40 milliGRAM(s) Oral <User Schedule>  melatonin 6 milliGRAM(s) Oral at bedtime  metFORMIN 500 milliGRAM(s) Oral two times a day with meals  nicotine -  14 mG/24Hr(s) Patch 1 Patch Transdermal daily  polyethylene glycol 3350 17 Gram(s) Oral <User Schedule>  senna 2 Tablet(s) Oral at bedtime  traZODone 25 milliGRAM(s) Oral at bedtime    MEDICATIONS  (PRN):  acetaminophen     Tablet .. 650 milliGRAM(s) Oral every 6 hours PRN Moderate Pain (4 - 6), Severe Pain (7 - 10)  bisacodyl Suppository 10 milliGRAM(s) Rectal daily PRN Constipation  dextrose Oral Gel 15 Gram(s) Oral once PRN Blood Glucose LESS THAN 70 milliGRAM(s)/deciliter

## 2023-08-11 NOTE — CHART NOTE - NSCHARTNOTEFT_GEN_A_CORE
Nutrition Follow Up Note  Hospital Course   (Per Electronic Medical Record)    Source:  Patient [X]  Nursing Staff [X]   Medical Record [X]      Diet: Diet, Regular:   Consistent Carbohydrate {No Snacks}  DASH/TLC {Sodium & Cholesterol Restricted} (08-06-23 @ 12:28) [Active]    At this time patient tolerating diet w/ adequate appetite/intake consuming mostly % of meals. Diet texture upgraded to regular 8/6. No issues w/ chewing and swallowing current diet texture. Denies N/V/C/D, last BM 8/10 Per nursing flowsheets. Noted POCT:88-11mg/dL inlast 24 hours.       Enteral/Parenteral Nutrition: Not Applicable    Current Weight: 243.3lb on 7/29  Recommen obtain current weight    Pertinent Medications: MEDICATIONS  (STANDING):  amLODIPine   Tablet 10 milliGRAM(s) Oral <User Schedule>  aspirin enteric coated 81 milliGRAM(s) Oral daily  atorvastatin 80 milliGRAM(s) Oral at bedtime  carvedilol 25 milliGRAM(s) Oral <User Schedule>  clopidogrel Tablet 75 milliGRAM(s) Oral daily  dextrose 5%. 1000 milliLiter(s) (50 mL/Hr) IV Continuous <Continuous>  dextrose 5%. 1000 milliLiter(s) (100 mL/Hr) IV Continuous <Continuous>  dextrose 50% Injectable 25 Gram(s) IV Push once  dextrose 50% Injectable 12.5 Gram(s) IV Push once  dextrose 50% Injectable 25 Gram(s) IV Push once  enoxaparin Injectable 40 milliGRAM(s) SubCutaneous every 24 hours  escitalopram 10 milliGRAM(s) Oral daily  glucagon  Injectable 1 milliGRAM(s) IntraMuscular once  hydrALAZINE 50 milliGRAM(s) Oral <User Schedule>  lisinopril 40 milliGRAM(s) Oral <User Schedule>  melatonin 6 milliGRAM(s) Oral at bedtime  metFORMIN 500 milliGRAM(s) Oral two times a day with meals  nicotine -  14 mG/24Hr(s) Patch 1 Patch Transdermal daily  polyethylene glycol 3350 17 Gram(s) Oral <User Schedule>  senna 2 Tablet(s) Oral at bedtime  traZODone 25 milliGRAM(s) Oral at bedtime    MEDICATIONS  (PRN):  acetaminophen     Tablet .. 650 milliGRAM(s) Oral every 6 hours PRN Moderate Pain (4 - 6), Severe Pain (7 - 10)  bisacodyl Suppository 10 milliGRAM(s) Rectal daily PRN Constipation  dextrose Oral Gel 15 Gram(s) Oral once PRN Blood Glucose LESS THAN 70 milliGRAM(s)/deciliter      Pertinent Labs:  08-10 Na141 mmol/L Glu 109 mg/dL<H> K+ 4.1 mmol/L Cr  1.15 mg/dL BUN 23 mg/dL 08-10 Alb 3.4 g/dL 07-25 Chol 230 mg/dL<H> LDL --    HDL 32 mg/dL<L> Trig 240 mg/dL<H>    Skin: No pressure injury per nursing flowsheets    Edema: No edema noted per nursing flowsheet    Last Bowel Movement: on 8/10 Per nursing flowsheets     Estimated Needs:   [X] No Change Since Previous Assessment    Previous Nutrition Diagnosis:   Swallowing Difficulty   related to CVA   as evidenced by need for mechanically altered diet texture       Nutrition Diagnosis is [X] Resolved - Diet order upgraded to regular texture 8/6    Altered Nutrition Related Lab Values   Related to T2DM   as evidenced by elevated POCT and A1c 7.0%    Nutrition Diagnosis is [X] Ongoing - Addressed w/ Heart-Healthy Consistent Carbohydrate diet     New Nutrition Diagnosis: [X] Not Applicable      Interventions:   1. Recommend continuing with current plan of care    Monitoring & Evaluation:   [X] Weights   [X] PO Intake   [X] Skin Integrity   [X] Follow Up (Per Protocol)  [X] Tolerance to Diet Prescription   [X] Other: Labs & POCT    Registered Dietitian/Nutritionist Remains Available.  Staci Story RD, MS, CDN    Phone# [phone]

## 2023-08-11 NOTE — PROGRESS NOTE ADULT - ASSESSMENT
ASSESSMENT/PLAN  Patient  is a 42 YO man with a PMH of T2DM, HTN, previous CVA with minimal residual deficits, current smoker who presented to [facility] ED on 7/24 after two weeks of intermittent left lower extremity weakness with accompanying back pain,  and dysarthria. He was found to have acute infarct in left inferior adrienne and smaller acute infarcts in bilateral parieto-occipital juxtacortical . Hospital course s/f HTN, depression and suicidal ideation seen and cleared by psychologist. Patient now with gait Instability, ADL impairments and Functional impairments.    #CVA now with Dysarthria and RLE weakness due to acute infarct in left inferior adrienne and smaller acute infarcts in bilateral parieto-occipital juxtacortical. Mechanism large artery atherosclerosis  - Continue Comprehensive Rehab Program: PT/OT/ST, 3hours daily and 5 days weekly.   - Continue ASA 81mg and Plavix 75mg daily for 3 months, then ASA alone    #Mood/sleep  -Continue Lexapro 10mg daily for neurorecovery 8/2  -Continue trazodone 25mg at bedtime  - Depression with suicidal ideation while in acute hospital - now resolved   - Neuropsychology consult   - outpatient at [facility].    #HTN  - Carvedilol 25mg Q 12hrs  - Amlodipine 10mg daily  - Hydralazine 25mg BID  - Lisinopril 20mg daily  -monitor vital signs    #HLD  - Lipitor 80mg daily    #DM II  -Stop ISS and FS checks as FS have been <180 - to monitor serum glucose   - A1c 7.0 on 7/25   -Continue metformin 500mg BID    #Pain management  - Tylenol PRN    #Smoker  -continue nicotine patch  - smoking cessation education    #DVT ppx  - Lovenox  - TEDs    #Bowel Regimen  - Senna  - miralax BID  -Enema PRN   -Dulcolax PRN    #Bladder management  - BS on admission, and q 8 hours (SC if > 400)  - Monitor UO    #FEN   - Diet: Soft/bite sized with thins     #Skin:  - Skin on admission: grossly intact  - Pressure injury/Skin: Turn Q2hrs while in bed, OOB to Chair, PT/OT     #Precaution  - Fall, Aspiration, cardiac    TEAM MEETING 8/9/23  SW:  lives with wife in apartment with 3 lynda  OT: sv for adls, min for transfers  PT: CG-SV for transfers, 120' with RW and right ace wrap, 12 steps 2HR  SLP: dysarthria, hypophonia  barriers: dec coordination, dysarthria, dysmetria  EDOD: Home 8/18

## 2023-08-12 NOTE — PROGRESS NOTE ADULT - SUBJECTIVE AND OBJECTIVE BOX
No overnight events.      REVIEW OF SYSTEMS  Constitutional - No fever,  No fatigue  Neurological - No headaches, No loss of strength  Musculoskeletal - No joint pain, No joint swelling, No muscle pain    VITALS  T(C): 36.9 (08-12-23 @ 08:14), Max: 36.9 (08-12-23 @ 08:14)  HR: 55 (08-12-23 @ 08:14) (55 - 75)  BP: 158/90 (08-12-23 @ 08:14) (158/90 - 158/92)  RR: 16 (08-12-23 @ 08:14) (16 - 16)  SpO2: 98% (08-12-23 @ 08:14) (95% - 99%)  Wt(kg): --       MEDICATIONS   acetaminophen     Tablet .. 650 milliGRAM(s) every 6 hours PRN  amLODIPine   Tablet 10 milliGRAM(s) <User Schedule>  aspirin enteric coated 81 milliGRAM(s) daily  atorvastatin 80 milliGRAM(s) at bedtime  bisacodyl Suppository 10 milliGRAM(s) daily PRN  carvedilol 25 milliGRAM(s) <User Schedule>  clopidogrel Tablet 75 milliGRAM(s) daily  dextrose 5%. 1000 milliLiter(s) <Continuous>  dextrose 5%. 1000 milliLiter(s) <Continuous>  dextrose 50% Injectable 25 Gram(s) once  dextrose 50% Injectable 12.5 Gram(s) once  dextrose 50% Injectable 25 Gram(s) once  dextrose Oral Gel 15 Gram(s) once PRN  enoxaparin Injectable 40 milliGRAM(s) every 24 hours  escitalopram 10 milliGRAM(s) daily  glucagon  Injectable 1 milliGRAM(s) once  hydrALAZINE 50 milliGRAM(s) <User Schedule>  lisinopril 40 milliGRAM(s) <User Schedule>  melatonin 6 milliGRAM(s) at bedtime  metFORMIN 500 milliGRAM(s) two times a day with meals  nicotine -  14 mG/24Hr(s) Patch 1 Patch daily  polyethylene glycol 3350 17 Gram(s) <User Schedule>  senna 2 Tablet(s) at bedtime  traZODone 25 milliGRAM(s) at bedtime      RECENT LABS/IMAGING                        ---------  PHYSICAL EXAM  Constitutional - NAD, Comfortable, in bed   Pulm - Breathing comfortably  Abd - Soft, NTND  Extremities - No edema, No calf tenderness  Neurologic Exam -                    Cognitive - Awake, Alert, oriented x 3     Communication - Fluent dysarthria      Motor - moves all ext      Sensory - Intact to LT  Psychiatric - Mood WNL, Affect WNL    ASSESSMENT/PLAN  43y Male h/o DM, HTN , CVA with functional deficits after CVA  infarct in adrienne, bilateral parieto-occipital juxtacortical.  mood on lexapro, trazodone  Continue current medical management  Pain - Tylenol PRN  DVT PPX - lovenox   Continue 3hrs a day of comprehensive rehab program.

## 2023-08-13 NOTE — PROGRESS NOTE ADULT - SUBJECTIVE AND OBJECTIVE BOX
No overnight events.      REVIEW OF SYSTEMS  Constitutional - No fever,  No fatigue  Neurological - No headaches, No loss of strength  Musculoskeletal - No joint pain, No joint swelling, No muscle pain    VITALS  T(C): 36.8 (08-13-23 @ 09:11), Max: 37 (08-12-23 @ 20:25)  HR: 61 (08-13-23 @ 09:11) (61 - 70)  BP: 128/79 (08-13-23 @ 09:11) (128/79 - 148/87)  RR: 16 (08-13-23 @ 09:11) (16 - 16)  SpO2: 96% (08-13-23 @ 09:11) (96% - 98%)  Wt(kg): --       MEDICATIONS   acetaminophen     Tablet .. 650 milliGRAM(s) every 6 hours PRN  amLODIPine   Tablet 10 milliGRAM(s) <User Schedule>  aspirin enteric coated 81 milliGRAM(s) daily  atorvastatin 80 milliGRAM(s) at bedtime  bisacodyl Suppository 10 milliGRAM(s) daily PRN  carvedilol 25 milliGRAM(s) <User Schedule>  clopidogrel Tablet 75 milliGRAM(s) daily  dextrose 5%. 1000 milliLiter(s) <Continuous>  dextrose 5%. 1000 milliLiter(s) <Continuous>  dextrose 50% Injectable 25 Gram(s) once  dextrose 50% Injectable 12.5 Gram(s) once  dextrose 50% Injectable 25 Gram(s) once  dextrose Oral Gel 15 Gram(s) once PRN  enoxaparin Injectable 40 milliGRAM(s) every 24 hours  escitalopram 10 milliGRAM(s) daily  glucagon  Injectable 1 milliGRAM(s) once  hydrALAZINE 50 milliGRAM(s) <User Schedule>  lisinopril 40 milliGRAM(s) <User Schedule>  melatonin 6 milliGRAM(s) at bedtime  metFORMIN 500 milliGRAM(s) two times a day with meals  nicotine -  14 mG/24Hr(s) Patch 1 Patch daily  polyethylene glycol 3350 17 Gram(s) <User Schedule>  senna 2 Tablet(s) at bedtime  traZODone 25 milliGRAM(s) at bedtime      RECENT LABS/IMAGING                          ---------  PHYSICAL EXAM  Constitutional - NAD, Comfortable, in chair   Pulm - Breathing comfortably  Abd - Soft, NTND  Extremities - No edema, No calf tenderness  Neurologic Exam -                    Cognitive - Awake, Alert, oriented x 3     Communication - Fluent dysarthria      Motor - moves all ext      Sensory - Intact to LT  Psychiatric - Mood WNL, Affect WNL    ASSESSMENT/PLAN  43y Male h/o DM, HTN , CVA with functional deficits after CVA  infarct in adrienne, bilateral parieto-occipital juxtacortical.  mood on lexapro, trazodone  Continue current medical management  Pain - Tylenol PRN  DVT PPX - lovenox   Continue 3hrs a day of comprehensive rehab program.

## 2023-08-13 NOTE — PROGRESS NOTE ADULT - SUBJECTIVE AND OBJECTIVE BOX
Hospitalist: Elodia Middleton DO    CHIEF COMPLAINT: Patient is a 43y old  male who presents with a chief complaint of CVA (13 Aug 2023 10:19)      SUBJECTIVE / OVERNIGHT EVENTS: Patient seen and examined. No acute events overnight. Pain well controlled and patient without any complaints.    MEDICATIONS  (STANDING):  amLODIPine   Tablet 10 milliGRAM(s) Oral <User Schedule>  aspirin enteric coated 81 milliGRAM(s) Oral daily  atorvastatin 80 milliGRAM(s) Oral at bedtime  carvedilol 25 milliGRAM(s) Oral <User Schedule>  clopidogrel Tablet 75 milliGRAM(s) Oral daily  dextrose 5%. 1000 milliLiter(s) (50 mL/Hr) IV Continuous <Continuous>  dextrose 5%. 1000 milliLiter(s) (100 mL/Hr) IV Continuous <Continuous>  dextrose 50% Injectable 25 Gram(s) IV Push once  dextrose 50% Injectable 12.5 Gram(s) IV Push once  dextrose 50% Injectable 25 Gram(s) IV Push once  enoxaparin Injectable 40 milliGRAM(s) SubCutaneous every 24 hours  escitalopram 10 milliGRAM(s) Oral daily  glucagon  Injectable 1 milliGRAM(s) IntraMuscular once  hydrALAZINE 50 milliGRAM(s) Oral <User Schedule>  lisinopril 40 milliGRAM(s) Oral <User Schedule>  melatonin 6 milliGRAM(s) Oral at bedtime  metFORMIN 500 milliGRAM(s) Oral two times a day with meals  nicotine -  14 mG/24Hr(s) Patch 1 Patch Transdermal daily  polyethylene glycol 3350 17 Gram(s) Oral <User Schedule>  senna 2 Tablet(s) Oral at bedtime  traZODone 25 milliGRAM(s) Oral at bedtime    MEDICATIONS  (PRN):  acetaminophen     Tablet .. 650 milliGRAM(s) Oral every 6 hours PRN Moderate Pain (4 - 6), Severe Pain (7 - 10)  bisacodyl Suppository 10 milliGRAM(s) Rectal daily PRN Constipation  dextrose Oral Gel 15 Gram(s) Oral once PRN Blood Glucose LESS THAN 70 milliGRAM(s)/deciliter      VITALS:  T(F): 98.2 (08-13-23 @ 09:11), Max: 98.6 (08-12-23 @ 20:25)  HR: 61 (08-13-23 @ 09:11) (61 - 70)  BP: 128/79 (08-13-23 @ 09:11) (128/79 - 148/87)  RR: 16 (08-13-23 @ 09:11) (16 - 16)  SpO2: 96% (08-13-23 @ 09:11)      REVIEW OF SYSTEMS:  For ROV please refer back to H&P     PHYSICAL EXAM:  General: NAD, A/O x 3  ENT: MMM  Neck: Supple, No JVD  Lungs: Clear to auscultation bilaterally  Cardio: RRR, S1/S2, No murmurs  Abdomen: Soft, Nontender, Nondistended; Bowel sounds present  Extremities: No calf tenderness, No pitting edema      RADIOLOGY & ADDITIONAL TESTS:    Imaging Personally Reviewed:    [X] Consultant(s) Notes Reviewed:  [X] Care Discussed with Consultants/Other Providers:

## 2023-08-13 NOTE — PROGRESS NOTE ADULT - ASSESSMENT
44 YO man with a PMH of T2DM, HTN, previous CVA with minimal residual deficits, current smoker who presented to [facility] ED on 7/24 after two weeks of intermittent left lower extremity weakness with accompanying back pain,  and dysarthria. He was found to have acute infarct in left inferior adrienne and smaller acute infarcts in bilateral parieto-occipital juxtacortical . Hospital course s/f HTN, depression and suicidal ideation seen and cleared by psychologist. Patient now with gait Instability, ADL impairments and Functional impairments.    #H/o CVA, infarct in left inferior adrienne and smaller acute infarcts in bilateral parieto-occipital juxtacortical. Mechanism large artery atherosclerosis  - ASA 81mg and Plavix 75mg daily for 3 months, then ASA alone  - Lipitor 80mg qhs    #HTN  - BP now controlled  - lisinopril increased to 40mg   - increase hydralazine to 50mg BID 8/5  - Carvedilol 25mg Q 12hrs  - Amlodipine 10mg daily    #HLD  - Lipitor 80mg daily    #DM II  - ISS and FS  - A1c 7.0 on 7/25   - cont with metformin 500mg BID    DVT ppx Lovenox

## 2023-08-14 NOTE — PROGRESS NOTE ADULT - ASSESSMENT
ASSESSMENT/PLAN  Patient  is a 44 YO man with a PMH of T2DM, HTN, previous CVA with minimal residual deficits, current smoker who presented to [facility] ED on 7/24 after two weeks of intermittent left lower extremity weakness with accompanying back pain,  and dysarthria. He was found to have acute infarct in left inferior adrienne and smaller acute infarcts in bilateral parieto-occipital juxtacortical . Hospital course s/f HTN, depression and suicidal ideation seen and cleared by psychologist. Patient now with gait Instability, ADL impairments and Functional impairments.    #CVA now with Dysarthria and RLE weakness due to acute infarct in left inferior adrienne and smaller acute infarcts in bilateral parieto-occipital juxtacortical. Mechanism large artery atherosclerosis  - Continue Comprehensive Rehab Program: PT/OT/ST, 3hours daily and 5 days weekly.   - Continue ASA 81mg and Plavix 75mg daily for 3 months, then ASA alone    #Mood/sleep  -Continue Lexapro 10mg daily for neurorecovery 8/2  -Continue trazodone 25mg at bedtime  - Depression with suicidal ideation while in acute hospital - now resolved   - Neuropsychology consult   - outpatient at [facility].    #HTN  - Carvedilol 25mg Q 12hrs  - Amlodipine 10mg daily  - Hydralazine 25mg BID  - Lisinopril 20mg daily  -monitor vital signs -stable     #HLD  - Lipitor 80mg daily    #DM II  -Stop ISS and FS checks as FS have been <180 - to monitor serum glucose   - A1c 7.0 on 7/25   -Continue metformin 500mg BID    #Pain management  - Tylenol PRN    #Smoker  -continue nicotine patch  - smoking cessation education    #DVT ppx  - Lovenox  - TEDs    #Bowel Regimen  - Senna  - miralax BID  -Enema PRN   -Dulcolax PRN      #FEN   - Diet: Regular with thins     #Skin:  - Skin on admission: grossly intact  - Pressure injury/Skin: Turn Q2hrs while in bed, OOB to Chair, PT/OT     #Precaution  - Fall, Aspiration, cardiac    TEAM MEETING 8/9/23  SW:  lives with wife in apartment with 3 lynda  OT: sv for adls, min for transfers  PT: CG-SV for transfers, 120' with RW and right ace wrap, 12 steps 2HR  SLP: dysarthria, hypophonia  barriers: dec coordination, dysarthria, dysmetria  EDOD: Home 8/18

## 2023-08-14 NOTE — PROGRESS NOTE ADULT - ASSESSMENT
42 YO man with a PMH of T2DM, HTN, previous CVA with minimal residual deficits, current smoker who presented to [facility] ED on 7/24 after two weeks of intermittent left lower extremity weakness with accompanying back pain,  and dysarthria. He was found to have acute infarct in left inferior adrienne and smaller acute infarcts in bilateral parieto-occipital juxtacortical . Hospital course s/f HTN, depression and suicidal ideation seen and cleared by psychologist. Patient now with gait Instability, ADL impairments and Functional impairments.    #H/o CVA, infarct in left inferior adrienne and smaller acute infarcts in bilateral parieto-occipital juxtacortical. Mechanism large artery atherosclerosis  - ASA 81mg and Plavix 75mg daily for 3 months, then ASA alone  - Lipitor 80mg qhs    #HTN  - BP now controlled  - lisinopril increased to 40mg   - hydralazine 50mg BID  - Carvedilol 25mg Q 12hrs  - Amlodipine 10mg daily    #HLD  - Lipitor 80mg daily    #DM II  - ISS and FS  - A1c 7.0 on 7/25   - cont with metformin 500mg BID    DVT ppx Lovenox

## 2023-08-14 NOTE — PROGRESS NOTE ADULT - SUBJECTIVE AND OBJECTIVE BOX
SUBJECTIVE/ROS: He was evaluated while in chair this morning. He denies chest pain, fever, chills, nausea, vomiting, abdominal pain, headache, or BLE pain. No acute events overnight.     HPI:  Patient  is a 44 YO man with a PMH of T2DM, HTN, previous CVA (per patient 8 years ago, treated with TPA at [facility] [place], p/w L hemiplegia, facial droop and dysarthria with minimal residual deficits), current smoker who presented to [facility] ED on 7/24 after two weeks of intermittent left lower extremity weakness with accompanying back pain similar to patient's sciatica pain, and 25+ hours of dysarthria. He stated he had episode of similar dysarthria 2 weeks ago as well that improved.     On examination, patient has mild dysarthria, accompanied by wife who states that patient's speech is noticeably more slurred than his baseline. Says that his previous stroke 8 years ago presented similarly, but was much more severe (weakness + speech changes/aphasia).     Not a Tenecteplase candidate given out of time window. MRI Brain on 7/26 showed 1.1 cm acute infarct left inferior adrienne as well as smaller subcentimeter acute infarcts bilateral parieto-occipital juxtacortical white matter.    Impression:  Dysarthria and RLE weakness due to acute infarct in left inferior adrienne and smaller acute infarcts in bilateral parieto-occipital juxtacortical. Mechanism large artery atherosclerosis    Aspirin 81mg and Plavix 75mg daily for 3 months as per SAMMPRIS,  then Asa 81mg daily indefinitely     TTE: EF 58%, normal LA. , continue atorvastatin 80mg qhs,  titrate statin to LDL goal less than 70.  Cardiology consulted for blood pressure management, follow up outpatient to slowly taper from permissive hypertension to normotension (Lower SBP 10-20mmHg per week). Seen by psychology for depression/suicidality (now resolved), f/u outpatient at [facility].    Patient was evaluated by PM&R and therapy for functional deficits, gait/ADL impairments and acute rehabilitation was recommended. Patient was medically optimized for discharge to [facility] IRU on 7/27/23.      Vital Signs Last 24 Hrs  T(C): 36.3 (14 Aug 2023 09:23), Max: 36.7 (13 Aug 2023 20:40)  T(F): 97.4 (14 Aug 2023 09:23), Max: 98.1 (13 Aug 2023 20:40)  HR: 75 (14 Aug 2023 09:23) (71 - 75)  BP: 124/82 (14 Aug 2023 09:23) (124/82 - 145/91)  BP(mean): --  RR: 16 (14 Aug 2023 09:23) (16 - 16)  SpO2: 99% (14 Aug 2023 09:23) (97% - 99%)    Parameters below as of 14 Aug 2023 09:23  Patient On (Oxygen Delivery Method): room air              PHYSICAL EXAM  Constitutional - NAD, Comfortable  HEENT - NCAT, EOMI  Neck - Supple, No limited ROM  Chest - CTA bilaterally  Cardiovascular - RRR, S1S2  Abdomen -BS+, Soft, NTND  Extremities - No C/C/E, No calf tenderness   Neurologic Exam - aox3, dysarthria, RU/RL 4/5, DEBRA/LL 5/5        LABS:                          14.2   7.39  )-----------( 216      ( 14 Aug 2023 08:50 )             41.4     08-14    142  |  105  |  24<H>  ----------------------------<  167<H>  4.5   |  27  |  1.24    Ca    9.6      14 Aug 2023 08:50    TPro  7.2  /  Alb  3.9  /  TBili  0.3  /  DBili  x   /  AST  19  /  ALT  28  /  AlkPhos  52  08-14    LIVER FUNCTIONS - ( 14 Aug 2023 08:50 )  Alb: 3.9 g/dL / Pro: 7.2 g/dL / ALK PHOS: 52 U/L / ALT: 28 U/L / AST: 19 U/L / GGT: x             Urinalysis Basic - ( 14 Aug 2023 08:50 )    Color: x / Appearance: x / SG: x / pH: x  Gluc: 167 mg/dL / Ketone: x  / Bili: x / Urobili: x   Blood: x / Protein: x / Nitrite: x   Leuk Esterase: x / RBC: x / WBC x   Sq Epi: x / Non Sq Epi: x / Bacteria: x          MEDICATIONS  (STANDING):  amLODIPine   Tablet 10 milliGRAM(s) Oral <User Schedule>  aspirin enteric coated 81 milliGRAM(s) Oral daily  atorvastatin 80 milliGRAM(s) Oral at bedtime  carvedilol 25 milliGRAM(s) Oral <User Schedule>  clopidogrel Tablet 75 milliGRAM(s) Oral daily  dextrose 5%. 1000 milliLiter(s) (50 mL/Hr) IV Continuous <Continuous>  dextrose 5%. 1000 milliLiter(s) (100 mL/Hr) IV Continuous <Continuous>  dextrose 50% Injectable 25 Gram(s) IV Push once  dextrose 50% Injectable 12.5 Gram(s) IV Push once  dextrose 50% Injectable 25 Gram(s) IV Push once  enoxaparin Injectable 40 milliGRAM(s) SubCutaneous every 24 hours  escitalopram 10 milliGRAM(s) Oral daily  glucagon  Injectable 1 milliGRAM(s) IntraMuscular once  hydrALAZINE 50 milliGRAM(s) Oral <User Schedule>  lisinopril 40 milliGRAM(s) Oral <User Schedule>  melatonin 6 milliGRAM(s) Oral at bedtime  metFORMIN 500 milliGRAM(s) Oral two times a day with meals  nicotine -  14 mG/24Hr(s) Patch 1 Patch Transdermal daily  polyethylene glycol 3350 17 Gram(s) Oral <User Schedule>  senna 2 Tablet(s) Oral at bedtime  traZODone 25 milliGRAM(s) Oral at bedtime    MEDICATIONS  (PRN):  acetaminophen     Tablet .. 650 milliGRAM(s) Oral every 6 hours PRN Moderate Pain (4 - 6), Severe Pain (7 - 10)  bisacodyl Suppository 10 milliGRAM(s) Rectal daily PRN Constipation  dextrose Oral Gel 15 Gram(s) Oral once PRN Blood Glucose LESS THAN 70 milliGRAM(s)/deciliter

## 2023-08-14 NOTE — PROGRESS NOTE ADULT - SUBJECTIVE AND OBJECTIVE BOX
Patient is a 43y old  Male who presents with a chief complaint of CVA (13 Aug 2023 14:33)    Patient seen and examined at bedside. No acute overnight events. Slept well. Used CPAP overnight. Pleased with therapy progress     ALLERGIES:  No Known Allergies    MEDICATIONS  (STANDING):  amLODIPine   Tablet 10 milliGRAM(s) Oral <User Schedule>  aspirin enteric coated 81 milliGRAM(s) Oral daily  atorvastatin 80 milliGRAM(s) Oral at bedtime  carvedilol 25 milliGRAM(s) Oral <User Schedule>  clopidogrel Tablet 75 milliGRAM(s) Oral daily  dextrose 5%. 1000 milliLiter(s) (50 mL/Hr) IV Continuous <Continuous>  dextrose 5%. 1000 milliLiter(s) (100 mL/Hr) IV Continuous <Continuous>  dextrose 50% Injectable 25 Gram(s) IV Push once  dextrose 50% Injectable 12.5 Gram(s) IV Push once  dextrose 50% Injectable 25 Gram(s) IV Push once  enoxaparin Injectable 40 milliGRAM(s) SubCutaneous every 24 hours  escitalopram 10 milliGRAM(s) Oral daily  glucagon  Injectable 1 milliGRAM(s) IntraMuscular once  hydrALAZINE 50 milliGRAM(s) Oral <User Schedule>  lisinopril 40 milliGRAM(s) Oral <User Schedule>  melatonin 6 milliGRAM(s) Oral at bedtime  metFORMIN 500 milliGRAM(s) Oral two times a day with meals  nicotine -  14 mG/24Hr(s) Patch 1 Patch Transdermal daily  polyethylene glycol 3350 17 Gram(s) Oral <User Schedule>  senna 2 Tablet(s) Oral at bedtime  traZODone 25 milliGRAM(s) Oral at bedtime    MEDICATIONS  (PRN):  acetaminophen     Tablet .. 650 milliGRAM(s) Oral every 6 hours PRN Moderate Pain (4 - 6), Severe Pain (7 - 10)  bisacodyl Suppository 10 milliGRAM(s) Rectal daily PRN Constipation  dextrose Oral Gel 15 Gram(s) Oral once PRN Blood Glucose LESS THAN 70 milliGRAM(s)/deciliter    Vital Signs Last 24 Hrs  T(F): 97.4 (14 Aug 2023 09:23), Max: 98.1 (13 Aug 2023 20:40)  HR: 75 (14 Aug 2023 09:23) (71 - 75)  BP: 124/82 (14 Aug 2023 09:23) (124/82 - 145/91)  RR: 16 (14 Aug 2023 09:23) (16 - 16)  SpO2: 99% (14 Aug 2023 09:23) (97% - 99%)  I&O's Summary    PHYSICAL EXAM:  General: NAD, A/O x 3  ENT: MMM, no tonsilar exudate  Neck: Supple, No JVD  Lungs: Clear to auscultation bilaterally, no wheezes. Good air entry bilaterally   Cardio: RRR, S1/S2, No murmurs  Abdomen: Soft, Nontender, Nondistended; Bowel sounds present  Extremities: No calf tenderness, No pitting edema    LABS:                        14.2   7.39  )-----------( 216      ( 14 Aug 2023 08:50 )             41.4       08-14    142  |  105  |  24  ----------------------------<  167  4.5   |  27  |  1.24    Ca    9.6      14 Aug 2023 08:50    TPro  7.2  /  Alb  3.9  /  TBili  0.3  /  DBili  x   /  AST  19  /  ALT  28  /  AlkPhos  52  08-14     07-25 Chol 230 mg/dL LDL -- HDL 32 mg/dL Trig 240 mg/dL    Urinalysis Basic - ( 14 Aug 2023 08:50 )    Color: x / Appearance: x / SG: x / pH: x  Gluc: 167 mg/dL / Ketone: x  / Bili: x / Urobili: x   Blood: x / Protein: x / Nitrite: x   Leuk Esterase: x / RBC: x / WBC x   Sq Epi: x / Non Sq Epi: x / Bacteria: x    RADIOLOGY & ADDITIONAL TESTS:     Care Discussed with Consultants/Other Providers:

## 2023-08-15 NOTE — PROGRESS NOTE ADULT - ASSESSMENT
ASSESSMENT/PLAN  Patient  is a 42 YO man with a PMH of T2DM, HTN, previous CVA with minimal residual deficits, current smoker who presented to [facility] ED on 7/24 after two weeks of intermittent left lower extremity weakness with accompanying back pain,  and dysarthria. He was found to have acute infarct in left inferior adrienne and smaller acute infarcts in bilateral parieto-occipital juxtacortical . Hospital course s/f HTN, depression and suicidal ideation seen and cleared by psychologist. Patient now with gait Instability, ADL impairments and Functional impairments.    #CVA now with Dysarthria and RLE weakness due to acute infarct in left inferior adrienne and smaller acute infarcts in bilateral parieto-occipital juxtacortical. Mechanism large artery atherosclerosis  - Continue Comprehensive Rehab Program: PT/OT/ST, 3hours daily and 5 days weekly.   - Continue ASA 81mg and Plavix 75mg daily for 3 months, then ASA alone    #Mood/sleep  -Continue Lexapro 10mg daily for neurorecovery 8/2  -Continue trazodone 25mg at bedtime  - Depression with suicidal ideation while in acute hospital - now resolved   - Neuropsychology consult   - outpatient at [facility].    #HTN  - Carvedilol 25mg Q 12hrs  - Amlodipine 10mg daily  - Hydralazine 25mg BID  - Lisinopril 20mg daily  -monitor vital signs -stable     #HLD  - Lipitor 80mg daily    #DM II  -Stop ISS and FS checks as FS have been <180 - to monitor serum glucose   - A1c 7.0 on 7/25   -Continue metformin 500mg BID    #Pain management  - Tylenol PRN    #Smoker  -continue nicotine patch  - smoking cessation education    #DVT ppx  - Lovenox  - TEDs    #Bowel Regimen  - Senna  - miralax BID  -Enema PRN   -Dulcolax PRN  -having daily Bms      #FEN   - Diet: Regular with thins     #Skin:  - Skin on admission: grossly intact  - Pressure injury/Skin: Turn Q2hrs while in bed, OOB to Chair, PT/OT     #Precaution  - Fall, Aspiration, cardiac    TEAM MEETING 8/9/23  SW:  lives with wife in apartment with 3 lynda  OT: sv for adls, min for transfers  PT: CG-SV for transfers, 120' with RW and right ace wrap, 12 steps 2HR  SLP: dysarthria, hypophonia  barriers: dec coordination, dysarthria, dysmetria  EDOD: Home 8/18

## 2023-08-15 NOTE — PROGRESS NOTE ADULT - SUBJECTIVE AND OBJECTIVE BOX
Patient is a 43y old  Male who presents with a chief complaint of CVA (14 Aug 2023 15:46)    Patient seen and examined at bedside. No acute overnight events.     ALLERGIES:  No Known Allergies    MEDICATIONS  (STANDING):  amLODIPine   Tablet 10 milliGRAM(s) Oral <User Schedule>  aspirin enteric coated 81 milliGRAM(s) Oral daily  atorvastatin 80 milliGRAM(s) Oral at bedtime  carvedilol 25 milliGRAM(s) Oral <User Schedule>  clopidogrel Tablet 75 milliGRAM(s) Oral daily  dextrose 5%. 1000 milliLiter(s) (100 mL/Hr) IV Continuous <Continuous>  dextrose 5%. 1000 milliLiter(s) (50 mL/Hr) IV Continuous <Continuous>  dextrose 50% Injectable 25 Gram(s) IV Push once  dextrose 50% Injectable 12.5 Gram(s) IV Push once  dextrose 50% Injectable 25 Gram(s) IV Push once  enoxaparin Injectable 40 milliGRAM(s) SubCutaneous every 24 hours  escitalopram 10 milliGRAM(s) Oral daily  glucagon  Injectable 1 milliGRAM(s) IntraMuscular once  hydrALAZINE 50 milliGRAM(s) Oral <User Schedule>  lisinopril 40 milliGRAM(s) Oral <User Schedule>  melatonin 6 milliGRAM(s) Oral at bedtime  metFORMIN 500 milliGRAM(s) Oral two times a day with meals  nicotine -  14 mG/24Hr(s) Patch 1 Patch Transdermal daily  polyethylene glycol 3350 17 Gram(s) Oral <User Schedule>  senna 2 Tablet(s) Oral at bedtime  traZODone 25 milliGRAM(s) Oral at bedtime    MEDICATIONS  (PRN):  acetaminophen     Tablet .. 650 milliGRAM(s) Oral every 6 hours PRN Moderate Pain (4 - 6), Severe Pain (7 - 10)  bisacodyl Suppository 10 milliGRAM(s) Rectal daily PRN Constipation  dextrose Oral Gel 15 Gram(s) Oral once PRN Blood Glucose LESS THAN 70 milliGRAM(s)/deciliter    Vital Signs Last 24 Hrs  T(F): 98.6 (15 Aug 2023 07:58), Max: 98.6 (15 Aug 2023 07:58)  HR: 64 (15 Aug 2023 07:58) (64 - 73)  BP: 124/75 (15 Aug 2023 07:58) (124/75 - 148/83)  RR: 16 (15 Aug 2023 07:58) (16 - 16)  SpO2: 95% (15 Aug 2023 07:58) (95% - 98%)  I&O's Summary    PHYSICAL EXAM:  General: NAD, awake, alert. pleasant   ENT: MMM, no tonsilar exudate  Neck: Supple, No JVD  Lungs: Clear to auscultation bilaterally, no wheezes. Good air entry bilaterally   Cardio: RRR, S1/S2, No murmurs  Abdomen: Soft, Nontender, Nondistended; Bowel sounds present  Extremities: No calf tenderness, No pitting edema    LABS:                        14.2   7.39  )-----------( 216      ( 14 Aug 2023 08:50 )             41.4       08-14    142  |  105  |  24  ----------------------------<  167  4.5   |  27  |  1.24    Ca    9.6      14 Aug 2023 08:50    TPro  7.2  /  Alb  3.9  /  TBili  0.3  /  DBili  x   /  AST  19  /  ALT  28  /  AlkPhos  52  08-14     07-25 Chol 230 mg/dL LDL -- HDL 32 mg/dL Trig 240 mg/dL    Urinalysis Basic - ( 14 Aug 2023 08:50 )    Color: x / Appearance: x / SG: x / pH: x  Gluc: 167 mg/dL / Ketone: x  / Bili: x / Urobili: x   Blood: x / Protein: x / Nitrite: x   Leuk Esterase: x / RBC: x / WBC x   Sq Epi: x / Non Sq Epi: x / Bacteria: x    RADIOLOGY & ADDITIONAL TESTS:     Care Discussed with Consultants/Other Providers:

## 2023-08-15 NOTE — PROGRESS NOTE ADULT - ASSESSMENT
42 YO man with a PMH of T2DM, HTN, previous CVA with minimal residual deficits, current smoker who presented to [facility] ED on 7/24 after two weeks of intermittent left lower extremity weakness with accompanying back pain,  and dysarthria. He was found to have acute infarct in left inferior adrienne and smaller acute infarcts in bilateral parieto-occipital juxtacortical . Hospital course s/f HTN, depression and suicidal ideation seen and cleared by psychologist. Patient now with gait Instability, ADL impairments and Functional impairments.    #H/o CVA, infarct in left inferior adrienne and smaller acute infarcts in bilateral parieto-occipital juxtacortical. Mechanism large artery atherosclerosis  - ASA 81mg and Plavix 75mg daily for 3 months, then ASA alone  - Lipitor 80mg qhs    #HTN  - BP better controlled  - lisinopril 40mg qd, hydralazine 50mg BID, Carvedilol 25mg Q 12hrs, Amlodipine 10mg daily    #HLD  - Lipitor 80mg daily    #DM II  - ISS and FS  - A1c 7.0 on 7/25   - cont with metformin 500mg BID    DVT ppx Lovenox

## 2023-08-15 NOTE — PROGRESS NOTE ADULT - SUBJECTIVE AND OBJECTIVE BOX
SUBJECTIVE/ROS: He was evaluated while in chair this morning. He states he is feeling well today and has no new complaints. He denies chest pain, fever, chills, nausea, vomiting, abdominal pain, headache, or BLE pain.    HPI:  Patient  is a 42 YO man with a PMH of T2DM, HTN, previous CVA (per patient 8 years ago, treated with TPA at [facility], p/w L hemiplegia, facial droop and dysarthria with minimal residual deficits), current smoker who presented to [facility] ED on 7/24 after two weeks of intermittent left lower extremity weakness with accompanying back pain similar to patient's sciatica pain, and 25+ hours of dysarthria. He stated he had episode of similar dysarthria 2 weeks ago as well that improved.     On examination, patient has mild dysarthria, accompanied by wife who states that patient's speech is noticeably more slurred than his baseline. Says that his previous stroke 8 years ago presented similarly, but was much more severe (weakness + speech changes/aphasia).     Not a Tenecteplase candidate given out of time window. MRI Brain on 7/26 showed 1.1 cm acute infarct left inferior adrienne as well as smaller subcentimeter acute infarcts bilateral parieto-occipital juxtacortical white matter.    Impression:  Dysarthria and RLE weakness due to acute infarct in left inferior adrienne and smaller acute infarcts in bilateral parieto-occipital juxtacortical. Mechanism large artery atherosclerosis    Aspirin 81mg and Plavix 75mg daily for 3 months as per SAMMPRIS,  then Asa 81mg daily indefinitely     TTE: EF 58%, normal LA. , continue atorvastatin 80mg qhs,  titrate statin to LDL goal less than 70.  Cardiology consulted for blood pressure management, follow up outpatient to slowly taper from permissive hypertension to normotension (Lower SBP 10-20mmHg per week). Seen by psychology for depression/suicidality (now resolved), f/u outpatient at [facility].    Patient was evaluated by PM&R and therapy for functional deficits, gait/ADL impairments and acute rehabilitation was recommended. Patient was medically optimized for discharge to [facility] IRU on 7/27/23.      Vital Signs Last 24 Hrs  T(C): 37 (15 Aug 2023 07:58), Max: 37 (15 Aug 2023 07:58)  T(F): 98.6 (15 Aug 2023 07:58), Max: 98.6 (15 Aug 2023 07:58)  HR: 64 (15 Aug 2023 07:58) (64 - 73)  BP: 124/75 (15 Aug 2023 07:58) (124/75 - 148/83)  BP(mean): --  RR: 16 (15 Aug 2023 07:58) (16 - 16)  SpO2: 95% (15 Aug 2023 07:58) (95% - 98%)    Parameters below as of 15 Aug 2023 07:58  Patient On (Oxygen Delivery Method): room air        PHYSICAL EXAM  Constitutional - NAD, Comfortable  HEENT - NCAT, EOMI  Neck - Supple, No limited ROM  Chest - CTA bilaterally  Cardiovascular - RRR, S1S2  Abdomen -BS+, Soft, NTND  Extremities - No C/C/E, No calf tenderness   Neurologic Exam - aox3, dysarthria, RU/RL 4/5, DEBRA/LL 5/5        LABS:                          14.2   7.39  )-----------( 216      ( 14 Aug 2023 08:50 )             41.4     08-14    142  |  105  |  24<H>  ----------------------------<  167<H>  4.5   |  27  |  1.24    Ca    9.6      14 Aug 2023 08:50    TPro  7.2  /  Alb  3.9  /  TBili  0.3  /  DBili  x   /  AST  19  /  ALT  28  /  AlkPhos  52  08-14    LIVER FUNCTIONS - ( 14 Aug 2023 08:50 )  Alb: 3.9 g/dL / Pro: 7.2 g/dL / ALK PHOS: 52 U/L / ALT: 28 U/L / AST: 19 U/L / GGT: x             Urinalysis Basic - ( 14 Aug 2023 08:50 )    Color: x / Appearance: x / SG: x / pH: x  Gluc: 167 mg/dL / Ketone: x  / Bili: x / Urobili: x   Blood: x / Protein: x / Nitrite: x   Leuk Esterase: x / RBC: x / WBC x   Sq Epi: x / Non Sq Epi: x / Bacteria: x          MEDICATIONS  (STANDING):  amLODIPine   Tablet 10 milliGRAM(s) Oral <User Schedule>  aspirin enteric coated 81 milliGRAM(s) Oral daily  atorvastatin 80 milliGRAM(s) Oral at bedtime  carvedilol 25 milliGRAM(s) Oral <User Schedule>  clopidogrel Tablet 75 milliGRAM(s) Oral daily  dextrose 5%. 1000 milliLiter(s) (50 mL/Hr) IV Continuous <Continuous>  dextrose 5%. 1000 milliLiter(s) (100 mL/Hr) IV Continuous <Continuous>  dextrose 50% Injectable 25 Gram(s) IV Push once  dextrose 50% Injectable 12.5 Gram(s) IV Push once  dextrose 50% Injectable 25 Gram(s) IV Push once  enoxaparin Injectable 40 milliGRAM(s) SubCutaneous every 24 hours  escitalopram 10 milliGRAM(s) Oral daily  glucagon  Injectable 1 milliGRAM(s) IntraMuscular once  hydrALAZINE 50 milliGRAM(s) Oral <User Schedule>  lisinopril 40 milliGRAM(s) Oral <User Schedule>  melatonin 6 milliGRAM(s) Oral at bedtime  metFORMIN 500 milliGRAM(s) Oral two times a day with meals  nicotine -  14 mG/24Hr(s) Patch 1 Patch Transdermal daily  polyethylene glycol 3350 17 Gram(s) Oral <User Schedule>  senna 2 Tablet(s) Oral at bedtime  traZODone 25 milliGRAM(s) Oral at bedtime    MEDICATIONS  (PRN):  acetaminophen     Tablet .. 650 milliGRAM(s) Oral every 6 hours PRN Moderate Pain (4 - 6), Severe Pain (7 - 10)  bisacodyl Suppository 10 milliGRAM(s) Rectal daily PRN Constipation  dextrose Oral Gel 15 Gram(s) Oral once PRN Blood Glucose LESS THAN 70 milliGRAM(s)/deciliter

## 2023-08-16 NOTE — PROGRESS NOTE ADULT - NS ATTEND AMEND GEN_ALL_CORE FT
I have personally seen and examined the patient with the NP. Medical records reviewed. I have made amendments to the documentation where necessary and adjusted the history, physical examination, and plan as documented by the NP.    Multidisciplinary team meeting today:  patient's functional goals and needs, functional and clinical  progress were discussed, barriers to discharge were identified. Anticipate discharge home with home care, 24/7 supervision for safety.    EDOD 8/18/23    37 min spent

## 2023-08-16 NOTE — PROGRESS NOTE ADULT - SUBJECTIVE AND OBJECTIVE BOX
SUBJECTIVE/ROS: He was evaluated while in chair this morning.  No events overnight. He denies chest pain, fever, chills, nausea, vomiting, abdominal pain, headache, or BLE pain.    HPI:  Patient  is a 42 YO man with a PMH of T2DM, HTN, previous CVA (per patient 8 years ago, treated with TPA at [facility], p/w L hemiplegia, facial droop and dysarthria with minimal residual deficits), current smoker who presented to [facility] ED on 7/24 after two weeks of intermittent left lower extremity weakness with accompanying back pain similar to patient's sciatica pain, and 25+ hours of dysarthria. He stated he had episode of similar dysarthria 2 weeks ago as well that improved.     On examination, patient has mild dysarthria, accompanied by wife who states that patient's speech is noticeably more slurred than his baseline. Says that his previous stroke 8 years ago presented similarly, but was much more severe (weakness + speech changes/aphasia).     Not a Tenecteplase candidate given out of time window. MRI Brain on 7/26 showed 1.1 cm acute infarct left inferior adrienne as well as smaller subcentimeter acute infarcts bilateral parieto-occipital juxtacortical white matter.    Impression:  Dysarthria and RLE weakness due to acute infarct in left inferior adrienne and smaller acute infarcts in bilateral parieto-occipital juxtacortical. Mechanism large artery atherosclerosis    Aspirin 81mg and Plavix 75mg daily for 3 months as per SAMMPRIS,  then Asa 81mg daily indefinitely     TTE: EF 58%, normal LA. , continue atorvastatin 80mg qhs,  titrate statin to LDL goal less than 70.  Cardiology consulted for blood pressure management, follow up outpatient to slowly taper from permissive hypertension to normotension (Lower SBP 10-20mmHg per week). Seen by psychology for depression/suicidality (now resolved), f/u outpatient at [facility].    Patient was evaluated by PM&R and therapy for functional deficits, gait/ADL impairments and acute rehabilitation was recommended. Patient was medically optimized for discharge to [facility] IRU on 7/27/23.      Vital Signs Last 24 Hrs  T(C): 36.8 (16 Aug 2023 08:15), Max: 36.8 (16 Aug 2023 08:15)  T(F): 98.2 (16 Aug 2023 08:15), Max: 98.2 (16 Aug 2023 08:15)  HR: 62 (16 Aug 2023 08:15) (62 - 75)  BP: 139/84 (16 Aug 2023 08:15) (137/94 - 139/84)  BP(mean): --  RR: 16 (16 Aug 2023 08:15) (16 - 16)  SpO2: 98% (16 Aug 2023 08:15) (98% - 98%)    Parameters below as of 16 Aug 2023 08:15  Patient On (Oxygen Delivery Method): room air          PHYSICAL EXAM  Constitutional - NAD, Comfortable  HEENT - NCAT, EOMI  Neck - Supple, No limited ROM  Chest - CTA bilaterally  Cardiovascular - RRR, S1S2  Abdomen -BS+, Soft, NTND  Extremities - No C/C/E, No calf tenderness   Neurologic Exam - aox3, dysarthria, RU/RL 4/5, DEBRA/LL 5/5        LABS:                          14.2   7.39  )-----------( 216      ( 14 Aug 2023 08:50 )             41.4     08-14    142  |  105  |  24<H>  ----------------------------<  167<H>  4.5   |  27  |  1.24    Ca    9.6      14 Aug 2023 08:50    TPro  7.2  /  Alb  3.9  /  TBili  0.3  /  DBili  x   /  AST  19  /  ALT  28  /  AlkPhos  52  08-14    LIVER FUNCTIONS - ( 14 Aug 2023 08:50 )  Alb: 3.9 g/dL / Pro: 7.2 g/dL / ALK PHOS: 52 U/L / ALT: 28 U/L / AST: 19 U/L / GGT: x             Urinalysis Basic - ( 14 Aug 2023 08:50 )    Color: x / Appearance: x / SG: x / pH: x  Gluc: 167 mg/dL / Ketone: x  / Bili: x / Urobili: x   Blood: x / Protein: x / Nitrite: x   Leuk Esterase: x / RBC: x / WBC x   Sq Epi: x / Non Sq Epi: x / Bacteria: x          MEDICATIONS  (STANDING):  amLODIPine   Tablet 10 milliGRAM(s) Oral <User Schedule>  aspirin enteric coated 81 milliGRAM(s) Oral daily  atorvastatin 80 milliGRAM(s) Oral at bedtime  carvedilol 25 milliGRAM(s) Oral <User Schedule>  clopidogrel Tablet 75 milliGRAM(s) Oral daily  dextrose 5%. 1000 milliLiter(s) (50 mL/Hr) IV Continuous <Continuous>  dextrose 5%. 1000 milliLiter(s) (100 mL/Hr) IV Continuous <Continuous>  dextrose 50% Injectable 25 Gram(s) IV Push once  dextrose 50% Injectable 12.5 Gram(s) IV Push once  dextrose 50% Injectable 25 Gram(s) IV Push once  enoxaparin Injectable 40 milliGRAM(s) SubCutaneous every 24 hours  escitalopram 10 milliGRAM(s) Oral daily  glucagon  Injectable 1 milliGRAM(s) IntraMuscular once  hydrALAZINE 50 milliGRAM(s) Oral <User Schedule>  lisinopril 40 milliGRAM(s) Oral <User Schedule>  melatonin 6 milliGRAM(s) Oral at bedtime  metFORMIN 500 milliGRAM(s) Oral two times a day with meals  nicotine -  14 mG/24Hr(s) Patch 1 Patch Transdermal daily  polyethylene glycol 3350 17 Gram(s) Oral <User Schedule>  senna 2 Tablet(s) Oral at bedtime  traZODone 25 milliGRAM(s) Oral at bedtime    MEDICATIONS  (PRN):  acetaminophen     Tablet .. 650 milliGRAM(s) Oral every 6 hours PRN Moderate Pain (4 - 6), Severe Pain (7 - 10)  bisacodyl Suppository 10 milliGRAM(s) Rectal daily PRN Constipation  dextrose Oral Gel 15 Gram(s) Oral once PRN Blood Glucose LESS THAN 70 milliGRAM(s)/deciliter

## 2023-08-16 NOTE — PROGRESS NOTE ADULT - ASSESSMENT
ASSESSMENT/PLAN  Patient  is a 44 YO man with a PMH of T2DM, HTN, previous CVA with minimal residual deficits, current smoker who presented to [facility] ED on 7/24 after two weeks of intermittent left lower extremity weakness with accompanying back pain,  and dysarthria. He was found to have acute infarct in left inferior adrienne and smaller acute infarcts in bilateral parieto-occipital juxtacortical . Hospital course s/f HTN, depression and suicidal ideation seen and cleared by psychologist. Patient now with gait Instability, ADL impairments and Functional impairments.    #CVA now with Dysarthria and RLE weakness due to acute infarct in left inferior adrienne and smaller acute infarcts in bilateral parieto-occipital juxtacortical. Mechanism large artery atherosclerosis  - Continue Comprehensive Rehab Program: PT/OT/ST, 3hours daily and 5 days weekly.   - Continue ASA 81mg and Plavix 75mg daily for 3 months, then ASA alone    #Mood/sleep  -Continue Lexapro 10mg daily for neurorecovery 8/2  -Continue trazodone 25mg at bedtime  - Depression with suicidal ideation while in acute hospital - now resolved   - Neuropsychology consult   - outpatient at [facility].    #HTN  - Carvedilol 25mg Q 12hrs  - Amlodipine 10mg daily  - Hydralazine 25mg BID  - Lisinopril 20mg daily  -monitor vital signs -stable     #HLD  - Lipitor 80mg daily    #DM II  -Stop ISS and FS checks as FS have been <180 - to monitor serum glucose   - A1c 7.0 on 7/25   -Continue metformin 500mg BID    #Pain management  - Tylenol PRN    #Smoker  -continue nicotine patch  - smoking cessation education    #DVT ppx  - Lovenox  - TEDs    #Bowel Regimen  - Senna  - miralax BID  -Enema PRN   -Dulcolax PRN  -having daily Bms      #FEN   - Diet: Regular with thins     #Skin:  - Skin on admission: grossly intact  - Pressure injury/Skin: Turn Q2hrs while in bed, OOB to Chair, PT/OT     #Precaution  - Fall, Aspiration, cardiac    TEAM MEETING 8/16/23  SW:  lives with wife in apartment with 3 lynda  OT: sv for adls, min for transfers  PT: sV for transfers, 120' with RW and right ace wrap, 12 steps 2HR  SLP: dysarthria, hypophonia  barriers: dec coordination, dysarthria, dysmetria  EDOD: Home 8/18

## 2023-08-17 NOTE — DISCHARGE NOTE PROVIDER - NSDCMRMEDTOKEN_GEN_ALL_CORE_FT
acetaminophen 325 mg oral tablet: 2 tab(s) orally every 6 hours As needed Moderate Pain (4 - 6), Severe Pain (7 - 10)  amLODIPine 10 mg oral tablet: 1 tab(s) orally once a day  aspirin 81 mg oral delayed release tablet: 1 tab(s) orally once a day  atorvastatin 80 mg oral tablet: 1 tab(s) orally once a day (at bedtime)  bisacodyl 10 mg rectal suppository: 1 suppository(ies) rectal once a day As needed Constipation  clopidogrel 75 mg oral tablet: 1 tab(s) orally once a day  Coreg 25 mg oral tablet: 1 tab(s) orally 2 times a day  escitalopram 10 mg oral tablet: 1 tab(s) orally once a day  hydrALAZINE 50 mg oral tablet: 1 tab(s) orally 2 times a day  lisinopril 40 mg oral tablet: 1 tab(s) orally once a day  melatonin 3 mg oral tablet: 2 tab(s) orally once a day (at bedtime)  metFORMIN 500 mg oral tablet: 1 tab(s) orally 2 times a day (with meals)  nicotine 14 mg/24 hr transdermal film, extended release: 1 patch transdermal once a day  polyethylene glycol 3350 oral powder for reconstitution: 17 gram(s) orally 2 times a day  senna leaf extract oral tablet: 2 tab(s) orally once a day (at bedtime)  traZODone 50 mg oral tablet: 25 milligram(s) orally once a day (at bedtime)

## 2023-08-17 NOTE — DISCHARGE NOTE NURSING/CASE MANAGEMENT/SOCIAL WORK - PATIENT PORTAL LINK FT
You can access the FollowMyHealth Patient Portal offered by [facility] by registering at the following website: [email or web address]. By joining [facility]’s FollowMyHealth portal, you will also be able to view your health information using other applications (apps) compatible with our system.

## 2023-08-17 NOTE — DISCHARGE NOTE PROVIDER - NSFOLLOWUPCLINICS_GEN_ALL_ED_FT
[facility]  Psychiatry  [address] [place] [address]  Phone: [phone]  Fax:

## 2023-08-17 NOTE — DISCHARGE NOTE NURSING/CASE MANAGEMENT/SOCIAL WORK - NSDCPEWEB_GEN_ALL_CORE
[facility] for Tobacco Control website --- [email or web address] website --- [email or web address]

## 2023-08-17 NOTE — DISCHARGE NOTE PROVIDER - PROVIDER TOKENS
PROVIDER:[TOKEN:[[id number]],FOLLOWUP:[1 week]],PROVIDER:[TOKEN:[[id number]],FOLLOWUP:[2 weeks]]

## 2023-08-17 NOTE — PROGRESS NOTE ADULT - ASSESSMENT
ASSESSMENT/PLAN  Patient  is a 44 YO man with a PMH of T2DM, HTN, previous CVA with minimal residual deficits, current smoker who presented to [facility] ED on 7/24 after two weeks of intermittent left lower extremity weakness with accompanying back pain,  and dysarthria. He was found to have acute infarct in left inferior adrienne and smaller acute infarcts in bilateral parieto-occipital juxtacortical . Hospital course s/f HTN, depression and suicidal ideation seen and cleared by psychologist. Patient now with gait Instability, ADL impairments and Functional impairments.    #CVA now with Dysarthria and RLE weakness due to acute infarct in left inferior adrienne and smaller acute infarcts in bilateral parieto-occipital juxtacortical. Mechanism large artery atherosclerosis  - Continue Comprehensive Rehab Program: PT/OT/ST, 3hours daily and 5 days weekly.   - Continue ASA 81mg and Plavix 75mg daily for 3 months, then ASA alone    #Mood/sleep  -Continue Lexapro 10mg daily for neurorecovery 8/2  -Continue trazodone 25mg at bedtime  - Depression with suicidal ideation while in acute hospital - now resolved   - Neuropsychology consult   - outpatient at [facility].    #HTN  - Carvedilol 25mg Q 12hrs  - Amlodipine 10mg daily  - Hydralazine 25mg BID  - Lisinopril 20mg daily  -monitor vital signs -stable     #HLD  - Lipitor 80mg daily    #DM II  -Stop ISS and FS checks as FS have been <180 - to monitor serum glucose   - A1c 7.0 on 7/25   -Continue metformin 500mg BID    #Pain management  - Tylenol PRN    #Smoker  -continue nicotine patch  - smoking cessation education    #DVT ppx  - Lovenox  - TEDs    #Bowel Regimen  - Senna  - miralax BID  -Enema PRN   -Dulcolax PRN  -having daily Bms      #FEN   - Diet: Regular with thins     #Skin:  - Skin on admission: grossly intact  - Pressure injury/Skin: Turn Q2hrs while in bed, OOB to Chair, PT/OT     #Precaution  - Fall, Aspiration, cardiac    TEAM MEETING 8/16/23  SW:  lives with wife in apartment with 3 lynda  OT: sv for adls, min for transfers  PT: sV for transfers, 120' with RW and right ace wrap, 12 steps 2HR  SLP: dysarthria, hypophonia  barriers: dec coordination, dysarthria, dysmetria  EDOD: Home 8/18

## 2023-08-17 NOTE — DISCHARGE NOTE PROVIDER - NSDCCPCAREPLAN_GEN_ALL_CORE_FT
PRINCIPAL DISCHARGE DIAGNOSIS  Diagnosis: Acute cerebrovascular accident (CVA)  Assessment and Plan of Treatment: - Continue ASA 81mg and Plavix 75mg daily for 3 months, then ASA alone  -Follow up with neurologist before making adjustments        SECONDARY DISCHARGE DIAGNOSES  Diagnosis: Hypertension  Assessment and Plan of Treatment: - Carvedilol 25mg Q 12hrs  - Amlodipine 10mg daily  - Hydralazine 25mg BID  - Lisinopril 20mg daily  -Follow up with PMD for further management    Diagnosis: Cigarette smoker  Assessment and Plan of Treatment: -continue nicotine patch  - smoking cessation education provided       Diagnosis: Constipation  Assessment and Plan of Treatment: - Senna at bedtime   - miralax BID      Diagnosis: Mood disorder  Assessment and Plan of Treatment: -Continue Lexapro 10mg daily for neurorecovery   -Continue trazodone 25mg at bedtime for sleep  - outpatient at [facility].      Diagnosis: Hyperlipidemia  Assessment and Plan of Treatment: - Lipitor 80mg daily    Diagnosis: Diabetes mellitus, type 2  Assessment and Plan of Treatment: - A1c 7.0 on 7/25   -Continue metformin 500mg BID  -Follow up with PMD for further management

## 2023-08-17 NOTE — DISCHARGE NOTE PROVIDER - HOSPITAL COURSE
HPI:  Patient  is a 44 YO man with a PMH of T2DM, HTN, previous CVA (per patient 8 years ago, treated with TPA at [facility], p/w L hemiplegia, facial droop and dysarthria with minimal residual deficits), current smoker who presented to [facility] ED on 7/24 after two weeks of intermittent left lower extremity weakness with accompanying back pain similar to patient's sciatica pain, and 25+ hours of dysarthria. He stated he had episode of similar dysarthria 2 weeks ago as well that improved.     On examination, patient has mild dysarthria, accompanied by wife who states that patient's speech is noticeably more slurred than his baseline. Says that his previous stroke 8 years ago presented similarly, but was much more severe (weakness + speech changes/aphasia).     Not a Tenecteplase candidate given out of time window. MRI Brain on 7/26 showed 1.1 cm acute infarct left inferior adrienne as well as smaller subcentimeter acute infarcts bilateral parieto-occipital juxtacortical white matter.    Impression:  Dysarthria and RLE weakness due to acute infarct in left inferior adrienne and smaller acute infarcts in bilateral parieto-occipital juxtacortical. Mechanism large artery atherosclerosis    Aspirin 81mg and Plavix 75mg daily for 3 months as per UMAIR,  then Asa 81mg daily indefinitely     TTE: EF 58%, normal LA. , continue atorvastatin 80mg qhs,  titrate statin to LDL goal less than 70.  Cardiology consulted for blood pressure management, follow up outpatient to slowly taper from permissive hypertension to normotension (Lower SBP 10-20mmHg per week). Seen by psychology for depression/suicidality (now resolved), f/u outpatient at [facility] Adult Out[facility] Psychiat[facility].    Patient was evaluated by PM&R and therapy for functional deficits, gait/ADL impairments and acute rehabilitation was recommended. Patient was medically optimized for discharge to [facility] IRU on 7/27/23.      Rehab course significant for improvement in dysarthria and mobility. All other medical co-morbidites were stable.    Pt tolerated course of inpatient pt/ot/slp rehab with significant functional improvements and met rehab goals prior to discharge.     Pt was medically cleared on 8/18/23 for discharge to Home with family.

## 2023-08-17 NOTE — DISCHARGE NOTE PROVIDER - CARE PROVIDER_API CALL
Lakisha Hemphill  NP in Family Health  [address], [address]  [place] [address]  Phone: [phone]  Fax: [phone]  Follow Up Time: 1 week    Talib Dsouza  [address], [address]  [place] [address]  Phone: [phone]  Fax: [phone]  Follow Up Time: 2 weeks

## 2023-08-17 NOTE — DISCHARGE NOTE NURSING/CASE MANAGEMENT/SOCIAL WORK - NSDCPEEMAIL_GEN_ALL_CORE
[facility] for Tobacco Control email [email or web address]

## 2023-08-17 NOTE — PROGRESS NOTE ADULT - SUBJECTIVE AND OBJECTIVE BOX
SUBJECTIVE/ROS: He was evaluated while in chair this morning and again in therapy. He states he is feeling well. He denies chest pain, fever, chills, nausea, vomiting, abdominal pain, headache, or BLE pain.    HPI:  Patient  is a 42 YO man with a PMH of T2DM, HTN, previous CVA (per patient 8 years ago, treated with TPA at [facility], p/w L hemiplegia, facial droop and dysarthria with minimal residual deficits), current smoker who presented to [facility] ED on 7/24 after two weeks of intermittent left lower extremity weakness with accompanying back pain similar to patient's sciatica pain, and 25+ hours of dysarthria. He stated he had episode of similar dysarthria 2 weeks ago as well that improved.     On examination, patient has mild dysarthria, accompanied by wife who states that patient's speech is noticeably more slurred than his baseline. Says that his previous stroke 8 years ago presented similarly, but was much more severe (weakness + speech changes/aphasia).     Not a Tenecteplase candidate given out of time window. MRI Brain on 7/26 showed 1.1 cm acute infarct left inferior adrienne as well as smaller subcentimeter acute infarcts bilateral parieto-occipital juxtacortical white matter.    Impression:  Dysarthria and RLE weakness due to acute infarct in left inferior adrienne and smaller acute infarcts in bilateral parieto-occipital juxtacortical. Mechanism large artery atherosclerosis    Aspirin 81mg and Plavix 75mg daily for 3 months as per SAMMPRIS,  then Asa 81mg daily indefinitely     TTE: EF 58%, normal LA. , continue atorvastatin 80mg qhs,  titrate statin to LDL goal less than 70.  Cardiology consulted for blood pressure management, follow up outpatient to slowly taper from permissive hypertension to normotension (Lower SBP 10-20mmHg per week). Seen by psychology for depression/suicidality (now resolved), f/u outpatient at [facility].    Patient was evaluated by PM&R and therapy for functional deficits, gait/ADL impairments and acute rehabilitation was recommended. Patient was medically optimized for discharge to [facility] IRU on 7/27/23.      Vital Signs Last 24 Hrs  T(C): 37.1 (16 Aug 2023 20:37), Max: 37.1 (16 Aug 2023 20:37)  T(F): 98.7 (16 Aug 2023 20:37), Max: 98.7 (16 Aug 2023 20:37)  HR: 69 (17 Aug 2023 00:05) (69 - 69)  BP: 143/88 (16 Aug 2023 20:37) (143/88 - 143/88)  BP(mean): --  RR: 16 (16 Aug 2023 20:37) (16 - 16)  SpO2: 98% (17 Aug 2023 00:05) (98% - 98%)    Parameters below as of 16 Aug 2023 20:37  Patient On (Oxygen Delivery Method): room air          PHYSICAL EXAM  Constitutional - NAD, Comfortable  HEENT - NCAT, EOMI  Neck - Supple, No limited ROM  Chest - CTA bilaterally  Cardiovascular - RRR, S1S2  Abdomen -BS+, Soft, NTND  Extremities - No C/C/E, No calf tenderness   Neurologic Exam - aox3, dysarthria improving, RU/RL 4/5, DEBRA/LL 5/5        LABS:                          14.2   6.68  )-----------( 222      ( 17 Aug 2023 09:00 )             41.8     08-17    143  |  105  |  19  ----------------------------<  156<H>  4.7   |  29  |  1.17    Ca    9.5      17 Aug 2023 09:00    TPro  7.0  /  Alb  3.8  /  TBili  0.5  /  DBili  x   /  AST  17  /  ALT  33  /  AlkPhos  46  08-17    LIVER FUNCTIONS - ( 17 Aug 2023 09:00 )  Alb: 3.8 g/dL / Pro: 7.0 g/dL / ALK PHOS: 46 U/L / ALT: 33 U/L / AST: 17 U/L / GGT: x             Urinalysis Basic - ( 17 Aug 2023 09:00 )    Color: x / Appearance: x / SG: x / pH: x  Gluc: 156 mg/dL / Ketone: x  / Bili: x / Urobili: x   Blood: x / Protein: x / Nitrite: x   Leuk Esterase: x / RBC: x / WBC x   Sq Epi: x / Non Sq Epi: x / Bacteria: x              MEDICATIONS  (STANDING):  amLODIPine   Tablet 10 milliGRAM(s) Oral <User Schedule>  aspirin enteric coated 81 milliGRAM(s) Oral daily  atorvastatin 80 milliGRAM(s) Oral at bedtime  carvedilol 25 milliGRAM(s) Oral <User Schedule>  clopidogrel Tablet 75 milliGRAM(s) Oral daily  dextrose 5%. 1000 milliLiter(s) (100 mL/Hr) IV Continuous <Continuous>  dextrose 5%. 1000 milliLiter(s) (50 mL/Hr) IV Continuous <Continuous>  dextrose 50% Injectable 25 Gram(s) IV Push once  dextrose 50% Injectable 12.5 Gram(s) IV Push once  dextrose 50% Injectable 25 Gram(s) IV Push once  enoxaparin Injectable 40 milliGRAM(s) SubCutaneous every 24 hours  escitalopram 10 milliGRAM(s) Oral daily  glucagon  Injectable 1 milliGRAM(s) IntraMuscular once  hydrALAZINE 50 milliGRAM(s) Oral <User Schedule>  lisinopril 40 milliGRAM(s) Oral <User Schedule>  melatonin 6 milliGRAM(s) Oral at bedtime  metFORMIN 500 milliGRAM(s) Oral two times a day with meals  nicotine -  14 mG/24Hr(s) Patch 1 Patch Transdermal daily  polyethylene glycol 3350 17 Gram(s) Oral <User Schedule>  senna 2 Tablet(s) Oral at bedtime  traZODone 25 milliGRAM(s) Oral at bedtime    MEDICATIONS  (PRN):  acetaminophen     Tablet .. 650 milliGRAM(s) Oral every 6 hours PRN Moderate Pain (4 - 6), Severe Pain (7 - 10)  bisacodyl Suppository 10 milliGRAM(s) Rectal daily PRN Constipation  dextrose Oral Gel 15 Gram(s) Oral once PRN Blood Glucose LESS THAN 70 milliGRAM(s)/deciliter

## 2023-08-17 NOTE — DISCHARGE NOTE PROVIDER - NSDCFUSCHEDAPPT_GEN_ALL_CORE_FT
Lakisha Hemphill  [facility]  NEUROLOGY [address]  Scheduled Appointment: 08/21/2023    [facility]  INTMED [address]   Scheduled Appointment: 08/28/2023

## 2023-08-17 NOTE — DISCHARGE NOTE NURSING/CASE MANAGEMENT/SOCIAL WORK - NSDCPEFALRISK_GEN_ALL_CORE
For information on Fall & Injury Prevention, visit: [email or web address] OR  [email or web address] OR  https://www.cdc.gov/steadi/patient.html

## 2023-08-17 NOTE — DISCHARGE NOTE NURSING/CASE MANAGEMENT/SOCIAL WORK - NSDCFUADDAPPT_GEN_ALL_CORE_FT
Transitions of [place] Friday Aug 25, 2023  ST-10:50  PT-11:50  OT-1:20  [address] [place] [address]  Tel: [phone]

## 2023-08-18 NOTE — PROGRESS NOTE ADULT - NS ATTEND BILL GEN_ALL_CORE
HISTORY OF PRESENT ILLNESS  Isaiah Zapien is a 52 y.o. male. HPI   Here for CPE and fu CVA 2020 with ataxia (left cerebellar infarct), HTN HLD DM-2 uncontrolled and systolic CHF ef 38-50% with LVH  Had recent labs a1c 10.4 LDL 75 hdl 23  Alk phos 134  No balance difficulties since CVA last year    Cgm--mostly 150 or less until last month per pt > BS went up on abx for left ankle cellulitis and after covid shot per pt  No change in diet  Had cataract sx and eye exam this year-no retinopathy per pt    Has numbness in feet  No cp or osb    Last OV     C/o abrasion to left lateral ankle > 10 d ago while working on his car in garage. Scraped ankle. Noted some clear drainage yesterday and mild erythema around the scab  No f/c     --aJnelle Womack MD on 10/12/2021 at 3:08 PM     An electronic signature was used to authenticate this note. Last OV    Seeing pharm D to help with DM-2 management and insulin titration  Recent a1c down to 8.6 last month down from 10.5 a few months ago     Last     Patient Active Problem List    Diagnosis Date Noted    Ataxia 12/22/2020    Diabetes mellitus ([address] 10/01/2010    HTN (hypertension) 10/01/2010    Pure hypercholesterolemia 10/01/2010    Achalasia 10/01/2010     Current Outpatient Medications   Medication Sig Dispense Refill    atorvastatin (LIPITOR) 20 mg tablet Take 1 Tablet by mouth daily. 90 Tablet 2    insulin glargine U-300 conc (Toujeo SoloStar U-300 Insulin) 300 unit/mL (1.5 mL) inpn pen 58 Units by SubCUTAneous route daily. Please dispense 3 boxes. Indications: type 2 diabetes mellitus 13.5 mL 2    flash glucose sensor (FreeStyle Alyx 14 Day Sensor) kit 1 Each by Does Not Apply route See Golden Garcia. Apply and replace sensor every 2 weeks. Scan at least 3 times a day. E11.65 6 Kit 11    metoprolol succinate (TOPROL-XL) 25 mg XL tablet Take 1 Tablet by mouth daily.  90 Tablet 1    Insulin Needles, Disposable, 32 gauge x 5/32\" ndle 1 Pen Needle by
SubCUTAneous route See Admin Instructions. Use to give insulin 5 times daily. Please dispense 4 mm length. E11.65 500 Pen Needle 11    insulin lispro (HumaLOG KwikPen Insulin) 100 unit/mL kwikpen 14 units with breakfast, 14 units with lunch, and 18 units with dinner  Indications: type 2 diabetes mellitus 45 mL 2    aspirin 81 mg chewable tablet Take 81 mg by mouth daily.  amLODIPine (NORVASC) 10 mg tablet Take 1 Tab by mouth daily.  90 Tab 3    lancets misc Check fsbs bid (Patient not taking: Reported on 12/1/2021) 200 Each 11     Allergies   Allergen Reactions    Lisinopril Anaphylaxis     Angioedema      Lab Results   Component Value Date/Time    WBC 11.3 (H) 11/12/2021 08:48 AM    HGB 16.2 11/12/2021 08:48 AM    HCT 47.8 11/12/2021 08:48 AM    PLATELET 179 07/63/7543 08:48 AM    MCV 93 11/12/2021 08:48 AM     Lab Results   Component Value Date/Time    Hemoglobin A1c 10.4 (H) 11/12/2021 08:48 AM    Hemoglobin A1c 10.5 (H) 12/23/2020 02:38 AM    Hemoglobin A1c 10.1 (H) 11/07/2019 03:59 PM    Glucose 329 (H) 11/12/2021 08:48 AM    Glucose (POC) 199 (H) 12/23/2020 09:10 PM    Microalb/Creat ratio (ug/mg creat.) 13 11/12/2021 08:48 AM    LDL, calculated 75 11/12/2021 08:48 AM    LDL, calculated 121.6 (H) 12/23/2020 02:38 AM    Creatinine 0.96 11/12/2021 08:48 AM      Lab Results   Component Value Date/Time    Cholesterol, total 143 11/12/2021 08:48 AM    HDL Cholesterol 23 (L) 11/12/2021 08:48 AM    LDL, calculated 75 11/12/2021 08:48 AM    LDL, calculated 121.6 (H) 12/23/2020 02:38 AM    Triglyceride 274 (H) 11/12/2021 08:48 AM    CHOL/HDL Ratio 8.5 (H) 12/23/2020 02:38 AM     Lab Results   Component Value Date/Time    GFR est non-AA 89 11/12/2021 08:48 AM    GFR est  11/12/2021 08:48 AM    Creatinine 0.96 11/12/2021 08:48 AM    BUN 14 11/12/2021 08:48 AM    Sodium 137 11/12/2021 08:48 AM    Potassium 4.4 11/12/2021 08:48 AM    Chloride 100 11/12/2021 08:48 AM    CO2 22 11/12/2021 08:48 AM    Magnesium
2.2 01/29/2019 02:48 AM    Phosphorus 2.7 01/29/2019 02:48 AM     Lab Results   Component Value Date/Time    Prostate Specific Ag 0.3 11/12/2021 08:48 AM    Prostate Specific Ag 0.3 05/30/2019 04:34 PM     Lab Results   Component Value Date/Time    TSH 1.32 12/23/2020 02:38 AM      Lab Results   Component Value Date/Time    Glucose 329 (H) 11/12/2021 08:48 AM    Glucose (POC) 199 (H) 12/23/2020 09:10 PM         Review of Systems   Constitutional: Negative for chills, fever, malaise/fatigue and weight loss. HENT: Negative. Eyes: Negative for blurred vision and double vision. Respiratory: Negative for cough and shortness of breath. Cardiovascular: Negative for chest pain and palpitations. Gastrointestinal: Negative for abdominal pain, blood in stool, constipation, diarrhea, melena, nausea and vomiting. Genitourinary: Negative for dysuria, frequency, hematuria and urgency. Musculoskeletal: Negative for back pain, falls, joint pain and myalgias. Skin: Negative. Neurological: Negative for dizziness, tremors and headaches. Numbness of feet       Physical Exam  Vitals and nursing note reviewed. Constitutional:       General: He is not in acute distress. Appearance: He is well-developed. Comments: Appears stated age   HENT:      Head: Normocephalic. Mouth/Throat:      Mouth: Mucous membranes are moist.   Eyes:      Pupils: Pupils are equal, round, and reactive to light. Cardiovascular:      Rate and Rhythm: Normal rate and regular rhythm. Pulses: Normal pulses. Heart sounds: No murmur heard. No friction rub. No gallop. Pulmonary:      Effort: Pulmonary effort is normal.      Breath sounds: Normal breath sounds. Abdominal:      Palpations: Abdomen is soft. Skin:     General: Skin is warm. Neurological:      General: No focal deficit present. Mental Status: He is alert.       Comments:      Diabetic foot exam performed by Caprice Santana MD       Measurement
Response Nurse Comment Physician Comment  Monofilament  R - absent sensation with micro filament  L - absent sensation with micro filament    Pulse DP R - 1+ (weak)  L - 1+ (weak)    Pulse TP R - 1+ (weak)  L - 1+ (weak)    Structural deformity R - None  L - None    Skin Integrity / Deformity R - None  L - None       Reviewed by:         Psychiatric:         Mood and Affect: Mood normal.         Behavior: Behavior normal.         Thought Content: Thought content normal.         Judgment: Judgment normal.         ASSESSMENT and PLAN  Diagnoses and all orders for this visit:    1. Routine general medical examination at a health care facility  -     OCCULT BLOOD IMMUNOASSAY,DIAGNOSTIC; Future   TDap today   Recommended shingrix  2. Type 2 diabetes mellitus with other specified complication, with long-term current use of insulin (HCC)   Uncontrolled-CGM readings worse in last month per pt   Fu Pharm D tomorrow   Consider trial of add on GLP1   3. Dyslipidemia   Exercise recommended to raise the HDL  4. Hypertension, unspecified type   controlled  5. History of CVA (cerebrovascular accident)   Aspirin dialy  6. Encounter for immunization  -     TETANUS, DIPHTHERIA TOXOIDS AND ACELLULAR PERTUSSIS VACCINE (TDAP), IN INDIVIDS. >=7, IM    7. Diabetic polyneuropathy associated with type 2 diabetes mellitus ([facility] [address]  -     REFERRAL TO PODIATRY      Follow-up and Dispositions    · Return in about 6 months (around 6/1/2022) for dm-2 htn hld hx cva.
Attending to bill

## 2023-08-18 NOTE — PROGRESS NOTE ADULT - SUBJECTIVE AND OBJECTIVE BOX
SUBJECTIVE/ROS: He was evaluated while in chair this morning and again in therapy. He denies chest pain, fever, chills, nausea, vomiting, abdominal pain, headache, or BLE pain. He is medically stable and ready for discharge home. All questions answered and instructions reviewed.     HPI:  Patient  is a 42 YO man with a PMH of T2DM, HTN, previous CVA (per patient 8 years ago, treated with TPA at [facility], p/w L hemiplegia, facial droop and dysarthria with minimal residual deficits), current smoker who presented to [facility] ED on 7/24 after two weeks of intermittent left lower extremity weakness with accompanying back pain similar to patient's sciatica pain, and 25+ hours of dysarthria. He stated he had episode of similar dysarthria 2 weeks ago as well that improved.     On examination, patient has mild dysarthria, accompanied by wife who states that patient's speech is noticeably more slurred than his baseline. Says that his previous stroke 8 years ago presented similarly, but was much more severe (weakness + speech changes/aphasia).     Not a Tenecteplase candidate given out of time window. MRI Brain on 7/26 showed 1.1 cm acute infarct left inferior adrienne as well as smaller subcentimeter acute infarcts bilateral parieto-occipital juxtacortical white matter.    Impression:  Dysarthria and RLE weakness due to acute infarct in left inferior adrienne and smaller acute infarcts in bilateral parieto-occipital juxtacortical. Mechanism large artery atherosclerosis    Aspirin 81mg and Plavix 75mg daily for 3 months as per SAMMPRIS,  then Asa 81mg daily indefinitely     TTE: EF 58%, normal LA. , continue atorvastatin 80mg qhs,  titrate statin to LDL goal less than 70.  Cardiology consulted for blood pressure management, follow up outpatient to slowly taper from permissive hypertension to normotension (Lower SBP 10-20mmHg per week). Seen by psychology for depression/suicidality (now resolved), f/u outpatient at [facility].    Patient was evaluated by PM&R and therapy for functional deficits, gait/ADL impairments and acute rehabilitation was recommended. Patient was medically optimized for discharge to [facility] IRU on 7/27/23.      Vital Signs Last 24 Hrs  T(C): 36.4 (18 Aug 2023 09:01), Max: 36.8 (17 Aug 2023 20:11)  T(F): 97.6 (18 Aug 2023 09:01), Max: 98.2 (17 Aug 2023 20:11)  HR: 61 (18 Aug 2023 09:01) (61 - 77)  BP: 128/74 (18 Aug 2023 09:01) (128/74 - 147/81)  BP(mean): --  RR: 16 (18 Aug 2023 09:01) (16 - 16)  SpO2: 96% (18 Aug 2023 09:01) (96% - 96%)    Parameters below as of 18 Aug 2023 09:01  Patient On (Oxygen Delivery Method): room air              PHYSICAL EXAM  Constitutional - NAD, Comfortable  HEENT - NCAT, EOMI  Neck - Supple, No limited ROM  Chest - CTA bilaterally  Cardiovascular - RRR, S1S2  Abdomen -BS+, Soft, NTND  Extremities - No C/C/E, No calf tenderness   Neurologic Exam - aox3, dysarthria improving, RU/RL 4/5, DEBRA/LL 5/5        LABS:                          14.2   6.68  )-----------( 222      ( 17 Aug 2023 09:00 )             41.8     08-17    143  |  105  |  19  ----------------------------<  156<H>  4.7   |  29  |  1.17    Ca    9.5      17 Aug 2023 09:00    TPro  7.0  /  Alb  3.8  /  TBili  0.5  /  DBili  x   /  AST  17  /  ALT  33  /  AlkPhos  46  08-17    LIVER FUNCTIONS - ( 17 Aug 2023 09:00 )  Alb: 3.8 g/dL / Pro: 7.0 g/dL / ALK PHOS: 46 U/L / ALT: 33 U/L / AST: 17 U/L / GGT: x             Urinalysis Basic - ( 17 Aug 2023 09:00 )    Color: x / Appearance: x / SG: x / pH: x  Gluc: 156 mg/dL / Ketone: x  / Bili: x / Urobili: x   Blood: x / Protein: x / Nitrite: x   Leuk Esterase: x / RBC: x / WBC x   Sq Epi: x / Non Sq Epi: x / Bacteria: x          MEDICATIONS  (STANDING):  amLODIPine   Tablet 10 milliGRAM(s) Oral <User Schedule>  aspirin enteric coated 81 milliGRAM(s) Oral daily  atorvastatin 80 milliGRAM(s) Oral at bedtime  carvedilol 25 milliGRAM(s) Oral <User Schedule>  clopidogrel Tablet 75 milliGRAM(s) Oral daily  dextrose 5%. 1000 milliLiter(s) (100 mL/Hr) IV Continuous <Continuous>  dextrose 5%. 1000 milliLiter(s) (50 mL/Hr) IV Continuous <Continuous>  dextrose 50% Injectable 25 Gram(s) IV Push once  dextrose 50% Injectable 12.5 Gram(s) IV Push once  dextrose 50% Injectable 25 Gram(s) IV Push once  enoxaparin Injectable 40 milliGRAM(s) SubCutaneous every 24 hours  escitalopram 10 milliGRAM(s) Oral daily  glucagon  Injectable 1 milliGRAM(s) IntraMuscular once  hydrALAZINE 50 milliGRAM(s) Oral <User Schedule>  lisinopril 40 milliGRAM(s) Oral <User Schedule>  melatonin 6 milliGRAM(s) Oral at bedtime  metFORMIN 500 milliGRAM(s) Oral two times a day with meals  nicotine -  14 mG/24Hr(s) Patch 1 Patch Transdermal daily  polyethylene glycol 3350 17 Gram(s) Oral <User Schedule>  senna 2 Tablet(s) Oral at bedtime  traZODone 25 milliGRAM(s) Oral at bedtime    MEDICATIONS  (PRN):  acetaminophen     Tablet .. 650 milliGRAM(s) Oral every 6 hours PRN Moderate Pain (4 - 6), Severe Pain (7 - 10)  bisacodyl Suppository 10 milliGRAM(s) Rectal daily PRN Constipation  dextrose Oral Gel 15 Gram(s) Oral once PRN Blood Glucose LESS THAN 70 milliGRAM(s)/deciliter

## 2023-08-18 NOTE — PROGRESS NOTE ADULT - PROVIDER SPECIALTY LIST ADULT
Hospitalist
Hospitalist
Neuro Rehabilitation
Hospitalist
Neuro Rehabilitation
Rehab Medicine
Hospitalist
Neuro Rehabilitation
Rehab Medicine
Rehab Medicine
Hospitalist
Neuro Rehabilitation
Rehab Medicine

## 2023-08-18 NOTE — PROGRESS NOTE ADULT - ASSESSMENT
ASSESSMENT/PLAN  Patient  is a 44 YO man with a PMH of T2DM, HTN, previous CVA with minimal residual deficits, current smoker who presented to [facility] ED on 7/24 after two weeks of intermittent left lower extremity weakness with accompanying back pain,  and dysarthria. He was found to have acute infarct in left inferior adrienne and smaller acute infarcts in bilateral parieto-occipital juxtacortical . Hospital course s/f HTN, depression and suicidal ideation seen and cleared by psychologist. Patient now with gait Instability, ADL impairments and Functional impairments.    #CVA now with Dysarthria and RLE weakness due to acute infarct in left inferior adrienne and smaller acute infarcts in bilateral parieto-occipital juxtacortical. Mechanism large artery atherosclerosis  - Continue ASA 81mg and Plavix 75mg daily for 3 months, then ASA alone    #Mood/sleep  -Continue Lexapro 10mg daily for neurorecovery 8/2  -Continue trazodone 25mg at bedtime  - outpatient at [facility].    #HTN  - Carvedilol 25mg Q 12hrs  - Amlodipine 10mg daily  - Hydralazine 25mg BID  - Lisinopril 20mg daily    #HLD  - Lipitor 80mg daily    #Type 2 DM   - A1c 7.0 on 7/25   -Continue metformin 500mg BID    #Pain management  - Tylenol PRN    #Smoker  -continue nicotine patch  - smoking cessation education    #DVT ppx  - Lovenox  - TEDs    #Bowel Regimen  - Senna  - miralax BID  -Enema PRN     TEAM MEETING 8/16/23  SW:  lives with wife in apartment with 3 lyn[address]  OT: sv for adls, min for transfers  PT: sV for transfers, 120' with RW and right ace wrap, 12 steps 2HR  SLP: dysarthria, hypophonia  barriers: dec coordination, dysarthria, dysmetria  EDOD: Home 8/18    MEDICALLY STABLE AND READY FOR DISCHARGE HOME WITH FAMILY.

## 2023-08-18 NOTE — PROGRESS NOTE ADULT - ASSESSMENT
44 yo man with a PMH of T2DM, HTN, previous CVA with minimal residual deficits, current smoker who presented to [facility] ED on 7/24 after two weeks of intermittent left lower extremity weakness with accompanying back pain,  and dysarthria. He was found to have acute infarct in left inferior adrienne and smaller acute infarcts in bilateral parieto-occipital juxtacortical . Hospital course s/f HTN, depression and suicidal ideation seen and cleared by psychologist. Patient now with gait Instability, ADL impairments and Functional impairments.    #H/o CVA, infarct in left inferior adrienne and smaller acute infarcts in bilateral parieto-occipital juxtacortical. Mechanism large artery atherosclerosis  - ASA 81mg and Plavix 75mg daily for 3 months, then ASA alone  - Lipitor 80mg qhs    #HTN  - BP better controlled  - lisinopril 40mg qd, hydralazine 50mg BID, Carvedilol 25mg Q12hrs, Amlodipine 10mg daily  - Outpatient follow up with PCP for reassessment and further management, patient advised to check with his insurance to find a PCP that is in-network/takes his insurance    #HLD  - Lipitor 80mg daily    #DM II  - ISS and FS  - A1c 7.0 on 7/25   - continue with metformin 500mg BID    #DVT ppx  - Lovenox

## 2023-08-18 NOTE — PROGRESS NOTE ADULT - REASON FOR ADMISSION
CVA

## 2023-08-18 NOTE — PROGRESS NOTE ADULT - NS ATTEND OPT1 GEN_ALL_CORE
I attest my time as attending is greater than 50% of the total combined time spent on qualifying patient care activities by the PA/NP and attending.
I independently performed the documented:

## 2023-08-18 NOTE — PROGRESS NOTE ADULT - NS ATTEND AMEND GEN_ALL_CORE FT
Patient is being discharged home with home care today.  Discharge instructions were discussed with patient and family, all current medications were sent to the pharmacy. Patient and family were educated on importance of medication compliance,  continued  care with PMD and follow-up care with the specialists in the community. Safety and fall risk precautions  were discussed in detail, counseled on healthy life style modifications.  All questions were answered to their satisfaction.    40 min spent

## 2023-08-18 NOTE — PROGRESS NOTE ADULT - SUBJECTIVE AND OBJECTIVE BOX
Patient is a 43y old  Male who presents with a chief complaint of CVA (18 Aug 2023 11:19)    Patient seen and examined at bedside. No acute overnight events.     ALLERGIES:  No Known Allergies    MEDICATIONS  (STANDING):  amLODIPine   Tablet 10 milliGRAM(s) Oral <User Schedule>  aspirin enteric coated 81 milliGRAM(s) Oral daily  atorvastatin 80 milliGRAM(s) Oral at bedtime  carvedilol 25 milliGRAM(s) Oral <User Schedule>  clopidogrel Tablet 75 milliGRAM(s) Oral daily  dextrose 5%. 1000 milliLiter(s) (100 mL/Hr) IV Continuous <Continuous>  dextrose 5%. 1000 milliLiter(s) (50 mL/Hr) IV Continuous <Continuous>  dextrose 50% Injectable 25 Gram(s) IV Push once  dextrose 50% Injectable 12.5 Gram(s) IV Push once  dextrose 50% Injectable 25 Gram(s) IV Push once  enoxaparin Injectable 40 milliGRAM(s) SubCutaneous every 24 hours  escitalopram 10 milliGRAM(s) Oral daily  glucagon  Injectable 1 milliGRAM(s) IntraMuscular once  hydrALAZINE 50 milliGRAM(s) Oral <User Schedule>  lisinopril 40 milliGRAM(s) Oral <User Schedule>  melatonin 6 milliGRAM(s) Oral at bedtime  metFORMIN 500 milliGRAM(s) Oral two times a day with meals  nicotine -  14 mG/24Hr(s) Patch 1 Patch Transdermal daily  polyethylene glycol 3350 17 Gram(s) Oral <User Schedule>  senna 2 Tablet(s) Oral at bedtime  traZODone 25 milliGRAM(s) Oral at bedtime    MEDICATIONS  (PRN):  acetaminophen     Tablet .. 650 milliGRAM(s) Oral every 6 hours PRN Moderate Pain (4 - 6), Severe Pain (7 - 10)  bisacodyl Suppository 10 milliGRAM(s) Rectal daily PRN Constipation  dextrose Oral Gel 15 Gram(s) Oral once PRN Blood Glucose LESS THAN 70 milliGRAM(s)/deciliter    Vital Signs Last 24 Hrs  T(F): 97.6 (18 Aug 2023 09:01), Max: 98.2 (17 Aug 2023 20:11)  HR: 61 (18 Aug 2023 09:01) (61 - 77)  BP: 128/74 (18 Aug 2023 09:01) (128/74 - 147/81)  RR: 16 (18 Aug 2023 09:01) (16 - 16)  SpO2: 96% (18 Aug 2023 09:01) (96% - 96%)      PHYSICAL EXAM:  General: NAD, awake, alert  ENT: Moist mucous membranes  Neck: Supple, no JVD  Lungs: Clear to auscultation bilaterally, no wheezes  Cardio: S1 S2, regular rate and rhythm  Abdomen: Soft, nontender, nondistended, bowel sounds present  Extremities: No calf tenderness, no pitting edema    LABS:                        14.2   6.68  )-----------( 222      ( 17 Aug 2023 09:00 )             41.8       08-17    143  |  105  |  19  ----------------------------<  156  4.7   |  29  |  1.17    Ca    9.5      17 Aug 2023 09:00    TPro  7.0  /  Alb  3.8  /  TBili  0.5  /  DBili  x   /  AST  17  /  ALT  33  /  AlkPhos  46  08-17         07-25 Chol 230 mg/dL LDL -- HDL 32 mg/dL Trig 240 mg/dL      RADIOLOGY & ADDITIONAL TESTS:     Care Discussed with Consultants/Other Providers: PM&R

## 2023-08-21 PROBLEM — Z87.891 FORMER SMOKER: Status: ACTIVE | Noted: 2023-01-01

## 2023-08-21 NOTE — DISCUSSION/SUMMARY
[Antithrombotic therapy with ___] : antithrombotic therapy with  [unfilled] [Intensive Blood Pressure Control] : intensive blood pressure control [Lipid Lowering Therapy] : lipid lowering therapy [Patient encouraged to discuss with Primary MD] : I encouraged the patient to discuss these important issues with ~his/her~ primary care doctor [Goals and Counseling] : I have reviewed the goals of stroke risk factor modification. I counseled the patient on measures to reduce stroke risk, including the importance of medication compliance, risk factor control, exercise, healthy diet and avoidance of smoking. I reviewed stroke warning signs and symptoms and appropriate actions to take if such occur. [FreeTextEntry1] : Patient ROB OSHEA is a 43y (1980) man with a PMHx significant for T2DM, HTN, previous CVA (per patient 8 years ago, treated w/ TPA at [facility], p/w L hemiplegia, facial droop and dysarthria w/ minimal residual deficits), current smoker who presented to [facility] ED after two weeks of intermittent left lower extremity weakness with accompanying back pain similar to patient's sciatica pain and 25+ hours of dysarthria. He stated he had episode of similar dysarthria 2 weeks ago as well that improved.   CTA There is occlusion of the intradural right vertebral artery segment and high-grade stenosis involving the intradural left vertebral artery segment. Posterior circulation appears intact distally. MRI head: Acute left inferior adrienne and smaller acute infarcts in bilateral parieto-occipital juxtacortical infarcts. Bilateral subcentimeter chronic lacunar infarcts with pontine  Impression: Dysarthria and RLE weakness due to acute infarct in left inferior adrienne and smaller acute infarcts in bilateral parieto-occipital juxtacortical. Mechanism large artery atherosclerosis  ** I will discuss with stroke team if they recommend any further imaging studies or medication management   Overall patient is neurologically stable. Continue ASA 81 mg once daily and Plavix 75 mg once daily for 3 weeks followed by ASA 81 mg once daily for aggressive secondary stroke reduction. Continue to follow up with PCP/cardiology for BP and statin management (goal LDL <70) as well as all routine primary care needs.  HbA1c 7.0 Follow up with cardiology for evaluation and management of BP and holter monitor. Continue with PT/OT/Speech therapy. Discussed importance of CPAP compliance and sleep medicine referral provided. We discussed aggressive vascular strict risk factor control. We discussed importance of smoking cessation. Contact numbers provided for psych, cardiology and sleep medicine. Discussed the use of lexapro (prescribed in hospital) and advised that he cannot discontinue it abruptly.   Follow up with me in 1-2 months and stroke MD at next available. Transcranial and Carotid Doppler's to be obtained at next appointment. Patient and family were educated on signs and symptoms of stroke and to contact 911 immediately if experiencing any.

## 2023-08-21 NOTE — HISTORY OF PRESENT ILLNESS
[FreeTextEntry1] : Mr. Oshea is a 43 year old male PMHx significant for T2DM, HTN, previous CVA (per patient 8 years ago, treated w/ TPA at [facility] (in [place]) who presents today for post hospitalization follow up after a left inferior adrienne and smaller acute infarcts in bilateral parieto-occipital juxtacortical infarcts on 7/24/2023  HPI: Patient ROB OSHEA is a 43y p/w L hemiplegia, facial droop and dysarthria w/ minimal residual deficits), current smoker who presented to [facility] ED after two weeks of intermittent left lower extremity weakness with accompanying back pain similar to patient's sciatica pain, and 25+ hours of dysarthria. He stated he had episode of similar dysarthria 2 weeks ago as well that improved.  On examination, patient has mild dysarthria, accompanied by wife who states that patient's speech is noticeably more slurred than his baseline. Says that his previous stroke 8 years ago presented similarly, but was much more severe (weakness + speech changes/aphasia). Patient denies any recent fevers/chills, dysuria/hematuria, signs of infection, travel, sick contacts or trauma.   MRI head: Acute left inferior adrienne and smaller acute infarcts in bilateral parieto-occipital juxtacortical infarcts. Bilateral subcentimeter chronic lacunar infarcts with pontine CTA There is occlusion of the intradural right vertebral artery segment and high-grade stenosis involving the intradural left vertebral artery segment. Posterior circulation appears intact distally.  He notes that 8 years ago he had a stroke due to his risk factors with no residual deficits. He was prescribed ASA at time of that stroke but notes he stopped it a few years ago after "feeling good". He was a smoker at time of this event and not compliant with his meds. His wife has since started preparing his meds to help with compliance and he has quit smoking. He remains with some dysarthria and fatigue and uses a walker for stability. He is an electrical salesman and out on disability.

## 2023-08-21 NOTE — REVIEW OF SYSTEMS
[Anxiety] : anxiety [Depression] : depression [As Noted in HPI] : as noted in HPI [Negative] : Heme/Lymph [Suicidal] : not suicidal

## 2023-08-21 NOTE — PHYSICAL EXAM
[FreeTextEntry1] : General: No acute distress HEENT: EOM intact, visual fields full Abdomen: Soft, nontender, nondistended  Extremities: No edema  NEUROLOGICAL EXAM: Mental status: Awake, alert, oriented x3, hypophonic, repetition intact, no neglect, normal memory, follows simple and complex commands Cranial Nerves: No facial asymmetry, no nystagmus, mild dysarthria with gurgled speech, tongue midline Motor exam: Normal tone, RUE and RLE drifts 4/5, LUE 5/5, LLE 5/5 Reflexes: Hyperreflexia on the left Sensation: Intact to light touch  Coordination/ Gait: No dysmetria, GIOVANNI intact and symmetric bilaterally

## 2023-08-28 PROBLEM — Z00.00 ENCOUNTER FOR PREVENTIVE HEALTH EXAMINATION: Status: ACTIVE | Noted: 2023-01-01

## 2023-08-31 NOTE — ASSESSMENT
[FreeTextEntry1] : 44 y/o M with DM, HTN, HLD, former smoker, and multiple CVAs (2015 and summer 2023) presenting to establish care.  # DM - 7/2023 A1c 7.0  - c/w metformin 500mg BID - endo referral  # HTN; well controlled - c/w current regimen amlodipine 10mg, lisinopril 40mg, and hydralazine 50mg BID  # HLD - 7/2023  - c/w high intensity statin  # Smoking - no longer smoking, on nicotine patch - counselled on importance of continued cessation of smoking to decrease risk of further complications including MI, CVA  # Multiple CVAs - following with neuro - c/w DAPT, and aggressive BP and lipid control - cardiac w/u pending  # HCM - recent CBC, CMP unremarkable - f/u in 1 month for HCM needs

## 2023-08-31 NOTE — HISTORY OF PRESENT ILLNESS
[de-identified] : 44 y/o M with DM, HTN, HLD, former smoker, and multiple CVAs (2015 and summer 2023) presenting to [facility] care. Pt recently discharged from acute rehab after hospitalization for multiple strokes a few weeks ago. Pt currently off cigarettes and using nicotine patch. Confirms med compliance. Has f/u with neuro and referral for cardiology. Still with dysarthria, R sided weakness, and difficulty with balance requiring walker/cane. Otherwise feeling ok with mood controlled on prescribed Lexapro.

## 2023-08-31 NOTE — PHYSICAL EXAM
[No Acute Distress] : no acute distress [Normal Sclera/Conjunctiva] : normal sclera/conjunctiva [Normal Outer Ear/Nose] : the outer ears and nose were normal in appearance [No Respiratory Distress] : no respiratory distress  [No Accessory Muscle Use] : no accessory muscle use [Clear to Auscultation] : lungs were clear to auscultation bilaterally [Normal Rate] : normal rate  [Regular Rhythm] : with a regular rhythm [Soft] : abdomen soft [Non Tender] : non-tender [No Rash] : no rash [Normal Affect] : the affect was normal [Normal Insight/Judgement] : insight and judgment were intact [de-identified] : using walker, R sided mild weakness

## 2023-08-31 NOTE — REVIEW OF SYSTEMS
[Unsteady Walk] : ataxia [Negative] : Heme/Lymph [FreeTextEntry4] : dysarthria  [FreeTextEntry9] : as noted in HPI [de-identified] : as noted in HPI

## 2023-09-13 PROBLEM — Z86.79 HISTORY OF ESSENTIAL HYPERTENSION: Status: RESOLVED | Noted: 2023-01-01 | Resolved: 2023-01-01

## 2023-09-13 PROBLEM — Z86.39 HISTORY OF TYPE 2 DIABETES MELLITUS: Status: RESOLVED | Noted: 2023-01-01 | Resolved: 2023-01-01

## 2023-09-13 PROBLEM — Z86.73 HISTORY OF CEREBROVASCULAR ACCIDENT: Status: RESOLVED | Noted: 2023-01-01 | Resolved: 2023-01-01

## 2023-10-02 NOTE — PROGRESS NOTE ADULT - NS_MD_PANP_GEN_ALL_CORE

## 2023-11-16 NOTE — H&P ADULT - HISTORY OF PRESENT ILLNESS
Patient ROB OSHEA is a 43y (1980) *** handed wo/man who presented to *** ED on ***, with c/o ***.    PMH significant for: ***    Review of Systems:  INCOMPLETE       Vitals:  T(C): 36.8 (11-16-23 @ 20:15), Max: 37.1 (11-16-23 @ 20:00)  HR: 75 (11-16-23 @ 20:15) (75 - 79)  BP: 153/94 (11-16-23 @ 20:15) (122/89 - 158/92)  RR: 23 (11-16-23 @ 20:15) (18 - 24)  SpO2: 100% (11-16-23 @ 20:15) (97% - 100%)    Physical Examination: INCOMPLETE  General - Sitting up on ED cart  Cardiovascular - No LE edema  Eyes - Fundoscopy not performed due to safety precautions in the setting of infection risk, non-injected conjunctiva, anicteric sclera    Neurologic Exam:  Mental status:  Awake, Alert; Oriented to: person, place, and time; Speech: fluent; Repetition and naming: intact; Follows simple and complex commands; Attention/concentration: intact; Recent and remote memory (including registration and recall): registration intact, 3/3 on 3-word recall; Fund of knowledge: intact    Cranial nerves - PERRL, VFF on confrontational testing, EOMI - no nystagmus, face sensation (V1-V3) intact b/l, facial strength intact without asymmetry b/l, hearing intact b/l with finger rub test, palate with symmetric elevation, trapezius 5/5 strength b/l, tongue midline on protrusion with full lateral movement    Motor - Normal bulk and tone throughout. No pronator drift.  Strength testing (R/L)  Deltoid:  5/5   Biceps:  5/5   Triceps:  5/5   Wrist Extension:  5/5   Wrist Flexion:  5/5   Interossei:  5/5   :  5/5  Hip Flexion:  5/5   Hip Extension:  5/5   Knee Flexion:  5/5   Knee Extension:  5/5   Dorsiflexion:  5/5   Plantar Flexion:  5/5    Sensation - Light touch/vibration intact throughout    DTRs (R/L)  Biceps:  2+/2+   Triceps:  2+/2+   Brachioradialis:  2+/2+   Patellar:  2+/2+   Ankle:  2+/2+   Toes/plantar response:  Down/Down    Coordination - Finger to Nose intact b/l. No tremors appreciated.    Gait and station - Did not assess d/t fall risk/safety concerns.    Labs:                        15.6   9.87  )-----------( 236      ( 16 Nov 2023 19:21 )             45.3     11-16    141  |  104  |  27<H>  ----------------------------<  128<H>  4.5   |  22  |  1.17    Ca    9.4      16 Nov 2023 19:21    TPro  7.4  /  Alb  4.8  /  TBili  0.7  /  DBili  x   /  AST  20  /  ALT  17  /  AlkPhos  57  11-16    CAPILLARY BLOOD GLUCOSE  114 (16 Nov 2023 19:31)      POCT Blood Glucose.: 114 mg/dL (16 Nov 2023 19:12)    LIVER FUNCTIONS - ( 16 Nov 2023 19:21 )  Alb: 4.8 g/dL / Pro: 7.4 g/dL / ALK PHOS: 57 U/L / ALT: 17 U/L / AST: 20 U/L / GGT: x             PT/INR - ( 16 Nov 2023 19:21 )   PT: 11.0 sec;   INR: 1.05 ratio         PTT - ( 16 Nov 2023 19:21 )  PTT:31.0 sec  CSF:                  Radiology:     Patient ROB OSHEA is a 43y (1980) RIGHT handed man who presented to [facility] ED on 11/16/2023, as a CODE STROKE, with c/o difficulty walking, LLE weakness, speech changes.    PMH significant for: stroke (7/2023 - presenting with dysarthria - left inferior adrienne, b/l parieto-occipital juxtacortical WM w/mild residual dysarthria; 8 years ago - treated with TPA at [facility] - p/w L hemiplegia, facial droop and dysarthria), T2DM, HTN, former smoker (quit in 7/2023)    Presented as a CODE STROKE at 19:12. Neurology arrived promptly to evaluate patient. Per wife, patient having acute onset slurred speech (worse from baseline) and inability to stand/walk. This began at 17:00, 11/16/2023. On exam: patient has a L facial droop, ataxia in LUE (dysmetria with FTN), and struggles to perform HTS b/l. Patient also with pronounced slurred speech (moderate but is intelligible). Patient was rushed to the CT scanner on the first floor of the hospital d/t CT scanner in ED being used. Underwent NCCTH, which revealed no hemorrhage, but was initially read by Radiology as showing a nonacute left occipital infarct not seen on the CTH performed 7/26/2023. This infarct was not c/w patient's exam. Patient was emergently rushed back to ED for vital signs and weight. These were unable to be obtained on the first floor. After risk/benefit discussion with patient and wife - tenecteplase was administered at 11/16/2023, 19:47. DTN time was delayed d/t patient needing to be taken up to first floor CT scanner from ED (ED scanner in use), needed to return to ED after scans for vitals including BP and weight.    30 minutes after administration, NIHSS 4->2 (mild left facial but improved and mild dysarthria). Patient reports voice is much better. Performs FTN without issue with LUE. Performs HTS b/l. L NLF flattening significantly improved. Unable to walk patient d/t safety concerns s/p tenecteplase (on bed rest).    Patient currently takes ASA 81mg daily every day. Was originally on DAPT (ASA + clopidogrel) after his last stroke in 7/2023, but saw Dr. Guzman in 11/2/2023, who recommended patient switch over to aspirin monotherapy.  Patient reports that he takes all of his medications every day including his aspirin. Patient and wife report after patient's last stroke, he went to inpatient rehab. He still has slurred speech but it was "90% better." Walks with a cane, but is otherwise independent.  is able to climb stairs with the assistance of his cane.  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 Went to work last week. On Sunday (11/12/2023) - patient was feeling ill. Constipated, vomited. Had some room spinning dizziness. Has been sleeping a lot and has been diaphoretic. Symptoms of slurred speech and gait imbalance came on suddenly today at 11/16/2023. Wife says it was a "night and day difference," which triggered her to rush patient to the ED.     Patient reports that he quit smoking after his stroke in July 2023.  Patient was using a nicotine patch for short while, but he is no longer smoking or using a nicotine patch.  Patient denies any alcohol use.  Patient smokes 1 joint of marijuana per night.  Patient currently lives with his wife and daughter.    Patient denies fevers, chills, visual changes, neck pain, chest pain, palpitations, shortness of breath, abdominal pain, emesis.  Patient reports that he was feeling nauseated earlier.

## 2023-11-16 NOTE — ED ADULT NURSE NOTE - NSFALLUNIVINTERV_ED_ALL_ED
Bed/Stretcher in lowest position, wheels locked, appropriate side rails in place/Call bell, personal items and telephone in reach/Instruct patient to call for assistance before getting out of bed/chair/stretcher/Non-slip footwear applied when patient is off stretcher/[place] to call system/Physically safe environment - no spills, clutter or unnecessary equipment/Purposeful proactive rounding/Room/bathroom lighting operational, light cord in reach

## 2023-11-16 NOTE — ED PROVIDER NOTE - PHYSICAL EXAMINATION
GENERAL: Awake. Alert. NAD. Well nourished.  HEENT: NC/AT, PERRL, EOMI, vertical nystagmus and horizontal nystagmus noted. Conjunctiva pink, no scleral icterus. Airway patent.   LUNGS: CTAB. No wheezes or rales noted.  CARDIAC: RRR.  ABDOMEN: Soft, NT, ND, no rebound, no guarding.  EXT: No edema, no calf tenderness, distal pulses 2+ bilaterally  NEURO: A&Ox3. Moving all extremities. Sensation and strength intact throughout. L sided facial droop. No pronator drift. Unsteady gait.  SKIN: Warm and dry.   PSYCH: Normal affect.

## 2023-11-16 NOTE — H&P ADULT - ATTENDING COMMENTS
I reviewed available diagnostic studies, and reviewed images personally. I agree with resident's history, exam, orders placed, and plan of care. Medical issues needing to be addressed include: Cerebral ischemia/infarction, hx of stroke, dysarthria, L hemiparesis, facial droop, DM2, HTN, former smoker who quit in 7/2023 s/p counseling). s/p tenecteplase NIHSS 4->2. Severe disease of posterior circ involving vert, basilar and associated ischemia. MRI MRA nova pending. DAPT, high dose statin. pending angio +/- intervention- to be discussed.  worsened dysarthria and some walking difficulty at this time that is worse than baseline per pt. SBP goal 120-180. Service provided on 11/17/2023.

## 2023-11-16 NOTE — PATIENT PROFILE ADULT - FALL HARM RISK - HARM RISK INTERVENTIONS

## 2023-11-16 NOTE — STROKE CODE NOTE - NSSTROKETPADOOR_OTHER_FT
Patient needing to be taken up to first floor CT scanner from ED (ED scanner in use), needed to return to ED after scans for vitals including BP and weight

## 2023-11-16 NOTE — PATIENT PROFILE ADULT - FALL HARM RISK - FALL HARM RISK
Pre Op Diagnosis: left breast abscess, patient with active hidradenitis infection  Post Op Diagnosis: Same  Operative Procedure: I&D of left breast abscess 1:00 to 2:00   Attending Physician: Dr. Becerril   Anesthesia: 1% Lidocaine with epinepherine  Indication: This is a 27 yo F with h/o hidradenitis presenting with active drainage of hidradenitis in both breasts and left breast abscess.   Details and risks and alternatives were explained to the patient including but not limited to infection and bleeding. The patient agreed to proceed and consent is in the chart.  Operative Procedure:  The patientï¿½s left extremity was elevated above her head. UThe area was prepped in the usual sterile fashion and lidocaine was injected superficially and deep. The abscess was unroofed 8 cc of purulent was drained. Culture was obtained and sent.  The abscess cavity was copiously irrigated.   Hemostasis was achieved with gentle pressure, and a sterile dressing placed over the incision. The patient tolerated the procedure  well and all needles were disposed of at the end of the case.  Specimen:I&D culture   Complications: None  Estimated blood loss: minimal  Agree with proceure report   Michelle Becerril MD  Breast Surgery   [phone]  
Coagulation/Other

## 2023-11-16 NOTE — H&P ADULT - NSHPPHYSICALEXAM_GEN_ALL_CORE
Vitals:  T(C): 36.8 (11-16-23 @ 20:15), Max: 37.1 (11-16-23 @ 20:00)  HR: 75 (11-16-23 @ 20:15) (75 - 79)  BP: 153/94 (11-16-23 @ 20:15) (122/89 - 158/92)  RR: 23 (11-16-23 @ 20:15) (18 - 24)  SpO2: 100% (11-16-23 @ 20:15) (97% - 100%)    Physical Examination:  General - Sitting up on ED cart, NAD, appears older than stated age  Cardiovascular - No LE edema  Eyes - Mildly injected conjunctiva, anicteric sclera    Neurologic Exam:  Mental status:  Awake, Alert; Oriented to: person, place. Knows it is November 2023 but not exact date; Speech: moderate dysarthria; Repetition and naming: intact; Follows simple and complex commands; Attention/concentration: can spell WORLD forward but not backward; Recent and remote memory (including registration and recall): JULIETA; Fund of knowledge: intact    Cranial nerves - R pupil>L, both reactive, VFF on confrontational testing, EOM - inability to bury the sclera of the R eye with rightward gaze, L upper eyelid ptosis, face sensation (V1-V3) intact b/l, facial strength - L facial droop, hearing intact b/l with finger rub test, palate with symmetric elevation, trapezius 5/5 strength b/l, tongue midline on protrusion with full lateral movement    Motor - Normal bulk and tone throughout. No pronator drift.  Strength testing (R/L)  Deltoid:  5/5   Biceps:  5/5   Triceps:  5/5   Wrist Extension:  5/5   Wrist Flexion:  5/5   Interossei:  5/5   :  5/5  Hip Flexion:  5/5   Hip Extension:  5/5   Knee Flexion:  5/5   Knee Extension:  5/5   Dorsiflexion:  5/5   Plantar Flexion:  5/5    Sensation - Light touch intact throughout    DTRs (R/L)  3+ upper/lower  b/l sustained ankle clonus    Coordination - Finger to Nose, Heel to Shin intact b/l. No tremors appreciated.    Gait and station - Patient unable to stand with holding onto the sides of transport chair during CODE STROKE. Did not assess d/t fall risk/safety concerns. Patient is s/p tenecteplase.

## 2023-11-16 NOTE — H&P ADULT - ASSESSMENT
ROB OSHEA is a 43y RIGHT handed man who presented to [facility] ED on 11/16/2023, as a CODE STROKE, with c/o difficulty walking, LLE weakness, speech changes. PMH significant for: stroke (7/2023 - presenting with dysarthria - left inferior adrienne, b/l parieto-occipital juxtacortical WM w/mild residual dysarthria; 8 years ago - treated with TPA at [facility] - p/w L hemiplegia, facial droop and dysarthria), T2DM, HTN, former smoker (quit in 7/2023). Symptoms beginning at 17:00, 11/16/2023. Noted at the code to have a L facial, LUE ataxia, b/l LE ataxia, slurred speech. No appreciable drift in any of the extremities. Underwent CT scans with out sign of hemorrhage or jayla LVO. Tenecteplase administered at 19:47, with improvement from NIHSS 4 to 2. Patient previously taking clopidogrel and aspirin after last stroke. Currently only taking aspirin at the instructions of his vascular neurologist.    ED vitals notable for: afebrile, 158/92, HR 79 RR 18, SpO2 97%. Labs notable for: glucose 156. CT imaging disclosed: no hemorrhage, left occipital/right cerebellar acute to subacute infarctions; chronic left basal ganglia, left thalamic and central pontine remote infarctions. There is bilateral atherosclerosis of the carotid system, as well as in the anterior and posterior circulation. The left vertebral artery, basial artery, and posterior cerebral arteries are with progressively reduced flow since last study. Initial exam notable for L facial droop, ataxia in the LUE, bilateral lower limbs, moderate dysarthria, patient unable to stand/walk without holding the transport chair handles.     NIHSS on admission: 4->2 (mild dysarthria, mild L facial - 30 minutes after tenecteplase)  LKN: 17:00, 11/16/2023  pre-MRS: 2 (walks with a cane, otherwise independent)    Impression: L facial weakness, LUE ataxia, dysarthria, gait instability d/t R brain dysfunction d/t acute ischemic stroke; mechanism: consider TENISHA vs. ESUS    Plan:    1) Admit to MICU for post tenecteplase monitoring  - Please perform dysphagia screen now   - Once dysphagia screen passed, start atorvastatin 80mg once daily  - Notify provider if patient passes dysphasgia screen able to have diet if no pending intervention  - Keep BP <180/105, notify provider if out of range  - During infusion, neuro exams every 15 minutes.   - Check every 15 minutes for first hour after infusion is stopped; every 30 minutes for the next 6 hours; hourly until 24 hours from end of infusion.   - Minimize arterial and venous punctures, able to draw blood 4hr s/p tenecteplase  - Keep temp <100F and maintain glucose 140-180.   - Notify provider for "HR >100 or <55 or SaO2 <95%"  - Maintain strict bed rest for 12 hours with HOB <30 degrees  - After 12hr s/p tenecteplase, patient able to ambulate with assist    - Repeat CT head with any acute change in mental status.   - No antiplatelet, anticoagulation, and heparin sq until 24 hour CT head negative for bleed.     2) Labs: Obtain Hemoglobin A1c, Lipid profile set, and TSH    3) Other tests:  - Repeat CT head noncon 24 hours post-tenecteplase to rule out bleed  - MRI brain without contrast   - Echo with bubble, may eventually need CROW  - PT/OT order  - Swallow evaluation if fails dysphagia screen  - SLP order if patient with dysarthria  - Stroke education    4) DVT ppx - SCDs (NO heparin SQ as post tenecteplase protocol)    Decision to give tenecteplase and plan discussed with Stroke fellow, Dr. RC Rey. Risk and benefits of tenecteplase were discussed with patient/family member including risk of symptomatic ICH/death up to 6.4%. Decision was made that benefits outweighed risks and tenecteplase was administered.    Tenecteplase given at 19:47, 11/16/2023.  NO thrombectomy d/t no LVO.    Patient/plan d/w Stroke fellow, Dr. Rey, under the supervision of attending vascular neurologist Dr. Quintero. To be seen by attending in the AM with attestation to follow. Admission orders placed promptly upon arrival to the unit. Note delayed d/t emergent patient care. ROB OSHEA is a 43y RIGHT handed man who presented to [facility] ED on 11/16/2023, as a CODE STROKE, with c/o difficulty walking, LLE weakness, speech changes. PMH significant for: stroke (7/2023 - presenting with dysarthria - left inferior adrienne, b/l parieto-occipital juxtacortical WM w/mild residual dysarthria; 8 years ago - treated with TPA at [facility] [place] - p/w L hemiplegia, facial droop and dysarthria), T2DM, HTN, former smoker (quit in 7/2023). Symptoms beginning at 17:00, 11/16/2023. Noted at the code to have a L facial, LUE ataxia, b/l LE ataxia, slurred speech. No appreciable drift in any of the extremities. Underwent CT scans with out sign of hemorrhage or jayla LVO. Tenecteplase administered at 19:47, with improvement from NIHSS 4 to 2. Patient previously taking clopidogrel and aspirin after last stroke. Currently only taking aspirin at the instructions of his vascular neurologist.    ED vitals notable for: afebrile, 158/92, HR 79 RR 18, SpO2 97%. Labs notable for: glucose 156. CT imaging disclosed: no hemorrhage, left occipital/right cerebellar acute to subacute infarctions; chronic left basal ganglia, left thalamic and central pontine remote infarctions. There is bilateral atherosclerosis of the carotid system, as well as in the anterior and posterior circulation. The left vertebral artery, basial artery, and posterior cerebral arteries are with progressively reduced flow since last study. Initial exam notable for L facial droop, ataxia in the LUE, bilateral lower limbs, moderate dysarthria, patient unable to stand/walk without holding the transport chair handles.     NIHSS on admission: 4->2 (mild dysarthria, mild L facial - 30 minutes after tenecteplase)  LKN: 17:00, 11/16/2023  pre-MRS: 2 (walks with a cane, otherwise independent)    Impression: L facial weakness, LUE ataxia, dysarthria, gait instability d/t R brain dysfunction d/t acute ischemic stroke; mechanism: consider TENISHA vs. ESUS    Plan:    1) Admit to Stroke Unit for post tenecteplase monitoring  - Please perform dysphagia screen now - failed - keep NPO overnight until SLP evaluation   - Once dysphagia screen passed, start atorvastatin 80mg once daily  - Notify provider if patient passes dysphasgia screen able to have diet if no pending intervention  - Keep BP <180/105, notify provider if out of range  - During infusion, neuro exams every 15 minutes.   - Check every 15 minutes for first hour after infusion is stopped; every 30 minutes for the next 6 hours; hourly until 24 hours from end of infusion.   - Minimize arterial and venous punctures, able to draw blood 4hr s/p tenecteplase  - Keep temp <100F and maintain glucose 140-180.   - Notify provider for "HR >100 or <55 or SaO2 <95%"  - Maintain strict bed rest for 12 hours with HOB <30 degrees  - After 12hr s/p tenecteplase, patient able to ambulate with assist    - Repeat CT head with any acute change in mental status.   - No antiplatelet, anticoagulation, and heparin sq until 24 hour CT head negative for bleed.     2) Labs: Obtain Hemoglobin A1c, Lipid profile set, and TSH    3) Other tests:  - Repeat CT head noncon 24 hours post-tenecteplase to rule out bleed  - MRI brain without contrast   - Echo with bubble, may eventually need CROW  - PT/OT order  - Swallow evaluation if fails dysphagia screen  - SLP order if patient with dysarthria  - Stroke education    4) DVT ppx - SCDs (NO heparin SQ as post tenecteplase protocol)    Decision to give tenecteplase and plan discussed with Stroke fellow, Dr. RC Rey. Risk and benefits of tenecteplase were discussed with patient/family member including risk of symptomatic ICH/death up to 6.4%. Decision was made that benefits outweighed risks and tenecteplase was administered.    Tenecteplase given at 19:47, 11/16/2023.  NO thrombectomy d/t no LVO.    Patient/plan d/w Stroke fellow, Dr. Rey, under the supervision of attending vascular neurologist Dr. Quintero. To be seen by attending in the AM with attestation to follow. Admission orders placed promptly upon arrival to the unit. Note delayed d/t emergent patient care.

## 2023-11-16 NOTE — H&P ADULT - NSHPLABSRESULTS_GEN_ALL_CORE
Labs:                        15.6   9.87  )-----------( 236      ( 16 Nov 2023 19:21 )             45.3     11-16    141  |  104  |  27<H>  ----------------------------<  128<H>  4.5   |  22  |  1.17    Ca    9.4      16 Nov 2023 19:21    TPro  7.4  /  Alb  4.8  /  TBili  0.7  /  DBili  x   /  AST  20  /  ALT  17  /  AlkPhos  57  11-16    CAPILLARY BLOOD GLUCOSE  114 (16 Nov 2023 19:31)      POCT Blood Glucose.: 114 mg/dL (16 Nov 2023 19:12)    LIVER FUNCTIONS - ( 16 Nov 2023 19:21 )  Alb: 4.8 g/dL / Pro: 7.4 g/dL / ALK PHOS: 57 U/L / ALT: 17 U/L / AST: 20 U/L / GGT: x             PT/INR - ( 16 Nov 2023 19:21 )   PT: 11.0 sec;   INR: 1.05 ratio         PTT - ( 16 Nov 2023 19:21 )  PTT:31.0 sec  CSF:    Radiology:  NCCT[facility]:  1. No intracranial hemorrhage is appreciated.    2. No intracranial mass lesion is appreciated.    3.  Left occipital and right cerebellar acute to subacute infarctions.   Left basal ganglia, left thalamic and central pontine remote infarctions    4. MR may provide higher sensitivity imaging evaluation for the clinical   indication of acute infarction.    CTA H/N, CTP:  1.   Right carotid system:    Atherosclerotic plaque without    hemodynamically significant stenosis.    2.   Left carotid system:     Atherosclerotic plaque without    hemodynamically significant stenosis.    3.   Vertebral circulation:    Patent.    4.  Anterior intracranial circulation:     Intracranial atherosclerosis   cavernous and clinoid segments of the internal carotid arteries, mild to   moderate, and middle cerebral arteries, moderate on the right and   mild-to-moderate on the left.    5.  Posterior intracranial circulation:    Severe atherosclerotic disease   has progressed since July 2023 with progressive diminished flow of the   left vertebral artery, basilar artery and posterior cerebral arteries.   There is likely reversal flow within the basilar artery to perfuse the   most distal left vertebral artery and PICA arteries. Occlusion of the   right vertebral artery was present in July 2023

## 2023-11-16 NOTE — ED PROVIDER NOTE - CLINICAL SUMMARY MEDICAL DECISION MAKING FREE TEXT BOX
43M PMH HTN, HLD, DM, CAD on ASA 81mg, prior CVA (x2, no residual weakness or deficits) presenting to ED with 2 hours of slurred speech and difficulty walking associated with dizziness and L sided facial droop. Given hx and physical, ddx includes but is not limited to cva, tia, ich. Plan for code stroke, neuro, ct, labs, tba

## 2023-11-16 NOTE — ED PROVIDER NOTE - OBJECTIVE STATEMENT
43M PMH HTN, HLD, DM, CAD on ASA 81mg, prior CVA (x2, no residual weakness or deficits) presenting to ED with 2 hours of slurred speech and difficulty walking associated with dizziness and L sided facial droop. Code stroke called, pt taken to CT scan immediately. Pt normotensive with normal FSG at that time. CT with concern for R PCA occlusion with findings concerning for b/l occipital infarcts (unclear age). Given pt hx/symptoms, decision made to give TNK. Pt without contraindications to TNK. Wife and pt consented to TNK. TNK given at 19:47. Upon further hx from pt and family member, pt noted to have acute onset difficulty walking and fell to buttocks, no LOC, unclear head trauma. Denies headache, vomiting, chest pain, sob, fever, recent illness. 43M PMH HTN, HLD, DM, CAD on ASA 81mg, prior CVA (x2, no residual weakness or deficits) presenting to ED with 2 hours of slurred speech and difficulty walking associated with dizziness and L sided facial droop. Code stroke called, pt taken to CT scan immediately. Pt normotensive with normal FSG at that time. CT with concern for R PCA occlusion with findings concerning for l occipital infarcts and R cerebellar infarct. Given pt hx/symptoms, decision made to give TNK. Pt without contraindications to TNK. Wife and pt consented to TNK. TNK given at 19:47. Upon further hx from pt and family member, pt noted to have acute onset difficulty walking and fell to buttocks, no LOC, unclear head trauma. Denies headache, vomiting, chest pain, sob, fever, recent illness.

## 2023-11-16 NOTE — ED PROVIDER NOTE - ATTENDING CONTRIBUTION TO CARE
Patient delayed to CT secondary to it being occupied by a trauma 1 patient at the time of arrival of our patient. Patient escorted up to CT on the first floor by our team and neurology. TNK given after rba with patient and family.  All risks, benefits and alternative therapies/imaging have been discussed with patient and available support/family in detail in layman terms to facilitate discussion. Time for questions given to patient/support/family and no further questions asked to be answered. Patient accepts the therapy/test and is aware of the potential consequences. Patient will follow up with their primary physician and/or specialist.   Patient endorsed to Dr. Quintero at the time of admission. Based on patient's history and physical exam, as well as the results of today's workup, I feel that patient warrants admission to the hospital for further workup/evaluation and continued management. I discussed the findings of today's workup with the patient and addressed the patient's questions and concerns. The patient was agreeable with admission. Our team spoke with the inpatient receiving team who accepted the patient for admission and subsequently took over the patient's care at the time of admission. The receiving team will follow up on pending labs, analgesia, any clinical imaging results, ancillary findings, reassess, and disposition as clinically indicated. Details of patient and plan conveyed to receiving physician team and conveyed back for understanding. There were no questions at this time about the patient's status, disposition, and plan. Patient's care to be taken over by receiving physician team at this time, all decisions regarding the progression of care will be made at their discretion.

## 2023-11-16 NOTE — ED ADULT NURSE NOTE - OBJECTIVE STATEMENT
Pt 42 y/o male, AxOx3, presents to ED from home complaining of difficulty walking, slurred speech since 1700. PMh of CVA x 2 ( last being 7/24). Recently taken off plavix. Code stroke called in triage, pt taken to 1st floor CT for imagining. See neuro flow for neuro assessment. Pt is awake, noted to have slurred speech and some grunting. Wife at bedside states grunting and mild couching is baseline due to working with outpt speech therapy.  Breathing spontaneous and unlabored. Transported back to ED and placed on continuous pulse ox and cardiac monitor, NSR noted. TNK administered by Neuro MD with ED MD at bedside. Safety and comfort measures initiated- bed placed in lowest position and side rails raised. Pt oriented to call bell system.

## 2023-11-17 NOTE — CONSULT NOTE ADULT - SUBJECTIVE AND OBJECTIVE BOX
CHIEF COMPLAINT:    HISTORY OF PRESENT ILLNESS:  43y (1980) RIGHT handed man who presented to [facility] ED on 11/16/2023, as a CODE STROKE, with c/o difficulty walking, LLE weakness, speech changes.    PMH significant for: stroke (7/2023 - presenting with dysarthria - left inferior adrienne, b/l parieto-occipital juxtacortical WM w/mild residual dysarthria; 8 years ago - treated with TPA at [facility] - p/w L hemiplegia, facial droop and dysarthria), T2DM, HTN, former smoker (quit in 7/2023)    Presented as a CODE STROKE at 19:12. Neurology arrived promptly to evaluate patient. Per wife, patient having acute onset slurred speech (worse from baseline) and inability to stand/walk. This began at 17:00, 11/16/2023. On exam: patient has a L facial droop, ataxia in LUE (dysmetria with FTN), and struggles to perform HTS b/l. Patient also with pronounced slurred speech (moderate but is intelligible). Patient was rushed to the CT scanner on the first floor of the hospital d/t CT scanner in ED being used. Underwent NCCTH, which revealed no hemorrhage, but was initially read by Radiology as showing a nonacute left occipital infarct not seen on the CTH performed 7/26/2023. This infarct was not c/w patient's exam. Patient was emergently rushed back to ED for vital signs and weight. These were unable to be obtained on the first floor. After risk/benefit discussion with patient and wife - tenecteplase was administered at 11/16/2023, 19:47. DTN time was delayed d/t patient needing to be taken up to first floor CT scanner from ED (ED scanner in use), needed to return to ED after scans for vitals including BP and weight.  30 minutes after administration, NIHSS 4->2 (mild left facial but improved and mild dysarthria). Patient reports voice is much better. Performs FTN without issue with LUE. Performs HTS b/l. L NLF flattening significantly improved. Unable to walk patient d/t safety concerns s/p tenecteplase (on bed rest).  Patient currently takes ASA 81mg daily every day. Was originally on DAPT (ASA + clopidogrel) after his last stroke in 7/2023, but saw Dr. Guzman in 11/2/2023, who recommended patient switch over to aspirin monotherapy.  Patient reports that he takes all of his medications every day including his aspirin. Patient and wife report after patient's last stroke, he went to inpatient rehab. He still has slurred speech but it was "90% better." Walks with a cane, but is otherwise independent.  is able to climb stairs with the assistance of his cane.  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 Went to work last week. On Sunday (11/12/2023) - patient was feeling ill. Constipated, vomited. Had some room spinning dizziness. Has been sleeping a lot and has been diaphoretic. Symptoms of slurred speech and gait imbalance came on suddenly today at 11/16/2023. Wife says it was a "night and day difference," which triggered her to rush patient to the ED.   Patient reports that he quit smoking after his stroke in July 2023.  Patient was using a nicotine patch for short while, but he is no longer smoking or using a nicotine patch.  Patient denies any alcohol use.  Patient smokes 1 joint of marijuana per night.  Patient currently lives with his wife and daughter.  Patient denies fevers, chills, visual changes, neck pain, chest pain, palpitations, shortness of breath, abdominal pain, emesis.  Patient reports that he was feeling nauseated earlier.    PAST MEDICAL & SURGICAL HISTORY:  DM (diabetes mellitus)      HTN (hypertension)      CVA (cerebrovascular accident)    MEDICATIONS:  hydrALAZINE Injectable 5 milliGRAM(s) IV Push every 4 hours PRN      dextrose 50% Injectable 25 Gram(s) IV Push once  dextrose 50% Injectable 12.5 Gram(s) IV Push once  dextrose 50% Injectable 25 Gram(s) IV Push once  dextrose Oral Gel 15 Gram(s) Oral once PRN  glucagon  Injectable 1 milliGRAM(s) IntraMuscular once  insulin lispro (ADMELOG) corrective regimen sliding scale   SubCutaneous every 6 hours    dextrose 5%. 1000 milliLiter(s) IV Continuous <Continuous>  dextrose 5%. 1000 milliLiter(s) IV Continuous <Continuous>  influenza   Vaccine 0.5 milliLiter(s) IntraMuscular once  sodium chloride 0.9%. 1000 milliLiter(s) IV Continuous <Continuous>      FAMILY HISTORY:      SOCIAL HISTORY:    [ ] Non-smoker  [ ] Smoker  [ ] Alcohol    Allergies    No Known Allergies    Intolerances    	    REVIEW OF SYSTEMS:  CONSTITUTIONAL: No fever, weight loss, or fatigue  EYES: No eye pain, visual disturbances, or discharge  ENMT:  No difficulty hearing, tinnitus, vertigo; No sinus or throat pain  NECK: No pain or stiffness  RESPIRATORY: No cough, wheezing, chills or hemoptysis; No Shortness of Breath  CARDIOVASCULAR: No chest pain, palpitations, passing out, dizziness, or leg swelling  GASTROINTESTINAL: No abdominal or epigastric pain. No nausea, vomiting, or hematemesis; No diarrhea or constipation. No melena or hematochezia.  GENITOURINARY: No dysuria, frequency, hematuria, or incontinence  NEUROLOGICAL: No headaches, memory loss, loss of strength, numbness, or tremors  SKIN: No itching, burning, rashes, or lesions   LYMPH Nodes: No enlarged glands  ENDOCRINE: No heat or cold intolerance; No hair loss  MUSCULOSKELETAL: No joint pain or swelling; No muscle, back, or extremity pain  PSYCHIATRIC: No depression, anxiety, mood swings, or difficulty sleeping  HEME/LYMPH: No easy bruising, or bleeding gums  ALLERY AND IMMUNOLOGIC: No hives or eczema	    [ ] All others negative	  [ ] Unable to obtain    PHYSICAL EXAM:  T(C): 36.9 (11-17-23 @ 06:00), Max: 37.1 (11-16-23 @ 20:00)  HR: 82 (11-17-23 @ 09:00) (68 - 98)  BP: 161/86 (11-17-23 @ 09:00) (122/89 - 168/101)  RR: 22 (11-17-23 @ 09:00) (18 - 29)  SpO2: 100% (11-17-23 @ 09:00) (94% - 100%)  Wt(kg): --  I&O's Summary    16 Nov 2023 07:01  -  17 Nov 2023 07:00  --------------------------------------------------------  IN: 875 mL / OUT: 850 mL / NET: 25 mL        Appearance: NAD  HEENT:  Dry  oral mucosa, PERRL, EOMI	  Lymphatic: No lymphadenopathy  Cardiovascular: Normal S1 S2, No JVD, No murmurs, No edema  Respiratory: Lungs clear to auscultation	  Psychiatry: A & O x 3, Mood & affect appropriate  Gastrointestinal:  Soft, Non-tender, + BS	  Skin: No rashes, No ecchymoses, No cyanosis	  Neurologic: Non-focal  Extremities: Normal range of motion, No clubbing, cyanosis or edema  Vascular: Peripheral pulses palpable 2+ bilaterally  Neurologic Exam:  Mental status:  Awake, Alert; Oriented to: person, place. Knows it is November 2023 but not exact date; Speech: moderate dysarthria; Repetition and naming: intact; Follows simple and complex commands; Attention/concentration: can spell WORLD forward but not backward; Recent and remote memory (including registration and recall): JULIETA; Fund of knowledge: intact    Cranial nerves - R pupil>L, both reactive, VFF on confrontational testing, EOM - inability to bury the sclera of the R eye with rightward gaze, L upper eyelid ptosis, face sensation (V1-V3) intact b/l, facial strength - L facial droop, hearing intact b/l with finger rub test, palate with symmetric elevation, trapezius 5/5 strength b/l, tongue midline on protrusion with full lateral movement    Motor - Normal bulk and tone throughout. No pronator drift.  Strength testing (R/L)  Deltoid:  5/5   Biceps:  5/5   Triceps:  5/5   Wrist Extension:  5/5   Wrist Flexion:  5/5   Interossei:  5/5   :  5/5  Hip Flexion:  5/5   Hip Extension:  5/5   Knee Flexion:  5/5   Knee Extension:  5/5   Dorsiflexion:  5/5   Plantar Flexion:  5/5    Sensation - Light touch intact throughout    DTRs (R/L)  3+ upper/lower  b/l sustained ankle clonus    Coordination - Finger to Nose, Heel to Shin intact b/l. No tremors appreciated.    Gait and station - Patient unable to stand with holding onto the sides of transport chair during CODE STROKE. Did not assess d/t fall risk/safety concerns. Patient is s/p tenecteplase  TELEMETRY: SR 	    ECG:  	  RADIOLOGY:  < from: CT Angio Neck Stroke Protocol w/ IV Cont (11.16.23 @ 19:41) >    ACC: [id number] EXAM:  CT BRAIN PERFUSION MAPS STROKE   ORDERED BY: KARON WATT     ACC: [id number] EXAM:  CT ANGIO NECK STROKE PROTCL IC   ORDERED BY: KARON WATT     ACC: [id number] EXAM:  CT ANGIO BRAIN STROKE PROTC IC   ORDERED BY: KARON WATT     PROCEDURE DATE:  11/16/2023          INTERPRETATION:  Three examinations were performed on this patient:  1.  CT Angiography of the carotid arteries with IV contrast (and without,   if performed)  2.  CT Angiography of the intracranial circulation with IV contrast (and   without, if performed)  3.  CT brain perfusion:   CT head with and without IV contrast    CLINICAL INFORMATION:      CVA/STROKE    TECHNIQUE (CTAexaminations):   CT angiography images at 1 mm thickness   were acquired from the skull base to the skull vertex as a single helical   acquisition.   Images were acquired during rapid bolus intravenous   administration of  nonionic contrast  volume 120 cc administered  (or   default volume 100 mL).  Thisdata set was reconstructed sagittal and   coronal images.  3D MIP reconstruction images were obtained.    Post   processing angiographic reconstruction of images was performed. This data   set was  reconstructed  and reviewed in multiple projections to evaluate   carotid morphology and intracranial vessels.   The magnitude of stenosis   was evaluated based on the diameter of an intact distal of the internal   carotid artery using NASCET criteria.    TECHNIQUE (CT brain perfusion):  Multiple sequential acquisitions through   the head during dynamic rapid bolus administration of intravenous   contrast administration 120 cc administered.   Helical 10 mm images were   obtained at multiple time points Perfusion data is reconstructed as   cerebral perfusion, cerebral blood volume, time to maximum perfusion and   mean transit time color-coded images at 10 mm thick sections.    ARTIFICIAL INTELLIGENCE:   This study was analyzed with AI algorithms   including deep machine learning to assist in theevaluation of this brain   perfusion data set.    DOSE INFORMATION:   This scan was performed using automatic exposure   control (radiation dose reduction software) to obtain a diagnostic image   quality scan with patient dose as low as reasonably achievable.   Total   DLP for this examination is estimated at 4558 mGy-cm.    COMPARISON:    Concurrent CT head stroke code also CT angiography July 2023.      FINDINGS:    CTA CAROTID CIRCULATION:    The thoracic aortic arch appears intact.  The great vessel origins remain   patent.    The right carotid circulation demonstrates an intact common carotid   artery.  The carotid bulb demonstrates calcified and noncalcified   atherosclerotic plaque without hemodynamically significant stenosis.  The   internal carotid has a vascular loop below the skull base.    The left carotid circulation demonstrates an intact common carotid   artery.  The carotid bulb demonstrates scattered calcified and   noncalcified atherosclerotic time without hemodynamically significant   stenosis.  The internal carotid is patent to the skull base.    The vertebral arteries are patent.  The degree of vertebral asymmetry is   within physiologic limits. Each vertebral artery ascends in the neck with   near constant caliber.    CTA INTRACRANIAL CIRCULATION:    The ANTERIOR circulation demonstrates intact inflow from the ascending   cervical segment to the petrous segment of each internal carotid artery.   The cavernous and clinoid segments demonstrates predominantly calcified   atherosclerotic plaque causing luminal irregularity and low grade   narrowing that appears to remain less than 50%.   The ophthalmic arteries   are demonstrated as patent vessels on each side.    The anterior cerebral   arteries are patentand symmetric.  An anterior communicating artery is   present. The anterior cerebral arterial A2 segments are patent to   peripheral branching. The right middle cerebral artery  initial M1   segment demonstrates at least moderate stenosis in its proximal middle   thirds. Peripheral anterior and posterior division sylvian branches.  The   left middle cerebral artery demonstrates attenuated caliber of its   initial M1 segment without focal stenosis. At its bifurcation there are   also at least low-grade stenoses. Further peripheral anterior and   posterior division sylvian branches remain patent.    The POSTERIOR circulation demonstrates occlusion of each vertebral artery   near its entrance to the posterior fossa. Calcified atherosclerotic   plaque is evident without adjacent flow. Posterior cerebral arteries   demonstrate minimal flow. Flow reconstitutes within the left vertebral   artery distal to its plica possibly reversed. The basal artery is   attenuated in caliber caliber, likely with reversed flow. Posterior   cerebral arteries are patent small caliber. Posterior communicating   arteries are recognized.    The principal dural venous sinuses are patent.  This includes the   superior sagittal sinus anterior and posterior limbs.  Each transverse   sinus is patent to sigmoid sinuses and patent jugular veins.    No intracranial aneurysm or vascular malformation is identified.    CT BRAIN PERFUSION:    The arterial input function is rapid and physiologic in appearance. The   venous output function follows at an appropriate delay and higher peak,   physiologic.  The cursors for these data acquisitions appear   appropriately positioned.    Cerebral perfusion images demonstrate no significant asymmetric region of   diminished in-flow perfusion.  The volume of brain parenchyma receiving   cerebral blood flow diminished further than 30% is 0 mL.    Cerebral blood volume images demonstrate no significant asymmetric region   of abnormal blood volume.    Mean transit time of brain perfusion and time to maximum perfusion images   demonstrate confluent regions of asymmetric delay in time to maximum   perfusion within the posterior circulation right more than left.  The   volume of brain parenchyma demonstrating delay in maximumenhancement   greater than 6 seconds is 41 mL. At a lower threshold of greater than 4   seconds this increases to include the entire posterior circulation.    ADDITIONAL FINDINGS:   None.      IMPRESSION:    1.   Right carotid system:    Atherosclerotic plaque without    hemodynamically significant stenosis.    2.   Left carotid system:     Atherosclerotic plaque without    hemodynamically significant stenosis.    3.   Vertebral circulation:    Patent.    4.  Anterior intracranial circulation: Intracranial atherosclerosis   cavernous and clinoid segments of the internal carotid arteries, mild to   moderate, and middle cerebral arteries, moderate on the right and   mild-to-moderate on the left.    5.  Posterior intracranial circulation:Severe atherosclerotic disease   has progressed since July 2023 with progressive diminished flow of the   left vertebral artery, basilar artery and posterior cerebral arteries.   There is likely reversal flow within the basilar artery to perfuse the   most distal left vertebral artery and PICA arteries. Occlusion of the   right vertebral artery was present in July 2023    6. Brain perfusion:   No acute infarction of the brain is convincingly   demonstrated.  However, there is extensive ischemia within the posterior   circulation.    --- End of Report ---           ERIC REDMOND MD; Resident Radiologist  This document has been electronically signed.  ADELFO MENDOZA MD; Attending Radiologist  This document has been electronically signed. Nov 16 2023  8:19PM    < end of copied text >    OTHER: 	  	  LABS:	 	    CARDIAC MARKERS:      < from: TTE W or WO Ultrasound Enhancing Agent (07.25.23 @ 09:57) >    TRANSTHORACIC ECHOCARDIOGRAM REPORT  ________________________________________________________________________________                                      _______       Pt. Name:       ROB OSHEA   Study Date:    7/25/2023  MRN:            [id number]   YOB: 1980  Accession #:    [id number]    Age:           43 years  Account#:       [id number] Gender:        M  Heart Rate:     82 bpm       Height:        70.00 in (177.80 cm)  Rhythm:         sinus rhythm Weight:        229.00 lb (103.87 kg)  Blood Pressure: 150/90 mmHg  BSA/BMI:       2.21 m² / 32.86 kg/m²  ________________________________________________________________________________________  Referring Physician:    [id number] Annia Quintero  Interpreting Physician: MD Diana  Primary Sonographer:    Amor Dean [facility]    CPT:                ECHO TTE WITH CON COMP W DOPP - .m;DEFINITY ECHO                      CONTRAST PER ML - .m;DEFINITY ECHO CONTRAST PER ML                      WASTED - .m  Indication(s):      Cerebral infarction, unspecified - I63.9  Procedure:          Transthoracic echocardiogram with 2-D, M-mode and complete                      spectral and color flow Doppler.  Ordering Location:  [facility]  Contrast Injection: Verbal consent was obtained for injection of Ultrasonic                      Enhancing Agent following a discussion of risks and                      benefits.                      Endocardial visualization enhanced with 3 ml of Definity                      Ultrasound enhancing agent (Lot#:[id number] Exp.Date:8/1/24                      Discarded Dose:7ml).  UEA Reaction:       Patient had no adverse reaction after injection of                      Ultrasound Enhancing Agent.  Study Information:  Image quality for this study is fair.    _______________________________________________________________________________________  CONCLUSIONS:      1. Normal left ventricular cavity size. The left ventricular wall thickness is normal. The left ventricular systolic function is normal with an ejection fraction of 58 % by Joseph's method of disks. There are no regional wall motion abnormalities seen.   2. There is normal left ventricular diastolic function.   3. Normal right ventricular cavity size, normal right ventricular wall thickness and normal right ventricular systolic function.   4. Normal atria.   5. No significant valvular disease.   6. No pericardial effusion seen.    ________________________________________________________________________________________        A1C with Estimated Average Glucose Result: 7.0: Method: Immunoassay                         15.3   9.81  )-----------( 213      ( 17 Nov 2023 06:59 )             43.8     11-17    140  |  105  |  22  ----------------------------<  116<H>  3.8   |  21<L>  |  1.12    Ca    9.6      17 Nov 2023 07:01    TPro  7.4  /  Alb  4.8  /  TBili  0.7  /  DBili  x   /  AST  20  /  ALT  17  /  AlkPhos  57  11-16    proBNP:   Lipid Profile:   HgA1c:   TSH:

## 2023-11-17 NOTE — CHART NOTE - NSCHARTNOTEFT_GEN_A_CORE
***Vascular Neurology Follow-up Chart Note***    Patient seen today with attending on rounds.  Exam stable. NPO for now d/t failed dysphagia. On IVF (125 mL/hr x 24 hours). Straight cath x1.    Impression: L facial weakness, LUE ataxia, dysarthria, gait instability d/t R brain dysfunction d/t acute ischemic stroke; mechanism: consider TENISHA vs. ESUS. S/p tenecteplase at 19:47 on 11/16.      Plan:  [] Repeat NCCTH  [] MRI brain w/o  [] TTE  [] HgbA1c and lipid panel  [] DVT ppx: SCDs  [] Failed dysphagia screen, NPO pending S&S eval  [] PT/OT/SLP      Case & Plan discussed with patient and family. All questions answered. Plan discussed with primary team       CORE MEASURES:         Admission NIHSS: 4     Tenecteplase: [x] YES [] NO     Statin Therapy: [x] YES [] NO     Smoking [] YES [x] NO     Afib [] YES [x] NO     Stroke Education [x] YES [] NO    Dysphagia screen : [] Passed [x] Failed- S&S pending [] Pending  (Ensure medications are ordered via appropriate route)    DVT ppx: Venodynes [x] Heparin s.c [] LMWH [] Not on chemical DVT ppx due to: S/p Tenectaplase. ***Vascular Neurology Follow-up Chart Note***    Patient seen today with attending on rounds.  Exam stable. NPO for now d/t failed dysphagia. On IVF (125 mL/hr x 24 hours). Straight cath x1.    Impression: L facial weakness, LUE ataxia, dysarthria, gait instability d/t R brain dysfunction d/t acute ischemic stroke; mechanism: consider TENISHA vs. ESUS. S/p tenecteplase at 19:47 on 11/16.      Plan:  [] Repeat NCCTH  [] MRI brain w/o  [] MRA NOVA H/N  [] TTE  [] HgbA1c and lipid panel  [] DVT ppx: SCDs  [] Failed dysphagia screen, NPO pending S&S eval  [] PT/OT/SLP      Case & Plan discussed with patient and family. All questions answered. Plan discussed with primary team       CORE MEASURES:         Admission NIHSS: 4     Tenecteplase: [x] YES [] NO     Statin Therapy: [x] YES [] NO     Smoking [] YES [x] NO     Afib [] YES [x] NO     Stroke Education [x] YES [] NO    Dysphagia screen : [] Passed [x] Failed- S&S pending [] Pending  (Ensure medications are ordered via appropriate route)    DVT ppx: Venodynes [x] Heparin s.c [] LMWH [] Not on chemical DVT ppx due to: S/p Tenectaplase. ***Vascular Neurology Follow-up Chart Note***    Patient seen today with attending on rounds.  Exam stable. NPO for now d/t failed dysphagia. On IVF (125 mL/hr x 24 hours). Straight cath x1.    Impression: L facial weakness, LUE ataxia, dysarthria, gait instability d/t R brain dysfunction d/t acute ischemic stroke; mechanism: consider TENISHA/ICAD vs. ESUS. S/p tenecteplase at 19:47 on 11/16.      Plan:  [] Repeat NCCTH  [] MRI brain w/o  [] MRA NOVA H/N  [] TTE  [] HgbA1c and lipid panel  [] DVT ppx: SCDs  [] Failed dysphagia screen, NPO pending S&S eval  [] PT/OT/SLP    Case & Plan discussed with patient and family. All questions answered.     CORE MEASURES:         Admission NIHSS: 4     Tenecteplase: [x] YES [] NO     Statin Therapy: [x] YES [] NO     Smoking [] YES [x] NO     Afib [] YES [x] NO     Stroke Education [x] YES [] NO    Dysphagia screen : [] Passed [x] Failed- S&S pending [] Pending  (Ensure medications are ordered via appropriate route)    DVT ppx: Venodynes [x] Heparin s.c [] LMWH [] Not on chemical DVT ppx due to: S/p Tenectaplase. ***Vascular Neurology Follow-up Chart Note***    Patient seen today with attending on rounds.  AO. Mentating well.  CN: VFF. fatiguable nystagmus, upbeating for 2 beats. EOM otherwise intact. ?subtle L facial droop.  Motor 5/5 strength UE and LE b/l. Sensation intact b/l UE and LE. Exam notable for trace ataxia on FTN and HTS, b/l L>R.  Rapid alternating movements mildly impaired L>R.    Exam stable. NPO for now d/t failed dysphagia. On IVF (125 mL/hr x 24 hours). Straight cath x1.    Impression: L facial weakness, LUE ataxia, dysarthria, gait instability d/t R brain dysfunction d/t acute ischemic stroke; mechanism: consider TENISHA/ICAD vs. ESUS. S/p tenecteplase at 19:47 on 11/16, with improvement in exam.      Plan:  [] Repeat CTH non contrast stability scan  [] MRI brain w/o  [] MRA NOVA H/N  [] COVID test  [] TTE with bubble  [] A1c and lipid panel  [] DVT ppx: SCDs (s/p TNK)  [] Failed dysphagia screen, NPO pending S&S eval  [] PT/OT/SLP    Case & Plan discussed with patient and family. All questions answered.     CORE MEASURES:         Admission NIHSS: 4     Tenecteplase: [x] YES [] NO     Statin Therapy: [x] YES [] NO     Smoking [] YES [x] NO     Afib [] YES [x] NO     Stroke Education [x] YES [] NO    Dysphagia screen : [] Passed [x] Failed- S&S pending [] Pending  (Ensure medications are ordered via appropriate route)    DVT ppx: Venodynes , Not on chemical DVT ppx due to: S/p Tenectaplase. ***Vascular Neurology Follow-up Chart Note***    Patient seen today with attending on rounds.  AO. Mentating well.  CN: VFF. fatiguable nystagmus, upbeating for 2 beats. EOM otherwise intact. ?subtle L facial droop.  Motor 5/5 strength UE and LE b/l. Sensation intact b/l UE and LE. Exam notable for trace ataxia on FTN and HTS, b/l L>R.  Rapid alternating movements mildly impaired L>R.    Exam stable. NPO for now d/t failed dysphagia. On IVF (125 mL/hr x 24 hours). Straight cath x1.    Impression: L facial weakness, LUE ataxia, dysarthria, gait instability d/t R brain dysfunction d/t acute ischemic stroke; mechanism: consider TENISHA/ICAD vs. ESUS. S/p tenecteplase at 19:47 on 11/16, with improvement in exam.      Plan: See attending attestation on H&P 11/16 for final recommendations.  [] Repeat CTH non contrast stability scan  [] MRI brain w/o  [] DAPT 24 hours after TNK administration if no bleed on imaging  [] MRA NOVA H/N  [] COVID test  [] TTE with bubble  [] A1c and lipid panel  [] DVT ppx: SCDs (s/p TNK)  [] NPO pending S&S eval  [] PT/OT/SLP    Case & Plan discussed with patient and family. All questions answered.     CORE MEASURES:         Admission NIHSS: 4     Tenecteplase: [x] YES [] NO     Statin Therapy: [x] YES [] NO     Smoking [] YES [x] NO     Afib [] YES [x] NO     Stroke Education [x] YES [] NO    Dysphagia screen : [] Passed [x] Failed- S&S pending [] Pending  (Ensure medications are ordered via appropriate route)    DVT ppx: Venodynes , Not on chemical DVT ppx due to: S/p Tenectaplase.      Plan: See attending attestation on H&P 11/16 for final recommendations.

## 2023-11-17 NOTE — SPEECH LANGUAGE PATHOLOGY EVALUATION - SLP DIAGNOSIS
42 yo M pmhx CVA admitted code stroke. Patient presents with mild/mod dysarthria. Slight left facial weakness; flattened L nasolabial fold (baseline); New reduced lingual/labial coordination. Fair intelligibility in unknown contexts; fair to good in known contexts. Patient deferred remainder of exam (exp/rec language and cognition) due to c/o "tired"; wife states patient did not sleep after admission last night. Based on limited exam, pt is AA+Ox4; verbal output is fluent without obvious word finding deficits; able to repeat; follows directives; Voice wfl for age/gender; able to make wants/needs known. This service will follow up with comprehensive assessment at earliest availability. d/w TERI Johnson

## 2023-11-17 NOTE — SPEECH LANGUAGE PATHOLOGY EVALUATION - SLP PERTINENT HISTORY OF CURRENT PROBLEM
43y (1980) RIGHT handed man who presented to [facility] ED on 11/16/2023, as a CODE STROKE, with c/o difficulty walking, LLE weakness, speech changes.

## 2023-11-17 NOTE — PHYSICAL THERAPY INITIAL EVALUATION ADULT - GAIT DEVIATIONS NOTED, PT EVAL
decreased tahira/increased time in double stance/decreased velocity of limb motion/decreased step length/decreased weight-shifting ability

## 2023-11-17 NOTE — CONSULT NOTE ADULT - ASSESSMENT
43y RIGHT handed man who presented to [facility] ED on 11/16/2023, as a CODE STROKE, with c/o difficulty walking, LLE weakness, speech changes. PMH significant for: stroke (7/2023 - presenting with dysarthria - left inferior adrienne, b/l parieto-occipital juxtacortical WM w/mild residual dysarthria; 8 years ago - treated with TPA at [facility] - p/w L hemiplegia, facial droop and dysarthria), T2DM, HTN, former smoker (quit in 7/2023). Symptoms beginning at 17:00, 11/16/2023. Noted at the code to have a L facial, LUE ataxia, b/l LE ataxia, slurred speech. No appreciable drift in any of the extremities. Underwent CT scans with out sign of hemorrhage or jayla LVO. Tenecteplase administered at 19:47, with improvement from NIHSS 4 to 2. Patient previously taking clopidogrel and aspirin after last stroke. Currently only taking aspirin at the instructions of his vascular neurologist.    ED vitals notable for: afebrile, 158/92, HR 79 RR 18, SpO2 97%. Labs notable for: glucose 156. CT imaging disclosed: no hemorrhage, left occipital/right cerebellar acute to subacute infarctions; chronic left basal ganglia, left thalamic and central pontine remote infarctions. There is bilateral atherosclerosis of the carotid system, as well as in the anterior and posterior circulation. The left vertebral artery, basial artery, and posterior cerebral arteries are with progressively reduced flow since last study. Initial exam notable for L facial droop, ataxia in the LUE, bilateral lower limbs, moderate dysarthria, patient unable to stand/walk without holding the transport chair handles.     NIHSS on admission: 4->2 (mild dysarthria, mild L facial - 30 minutes after tenecteplase)  LKN: 17:00, 11/16/2023  pre-MRS: 2 (walks with a cane, otherwise independent)    Impression: L facial weakness, LUE ataxia, dysarthria, gait instability d/t R brain dysfunction d/t acute ischemic stroke

## 2023-11-17 NOTE — PHYSICAL THERAPY INITIAL EVALUATION ADULT - ADDITIONAL COMMENTS
Pt lives with wife  in apt with 3-4 steps to enter with first floor setup. Pt use cane and owns a RW. Pt lives with wife  in house with 3-4 steps to enter with first floor setup. Pt use cane and owns a RW.

## 2023-11-17 NOTE — SWALLOW BEDSIDE ASSESSMENT ADULT - COMMENTS
tenecteplase was administered at 11/16/2023, 19:47...30 minutes after administration, NIHSS 4->2 (mild left facial but improved and mild dysarthria). Patient reports voice is much better. Performs FTN without issue with LUE. Performs HTS b/l. L NLF flattening significantly improved.   CT Brain: Left occipital and right cerebellar acute to subacute infarctions. Left basal ganglia, left thalamic and central pontine remote infarctions.    Neuro Impression 11/17: L facial weakness, LUE ataxia, dysarthria, gait instability d/t R brain dysfunction d/t acute ischemic stroke; mechanism: consider TENISHA/ICAD vs. ESUS. S/p tenecteplase at 19:47 on 11/16.    failed dysphagia screen; NPO tenecteplase was administered at 11/16/2023, 19:47...30 minutes after administration, NIHSS 4->2 (mild left facial but improved and mild dysarthria). Patient reports voice is much better. Performs FTN without issue with LUE. Performs HTS b/l. L NLF flattening significantly improved.   CT Brain: Left occipital and right cerebellar acute to subacute infarctions. Left basal ganglia, left thalamic and central pontine remote infarctions.    Neuro Impression 11/17: L facial weakness, LUE ataxia, dysarthria, gait instability d/t R brain dysfunction d/t acute ischemic stroke; mechanism: consider TENISHA/ICAD vs. ESUS.     failed dysphagia screen in ED; wet gurgly; NPO    S/p tenecteplase at 19:47 on 11/16. tenecteplase was administered at 11/16/2023, 19:47...30 minutes after administration, NIHSS 4->2 (mild left facial but improved and mild dysarthria). Patient reports voice is much better. Performs FTN without issue with LUE. Performs HTS b/l. L NLF flattening significantly improved.   CT Brain: Left occipital and right cerebellar acute to subacute infarctions. Left basal ganglia, left thalamic and central pontine remote infarctions.    Neuro Impression 11/17: L facial weakness, LUE ataxia, dysarthria, gait instability d/t R brain dysfunction d/t acute ischemic stroke; mechanism: consider TENISHA/ICAD vs. ESUS.     failed dysphagia screen in ED; wet gurgly; NPO    S/p tenecteplase at 19:47 on 11/16.    Pt known to this service from previous admission July 2023. Speech/language evaluation was completed which found patient with moderate to severe dysarthria and hypophonia. No language or cognitive deficits at that time. Had passed RN dysphagia screen and was initiated on po diet per MD. Pt's wife reports that prior to current admission, patient had regained nearly full function of speech, voice and swallowing function.

## 2023-11-17 NOTE — OCCUPATIONAL THERAPY INITIAL EVALUATION ADULT - PERTINENT HX OF CURRENT PROBLEM, REHAB EVAL
ROB OSHEA is a 43y (1980) RIGHT handed man who presented to [facility] ED on 11/16/2023, as a CODE STROKE, with c/o difficulty walking, LLE weakness, speech changes. PMH significant for: stroke (7/2023 - presenting with dysarthria - left inferior adrienne, b/l parieto-occipital juxtacortical WM w/mild residual dysarthria; 8 years ago - treated with TPA at [facility] - p/w L hemiplegia, facial droop and dysarthria), T2DM, HTN, former smoker (quit in 7/2023) Presented as a CODE STROKE at 19:12. Neurology arrived promptly to evaluate patient. Per wife, patient having acute onset slurred speech (worse from baseline) and inability to stand/walk. This began at 17:00, 11/16/2023. On exam: patient has a L facial droop, ataxia in LUE (dysmetria with FTN), and struggles to perform HTS b/l. Patient also with pronounced slurred speech (moderate but is intelligible). Patient was rushed to the CT scanner on the first floor of the hospital d/t CT scanner in ED being used. Underwent NCCTH, which revealed no hemorrhage, but was initially read by Radiology as showing a nonacute left occipital infarct not seen on the CTH performed 7/26/2023. This infarct was not c/w patient's exam. Patient was emergently rushed back to ED for vital signs and weight. These were unable to be obtained on the first floor. After risk/benefit discussion with patient and wife - tenecteplase was administered at 11/16/2023, 19:47. DTN time was delayed d/t patient needing to be taken up to first floor CT scanner from ED (ED scanner in use), needed to return to ED after scans for vitals including BP and weight. 30 minutes after administration, NIHSS 4->2 (mild left facial but improved and mild dysarthria). Patient reports voice is much better. Performs FTN without issue with LUE. Performs HTS b/l. L NLF flattening significantly improved. Unable to walk patient d/t safety concerns s/p tenecteplase (on bed rest). Patient currently takes ASA 81mg daily every day. Was originally on DAPT (ASA + clopidogrel) after his last stroke in 7/2023, but saw Dr. Guzman in 11/2/2023, who recommended patient switch over to aspirin monotherapy.  Patient reports that he takes all of his medications every day including his aspirin. Patient and wife report after patient's last stroke, he went to inpatient rehab. He still has slurred speech but it was "90% better." Walks with a cane, but is otherwise independent.  is able to climb stairs with the assistance of his cane.  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 Went to work last week. On Sunday (11/12/2023) - patient was feeling ill. Constipated, vomited. Had some room spinning dizziness. Has been sleeping a lot and has been diaphoretic. Symptoms of slurred speech and gait imbalance came on suddenly today at 11/16/2023. Wife says it was a "night and day difference," which triggered her to rush patient to the ED. Patient reports that he quit smoking after his stroke in July 2023.  Patient was using a nicotine patch for short while, but he is no longer smoking or using a nicotine patch.  Patient denies any alcohol use.  Patient smokes 1 joint of marijuana per night.  Patient currently lives with his wife and daughter. Patient denies fevers, chills, visual changes, neck pain, chest pain, palpitations, shortness of breath, abdominal pain, emesis.  Patient reports that he was feeling nauseated earlier.  CT Angio 11/16: 1. Right carotid system: Atherosclerotic plaque without  hemodynamically significant stenosis. 2. Left carotid system: Atherosclerotic plaque without hemodynamically significant stenosis. 3. Vertebral circulation: Patent. 4. Anterior intracranial circulation:    Intracranial atherosclerosis cavernous and clinoid segments of the internal carotid arteries, mild to moderate, and middle cerebral arteries, moderate on the right and mild-to-moderate on the left. 5.  Posterior intracranial circulation: Severe atherosclerotic disease has progressed since July 2023 with progressive diminished flow of the left vertebral artery, basilar artery and posterior cerebral arteries. There is likely reversal flow within the basilar artery to perfuse the most distal left vertebral artery and PICA arteries. Occlusion of the right vertebral artery was present in July 2023 6. Brain perfusion: No acute infarction of the brain is convincingly demonstrated. However, there is extensive ischemia within the posterior circulation.

## 2023-11-17 NOTE — SPEECH LANGUAGE PATHOLOGY EVALUATION - COMMENTS
tenecteplase was administered at 11/16/2023, 19:47...30 minutes after administration, NIHSS 4->2 (mild left facial but improved and mild dysarthria). Patient reports voice is much better. Performs FTN without issue with LUE. Performs HTS b/l. L NLF flattening significantly improved.   CT Brain: Left occipital and right cerebellar acute to subacute infarctions. Left basal ganglia, left thalamic and central pontine remote infarctions.    Neuro Impression 11/17: L facial weakness, LUE ataxia, dysarthria, gait instability d/t R brain dysfunction d/t acute ischemic stroke; mechanism: consider TENISHA/ICAD vs. ESUS. S/p tenecteplase at 19:47 on 11/16.    failed dysphagia screen; NPO    MRI pending wfl for age/gender CT Brain: Left occipital and right cerebellar acute to subacute infarctions. Left basal ganglia, left thalamic and central pontine remote infarctions.    Neuro Impression 11/17: L facial weakness, LUE ataxia, dysarthria, gait instability d/t R brain dysfunction d/t acute ischemic stroke; mechanism: consider TENISHA/ICAD vs. ESUS.    Tenecteplase was administered at 11/16/2023, 19:47. Per Neuro, 30 minutes after administration, NIHSS 4->2 (mild left facial but improved and mild dysarthria). Patient reports voice is much better. Performs FTN without issue with LUE. Performs HTS b/l. L NLF flattening significantly improved.     Failed RN dysphagia screen in ED; wet, gurgly 11/16 20:00; -->NPO    MRI pending reduced strength/coordination

## 2023-11-17 NOTE — OCCUPATIONAL THERAPY INITIAL EVALUATION ADULT - ADL RETRAINING, OT EVAL
Pt will independently perform LB dressing within 4 weeks. Pt will independently perform UB dressing within 4 weeks. Pt will perform LB dressing CGA within 4 weeks. Pt will perform UB dressing CGA within 4 weeks.

## 2023-11-17 NOTE — CONSULT NOTE ADULT - ASSESSMENT
43M hx HTN, DM, prior posterior circulation CVA (most recent 7/2023) on DAPT p/w L facial, dysarthria, LUE dysmetria. TNK 11/16 7:47pm, NIHSS 4->2. 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 Exam: mild L facial, moderate LUE dysmetria to FTN. Some expressive aphasia/confusion, but can understand complex commands, memory intact. O/w intact AOx3.  -MRI/MRA and NOVA  -Pre-op possible Angio w possible recanalization/stenting pending imaging/discussion

## 2023-11-17 NOTE — OCCUPATIONAL THERAPY INITIAL EVALUATION ADULT - NS ASR FOLLOW COMMAND OT EVAL
100% of the time/able to follow single-step instructions Pt participated in Short Blessed Test cognitive assessment, however unable to formally obtain results 2* pt dysarthric and distracted by interruptions during administration./100% of the time/able to follow single-step instructions

## 2023-11-17 NOTE — PHYSICAL THERAPY INITIAL EVALUATION ADULT - GENERAL OBSERVATIONS, REHAB EVAL
Pt received ambulating to bathroom with OTS Hollie w/RW and Min A, in NAD, +IV Lock. Pt AOx3, pleasant and cooperative

## 2023-11-17 NOTE — SWALLOW BEDSIDE ASSESSMENT ADULT - SLP PERTINENT HISTORY OF CURRENT PROBLEM
43y (1980) RIGHT handed man who presented to [facility] ED on 11/16/2023, as a CODE STROKE, with c/o difficulty walking, LLE weakness, speech changes.

## 2023-11-17 NOTE — OCCUPATIONAL THERAPY INITIAL EVALUATION ADULT - ADDITIONAL COMMENTS
Pt reports living in a private home with his wife, 3-4 steps to enter, 1st floor set up, walk in shower. Pt reports being independent with ADLs and ambulating with a cane prior to admission. Pt reports living in a private home with his wife, 3-4 steps to enter, 1st floor set up, walk in shower. Pt reports being independent with ADLs and ambulating with a cane prior to admission. Pt owns a RW and shower chair. Pt reports living in a private home with his wife, 3-4 steps to enter, 1st floor set up, walk in shower. Pt required assistance for ADLs and was ambulating with a cane prior to admission. Pt owns a RW and shower chair.

## 2023-11-17 NOTE — OCCUPATIONAL THERAPY INITIAL EVALUATION ADULT - TRANSFER TRAINING, PT EVAL
Pt will independently perform toilet transfer with least restrictive device within 4 weeks. Pt will perform toilet transfer supervision with least restrictive device within 4 weeks.

## 2023-11-18 NOTE — PROGRESS NOTE ADULT - ASSESSMENT
43y RIGHT handed man who presented to [facility] ED on 11/16/2023, as a CODE STROKE, with c/o difficulty walking, LLE weakness, speech changes. PMH significant for: stroke (7/2023 - presenting with dysarthria - left inferior adrienne, b/l parieto-occipital juxtacortical WM w/mild residual dysarthria; 8 years ago - treated with TPA at [facility] [place] - p/w L hemiplegia, facial droop and dysarthria), T2DM, HTN, former smoker (quit in 7/2023). Symptoms beginning at 17:00, 11/16/2023. Noted at the code to have a L facial, LUE ataxia, b/l LE ataxia, slurred speech. No appreciable drift in any of the extremities. Underwent CT scans with out sign of hemorrhage or jayla LVO. Tenecteplase administered at 19:47, with improvement from NIHSS 4 to 2. Patient previously taking clopidogrel and aspirin after last stroke. Currently only taking aspirin at the instructions of his vascular neurologist.    ED vitals notable for: afebrile, 158/92, HR 79 RR 18, SpO2 97%. Labs notable for: glucose 156. CT imaging disclosed: no hemorrhage, left occipital/right cerebellar acute to subacute infarctions; chronic left basal ganglia, left thalamic and central pontine remote infarctions. There is bilateral atherosclerosis of the carotid system, as well as in the anterior and posterior circulation. The left vertebral artery, basial artery, and posterior cerebral arteries are with progressively reduced flow since last study. Initial exam notable for L facial droop, ataxia in the LUE, bilateral lower limbs, moderate dysarthria, patient unable to stand/walk without holding the transport chair handles.     NIHSS on admission: 4->2 (mild dysarthria, mild L facial - 30 minutes after tenecteplase)  LKN: 17:00, 11/16/2023  pre-MRS: 2 (walks with a cane, otherwise independent)    Impression: L facial weakness, LUE ataxia, dysarthria, gait instability d/t R brain dysfunction d/t acute ischemic stroke

## 2023-11-18 NOTE — PROVIDER CONTACT NOTE (CHANGE IN STATUS NOTIFICATION) - ACTION/TREATMENT ORDERED:
fluctuating L-sided numbness is considered normal finding with patient diagnosis. Diplopia likely pressure dependent. 500cc fluid bolus.

## 2023-11-18 NOTE — PROGRESS NOTE ADULT - SUBJECTIVE AND OBJECTIVE BOX
THE PATIENT WAS SEEN AND EXAMINED BY ME WITH THE HOUSESTAFF AND STROKE TEAM DURING MORNING ROUNDS.   HPI:  43y right handed man PMH stroke (7/2023 - presenting with dysarthria - left inferior adrienne, b/l parieto-occipital juxtacortical WM w/mild residual dysarthria; 8 years ago - treated with TPA at [facility], Pt presented at that time with left hemiplegia, facial droop and dysarthria), T2DM, HTN, former smoker (quit in 7/2023), patient currently takes ASA 81mg daily every day. Was originally on DAPT (ASA + clopidogrel) after his last stroke in 7/2023, but saw Dr. Guzman in 11/2/2023, who recommended patient switch over to aspirin monotherapy.  who presented to [facility] ED on 11/16/2023, with c/o sudden difficulty walking, LLE weakness, speech changes. Per wife, patient having acute onset slurred speech (worse from baseline) and inability to stand/walk. This began at 17:00, 11/16/2023. On exam: patient has a L facial droop, ataxia in LUE (dysmetria with FTN), and struggles to perform HTS b/l. Patient also with pronounced slurred speech (moderate but is intelligible). Patient underwent NCCTH, which revealed no hemorrhage, but was initially read by Radiology as showing a nonacute left occipital infarct not seen on the CTH performed 7/26/2023. This infarct was not c/w patient's exam.  After risk/benefit discussion with patient and wife - tenecteplase was administered at 11/16/2023, 19:47. DTN time was delayed d/t patient needing to be taken up to first floor CT scanner from ED (ED scanner in use), needed to return to ED after scans for vitals including BP and weight. 30 minutes after administration, NIHSS 4->2 (mild left facial but improved and mild dysarthria). Patient reports voice is much better. Performs FTN without issue with LUE. Performs HTS b/l. L NLF flattening significantly improved. Unable to walk patient d/t safety concerns s/p tenecteplase (on bed rest). Pt walks with a cane, but is otherwise independent.  is able to climb stairs with the assistance of his cane.  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  On Sunday (11/12/2023) - patient was feeling ill. Constipated, vomited. Had some room spinning dizziness. Has been sleeping a lot and has been diaphoretic. Patient denies any alcohol use.  Patient smokes 1 joint of marijuana per night.  Patient currently lives with his wife and daughter. Patient denies fevers, chills, visual changes, neck pain, chest pain, palpitations, shortness of breath, abdominal pain, emesis.  Patient reports that he was feeling nauseated earlier.       SUBJECTIVE: No events overnight.  No new neurologic complaints, ROS reported negative unless otherwise noted.      atorvastatin 80 milliGRAM(s) Oral at bedtime  dextrose 5%. 1000 milliLiter(s) IV Continuous <Continuous>  dextrose 5%. 1000 milliLiter(s) IV Continuous <Continuous>  dextrose 50% Injectable 25 Gram(s) IV Push once  dextrose 50% Injectable 12.5 Gram(s) IV Push once  dextrose 50% Injectable 25 Gram(s) IV Push once  dextrose Oral Gel 15 Gram(s) Oral once PRN  enoxaparin Injectable 40 milliGRAM(s) SubCutaneous every 24 hours  escitalopram 10 milliGRAM(s) Oral daily  glucagon  Injectable 1 milliGRAM(s) IntraMuscular once  hydrALAZINE Injectable 5 milliGRAM(s) IV Push every 4 hours PRN  influenza   Vaccine 0.5 milliLiter(s) IntraMuscular once  insulin lispro (ADMELOG) corrective regimen sliding scale   SubCutaneous Before meals and at bedtime  sodium chloride 0.9%. 1000 milliLiter(s) IV Continuous <Continuous>  traZODone 50 milliGRAM(s) Oral at bedtime      PHYSICAL EXAM:   Vital Signs Last 24 Hrs  T(C): 37.1 (17 Nov 2023 20:00), Max: 37.3 (17 Nov 2023 13:00)  T(F): 98.8 (17 Nov 2023 20:00), Max: 99.1 (17 Nov 2023 13:00)  HR: 56 (18 Nov 2023 06:00) (56 - 90)  BP: 160/74 (18 Nov 2023 06:00) (153/99 - 180/102)  BP(mean): 106 (18 Nov 2023 06:00) (106 - 133)  RR: 17 (18 Nov 2023 06:00) (17 - 26)  SpO2: 98% (18 Nov 2023 06:00) (95% - 100%)    Parameters below as of 18 Nov 2023 06:00  Patient On (Oxygen Delivery Method): room air        General: No acute distress  HEENT: EOM intact, visual fields full  Abdomen: Soft, nontender, nondistended   Extremities: No edema    NEUROLOGICAL EXAM:  Mental status: eyes open, awake, alert, oriented x3, fluent speech no neglect, able to follow commands  Cranial Nerves: Subtle left facial droop, no nystagmus, no dysarthria.  Motor exam: Normal tone, no drift, RUE 5/5, RLE 5/5, LUE 5/5, LLE 5/5,  Sensation: Intact to light touch   Coordination/ Gait: No dysmetria, Rapid alternating movements mildly impaired L>R.       LABS:                        14.4   8.51  )-----------( 200      ( 18 Nov 2023 06:17 )             43.0    11-18    143  |  110<H>  |  18  ----------------------------<  113<H>  4.1   |  22  |  1.28    Ca    9.3      18 Nov 2023 06:17    TPro  7.4  /  Alb  4.8  /  TBili  0.7  /  DBili  x   /  AST  20  /  ALT  17  /  AlkPhos  57  11-16  PT/INR - ( 16 Nov 2023 19:21 )   PT: 11.0 sec;   INR: 1.05 ratio         PTT - ( 16 Nov 2023 19:21 )  PTT:31.0 sec      IMAGING: Reviewed by me.     MRI, MRA head/neck Brain 1/17/23: Small vessel white matter ischemic changes. Acute infarcts in the posterior circulation involving the left occipital lobe, left cerebellum and bilateral brachium pontis, right adrienne. Chronic appearing infarct adrienne. Extremely poor flow identified in the basilar artery due to   what appears to be severe distal vertebral artery stenosis. Flow in the posterior communicating arteries and posterior cerebral arteries is too small to measure.    CTH 11/17: Stable posterior circulation infarctions.    CTH/ CTA neck 11/16: No intracranial hemorrhage is appreciated. No intracranial mass lesion is appreciated.Left occipital and right cerebellar acute to subacute infarctions. Left basal ganglia, left thalamic and central pontine remote infarctions    CTA head/Neck CTP 11/16:    1. Right carotid system:  Atherosclerotic plaque without hemodynamically significant stenosis.  2. Left carotid system:  Atherosclerotic plaque without hemodynamically significant stenosis.  3. Vertebral circulation:  Patent.  4. Anterior intracranial circulation:  Intracranial atherosclerosis cavernous and clinoid segments of the internal carotid arteries, mild to moderate, and middle cerebral arteries, moderate on the right and mild-to-moderate on the left.  5.  Posterior intracranial circulation:  Severe atherosclerotic disease has progressed since July 2023 with progressive diminished flow of the left vertebral artery, basilar artery and posterior cerebral arteries. There is likely reversal flow within the basilar artery to perfuse the most distal left vertebral artery and PICA arteries. Occlusion of the right vertebral artery was present in July 2023  6. Brain perfusion:  No acute infarction of the brain is convincingly demonstrated.  However, there is extensive ischemia within the posterior circulation.     THE PATIENT WAS SEEN AND EXAMINED BY ME WITH THE HOUSESTAFF AND STROKE TEAM DURING MORNING ROUNDS.   HPI:  43y right handed man PMH stroke (7/2023 - presenting with dysarthria - left inferior adrienne, b/l parieto-occipital juxtacortical WM w/mild residual dysarthria; 8 years ago - treated with TPA at [facility], Pt presented at that time with left hemiplegia, facial droop and dysarthria), T2DM, HTN, former smoker (quit in 7/2023), patient currently takes ASA 81mg daily every day. Was originally on DAPT (ASA + clopidogrel) after his last stroke in 7/2023, but saw Dr. Guzman in 11/2/2023, who recommended patient switch over to aspirin monotherapy.  who presented to [facility] ED on 11/16/2023, with c/o sudden difficulty walking, LLE weakness, speech changes. Per wife, patient having acute onset slurred speech (worse from baseline) and inability to stand/walk. This began at 17:00, 11/16/2023. On exam: patient has a L facial droop, ataxia in LUE (dysmetria with FTN), and struggles to perform HTS b/l. Patient also with pronounced slurred speech (moderate but is intelligible). Patient underwent NCCTH, which revealed no hemorrhage, but was initially read by Radiology as showing a nonacute left occipital infarct not seen on the CTH performed 7/26/2023. This infarct was not c/w patient's exam.  After risk/benefit discussion with patient and wife - tenecteplase was administered at 11/16/2023, 19:47. DTN time was delayed d/t patient needing to be taken up to first floor CT scanner from ED (ED scanner in use), needed to return to ED after scans for vitals including BP and weight. 30 minutes after administration, NIHSS 4->2 (mild left facial but improved and mild dysarthria). Patient reports voice is much better. Performs FTN without issue with LUE. Performs HTS b/l. L NLF flattening significantly improved. Unable to walk patient d/t safety concerns s/p tenecteplase (on bed rest). Pt walks with a cane, but is otherwise independent.  is able to climb stairs with the assistance of his cane.  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  On Sunday (11/12/2023) - patient was feeling ill. Constipated, vomited. Had some room spinning dizziness. Has been sleeping a lot and has been diaphoretic. Patient denies any alcohol use.  Patient smokes 1 joint of marijuana per night.  Patient currently lives with his wife and daughter. Patient denies fevers, chills, visual changes, neck pain, chest pain, palpitations, shortness of breath, abdominal pain, emesis.  Patient reports that he was feeling nauseated earlier.       SUBJECTIVE: No events overnight.  No new neurologic complaints, ROS reported negative unless otherwise noted.      atorvastatin 80 milliGRAM(s) Oral at bedtime  dextrose 5%. 1000 milliLiter(s) IV Continuous <Continuous>  dextrose 5%. 1000 milliLiter(s) IV Continuous <Continuous>  dextrose 50% Injectable 25 Gram(s) IV Push once  dextrose 50% Injectable 12.5 Gram(s) IV Push once  dextrose 50% Injectable 25 Gram(s) IV Push once  dextrose Oral Gel 15 Gram(s) Oral once PRN  enoxaparin Injectable 40 milliGRAM(s) SubCutaneous every 24 hours  escitalopram 10 milliGRAM(s) Oral daily  glucagon  Injectable 1 milliGRAM(s) IntraMuscular once  hydrALAZINE Injectable 5 milliGRAM(s) IV Push every 4 hours PRN  influenza   Vaccine 0.5 milliLiter(s) IntraMuscular once  insulin lispro (ADMELOG) corrective regimen sliding scale   SubCutaneous Before meals and at bedtime  sodium chloride 0.9%. 1000 milliLiter(s) IV Continuous <Continuous>  traZODone 50 milliGRAM(s) Oral at bedtime      PHYSICAL EXAM:   Vital Signs Last 24 Hrs  T(C): 37.1 (17 Nov 2023 20:00), Max: 37.3 (17 Nov 2023 13:00)  T(F): 98.8 (17 Nov 2023 20:00), Max: 99.1 (17 Nov 2023 13:00)  HR: 56 (18 Nov 2023 06:00) (56 - 90)  BP: 160/74 (18 Nov 2023 06:00) (153/99 - 180/102)  BP(mean): 106 (18 Nov 2023 06:00) (106 - 133)  RR: 17 (18 Nov 2023 06:00) (17 - 26)  SpO2: 98% (18 Nov 2023 06:00) (95% - 100%)    Parameters below as of 18 Nov 2023 06:00  Patient On (Oxygen Delivery Method): room air        General: No acute distress  HEENT: EOM intact, visual fields full  Abdomen: Soft, nontender, nondistended   Extremities: No edema    NEUROLOGICAL EXAM:  Mental status: Eyes open, awake, alert, oriented x3, fluent speech no neglect, able to follow commands  Cranial Nerves: Subtle left facial droop, no nystagmus, no dysarthria.  Motor exam: Normal tone, no drift, RUE 5/5, RLE 5/5, LUE 5/5, LLE 5/5,  Sensation: Intact to light touch   Coordination/ Gait: subtle left dysmetria, Rapid alternating movements mildly impaired L>R.       LABS:                        14.4   8.51  )-----------( 200      ( 18 Nov 2023 06:17 )             43.0    11-18    143  |  110<H>  |  18  ----------------------------<  113<H>  4.1   |  22  |  1.28    Ca    9.3      18 Nov 2023 06:17    TPro  7.4  /  Alb  4.8  /  TBili  0.7  /  DBili  x   /  AST  20  /  ALT  17  /  AlkPhos  57  11-16  PT/INR - ( 16 Nov 2023 19:21 )   PT: 11.0 sec;   INR: 1.05 ratio         PTT - ( 16 Nov 2023 19:21 )  PTT:31.0 sec      IMAGING: Reviewed by me.     MRI, MRA head/neck Brain 1/17/23: Small vessel white matter ischemic changes. Acute infarcts in the posterior circulation involving the left occipital lobe, left cerebellum and bilateral brachium pontis, right adrienne. Chronic appearing infarct adrienne. Extremely poor flow identified in the basilar artery due to   what appears to be severe distal vertebral artery stenosis. Flow in the posterior communicating arteries and posterior cerebral arteries is too small to measure.    CTH 11/17: Stable posterior circulation infarctions.    CTH/ CTA neck 11/16: No intracranial hemorrhage is appreciated. No intracranial mass lesion is appreciated.Left occipital and right cerebellar acute to subacute infarctions. Left basal ganglia, left thalamic and central pontine remote infarctions    CTA head/Neck CTP 11/16:    1. Right carotid system:  Atherosclerotic plaque without hemodynamically significant stenosis.  2. Left carotid system:  Atherosclerotic plaque without hemodynamically significant stenosis.  3. Vertebral circulation:  Patent.  4. Anterior intracranial circulation:  Intracranial atherosclerosis cavernous and clinoid segments of the internal carotid arteries, mild to moderate, and middle cerebral arteries, moderate on the right and mild-to-moderate on the left.  5.  Posterior intracranial circulation:  Severe atherosclerotic disease has progressed since July 2023 with progressive diminished flow of the left vertebral artery, basilar artery and posterior cerebral arteries. There is likely reversal flow within the basilar artery to perfuse the most distal left vertebral artery and PICA arteries. Occlusion of the right vertebral artery was present in July 2023  6. Brain perfusion:  No acute infarction of the brain is convincingly demonstrated.  However, there is extensive ischemia within the posterior circulation.

## 2023-11-18 NOTE — PROVIDER CONTACT NOTE (CHANGE IN STATUS NOTIFICATION) - SITUATION
pt with new complaints of numbness on L finger tips and tips of toes as well as new diplopia noted in RUQ visual field

## 2023-11-18 NOTE — PROGRESS NOTE ADULT - ASSESSMENT
ASSESSMENT: 43y right handed man PMH stroke (7/2023 - presenting with dysarthria - left inferior adrienne, b/l parieto-occipital juxtacortical WM w/mild residual dysarthria; 8 years ago - treated with TPA at [facility], Pt presented at that time with left hemiplegia, facial droop and dysarthria), T2DM, HTN, former smoker (quit in 7/2023), patient currently takes ASA 81mg daily every day. Was originally on DAPT (ASA + clopidogrel) Pt presented to [facility] ED on 11/16/2023, with c/o sudden difficulty walking, LLE weakness, speech changes. Pt is s/p tenecteplase    Impression: Left facial weakness, LUE ataxia, dysarthria, gait instability due to posterior circulation ischemic infarct:  Severe disease of posterior circulation involving vertebral, basilar and associated ischemia. mechanism: consider TENISHA vs. ESUS    NEURO: Neuro exam unchanged, Continue close monitoring for neurologic deterioration, permissive HTN with slow titration to normotensive, Plan for possible cerebral angiogram on 11/20/23 for possible recanalization/stent, titrate statin to LDL goal less than 70, MRI Brain w/o, MRA Head w/o and Neck w/contrast results as above. Physical therapy/OT eval with recommendations for AR    ANTITHROMBOTIC THERAPY:     PULMONARY: CXR clear, protecting airway, saturating well     CARDIOVASCULAR: check TTE, cardiac monitoring no events                              SBP goal: 110-180mmHg    GASTROINTESTINAL: Dysphagia screen  failed, S/S eval on 11/17 with recommendations for regular diet, tolerating well     Diet: Regular    RENAL: BUN/Cr stable, good urine output, urine toxicology positive for THC      Na Goal: Greater than 135     Mauricio: No    HEMATOLOGY: H/H stable, Platelets normal      DVT ppx:  LMWH     ID: afebrile, no leukocytosis     OTHER: Plan/goals of acre discussed with pt and pt's family at bedside    DISPOSITION: Rehab or home depending on PT eval once stable and workup is complete    CORE MEASURES:        Admission NIHSS: 4-->2     Tenecteplase: YES      LDL/HDL: 37/32     Depression Screen: P     Statin Therapy: Y     Dysphagia Screen:  FAIL     Smoking Former smoker quit 07/2023      Afib  NO     Stroke Education  YES    Obtain screening ultrasound in patients who meet 1 or more of the following criteria as patient is high risk for DVT/PE upon admission:   [] History of DVT/PE  []Hypercoagulable states (Factor V Leiden, Cancer, OCP, etc. )  []Prolonged immobility (hemiplegia/hemiparesis/post operative or any other extended immobilization)   [] Transferred from outside facility (Rehab or Long term care)  [] Age </= to 50 ASSESSMENT: 43y right handed man PMH stroke (7/2023 - presenting with dysarthria - left inferior adrienne, b/l parieto-occipital juxtacortical WM w/mild residual dysarthria; 8 years ago - treated with TPA at [facility], Pt presented at that time with left hemiplegia, facial droop and dysarthria), T2DM, HTN, former smoker (quit in 7/2023), patient currently takes ASA 81mg daily every day. Was originally on DAPT (ASA + clopidogrel) Pt presented to [facility] ED on 11/16/2023, with c/o sudden difficulty walking, LLE weakness, speech changes. Pt is s/p tenecteplase    Impression: Left facial weakness, LUE ataxia, dysarthria, gait instability due to posterior circulation ischemic infarct:  Severe disease of posterior circulation involving vertebral, basilar and associated ischemia. mechanism:  TENISHA     NEURO: Neuro exam unchanged, Continue close monitoring for neurologic deterioration, permissive HTN with slow titration to normotensive, Plan for possible cerebral angiogram on 11/20/23 for possible recanalization/stent, titrate statin to LDL goal less than 70, MRI Brain w/o, MRA Head w/o and Neck w/contrast results as above. Physical therapy/OT eval with recommendations for AR    ANTITHROMBOTIC THERAPY: ASA and Brilinta for ICAD for secondary stroke prevention, Loaded with Brilinta 180mg    PULMONARY: Protecting airway, saturating well     CARDIOVASCULAR: check TTE, cardiac monitoring no events                              SBP goal: 140-200mmHg    GASTROINTESTINAL: Dysphagia screen  failed, S/S eval on 11/17 with recommendations for regular diet, tolerating well     Diet: Regular    RENAL: BUN/Cr stable, good urine output, urine toxicology positive for THC      Na Goal: Greater than 135     Mauricio: No    HEMATOLOGY: H/H stable, Platelets normal      DVT ppx:  LMWH     ID: afebrile, no leukocytosis     OTHER: Plan/goals of acre discussed with pt and pt's family at bedside    DISPOSITION: Acute Rehab as per PT/OT eval once stable and workup is complete    CORE MEASURES:        Admission NIHSS: 4-->2     Tenecteplase: YES      LDL/HDL: 37/32     Depression Screen: 0     Statin Therapy: Y     Dysphagia Screen:  FAIL     Smoking Former smoker quit 07/2023      Afib  NO     Stroke Education  YES    Obtain screening ultrasound in patients who meet 1 or more of the following criteria as patient is high risk for DVT/PE upon admission:   [] History of DVT/PE  []Hypercoagulable states (Factor V Leiden, Cancer, OCP, etc. )  []Prolonged immobility (hemiplegia/hemiparesis/post operative or any other extended immobilization)   [] Transferred from outside facility (Rehab or Long term care)  [] Age </= to 50

## 2023-11-18 NOTE — PROGRESS NOTE ADULT - SUBJECTIVE AND OBJECTIVE BOX
Subjective: Patient seen and examined. No new events except as noted.   remains in Stroke unit     REVIEW OF SYSTEMS:    CONSTITUTIONAL: + weakness, fevers or chills  EYES/ENT: No visual changes;  No vertigo or throat pain   NECK: No pain or stiffness  RESPIRATORY: No cough, wheezing, hemoptysis; No shortness of breath  CARDIOVASCULAR: No chest pain or palpitations  GASTROINTESTINAL: No abdominal or epigastric pain. No nausea, vomiting, or hematemesis; No diarrhea or constipation. No melena or hematochezia.  GENITOURINARY: No dysuria, frequency or hematuria  NEUROLOGICAL: No numbness or weakness  SKIN: No itching, burning, rashes, or lesions   All other review of systems is negative unless indicated above.    MEDICATIONS:  MEDICATIONS  (STANDING):  aspirin  chewable 81 milliGRAM(s) Oral daily  atorvastatin 80 milliGRAM(s) Oral at bedtime  dextrose 5%. 1000 milliLiter(s) (50 mL/Hr) IV Continuous <Continuous>  dextrose 5%. 1000 milliLiter(s) (100 mL/Hr) IV Continuous <Continuous>  dextrose 50% Injectable 25 Gram(s) IV Push once  dextrose 50% Injectable 12.5 Gram(s) IV Push once  dextrose 50% Injectable 25 Gram(s) IV Push once  enoxaparin Injectable 40 milliGRAM(s) SubCutaneous every 24 hours  escitalopram 10 milliGRAM(s) Oral daily  glucagon  Injectable 1 milliGRAM(s) IntraMuscular once  influenza   Vaccine 0.5 milliLiter(s) IntraMuscular once  insulin lispro (ADMELOG) corrective regimen sliding scale   SubCutaneous Before meals and at bedtime  sodium chloride 0.9%. 1000 milliLiter(s) (75 mL/Hr) IV Continuous <Continuous>  traZODone 50 milliGRAM(s) Oral at bedtime      PHYSICAL EXAM:  T(C): 36.6 (11-18-23 @ 12:00), Max: 37.1 (11-17-23 @ 20:00)  HR: 70 (11-18-23 @ 18:00) (56 - 79)  BP: 179/96 (11-18-23 @ 18:00) (140/91 - 179/96)  RR: 24 (11-18-23 @ 18:00) (16 - 25)  SpO2: 97% (11-18-23 @ 18:00) (94% - 100%)  Wt(kg): --  I&O's Summary    17 Nov 2023 07:01  -  18 Nov 2023 07:00  --------------------------------------------------------  IN: 250 mL / OUT: 700 mL / NET: -450 mL    18 Nov 2023 07:01  -  18 Nov 2023 19:24  --------------------------------------------------------  IN: 0 mL / OUT: 1650 mL / NET: -1650 mL            Appearance: NAD  HEENT:  Dry  oral mucosa, PERRL, EOMI	  Lymphatic: No lymphadenopathy  Cardiovascular: Normal S1 S2, No JVD, No murmurs, No edema  Respiratory: Lungs clear to auscultation	  Psychiatry: A & O x 3, Mood & affect appropriate  Gastrointestinal:  Soft, Non-tender, + BS	  Skin: No rashes, No ecchymoses, No cyanosis	  Neurologic: Non-focal  Extremities: Normal range of motion, No clubbing, cyanosis or edema  Vascular: Peripheral pulses palpable 2+ bilaterally  Neurologic Exam:  Mental status:  Awake, Alert; Oriented to: person, place. Knows it is November 2023 but not exact date; Speech: moderate dysarthria; Repetition and naming: intact; Follows simple and complex commands; Attention/concentration: can spell WORLD forward but not backward; Recent and remote memory (including registration and recall): JULIETA; Fund of knowledge: intact    Cranial nerves - R pupil>L, both reactive, VFF on confrontational testing, EOM - inability to bury the sclera of the R eye with rightward gaze, L upper eyelid ptosis, face sensation (V1-V3) intact b/l, facial strength - L facial droop, hearing intact b/l with finger rub test, palate with symmetric elevation, trapezius 5/5 strength b/l, tongue midline on protrusion with full lateral movement    Motor - Normal bulk and tone throughout. No pronator drift.  Strength testing (R/L)  Deltoid:  5/5   Biceps:  5/5   Triceps:  5/5   Wrist Extension:  5/5   Wrist Flexion:  5/5   Interossei:  5/5   :  5/5  Hip Flexion:  5/5   Hip Extension:  5/5   Knee Flexion:  5/5   Knee Extension:  5/5   Dorsiflexion:  5/5   Plantar Flexion:  5/5    Sensation - Light touch intact throughout    DTRs (R/L)  3+ upper/lower  b/l sustained ankle clonus    Coordination - Finger to Nose, Heel to Shin intact b/l. No tremors appreciated.    Gait and station - Patient unable to stand with holding onto the sides of transport chair during     LABS:    CARDIAC MARKERS:                                14.4   8.51  )-----------( 200      ( 18 Nov 2023 06:17 )             43.0     11-18    143  |  110<H>  |  18  ----------------------------<  113<H>  4.1   |  22  |  1.28    Ca    9.3      18 Nov 2023 06:17        TELEMETRY: 	SR    ECG:  	  RADIOLOGY: < from: MR Angio Neck No Cont (11.17.23 @ 15:31) >    ACC: [id number] EXAM:  MR ANGIO BRAIN   ORDERED BY:  ZARI RICHMOND     ACC: [id number] EXAM:  MR ANGIO NECK   ORDERED BY:  ZARI RICHMOND     ACC: [id number] EXAM:  MR BRAIN   ORDERED BY:  DIOGO TANNER     PROCEDURE DATE:  11/17/2023          INTERPRETATION:  CLINICAL INDICATION: Left-sided facial weakness, left   upper and lower extremity weakness, ataxia, slurred speech      Magnetic resonance imaging of the brain was carried out with transaxial   SPGR, FLAIR, fast spin echo T2 weighted images, axial susceptibility   weighted series, diffusion weighted series and sagittal T1 weighted   series on a 1.5 Muna magnet.    Comparison is made with the prior CT/CTA of 11/16/2023.    The fourth, third and lateral ventricles are normal in size and position.   There is no hemorrhage, mass or shift of the midline structures.   Scattered small vessel white matter ischemic changes are noted. There are   acute infarcts on diffusion-weighted imaging in the left occipital lobe,   the left cerebellum at the border zone between the left posterior   inferior cerebellar artery and anterior inferior cerebellar artery.   Nonacute brainstem ischemic change at the base of the adrienne is also   identified.    A 2 D and 3-D axial noncontrast MRA were performed on the cervical and   intracranial vessels, respectively. Intravascular flow quantification was   performed using gated 2D phase contrast MR, imaged perpendicular to the   vessel axis.  Images were post processed NOVA software and a NOVA flow   study report is available.    There is poor flow in the vertebral arteries bilaterally with severe   stenosis at the V4 segments. There is poor flow identified in the basilar   artery with predominantly retrograde flow. The posterior communicating   arteries are 2 small to be measured. Minimal flow is identified in the   right posterior cerebral artery, the left posterior cerebral artery flow   was not measurable.    Flow is as follows in milliliters per minute.    RCCA 717, BALAJI 364, RMCA 180, RACA 118, RACA2 126    LCCA 634, LICA 309, LMCA 187, LACA 98, LACA2 106    RVA 29, LVA 9, BA prox 2, BA mid -15(retrograde), BA distal   -13(retrograde), RPCA 12    IMPRESSION: Small vessel white matter ischemic changes. Acute infarcts in   the posterior circulation involving the left occipital lobe, left   cerebellum and bilateral brachium pontis, right adrienne. Chronic appearing   infarct adrienne. Extremely poor flow identified in the basilar artery due to   what appears to be severe distal vertebral artery stenosis. Flow in the   posterior communicating arteries and posterior cerebral arteries is too   small to measure.      Dr. Sanchez discussed these findings with neurology resident on 11/17/2023   3:34 PM with read back.  --- End of Report ---            AXEL SANCHEZ MD; Attending Radiologist  This document has been electronically signed. Nov 17 2023  3:34PM    < end of copied text >    DIAGNOSTIC TESTING:  [ ] Echocardiogram:  [ ]  Catheterization:  [ ] Stress Test:    OTHER:

## 2023-11-18 NOTE — PROVIDER CONTACT NOTE (CHANGE IN STATUS NOTIFICATION) - ASSESSMENT
NIHH exam from 3 to 4. new numbness on L finger tips and tips of toes. New diplopia noted in RUQ visual field.  /85 HR 71 T 36.6 SPO2 99% RR 17

## 2023-11-18 NOTE — PROGRESS NOTE ADULT - NS ATTEND AMEND GEN_ALL_CORE FT
I saw and examined the patient, reviewed diagnostic studies, and reviewed images personally. I agree with PA’s history, exam, orders placed, and plan of care. Medical issues needing to be addressed include: Cerebral ischemia/infarction, hx of stroke, dysarthria, L hemiparesis, facial droop, DM2, HTN, former smoker who quit in 7/2023 s/p counseling). s/p tenecteplase NIHSS 4->2. Severe disease of posterior circ involving vert, basilar and associated ischemia. Was on ASA/plavix for 3 months since July - disease progression with recurrent stroke, ASA/Brilinta per [facility]OTT. high dose statin. pending angio +/- intervention- to be discussed.  SBP goal 140-200. Stable today.

## 2023-11-18 NOTE — PROGRESS NOTE ADULT - ASSESSMENT
43M hx HTN, DM, prior posterior circulation CVA (most recent 7/2023) on DAPT p/w L facial, dysarthria, LUE dysmetria. TNK 11/16 7:47pm, NIHSS 4->2. 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 Exam: mild L facial, moderate LUE dysmetria to FTN. Some expressive aphasia/confusion, but can understand complex commands, memory intact. O/w intact AOx3.  -MRI/MRA and NOVA  -Pre-op possible Angio w possible recanalization/stenting pending imaging/discussion 43M hx HTN, DM, prior posterior circulation CVA (most recent 7/2023) on DAPT p/w L facial, dysarthria, LUE dysmetria. TNK 11/16 7:47pm, NIHSS 4->2. 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 Exam: mild L facial, moderate LUE dysmetria to FTN. Some expressive aphasia/confusion, but can understand complex commands, memory intact. O/w intact AOx3.  -MRI/MRA and NOVA done w/ acute infarcts in posterior circulation involving L occipital lobe, cerebellum, b/l brachium pontis, and R adrienne. Poor flow in basilar 2/2 severe distal vertebral artery stenosis. Flow in pcomms and PCAs too low to measure  -Pre-op possible angio w possible recanalization/stenting pending further discussion

## 2023-11-18 NOTE — PROGRESS NOTE ADULT - SUBJECTIVE AND OBJECTIVE BOX
Patient seen and examined at bedside.    --Anticoagulation--  enoxaparin Injectable 40 milliGRAM(s) SubCutaneous every 24 hours    T(C): 37.1 (11-17-23 @ 20:00), Max: 37.3 (11-17-23 @ 13:00)  HR: 56 (11-18-23 @ 06:00) (56 - 90)  BP: 160/74 (11-18-23 @ 06:00) (153/99 - 180/102)  RR: 17 (11-18-23 @ 06:00) (17 - 26)  SpO2: 98% (11-18-23 @ 06:00) (95% - 100%)  Wt(kg): --    Exam: mild L facial, moderate LUE dysmetria to FTN. Some expressive aphasia/confusion, but can understand complex commands, memory intact. O/w intact AOx3.

## 2023-11-19 NOTE — PROGRESS NOTE ADULT - SUBJECTIVE AND OBJECTIVE BOX
Subjective: Patient seen and examined. No new events except as noted.   remains in Stroke uint   plan for cerebral angio tomorrow   no cp or sob     REVIEW OF SYSTEMS:    CONSTITUTIONAL: + weakness, fevers or chills  EYES/ENT: No visual changes;  No vertigo or throat pain   NECK: No pain or stiffness  RESPIRATORY: No cough, wheezing, hemoptysis; No shortness of breath  CARDIOVASCULAR: No chest pain or palpitations  GASTROINTESTINAL: No abdominal or epigastric pain. No nausea, vomiting, or hematemesis; No diarrhea or constipation. No melena or hematochezia.  GENITOURINARY: No dysuria, frequency or hematuria  NEUROLOGICAL: No numbness or weakness  SKIN: No itching, burning, rashes, or lesions   All other review of systems is negative unless indicated above.    MEDICATIONS:  MEDICATIONS  (STANDING):  aspirin  chewable 81 milliGRAM(s) Oral daily  atorvastatin 80 milliGRAM(s) Oral at bedtime  dextrose 5%. 1000 milliLiter(s) (50 mL/Hr) IV Continuous <Continuous>  dextrose 5%. 1000 milliLiter(s) (100 mL/Hr) IV Continuous <Continuous>  dextrose 50% Injectable 25 Gram(s) IV Push once  dextrose 50% Injectable 12.5 Gram(s) IV Push once  dextrose 50% Injectable 25 Gram(s) IV Push once  enoxaparin Injectable 40 milliGRAM(s) SubCutaneous every 24 hours  escitalopram 10 milliGRAM(s) Oral daily  glucagon  Injectable 1 milliGRAM(s) IntraMuscular once  influenza   Vaccine 0.5 milliLiter(s) IntraMuscular once  insulin lispro (ADMELOG) corrective regimen sliding scale   SubCutaneous Before meals and at bedtime  sodium chloride 0.9%. 1000 milliLiter(s) (75 mL/Hr) IV Continuous <Continuous>  ticagrelor 90 milliGRAM(s) Oral two times a day  traZODone 50 milliGRAM(s) Oral at bedtime      PHYSICAL EXAM:  T(C): 36.9 (11-19-23 @ 08:00), Max: 36.9 (11-19-23 @ 08:00)  HR: 72 (11-19-23 @ 08:00) (63 - 82)  BP: 106/52 (11-19-23 @ 08:00) (106/52 - 179/96)  RR: 23 (11-19-23 @ 08:00) (16 - 25)  SpO2: 100% (11-19-23 @ 08:00) (94% - 100%)  Wt(kg): --  I&O's Summary    18 Nov 2023 07:01  -  19 Nov 2023 07:00  --------------------------------------------------------  IN: 1260 mL / OUT: 1650 mL / NET: -390 mL            Appearance: NAD  HEENT:  Dry  oral mucosa, PERRL, EOMI	  Lymphatic: No lymphadenopathy  Cardiovascular: Normal S1 S2, No JVD, No murmurs, No edema  Respiratory: Lungs clear to auscultation	  Psychiatry: A & O x 3, Mood & affect appropriate  Gastrointestinal:  Soft, Non-tender, + BS	  Skin: No rashes, No ecchymoses, No cyanosis	  Neurologic: Non-focal  Extremities: Normal range of motion, No clubbing, cyanosis or edema  Vascular: Peripheral pulses palpable 2+ bilaterally  Neurologic Exam:  Mental status:  Awake, Alert; Oriented to: person, place. Knows it is November 2023 but not exact date; Speech: moderate dysarthria; Repetition and naming: intact; Follows simple and complex commands; Attention/concentration: can spell WORLD forward but not backward; Recent and remote memory (including registration and recall): JULIETA; Fund of knowledge: intact    Cranial nerves - R pupil>L, both reactive, VFF on confrontational testing, EOM - inability to bury the sclera of the R eye with rightward gaze, L upper eyelid ptosis, face sensation (V1-V3) intact b/l, facial strength - L facial droop, hearing intact b/l with finger rub test, palate with symmetric elevation, trapezius 5/5 strength b/l, tongue midline on protrusion with full lateral movement    Motor - Normal bulk and tone throughout. No pronator drift.  Strength testing (R/L)  Deltoid:  5/5   Biceps:  5/5   Triceps:  5/5   Wrist Extension:  5/5   Wrist Flexion:  5/5   Interossei:  5/5   :  5/5  Hip Flexion:  5/5   Hip Extension:  5/5   Knee Flexion:  5/5   Knee Extension:  5/5   Dorsiflexion:  5/5   Plantar Flexion:  5/5    Sensation - Light touch intact throughout    DTRs (R/L)  3+ upper/lower  b/l sustained ankle clonus    Coordination - Finger to Nose, Heel to Shin intact b/l. No tremors appreciated.    Gait and station - Patient unable to stand with holding onto the sides of transport chair during         LABS:    CARDIAC MARKERS:                                14.9   7.75  )-----------( 187      ( 19 Nov 2023 06:20 )             43.6     11-19    142  |  109<H>  |  18  ----------------------------<  119<H>  4.2   |  22  |  1.21    Ca    8.9      19 Nov 2023 06:20        TELEMETRY: 	 SR   ECG:  	  RADIOLOGY:   DIAGNOSTIC TESTING:  [ ] Echocardiogram:  [ ]  Catheterization:  [ ] Stress Test:    OTHER:

## 2023-11-19 NOTE — PROGRESS NOTE ADULT - SUBJECTIVE AND OBJECTIVE BOX
THE PATIENT WAS SEEN AND EXAMINED BY ME WITH THE HOUSESTAFF AND STROKE TEAM DURING MORNING ROUNDS.   HPI:  43y right handed man PMH stroke (7/2023 - presenting with dysarthria - left inferior adrienne, b/l parieto-occipital juxtacortical WM w/mild residual dysarthria; 8 years ago - treated with TPA at [facility], Pt presented at that time with left hemiplegia, facial droop and dysarthria), T2DM, HTN, former smoker (quit in 7/2023), patient currently takes ASA 81mg daily every day. Was originally on DAPT (ASA + clopidogrel) after his last stroke in 7/2023, but saw Dr. Guzman in 11/2/2023, who recommended patient switch over to aspirin monotherapy.  who presented to [facility] ED on 11/16/2023, with c/o sudden difficulty walking, LLE weakness, speech changes. Per wife, patient having acute onset slurred speech (worse from baseline) and inability to stand/walk. This began at 17:00, 11/16/2023. On exam: patient has a L facial droop, ataxia in LUE (dysmetria with FTN), and struggles to perform HTS b/l. Patient also with pronounced slurred speech (moderate but is intelligible). Patient underwent NCCTH, which revealed no hemorrhage, but was initially read by Radiology as showing a nonacute left occipital infarct not seen on the CTH performed 7/26/2023. This infarct was not c/w patient's exam.  After risk/benefit discussion with patient and wife - tenecteplase was administered at 11/16/2023, 19:47. DTN time was delayed d/t patient needing to be taken up to first floor CT scanner from ED (ED scanner in use), needed to return to ED after scans for vitals including BP and weight. 30 minutes after administration, NIHSS 4->2 (mild left facial but improved and mild dysarthria). Patient reports voice is much better. Performs FTN without issue with LUE. Performs HTS b/l. L NLF flattening significantly improved. Unable to walk patient d/t safety concerns s/p tenecteplase (on bed rest). Pt walks with a cane, but is otherwise independent.  is able to climb stairs with the assistance of his cane.  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  On Sunday (11/12/2023) - patient was feeling ill. Constipated, vomited. Had some room spinning dizziness. Has been sleeping a lot and has been diaphoretic. Patient denies any alcohol use.  Patient smokes 1 joint of marijuana per night.  Patient currently lives with his wife and daughter. Patient denies fevers, chills, visual changes, neck pain, chest pain, palpitations, shortness of breath, abdominal pain, emesis.  Patient reports that he was feeling nauseated earlier.         SUBJECTIVE: No events overnight.  No new neurologic complaints, ROS reported negative unless otherwise noted.      acetaminophen     Tablet .. 650 milliGRAM(s) Oral every 6 hours PRN  aspirin  chewable 81 milliGRAM(s) Oral daily  atorvastatin 80 milliGRAM(s) Oral at bedtime  dextrose 5%. 1000 milliLiter(s) IV Continuous <Continuous>  dextrose 5%. 1000 milliLiter(s) IV Continuous <Continuous>  dextrose 50% Injectable 25 Gram(s) IV Push once  dextrose 50% Injectable 12.5 Gram(s) IV Push once  dextrose 50% Injectable 25 Gram(s) IV Push once  dextrose Oral Gel 15 Gram(s) Oral once PRN  enoxaparin Injectable 40 milliGRAM(s) SubCutaneous every 24 hours  escitalopram 10 milliGRAM(s) Oral daily  glucagon  Injectable 1 milliGRAM(s) IntraMuscular once  hydrALAZINE Injectable 5 milliGRAM(s) IV Push every 4 hours PRN  influenza   Vaccine 0.5 milliLiter(s) IntraMuscular once  insulin lispro (ADMELOG) corrective regimen sliding scale   SubCutaneous Before meals and at bedtime  sodium chloride 0.9%. 1000 milliLiter(s) IV Continuous <Continuous>  ticagrelor 90 milliGRAM(s) Oral two times a day  traZODone 50 milliGRAM(s) Oral at bedtime      PHYSICAL EXAM:   Vital Signs Last 24 Hrs  T(C): 36.9 (19 Nov 2023 08:00), Max: 36.9 (19 Nov 2023 08:00)  T(F): 98.4 (19 Nov 2023 08:00), Max: 98.4 (19 Nov 2023 08:00)  HR: 64 (19 Nov 2023 10:13) (59 - 82)  BP: 122/55 (19 Nov 2023 10:13) (93/53 - 179/96)  BP(mean): 79 (19 Nov 2023 10:13) (70 - 129)  RR: 18 (19 Nov 2023 10:13) (17 - 25)  SpO2: 100% (19 Nov 2023 10:13) (94% - 100%)    Parameters below as of 19 Nov 2023 10:13  Patient On (Oxygen Delivery Method): BiPAP/CPAP        General: No acute distress  HEENT: EOM intact, visual fields full  Abdomen: Soft, nontender, nondistended   Extremities: No edema    NEUROLOGICAL EXAM:  Mental status: Eyes open, awake, alert, oriented x3, fluent speech no neglect, able to follow commands  Cranial Nerves: Subtle left facial droop, no nystagmus, no dysarthria.  Motor exam: Normal tone, no drift, RUE 5/5, RLE 5/5, LUE 5/5, LLE 5/5,  Sensation: Intact to light touch   Coordination/ Gait: subtle left dysmetria, Rapid alternating movements mildly impaired L>R.    LABS:                        14.9   7.75  )-----------( 187      ( 19 Nov 2023 06:20 )             43.6    11-19    142  |  109<H>  |  18  ----------------------------<  119<H>  4.2   |  22  |  1.21    Ca    8.9      19 Nov 2023 06:20          IMAGING: Reviewed by me.     MRI, MRA head/neck Brain 1/17/23: Small vessel white matter ischemic changes. Acute infarcts in the posterior circulation involving the left occipital lobe, left cerebellum and bilateral brachium pontis, right adrienne. Chronic appearing infarct adrienne. Extremely poor flow identified in the basilar artery due to   what appears to be severe distal vertebral artery stenosis. Flow in the posterior communicating arteries and posterior cerebral arteries is too small to measure.    CTH 11/17: Stable posterior circulation infarctions.    CTH/ CTA neck 11/16: No intracranial hemorrhage is appreciated. No intracranial mass lesion is appreciated.Left occipital and right cerebellar acute to subacute infarctions. Left basal ganglia, left thalamic and central pontine remote infarctions    CTA head/Neck CTP 11/16:    1. Right carotid system:  Atherosclerotic plaque without hemodynamically significant stenosis.  2. Left carotid system:  Atherosclerotic plaque without hemodynamically significant stenosis.  3. Vertebral circulation:  Patent.  4. Anterior intracranial circulation:  Intracranial atherosclerosis cavernous and clinoid segments of the internal carotid arteries, mild to moderate, and middle cerebral arteries, moderate on the right and mild-to-moderate on the left.  5.  Posterior intracranial circulation:  Severe atherosclerotic disease has progressed since July 2023 with progressive diminished flow of the left vertebral artery, basilar artery and posterior cerebral arteries. There is likely reversal flow within the basilar artery to perfuse the most distal left vertebral artery and PICA arteries. Occlusion of the right vertebral artery was present in July 2023  6. Brain perfusion:  No acute infarction of the brain is convincingly demonstrated.  However, there is extensive ischemia within the posterior circulation.       THE PATIENT WAS SEEN AND EXAMINED BY ME WITH THE HOUSESTAFF AND STROKE TEAM DURING MORNING ROUNDS.   HPI:  43y right handed man PMH stroke (7/2023 - presenting with dysarthria - left inferior adrienne, b/l parieto-occipital juxtacortical WM w/mild residual dysarthria; 8 years ago - treated with TPA at [facility] [place], Pt presented at that time with left hemiplegia, facial droop and dysarthria), T2DM, HTN, former smoker (quit in 7/2023), patient currently takes ASA 81mg daily every day. Was originally on DAPT (ASA + clopidogrel) after his last stroke in 7/2023, but saw Dr. Guzman in 11/2/2023, who recommended patient switch over to aspirin monotherapy.  who presented to [facility] ED on 11/16/2023, with c/o sudden difficulty walking, LLE weakness, speech changes. Per wife, patient having acute onset slurred speech (worse from baseline) and inability to stand/walk. This began at 17:00, 11/16/2023. On exam: patient has a L facial droop, ataxia in LUE (dysmetria with FTN), and struggles to perform HTS b/l. Patient also with pronounced slurred speech (moderate but is intelligible). Patient underwent NCCTH, which revealed no hemorrhage, but was initially read by Radiology as showing a nonacute left occipital infarct not seen on the CTH performed 7/26/2023. This infarct was not c/w patient's exam.  After risk/benefit discussion with patient and wife - tenecteplase was administered at 11/16/2023, 19:47. DTN time was delayed d/t patient needing to be taken up to first floor CT scanner from ED (ED scanner in use), needed to return to ED after scans for vitals including BP and weight. 30 minutes after administration, NIHSS 4->2 (mild left facial but improved and mild dysarthria). Patient reports voice is much better. Performs FTN without issue with LUE. Performs HTS b/l. L NLF flattening significantly improved. Unable to walk patient d/t safety concerns s/p tenecteplase (on bed rest). Pt walks with a cane, but is otherwise independent.  is able to climb stairs with the assistance of his cane.  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  On Sunday (11/12/2023) - patient was feeling ill. Constipated, vomited. Had some room spinning dizziness. Has been sleeping a lot and has been diaphoretic. Patient denies any alcohol use.  Patient smokes 1 joint of marijuana per night.  Patient currently lives with his wife and daughter. Patient denies fevers, chills, visual changes, neck pain, chest pain, palpitations, shortness of breath, abdominal pain, emesis.  Patient reports that he was feeling nauseated earlier.         SUBJECTIVE: No events overnight.  No new neurologic complaints, ROS reported negative unless otherwise noted.      acetaminophen     Tablet .. 650 milliGRAM(s) Oral every 6 hours PRN  aspirin  chewable 81 milliGRAM(s) Oral daily  atorvastatin 80 milliGRAM(s) Oral at bedtime  dextrose 5%. 1000 milliLiter(s) IV Continuous <Continuous>  dextrose 5%. 1000 milliLiter(s) IV Continuous <Continuous>  dextrose 50% Injectable 25 Gram(s) IV Push once  dextrose 50% Injectable 12.5 Gram(s) IV Push once  dextrose 50% Injectable 25 Gram(s) IV Push once  dextrose Oral Gel 15 Gram(s) Oral once PRN  enoxaparin Injectable 40 milliGRAM(s) SubCutaneous every 24 hours  escitalopram 10 milliGRAM(s) Oral daily  glucagon  Injectable 1 milliGRAM(s) IntraMuscular once  hydrALAZINE Injectable 5 milliGRAM(s) IV Push every 4 hours PRN  influenza   Vaccine 0.5 milliLiter(s) IntraMuscular once  insulin lispro (ADMELOG) corrective regimen sliding scale   SubCutaneous Before meals and at bedtime  sodium chloride 0.9%. 1000 milliLiter(s) IV Continuous <Continuous>  ticagrelor 90 milliGRAM(s) Oral two times a day  traZODone 50 milliGRAM(s) Oral at bedtime      PHYSICAL EXAM:   Vital Signs Last 24 Hrs  T(C): 36.9 (19 Nov 2023 08:00), Max: 36.9 (19 Nov 2023 08:00)  T(F): 98.4 (19 Nov 2023 08:00), Max: 98.4 (19 Nov 2023 08:00)  HR: 64 (19 Nov 2023 10:13) (59 - 82)  BP: 122/55 (19 Nov 2023 10:13) (93/53 - 179/96)  BP(mean): 79 (19 Nov 2023 10:13) (70 - 129)  RR: 18 (19 Nov 2023 10:13) (17 - 25)  SpO2: 100% (19 Nov 2023 10:13) (94% - 100%)    Parameters below as of 19 Nov 2023 10:13  Patient On (Oxygen Delivery Method): BiPAP/CPAP        General: No acute distress  HEENT: EOM intact, visual fields full  Abdomen: Soft, nontender, nondistended   Extremities: No edema    NEUROLOGICAL EXAM:  Mental status: Eyes closed, opens eyes to light touch and voice for a few minutes, drowsiness, awake, alert, oriented x4, fluent speech no neglect, able to follow commands  Cranial Nerves: Subtle left facial droop, no nystagmus, no dysarthria.  Motor exam: Normal tone, no drift, RUE 5/5, RLE 5/5, LUE 5/5, LLE 5/5,  Sensation: Intact to light touch   Coordination/ Gait: subtle left dysmetria, rapid alternating movements mildly impaired L>R.    LABS:                        14.9   7.75  )-----------( 187      ( 19 Nov 2023 06:20 )             43.6    11-19    142  |  109<H>  |  18  ----------------------------<  119<H>  4.2   |  22  |  1.21    Ca    8.9      19 Nov 2023 06:20          IMAGING: Reviewed by me.     CTH 11/19/23: Evolving acute infarcts in the posterior circulation are unchanged from 11/17/2023.  No new intracranial findings.     MRI, MRA head/neck Brain 1/17/23: Small vessel white matter ischemic changes. Acute infarcts in the posterior circulation involving the left occipital lobe, left cerebellum and bilateral brachium pontis, right adrienne. Chronic appearing infarct adrienne. Extremely poor flow identified in the basilar artery due to   what appears to be severe distal vertebral artery stenosis. Flow in the posterior communicating arteries and posterior cerebral arteries is too small to measure.    CTH 11/17: Stable posterior circulation infarctions.    CTH/ CTA neck 11/16: No intracranial hemorrhage is appreciated. No intracranial mass lesion is appreciated.Left occipital and right cerebellar acute to subacute infarctions. Left basal ganglia, left thalamic and central pontine remote infarctions    CTA head/Neck CTP 11/16:    1. Right carotid system:  Atherosclerotic plaque without hemodynamically significant stenosis.  2. Left carotid system:  Atherosclerotic plaque without hemodynamically significant stenosis.  3. Vertebral circulation:  Patent.  4. Anterior intracranial circulation:  Intracranial atherosclerosis cavernous and clinoid segments of the internal carotid arteries, mild to moderate, and middle cerebral arteries, moderate on the right and mild-to-moderate on the left.  5.  Posterior intracranial circulation:  Severe atherosclerotic disease has progressed since July 2023 with progressive diminished flow of the left vertebral artery, basilar artery and posterior cerebral arteries. There is likely reversal flow within the basilar artery to perfuse the most distal left vertebral artery and PICA arteries. Occlusion of the right vertebral artery was present in July 2023  6. Brain perfusion:  No acute infarction of the brain is convincingly demonstrated.  However, there is extensive ischemia within the posterior circulation.       THE PATIENT WAS SEEN AND EXAMINED BY ME WITH THE HOUSESTAFF AND STROKE TEAM DURING MORNING ROUNDS.   HPI:  43y right handed man PMH stroke (7/2023 - presenting with dysarthria - left inferior adrienne, b/l parieto-occipital juxtacortical WM w/mild residual dysarthria; 8 years ago - treated with TPA at [facility], Pt presented at that time with left hemiplegia, facial droop and dysarthria), T2DM, HTN, former smoker (quit in 7/2023), patient currently takes ASA 81mg daily every day. Was originally on DAPT (ASA + clopidogrel) after his last stroke in 7/2023, but saw Dr. Guzman in 11/2/2023, who recommended patient switch over to aspirin monotherapy.  who presented to [facility] ED on 11/16/2023, with c/o sudden difficulty walking, LLE weakness, speech changes. Per wife, patient having acute onset slurred speech (worse from baseline) and inability to stand/walk. This began at 17:00, 11/16/2023. On exam: patient has a L facial droop, ataxia in LUE (dysmetria with FTN), and struggles to perform HTS b/l. Patient also with pronounced slurred speech (moderate but is intelligible). Patient underwent NCCTH, which revealed no hemorrhage, but was initially read by Radiology as showing a nonacute left occipital infarct not seen on the CTH performed 7/26/2023. This infarct was not c/w patient's exam.  After risk/benefit discussion with patient and wife - tenecteplase was administered at 11/16/2023, 19:47. DTN time was delayed d/t patient needing to be taken up to first floor CT scanner from ED (ED scanner in use), needed to return to ED after scans for vitals including BP and weight. 30 minutes after administration, NIHSS 4->2 (mild left facial but improved and mild dysarthria). Patient reports voice is much better. Performs FTN without issue with LUE. Performs HTS b/l. L NLF flattening significantly improved. Unable to walk patient d/t safety concerns s/p tenecteplase (on bed rest). Pt walks with a cane, but is otherwise independent.  is able to climb stairs with the assistance of his cane.  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  On Sunday (11/12/2023) - patient was feeling ill. Constipated, vomited. Had some room spinning dizziness. Has been sleeping a lot and has been diaphoretic. Patient denies any alcohol use.  Patient smokes 1 joint of marijuana per night.  Patient currently lives with his wife and daughter. Patient denies fevers, chills, visual changes, neck pain, chest pain, palpitations, shortness of breath, abdominal pain, emesis.  Patient reports that he was feeling nauseated earlier.         SUBJECTIVE: No events overnight.  No new neurologic complaints, ROS reported negative unless otherwise noted.      acetaminophen     Tablet .. 650 milliGRAM(s) Oral every 6 hours PRN  aspirin  chewable 81 milliGRAM(s) Oral daily  atorvastatin 80 milliGRAM(s) Oral at bedtime  dextrose 5%. 1000 milliLiter(s) IV Continuous <Continuous>  dextrose 5%. 1000 milliLiter(s) IV Continuous <Continuous>  dextrose 50% Injectable 25 Gram(s) IV Push once  dextrose 50% Injectable 12.5 Gram(s) IV Push once  dextrose 50% Injectable 25 Gram(s) IV Push once  dextrose Oral Gel 15 Gram(s) Oral once PRN  enoxaparin Injectable 40 milliGRAM(s) SubCutaneous every 24 hours  escitalopram 10 milliGRAM(s) Oral daily  glucagon  Injectable 1 milliGRAM(s) IntraMuscular once  hydrALAZINE Injectable 5 milliGRAM(s) IV Push every 4 hours PRN  influenza   Vaccine 0.5 milliLiter(s) IntraMuscular once  insulin lispro (ADMELOG) corrective regimen sliding scale   SubCutaneous Before meals and at bedtime  sodium chloride 0.9%. 1000 milliLiter(s) IV Continuous <Continuous>  ticagrelor 90 milliGRAM(s) Oral two times a day  traZODone 50 milliGRAM(s) Oral at bedtime      PHYSICAL EXAM:   Vital Signs Last 24 Hrs  T(C): 36.9 (19 Nov 2023 08:00), Max: 36.9 (19 Nov 2023 08:00)  T(F): 98.4 (19 Nov 2023 08:00), Max: 98.4 (19 Nov 2023 08:00)  HR: 64 (19 Nov 2023 10:13) (59 - 82)  BP: 122/55 (19 Nov 2023 10:13) (93/53 - 179/96)  BP(mean): 79 (19 Nov 2023 10:13) (70 - 129)  RR: 18 (19 Nov 2023 10:13) (17 - 25)  SpO2: 100% (19 Nov 2023 10:13) (94% - 100%)    Parameters below as of 19 Nov 2023 10:13  Patient On (Oxygen Delivery Method): BiPAP/CPAP        General: No acute distress  HEENT: EOM intact, visual fields full  Abdomen: Soft, nontender, nondistended   Extremities: No edema    NEUROLOGICAL EXAM:  Mental status: Eyes closed, opens eyes to light touch and voice for a few minutes, drowsiness, awake, alert, oriented x4, fluent speech no neglect, able to follow commands  Cranial Nerves: Subtle left facial droop, dysconjugate gaze, no nystagmus, no dysarthria.  Motor exam: Normal tone, no drift, RUE 5/5, RLE 5/5, LUE 5/5, LLE 5/5,  Sensation: Intact to light touch   Coordination/ Gait: subtle left dysmetria, rapid alternating movements mildly impaired L>R.    LABS:                        14.9   7.75  )-----------( 187      ( 19 Nov 2023 06:20 )             43.6    11-19    142  |  109<H>  |  18  ----------------------------<  119<H>  4.2   |  22  |  1.21    Ca    8.9      19 Nov 2023 06:20          IMAGING: Reviewed by me.     CTH 11/19/23: Evolving acute infarcts in the posterior circulation are unchanged from 11/17/2023.  No new intracranial findings.     MRI, MRA head/neck Brain 1/17/23: Small vessel white matter ischemic changes. Acute infarcts in the posterior circulation involving the left occipital lobe, left cerebellum and bilateral brachium pontis, right adrienne. Chronic appearing infarct adrienne. Extremely poor flow identified in the basilar artery due to   what appears to be severe distal vertebral artery stenosis. Flow in the posterior communicating arteries and posterior cerebral arteries is too small to measure.    CTH 11/17: Stable posterior circulation infarctions.    CTH/ CTA neck 11/16: No intracranial hemorrhage is appreciated. No intracranial mass lesion is appreciated.Left occipital and right cerebellar acute to subacute infarctions. Left basal ganglia, left thalamic and central pontine remote infarctions    CTA head/Neck CTP 11/16:    1. Right carotid system:  Atherosclerotic plaque without hemodynamically significant stenosis.  2. Left carotid system:  Atherosclerotic plaque without hemodynamically significant stenosis.  3. Vertebral circulation:  Patent.  4. Anterior intracranial circulation:  Intracranial atherosclerosis cavernous and clinoid segments of the internal carotid arteries, mild to moderate, and middle cerebral arteries, moderate on the right and mild-to-moderate on the left.  5.  Posterior intracranial circulation:  Severe atherosclerotic disease has progressed since July 2023 with progressive diminished flow of the left vertebral artery, basilar artery and posterior cerebral arteries. There is likely reversal flow within the basilar artery to perfuse the most distal left vertebral artery and PICA arteries. Occlusion of the right vertebral artery was present in July 2023  6. Brain perfusion:  No acute infarction of the brain is convincingly demonstrated.  However, there is extensive ischemia within the posterior circulation.

## 2023-11-19 NOTE — PROGRESS NOTE ADULT - ASSESSMENT
ASSESSMENT: 43y right handed man PMH stroke (7/2023 - presenting with dysarthria - left inferior adrienne, b/l parieto-occipital juxtacortical WM w/mild residual dysarthria; 8 years ago - treated with TPA at [facility], Pt presented at that time with left hemiplegia, facial droop and dysarthria), T2DM, HTN, former smoker (quit in 7/2023), patient currently takes ASA 81mg daily every day. Was originally on DAPT (ASA + clopidogrel) Pt presented to [facility] ED on 11/16/2023, with c/o sudden difficulty walking, LLE weakness, speech changes. Pt is s/p tenecteplase    Impression: Left facial weakness, LUE ataxia, dysarthria, gait instability due to posterior circulation ischemic infarct:  Severe disease of posterior circulation involving vertebral, basilar and associated ischemia. mechanism:  TENISHA     NEURO: Neuro exam unchanged, Continue close monitoring for neurologic deterioration, permissive HTN with slow titration to normotensive, Plan for cerebral angiogram on 11/20/23 for possible recanalization/stent, titrate statin to LDL goal less than 70, MRI Brain w/o, MRA Head w/o and Neck w/contrast results as above. Physical therapy/OT eval with recommendations for AR    ANTITHROMBOTIC THERAPY: ASA and Brilinta as per THALES trial for ICAD for secondary stroke prevention, Pt was on ASA and -Plavix at home for 3 months, now with disease progression with recurrent stroke    PULMONARY: Protecting airway, saturating well     CARDIOVASCULAR: check TTE, cardiac monitoring no events                              SBP goal: 140-200mmHg    GASTROINTESTINAL: Dysphagia screen  failed, S/S eval on 11/17 with recommendations for regular diet, tolerating well, NPO overnight for cerebral angiogram on 11/20/23     Diet: Regular    RENAL: BUN/Cr stable, good urine output, urine toxicology positive for THC      Na Goal: Greater than 135     Mauricio: No    HEMATOLOGY: H/H stable, Platelets normal      DVT ppx:  LMWH     ID: afebrile, no leukocytosis     OTHER: Plan/goals of care discussed with pt and pt's family at bedside    DISPOSITION: Acute Rehab as per PT/OT eval once stable and workup is complete    CORE MEASURES:        Admission NIHSS: 4-->2     Tenecteplase: YES      LDL/HDL: 37/32     Depression Screen: 0     Statin Therapy: Y     Dysphagia Screen:  FAIL     Smoking Former smoker quit 07/2023      Afib  NO     Stroke Education  YES    Obtain screening ultrasound in patients who meet 1 or more of the following criteria as patient is high risk for DVT/PE upon admission:   [] History of DVT/PE  []Hypercoagulable states (Factor V Leiden, Cancer, OCP, etc. )  []Prolonged immobility (hemiplegia/hemiparesis/post operative or any other extended immobilization)   [] Transferred from outside facility (Rehab or Long term care)  [] Age </= to 50 ASSESSMENT: 43y right handed man PMH stroke (7/2023 - presenting with dysarthria - left inferior adrienne, b/l parieto-occipital juxtacortical WM w/mild residual dysarthria; 8 years ago - treated with TPA at [facility], Pt presented at that time with left hemiplegia, facial droop and dysarthria), T2DM, HTN, former smoker (quit in 7/2023), patient currently takes ASA 81mg daily every day. Was originally on DAPT (ASA + clopidogrel) Pt presented to [facility] ED on 11/16/2023, with c/o sudden difficulty walking, LLE weakness, speech changes. Pt is s/p tenecteplase    Impression: Left facial weakness, LUE ataxia, dysarthria, gait instability due to posterior circulation ischemic infarct:  Severe disease of posterior circulation involving vertebral, basilar and associated ischemia. mechanism:  TENISHA     NEURO: Neuro exam unchanged, Continue close monitoring for neurologic deterioration, permissive HTN with slow titration to normotensive, Plan for cerebral angiogram on 11/20/23 for possible recanalization/stent, titrate statin to LDL goal less than 70, MRI Brain w/o, MRA Head w/o and Neck w/contrast results as above. Physical therapy/OT eval with recommendations for AR. As per Cardio eval: Acceptable cardiac/medical risk to proceed RCRI 1    ANTITHROMBOTIC THERAPY: ASA and Brilinta as per THALES trial for ICAD for secondary stroke prevention, Pt was on ASA and -Plavix at home for 3 months, now with disease progression with recurrent stroke    PULMONARY: Protecting airway, saturating well     CARDIOVASCULAR: check TTE, cardiac monitoring no events                              SBP goal: 140-200mmHg    GASTROINTESTINAL: Dysphagia screen  failed, S/S eval on 11/17 with recommendations for regular diet, tolerating well, NPO overnight for cerebral angiogram on 11/20/23     Diet: Regular    RENAL: BUN/Cr stable, good urine output, urine toxicology positive for THC      Na Goal: Greater than 135     Mauricio: No    HEMATOLOGY: H/H stable, Platelets normal      DVT ppx:  LMWH     ID: afebrile, no leukocytosis     OTHER: Plan/goals of care discussed with pt and pt's family at bedside    DISPOSITION: Acute Rehab as per PT/OT eval once stable and workup is complete    CORE MEASURES:        Admission NIHSS: 4-->2     Tenecteplase: YES      LDL/HDL: 37/32     Depression Screen: 0     Statin Therapy: Y     Dysphagia Screen:  FAIL     Smoking Former smoker quit 07/2023      Afib  NO     Stroke Education  YES    Obtain screening ultrasound in patients who meet 1 or more of the following criteria as patient is high risk for DVT/PE upon admission:   [] History of DVT/PE  []Hypercoagulable states (Factor V Leiden, Cancer, OCP, etc. )  []Prolonged immobility (hemiplegia/hemiparesis/post operative or any other extended immobilization)   [] Transferred from outside facility (Rehab or Long term care)  [] Age </= to 50 ASSESSMENT: 43y right handed man PMH stroke (7/2023 - presenting with dysarthria - left inferior adrienne, b/l parieto-occipital juxtacortical WM w/mild residual dysarthria; 8 years ago - treated with TPA at [facility], Pt presented at that time with left hemiplegia, facial droop and dysarthria), T2DM, HTN, former smoker (quit in 7/2023), patient currently takes ASA 81mg daily every day. Was originally on DAPT (ASA + clopidogrel) Pt presented to [facility] ED on 11/16/2023, with c/o sudden difficulty walking, LLE weakness, speech changes. Pt is s/p tenecteplase    Impression: Left facial weakness, LUE ataxia, dysarthria, gait instability due to posterior circulation ischemic infarct:  Severe disease of posterior circulation involving vertebral, basilar and associated ischemia. mechanism:  TENISHA     NEURO: Neuro exam this morning during rounds wit some increased in drowsiness, but AxO X4, motor unchanged, later in pm pt reported double vision, on eval with notable dysconjugate gaze, Urgent head CT done: unchanged. Case discussed with NSCU and IR team, will obtain stat MRI to eval for ay additional ischemic changes.  Will d/c Brilinta and start Heparin drip as discussed with NSCU and monitor in stroke unit with continued BP support. IR planning on cerebral angiogram on 11/20/23. Continue close monitoring for neurologic deterioration, permissive HTN with slow titration to normotensive, Plan for cerebral angiogram on 11/20/23 for possible recanalization/stent, titrate statin to LDL goal less than 70, MRI Brain w/o, MRA Head w/o and Neck w/contrast results as above. Physical therapy/OT eval with recommendations for AR. As per Cardio eval: Acceptable cardiac/medical risk to proceed RCRI 1    ANTITHROMBOTIC THERAPY: ASA and Brilinta as per THALES trial for ICAD for secondary stroke prevention, Pt was on ASA and -Plavix at home for 3 months, now with disease progression with recurrent stroke    PULMONARY: Protecting airway, saturating well     CARDIOVASCULAR: check TTE, cardiac monitoring no events                              SBP goal: 140-200mmHg    GASTROINTESTINAL: Dysphagia screen  failed, S/S eval on 11/17 with recommendations for regular diet, tolerating well, NPO overnight for cerebral angiogram on 11/20/23     Diet: Regular    RENAL: BUN/Cr stable, good urine output, urine toxicology positive for THC      Na Goal: Greater than 135     Mauricio: No    HEMATOLOGY: H/H stable, Platelets normal      DVT ppx:  LMWH     ID: afebrile, no leukocytosis     OTHER: Plan/goals of care discussed with pt and pt's family at bedside    DISPOSITION: Acute Rehab as per PT/OT eval once stable and workup is complete    CORE MEASURES:        Admission NIHSS: 4-->2     Tenecteplase: YES      LDL/HDL: 37/32     Depression Screen: 0     Statin Therapy: Y     Dysphagia Screen:  FAIL     Smoking Former smoker quit 07/2023      Afib  NO     Stroke Education  YES    Obtain screening ultrasound in patients who meet 1 or more of the following criteria as patient is high risk for DVT/PE upon admission:   [] History of DVT/PE  []Hypercoagulable states (Factor V Leiden, Cancer, OCP, etc. )  []Prolonged immobility (hemiplegia/hemiparesis/post operative or any other extended immobilization)   [] Transferred from outside facility (Rehab or Long term care)  [] Age </= to 50 ASSESSMENT: 43y right handed man PMH stroke (7/2023 - presenting with dysarthria - left inferior adrienne, b/l parieto-occipital juxtacortical WM w/mild residual dysarthria; 8 years ago - treated with TPA at [facility], Pt presented at that time with left hemiplegia, facial droop and dysarthria), T2DM, HTN, former smoker (quit in 7/2023), patient currently takes ASA 81mg daily every day. Was originally on DAPT (ASA + clopidogrel) Pt presented to [facility] ED on 11/16/2023, with c/o sudden difficulty walking, LLE weakness, speech changes. Pt is s/p tenecteplase    Impression: Left facial weakness, LUE ataxia, dysarthria, gait instability due to posterior circulation ischemic infarct:  Severe disease of posterior circulation involving vertebral, basilar and associated ischemia. mechanism:  TENISHA     NEURO: Neuro exam this morning, 11/19, during rounds wit some increased in drowsiness, but AxO X4, motor unchanged, later in pm pt reported double vision, on eval with notable dysconjugate gaze, Urgent head CT done: unchanged. Case discussed with NSCU and IR team, will obtain stat MRI to eval for ay additional ischemic changes.  Will d/c Brilinta and start Heparin drip as discussed with NSCU and monitor in stroke unit with continued BP support. IR planning on cerebral angiogram on 11/20/23. Continue close monitoring for neurologic deterioration, permissive HTN with slow titration to normotensive, Plan for cerebral angiogram on 11/20/23 for possible recanalization/stent, titrate statin to LDL goal less than 70, MRI Brain w/o, MRA Head w/o and Neck w/contrast results as above. Physical therapy/OT eval with recommendations for AR. As per Cardio eval: Acceptable cardiac/medical risk to proceed RCRI 1    ANTITHROMBOTIC THERAPY: Continue ASA and will D/c Brilinta and start Heparin drip as discussed with NSCU, (PTT goal 60-80)    PULMONARY: Protecting airway, saturating well     CARDIOVASCULAR: check TTE, cardiac monitoring no events                              SBP goal: 140-200mmHg    GASTROINTESTINAL: Dysphagia screen  failed, S/S eval on 11/17 with recommendations for regular diet, tolerating well, NPO overnight for cerebral angiogram on 11/20/23     Diet: Regular    RENAL: BUN/Cr stable, good urine output, urine toxicology positive for THC      Na Goal: Greater than 135     Mauricio: No    HEMATOLOGY: H/H stable, Platelets normal      DVT ppx:  LMWH     ID: afebrile, no leukocytosis     OTHER: Plan/goals of care discussed with pt and pt's family at bedside    DISPOSITION: Acute Rehab as per PT/OT eval once stable and workup is complete    CORE MEASURES:        Admission NIHSS: 4-->2     Tenecteplase: YES      LDL/HDL: 37/32     Depression Screen: 0     Statin Therapy: Y     Dysphagia Screen:  FAIL     Smoking Former smoker quit 07/2023      Afib  NO     Stroke Education  YES    Obtain screening ultrasound in patients who meet 1 or more of the following criteria as patient is high risk for DVT/PE upon admission:   [] History of DVT/PE  []Hypercoagulable states (Factor V Leiden, Cancer, OCP, etc. )  []Prolonged immobility (hemiplegia/hemiparesis/post operative or any other extended immobilization)   [] Transferred from outside facility (Rehab or Long term care)  [] Age </= to 50

## 2023-11-19 NOTE — PROGRESS NOTE ADULT - ASSESSMENT
43y RIGHT handed man who presented to [facility] ED on 11/16/2023, as a CODE STROKE, with c/o difficulty walking, LLE weakness, speech changes. PMH significant for: stroke (7/2023 - presenting with dysarthria - left inferior adrienne, b/l parieto-occipital juxtacortical WM w/mild residual dysarthria; 8 years ago - treated with TPA at [facility] - p/w L hemiplegia, facial droop and dysarthria), T2DM, HTN, former smoker (quit in 7/2023). Symptoms beginning at 17:00, 11/16/2023. Noted at the code to have a L facial, LUE ataxia, b/l LE ataxia, slurred speech. No appreciable drift in any of the extremities. Underwent CT scans with out sign of hemorrhage or jayla LVO. Tenecteplase administered at 19:47, with improvement from NIHSS 4 to 2. Patient previously taking clopidogrel and aspirin after last stroke. Currently only taking aspirin at the instructions of his vascular neurologist.    ED vitals notable for: afebrile, 158/92, HR 79 RR 18, SpO2 97%. Labs notable for: glucose 156. CT imaging disclosed: no hemorrhage, left occipital/right cerebellar acute to subacute infarctions; chronic left basal ganglia, left thalamic and central pontine remote infarctions. There is bilateral atherosclerosis of the carotid system, as well as in the anterior and posterior circulation. The left vertebral artery, basial artery, and posterior cerebral arteries are with progressively reduced flow since last study. Initial exam notable for L facial droop, ataxia in the LUE, bilateral lower limbs, moderate dysarthria, patient unable to stand/walk without holding the transport chair handles.     NIHSS on admission: 4->2 (mild dysarthria, mild L facial - 30 minutes after tenecteplase)  LKN: 17:00, 11/16/2023  pre-MRS: 2 (walks with a cane, otherwise independent)    Impression: L facial weakness, LUE ataxia, dysarthria, gait instability d/t R brain dysfunction d/t acute ischemic stroke

## 2023-11-19 NOTE — CONSULT NOTE ADULT - SUBJECTIVE AND OBJECTIVE BOX
patient is a 42 y/O RIGHT handed man who presented to [facility] ED on 11/16/2023, as a CODE STROKE, with c/o difficulty walking, LLE weakness, speech changes. PMH significant for: stroke (7/2023 - presenting with dysarthria - left inferior adrienne, b/l parieto-occipital juxtacortical WM w/mild residual dysarthria; 8 years ago - treated with TPA at [facility] - p/w L hemiplegia, facial droop and dysarthria), T2DM, HTN, former smoker (quit in 7/2023). Symptoms beginning at 17:00, 11/16/2023. Noted at the code to have a L facial, LUE ataxia, b/l LE ataxia, slurred speech. No appreciable drift in any of the extremities. Underwent CT scans with out sign of hemorrhage or jayla LVO. Tenecteplase administered at 19:47, with improvement from NIHSS 4 to 2. Patient previously taking clopidogrel and aspirin after last stroke. Currently only taking aspirin at the instructions of his vascular neurologist.    Subjective: Patient seen and examined. No new events except as noted.     REVIEW OF SYSTEMS:    CONSTITUTIONAL: No weakness, fevers or chills  EYES/ENT: No visual changes;  No vertigo or throat pain   NECK: No pain or stiffness  RESPIRATORY: No cough, wheezing, hemoptysis; No shortness of breath  CARDIOVASCULAR: No chest pain or palpitations  GASTROINTESTINAL: No abdominal or epigastric pain. No nausea, vomiting, or hematemesis; No diarrhea or constipation. No melena or hematochezia.  GENITOURINARY: No dysuria, frequency or hematuria  NEUROLOGICAL: No numbness or weakness  SKIN: No itching, burning, rashes, or lesions   All other review of systems is negative unless indicated above.    MEDICATIONS:  MEDICATIONS  (STANDING):  aspirin  chewable 81 milliGRAM(s) Oral daily  atorvastatin 80 milliGRAM(s) Oral at bedtime  dextrose 5%. 1000 milliLiter(s) (50 mL/Hr) IV Continuous <Continuous>  dextrose 5%. 1000 milliLiter(s) (100 mL/Hr) IV Continuous <Continuous>  dextrose 50% Injectable 25 Gram(s) IV Push once  dextrose 50% Injectable 12.5 Gram(s) IV Push once  dextrose 50% Injectable 25 Gram(s) IV Push once  escitalopram 10 milliGRAM(s) Oral daily  glucagon  Injectable 1 milliGRAM(s) IntraMuscular once  heparin  Infusion 1000 Unit(s)/Hr (10 mL/Hr) IV Continuous <Continuous>  influenza   Vaccine 0.5 milliLiter(s) IntraMuscular once  insulin lispro (ADMELOG) corrective regimen sliding scale   SubCutaneous Before meals and at bedtime  sodium chloride 0.9%. 1000 milliLiter(s) (75 mL/Hr) IV Continuous <Continuous>  traZODone 50 milliGRAM(s) Oral at bedtime    Home Medications:  aspirin 81 mg oral delayed release tablet: 1 tab(s) orally once a day (17 Aug 2023 14:59)  traZODone 50 mg oral tablet: 50 milligram(s) orally once a day (at bedtime) (17 Nov 2023 04:59)    PAST MEDICAL & SURGICAL HISTORY:  DM (diabetes mellitus)  HTN (hypertension)  CVA (cerebrovascular accident)    Allergies    No Known Allergies    Intolerances        PHYSICAL EXAM:  T(C): 36.9 (11-19-23 @ 20:00), Max: 36.9 (11-19-23 @ 08:00)  HR: 71 (11-19-23 @ 20:00) (59 - 82)  BP: 180/96 (11-19-23 @ 20:00) (93/53 - 182/88)  RR: 22 (11-19-23 @ 20:00) (14 - 23)  SpO2: 97% (11-19-23 @ 20:00) (96% - 100%)  Wt(kg): --  I&O's Summary    18 Nov 2023 07:01  -  19 Nov 2023 07:00  --------------------------------------------------------  IN: 1260 mL / OUT: 1650 mL / NET: -390 mL    19 Nov 2023 07:01  -  19 Nov 2023 21:27  --------------------------------------------------------  IN: 0 mL / OUT: 1000 mL / NET: -1000 mL          Appearance: Normal	  HEENT:  PERRLA   Lymphatic: No lymphadenopathy   Cardiovascular: Normal S1 S2, no JVD  Respiratory: normal effort , clear  Gastrointestinal:  Soft, Non-tender  Skin: No rashes,  warm to touch  Psychiatry:  Mood & affect appropriate  Musculuskeletal: No edema    recent labs, Imaging and EKGs personally reviewed     11-18-23 @ 07:01  -  11-19-23 @ 07:00  --------------------------------------------------------  IN: 1260 mL / OUT: 1650 mL / NET: -390 mL    11-19-23 @ 07:01  -  11-19-23 @ 21:27  --------------------------------------------------------  IN: 0 mL / OUT: 1000 mL / NET: -1000 mL                          14.9   7.75  )-----------( 187      ( 19 Nov 2023 06:20 )             43.6               11-19    142  |  109<H>  |  18  ----------------------------<  119<H>  4.2   |  22  |  1.21    Ca    8.9      19 Nov 2023 06:20      PTT - ( 19 Nov 2023 15:00 )  PTT:32.8 sec                   Urinalysis Basic - ( 19 Nov 2023 06:20 )    Color: x / Appearance: x / SG: x / pH: x  Gluc: 119 mg/dL / Ketone: x  / Bili: x / Urobili: x   Blood: x / Protein: x / Nitrite: x   Leuk Esterase: x / RBC: x / WBC x   Sq Epi: x / Non Sq Epi: x / Bacteria: x

## 2023-11-19 NOTE — PROGRESS NOTE ADULT - NS ATTEND AMEND GEN_ALL_CORE FT
I saw and examined the patient, reviewed diagnostic studies, and reviewed images personally. I agree with PA’s history, exam, orders placed, and plan of care. Medical issues needing to be addressed include: Cerebral ischemia/infarction, hx of stroke, dysarthria, L hemiparesis, facial droop, DM2, HTN, former smoker who quit in 7/2023 s/p counseling). s/p tenecteplase NIHSS 4->2. Severe disease of posterior circ involving vert, basilar and associated ischemia. Was on ASA/plavix for 3 months since July - disease progression with recurrent stroke, ASA/Brilinta THALES protocol/prep for possible stent high dose statin.  Appeared more drowsy this AM, bladder scan shows he is retaining urine - s/p cath w mild improvement.     This afternoon, called to bedside for reports of double vision. Exam with mod dysarthria, and report of diplopia - noted slight disconjugate gaze w/ R NAKIA that was present before. Case discussed with NSCU. Start heparin gtt goal 60-80, dc Brilinta, cont monitoring and pressure support in stroke unit. stat MRI obtained, wo significant changes in acute stroke burden. NSGY/IR made aware. case discussed at length with wife, and his parents at bedside. s/p 500cc NS bolus. Improved alertness, mildly improved dysarthria, persistent EOM abnormality on re-evaluation. Still intact strength. Family informed of MRI result. pending angio +/- intervention. Thirty minutes of critical care time was spent in caring for this patient who has concern of sudden and clinically significant deterioration requiring a high level of physician preparedness, management of brain supporting interventions, and frequent physician assessments. I saw and examined the patient, reviewed diagnostic studies, and reviewed images personally. I agree with PA’s history, exam, orders placed, and plan of care. Medical issues needing to be addressed include: Cerebral ischemia/infarction, hx of stroke, dysarthria, L hemiparesis, facial droop, DM2, HTN, former smoker who quit in 7/2023 s/p counseling). s/p tenecteplase NIHSS 4->2. Severe disease of posterior circ involving vert, basilar and associated ischemia. Was on ASA/plavix for 3 months since July - disease progression with recurrent stroke, ASA/Brilinta THALES protocol/prep for possible stent high dose statin.  Appeared more drowsy this AM, bladder scan shows he is retaining urine - s/p cath w mild improvement.     This afternoon, called to bedside for reports of double vision. Exam with mod dysarthria, and report of diplopia - noted slight disconjugate gaze w/ R NAKIA that was not present before. Case discussed with NSCU. Start heparin gtt goal 60-80, dc Brilinta, cont monitoring and pressure support in stroke unit. stat MRI obtained, wo significant changes in acute stroke burden. NSGY/IR made aware. case discussed at length with wife, and his parents at bedside. s/p 500cc NS bolus. Improved alertness, mildly improved dysarthria, persistent EOM abnormality on re-evaluation. Still intact strength. Family informed of MRI result. pending angio +/- intervention. Thirty minutes of critical care time was spent in caring for this patient who has concern of sudden and clinically significant deterioration requiring a high level of physician preparedness, management of brain supporting interventions, and frequent physician assessments.

## 2023-11-20 NOTE — PROGRESS NOTE ADULT - ASSESSMENT
43M hx HTN, DM, prior posterior circulation CVA (most recent 7/2023) on DAPT p/w L facial, dysarthria, LUE dysmetria. TNK 11/16 7:47pm, NIHSS 4->2. 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 Exam: Patient c/o diplopia. On exam, with disconjugate gaze, mild-moderate dysarthria, strength intact   -MRI/MRA and NOVA done w/ acute infarcts in posterior circulation involving L occipital lobe, cerebellum, b/l brachium pontis, and R adrienne. Poor flow in basilar 2/2 severe distal vertebral artery stenosis. Flow in pcomms and PCAs too low to measure  -Pre-op'd for angio with possible recanalization/stenting with Dr. Xiao today 11/20/23

## 2023-11-20 NOTE — PROGRESS NOTE ADULT - ASSESSMENT
patient is a 42 y/O RIGHT handed man who presented to [facility] ED on 11/16/2023, as a CODE STROKE, with c/o difficulty walking, LLE weakness, speech changes. PMH significant for: stroke (7/2023 - presenting with dysarthria - left inferior adrienne, b/l parieto-occipital juxtacortical WM w/mild residual dysarthria; 8 years ago - treated with TPA at [facility] - p/w L hemiplegia, facial droop and dysarthria), T2DM, HTN, former smoker (quit in 7/2023). Symptoms beginning at 17:00, 11/16/2023. Noted at the code to have a L facial, LUE ataxia, b/l LE ataxia, slurred speech. No appreciable drift in any of the extremities. Underwent CT scans with out sign of hemorrhage or jayla LVO. Tenecteplase administered at 19:47, with improvement from NIHSS 4 to 2. Patient previously taking clopidogrel and aspirin after last stroke. Currently only taking aspirin at the instructions of his vascular neurologist.    # CVA   neuro management appreciated   CT and MRI noted, multiple embolic strokes   S/P TPA  ASA and statin  Neurosurg eval appreciated  CROW  hypercoag W/U, malignancy W/U  PT/OT, aspiration precautions   plan for angio by neurosurg , today   patient is medically optimized for angio   discused with wife at the bedside       # HTN   BP control   Check echo      # DM  sliding scale  A1C 5.5     # HLD    lipid panel  statin     DVT and Gi PPX    patient is a 42 y/O RIGHT handed man who presented to [facility] ED on 11/16/2023, as a CODE STROKE, with c/o difficulty walking, LLE weakness, speech changes. PMH significant for: stroke (7/2023 - presenting with dysarthria - left inferior adrienne, b/l parieto-occipital juxtacortical WM w/mild residual dysarthria; 8 years ago - treated with TPA at [facility] - p/w L hemiplegia, facial droop and dysarthria), T2DM, HTN, former smoker (quit in 7/2023). Symptoms beginning at 17:00, 11/16/2023. Noted at the code to have a L facial, LUE ataxia, b/l LE ataxia, slurred speech. No appreciable drift in any of the extremities. Underwent CT scans with out sign of hemorrhage or jayla LVO. Tenecteplase administered at 19:47, with improvement from NIHSS 4 to 2. Patient previously taking clopidogrel and aspirin after last stroke. Currently only taking aspirin at the instructions of his vascular neurologist.    # CVA   neuro management appreciated   CT and MRI noted, multiple embolic strokes   S/P TPA  ASA and statin  started on AC heparin drip full dose per neur team   Neurosurg eval appreciated  CROW  hypercoag W/U, malignancy W/U  PT/OT, aspiration precautions   plan for angio by neurosurg , today   patient is medically optimized for angio   discused with wife at the bedside       # HTN   BP control   Check echo      # DM  sliding scale  A1C 5.5     # HLD    lipid panel  statin     DVT and Gi PPX

## 2023-11-20 NOTE — PROGRESS NOTE ADULT - NS ATTEND AMEND GEN_ALL_CORE FT
I saw and examined the patient after his cerebral angiogram.   He reports continued diplopia.    On exam:  Awake, alert, oriented to situation ,speaking fluently, intact insight.   Dysconjugate gaze, tongue midline, mild dysarthria.   MOTOR: LUE 4/5, all else 5/5.     MRi brain images reviewed: diffusion restriction, L PICA, R AICA, L PCA territories.   CTA H&N images reviewed: bilateral vertebral artery V4 occlusion.     AP: 43 year old man with hx of DM, HTN, HLD, tobacco use, recent pontine stroke, remote stroke, vertebral artery occlusion bilaterally, presenting with dysarthria, and gait instability on 11/16, received tenecteplase with improvement, progression of symptoms of diplopia/dysconjugate gaze on 11/19, now on ASA and heparin infusion.  Exam with dysconjugate gaze, and L arm weakness, trace dysarthria.  Imaging showing the severe atherosclerotic stenosis bilateral vertebral arteries, and strokes in the posterior circulation.    Suspect severe intracranial atherosclerotic disease, progressed from recent imaging.  Possible unstable plaque.     Case discussed at length on vascular rounds.  Decision to offer stent at this point.    Will reload with brilinta on AM 11/20, DC heparin prior to brilinta load.

## 2023-11-20 NOTE — PROGRESS NOTE ADULT - SUBJECTIVE AND OBJECTIVE BOX
Name of Patient : ROB OSHEA  MRN: [id number]  Date of visit: 11-20-23       Subjective: Patient seen and examined. No new events except as noted.   angio today     REVIEW OF SYSTEMS:  limited     MEDICATIONS:  MEDICATIONS  (STANDING):  aspirin  chewable 81 milliGRAM(s) Oral daily  atorvastatin 80 milliGRAM(s) Oral at bedtime  dextrose 5%. 1000 milliLiter(s) (50 mL/Hr) IV Continuous <Continuous>  dextrose 5%. 1000 milliLiter(s) (100 mL/Hr) IV Continuous <Continuous>  dextrose 50% Injectable 25 Gram(s) IV Push once  dextrose 50% Injectable 12.5 Gram(s) IV Push once  dextrose 50% Injectable 25 Gram(s) IV Push once  escitalopram 10 milliGRAM(s) Oral daily  glucagon  Injectable 1 milliGRAM(s) IntraMuscular once  heparin  Infusion 1400 Unit(s)/Hr (14 mL/Hr) IV Continuous <Continuous>  influenza   Vaccine 0.5 milliLiter(s) IntraMuscular once  insulin lispro (ADMELOG) corrective regimen sliding scale   SubCutaneous Before meals and at bedtime  sodium chloride 0.9%. 1000 milliLiter(s) (75 mL/Hr) IV Continuous <Continuous>  traZODone 50 milliGRAM(s) Oral at bedtime      PHYSICAL EXAM:  T(C): 36.9 (11-20-23 @ 13:14), Max: 36.9 (11-19-23 @ 20:00)  HR: 74 (11-20-23 @ 14:00) (63 - 84)  BP: 164/93 (11-20-23 @ 14:00) (122/66 - 185/97)  RR: 23 (11-20-23 @ 14:00) (20 - 25)  SpO2: 97% (11-20-23 @ 14:00) (97% - 100%)  Wt(kg): --  I&O's Summary    19 Nov 2023 07:01  -  20 Nov 2023 07:00  --------------------------------------------------------  IN: 1144 mL / OUT: 2250 mL / NET: -1106 mL    20 Nov 2023 07:01  -  20 Nov 2023 16:16  --------------------------------------------------------  IN: 0 mL / OUT: 850 mL / NET: -850 mL        Weight (kg): 111.7 (11-20 @ 09:33)    Appearance: Normal	  HEENT:  PERRLA   Lymphatic: No lymphadenopathy   Cardiovascular: Normal S1 S2, no JVD  Respiratory: normal effort , clear  Gastrointestinal:  Soft, Non-tender  Skin: No rashes,  warm to touch  Psychiatry:  Mood & affect appropriate  Musculuskeletal: + weakness    recent labs, Imaging and EKGs personally reviewed     11-19-23 @ 07:01  -  11-20-23 @ 07:00  --------------------------------------------------------  IN: 1144 mL / OUT: 2250 mL / NET: -1106 mL    11-20-23 @ 07:01  -  11-20-23 @ 16:16  --------------------------------------------------------  IN: 0 mL / OUT: 850 mL / NET: -850 mL                            13.7   7.33  )-----------( 181      ( 20 Nov 2023 05:30 )             40.3               11-20    141  |  108  |  17  ----------------------------<  110<H>  3.7   |  22  |  1.25    Ca    9.1      20 Nov 2023 05:31      PTT - ( 20 Nov 2023 13:38 )  PTT:49.0 sec                   Urinalysis Basic - ( 20 Nov 2023 05:31 )    Color: x / Appearance: x / SG: x / pH: x  Gluc: 110 mg/dL / Ketone: x  / Bili: x / Urobili: x   Blood: x / Protein: x / Nitrite: x   Leuk Esterase: x / RBC: x / WBC x   Sq Epi: x / Non Sq Epi: x / Bacteria: x

## 2023-11-20 NOTE — PROGRESS NOTE ADULT - SUBJECTIVE AND OBJECTIVE BOX
Patient seen and examined s/p [].     Doing well. Awake, alert, oriented x 3. Exam stable. Groin soft, c/d/i     T(C): 36.9 (11-20-23 @ 16:00), Max: 36.9 (11-20-23 @ 13:14)  HR: 68 (11-20-23 @ 20:15) (63 - 84)  BP: 166/82 (11-20-23 @ 20:15) (122/66 - 185/97)  RR: 22 (11-20-23 @ 20:15) (20 - 30)  SpO2: 97% (11-20-23 @ 20:15) (96% - 100%)                          13.7   7.33  )-----------( 181      ( 20 Nov 2023 05:30 )             40.3     11-20    141  |  108  |  17  ----------------------------<  110<H>  3.7   |  22  |  1.25    Ca    9.1      20 Nov 2023 05:31      PTT - ( 20 Nov 2023 13:38 )  PTT:49.0 sec  Urinalysis Basic - ( 20 Nov 2023 05:31 )    Color: x / Appearance: x / SG: x / pH: x  Gluc: 110 mg/dL / Ketone: x  / Bili: x / Urobili: x   Blood: x / Protein: x / Nitrite: x   Leuk Esterase: x / RBC: x / WBC x   Sq Epi: x / Non Sq Epi: x / Bacteria: x          CAPILLARY BLOOD GLUCOSE      POCT Blood Glucose.: 107 mg/dL (20 Nov 2023 16:38)  POCT Blood Glucose.: 116 mg/dL (20 Nov 2023 13:21)  POCT Blood Glucose.: 101 mg/dL (20 Nov 2023 05:22)  POCT Blood Glucose.: 92 mg/dL (19 Nov 2023 21:29)

## 2023-11-20 NOTE — PROGRESS NOTE ADULT - SUBJECTIVE AND OBJECTIVE BOX
Patient seen and examined at bedside.    --Anticoagulation--  aspirin  chewable 81 milliGRAM(s) Oral daily  heparin  Infusion 1200 Unit(s)/Hr IV Continuous <Continuous>    T(C): 36.9 (11-19-23 @ 20:00), Max: 36.9 (11-19-23 @ 08:00)  HR: 73 (11-20-23 @ 04:00) (59 - 82)  BP: 166/87 (11-20-23 @ 04:00) (93/53 - 183/82)  RR: 22 (11-20-23 @ 04:00) (14 - 23)  SpO2: 100% (11-20-23 @ 04:00) (97% - 100%)  Wt(kg): --    Exam: Patient c/o diplopia. On exam, with disconjugate gaze, mild-moderate dysarthria, strength intact

## 2023-11-20 NOTE — PROGRESS NOTE ADULT - SUBJECTIVE AND OBJECTIVE BOX
THE PATIENT WAS SEEN AND EXAMINED BY ME WITH THE HOUSESTAFF AND STROKE TEAM DURING MORNING ROUNDS.   HPI: Patient ROB OSHEA is a 43y (1980) RIGHT handed man who presented to [facility] ED on 11/16/2023, as a CODE STROKE, with c/o difficulty walking, LLE weakness, speech changes.  PMH significant for: stroke (7/2023 - presenting with dysarthria - left inferior adrienne, b/l parieto-occipital juxtacortical WM w/mild residual dysarthria; 8 years ago - treated with TPA at [facility] - p/w L hemiplegia, facial droop and dysarthria), T2DM, HTN, former smoker (quit in 7/2023)  Presented as a CODE STROKE at 19:12. Neurology arrived promptly to evaluate patient. Per wife, patient having acute onset slurred speech (worse from baseline) and inability to stand/walk. This began at 17:00, 11/16/2023. On exam: patient has a L facial droop, ataxia in LUE (dysmetria with FTN), and struggles to perform HTS b/l. Patient also with pronounced slurred speech (moderate but is intelligible). Patient was rushed to the CT scanner on the first floor of the hospital d/t CT scanner in ED being used. Underwent NCCTH, which revealed no hemorrhage, but was initially read by Radiology as showing a nonacute left occipital infarct not seen on the CTH performed 7/26/2023. This infarct was not c/w patient's exam. Patient was emergently rushed back to ED for vital signs and weight. These were unable to be obtained on the first floor. After risk/benefit discussion with patient and wife - tenecteplase was administered at 11/16/2023, 19:47. DTN time was delayed d/t patient needing to be taken up to first floor CT scanner from ED (ED scanner in use), needed to return to ED after scans for vitals including BP and weight.  30 minutes after administration, NIHSS 4->2 (mild left facial but improved and mild dysarthria). Patient reports voice is much better. Performs FTN without issue with LUE. Performs HTS b/l. L NLF flattening significantly improved. Unable to walk patient d/t safety concerns s/p tenecteplase (on bed rest).  Patient currently takes ASA 81mg daily every day. Was originally on DAPT (ASA + clopidogrel) after his last stroke in 7/2023, but saw Dr. Guzman in 11/2/2023, who recommended patient switch over to aspirin monotherapy.  Patient reports that he takes all of his medications every day including his aspirin. Patient and wife report after patient's last stroke, he went to inpatient rehab. He still has slurred speech but it was "90% better." Walks with a cane, but is otherwise independent.  is able to climb stairs with the assistance of his cane.  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 Went to work last week. On Sunday (11/12/2023) - patient was feeling ill. Constipated, vomited. Had some room spinning dizziness. Has been sleeping a lot and has been diaphoretic. Symptoms of slurred speech and gait imbalance came on suddenly today at 11/16/2023. Wife says it was a "night and day difference," which triggered her to rush patient to the ED.   Patient reports that he quit smoking after his stroke in July 2023.  Patient was using a nicotine patch for short while, but he is no longer smoking or using a nicotine patch.  Patient denies any alcohol use.  Patient smokes 1 joint of marijuana per night.  Patient currently lives with his wife and daughter.  Patient denies fevers, chills, visual changes, neck pain, chest pain, palpitations, shortness of breath, abdominal pain, emesis.  Patient reports that he was feeling nauseated earlier.      SUBJECTIVE:  11/19 afternoon noted to have worsening dysarthria and double vision, started on heparin gtt and d/c Brilinta. stat MRI obtained, wo significant changes in acute stroke burden. Pending angio today with neurosurgery. Pre-op'd for angio with possible recanalization/stenting. ROS reported negative unless otherwise noted.    acetaminophen     Tablet .. 650 milliGRAM(s) Oral every 6 hours PRN  aspirin  chewable 81 milliGRAM(s) Oral daily  atorvastatin 80 milliGRAM(s) Oral at bedtime  escitalopram 10 milliGRAM(s) Oral daily  glucagon  Injectable 1 milliGRAM(s) IntraMuscular once  heparin  Infusion 1300 Unit(s)/Hr IV Continuous <Continuous>  hydrALAZINE Injectable 5 milliGRAM(s) IV Push every 4 hours PRN  influenza   Vaccine 0.5 milliLiter(s) IntraMuscular once  insulin lispro (ADMELOG) corrective regimen sliding scale   SubCutaneous Before meals and at bedtime  sodium chloride 0.9%. 1000 milliLiter(s) IV Continuous <Continuous>  traZODone 50 milliGRAM(s) Oral at bedtime      PHYSICAL EXAM:   Vital Signs Last 24 Hrs  T(C): 36.6 (20 Nov 2023 04:00), Max: 36.9 (19 Nov 2023 08:00)  T(F): 97.9 (20 Nov 2023 04:00), Max: 98.4 (19 Nov 2023 08:00)  HR: 72 (20 Nov 2023 06:00) (59 - 75)  BP: 185/97 (20 Nov 2023 06:00) (93/53 - 185/97)  BP(mean): 129 (20 Nov 2023 06:00) (70 - 131)  RR: 22 (20 Nov 2023 06:00) (14 - 23)  SpO2: 100% (20 Nov 2023 06:00) (97% - 100%)    Parameters below as of 20 Nov 2023 06:00  Patient On (Oxygen Delivery Method): BiPAP/CPAP        General: No acute distress  HEENT: EOM intact, visual fields full  Abdomen: Soft, nontender, nondistended   Extremities: No edema    NEUROLOGICAL EXAM:  Mental status: Eyes closed, opens eyes to light touch and voice for a few minutes, drowsiness, awake, alert, oriented x4, fluent speech no neglect, able to follow commands  Cranial Nerves: Subtle left facial droop, dysconjugate gaze, no nystagmus, mild dysarthria.  Motor exam: Normal tone, no drift, RUE 5/5, RLE 5/5, LUE 4/5 with drift, LLE 5/5,  Sensation: Intact to light touch   Coordination/ Gait: subtle left dysmetria, rapid alternating movements mildly impaired L>R.      LABS:                        13.7   7.33  )-----------( 181      ( 20 Nov 2023 05:30 )             40.3    11-20    141  |  108  |  17  ----------------------------<  110<H>  3.7   |  22  |  1.25    Ca    9.1      20 Nov 2023 05:31    PTT - ( 20 Nov 2023 05:30 )  PTT:44.3 sec      IMAGING: Reviewed by me.           IMAGING: Reviewed by me.     CTH 11/19/23: Evolving acute infarcts in the posterior circulation are unchanged from 11/17/2023.  No new intracranial findings.     MRI, MRA head/neck Brain 1/17/23: Small vessel white matter ischemic changes. Acute infarcts in the posterior circulation involving the left occipital lobe, left cerebellum and bilateral brachium pontis, right adrienne. Chronic appearing infarct adrienne. Extremely poor flow identified in the basilar artery due to   what appears to be severe distal vertebral artery stenosis. Flow in the posterior communicating arteries and posterior cerebral arteries is too small to measure.    CTH 11/17: Stable posterior circulation infarctions.    CTH/ CTA neck 11/16: No intracranial hemorrhage is appreciated. No intracranial mass lesion is appreciated.Left occipital and right cerebellar acute to subacute infarctions. Left basal ganglia, left thalamic and central pontine remote infarctions    CTA head/Neck CTP 11/16:    1. Right carotid system:  Atherosclerotic plaque without hemodynamically significant stenosis.  2. Left carotid system:  Atherosclerotic plaque without hemodynamically significant stenosis.  3. Vertebral circulation:  Patent.  4. Anterior intracranial circulation:  Intracranial atherosclerosis cavernous and clinoid segments of the internal carotid arteries, mild to moderate, and middle cerebral arteries, moderate on the right and mild-to-moderate on the left.  5.  Posterior intracranial circulation:  Severe atherosclerotic disease has progressed since July 2023 with progressive diminished flow of the left vertebral artery, basilar artery and posterior cerebral arteries. There is likely reversal flow within the basilar artery to perfuse the most distal left vertebral artery and PICA arteries. Occlusion of the right vertebral artery was present in July 2023  6. Brain perfusion:  No acute infarction of the brain is convincingly demonstrated.  However, there is extensive ischemia within the posterior circulation.     THE PATIENT WAS SEEN AND EXAMINED BY ME WITH THE HOUSESTAFF AND STROKE TEAM DURING MORNING ROUNDS.   HPI: Patient ROB OSHEA is a 43y (1980) RIGHT handed man who presented to [facility] ED on 11/16/2023, as a CODE STROKE, with c/o difficulty walking, LLE weakness, speech changes.  PMH significant for: stroke (7/2023 - presenting with dysarthria - left inferior adrienne, b/l parieto-occipital juxtacortical WM w/mild residual dysarthria; 8 years ago - treated with TPA at [facility] - p/w L hemiplegia, facial droop and dysarthria), T2DM, HTN, former smoker (quit in 7/2023)  Presented as a CODE STROKE at 19:12. Neurology arrived promptly to evaluate patient. Per wife, patient having acute onset slurred speech (worse from baseline) and inability to stand/walk. This began at 17:00, 11/16/2023. On exam: patient has a L facial droop, ataxia in LUE (dysmetria with FTN), and struggles to perform HTS b/l. Patient also with pronounced slurred speech (moderate but is intelligible). Patient was rushed to the CT scanner on the first floor of the hospital d/t CT scanner in ED being used. Underwent NCCTH, which revealed no hemorrhage, but was initially read by Radiology as showing a nonacute left occipital infarct not seen on the CTH performed 7/26/2023. This infarct was not c/w patient's exam. Patient was emergently rushed back to ED for vital signs and weight. These were unable to be obtained on the first floor. After risk/benefit discussion with patient and wife - tenecteplase was administered at 11/16/2023, 19:47. DTN time was delayed d/t patient needing to be taken up to first floor CT scanner from ED (ED scanner in use), needed to return to ED after scans for vitals including BP and weight.  30 minutes after administration, NIHSS 4->2 (mild left facial but improved and mild dysarthria). Patient reports voice is much better. Performs FTN without issue with LUE. Performs HTS b/l. L NLF flattening significantly improved. Unable to walk patient d/t safety concerns s/p tenecteplase (on bed rest).  Patient currently takes ASA 81mg daily every day. Was originally on DAPT (ASA + clopidogrel) after his last stroke in 7/2023, but saw Dr. Guzman in 11/2/2023, who recommended patient switch over to aspirin monotherapy.  Patient reports that he takes all of his medications every day including his aspirin. Patient and wife report after patient's last stroke, he went to inpatient rehab. He still has slurred speech but it was "90% better." Walks with a cane, but is otherwise independent.  is able to climb stairs with the assistance of his cane.  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 Went to work last week. On Sunday (11/12/2023) - patient was feeling ill. Constipated, vomited. Had some room spinning dizziness. Has been sleeping a lot and has been diaphoretic. Symptoms of slurred speech and gait imbalance came on suddenly today at 11/16/2023. Wife says it was a "night and day difference," which triggered her to rush patient to the ED.   Patient reports that he quit smoking after his stroke in July 2023.  Patient was using a nicotine patch for short while, but he is no longer smoking or using a nicotine patch.  Patient denies any alcohol use.  Patient smokes 1 joint of marijuana per night.  Patient currently lives with his wife and daughter.  Patient denies fevers, chills, visual changes, neck pain, chest pain, palpitations, shortness of breath, abdominal pain, emesis.  Patient reports that he was feeling nauseated earlier.      SUBJECTIVE:  11/19 afternoon noted to have worsening dysarthria and double vision, started on heparin gtt and d/c Brilinta. stat MRI obtained, w/o significant changes in acute stroke burden. Pending angio today with neurosurgery. Pre-op'd for angio with possible recanalization/stenting. ROS reported negative unless otherwise noted.    acetaminophen     Tablet .. 650 milliGRAM(s) Oral every 6 hours PRN  aspirin  chewable 81 milliGRAM(s) Oral daily  atorvastatin 80 milliGRAM(s) Oral at bedtime  escitalopram 10 milliGRAM(s) Oral daily  glucagon  Injectable 1 milliGRAM(s) IntraMuscular once  heparin  Infusion 1300 Unit(s)/Hr IV Continuous <Continuous>  hydrALAZINE Injectable 5 milliGRAM(s) IV Push every 4 hours PRN  influenza   Vaccine 0.5 milliLiter(s) IntraMuscular once  insulin lispro (ADMELOG) corrective regimen sliding scale   SubCutaneous Before meals and at bedtime  sodium chloride 0.9%. 1000 milliLiter(s) IV Continuous <Continuous>  traZODone 50 milliGRAM(s) Oral at bedtime      PHYSICAL EXAM:   Vital Signs Last 24 Hrs  T(C): 36.6 (20 Nov 2023 04:00), Max: 36.9 (19 Nov 2023 08:00)  T(F): 97.9 (20 Nov 2023 04:00), Max: 98.4 (19 Nov 2023 08:00)  HR: 72 (20 Nov 2023 06:00) (59 - 75)  BP: 185/97 (20 Nov 2023 06:00) (93/53 - 185/97)  BP(mean): 129 (20 Nov 2023 06:00) (70 - 131)  RR: 22 (20 Nov 2023 06:00) (14 - 23)  SpO2: 100% (20 Nov 2023 06:00) (97% - 100%)    Parameters below as of 20 Nov 2023 06:00  Patient On (Oxygen Delivery Method): BiPAP/CPAP        General: No acute distress  HEENT: EOM intact, visual fields full  Abdomen: Soft, nontender, nondistended   Extremities: No edema    NEUROLOGICAL EXAM:  Mental status: Eyes closed, opens eyes to light touch and voice for a few minutes, drowsiness, awake, alert, oriented x4, fluent speech no neglect, able to follow commands  Cranial Nerves: Subtle left facial droop, dysconjugate gaze, no nystagmus, mild dysarthria.  Motor exam: Normal tone, no drift, RUE 5/5, RLE 5/5, LUE 4/5 with drift, LLE 5/5,  Sensation: Intact to light touch   Coordination/ Gait: subtle left dysmetria, rapid alternating movements mildly impaired L>R.      LABS:                        13.7   7.33  )-----------( 181      ( 20 Nov 2023 05:30 )             40.3    11-20    141  |  108  |  17  ----------------------------<  110<H>  3.7   |  22  |  1.25    Ca    9.1      20 Nov 2023 05:31    PTT - ( 20 Nov 2023 05:30 )  PTT:44.3 sec      IMAGING: Reviewed by me.           IMAGING: Reviewed by me.     CTH 11/19/23: Evolving acute infarcts in the posterior circulation are unchanged from 11/17/2023.  No new intracranial findings.     MRI, MRA head/neck Brain 1/17/23: Small vessel white matter ischemic changes. Acute infarcts in the posterior circulation involving the left occipital lobe, left cerebellum and bilateral brachium pontis, right adrienne. Chronic appearing infarct adrienne. Extremely poor flow identified in the basilar artery due to   what appears to be severe distal vertebral artery stenosis. Flow in the posterior communicating arteries and posterior cerebral arteries is too small to measure.    CTH 11/17: Stable posterior circulation infarctions.    CTH/ CTA neck 11/16: No intracranial hemorrhage is appreciated. No intracranial mass lesion is appreciated.Left occipital and right cerebellar acute to subacute infarctions. Left basal ganglia, left thalamic and central pontine remote infarctions    CTA head/Neck CTP 11/16:    1. Right carotid system:  Atherosclerotic plaque without hemodynamically significant stenosis.  2. Left carotid system:  Atherosclerotic plaque without hemodynamically significant stenosis.  3. Vertebral circulation:  Patent.  4. Anterior intracranial circulation:  Intracranial atherosclerosis cavernous and clinoid segments of the internal carotid arteries, mild to moderate, and middle cerebral arteries, moderate on the right and mild-to-moderate on the left.  5.  Posterior intracranial circulation:  Severe atherosclerotic disease has progressed since July 2023 with progressive diminished flow of the left vertebral artery, basilar artery and posterior cerebral arteries. There is likely reversal flow within the basilar artery to perfuse the most distal left vertebral artery and PICA arteries. Occlusion of the right vertebral artery was present in July 2023  6. Brain perfusion:  No acute infarction of the brain is convincingly demonstrated.  However, there is extensive ischemia within the posterior circulation.

## 2023-11-20 NOTE — PROGRESS NOTE ADULT - ASSESSMENT
43y right handed man PMH stroke (7/2023 - presenting with dysarthria - left inferior adrienne, b/l parieto-occipital juxtacortical WM w/mild residual dysarthria; 8 years ago - treated with TPA at [facility], [place], Pt presented at that time with left hemiplegia, facial droop and dysarthria), T2DM, HTN, former smoker (quit in 7/2023), patient currently takes ASA 81mg daily every day. Was originally on DAPT (ASA + clopidogrel) Pt presented to [facility] ED on 11/16/2023, with c/o sudden difficulty walking, LLE weakness, speech changes. Pt is s/p tenecteplase    Impression: Left facial weakness, LUE ataxia, dysarthria, gait instability due to posterior circulation ischemic infarct:  Severe disease of posterior circulation involving vertebral, basilar and associated ischemia. mechanism:  TENISHA     NEURO: Neuro exam change on 11/19 with increase in drowsiness, but AxO X4, motor unchanged, later in pm pt reported double vision, on eval with notable dysconjugate gaze, Urgent head CT done: unchanged. Case discussed with NSCU and IR team, stat MRI without any ischemic changes. D/c Brilinta and started on Heparin drip as discussed with NSCU and monitor in stroke unit with continued BP support. PTT goal of 60-80. Neuro IR for cerebral angiogram today, 11/20/23. Continue close monitoring for neurologic deterioration, permissive HTN with slow titration to normotensive, Plan for cerebral angiogram on 11/20/23 for possible recanalization/stent, titrate statin to LDL goal less than 70, MRI Brain w/o, MRA Head w/o and Neck w/contrast results as above. Physical therapy/OT eval with recommendations for AR. As per Cardio eval: Acceptable cardiac/medical risk to proceed RCRI 1    ANTITHROMBOTIC THERAPY: Continue ASA and Heparin drip (PTT goal 60-80)    PULMONARY: Protecting airway, saturating well on room air     CARDIOVASCULAR: pending TTE, cardiac monitoring no events                              SBP goal: 140-200mmHg    GASTROINTESTINAL: Dysphagia screen  failed, S/S eval on 11/17 with recommendations for regular diet, tolerating well, NPO overnight for cerebral angiogram on 11/20/23     Diet: Regular    RENAL: BUN/Cr stable, good urine output, urine toxicology positive for THC      Na Goal: Greater than 135     Mauricio: No    HEMATOLOGY: H/H stable, Platelets normal      DVT ppx:  LMWH     ID: afebrile, no leukocytosis     OTHER: Plan/goals of care discussed with pt and pt's family at bedside    DISPOSITION: Acute Rehab as per PT/OT eval once stable and workup is complete    CORE MEASURES:        Admission NIHSS: 4-->2     Tenecteplase: YES      LDL/HDL: 37/32     Depression Screen: 0     Statin Therapy: Y     Dysphagia Screen:  FAIL     Smoking Former smoker quit 07/2023      Afib  NO     Stroke Education  YES    Obtain screening ultrasound in patients who meet 1 or more of the following criteria as patient is high risk for DVT/PE upon admission:   [] History of DVT/PE  []Hypercoagulable states (Factor V Leiden, Cancer, OCP, etc. )  []Prolonged immobility (hemiplegia/hemiparesis/post operative or any other extended immobilization)   [] Transferred from outside facility (Rehab or Long term care)  [] Age </= to 50    43y right handed man PMH stroke (7/2023 - presenting with dysarthria - left inferior adrienne, b/l parieto-occipital juxtacortical WM w/mild residual dysarthria; 8 years ago - treated with TPA at [facility], Pt presented at that time with left hemiplegia, facial droop and dysarthria), T2DM, HTN, former smoker (quit in 7/2023), patient currently takes ASA 81mg daily every day. Was originally on DAPT (ASA + clopidogrel) Pt presented to [facility] ED on 11/16/2023, with c/o sudden difficulty walking, LLE weakness, speech changes. Pt is s/p tenecteplase    Impression: Left facial weakness, LUE ataxia, dysarthria, gait instability due to posterior circulation ischemic infarct:  Severe disease of posterior circulation involving vertebral, basilar and associated ischemia. mechanism:  TENISHA     NEURO: Neuro exam change on 11/19 with increase in drowsiness, but AxO X4, motor unchanged, later in pm pt reported double vision, on eval with notable dysconjugate gaze, Urgent head CT done: unchanged. Case discussed with NSCU and IR team, stat MRI without any ischemic changes. D/c Brilinta and started on Heparin drip on 11/19 as discussed with NSCU and monitor in stroke unit with continued BP support. PTT goal of 60-80. S/p cerebral angiogram on 11/20/23 showing severe distal vertebral artery stenosis bilaterally. No stent placed on 11/20. Continue close monitoring for neurologic deterioration, permissive HTN with slow titration to normotensive. Will titrate statin to LDL goal less than 70, MRI Brain w/o, MRA Head w/o and Neck w/contrast results as above. Physical therapy/OT eval with recommendations for AR. As per Cardio eval: Acceptable cardiac/medical risk to proceed RCRI 1    ANTITHROMBOTIC THERAPY: Continue ASA and Heparin drip (PTT goal 60-80)    PULMONARY: Protecting airway, saturating well on room air     CARDIOVASCULAR: TTE woth EF of 68%, c/w cardiac monitoring no events                              SBP goal: 140-200mmHg    GASTROINTESTINAL: Dysphagia screen  failed, S/S eval on 11/17 with recommendations for regular diet, tolerating well, NPO overnight for cerebral angiogram on 11/20/23     Diet: Regular    RENAL: BUN/Cr stable, good urine output, urine toxicology positive for THC      Na Goal: Greater than 135     Mauricio: No    HEMATOLOGY: H/H stable, Platelets normal      DVT ppx:  LMWH     ID: afebrile, no leukocytosis     OTHER: Plan/goals of care discussed with pt and pt's family at bedside    DISPOSITION: Acute Rehab as per PT/OT eval once stable and workup is complete    CORE MEASURES:        Admission NIHSS: 4-->2     Tenecteplase: YES      LDL/HDL: 37/32     Depression Screen: 0     Statin Therapy: Y     Dysphagia Screen:  FAIL     Smoking Former smoker quit 07/2023      Afib  NO     Stroke Education  YES    Obtain screening ultrasound in patients who meet 1 or more of the following criteria as patient is high risk for DVT/PE upon admission:   [] History of DVT/PE  []Hypercoagulable states (Factor V Leiden, Cancer, OCP, etc. )  []Prolonged immobility (hemiplegia/hemiparesis/post operative or any other extended immobilization)   [] Transferred from outside facility (Rehab or Long term care)  [] Age </= to 50

## 2023-11-20 NOTE — CHART NOTE - NSCHARTNOTEFT_GEN_A_CORE
Interventional Neuro- Radiology   Procedure Note PA-C    Procedure: Catheter Cerebral Angiogram   Pre- Procedure Diagnosis: infarct   Post- Procedure Diagnosis:    : Dr Phoebe Xiao  Fellow:     Dr Remy Birmingham   Physician Assistant: Jamari Neil     Nurse:                   Radha Boothe RN  Radiologic Tech:    Blair South LRT   Anesthesiologist:  Sheath:      I/Os: EBL less than 10cc  IV fluids:     cc     Urine output     cc    Contrast Visi          Vitals: BP         HR      Spo2 100%          Preliminary Report:  Using a 5 Serbian long sheath to the right groin under MAC sedation via left vertebral artery,  left internal carotid artery, left external carotid artery, right vertebral artery, right internal carotid artery, right external carotid artery a selective cerebral angiography was performed and demonstrated                       Official note to follow.  Patient tolerated procedure well, hemodynamically stable, no change in neurological status compared to baseline.  Results discussed with neuro ICU team, patient and patient's family. Right groin sheath was removed, manual compression held to hemostasis for 20 minutes, no active bleeding, no hematoma, quick clot and safeguard balloon dressing applied at Interventional Neuro- Radiology   Procedure Note PA-C    Procedure: Catheter Cerebral Angiogram   Pre- Procedure Diagnosis: infarct   Post- Procedure Diagnosis:    : Dr Phoebe Xiao  Fellow:     Dr Remy Birmingham   Physician Assistant: Jamari Magdaleno     Nurse:                   Radha Boothe RN  Radiologic Tech:    Blair South LRT   Anesthesiologist:  Sheath:      I/Os: EBL less than 10cc  IV fluids:     cc     Urine output     cc    Contrast Visi          Vitals: BP         HR      Spo2 100%          Preliminary Report:  Using a 5 [place]otho long sheath to the right groin under MAC sedation via left vertebral artery,  left internal carotid artery, left external carotid artery, right vertebral artery, right internal carotid artery, right external carotid artery a selective cerebral angiography was performed and demonstrated                       Official note to follow.  Patient tolerated procedure well, hemodynamically stable, no change in neurological status compared to baseline.  Results discussed with neuro ICU team, patient and patient's family. Right groin sheath was removed, manual compression held to hemostasis for 20 minutes, no active bleeding, no hematoma, quick clot and safeguard balloon dressing applied at Interventional Neuro- Radiology   Procedure Note PA-C    Procedure: Catheter Cerebral Angiogram   Pre- Procedure Diagnosis: severe distal vertebral artery stenosis bilaterally  Post- Procedure Diagnosis: severe distal vertebral artery stenosis bilaterally    : Dr. Phoebe Xiao  Fellow:     Dr. Nicholas Hill  Physician Assistant: Jamari Magdaleno PA-C  Resident: Dr. Bhavesh Chiu  Nurse:                   Radha Boothe RN  Radiologic Tech:     Blair South LRT   Anesthesiologist:    Dr. Luis Landry (no sedation given)    I/Os: EBL less than 10cc  IV fluids:  150 cc   Urine output: DTV  Contrast: 97 cc    Preliminary Report:  Using a 5 Guamanian short sheath to the right groin under local anesthesia via left vertebral artery,  left internal carotid artery, left external carotid artery, right vertebral artery, right internal carotid artery, right external carotid artery a selective cerebral angiography was performed and demonstrated occlusion of bilateral vertebral arteries with some flow to the basilar from the RIght PCOM and anterior spinal artery. Will discuss at the conference for stenting versus bypass.    Official dictated report to follow.    Patient tolerated procedure well, hemodynamically stable, no change in neurological status compared to baseline.  Results discussed with neuro ICU team and patient. Right groin sheath was removed, manual compression held to hemostasis, no active bleeding, no hematoma, quick clot and safeguard balloon dressing applied at 11:15 AM.

## 2023-11-20 NOTE — CONSULT NOTE ADULT - ASSESSMENT
Impression:  44 yo M with functional deficits secondary to diagnosis of acute strokes 2/2 severe vertebral stenosis    Plan:  Inpatient Rehab team with recommendations for Acute Inpatient Rehab- patient requires active and ongoing therapeutic interventions of multiple disciplines and can tolerate 3 hours of therapies. Can actively participate and benefit from  an intensive rehabilitation program. Requires supervision by a rehabilitation physician and a coordinated interdisciplinary approach to providing rehabilitation.     PT- ROM, Bed Mob, Transfers, Amb w AD and bracing as needed  OT- ADLs, bracing  SLP- on regular diet  Prec- Falls, Cardiac  DVT Prophylaxis - heparin gtt in anticipation of angiogram today with IR  Skin- Turn q2 h  Dispo- acute inpatient rehabilitation when acute interventions, workup have been completed, patient medically stabilized, and deemed appropriate for discharge    Impression:  42 yo M with functional deficits secondary to diagnosis of acute strokes 2/2 severe vertebral stenosis    Plan:  Inpatient Rehab team with recommendations for Acute Inpatient Rehab- patient requires active and ongoing therapeutic interventions of multiple disciplines and can tolerate 3 hours of therapies. Can actively participate and benefit from  an intensive rehabilitation program. Requires supervision by a rehabilitation physician and a coordinated interdisciplinary approach to providing rehabilitation.     PT- ROM, Bed Mob, Transfers, Amb w AD and bracing as needed  OT- ADLs, bracing  SLP- on regular diet, continue cog and speech therapies   Prec- Falls, Cardiac, aspiration  DVT Prophylaxis - heparin gtt in anticipation of angiogram today with IR  Skin- Turn q2 h  Dispo- acute inpatient rehabilitation when acute interventions, workup have been completed, patient medically stabilized, and deemed appropriate for discharge

## 2023-11-20 NOTE — PROGRESS NOTE ADULT - ASSESSMENT
43M hx HTN, DM, prior posterior circulation CVA (most recent 7/2023) on DAPT p/w L facial, dysarthria, LUE dysmetria. TNK 11/16 7:47pm, NIHSS 4->2. 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 Exam: Patient c/o diplopia. On exam, with disconjugate gaze, mild-moderate dysarthria, strength intact   -MRI/MRA and NOVA done w/ acute infarcts in posterior circulation involving L occipital lobe, cerebellum, b/l brachium pontis, and R adrienne. Poor flow in basilar 2/2 severe distal vertebral artery stenosis. Flow in pcomms and PCAs too low to measure  -s/p angio w/ occlusion of b/l vertebral arteries with some flow to the basilar from R pcom and anterior spinal artery

## 2023-11-20 NOTE — PROGRESS NOTE ADULT - SUBJECTIVE AND OBJECTIVE BOX
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

## 2023-11-20 NOTE — CONSULT NOTE ADULT - SUBJECTIVE AND OBJECTIVE BOX
Patient is a 43y old  Male who presents with a chief complaint of Stroke (20 Nov 2023 07:34)      HPI:  Patient ROB OSHEA is a 43y (1980) RIGHT handed man who presented to [facility] ED on 11/16/2023, as a CODE STROKE, with c/o difficulty walking, LLE weakness, speech changes.    PMH significant for: stroke (7/2023 - presenting with dysarthria - left inferior adrienne, b/l parieto-occipital juxtacortical WM w/mild residual dysarthria; 8 years ago - treated with TPA at [facility] - p/w L hemiplegia, facial droop and dysarthria), T2DM, HTN, former smoker (quit in 7/2023)    Presented as a CODE STROKE at 19:12. Neurology arrived promptly to evaluate patient. Per wife, patient having acute onset slurred speech (worse from baseline) and inability to stand/walk. This began at 17:00, 11/16/2023. On exam: patient has a L facial droop, ataxia in LUE (dysmetria with FTN), and struggles to perform HTS b/l. Patient also with pronounced slurred speech (moderate but is intelligible). Patient was rushed to the CT scanner on the first floor of the hospital d/t CT scanner in ED being used. Underwent NCCTH, which revealed no hemorrhage, but was initially read by Radiology as showing a nonacute left occipital infarct not seen on the CTH performed 7/26/2023. This infarct was not c/w patient's exam. Patient was emergently rushed back to ED for vital signs and weight. These were unable to be obtained on the first floor. After risk/benefit discussion with patient and wife - tenecteplase was administered at 11/16/2023, 19:47. DTN time was delayed d/t patient needing to be taken up to first floor CT scanner from ED (ED scanner in use), needed to return to ED after scans for vitals including BP and weight.    30 minutes after administration, NIHSS 4->2 (mild left facial but improved and mild dysarthria). Patient reports voice is much better. Performs FTN without issue with LUE. Performs HTS b/l. L NLF flattening significantly improved. Unable to walk patient d/t safety concerns s/p tenecteplase (on bed rest).    Patient currently takes ASA 81mg daily every day. Was originally on DAPT (ASA + clopidogrel) after his last stroke in 7/2023, but saw Dr. Guzman in 11/2/2023, who recommended patient switch over to aspirin monotherapy.  Patient reports that he takes all of his medications every day including his aspirin. Patient and wife report after patient's last stroke, he went to inpatient rehab. He still has slurred speech but it was "90% better." Walks with a cane, but is otherwise independent.  is able to climb stairs with the assistance of his cane.  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 Went to work last week. On Sunday (11/12/2023) - patient was feeling ill. Constipated, vomited. Had some room spinning dizziness. Has been sleeping a lot and has been diaphoretic. Symptoms of slurred speech and gait imbalance came on suddenly today at 11/16/2023. Wife says it was a "night and day difference," which triggered her to rush patient to the ED.     Patient reports that he quit smoking after his stroke in July 2023.  Patient was using a nicotine patch for short while, but he is no longer smoking or using a nicotine patch.  Patient denies any alcohol use.  Patient smokes 1 joint of marijuana per night.  Patient currently lives with his wife and daughter.    Patient denies fevers, chills, visual changes, neck pain, chest pain, palpitations, shortness of breath, abdominal pain, emesis.  Patient reports that he was feeling nauseated earlier.   (16 Nov 2023 21:31)    INTERVAL HISTORY  Significant improvement in symptoms per wife after TPA administration. MRI/MRA and NOVA done w/ acute infarcts in posterior circulation involving L occipital lobe, cerebellum, b/l brachium pontis, and R adrienne. Poor flow in basilar 2/2 severe distal vertebral artery stenosis. Flow in pcomms and PCAs too low to measure. Patient with angiogram per IR to be done on 11/20 w/ possible recanalization by Dr. Xiao.     REVIEW OF SYSTEMS: No chest pain, shortness of breath, nausea, vomiting or diarhea; other ROS neg     PAST MEDICAL & SURGICAL HISTORY  DM (diabetes mellitus)  HTN, age 0-18  HTN (hypertension)  CVA (cerebrovascular accident)    FUNCTIONAL HISTORY:   Lives with wife in a privateh ome w/ 3-4 MARIAM and first floor setup  Independent with ADLS and used a cane for ambulation. Residual mild dysarthria after previous stroke. Has RW and walk in shower    CURRENT FUNCTIONAL STATUS:  PT/OT 11/17:  Bed mobility not assessed  Transfers Toby  LBD Toby  UBD modA  Gait 25ft w/ Toby and RW  Recs for acute inpatient rehab    FAMILY HISTORY   not assessed    MEDICATIONS   acetaminophen     Tablet .. 650 milliGRAM(s) Oral every 6 hours PRN  aspirin  chewable 81 milliGRAM(s) Oral daily  atorvastatin 80 milliGRAM(s) Oral at bedtime  dextrose 5%. 1000 milliLiter(s) IV Continuous <Continuous>  dextrose 5%. 1000 milliLiter(s) IV Continuous <Continuous>  dextrose 50% Injectable 25 Gram(s) IV Push once  dextrose 50% Injectable 25 Gram(s) IV Push once  dextrose 50% Injectable 12.5 Gram(s) IV Push once  dextrose Oral Gel 15 Gram(s) Oral once PRN  escitalopram 10 milliGRAM(s) Oral daily  glucagon  Injectable 1 milliGRAM(s) IntraMuscular once  heparin  Infusion 1300 Unit(s)/Hr IV Continuous <Continuous>  hydrALAZINE Injectable 5 milliGRAM(s) IV Push every 4 hours PRN  influenza   Vaccine 0.5 milliLiter(s) IntraMuscular once  insulin lispro (ADMELOG) corrective regimen sliding scale   SubCutaneous Before meals and at bedtime  sodium chloride 0.9%. 1000 milliLiter(s) IV Continuous <Continuous>  traZODone 50 milliGRAM(s) Oral at bedtime      ALLERGIES  No Known Allergies      VITALS  T(C): 36.6 (11-20-23 @ 09:33), Max: 36.9 (11-19-23 @ 20:00)  HR: 84 (11-20-23 @ 09:33) (62 - 84)  BP: 153/73 (11-20-23 @ 09:33) (93/53 - 185/97)  RR: 22 (11-20-23 @ 09:33) (14 - 23)  SpO2: 100% (11-20-23 @ 09:33) (97% - 100%)  Wt(kg): --    PHYSICAL EXAM  Constitutional - NAD, Comfortable  HEENT - NCAT, EOMI  Neck - Supple  Chest - No distress, no use of accessory muscles for respiration  Cardiovascular -Well perfused  Abdomen - BS+, Soft, NTND  Extremities - No C/C/E, No calf tenderness   Neurologic Exam -                    Cognitive - Awake, Alert, AAO to self, place, date, year, situation     Communication - Fluent, No dysarthria, no aphasia     Cranial Nerves - CN 2-12 intact     Motor - No focal deficits      Sensory - Intact to LT     Reflexes - DTR Intact, No primitive reflexive  Psychiatric - Mood stable, Affect WNL    RECENT LABS/IMAGING  CBC Full  -  ( 20 Nov 2023 05:30 )  WBC Count : 7.33 K/uL  RBC Count : 4.69 M/uL  Hemoglobin : 13.7 g/dL  Hematocrit : 40.3 %  Platelet Count - Automated : 181 K/uL  Mean Cell Volume : 85.9 fl  Mean Cell Hemoglobin : 29.2 pg  Mean Cell Hemoglobin Concentration : 34.0 gm/dL  Auto Neutrophil # : x  Auto Lymphocyte # : x  Auto Monocyte # : x  Auto Eosinophil # : x  Auto Basophil # : x  Auto Neutrophil % : x  Auto Lymphocyte % : x  Auto Monocyte % : x  Auto Eosinophil % : x  Auto Basophil % : x    11-20    141  |  108  |  17  ----------------------------<  110<H>  3.7   |  22  |  1.25    Ca    9.1      20 Nov 2023 05:31      Urinalysis Basic - ( 20 Nov 2023 05:31 )    Color: x / Appearance: x / SG: x / pH: x  Gluc: 110 mg/dL / Ketone: x  / Bili: x / Urobili: x   Blood: x / Protein: x / Nitrite: x   Leuk Esterase: x / RBC: x / WBC x   Sq Epi: x / Non Sq Epi: x / Bacteria: x    < from: MR Head No Cont (11.19.23 @ 17:02) >  IMPRESSION: Redemonstration of multiple evolving acute/subacute posterior   fossa and left occipital lobe infarcts with associated cytotoxic edema   and without hemorrhagic transformation. Overall appearance is grossly   similar when compared with 11/17/2023.    Additional multiple chronic lacunar infarcts and similar-appearing   chronic white matter microvascular type changes.    < end of copied text >  < from: MR Angio Neck No Cont (11.17.23 @ 15:31) >    IMPRESSION: Small vessel white matter ischemic changes. Acute infarcts in   the posterior circulation involving the left occipital lobe, left   cerebellum and bilateral brachium pontis, right adrienne. Chronic appearing   infarct adrienne. Extremely poor flow identified in the basilar artery due to   what appears to be severe distal vertebral artery stenosis. Flow in the   posterior communicating arteries and posterior cerebral arteries is too   small to measure.      Dr. Barnett discussed these findings with neurology resident on 11/17/2023   3:34 PM with read back.    < end of copied text >   Patient is a 43y old  Male who presents with a chief complaint of Stroke (20 Nov 2023 07:34)      HPI:  Patient ROB OSHEA is a 43y (1980) RIGHT handed man who presented to [facility] ED on 11/16/2023, as a CODE STROKE, with c/o difficulty walking, LLE weakness, speech changes.    PMH significant for: stroke (7/2023 - presenting with dysarthria - left inferior adrienne, b/l parieto-occipital juxtacortical WM w/mild residual dysarthria; 8 years ago - treated with TPA at [facility] - p/w L hemiplegia, facial droop and dysarthria), T2DM, HTN, former smoker (quit in 7/2023)    Presented as a CODE STROKE at 19:12. Neurology arrived promptly to evaluate patient. Per wife, patient having acute onset slurred speech (worse from baseline) and inability to stand/walk. This began at 17:00, 11/16/2023. On exam: patient has a L facial droop, ataxia in LUE (dysmetria with FTN), and struggles to perform HTS b/l. Patient also with pronounced slurred speech (moderate but is intelligible). Patient was rushed to the CT scanner on the first floor of the hospital d/t CT scanner in ED being used. Underwent NCCTH, which revealed no hemorrhage, but was initially read by Radiology as showing a nonacute left occipital infarct not seen on the CTH performed 7/26/2023. This infarct was not c/w patient's exam. Patient was emergently rushed back to ED for vital signs and weight. These were unable to be obtained on the first floor. After risk/benefit discussion with patient and wife - tenecteplase was administered at 11/16/2023, 19:47. DTN time was delayed d/t patient needing to be taken up to first floor CT scanner from ED (ED scanner in use), needed to return to ED after scans for vitals including BP and weight.    30 minutes after administration, NIHSS 4->2 (mild left facial but improved and mild dysarthria). Patient reports voice is much better. Performs FTN without issue with LUE. Performs HTS b/l. L NLF flattening significantly improved. Unable to walk patient d/t safety concerns s/p tenecteplase (on bed rest).    Patient currently takes ASA 81mg daily every day. Was originally on DAPT (ASA + clopidogrel) after his last stroke in 7/2023, but saw Dr. Guzman in 11/2/2023, who recommended patient switch over to aspirin monotherapy.  Patient reports that he takes all of his medications every day including his aspirin. Patient and wife report after patient's last stroke, he went to inpatient rehab. He still has slurred speech but it was "90% better." Walks with a cane, but is otherwise independent.  is able to climb stairs with the assistance of his cane.  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 Went to work last week. On Sunday (11/12/2023) - patient was feeling ill. Constipated, vomited. Had some room spinning dizziness. Has been sleeping a lot and has been diaphoretic. Symptoms of slurred speech and gait imbalance came on suddenly today at 11/16/2023. Wife says it was a "night and day difference," which triggered her to rush patient to the ED.     Patient reports that he quit smoking after his stroke in July 2023.  Patient was using a nicotine patch for short while, but he is no longer smoking or using a nicotine patch.  Patient denies any alcohol use.  Patient smokes 1 joint of marijuana per night.  Patient currently lives with his wife and daughter.    Patient denies fevers, chills, visual changes, neck pain, chest pain, palpitations, shortness of breath, abdominal pain, emesis.  Patient reports that he was feeling nauseated earlier.   (16 Nov 2023 21:31)    INTERVAL HISTORY  Significant improvement in symptoms per wife after TPA administration. MRI/MRA and NOVA done w/ acute infarcts in posterior circulation involving L occipital lobe, cerebellum, b/l brachium pontis, and R adrienne. Poor flow in basilar 2/2 severe distal vertebral artery stenosis. Flow in pcomms and PCAs too low to measure. Patient with angiogram per IR to be done on 11/20 w/ possible recanalization by Dr. Xiao. Spoke with patient's wife and able to see patient in bed after angiogram. Patient tired but able to respond verbally. Biggest complaint is feeling foggy.     REVIEW OF SYSTEMS: +fatigue, No chest pain, shortness of breath, nausea, vomiting or diarhea; other ROS neg     PAST MEDICAL & SURGICAL HISTORY  DM (diabetes mellitus)  HTN, age 0-18  HTN (hypertension)  CVA (cerebrovascular accident)    FUNCTIONAL HISTORY:   Lives with wife in a privateh ome w/ 3-4 MARIAM and first floor setup  Independent with ADLS and used a cane for ambulation. Residual mild dysarthria after previous stroke. Has RW and walk in shower    CURRENT FUNCTIONAL STATUS:  PT/OT 11/17:  Bed mobility not assessed  Transfers Toby  LBD Toby  UBD modA  Gait 25ft w/ Toby and RW  Recs for acute inpatient rehab    FAMILY HISTORY   not assessed    MEDICATIONS   acetaminophen     Tablet .. 650 milliGRAM(s) Oral every 6 hours PRN  aspirin  chewable 81 milliGRAM(s) Oral daily  atorvastatin 80 milliGRAM(s) Oral at bedtime  dextrose 5%. 1000 milliLiter(s) IV Continuous <Continuous>  dextrose 5%. 1000 milliLiter(s) IV Continuous <Continuous>  dextrose 50% Injectable 25 Gram(s) IV Push once  dextrose 50% Injectable 25 Gram(s) IV Push once  dextrose 50% Injectable 12.5 Gram(s) IV Push once  dextrose Oral Gel 15 Gram(s) Oral once PRN  escitalopram 10 milliGRAM(s) Oral daily  glucagon  Injectable 1 milliGRAM(s) IntraMuscular once  heparin  Infusion 1300 Unit(s)/Hr IV Continuous <Continuous>  hydrALAZINE Injectable 5 milliGRAM(s) IV Push every 4 hours PRN  influenza   Vaccine 0.5 milliLiter(s) IntraMuscular once  insulin lispro (ADMELOG) corrective regimen sliding scale   SubCutaneous Before meals and at bedtime  sodium chloride 0.9%. 1000 milliLiter(s) IV Continuous <Continuous>  traZODone 50 milliGRAM(s) Oral at bedtime      ALLERGIES  No Known Allergies      VITALS  T(C): 36.6 (11-20-23 @ 09:33), Max: 36.9 (11-19-23 @ 20:00)  HR: 84 (11-20-23 @ 09:33) (62 - 84)  BP: 153/73 (11-20-23 @ 09:33) (93/53 - 185/97)  RR: 22 (11-20-23 @ 09:33) (14 - 23)  SpO2: 100% (11-20-23 @ 09:33) (97% - 100%)  Wt(kg): --    PHYSICAL EXAM  Constitutional - NAD, Comfortable  HEENT - NCAT, EOMI  Neck - Supple  Chest - No distress, no use of accessory muscles for respiration  Cardiovascular -Well perfused  Abdomen - BS+, Soft, NTND  Extremities - No C/C/E, No calf tenderness   Neurologic Exam -                    Cognitive - Awake, Alert, AAO to self, place, date, year, situation     Communication - Fluent, + dysarthria, no aphasia     Cranial Nerves - CN 2-12 intact     Motor - 4+/5 in all limbs     Coordination - FTN intact but slow      Sensory - Intact to LT     Reflexes - DTR Intact, No primitive reflexive  Psychiatric - Mood stable, Affect WNL    RECENT LABS/IMAGING  CBC Full  -  ( 20 Nov 2023 05:30 )  WBC Count : 7.33 K/uL  RBC Count : 4.69 M/uL  Hemoglobin : 13.7 g/dL  Hematocrit : 40.3 %  Platelet Count - Automated : 181 K/uL  Mean Cell Volume : 85.9 fl  Mean Cell Hemoglobin : 29.2 pg  Mean Cell Hemoglobin Concentration : 34.0 gm/dL  Auto Neutrophil # : x  Auto Lymphocyte # : x  Auto Monocyte # : x  Auto Eosinophil # : x  Auto Basophil # : x  Auto Neutrophil % : x  Auto Lymphocyte % : x  Auto Monocyte % : x  Auto Eosinophil % : x  Auto Basophil % : x    11-20    141  |  108  |  17  ----------------------------<  110<H>  3.7   |  22  |  1.25    Ca    9.1      20 Nov 2023 05:31      Urinalysis Basic - ( 20 Nov 2023 05:31 )    Color: x / Appearance: x / SG: x / pH: x  Gluc: 110 mg/dL / Ketone: x  / Bili: x / Urobili: x   Blood: x / Protein: x / Nitrite: x   Leuk Esterase: x / RBC: x / WBC x   Sq Epi: x / Non Sq Epi: x / Bacteria: x    < from: MR Head No Cont (11.19.23 @ 17:02) >  IMPRESSION: Redemonstration of multiple evolving acute/subacute posterior   fossa and left occipital lobe infarcts with associated cytotoxic edema   and without hemorrhagic transformation. Overall appearance is grossly   similar when compared with 11/17/2023.    Additional multiple chronic lacunar infarcts and similar-appearing   chronic white matter microvascular type changes.    < end of copied text >  < from: MR Angio Neck No Cont (11.17.23 @ 15:31) >    IMPRESSION: Small vessel white matter ischemic changes. Acute infarcts in   the posterior circulation involving the left occipital lobe, left   cerebellum and bilateral brachium pontis, right adrienne. Chronic appearing   infarct adrienne. Extremely poor flow identified in the basilar artery due to   what appears to be severe distal vertebral artery stenosis. Flow in the   posterior communicating arteries and posterior cerebral arteries is too   small to measure.      Dr. Barnett discussed these findings with neurology resident on 11/17/2023   3:34 PM with read back.    < end of copied text >

## 2023-11-20 NOTE — PROGRESS NOTE ADULT - SUBJECTIVE AND OBJECTIVE BOX
Subjective: Patient seen and examined. No new events except as noted.   remains in Stroke unit     REVIEW OF SYSTEMS:    CONSTITUTIONAL:+ weakness, fevers or chills  EYES/ENT: No visual changes;  No vertigo or throat pain   NECK: No pain or stiffness  RESPIRATORY: No cough, wheezing, hemoptysis; No shortness of breath  CARDIOVASCULAR: No chest pain or palpitations  GASTROINTESTINAL: No abdominal or epigastric pain. No nausea, vomiting, or hematemesis; No diarrhea or constipation. No melena or hematochezia.  GENITOURINARY: No dysuria, frequency or hematuria  NEUROLOGICAL: No numbness or weakness  SKIN: No itching, burning, rashes, or lesions   All other review of systems is negative unless indicated above.    MEDICATIONS:  MEDICATIONS  (STANDING):  aspirin  chewable 81 milliGRAM(s) Oral daily  atorvastatin 80 milliGRAM(s) Oral at bedtime  dextrose 5%. 1000 milliLiter(s) (50 mL/Hr) IV Continuous <Continuous>  dextrose 5%. 1000 milliLiter(s) (100 mL/Hr) IV Continuous <Continuous>  dextrose 50% Injectable 25 Gram(s) IV Push once  dextrose 50% Injectable 12.5 Gram(s) IV Push once  dextrose 50% Injectable 25 Gram(s) IV Push once  escitalopram 10 milliGRAM(s) Oral daily  glucagon  Injectable 1 milliGRAM(s) IntraMuscular once  heparin  Infusion 1300 Unit(s)/Hr (13 mL/Hr) IV Continuous <Continuous>  influenza   Vaccine 0.5 milliLiter(s) IntraMuscular once  insulin lispro (ADMELOG) corrective regimen sliding scale   SubCutaneous Before meals and at bedtime  sodium chloride 0.9%. 1000 milliLiter(s) (75 mL/Hr) IV Continuous <Continuous>  traZODone 50 milliGRAM(s) Oral at bedtime      PHYSICAL EXAM:  T(C): 36.6 (11-20-23 @ 09:33), Max: 36.9 (11-19-23 @ 20:00)  HR: 84 (11-20-23 @ 09:33) (69 - 84)  BP: 153/73 (11-20-23 @ 09:33) (107/62 - 185/97)  RR: 22 (11-20-23 @ 09:33) (14 - 23)  SpO2: 100% (11-20-23 @ 09:33) (97% - 100%)  Wt(kg): --  I&O's Summary    19 Nov 2023 07:01  -  20 Nov 2023 07:00  --------------------------------------------------------  IN: 1144 mL / OUT: 2250 mL / NET: -1106 mL        Weight (kg): 111.7 (11-20 @ 09:33)      Appearance: NAD  HEENT:  Dry  oral mucosa, PERRL, EOMI	  Lymphatic: No lymphadenopathy  Cardiovascular: Normal S1 S2, No JVD, No murmurs, No edema  Respiratory: Lungs clear to auscultation	  Psychiatry: A & O x 3, Mood & affect appropriate  Gastrointestinal:  Soft, Non-tender, + BS	  Skin: No rashes, No ecchymoses, No cyanosis	  Neurologic: Non-focal  Extremities: Normal range of motion, No clubbing, cyanosis or edema  Vascular: Peripheral pulses palpable 2+ bilaterally  Neurologic Exam:  Mental status:  Awake, Alert; Oriented to: person, place. Knows it is November 2023 but not exact date; Speech: moderate dysarthria; Repetition and naming: intact; Follows simple and complex commands; Attention/concentration: can spell WORLD forward but not backward; Recent and remote memory (including registration and recall): JULIETA; Fund of knowledge: intact    Cranial nerves - R pupil>L, both reactive, VFF on confrontational testing, EOM - inability to bury the sclera of the R eye with rightward gaze, L upper eyelid ptosis, face sensation (V1-V3) intact b/l, facial strength - L facial droop, hearing intact b/l with finger rub test, palate with symmetric elevation, trapezius 5/5 strength b/l, tongue midline on protrusion with full lateral movement    Motor - Normal bulk and tone throughout. No pronator drift.  Strength testing (R/L)  Deltoid:  5/5   Biceps:  5/5   Triceps:  5/5   Wrist Extension:  5/5   Wrist Flexion:  5/5   Interossei:  5/5   :  5/5  Hip Flexion:  5/5   Hip Extension:  5/5   Knee Flexion:  5/5   Knee Extension:  5/5   Dorsiflexion:  5/5   Plantar Flexion:  5/5    Sensation - Light touch intact throughout    DTRs (R/L)  3+ upper/lower  b/l sustained ankle clonus    Coordination - Finger to Nose, Heel to Shin intact b/l. No tremors appreciated.    Gait and station - Patient unable to stand with holding onto the sides of transport chair during             LABS:    CARDIAC MARKERS:                                13.7   7.33  )-----------( 181      ( 20 Nov 2023 05:30 )             40.3     11-20    141  |  108  |  17  ----------------------------<  110<H>  3.7   |  22  |  1.25    Ca    9.1      20 Nov 2023 05:31      proBNP:   Lipid Profile:   HgA1c:   TSH:             TELEMETRY: 	  SR  ECG:  	  RADIOLOGY:   DIAGNOSTIC TESTING:  [ ] Echocardiogram:  [ ]  Catheterization:  [ ] Stress Test:    OTHER:

## 2023-11-20 NOTE — PROGRESS NOTE ADULT - ASSESSMENT
43y RIGHT handed man who presented to [facility] ED on 11/16/2023, as a CODE STROKE, with c/o difficulty walking, LLE weakness, speech changes. PMH significant for: stroke (7/2023 - presenting with dysarthria - left inferior adrienne, b/l parieto-occipital juxtacortical WM w/mild residual dysarthria; 8 years ago - treated with TPA at [facility] - p/w L hemiplegia, facial droop and dysarthria), T2DM, HTN, former smoker (quit in 7/2023). Symptoms beginning at 17:00, 11/16/2023. Noted at the code to have a L facial, LUE ataxia, b/l LE ataxia, slurred speech. No appreciable drift in any of the extremities. Underwent CT scans with out sign of hemorrhage or jayla LVO. Tenecteplase administered at 19:47, with improvement from NIHSS 4 to 2. Patient previously taking clopidogrel and aspirin after last stroke. Currently only taking aspirin at the instructions of his vascular neurologist.    ED vitals notable for: afebrile, 158/92, HR 79 RR 18, SpO2 97%. Labs notable for: glucose 156. CT imaging disclosed: no hemorrhage, left occipital/right cerebellar acute to subacute infarctions; chronic left basal ganglia, left thalamic and central pontine remote infarctions. There is bilateral atherosclerosis of the carotid system, as well as in the anterior and posterior circulation. The left vertebral artery, basial artery, and posterior cerebral arteries are with progressively reduced flow since last study. Initial exam notable for L facial droop, ataxia in the LUE, bilateral lower limbs, moderate dysarthria, patient unable to stand/walk without holding the transport chair handles.     NIHSS on admission: 4->2 (mild dysarthria, mild L facial - 30 minutes after tenecteplase)  LKN: 17:00, 11/16/2023  pre-MRS: 2 (walks with a cane, otherwise independent)    Impression: L facial weakness, LUE ataxia, dysarthria, gait instability d/t R brain dysfunction d/t acute ischemic stroke

## 2023-11-20 NOTE — CHART NOTE - NSCHARTNOTEFT_GEN_A_CORE
Interventional Neuro Radiology  Pre-Procedure Note PA-C        This is a 43 year old right hand dominant male  who presented to [facility] ED on 11/16/2023, as a CODE STROKE, with c/o difficulty walking, LLE weakness, speech changes.    PMH significant for: stroke (7/2023 - presenting with dysarthria - left inferior adrienne, b/l parieto-occipital juxtacortical WM w/mild residual dysarthria; 8 years ago - treated with TPA at [facility], [place] - p/w L hemiplegia, facial droop and dysarthria), T2DM, HTN, former smoker (quit in 7/2023)    Presented as a CODE STROKE at 19:12. Neurology arrived promptly to evaluate patient. Per wife, patient having acute onset slurred speech (worse from baseline) and inability to stand/walk. This began at 17:00, 11/16/2023. On exam: patient has a L facial droop, ataxia in LUE (dysmetria with FTN), and struggles to perform HTS b/l. Patient also with pronounced slurred speech (moderate but is intelligible). Patient was rushed to the CT scanner on the first floor of the hospital d/t CT scanner in ED being used. Underwent NCCTH, which revealed no hemorrhage, but was initially read by Radiology as showing a nonacute left occipital infarct not seen on the CTH performed 7/26/2023. This infarct was not c/w patient's exam. Patient was emergently rushed back to ED for vital signs and weight. These were unable to be obtained on the first floor. After risk/benefit discussion with patient and wife - tenecteplase was administered at 11/16/2023, 19:47. DTN time was delayed d/t patient needing to be taken up to first floor CT scanner from ED (ED scanner in use), needed to return to ED after scans for vitals including BP and weight.    30 minutes after administration, NIHSS 4->2 (mild left facial but improved and mild dysarthria). Patient reports voice is much better. Performs FTN without issue with LUE. Performs HTS b/l. L NLF flattening significantly improved. Unable to walk patient d/t safety concerns s/p tenecteplase (on bed rest).    Patient currently takes ASA 81mg daily every day. Was originally on DAPT (ASA + clopidogrel) after his last stroke in 7/2023, but saw Dr. Guzman in 11/2/2023, who recommended patient switch over to aspirin monotherapy.  Patient reports that he takes all of his medications every day including his aspirin. Patient and wife report after patient's last stroke, he went to inpatient rehab. He still has slurred speech but it was "90% better." Walks with a cane, but is otherwise independent.  is able to climb stairs with the assistance of his cane.  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 Went to work last week. On Sunday (11/12/2023) - patient was feeling ill. Constipated, vomited. Had some room spinning dizziness. Has been sleeping a lot and has been diaphoretic. Symptoms of slurred speech and gait imbalance came on suddenly today at 11/16/2023. Wife says it was a "night and day difference," which triggered her to rush patient to the ED.     Patient reports that he quit smoking after his stroke in July 2023.  Patient was using a nicotine patch for short while, but he is no longer smoking or using a nicotine patch.  Patient denies any alcohol use.  Patient smokes 1 joint of marijuana per night.  Patient currently lives with his wife and daughter.    Patient denies fevers, chills, visual changes, neck pain, chest pain, palpitations, shortness of breath, abdominal pain, emesis.  Patient reports that he was feeling nauseated earlier.   (16 Nov 2023 21:31)      Allergies: No Known Allergies      PAST MEDICAL & SURGICAL HISTORY:  DM (diabetes mellitus)      HTN (hypertension)      CVA (cerebrovascular accident)          Social History:     FAMILY HISTORY:      Current Medications: acetaminophen     Tablet .. 650 milliGRAM(s) Oral every 6 hours PRN  aspirin  chewable 81 milliGRAM(s) Oral daily  atorvastatin 80 milliGRAM(s) Oral at bedtime  dextrose 5%. 1000 milliLiter(s) IV Continuous <Continuous>  dextrose 5%. 1000 milliLiter(s) IV Continuous <Continuous>  dextrose 50% Injectable 25 Gram(s) IV Push once  dextrose 50% Injectable 25 Gram(s) IV Push once  dextrose 50% Injectable 12.5 Gram(s) IV Push once  dextrose Oral Gel 15 Gram(s) Oral once PRN  escitalopram 10 milliGRAM(s) Oral daily  glucagon  Injectable 1 milliGRAM(s) IntraMuscular once  heparin  Infusion 1300 Unit(s)/Hr IV Continuous <Continuous>  hydrALAZINE Injectable 5 milliGRAM(s) IV Push every 4 hours PRN  influenza   Vaccine 0.5 milliLiter(s) IntraMuscular once  insulin lispro (ADMELOG) corrective regimen sliding scale   SubCutaneous Before meals and at bedtime  sodium chloride 0.9%. 1000 milliLiter(s) IV Continuous   traZODone 50 milliGRAM(s) Oral at bedtime      Labs:                         13.7   7.33  )-----------( 181      ( 20 Nov 2023 05:30 )             40.3       11-20    141  |  108  |  17  ----------------------------<  110<H>  3.7   |  22  |  1.25    Ca    9.1      20 Nov 2023 05:31      Blood Bank: A positive           ACC: [id number] EXAM:  MR BRAIN    PROCEDURE DATE:  11/19/2023    INTERPRETATION:  .  CLINICAL INFORMATION: Change in mental status. Evaluate intra-articular   extension.  TECHNIQUE: Multiplanar multi sequential MRI of the brain was acquired   without the administration of IV gadolinium.  COMPARISON: Prior PET/CT exam dated 11/19/2023. Prior brain study dated   11/17/2023.    IMPRESSION: Re demonstration of multiple evolving acute/subacute posterior   fossa and left occipital lobe infarcts with associated cytotoxic edema   and without hemorrhagic transformation. Overall appearance is grossly   similar when compared with 11/17/2023.    Additional multiple chronic lacunar infarcts and similar-appearing   chronic white matter microvascular type changes.      Assessment/Plan:   This is a 43 year old right hand dominant male    Patient presents to neuro-IR for selective cerebral angiography.   Procedure, goals, risks, benefits and alternatives were discussed with patient and patient's family. All questions were answered. Risks include but are not limited to stroke, vessel injury, hemorrhage, and or right groin hematoma. Patient demonstrates understanding of all risks involved with this procedure and wishes to continue. Appropriate consent was obtained from patient and consent is in the patient's chart. Interventional Neuro Radiology  Pre-Procedure Note ARNOLDO        43 year old right hand dominant male who presented to [facility] ED on 11/16/2023, as a CODE STROKE, with c/o difficulty walking, LLE weakness, speech changes.    PMH significant for: stroke (7/2023 - presenting with dysarthria - left inferior adrienne, b/l parieto-occipital juxtacortical WM w/mild residual dysarthria; 8 years ago - treated with TPA at [facility] - p/w L hemiplegia, facial droop and dysarthria), T2DM, HTN, former smoker (quit in 7/2023)    Presented as a CODE STROKE at 19:12 on 11/16/2023. Neurology arrived promptly to evaluate patient. Per wife, patient having acute onset slurred speech (worse from baseline) and inability to stand/walk. This began at 17:00, 11/16/2023. On exam: patient has a L facial droop, ataxia in LUE (dysmetria with FTN), and struggles to perform HTS b/l. Patient also with pronounced slurred speech (moderate but is intelligible). Patient was rushed to the CT scanner on the first floor of the hospital d/t CT scanner in ED being used. Underwent NCCTH, which revealed no hemorrhage, but was initially read by Radiology as showing a nonacute left occipital infarct not seen on the CTH performed 7/26/2023. This infarct was not c/w patient's exam. Patient was emergently rushed back to ED for vital signs and weight. These were unable to be obtained on the first floor. After risk/benefit discussion with patient and wife - tenecteplase was administered at 11/16/2023, 19:47. DTN time was delayed d/t patient needing to be taken up to first floor CT scanner from ED (ED scanner in use), needed to return to ED after scans for vitals including BP and weight.    30 minutes after administration, NIHSS 4->2 (mild left facial but improved and mild dysarthria). Patient reports voice is much better. Performs FTN without issue with LUE. Performs HTS b/l. L NLF flattening significantly improved. Unable to walk patient d/t safety concerns s/p tenecteplase (on bed rest).    MRI/MRA and NOVA done w/ acute infarcts in posterior circulation involving L occipital lobe, cerebellum, b/l brachium pontis, and R adrienne. Poor flow in basilar 2/2 severe distal vertebral artery stenosis. Flow in pcomms and PCAs too low to measure  Pt now presents for angio with possible recanalization/stenting with Dr. Xiao today 11/20.      Allergies: No Known Allergies      PAST MEDICAL & SURGICAL HISTORY:  DM (diabetes mellitus)  HTN (hypertension)  CVA (cerebrovascular accident)    Current Medications: acetaminophen     Tablet .. 650 milliGRAM(s) Oral every 6 hours PRN  aspirin  chewable 81 milliGRAM(s) Oral daily  atorvastatin 80 milliGRAM(s) Oral at bedtime  dextrose 5%. 1000 milliLiter(s) IV Continuous <Continuous>  dextrose 5%. 1000 milliLiter(s) IV Continuous <Continuous>  dextrose 50% Injectable 25 Gram(s) IV Push once  dextrose 50% Injectable 25 Gram(s) IV Push once  dextrose 50% Injectable 12.5 Gram(s) IV Push once  dextrose Oral Gel 15 Gram(s) Oral once PRN  escitalopram 10 milliGRAM(s) Oral daily  glucagon  Injectable 1 milliGRAM(s) IntraMuscular once  heparin  Infusion 1300 Unit(s)/Hr IV Continuous <Continuous>  hydrALAZINE Injectable 5 milliGRAM(s) IV Push every 4 hours PRN  influenza   Vaccine 0.5 milliLiter(s) IntraMuscular once  insulin lispro (ADMELOG) corrective regimen sliding scale   SubCutaneous Before meals and at bedtime  sodium chloride 0.9%. 1000 milliLiter(s) IV Continuous   traZODone 50 milliGRAM(s) Oral at bedtime      Labs:                         13.7   7.33  )-----------( 181      ( 20 Nov 2023 05:30 )             40.3       11-20    141  |  108  |  17  ----------------------------<  110<H>  3.7   |  22  |  1.25    Ca    9.1      20 Nov 2023 05:31    Activated Partial Thromboplastin Time (11.20.23 @ 05:30)    Activated Partial Thromboplastin Time: 44.3:   Prothrombin Time and INR, Plasma (11.16.23 @ 19:21)    Prothrombin Time, Plasma: 11.0 sec    INR: 1.05:     Blood Bank: A positive       ACC: [id number] EXAM:  MR BRAIN    PROCEDURE DATE:  11/19/2023    INTERPRETATION:  .  CLINICAL INFORMATION: Change in mental status. Evaluate intra-articular   extension.  TECHNIQUE: Multiplanar multi sequential MRI of the brain was acquired   without the administration of IV gadolinium.  COMPARISON: Prior PET/CT exam dated 11/19/2023. Prior brain study dated   11/17/2023.    IMPRESSION: Re demonstration of multiple evolving acute/subacute posterior   fossa and left occipital lobe infarcts with associated cytotoxic edema   and without hemorrhagic transformation. Overall appearance is grossly   similar when compared with 11/17/2023.    Additional multiple chronic lacunar infarcts and similar-appearing   chronic white matter microvascular type changes.      Assessment/Plan:   This is a 43 year old male with history as above and with acute infarcts in posterior circulation involving L occipital lobe, cerebellum, b/l brachium pontis, and R adrienne. Poor flow in basilar 2/2 severe distal vertebral artery stenosis bilaterally. Patient presents to neuro-IR for selective cerebral angiography and possible vertebral stent placement.   Procedure, goals, risks, benefits and alternatives were discussed with patient and patient's family. All questions were answered. Risks include but are not limited to stroke, vessel injury, hemorrhage, and or right groin hematoma. Patient demonstrates understanding of all risks involved with this procedure and wishes to continue. Appropriate consent was obtained from patient and consent is in the patient's chart. Interventional Neuro Radiology  Pre-Procedure Note ARNOLDO        43 year old right hand dominant male who presented to [facility] ED on 11/16/2023, as a CODE STROKE, with c/o difficulty walking, LLE weakness, speech changes.    PMH significant for: stroke (7/2023 - presenting with dysarthria - left inferior adrienne, b/l parieto-occipital juxtacortical WM w/mild residual dysarthria; 8 years ago - treated with TPA at [facility] - p/w L hemiplegia, facial droop and dysarthria), T2DM, HTN, former smoker (quit in 7/2023)    Presented as a CODE STROKE at 19:12 on 11/16/2023. Neurology arrived promptly to evaluate patient. Per wife, patient having acute onset slurred speech (worse from baseline) and inability to stand/walk. This began at 17:00, 11/16/2023. On exam: patient has a L facial droop, ataxia in LUE (dysmetria with FTN), and struggles to perform HTS b/l. Patient also with pronounced slurred speech (moderate but is intelligible). Patient was rushed to the CT scanner on the first floor of the hospital d/t CT scanner in ED being used. Underwent NCCTH, which revealed no hemorrhage, but was initially read by Radiology as showing a nonacute left occipital infarct not seen on the CTH performed 7/26/2023. This infarct was not c/w patient's exam. Patient was emergently rushed back to ED for vital signs and weight. These were unable to be obtained on the first floor. After risk/benefit discussion with patient and wife - tenecteplase was administered at 11/16/2023, 19:47. DTN time was delayed d/t patient needing to be taken up to first floor CT scanner from ED (ED scanner in use), needed to return to ED after scans for vitals including BP and weight.    30 minutes after administration, NIHSS 4->2 (mild left facial but improved and mild dysarthria). Patient reports voice is much better. Performs FTN without issue with LUE. Performs HTS b/l. L NLF flattening significantly improved. Unable to walk patient d/t safety concerns s/p tenecteplase (on bed rest).    MRI/MRA and NOVA done w/ acute infarcts in posterior circulation involving L occipital lobe, cerebellum, b/l brachium pontis, and R adrienne. Poor flow in basilar 2/2 severe distal vertebral artery stenosis. Flow in pcomms and PCAs too low to measure  Pt now presents for angio with possible recanalization/stenting with Dr. Xiao today 11/20.      Allergies: No Known Allergies      PAST MEDICAL & SURGICAL HISTORY:  DM (diabetes mellitus)  HTN (hypertension)  CVA (cerebrovascular accident)    Current Medications: acetaminophen     Tablet .. 650 milliGRAM(s) Oral every 6 hours PRN  aspirin  chewable 81 milliGRAM(s) Oral daily  atorvastatin 80 milliGRAM(s) Oral at bedtime  dextrose 5%. 1000 milliLiter(s) IV Continuous <Continuous>  dextrose 5%. 1000 milliLiter(s) IV Continuous <Continuous>  dextrose 50% Injectable 25 Gram(s) IV Push once  dextrose 50% Injectable 25 Gram(s) IV Push once  dextrose 50% Injectable 12.5 Gram(s) IV Push once  dextrose Oral Gel 15 Gram(s) Oral once PRN  escitalopram 10 milliGRAM(s) Oral daily  glucagon  Injectable 1 milliGRAM(s) IntraMuscular once  heparin  Infusion 1300 Unit(s)/Hr IV Continuous <Continuous>  hydrALAZINE Injectable 5 milliGRAM(s) IV Push every 4 hours PRN  influenza   Vaccine 0.5 milliLiter(s) IntraMuscular once  insulin lispro (ADMELOG) corrective regimen sliding scale   SubCutaneous Before meals and at bedtime  sodium chloride 0.9%. 1000 milliLiter(s) IV Continuous   traZODone 50 milliGRAM(s) Oral at bedtime    Neuro Exam: Awake and alert, oriented x3, + dysarthria, + dysconjugate gaze, + no nystagmus, mild left facial droop, tongue deviates to left, Left side UE drift, 5/5 power x on right and left upper and lower extremities. 3/5 strength LUE and 2/5 strength LLE, Normal sensation to LT.      Labs:                         13.7   7.33  )-----------( 181      ( 20 Nov 2023 05:30 )             40.3       11-20    141  |  108  |  17  ----------------------------<  110<H>  3.7   |  22  |  1.25    Ca    9.1      20 Nov 2023 05:31    Activated Partial Thromboplastin Time (11.20.23 @ 05:30)    Activated Partial Thromboplastin Time: 44.3:   Prothrombin Time and INR, Plasma (11.16.23 @ 19:21)    Prothrombin Time, Plasma: 11.0 sec    INR: 1.05:     Blood Bank: A positive       ACC: [id number] EXAM:  MR BRAIN    PROCEDURE DATE:  11/19/2023    INTERPRETATION:  .  CLINICAL INFORMATION: Change in mental status. Evaluate intra-articular   extension.  TECHNIQUE: Multiplanar multi sequential MRI of the brain was acquired   without the administration of IV gadolinium.  COMPARISON: Prior PET/CT exam dated 11/19/2023. Prior brain study dated   11/17/2023.    IMPRESSION: Re demonstration of multiple evolving acute/subacute posterior   fossa and left occipital lobe infarcts with associated cytotoxic edema   and without hemorrhagic transformation. Overall appearance is grossly   similar when compared with 11/17/2023.    Additional multiple chronic lacunar infarcts and similar-appearing   chronic white matter microvascular type changes.    < from: MR Angio Neck No Cont (11.17.23 @ 15:31) >    There is poor flow in the vertebral arteries bilaterally with severe   stenosis at the V4 segments. There is poor flow identified in the basilar   artery with predominantly retrograde flow. The posterior communicating   arteries are 2 small to be measured. Minimal flow is identified in the   right posterior cerebral artery, the left posterior cerebral artery flow   was not measurable.    < end of copied text >          Assessment/Plan:   This is a 43 year old male with history as above and with acute infarcts in posterior circulation involving L occipital lobe, cerebellum, b/l brachium pontis, and R adrienne. Poor flow in basilar 2/2 severe distal vertebral artery stenosis bilaterally. Patient presents to neuro-IR for selective cerebral angiography and possible vertebral stent placement.     Procedure, goals, risks, benefits and alternatives were discussed with patient and patient's family. All questions were answered. Risks include but are not limited to stroke, vessel injury, hemorrhage, and or right groin hematoma. Patient demonstrates understanding of all risks involved with this procedure and wishes to continue. Appropriate consent was obtained from patient and consent is in the patient's chart.

## 2023-11-21 NOTE — SWALLOW BEDSIDE ASSESSMENT ADULT - PHARYNGEAL PHASE
Decreased laryngeal elevation/Multiple swallows +audible swallows noted, suggestive of incoordination within the swallow sequence; Px with hx of GERD, may be indicative of retrograde flow/Decreased laryngeal elevation/Wet vocal quality post oral intake/Cough post oral intake/Throat clear post oral intake/Delayed cough post oral intake/Delayed throat clear post oral intake/Multiple swallows Decreased laryngeal elevation/Cough post oral intake/Throat clear post oral intake/Delayed cough post oral intake/Multiple swallows

## 2023-11-21 NOTE — SPEECH LANGUAGE PATHOLOGY EVALUATION - SLP DIAGNOSIS
42yo M who presented to [facility] ED as CODE STROKE with c/o difficulty walking, LLE weakness, speech changes, found to have L occipital and R cerebellar acute to subacute infarctions. Pt presents with dysarthria and cognitive-linguisitic deficits c/w R cerebellar CVA. Motor speech characterized by imprecise articulation, reduced rate of speech, hypernasality, harsh vocal quality, and decreased stress, with overall reduced coordination of speech at the conversational level. Fair intelligibility in known/unknown contexts, with occasional need for repetition to ensure comprehension of message. Cognitive-linguistic deficits evident in the areas of working memory, organization, mental flexibility, and semantic/phonemic fluency in the context of categorization tasks. Pt with delayed speed of processing, benefiting from increased wait time and occasional cues to assist with task completion. Comprehension and verbal expression intact. 42yo M who presented to [facility] ED as CODE STROKE with c/o difficulty walking, LLE weakness, speech changes, found to have L occipital and R cerebellar acute to subacute infarctions. Pt presents with dysarthria and cognitive-linguistic deficits c/w R cerebellar CVA. Motor speech characterized by imprecise articulation, reduced rate, hypernasality, harsh vocal quality, and decreased stress, with overall reduced coordination of speech at the conversational level. Fair intelligibility in known/unknown contexts, with occasional need for repetition to ensure comprehension of message. Cognitive-linguistic deficits evident in the areas of working memory, organization, mental flexibility, and semantic/phonemic fluency in the context of categorization tasks. Pt with delayed speed of processing, benefiting from increased wait time and occasional cues to assist with task completion. Comprehension and verbal expression intact, able to make wants/needs known.

## 2023-11-21 NOTE — SWALLOW BEDSIDE ASSESSMENT ADULT - H & P REVIEW
43yM who presented to [facility] ED on 11/16/2023, as a CODE STROKE, with c/o difficulty walking, LLE weakness, speech changes. PMH significant for: stroke (7/2023 - presenting with dysarthria - left inferior adrienne, b/l parieto-occipital juxtacortical WM w/mild residual dysarthria; 8 years ago - treated with TPA at [facility] - p/w L hemiplegia, facial droop and dysarthria), T2DM, HTN, former smoker (quit in 7/2023). Per wife, patient having acute onset slurred speech (worse from baseline) and inability to stand/walk. On exam: patient has a L facial droop, ataxia in LUE (dysmetria with FTN), and struggles to perform HTS b/l. Patient also with pronounced slurred speech (moderate but is intelligible). Underwent NCCTH, which revealed no hemorrhage, but was initially read by Radiology as showing a nonacute left occipital infarct not seen on CTH on 7/26/2023. This infarct was not c/w patient's exam. After risk/benefit discussion w/ pt and wife - tenecteplase was administered at 11/16/2023, 19:47. 30 minutes after administration, NIHSS 4->2 (mild left facial but improved and mild dysarthria). Pt reports voice is much better. Performs FTN without issue with LUE. Performs HTS b/l. L NLF flattening significantly improved. Unable to walk patient d/t safety concerns s/p tenecteplase (on bed rest). Pt reports he takes all of his medications every day including his aspirin. Pt and wife report after patient's last stroke, he went to inpatient rehab. He still has slurred speech but it was "90% better." Walks with a cane, but is otherwise independent, is able to climb stairs with the assistance of his cane./yes
yes

## 2023-11-21 NOTE — SPEECH LANGUAGE PATHOLOGY EVALUATION - COMMENTS
IMAGIN/19: MRI Brain: Redemonstration of multiple evolving acute/subacute posterior fossa and left occipital lobe infarcts with associated cytotoxic edema and without hemorrhagic transformation. Overall appearance is grossly similar when compared with 2023.  : CTH: Evolving acute infarcts in the left occipital lobe, posterior inferior aspect of the left cerebellar hemisphere, and in the right brachium pontis/medial right cerebellum are stable from 2023.     Interval Events:  : s/p angio w/ occlusion of b/l vertebral arteries with some flow to the basilar from R pcom and anterior spinal artery. No stent placed on . Following multidisciplinary discussion recommend angio/ stenting on : Pt noted to be coughing and choking briefly while brushing teeth during PM hygiene care. RN at bedside, maintaining sats, pt able to clear airway on their own, pt in no distress. Verbalizing it "went down the wrong pipe". Pt to be kept NPO in the meantime, pending speech and swallow re-eval in AM."    S + S HISTORY:   2023: During previous admission, speech/language evaluation was completed - pt w/ moderate to severe dysarthria and hypophonia. No language or cognitive deficits.  2023: Pt seen for speech/language evaluation during this hospital stay. +mild to moderate dysarthria. Fair intelligibility in unknown contexts; fair to good in known contexts. Patient deferred remainder of exam (exp/rec language and cognition). Comprehensive assessment of cognitive-linguistic skills warranted. Attempted to complete medication management activity as functional therapeutic context; however, transport arrived for scheduled CT scan. Further assessment will focus on problem solving, reasoning, and sequencing. Did not assess; will assess in speech and language tx if clinically indicated. Further assessment warranted; able to draw cube on assessment. This service will follow in-house for ongoing assessment of higher level cognitive-linguistic skills and to improve speech intelligibility.    GOALS: 1) Pt will improve cognitive-linguistic skills for functional communication.  2) Pt will improve speech production to increase overall speech intelligibility. Overall, motor speech appears imprecise and incoordinated in regards to articulation, prosody, and rate.

## 2023-11-21 NOTE — PROGRESS NOTE ADULT - SUBJECTIVE AND OBJECTIVE BOX
THE PATIENT WAS SEEN AND EXAMINED BY ME WITH THE HOUSESTAFF AND STROKE TEAM DURING MORNING ROUNDS.   HPI: Patient ROB OSHEA is a 43y (1980) RIGHT handed man who presented to [facility] ED on 11/16/2023, as a CODE STROKE, with c/o difficulty walking, LLE weakness, speech changes.  PMH significant for: stroke (7/2023 - presenting with dysarthria - left inferior adrienne, b/l parieto-occipital juxtacortical WM w/mild residual dysarthria; 8 years ago - treated with TPA at [facility] - p/w L hemiplegia, facial droop and dysarthria), T2DM, HTN, former smoker (quit in 7/2023)  Presented as a CODE STROKE at 19:12. Neurology arrived promptly to evaluate patient. Per wife, patient having acute onset slurred speech (worse from baseline) and inability to stand/walk. This began at 17:00, 11/16/2023. On exam: patient has a L facial droop, ataxia in LUE (dysmetria with FTN), and struggles to perform HTS b/l. Patient also with pronounced slurred speech (moderate but is intelligible). Patient was rushed to the CT scanner on the first floor of the hospital d/t CT scanner in ED being used. Underwent NCCTH, which revealed no hemorrhage, but was initially read by Radiology as showing a nonacute left occipital infarct not seen on the CTH performed 7/26/2023. This infarct was not c/w patient's exam. Patient was emergently rushed back to ED for vital signs and weight. These were unable to be obtained on the first floor. After risk/benefit discussion with patient and wife - tenecteplase was administered at 11/16/2023, 19:47. DTN time was delayed d/t patient needing to be taken up to first floor CT scanner from ED (ED scanner in use), needed to return to ED after scans for vitals including BP and weight.  30 minutes after administration, NIHSS 4->2 (mild left facial but improved and mild dysarthria). Patient reports voice is much better. Performs FTN without issue with LUE. Performs HTS b/l. L NLF flattening significantly improved. Unable to walk patient d/t safety concerns s/p tenecteplase (on bed rest).  Patient currently takes ASA 81mg daily every day. Was originally on DAPT (ASA + clopidogrel) after his last stroke in 7/2023, but saw Dr. Guzman in 11/2/2023, who recommended patient switch over to aspirin monotherapy.  Patient reports that he takes all of his medications every day including his aspirin. Patient and wife report after patient's last stroke, he went to inpatient rehab. He still has slurred speech but it was "90% better." Walks with a cane, but is otherwise independent.  is able to climb stairs with the assistance of his cane.  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 Went to work last week. On Sunday (11/12/2023) - patient was feeling ill. Constipated, vomited. Had some room spinning dizziness. Has been sleeping a lot and has been diaphoretic. Symptoms of slurred speech and gait imbalance came on suddenly today at 11/16/2023. Wife says it was a "night and day difference," which triggered her to rush patient to the ED.   Patient reports that he quit smoking after his stroke in July 2023.  Patient was using a nicotine patch for short while, but he is no longer smoking or using a nicotine patch.  Patient denies any alcohol use.  Patient smokes 1 joint of marijuana per night.  Patient currently lives with his wife and daughter.  Patient denies fevers, chills, visual changes, neck pain, chest pain, palpitations, shortness of breath, abdominal pain, emesis.  Patient reports that he was feeling nauseated earlier.      SUBJECTIVE: No events overnight.  No new neurologic complaints.  ROS reported negative unless otherwise noted.    acetaminophen     Tablet .. 650 milliGRAM(s) Oral every 6 hours PRN  atorvastatin 80 milliGRAM(s) Oral at bedtime  escitalopram 10 milliGRAM(s) Oral daily  glucagon  Injectable 1 milliGRAM(s) IntraMuscular once  heparin  Infusion 1450 Unit(s)/Hr IV Continuous <Continuous>  hydrALAZINE Injectable 5 milliGRAM(s) IV Push every 4 hours PRN  influenza   Vaccine 0.5 milliLiter(s) IntraMuscular once  insulin lispro (ADMELOG) corrective regimen sliding scale   SubCutaneous Before meals and at bedtime  sodium chloride 0.9%. 1000 milliLiter(s) IV Continuous <Continuous>  traZODone 50 milliGRAM(s) Oral at bedtime      PHYSICAL EXAM:   Vital Signs Last 24 Hrs  T(C): 36.8 (21 Nov 2023 04:00), Max: 36.9 (20 Nov 2023 13:14)  T(F): 98.3 (21 Nov 2023 04:00), Max: 98.5 (20 Nov 2023 16:00)  HR: 73 (21 Nov 2023 06:00) (63 - 84)  BP: 143/79 (21 Nov 2023 06:00) (122/66 - 184/92)  BP(mean): 101 (21 Nov 2023 06:00) (94 - 119)  RR: 25 (21 Nov 2023 06:00) (20 - 30)  SpO2: 100% (21 Nov 2023 06:00) (96% - 100%)    Parameters below as of 21 Nov 2023 06:00  Patient On (Oxygen Delivery Method): BiPAP/CPAP        General: No acute distress  HEENT: EOM intact, visual fields full  Abdomen: Soft, nontender, nondistended   Extremities: No edema    NEUROLOGICAL EXAM:  Mental status: Eyes closed, opens eyes to light touch and voice for a few minutes, drowsiness, awake, alert, oriented x4, fluent speech no neglect, able to follow commands  Cranial Nerves: Subtle left facial droop, dysconjugate gaze, no nystagmus, mild dysarthria.  Motor exam: Normal tone, no drift, RUE 5/5, RLE 5/5, LUE 4/5 with drift, LLE 5/5,  Sensation: Intact to light touch   Coordination/ Gait: subtle left dysmetria, rapid alternating movements mildly impaired L>R.      LABS:                        13.3   7.26  )-----------( 193      ( 21 Nov 2023 04:40 )             38.8    11-21    142  |  110<H>  |  15  ----------------------------<  103<H>  4.4   |  19<L>  |  1.18    Ca    9.1      21 Nov 2023 04:41    PTT - ( 21 Nov 2023 04:40 )  PTT:69.8 sec        IMAGING: Reviewed by me.     [facility] 11/19/23: Evolving acute infarcts in the posterior circulation are unchanged from 11/17/2023.  No new intracranial findings.     MRI, MRA head/neck Brain 1/17/23: Small vessel white matter ischemic changes. Acute infarcts in the posterior circulation involving the left occipital lobe, left cerebellum and bilateral brachium pontis, right adrienne. Chronic appearing infarct adrienne. Extremely poor flow identified in the basilar artery due to   what appears to be severe distal vertebral artery stenosis. Flow in the posterior communicating arteries and posterior cerebral arteries is too small to measure.    CTH 11/17: Stable posterior circulation infarctions.    CTH/ CTA neck 11/16: No intracranial hemorrhage is appreciated. No intracranial mass lesion is appreciated.Left occipital and right cerebellar acute to subacute infarctions. Left basal ganglia, left thalamic and central pontine remote infarctions    CTA head/Neck CTP 11/16:    1. Right carotid system:  Atherosclerotic plaque without hemodynamically significant stenosis.  2. Left carotid system:  Atherosclerotic plaque without hemodynamically significant stenosis.  3. Vertebral circulation:  Patent.  4. Anterior intracranial circulation:  Intracranial atherosclerosis cavernous and clinoid segments of the internal carotid arteries, mild to moderate, and middle cerebral arteries, moderate on the right and mild-to-moderate on the left.  5.  Posterior intracranial circulation:  Severe atherosclerotic disease has progressed since July 2023 with progressive diminished flow of the left vertebral artery, basilar artery and posterior cerebral arteries. There is likely reversal flow within the basilar artery to perfuse the most distal left vertebral artery and PICA arteries. Occlusion of the right vertebral artery was present in July 2023  6. Brain perfusion:  No acute infarction of the brain is convincingly demonstrated.  However, there is extensive ischemia within the posterior circulation.     THE PATIENT WAS SEEN AND EXAMINED BY ME WITH THE HOUSESTAFF AND STROKE TEAM DURING MORNING ROUNDS.   HPI: Patient ROB OSHEA is a 43y (1980) RIGHT handed man who presented to [facility] ED on 11/16/2023, as a CODE STROKE, with c/o difficulty walking, LLE weakness, speech changes.  PMH significant for: stroke (7/2023 - presenting with dysarthria - left inferior adreinne, b/l parieto-occipital juxtacortical WM w/mild residual dysarthria; 8 years ago - treated with TPA at [facility] - p/w L hemiplegia, facial droop and dysarthria), T2DM, HTN, former smoker (quit in 7/2023)  Presented as a CODE STROKE at 19:12. Neurology arrived promptly to evaluate patient. Per wife, patient having acute onset slurred speech (worse from baseline) and inability to stand/walk. This began at 17:00, 11/16/2023. On exam: patient has a L facial droop, ataxia in LUE (dysmetria with FTN), and struggles to perform HTS b/l. Patient also with pronounced slurred speech (moderate but is intelligible). Patient was rushed to the CT scanner on the first floor of the hospital d/t CT scanner in ED being used. Underwent NCCTH, which revealed no hemorrhage, but was initially read by Radiology as showing a nonacute left occipital infarct not seen on the CTH performed 7/26/2023. This infarct was not c/w patient's exam. Patient was emergently rushed back to ED for vital signs and weight. These were unable to be obtained on the first floor. After risk/benefit discussion with patient and wife - tenecteplase was administered at 11/16/2023, 19:47. DTN time was delayed d/t patient needing to be taken up to first floor CT scanner from ED (ED scanner in use), needed to return to ED after scans for vitals including BP and weight.  30 minutes after administration, NIHSS 4->2 (mild left facial but improved and mild dysarthria). Patient reports voice is much better. Performs FTN without issue with LUE. Performs HTS b/l. L NLF flattening significantly improved. Unable to walk patient d/t safety concerns s/p tenecteplase (on bed rest).  Patient currently takes ASA 81mg daily every day. Was originally on DAPT (ASA + clopidogrel) after his last stroke in 7/2023, but saw Dr. Guzman in 11/2/2023, who recommended patient switch over to aspirin monotherapy.  Patient reports that he takes all of his medications every day including his aspirin. Patient and wife report after patient's last stroke, he went to inpatient rehab. He still has slurred speech but it was "90% better." Walks with a cane, but is otherwise independent.  is able to climb stairs with the assistance of his cane.  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 Went to work last week. On Sunday (11/12/2023) - patient was feeling ill. Constipated, vomited. Had some room spinning dizziness. Has been sleeping a lot and has been diaphoretic. Symptoms of slurred speech and gait imbalance came on suddenly today at 11/16/2023. Wife says it was a "night and day difference," which triggered her to rush patient to the ED.   Patient reports that he quit smoking after his stroke in July 2023.  Patient was using a nicotine patch for short while, but he is no longer smoking or using a nicotine patch.  Patient denies any alcohol use.  Patient smokes 1 joint of marijuana per night.  Patient currently lives with his wife and daughter.  Patient denies fevers, chills, visual changes, neck pain, chest pain, palpitations, shortness of breath, abdominal pain, emesis.  Patient reports that he was feeling nauseated earlier.      SUBJECTIVE: No events overnight.  No new neurologic complaints.  ROS reported negative unless otherwise noted.    acetaminophen     Tablet .. 650 milliGRAM(s) Oral every 6 hours PRN  atorvastatin 80 milliGRAM(s) Oral at bedtime  escitalopram 10 milliGRAM(s) Oral daily  glucagon  Injectable 1 milliGRAM(s) IntraMuscular once  heparin  Infusion 1450 Unit(s)/Hr IV Continuous <Continuous>  hydrALAZINE Injectable 5 milliGRAM(s) IV Push every 4 hours PRN  influenza   Vaccine 0.5 milliLiter(s) IntraMuscular once  insulin lispro (ADMELOG) corrective regimen sliding scale   SubCutaneous Before meals and at bedtime  sodium chloride 0.9%. 1000 milliLiter(s) IV Continuous <Continuous>  traZODone 50 milliGRAM(s) Oral at bedtime      PHYSICAL EXAM:   Vital Signs Last 24 Hrs  T(C): 36.8 (21 Nov 2023 04:00), Max: 36.9 (20 Nov 2023 13:14)  T(F): 98.3 (21 Nov 2023 04:00), Max: 98.5 (20 Nov 2023 16:00)  HR: 73 (21 Nov 2023 06:00) (63 - 84)  BP: 143/79 (21 Nov 2023 06:00) (122/66 - 184/92)  BP(mean): 101 (21 Nov 2023 06:00) (94 - 119)  RR: 25 (21 Nov 2023 06:00) (20 - 30)  SpO2: 100% (21 Nov 2023 06:00) (96% - 100%)    Parameters below as of 21 Nov 2023 06:00  Patient On (Oxygen Delivery Method): BiPAP/CPAP        General: No acute distress  HEENT: EOM intact, visual fields full  Abdomen: Soft, nontender, nondistended   Extremities: No edema    NEUROLOGICAL EXAM:  Mental status: Eyes closed, opens eyes to light touch and voice for a few minutes, drowsiness, awake, alert, oriented x4, fluent speech no neglect, able to follow commands  Cranial Nerves: Subtle left facial droop, dysconjugate gaze, no nystagmus, moderate dysarthria.  Motor exam: Normal tone, no drift, RUE 5/5, RLE 5/5, LUE 4/5 with drift, LLE 5/5,  Sensation: Intact to light touch   Coordination/ Gait: subtle left dysmetria, rapid alternating movements mildly impaired L>R.      LABS:                        13.3   7.26  )-----------( 193      ( 21 Nov 2023 04:40 )             38.8    11-21    142  |  110<H>  |  15  ----------------------------<  103<H>  4.4   |  19<L>  |  1.18    Ca    9.1      21 Nov 2023 04:41    PTT - ( 21 Nov 2023 04:40 )  PTT:69.8 sec        IMAGING: Reviewed by me.     [facility] 11/19/23: Evolving acute infarcts in the posterior circulation are unchanged from 11/17/2023.  No new intracranial findings.     MRI, MRA head/neck Brain 1/17/23: Small vessel white matter ischemic changes. Acute infarcts in the posterior circulation involving the left occipital lobe, left cerebellum and bilateral brachium pontis, right adrienne. Chronic appearing infarct adrienne. Extremely poor flow identified in the basilar artery due to   what appears to be severe distal vertebral artery stenosis. Flow in the posterior communicating arteries and posterior cerebral arteries is too small to measure.    CTH 11/17: Stable posterior circulation infarctions.    CTH/ CTA neck 11/16: No intracranial hemorrhage is appreciated. No intracranial mass lesion is appreciated.Left occipital and right cerebellar acute to subacute infarctions. Left basal ganglia, left thalamic and central pontine remote infarctions    CTA head/Neck CTP 11/16:    1. Right carotid system:  Atherosclerotic plaque without hemodynamically significant stenosis.  2. Left carotid system:  Atherosclerotic plaque without hemodynamically significant stenosis.  3. Vertebral circulation:  Patent.  4. Anterior intracranial circulation:  Intracranial atherosclerosis cavernous and clinoid segments of the internal carotid arteries, mild to moderate, and middle cerebral arteries, moderate on the right and mild-to-moderate on the left.  5.  Posterior intracranial circulation:  Severe atherosclerotic disease has progressed since July 2023 with progressive diminished flow of the left vertebral artery, basilar artery and posterior cerebral arteries. There is likely reversal flow within the basilar artery to perfuse the most distal left vertebral artery and PICA arteries. Occlusion of the right vertebral artery was present in July 2023  6. Brain perfusion:  No acute infarction of the brain is convincingly demonstrated.  However, there is extensive ischemia within the posterior circulation.    CT Head No Cont (11.21.23  The left ventricles have a normal configuration  Old lacunar infarct involving the anterior left corona radiata region is   again seen  Abnormal areas of low-attenuation involving the left cerebellum left   occipital lobe and right brachium pontis region are again seen. These   findings are compatible with areas of acute infarct. No hemorrhagic   transformation is seen. No significant shift or herniation is seen.  The paranasal sinuses mastoid and middle ear regions appear clear.    Impression: Acute infarct again seen as described above.

## 2023-11-21 NOTE — SWALLOW BEDSIDE ASSESSMENT ADULT - ORAL PHASE
Decreased anterior-posterior movement of the bolus/Delayed oral transit time Within functional limits Decreased anterior-posterior movement of the bolus/Delayed oral transit time/Lingual stasis

## 2023-11-21 NOTE — SWALLOW BEDSIDE ASSESSMENT ADULT - SWALLOW EVAL: CRITERIA FOR SKILLED INTERVENTION MET
will follow up 1x to ensure diet tolerance; will follow for speech tx
demonstrates skilled criteria for swallowing intervention

## 2023-11-21 NOTE — PROGRESS NOTE ADULT - ASSESSMENT
43y right handed man PMH stroke (7/2023 - presenting with dysarthria - left inferior adrienne, b/l parieto-occipital juxtacortical WM w/mild residual dysarthria; 8 years ago - treated with TPA at [facility], Pt presented at that time with left hemiplegia, facial droop and dysarthria), T2DM, HTN, former smoker (quit in 7/2023), patient currently takes ASA 81mg daily every day. Was originally on DAPT (ASA + clopidogrel) Pt presented to [facility] ED on 11/16/2023, with c/o sudden difficulty walking, LLE weakness, speech changes. Pt is s/p tenecteplase    Impression: Left facial weakness, LUE ataxia, dysarthria, gait instability due to posterior circulation ischemic infarct:  Severe disease of posterior circulation involving vertebral, basilar and associated ischemia. mechanism:  TENISHA     NEURO: Neuro exam change on 11/19 with increase in drowsiness, but AxO X4, motor unchanged, later in PM patient reported double vision, on eval with notable dysconjugate gaze, Urgent head CT done: unchanged. Case discussed with NSCU and IR team, stat MRI without any ischemic changes. D/c Brilinta and started on Heparin drip on 11/19 as discussed with NSCU and monitor in stroke unit with continued BP support. PTT goal of 60-80. S/p cerebral angiogram on 11/20/23 showing severe distal vertebral artery stenosis bilaterally. No stent placed on 11/20. 11/20 PM noted to have coughing when eating dinner and brushing teeth. Dysphagia screen re ordered and patient failed. Pending repeat swallow eval. Continue close monitoring for neurologic deterioration, permissive HTN with slow titration to normotensive. Will titrate statin to LDL goal less than 70, MRI Brain w/o, MRA Head w/o and Neck w/contrast results as above. Physical therapy/OT eval with recommendations for AR. As per Cardio eval: Acceptable cardiac/medical risk to proceed RCRI 1    ANTITHROMBOTIC THERAPY: Continue ASA and Heparin drip (PTT goal 60-80)    PULMONARY: Protecting airway, saturating well on room air     CARDIOVASCULAR: TTE with EF of 68%, c/w cardiac monitoring no events                              SBP goal: 140-200mmHg    GASTROINTESTINAL: Dysphagia screen  failed, S/S eval on 11/17 with recommendations for regular diet. 11/20 noted to be coughing on thin liquids, made NPO pending repeat swallow evaluation.      Diet: NPO     RENAL: BUN/Cr stable, good urine output, urine toxicology positive for THC      Na Goal: Greater than 135     Mauricio: No    HEMATOLOGY: H/H stable, Platelets normal      DVT ppx:  LMWH     ID: afebrile, no leukocytosis     OTHER: Plan/goals of care discussed with pt and pt's family at bedside    DISPOSITION: Acute Rehab as per PT/OT eval once stable and workup is complete    CORE MEASURES:        Admission NIHSS: 4-->2     Tenecteplase: YES      LDL/HDL: 37/32     Depression Screen: 0     Statin Therapy: Y     Dysphagia Screen:  FAIL     Smoking Former smoker quit 07/2023      Afib  NO     Stroke Education  YES    Obtain screening ultrasound in patients who meet 1 or more of the following criteria as patient is high risk for DVT/PE upon admission:   [] History of DVT/PE  []Hypercoagulable states (Factor V Leiden, Cancer, OCP, etc. )  []Prolonged immobility (hemiplegia/hemiparesis/post operative or any other extended immobilization)   [] Transferred from outside facility (Rehab or Long term care)  [] Age </= to 50    43y right handed man PMH stroke (7/2023 - presenting with dysarthria - left inferior adrienne, b/l parieto-occipital juxtacortical WM w/mild residual dysarthria; 8 years ago - treated with TPA at [facility], Pt presented at that time with left hemiplegia, facial droop and dysarthria), T2DM, HTN, former smoker (quit in 7/2023), patient currently takes ASA 81mg daily every day. Was originally on DAPT (ASA + clopidogrel) Pt presented to [facility] ED on 11/16/2023, with c/o sudden difficulty walking, LLE weakness, speech changes. Pt is s/p tenecteplase    Impression: Left facial weakness, LUE ataxia, dysarthria, gait instability due to posterior circulation ischemic infarct:  Severe disease of posterior circulation involving vertebral, basilar and associated ischemia. mechanism:  TENISHA     NEURO: Neuro exam change on 11/19 with increase in drowsiness, but AxO X4, motor unchanged, later in PM patient reported double vision, on eval with notable dysconjugate gaze, Urgent head CT done: unchanged. Case discussed with NSCU and IR team, stat MRI without any ischemic changes. D/c Brilinta and started on Heparin drip on 11/19 as discussed with NSCU and monitor in stroke unit with continued BP support. PTT goal of 60-80. S/p diagnostic cerebral angiogram on 11/20 showing severe distal vertebral artery stenosis bilaterally. 11/20 PM noted to have coughing when eating dinner and brushing teeth. Dysphagia screen re ordered and patient failed. Speech re eval with reccs for pureed diet with mildly thick liquids. Plan for repeat cerebral angio on 11/22 for stent placement with neurosurgery. Heparin gtt discontinued. Patient loaded with Brilinta 180mg x 1 and then started on brilinta 90mg BID + aspirin 81mg QD on 11/21. Will continue close monitoring for neurologic deterioration, permissive HTN with slow titration to normotensive. Will titrate statin to LDL goal less than 70, MRI Brain w/o, MRA Head w/o and Neck w/contrast results as above. Physical therapy/OT eval with recommendations for AR. As per Cardio eval: Acceptable cardiac/medical risk to proceed RCRI 1    ANTITHROMBOTIC THERAPY: s/p brilinta 180mg load x1. Brilinta 90mg BID and aspirin 81mg QD     PULMONARY: Protecting airway, saturating well on room air     CARDIOVASCULAR: TTE with EF of 68%, c/w cardiac monitoring no events                              SBP goal: 140-200mmHg    GASTROINTESTINAL: Dysphagia screen  failed, S/S eval on 11/17 with recommendations for regular diet. 11/20 noted to be coughing on thin liquids, made NPO pending repeat swallow evaluation. Speech re eval with reccs for pureed diet with mildly thick liquids. Will need MBS likely friday      Diet: pureed     RENAL: BUN/Cr stable, good urine output, urine toxicology positive for THC      Na Goal: Greater than 135     Mauricio: No    HEMATOLOGY: H/H stable, Platelets normal      DVT ppx:  LMWH     ID: afebrile, no leukocytosis     OTHER: Plan/goals of care discussed with pt and pt's family at bedside    DISPOSITION: Acute Rehab as per PT/OT eval once stable and workup is complete    CORE MEASURES:        Admission NIHSS: 4-->2     Tenecteplase: YES      LDL/HDL: 37/32     Depression Screen: 0     Statin Therapy: Y     Dysphagia Screen:  FAIL     Smoking Former: smoker quit 07/2023      Afib  NO     Stroke Education  YES    Obtain screening ultrasound in patients who meet 1 or more of the following criteria as patient is high risk for DVT/PE upon admission:   [] History of DVT/PE  []Hypercoagulable states (Factor V Leiden, Cancer, OCP, etc. )  []Prolonged immobility (hemiplegia/hemiparesis/post operative or any other extended immobilization)   [] Transferred from outside facility (Rehab or Long term care)  [] Age </= to 50

## 2023-11-21 NOTE — PROGRESS NOTE ADULT - ASSESSMENT
43y RIGHT handed man who presented to [facility] ED on 11/16/2023, as a CODE STROKE, with c/o difficulty walking, LLE weakness, speech changes. PMH significant for: stroke (7/2023 - presenting with dysarthria - left inferior adrienne, b/l parieto-occipital juxtacortical WM w/mild residual dysarthria; 8 years ago - treated with TPA at [facility] [place] - p/w L hemiplegia, facial droop and dysarthria), T2DM, HTN, former smoker (quit in 7/2023). Symptoms beginning at 17:00, 11/16/2023. Noted at the code to have a L facial, LUE ataxia, b/l LE ataxia, slurred speech. No appreciable drift in any of the extremities. Underwent CT scans with out sign of hemorrhage or jayla LVO. Tenecteplase administered at 19:47, with improvement from NIHSS 4 to 2. Patient previously taking clopidogrel and aspirin after last stroke. Currently only taking aspirin at the instructions of his vascular neurologist.    ED vitals notable for: afebrile, 158/92, HR 79 RR 18, SpO2 97%. Labs notable for: glucose 156. CT imaging disclosed: no hemorrhage, left occipital/right cerebellar acute to subacute infarctions; chronic left basal ganglia, left thalamic and central pontine remote infarctions. There is bilateral atherosclerosis of the carotid system, as well as in the anterior and posterior circulation. The left vertebral artery, basial artery, and posterior cerebral arteries are with progressively reduced flow since last study. Initial exam notable for L facial droop, ataxia in the LUE, bilateral lower limbs, moderate dysarthria, patient unable to stand/walk without holding the transport chair handles.     NIHSS on admission: 4->2 (mild dysarthria, mild L facial - 30 minutes after tenecteplase)  LKN: 17:00, 11/16/2023  pre-MRS: 2 (walks with a cane, otherwise independent)    Impression: L facial weakness, LUE ataxia, dysarthria, gait instability d/t R brain dysfunction d/t acute ischemic stroke

## 2023-11-21 NOTE — SWALLOW BEDSIDE ASSESSMENT ADULT - SWALLOW EVAL: PROGNOSIS
Dx cont: Given current swallow profile, further instrumental testing (MBS) indicated to r/o aspiration and determine least restrictive diet.

## 2023-11-21 NOTE — SWALLOW BEDSIDE ASSESSMENT ADULT - NS ASR SWALLOW FINDINGS DISCUS
TERI Johnson; wife/Nursing/Patient/Family
TERI Novak; BONY Walton; Patient; Patient's wife at bedside

## 2023-11-21 NOTE — SWALLOW BEDSIDE ASSESSMENT ADULT - NS SPL SWALLOW CLINIC TRIAL FT
Pt with improved timeliness and coordination of swallow with conservative textures trialed; no overt s/s of aspiration noted.

## 2023-11-21 NOTE — SWALLOW BEDSIDE ASSESSMENT ADULT - SWALLOW EVAL: RECOMMENDED FEEDING/EATING TECHNIQUES
crush medication (when feasible)/maintain upright posture during/after eating for 30 mins/no straws/position upright (90 degrees)/small sips/bites
maintain upright posture during/after eating for 30 mins/oral hygiene/position upright (90 degrees)/small sips/bites

## 2023-11-21 NOTE — SWALLOW BEDSIDE ASSESSMENT ADULT - ADDITIONAL RECOMMENDATIONS
Maintain good oral hygiene.    GOALS: 1) Pt will tolerate current recommended diet w/o clinical s/s of aspiration.  2) Pt will demonstrate understanding and carryover of safe swallowing guidelines (upright posture, small sips/bites, allow for swallow between intakes) for maximal swallow safety and efficiency.  3) Pt will participate in objective testing (MBS) to instrumentally assess swallow function to determine least restrictive diet.

## 2023-11-21 NOTE — SWALLOW BEDSIDE ASSESSMENT ADULT - ASR SWALLOW RECOMMEND DIAG
to further assess swallow function and to r/o aspiration/VFSS/MBS
not indicated at this time; if there is further concern for aspiration, MBS may be considered for instrumental assessment

## 2023-11-21 NOTE — SPEECH LANGUAGE PATHOLOGY EVALUATION - SLP SHORT TERM MEMORY
Pt with accurate responses during paragraph recall tasks; mild deficits noted in digit span forward and backward; impaired delayed recall of 5 items in a list/impaired

## 2023-11-21 NOTE — SWALLOW BEDSIDE ASSESSMENT ADULT - SPECIFY REASON(S)
to subjectively assess swallow safety/function; r/o dysphagia; consult received after pt episode of coughing while brushing teeth to subjectively assess swallow safety/function; r/o dysphagia; consult received after pt episode of coughing while eating dinner and brushing teeth

## 2023-11-21 NOTE — PROGRESS NOTE ADULT - ASSESSMENT
patient is a 42 y/O RIGHT handed man who presented to [facility] ED on 11/16/2023, as a CODE STROKE, with c/o difficulty walking, LLE weakness, speech changes. PMH significant for: stroke (7/2023 - presenting with dysarthria - left inferior adrienne, b/l parieto-occipital juxtacortical WM w/mild residual dysarthria; 8 years ago - treated with TPA at [facility] - p/w L hemiplegia, facial droop and dysarthria), T2DM, HTN, former smoker (quit in 7/2023). Symptoms beginning at 17:00, 11/16/2023. Noted at the code to have a L facial, LUE ataxia, b/l LE ataxia, slurred speech. No appreciable drift in any of the extremities. Underwent CT scans with out sign of hemorrhage or jayla LVO. Tenecteplase administered at 19:47, with improvement from NIHSS 4 to 2. Patient previously taking clopidogrel and aspirin after last stroke. Currently only taking aspirin at the instructions of his vascular neurologist.    # CVA   neuro management appreciated   CT and MRI noted, multiple embolic strokes   S/P TPA  ASA and statin  Was on full dose heparin gtt, now on Lovenox 40 Qd PPX dose as patient planned for angio with stenting in AM 11/22   Neurosurg eval appreciated  Check CROW  TTE w/ EF of 68%, no WM  hypercoag W/U, malignancy W/U  PT/OT, aspiration precautions   S/P Angio with NSGY  patient is medically optimized for angio - moderate risk candidate. Planned for angio/ stenting in AM   discussed with wife at the bedside       # HTN   BP control   Check echo    # DM  sliding scale  A1C 5.5     # HLD    lipid panel  statin     DVT and Gi PPX   Discussed with Attending and Wife at the bedside.

## 2023-11-21 NOTE — SWALLOW BEDSIDE ASSESSMENT ADULT - SWALLOW EVAL: PATIENT/FAMILY GOALS STATEMENT
Pt wants to eat/drink, reports tolerating regular diet PTA w/o swallowing difficulty. Pt wants to eat/drink, reports tolerating regular diet PTA w/o swallowing difficulty. Pt reports change in overall status since Friday in this current hospital stay related to this speech and swallowing.

## 2023-11-21 NOTE — SPEECH LANGUAGE PATHOLOGY EVALUATION - SLP GENERAL OBSERVATIONS
Pt encountered in bed, asleep laying flat in bed but easily arousable, repositioned for the purpose of the evaluation, oriented x3, on room air, VSS. Wife at bedside. Pt pleasant and cooperative with exam. +dysarthria, fairly intelligible in known/unknown contexts. Vocal quality harsh.

## 2023-11-21 NOTE — SWALLOW BEDSIDE ASSESSMENT ADULT - COMMENTS
IMAGIN/19: MRI Brain: Redemonstration of multiple evolving acute/subacute posterior fossa and left occipital lobe infarcts with associated cytotoxic edema and without hemorrhagic transformation. Overall appearance is grossly similar when compared with 2023.  : CTH: Evolving acute infarcts in the left occipital lobe, posterior inferior aspect of the left cerebellar hemisphere, and in the right brachium pontis/medial right cerebellum are stable from 2023.     Interval Events:  : s/p angio w/ occlusion of b/l vertebral arteries with some flow to the basilar from R pcom and anterior spinal artery. No stent placed on . Following multidisciplinary discussion recommend angio/ stenting on : Pt noted to be coughing and choking briefly while brushing teeth during PM hygiene care. RN at bedside, maintaining sats, pt able to clear airway on their own, pt in no distress. "On bedside dysphagia re-eval, patient successfully swallowed 5cc of water without coughing or wet voice. However pt unable to swallow the 20cc volume successfully, as patient noted to cough several times mid swallow. Verbalizing it "went down the wrong pipe". Bedside dysphagia re-evaluation terminated, documented as a "fail" and PA Jessica notified. Pt to be kept NPO in the meantime, pending speech and swallow re-eval in AM."    SWALLOW HISTORY:  2023: During previous admission, speech/language evaluation was completed - pt w/ moderate to severe dysarthria and hypophonia. No language or cognitive deficits. Had passed RN dysphagia screen and was initiated on po diet per MD.   2023: This admission: pt seen for bedside swallow evaluation after failing dysphagia screen in ED. Pt p/w 1) grossly functional oropharyngeal swallow. Adequate oral management and no overt signs of laryngeal penetration/aspiration across all consistencies. Recommended regular and thin liquid diet, with no MBS unless team is concerned for aspiration. 2) dysarthria Significant cough noted after soft and bite sized trial. Though pt was in upright position, head of bed adjusted further. RR increased slightly during coughing episode; however, instructed pt to utilize diaphragmatic breathing techniques. Further solid trials deferred. VSS. Though cough not consistent across all trials of thin liquid administered, clinical s/s of aspiration present i/s/o acute changes; therefore, further instrumental testing indicated to r/o aspiration.

## 2023-11-21 NOTE — CHART NOTE - NSCHARTNOTEFT_GEN_A_CORE
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

## 2023-11-21 NOTE — SPEECH LANGUAGE PATHOLOGY EVALUATION - H & P REVIEW
yes
43yM who presented to [facility] ED on 11/16/2023, as a CODE STROKE, with c/o difficulty walking, LLE weakness, speech changes. PMH significant for: stroke (7/2023 - presenting with dysarthria - left inferior adrienne, b/l parieto-occipital juxtacortical WM w/mild residual dysarthria; 8 years ago - treated with TPA at [facility] [place] - p/w L hemiplegia, facial droop and dysarthria), T2DM, HTN, former smoker (quit in 7/2023). Per wife, patient having acute onset slurred speech (worse from baseline) and inability to stand/walk. On exam: patient has a L facial droop, ataxia in LUE (dysmetria with FTN), and struggles to perform HTS b/l. Patient also with pronounced slurred speech (moderate but is intelligible). Underwent NCCTH, which revealed no hemorrhage, but was initially read by Radiology as showing a nonacute left occipital infarct not seen on CTH on 7/26/2023. This infarct was not c/w patient's exam. After risk/benefit discussion w/ pt and wife - tenecteplase was administered at 11/16/2023, 19:47. 30 minutes after administration, NIHSS 4->2 (mild left facial but improved and mild dysarthria). Pt reports voice is much better. Performs FTN without issue with LUE. Performs HTS b/l. L NLF flattening significantly improved. Unable to walk patient d/t safety concerns s/p tenecteplase (on bed rest). Pt reports he takes all of his medications every day including his aspirin. Pt and wife report after patient's last stroke, he went to inpatient rehab. He still has slurred speech but it was "90% better." Walks with a cane, but is otherwise independent, is able to climb stairs with the assistance of his cane./yes

## 2023-11-21 NOTE — SWALLOW BEDSIDE ASSESSMENT ADULT - SWALLOW EVAL: DIAGNOSIS
42 yo M pmhx CVA admitted with LE weakness and slurred speech; found to have acute to subacute infarcts (left occipital, right cerebellar). Patient presents with 1) grossly functional oropharyngeal swallow. Adequate oral management and no overt signs of laryngeal penetration/aspiration across all consistencies. 2) dysarthria. Slight left facial weakness (baseline); Reduced lingual/labial coordination. Fair intelligibility in unknown contexts; fair to good in known contexts.
44yo M who presented to [facility] ED as CODE STROKE with c/o difficulty walking, LLE weakness, speech changes, found to have L occipital and R cerebellar acute to subacute infarctions. Pt p/w evidence of an oropharyngeal dysphagia i/s/o acute neurological changes. Oral phase notable for overall reduced bolus manipulation/management, prolonged, inefficient mastication, and delayed oral transit time during solid trials 2/2 oral motor weakness. Pharyngeal phase notable for suspected decreased hyolaryngeal elevation upon palpation, with audible swallows (~2-3 per bolus) suggestive of pharyngeal residue and incoordination in swallow sequence. Pt with fairly consistent cough/throat clear response post oral intake of soft solid + thin liquids, with slight change in vocal quality, concerning for laryngeal penetration/aspiration. Swallow presentation improved with puree and mildly thick liquids, w/o overt clinical s/s of aspiration.

## 2023-11-21 NOTE — PROGRESS NOTE ADULT - SUBJECTIVE AND OBJECTIVE BOX
Name of Patient : ROB OSHEA  MRN: [id number]  Date of visit: 11-21-23 @ 16:19      Subjective: Patient seen and examined. No new events except as noted.   Patient seen earlier this AM. Wife at the bedside.   S/P Angio yesterday.   S/P TTE and CT head     REVIEW OF SYSTEMS:  Limited ROS    MEDICATIONS:  MEDICATIONS  (STANDING):  aspirin  chewable 81 milliGRAM(s) Oral daily  atorvastatin 80 milliGRAM(s) Oral at bedtime  dextrose 5%. 1000 milliLiter(s) (50 mL/Hr) IV Continuous <Continuous>  dextrose 5%. 1000 milliLiter(s) (100 mL/Hr) IV Continuous <Continuous>  dextrose 50% Injectable 25 Gram(s) IV Push once  dextrose 50% Injectable 25 Gram(s) IV Push once  dextrose 50% Injectable 12.5 Gram(s) IV Push once  enoxaparin Injectable 40 milliGRAM(s) SubCutaneous every 24 hours  escitalopram 10 milliGRAM(s) Oral daily  glucagon  Injectable 1 milliGRAM(s) IntraMuscular once  influenza   Vaccine 0.5 milliLiter(s) IntraMuscular once  insulin lispro (ADMELOG) corrective regimen sliding scale   SubCutaneous Before meals and at bedtime  sodium chloride 0.9%. 1000 milliLiter(s) (100 mL/Hr) IV Continuous <Continuous>  traZODone 50 milliGRAM(s) Oral at bedtime      PHYSICAL EXAM:  T(C): 36.7 (11-21-23 @ 08:00), Max: 36.8 (11-21-23 @ 04:00)  HR: 77 (11-21-23 @ 14:45) (64 - 86)  BP: 156/81 (11-21-23 @ 14:45) (112/55 - 181/82)  RR: 24 (11-21-23 @ 14:00) (18 - 26)  SpO2: 98% (11-21-23 @ 14:45) (95% - 100%)  Wt(kg): --  I&O's Summary    20 Nov 2023 07:01  -  21 Nov 2023 07:00  --------------------------------------------------------  IN: 1183 mL / OUT: 2025 mL / NET: -842 mL    21 Nov 2023 07:01  -  21 Nov 2023 16:19  --------------------------------------------------------  IN: 729 mL / OUT: 600 mL / NET: 129 mL          Appearance: Awake, weak appearing male, lying down in bed 	  HEENT: Eyes are open   Lymphatic: No lymphadenopathy   Cardiovascular: Normal    Respiratory: normal effort , clear  Gastrointestinal:  Soft, Non-tender  Skin: No rashes,  warm to touch  Psychiatry: Calm   Musculoskeletal: No edema      11-20-23 @ 07:01  -  11-21-23 @ 07:00  --------------------------------------------------------  IN: 1183 mL / OUT: 2025 mL / NET: -842 mL    11-21-23 @ 07:01  -  11-21-23 @ 16:19  --------------------------------------------------------  IN: 729 mL / OUT: 600 mL / NET: 129 mL                                13.3   7.26  )-----------( 193      ( 21 Nov 2023 04:40 )             38.8               11-21    142  |  110<H>  |  15  ----------------------------<  103<H>  4.4   |  19<L>  |  1.18    Ca    9.1      21 Nov 2023 04:41      PTT - ( 21 Nov 2023 04:40 )  PTT:69.8 sec                   Urinalysis Basic - ( 21 Nov 2023 04:41 )    Color: x / Appearance: x / SG: x / pH: x  Gluc: 103 mg/dL / Ketone: x  / Bili: x / Urobili: x   Blood: x / Protein: x / Nitrite: x   Leuk Esterase: x / RBC: x / WBC x   Sq Epi: x / Non Sq Epi: x / Bacteria: x        < from: TTE W or WO Ultrasound Enhancing Agent (11.20.23 @ 08:19) >  CONCLUSIONS:      1. Left ventricular cavity is small. Left ventricular wall thickness is normal. Left ventricular systolic function is normal with an ejection fraction of 68 % by Joseph's method of disks. There are no regional wall motion abnormalities seen.    ________________________________________________________________________________________  FINDINGS:     Left Ventricle:  The left ventricular cavity is small. Left ventricular wall thickness is normal. Left ventricular systolic function is normal with a calculated ejection fraction of 68 % by the Joseph's biplane method of disks. There are no regional wall motion abnormalities seen. There is normal left ventricular diastolic function.     Right Ventricle:  The right ventricular cavity is normal in size and normal systolic function. Tricuspid annular plane systolic excursion (TAPSE) is 2.6 cm (normal >=1.7 cm). Tricuspid annular tissue Doppler S' is 11.6 cm/s (normal >10 cm/s).     Left Atrium:  The left atrium is normal with an indexed volume of 27.04 ml/m².     Right Atrium:  The right atrium is normal in size with an indexedvolume of 15.48 ml/m².     Interatrial Septum:  The interatrial septum appears intact.     Aortic Valve:  Normal aortic valve.     Mitral Valve:  Normal mitral valve.     Tricuspid Valve:  Normal tricuspid valve.     Pulmonic Valve:  Normal pulmonic valve.     Aorta:  The aortic annulus and aortic root appear normal in size.     Pericardium:  No pericardial effusion seen.       < end of copied text >    < from: CT Head No Cont (11.21.23 @ 11:29) >    Impression: Acute infarct again seen as described above.    < end of copied text >

## 2023-11-21 NOTE — PROGRESS NOTE ADULT - NS ATTEND AMEND GEN_ALL_CORE FT
I saw and examined the patient on stroke rounds on the date of service, this is a late attestation.   Patient endorses stable symptoms.  We reviewed the results of the vascular conference discussing his case, and the decision to offer a stent.    Risks and benefits of stenting versus medical management discussed.     On exam:   GEN: NAD  NEURO:   Awake, alert, oriented, speaking fluently.  Fully attending, and oriented.   Pupils 3-2mm, symmetric, disconjugate gaze, R exotropia, no facial weakness, moderate dysarthria, tongue midline.  MOTOR: mild drift L arm    MRI brain images reviewed: acute stroke, L PICA, R AICA, and L PCA territories.      AP: CTA H&N images reviewed: bilateral vertebral artery V4 occlusion.     AP: 43 year old man with hx of DM, HTN, HLD, tobacco use, recent pontine stroke, remote stroke, vertebral artery occlusion bilaterally, presenting with dysarthria, and gait instability on 11/16, received tenecteplase with improvement, progression of symptoms of diplopia/dysconjugate gaze on 11/19, now on ASA and brilinta, heparin infusion DC'd on 11/21.  Exam with dysconjugate gaze, and L arm weakness, trace dysarthria.  Imaging showing the severe atherosclerotic stenosis bilateral vertebral arteries, and strokes in the posterior circulation.    Severe intracranial atherosclerotic disease, progressed from recent imaging.  Possible unstable plaque.     Repeat CTH stable.    Case discussed at length on vascular rounds on 11/20.  Decision to offer stent at this point on 11/22.    Brilinta loaded on 11/21.

## 2023-11-21 NOTE — SWALLOW BEDSIDE ASSESSMENT ADULT - SLP GENERAL OBSERVATIONS
Pt encountered in bed, asleep laying flat in bed but easily arousable, repositioned for the purpose of the evaluation, oriented x3, on room air, VSS. Wife at bedside. Pt pleasant and cooperative with exam. +dysarthria, fairly intelligible in known/unknown contexts. Vocal quality hoarse, hypernasal.

## 2023-11-21 NOTE — SPEECH LANGUAGE PATHOLOGY EVALUATION - SPECIFY REASON(S)
assess speech/language; stroke
to assess speech production, expressive and receptive language skills, and cognitive-communication skills

## 2023-11-21 NOTE — SWALLOW BEDSIDE ASSESSMENT ADULT - ASPIRATION PRECAUTIONS
Monitor for s/s aspiration/laryngeal penetration. If noted:  D/C p.o. intake, provide non-oral nutrition/hydration/meds, and contact this service @ [id number]/yes
yes

## 2023-11-21 NOTE — SPEECH LANGUAGE PATHOLOGY EVALUATION - SLP DIFFUSE LANGUAGE CHARACTERISTICS
Pt perseverating on prior task (continued to say names that start with letter B from prior task). Overall, able to

## 2023-11-21 NOTE — PROGRESS NOTE ADULT - SUBJECTIVE AND OBJECTIVE BOX
Subjective: Patient seen and examined. No new events except as noted.   remains in Stroke unit '  s/p Catheter Cerebral Angiogram   Pre- Procedure Diagnosis: severe distal vertebral artery stenosis bilaterally  Post- Procedure Diagnosis: severe distal vertebral artery stenosis bilaterally    REVIEW OF SYSTEMS:    CONSTITUTIONAL: + weakness, fevers or chills  EYES/ENT: No visual changes;  No vertigo or throat pain   NECK: No pain or stiffness  RESPIRATORY: No cough, wheezing, hemoptysis; No shortness of breath  CARDIOVASCULAR: No chest pain or palpitations  GASTROINTESTINAL: No abdominal or epigastric pain. No nausea, vomiting, or hematemesis; No diarrhea or constipation. No melena or hematochezia.  GENITOURINARY: No dysuria, frequency or hematuria  NEUROLOGICAL: No numbness or weakness  SKIN: No itching, burning, rashes, or lesions   All other review of systems is negative unless indicated above.    MEDICATIONS:  MEDICATIONS  (STANDING):  atorvastatin 80 milliGRAM(s) Oral at bedtime  dextrose 5%. 1000 milliLiter(s) (50 mL/Hr) IV Continuous <Continuous>  dextrose 5%. 1000 milliLiter(s) (100 mL/Hr) IV Continuous <Continuous>  dextrose 50% Injectable 25 Gram(s) IV Push once  dextrose 50% Injectable 25 Gram(s) IV Push once  dextrose 50% Injectable 12.5 Gram(s) IV Push once  escitalopram 10 milliGRAM(s) Oral daily  glucagon  Injectable 1 milliGRAM(s) IntraMuscular once  heparin  Infusion 1450 Unit(s)/Hr (14.5 mL/Hr) IV Continuous <Continuous>  influenza   Vaccine 0.5 milliLiter(s) IntraMuscular once  insulin lispro (ADMELOG) corrective regimen sliding scale   SubCutaneous Before meals and at bedtime  sodium chloride 0.9%. 1000 milliLiter(s) (100 mL/Hr) IV Continuous <Continuous>  traZODone 50 milliGRAM(s) Oral at bedtime      PHYSICAL EXAM:  T(C): 36.8 (11-21-23 @ 04:00), Max: 36.9 (11-20-23 @ 13:14)  HR: 64 (11-21-23 @ 08:00) (63 - 82)  BP: 112/55 (11-21-23 @ 08:00) (112/55 - 184/92)  RR: 18 (11-21-23 @ 08:00) (18 - 30)  SpO2: 99% (11-21-23 @ 08:00) (96% - 100%)  Wt(kg): --  I&O's Summary    20 Nov 2023 07:01  -  21 Nov 2023 07:00  --------------------------------------------------------  IN: 1183 mL / OUT: 2025 mL / NET: -842 mL    21 Nov 2023 07:01  -  21 Nov 2023 09:57  --------------------------------------------------------  IN: 729 mL / OUT: 0 mL / NET: 729 mL            Appearance: NAD  HEENT:  Dry  oral mucosa, PERRL, EOMI	  Lymphatic: No lymphadenopathy  Cardiovascular: Normal S1 S2, No JVD, No murmurs, No edema  Respiratory: Lungs clear to auscultation	  Psychiatry: A & O x 3, Mood & affect appropriate  Gastrointestinal:  Soft, Non-tender, + BS	  Skin: No rashes, No ecchymoses, No cyanosis	  Neurologic: Non-focal  Extremities: Normal range of motion, No clubbing, cyanosis or edema  Vascular: Peripheral pulses palpable 2+ bilaterally  Neurologic Exam:  Mental status:  Awake, Alert; Oriented to: person, place. Knows it is November 2023 but not exact date; Speech: moderate dysarthria; Repetition and naming: intact; Follows simple and complex commands; Attention/concentration: can spell WORLD forward but not backward; Recent and remote memory (including registration and recall): JULIETA; Fund of knowledge: intact    Cranial nerves - R pupil>L, both reactive, VFF on confrontational testing, EOM - inability to bury the sclera of the R eye with rightward gaze, L upper eyelid ptosis, face sensation (V1-V3) intact b/l, facial strength - L facial droop, hearing intact b/l with finger rub test, palate with symmetric elevation, trapezius 5/5 strength b/l, tongue midline on protrusion with full lateral movement    Motor - Normal bulk and tone throughout. No pronator drift.  Strength testing (R/L)  Deltoid:  5/5   Biceps:  5/5   Triceps:  5/5   Wrist Extension:  5/5   Wrist Flexion:  5/5   Interossei:  5/5   :  5/5  Hip Flexion:  5/5   Hip Extension:  5/5   Knee Flexion:  5/5   Knee Extension:  5/5   Dorsiflexion:  5/5   Plantar Flexion:  5/5    Sensation - Light touch intact throughout    DTRs (R/L)  3+ upper/lower  b/l sustained ankle clonus    Coordination - Finger to Nose, Heel to Shin intact b/l. No tremors appreciated.    Gait and station - Patient unable to stand with holding onto the sides of transport chair during         LABS:    CARDIAC MARKERS:                                13.3   7.26  )-----------( 193      ( 21 Nov 2023 04:40 )             38.8     11-21    142  |  110<H>  |  15  ----------------------------<  103<H>  4.4   |  19<L>  |  1.18    Ca    9.1      21 Nov 2023 04:41      proBNP:   Lipid Profile:   HgA1c:   TSH:             TELEMETRY: 	    ECG:  	  RADIOLOGY:   DIAGNOSTIC TESTING:  [ ] Echocardiogram:  [ ]  Catheterization:  [ ] Stress Test:    OTHER:

## 2023-11-21 NOTE — SPEECH LANGUAGE PATHOLOGY EVALUATION - SLP PROGNOSIS
Dx cont: Further comprehensive assessment of higher-level cognitive-linguistic skills warranted during speech/language tx within functional therapeutic contexts (e.g. medication management) to guide plan of care.

## 2023-11-22 NOTE — PROGRESS NOTE ADULT - ASSESSMENT
43y RIGHT handed man who presented to [facility] ED on 11/16/2023, as a CODE STROKE, with c/o difficulty walking, LLE weakness, speech changes. PMH significant for: stroke (7/2023 - presenting with dysarthria - left inferior adrienne, b/l parieto-occipital juxtacortical WM w/mild residual dysarthria; 8 years ago - treated with TPA at [facility] - p/w L hemiplegia, facial droop and dysarthria), T2DM, HTN, former smoker (quit in 7/2023). Symptoms beginning at 17:00, 11/16/2023. Noted at the code to have a L facial, LUE ataxia, b/l LE ataxia, slurred speech. No appreciable drift in any of the extremities. Underwent CT scans with out sign of hemorrhage or jayla LVO. Tenecteplase administered at 19:47, with improvement from NIHSS 4 to 2. Patient previously taking clopidogrel and aspirin after last stroke. Currently only taking aspirin at the instructions of his vascular neurologist.    ED vitals notable for: afebrile, 158/92, HR 79 RR 18, SpO2 97%. Labs notable for: glucose 156. CT imaging disclosed: no hemorrhage, left occipital/right cerebellar acute to subacute infarctions; chronic left basal ganglia, left thalamic and central pontine remote infarctions. There is bilateral atherosclerosis of the carotid system, as well as in the anterior and posterior circulation. The left vertebral artery, basial artery, and posterior cerebral arteries are with progressively reduced flow since last study. Initial exam notable for L facial droop, ataxia in the LUE, bilateral lower limbs, moderate dysarthria, patient unable to stand/walk without holding the transport chair handles.     NIHSS on admission: 4->2 (mild dysarthria, mild L facial - 30 minutes after tenecteplase)  LKN: 17:00, 11/16/2023  pre-MRS: 2 (walks with a cane, otherwise independent)    Impression: L facial weakness, LUE ataxia, dysarthria, gait instability d/t R brain dysfunction d/t acute ischemic stroke

## 2023-11-22 NOTE — PROVIDER CONTACT NOTE (OTHER) - SITUATION
While resting, patient heard having large emesis episode.
Pt had change in mental status for 22:00 assessment. Upon neuro assessment, pt had worsening dysarthria
Pt noted to be coughing and choking briefly while brushing teeth during PM hygiene care. RN at bedside, maintaining sats, pt able to clear airway on their own, pt in no distress at this time.

## 2023-11-22 NOTE — PROGRESS NOTE ADULT - SUBJECTIVE AND OBJECTIVE BOX
Name of Patient : ROB OSHEA  MRN: [id number]  Date of visit: 11-22-23 @ 15:56      Subjective: Patient seen and examined. No new events except as noted.   Patient seen earlier this AM. Lying down in bed. Planned for angio today with NSGY.   Wife at the bedside.     REVIEW OF SYSTEMS:  Limited ROS    MEDICATIONS:  MEDICATIONS  (STANDING):  aspirin  chewable 81 milliGRAM(s) Oral daily  atorvastatin 80 milliGRAM(s) Oral at bedtime  dextrose 5%. 1000 milliLiter(s) (50 mL/Hr) IV Continuous <Continuous>  dextrose 5%. 1000 milliLiter(s) (100 mL/Hr) IV Continuous <Continuous>  dextrose 50% Injectable 25 Gram(s) IV Push once  dextrose 50% Injectable 25 Gram(s) IV Push once  dextrose 50% Injectable 12.5 Gram(s) IV Push once  enoxaparin Injectable 40 milliGRAM(s) SubCutaneous every 24 hours  escitalopram 10 milliGRAM(s) Oral daily  glucagon  Injectable 1 milliGRAM(s) IntraMuscular once  influenza   Vaccine 0.5 milliLiter(s) IntraMuscular once  insulin lispro (ADMELOG) corrective regimen sliding scale   SubCutaneous every 6 hours  polyethylene glycol 3350 17 Gram(s) Oral daily  senna 2 Tablet(s) Oral at bedtime  sodium chloride 0.9%. 1000 milliLiter(s) (100 mL/Hr) IV Continuous <Continuous>  ticagrelor 90 milliGRAM(s) Oral every 12 hours  traZODone 50 milliGRAM(s) Oral at bedtime      PHYSICAL EXAM:  T(C): 36.5 (11-22-23 @ 08:00), Max: 36.8 (11-22-23 @ 00:00)  HR: 84 (11-22-23 @ 14:00) (59 - 668)  BP: 189/87 (11-22-23 @ 14:00) (148/84 - 189/87)  RR: 25 (11-22-23 @ 14:00) (10 - 29)  SpO2: 96% (11-22-23 @ 14:00) (95% - 100%)  Wt(kg): --  I&O's Summary    21 Nov 2023 07:01  -  22 Nov 2023 07:00  --------------------------------------------------------  IN: 2829 mL / OUT: 2350 mL / NET: 479 mL    22 Nov 2023 07:01  -  22 Nov 2023 15:56  --------------------------------------------------------  IN: 300 mL / OUT: 600 mL / NET: -300 mL          Appearance: Awake, weak appearing male, lying down in bed 	  HEENT: Eyes are open   Lymphatic: No lymphadenopathy   Cardiovascular: Normal    Respiratory: normal effort , clear  Gastrointestinal:  Soft, Non-tender  Skin: No rashes,  warm to touch  Psychiatry: Calm   Musculoskeletal: No edema        11-21-23 @ 07:01  -  11-22-23 @ 07:00  --------------------------------------------------------  IN: 2829 mL / OUT: 2350 mL / NET: 479 mL    11-22-23 @ 07:01  -  11-22-23 @ 15:56  --------------------------------------------------------  IN: 300 mL / OUT: 600 mL / NET: -300 mL                                  13.1   7.24  )-----------( 177      ( 22 Nov 2023 05:35 )             38.4               11-22    143  |  109<H>  |  12  ----------------------------<  106<H>  3.8   |  23  |  1.22    Ca    8.7      22 Nov 2023 05:35      PTT - ( 22 Nov 2023 05:35 )  PTT:32.3 sec                   Urinalysis Basic - ( 22 Nov 2023 05:35 )    Color: x / Appearance: x / SG: x / pH: x  Gluc: 106 mg/dL / Ketone: x  / Bili: x / Urobili: x   Blood: x / Protein: x / Nitrite: x   Leuk Esterase: x / RBC: x / WBC x   Sq Epi: x / Non Sq Epi: x / Bacteria: x      < from: TTE W or WO Ultrasound Enhancing Agent (11.20.23 @ 08:19) >   1. Left ventricular cavity is small. Left ventricular wall thickness is normal. Left ventricular systolic function is normal with an ejection fraction of 68 % by Joseph's method of disks. There are no regional wall motion abnormalities seen.    < end of copied text >

## 2023-11-22 NOTE — PROVIDER CONTACT NOTE (OTHER) - ASSESSMENT
Pt had change in mental status for 22:00 assessment. Upon neuro assessment, pt had worsening dysarthria. Pt still remains A/Ox4, NDx4, VSS. Pt stated to be hearing a phone ring at his bedside. Noise was found to be pt PAS box at the edge of the bed.
Prior to episode however patient was able to take bedtime meds whole with water without any difficulty swallowing. No coughing noted with bedtime meds. Maintaining sats above 94% on room air, pt able to clear airway on his own, pt in no distress at this time. Neuro remains otherwise unchanged. Per patient care tech however, patient had similar episode with dinner earlier in the day.
Patient remains neurologically unchanged, vitals WDL, protecting airway. Verbalizing feeling anxious about angio tomorrow, feeling emotional.

## 2023-11-22 NOTE — PROGRESS NOTE ADULT - ASSESSMENT
patient is a 42 y/O RIGHT handed man who presented to [facility] ED on 11/16/2023, as a CODE STROKE, with c/o difficulty walking, LLE weakness, speech changes. PMH significant for: stroke (7/2023 - presenting with dysarthria - left inferior adrienne, b/l parieto-occipital juxtacortical WM w/mild residual dysarthria; 8 years ago - treated with TPA at [facility] - p/w L hemiplegia, facial droop and dysarthria), T2DM, HTN, former smoker (quit in 7/2023). Symptoms beginning at 17:00, 11/16/2023. Noted at the code to have a L facial, LUE ataxia, b/l LE ataxia, slurred speech. No appreciable drift in any of the extremities. Underwent CT scans with out sign of hemorrhage or jayla LVO. Tenecteplase administered at 19:47, with improvement from NIHSS 4 to 2. Patient previously taking clopidogrel and aspirin after last stroke. Currently only taking aspirin at the instructions of his vascular neurologist.    # CVA   neuro management appreciated   CT and MRI noted, multiple embolic strokes   S/P TPA  ASA and statin  Was on full dose heparin gtt, now on Lovenox 40 Qd PPX dose as patient planned for angio with stenting today 11/22   Neurosurg eval appreciated  TTE w/ EF of 68%, no WM  PT/OT, aspiration precautions   S/P Angio with NSGY  patient is medically optimized for angio - moderate risk candidate. Planned for angio/ stenting w/ NSGY   discussed with wife at the bedside       # HTN   BP parameters as per stroke neurology. If cleared, plan for BP medications for gradual normotension   TTE w/ EF of 68%, no WMA      # DM  sliding scale  A1C 5.5     # HLD    lipid panel  statin     DVT and Gi PPX     Discussed with Attending and Wife at the bedside.

## 2023-11-22 NOTE — PROGRESS NOTE ADULT - NS ATTEND AMEND GEN_ALL_CORE FT
I saw and examined the patient on stroke rounds with Arielle Marin.    No acute events overnight.  Initially planned for stent today, but needed to reschedule.      On exam:   GEN: NAD  NEURO:   Awake, alert, oriented, speaking fluently.  Fully attending, and oriented.   Pupils 3-2mm, symmetric, disconjugate gaze, R exotropia, no facial weakness, moderate dysarthria, tongue midline.  MOTOR: mild drift L arm  COORDINATION: ataxia on FNF on the L.      MRI brain images reviewed: acute stroke, L PICA, R AICA, and L PCA territories.      AP: CTA H&N images reviewed: bilateral vertebral artery V4 occlusion.     AP: 43 year old man with hx of DM, HTN, HLD, tobacco use, recent pontine stroke, remote stroke, vertebral artery occlusion bilaterally, presenting with dysarthria, and gait instability on 11/16, received tenecteplase with improvement, progression of symptoms of diplopia/dysconjugate gaze on 11/19.  Exam with dysconjugate gaze, and L arm weakness, trace dysarthria.  Imaging showing the severe atherosclerotic stenosis bilateral vertebral arteries, and strokes in the posterior circulation.    Severe intracranial atherosclerotic disease, progressed from recent imaging.  Possible unstable plaque.     Brilinta loaded on 11/21, now on ASA/brilinta  Intracranial stenting planned for 11/24 in AM.

## 2023-11-22 NOTE — PROGRESS NOTE ADULT - SUBJECTIVE AND OBJECTIVE BOX
THE PATIENT WAS SEEN AND EXAMINED BY ME WITH THE HOUSESTAFF AND STROKE TEAM DURING MORNING ROUNDS.   HPI: Patient ROB OSHEA is a 43y (1980) RIGHT handed man who presented to [facility] ED on 11/16/2023, as a CODE STROKE, with c/o difficulty walking, LLE weakness, speech changes.  PMH significant for: stroke (7/2023 - presenting with dysarthria - left inferior adrienne, b/l parieto-occipital juxtacortical WM w/mild residual dysarthria; 8 years ago - treated with TPA at [facility] - p/w L hemiplegia, facial droop and dysarthria), T2DM, HTN, former smoker (quit in 7/2023)  Presented as a CODE STROKE at 19:12. Neurology arrived promptly to evaluate patient. Per wife, patient having acute onset slurred speech (worse from baseline) and inability to stand/walk. This began at 17:00, 11/16/2023. On exam: patient has a L facial droop, ataxia in LUE (dysmetria with FTN), and struggles to perform HTS b/l. Patient also with pronounced slurred speech (moderate but is intelligible). Patient was rushed to the CT scanner on the first floor of the hospital d/t CT scanner in ED being used. Underwent NCCTH, which revealed no hemorrhage, but was initially read by Radiology as showing a nonacute left occipital infarct not seen on the CTH performed 7/26/2023. This infarct was not c/w patient's exam. Patient was emergently rushed back to ED for vital signs and weight. These were unable to be obtained on the first floor. After risk/benefit discussion with patient and wife - tenecteplase was administered at 11/16/2023, 19:47. DTN time was delayed d/t patient needing to be taken up to first floor CT scanner from ED (ED scanner in use), needed to return to ED after scans for vitals including BP and weight.  30 minutes after administration, NIHSS 4->2 (mild left facial but improved and mild dysarthria). Patient reports voice is much better. Performs FTN without issue with LUE. Performs HTS b/l. L NLF flattening significantly improved. Unable to walk patient d/t safety concerns s/p tenecteplase (on bed rest).  Patient currently takes ASA 81mg daily every day. Was originally on DAPT (ASA + clopidogrel) after his last stroke in 7/2023, but saw Dr. Guzman in 11/2/2023, who recommended patient switch over to aspirin monotherapy.  Patient reports that he takes all of his medications every day including his aspirin. Patient and wife report after patient's last stroke, he went to inpatient rehab. He still has slurred speech but it was "90% better." Walks with a cane, but is otherwise independent.  is able to climb stairs with the assistance of his cane.  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 Went to work last week. On Sunday (11/12/2023) - patient was feeling ill. Constipated, vomited. Had some room spinning dizziness. Has been sleeping a lot and has been diaphoretic. Symptoms of slurred speech and gait imbalance came on suddenly today at 11/16/2023. Wife says it was a "night and day difference," which triggered her to rush patient to the ED.   Patient reports that he quit smoking after his stroke in July 2023.  Patient was using a nicotine patch for short while, but he is no longer smoking or using a nicotine patch.  Patient denies any alcohol use.  Patient smokes 1 joint of marijuana per night.  Patient currently lives with his wife and daughter.  Patient denies fevers, chills, visual changes, neck pain, chest pain, palpitations, shortness of breath, abdominal pain, emesis.  Patient reports that he was feeling nauseated earlier.      SUBJECTIVE: No events overnight.  No new neurologic complaints.      acetaminophen     Tablet .. 650 milliGRAM(s) Oral every 6 hours PRN  aspirin  chewable 81 milliGRAM(s) Oral daily  atorvastatin 80 milliGRAM(s) Oral at bedtime  dextrose 5%. 1000 milliLiter(s) IV Continuous <Continuous>  dextrose 5%. 1000 milliLiter(s) IV Continuous <Continuous>  dextrose 50% Injectable 25 Gram(s) IV Push once  dextrose 50% Injectable 12.5 Gram(s) IV Push once  dextrose 50% Injectable 25 Gram(s) IV Push once  dextrose Oral Gel 15 Gram(s) Oral once PRN  enoxaparin Injectable 40 milliGRAM(s) SubCutaneous every 24 hours  escitalopram 10 milliGRAM(s) Oral daily  glucagon  Injectable 1 milliGRAM(s) IntraMuscular once  influenza   Vaccine 0.5 milliLiter(s) IntraMuscular once  insulin lispro (ADMELOG) corrective regimen sliding scale   SubCutaneous every 6 hours  ondansetron Injectable 4 milliGRAM(s) IV Push once PRN  polyethylene glycol 3350 17 Gram(s) Oral daily  senna 2 Tablet(s) Oral at bedtime  sodium chloride 0.9%. 1000 milliLiter(s) IV Continuous <Continuous>  ticagrelor 90 milliGRAM(s) Oral every 12 hours  traZODone 50 milliGRAM(s) Oral at bedtime      PHYSICAL EXAM:   Vital Signs Last 24 Hrs  T(C): 36.7 (22 Nov 2023 04:00), Max: 36.8 (22 Nov 2023 00:00)  T(F): 98.1 (22 Nov 2023 04:00), Max: 98.3 (22 Nov 2023 00:00)  HR: 86 (22 Nov 2023 06:25) (59 - 89)  BP: 152/82 (22 Nov 2023 06:11) (112/55 - 184/89)  BP(mean): 124 (22 Nov 2023 06:11) (78 - 128)  RR: 10 (22 Nov 2023 06:11) (10 - 29)  SpO2: 98% (22 Nov 2023 06:25) (95% - 100%)    Parameters below as of 22 Nov 2023 06:11  Patient On (Oxygen Delivery Method): BiPAP/CPAP        General: No acute distress  HEENT: EOM intact, visual fields full  Abdomen: Soft, nontender, nondistended   Extremities: No edema    NEUROLOGICAL EXAM:  Mental status: Eyes closed, opens eyes to light touch and voice for a few minutes, drowsiness, awake, alert, oriented x4, fluent speech no neglect, able to follow commands  Cranial Nerves: Subtle left facial droop, dysconjugate gaze, no nystagmus, moderate dysarthria.  Motor exam: Normal tone, no drift, RUE 5/5, RLE 5/5, LUE 4/5 with drift, LLE 5/5,  Sensation: Intact to light touch   Coordination/ Gait: subtle left dysmetria, rapid alternating movements mildly impaired L>R.    LABS:                        13.1   7.24  )-----------( 177      ( 22 Nov 2023 05:35 )             38.4    11-22    143  |  109<H>  |  12  ----------------------------<  106<H>  3.8   |  23  |  1.22    Ca    8.7      22 Nov 2023 05:35    PTT - ( 22 Nov 2023 05:35 )  PTT:32.3 sec      IMAGING: Reviewed by me.     CTH 11/19/23: Evolving acute infarcts in the posterior circulation are unchanged from 11/17/2023.  No new intracranial findings.     MRI, MRA head/neck Brain 1/17/23: Small vessel white matter ischemic changes. Acute infarcts in the posterior circulation involving the left occipital lobe, left cerebellum and bilateral brachium pontis, right adrienne. Chronic appearing infarct adrienne. Extremely poor flow identified in the basilar artery due to   what appears to be severe distal vertebral artery stenosis. Flow in the posterior communicating arteries and posterior cerebral arteries is too small to measure.    CTH 11/17: Stable posterior circulation infarctions.    CTH/ CTA neck 11/16: No intracranial hemorrhage is appreciated. No intracranial mass lesion is appreciated.Left occipital and right cerebellar acute to subacute infarctions. Left basal ganglia, left thalamic and central pontine remote infarctions    CTA head/Neck CTP 11/16:    1. Right carotid system:  Atherosclerotic plaque without hemodynamically significant stenosis.  2. Left carotid system:  Atherosclerotic plaque without hemodynamically significant stenosis.  3. Vertebral circulation:  Patent.  4. Anterior intracranial circulation:  Intracranial atherosclerosis cavernous and clinoid segments of the internal carotid arteries, mild to moderate, and middle cerebral arteries, moderate on the right and mild-to-moderate on the left.  5.  Posterior intracranial circulation:  Severe atherosclerotic disease has progressed since July 2023 with progressive diminished flow of the left vertebral artery, basilar artery and posterior cerebral arteries. There is likely reversal flow within the basilar artery to perfuse the most distal left vertebral artery and PICA arteries. Occlusion of the right vertebral artery was present in July 2023  6. Brain perfusion:  No acute infarction of the brain is convincingly demonstrated.  However, there is extensive ischemia within the posterior circulation.    CT Head No Cont (11.21.23  The left ventricles have a normal configuration  Old lacunar infarct involving the anterior left corona radiata region is   again seen  Abnormal areas of low-attenuation involving the left cerebellum left   occipital lobe and right brachium pontis region are again seen. These   findings are compatible with areas of acute infarct. No hemorrhagic   transformation is seen. No significant shift or herniation is seen.  The paranasal sinuses mastoid and middle ear regions appear clear.    Impression: Acute infarct again seen as described above. THE PATIENT WAS SEEN AND EXAMINED BY ME WITH THE HOUSESTAFF AND STROKE TEAM DURING MORNING ROUNDS.     HPI: Patient ROB OSHEA is a 43y (1980) RIGHT handed man who presented to [facility] ED on 11/16/2023, as a CODE STROKE, with c/o difficulty walking, LLE weakness, speech changes.  PMH significant for: stroke (7/2023 - presenting with dysarthria - left inferior adrienne, b/l parieto-occipital juxtacortical WM w/mild residual dysarthria; 8 years ago - treated with TPA at [facility] - p/w L hemiplegia, facial droop and dysarthria), T2DM, HTN, former smoker (quit in 7/2023)  Presented as a CODE STROKE at 19:12. Neurology arrived promptly to evaluate patient. Per wife, patient having acute onset slurred speech (worse from baseline) and inability to stand/walk. This began at 17:00, 11/16/2023. On exam: patient has a L facial droop, ataxia in LUE (dysmetria with FTN), and struggles to perform HTS b/l. Patient also with pronounced slurred speech (moderate but is intelligible). Patient was rushed to the CT scanner on the first floor of the hospital d/t CT scanner in ED being used. Underwent NCCTH, which revealed no hemorrhage, but was initially read by Radiology as showing a nonacute left occipital infarct not seen on the CTH performed 7/26/2023. This infarct was not c/w patient's exam. Patient was emergently rushed back to ED for vital signs and weight. These were unable to be obtained on the first floor. After risk/benefit discussion with patient and wife - tenecteplase was administered at 11/16/2023, 19:47. DTN time was delayed d/t patient needing to be taken up to first floor CT scanner from ED (ED scanner in use), needed to return to ED after scans for vitals including BP and weight.  30 minutes after administration, NIHSS 4->2 (mild left facial but improved and mild dysarthria). Patient reports voice is much better. Performs FTN without issue with LUE. Performs HTS b/l. L NLF flattening significantly improved. Unable to walk patient d/t safety concerns s/p tenecteplase (on bed rest).  Patient currently takes ASA 81mg daily every day. Was originally on DAPT (ASA + clopidogrel) after his last stroke in 7/2023, but saw Dr. Guzman in 11/2/2023, who recommended patient switch over to aspirin monotherapy.  Patient reports that he takes all of his medications every day including his aspirin. Patient and wife report after patient's last stroke, he went to inpatient rehab. He still has slurred speech but it was "90% better." Walks with a cane, but is otherwise independent.  is able to climb stairs with the assistance of his cane.  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 Went to work last week. On Sunday (11/12/2023) - patient was feeling ill. Constipated, vomited. Had some room spinning dizziness. Has been sleeping a lot and has been diaphoretic. Symptoms of slurred speech and gait imbalance came on suddenly today at 11/16/2023. Wife says it was a "night and day difference," which triggered her to rush patient to the ED.   Patient reports that he quit smoking after his stroke in July 2023.  Patient was using a nicotine patch for short while, but he is no longer smoking or using a nicotine patch.  Patient denies any alcohol use.  Patient smokes 1 joint of marijuana per night.  Patient currently lives with his wife and daughter.  Patient denies fevers, chills, visual changes, neck pain, chest pain, palpitations, shortness of breath, abdominal pain, emesis.  Patient reports that he was feeling nauseated earlier.    SUBJECTIVE: No events overnight.  No new neurologic complaints.      acetaminophen     Tablet .. 650 milliGRAM(s) Oral every 6 hours PRN  aspirin  chewable 81 milliGRAM(s) Oral daily  atorvastatin 80 milliGRAM(s) Oral at bedtime  dextrose 5%. 1000 milliLiter(s) IV Continuous <Continuous>  dextrose 5%. 1000 milliLiter(s) IV Continuous <Continuous>  dextrose 50% Injectable 25 Gram(s) IV Push once  dextrose 50% Injectable 12.5 Gram(s) IV Push once  dextrose 50% Injectable 25 Gram(s) IV Push once  dextrose Oral Gel 15 Gram(s) Oral once PRN  enoxaparin Injectable 40 milliGRAM(s) SubCutaneous every 24 hours  escitalopram 10 milliGRAM(s) Oral daily  glucagon  Injectable 1 milliGRAM(s) IntraMuscular once  influenza   Vaccine 0.5 milliLiter(s) IntraMuscular once  insulin lispro (ADMELOG) corrective regimen sliding scale   SubCutaneous every 6 hours  ondansetron Injectable 4 milliGRAM(s) IV Push once PRN  polyethylene glycol 3350 17 Gram(s) Oral daily  senna 2 Tablet(s) Oral at bedtime  sodium chloride 0.9%. 1000 milliLiter(s) IV Continuous <Continuous>  ticagrelor 90 milliGRAM(s) Oral every 12 hours  traZODone 50 milliGRAM(s) Oral at bedtime    PHYSICAL EXAM:   Vital Signs Last 24 Hrs  T(C): 36.7 (22 Nov 2023 04:00), Max: 36.8 (22 Nov 2023 00:00)  T(F): 98.1 (22 Nov 2023 04:00), Max: 98.3 (22 Nov 2023 00:00)  HR: 86 (22 Nov 2023 06:25) (59 - 89)  BP: 152/82 (22 Nov 2023 06:11) (112/55 - 184/89)  BP(mean): 124 (22 Nov 2023 06:11) (78 - 128)  RR: 10 (22 Nov 2023 06:11) (10 - 29)  SpO2: 98% (22 Nov 2023 06:25) (95% - 100%)    Parameters below as of 22 Nov 2023 06:11  Patient On (Oxygen Delivery Method): BiPAP/CPAP    General: No acute distress  HEENT: EOM intact  Abdomen: Soft, nontender, nondistended   Extremities: No edema    NEUROLOGICAL EXAM:  Mental status: Eyes closed, opens eyes to light touch and voice for a few minutes, drowsiness, awake, alert, oriented x4, fluent speech no neglect, able to follow commands  Cranial Nerves: Subtle left facial droop, dysconjugate gaze, no nystagmus, moderate dysarthria.  Motor exam: Normal tone, no drift, RUE 5/5, RLE 5/5, LUE 4/5 with drift, LLE 5/5,  Sensation: Intact to light touch   Coordination/ Gait: subtle left dysmetria, rapid alternating movements mildly impaired L>R.    LABS:                        13.1   7.24  )-----------( 177      ( 22 Nov 2023 05:35 )             38.4    11-22    143  |  109<H>  |  12  ----------------------------<  106<H>  3.8   |  23  |  1.22    Ca    8.7      22 Nov 2023 05:35    PTT - ( 22 Nov 2023 05:35 )  PTT:32.3 sec      IMAGING: Reviewed by me.     CTH 11/19/23: Evolving acute infarcts in the posterior circulation are unchanged from 11/17/2023.  No new intracranial findings.     MRI, MRA head/neck Brain 1/17/23: Small vessel white matter ischemic changes. Acute infarcts in the posterior circulation involving the left occipital lobe, left cerebellum and bilateral brachium pontis, right adrienne. Chronic appearing infarct adrienne. Extremely poor flow identified in the basilar artery due to   what appears to be severe distal vertebral artery stenosis. Flow in the posterior communicating arteries and posterior cerebral arteries is too small to measure.    CTH 11/17: Stable posterior circulation infarctions.    CTH/ CTA neck 11/16: No intracranial hemorrhage is appreciated. No intracranial mass lesion is appreciated.Left occipital and right cerebellar acute to subacute infarctions. Left basal ganglia, left thalamic and central pontine remote infarctions    CTA head/Neck CTP 11/16:    1. Right carotid system:  Atherosclerotic plaque without hemodynamically significant stenosis.  2. Left carotid system:  Atherosclerotic plaque without hemodynamically significant stenosis.  3. Vertebral circulation:  Patent.  4. Anterior intracranial circulation:  Intracranial atherosclerosis cavernous and clinoid segments of the internal carotid arteries, mild to moderate, and middle cerebral arteries, moderate on the right and mild-to-moderate on the left.  5.  Posterior intracranial circulation:  Severe atherosclerotic disease has progressed since July 2023 with progressive diminished flow of the left vertebral artery, basilar artery and posterior cerebral arteries. There is likely reversal flow within the basilar artery to perfuse the most distal left vertebral artery and PICA arteries. Occlusion of the right vertebral artery was present in July 2023  6. Brain perfusion:  No acute infarction of the brain is convincingly demonstrated.  However, there is extensive ischemia within the posterior circulation.    CT Head No Cont (11.21.23  The left ventricles have a normal configuration  Old lacunar infarct involving the anterior left corona radiata region is   again seen  Abnormal areas of low-attenuation involving the left cerebellum left   occipital lobe and right brachium pontis region are again seen. These   findings are compatible with areas of acute infarct. No hemorrhagic   transformation is seen. No significant shift or herniation is seen.  The paranasal sinuses mastoid and middle ear regions appear clear.    Impression: Acute infarct again seen as described above.

## 2023-11-22 NOTE — PROGRESS NOTE ADULT - SUBJECTIVE AND OBJECTIVE BOX
Subjective: Patient seen and examined. No new events except as noted.   remains in Stroke unit   no cp or sob   HR stable       REVIEW OF SYSTEMS:    CONSTITUTIONAL: + weakness, fevers or chills  EYES/ENT: No visual changes;  No vertigo or throat pain   NECK: No pain or stiffness  RESPIRATORY: No cough, wheezing, hemoptysis; No shortness of breath  CARDIOVASCULAR: No chest pain or palpitations  GASTROINTESTINAL: No abdominal or epigastric pain. No nausea, vomiting, or hematemesis; No diarrhea or constipation. No melena or hematochezia.  GENITOURINARY: No dysuria, frequency or hematuria  NEUROLOGICAL: No numbness or weakness  SKIN: No itching, burning, rashes, or lesions   All other review of systems is negative unless indicated above.    MEDICATIONS:  MEDICATIONS  (STANDING):  aspirin  chewable 81 milliGRAM(s) Oral daily  atorvastatin 80 milliGRAM(s) Oral at bedtime  dextrose 5%. 1000 milliLiter(s) (50 mL/Hr) IV Continuous <Continuous>  dextrose 5%. 1000 milliLiter(s) (100 mL/Hr) IV Continuous <Continuous>  dextrose 50% Injectable 25 Gram(s) IV Push once  dextrose 50% Injectable 25 Gram(s) IV Push once  dextrose 50% Injectable 12.5 Gram(s) IV Push once  enoxaparin Injectable 40 milliGRAM(s) SubCutaneous every 24 hours  escitalopram 10 milliGRAM(s) Oral daily  glucagon  Injectable 1 milliGRAM(s) IntraMuscular once  influenza   Vaccine 0.5 milliLiter(s) IntraMuscular once  insulin lispro (ADMELOG) corrective regimen sliding scale   SubCutaneous every 6 hours  polyethylene glycol 3350 17 Gram(s) Oral daily  senna 2 Tablet(s) Oral at bedtime  sodium chloride 0.9%. 1000 milliLiter(s) (100 mL/Hr) IV Continuous <Continuous>  ticagrelor 90 milliGRAM(s) Oral every 12 hours  traZODone 50 milliGRAM(s) Oral at bedtime      PHYSICAL EXAM:  T(C): 36.5 (11-22-23 @ 08:00), Max: 36.8 (11-22-23 @ 00:00)  HR: 668 (11-22-23 @ 09:21) (59 - 668)  BP: 157/75 (11-22-23 @ 08:00) (117/60 - 184/89)  RR: 20 (11-22-23 @ 09:21) (10 - 29)  SpO2: 100% (11-22-23 @ 09:21) (95% - 100%)  Wt(kg): --  I&O's Summary    21 Nov 2023 07:01  -  22 Nov 2023 07:00  --------------------------------------------------------  IN: 2829 mL / OUT: 2350 mL / NET: 479 mL    22 Nov 2023 07:01  -  22 Nov 2023 09:39  --------------------------------------------------------  IN: 100 mL / OUT: 0 mL / NET: 100 mL              Appearance: NAD  HEENT:  Dry  oral mucosa, PERRL, EOMI	  Lymphatic: No lymphadenopathy  Cardiovascular: Normal S1 S2, No JVD, No murmurs, No edema  Respiratory: Lungs clear to auscultation	  Psychiatry: A & O x 3, Mood & affect appropriate  Gastrointestinal:  Soft, Non-tender, + BS	  Skin: No rashes, No ecchymoses, No cyanosis	  Neurologic: Non-focal  Extremities: Normal range of motion, No clubbing, cyanosis or edema  Vascular: Peripheral pulses palpable 2+ bilaterally  Neurologic Exam:  Mental status:  Awake, Alert; Oriented to: person, place. Knows it is November 2023 but not exact date; Speech: moderate dysarthria; Repetition and naming: intact; Follows simple and complex commands; Attention/concentration: can spell WORLD forward but not backward; Recent and remote memory (including registration and recall): JULIETA; Fund of knowledge: intact    Cranial nerves - R pupil>L, both reactive, VFF on confrontational testing, EOM - inability to bury the sclera of the R eye with rightward gaze, L upper eyelid ptosis, face sensation (V1-V3) intact b/l, facial strength - L facial droop, hearing intact b/l with finger rub test, palate with symmetric elevation, trapezius 5/5 strength b/l, tongue midline on protrusion with full lateral movement    Motor - Normal bulk and tone throughout. No pronator drift.  Strength testing (R/L)  Deltoid:  5/5   Biceps:  5/5   Triceps:  5/5   Wrist Extension:  5/5   Wrist Flexion:  5/5   Interossei:  5/5   :  5/5  Hip Flexion:  5/5   Hip Extension:  5/5   Knee Flexion:  5/5   Knee Extension:  5/5   Dorsiflexion:  5/5   Plantar Flexion:  5/5    Sensation - Light touch intact throughout    DTRs (R/L)  3+ upper/lower  b/l sustained ankle clonus    Coordination - Finger to Nose, Heel to Shin intact b/l. No tremors appreciated.    Gait and station - Patient unable to stand with holding onto the sides of transport chair during             LABS:    CARDIAC MARKERS:                                13.1   7.24  )-----------( 177      ( 22 Nov 2023 05:35 )             38.4     11-22    143  |  109<H>  |  12  ----------------------------<  106<H>  3.8   |  23  |  1.22    Ca    8.7      22 Nov 2023 05:35      proBNP:   Lipid Profile:   HgA1c:   TSH:             TELEMETRY: SR	    ECG:  	  RADIOLOGY:   DIAGNOSTIC TESTING:  [ ] Echocardiogram:    [ ]  Catheterization:  [ ] Stress Test:    OTHER:

## 2023-11-22 NOTE — PROGRESS NOTE ADULT - ASSESSMENT
43y right handed man PMH stroke (7/2023 - presenting with dysarthria - left inferior adrienne, b/l parieto-occipital juxtacortical WM w/mild residual dysarthria; 8 years ago - treated with TPA at [facility], Pt presented at that time with left hemiplegia, facial droop and dysarthria), T2DM, HTN, former smoker (quit in 7/2023), patient currently takes ASA 81mg daily every day. Was originally on DAPT (ASA + clopidogrel) Pt presented to [facility] ED on 11/16/2023, with c/o sudden difficulty walking, LLE weakness, speech changes. Pt is s/p tenecteplase    Impression: Left facial weakness, LUE ataxia, dysarthria, gait instability due to posterior circulation ischemic infarct:  Severe disease of posterior circulation involving vertebral, basilar and associated ischemia. mechanism:  TENISHA     NEURO: Neuro exam change on 11/19 with increase in drowsiness, but AxO X4, motor unchanged, later in PM patient reported double vision, on eval with notable dysconjugate gaze, Urgent head CT done: unchanged. Case discussed with NSCU and IR team, stat MRI without any ischemic changes. D/c Brilinta and started on Heparin drip on 11/19 as discussed with NSCU and monitor in stroke unit with continued BP support. PTT goal of 60-80. S/p diagnostic cerebral angiogram on 11/20 showing severe distal vertebral artery stenosis bilaterally. 11/20 PM noted to have coughing when eating dinner and brushing teeth. Dysphagia screen re ordered and patient failed. Speech re eval with reccs for pureed diet with mildly thick liquids. Plan for repeat cerebral angio on 11/22 for stent placement with neurosurgery. Heparin gtt discontinued. Patient loaded with Brilinta 180mg x 1 and then started on brilinta 90mg BID + aspirin 81mg QD on 11/21. Will continue close monitoring for neurologic deterioration, permissive HTN with slow titration to normotensive. Will titrate statin to LDL goal less than 70, MRI Brain w/o, MRA Head w/o and Neck w/contrast results as above. Physical therapy/OT eval with recommendations for AR. As per Cardio eval: Acceptable cardiac/medical risk to proceed RCRI 1    ANTITHROMBOTIC THERAPY: s/p brilinta 180mg load x1. Brilinta 90mg BID and aspirin 81mg QD     PULMONARY: Protecting airway, saturating well on room air     CARDIOVASCULAR: TTE with EF of 68%, c/w cardiac monitoring no events                              SBP goal: 140-200mmHg    GASTROINTESTINAL: Dysphagia screen  failed, S/S eval on 11/17 with recommendations for regular diet. 11/20 noted to be coughing on thin liquids, Speech re eval with reccs for pureed diet with mildly thick liquids. Will need MBS likely friday      Diet: pureed, NPO for procedure    RENAL: BUN/Cr stable, good urine output, urine toxicology positive for THC      Na Goal: Greater than 135     Mauricio: No    HEMATOLOGY: H/H stable, Platelets 177     DVT ppx:  LMWH     ID: afebrile, no leukocytosis     OTHER: Plan/goals of care discussed with pt and pt's family at bedside    DISPOSITION: Acute Rehab as per PT/OT eval once stable and workup is complete    CORE MEASURES:        Admission NIHSS: 4-->2     Tenecteplase: YES      LDL/HDL: 37/32     Depression Screen: 0     Statin Therapy: Y     Dysphagia Screen:  FAIL     Smoking Former: smoker quit 07/2023      Afib  NO     Stroke Education  YES    Obtain screening ultrasound in patients who meet 1 or more of the following criteria as patient is high risk for DVT/PE upon admission:   [] History of DVT/PE  []Hypercoagulable states (Factor V Leiden, Cancer, OCP, etc. )  []Prolonged immobility (hemiplegia/hemiparesis/post operative or any other extended immobilization)   [] Transferred from outside facility (Rehab or Long term care)  [] Age </= to 50 43y right handed man PMH stroke (7/2023 - presenting with dysarthria - left inferior adrienne, b/l parieto-occipital juxtacortical WM w/mild residual dysarthria; 8 years ago - treated with TPA at [facility] [place], Pt presented at that time with left hemiplegia, facial droop and dysarthria), T2DM, HTN, former smoker (quit in 7/2023), patient currently takes ASA 81mg daily every day. Was originally on DAPT (ASA + clopidogrel) Pt presented to [facility] ED on 11/16/2023, with c/o sudden difficulty walking, LLE weakness, speech changes. Pt is s/p tenecteplase    Impression: Left facial weakness, LUE ataxia, dysarthria, gait instability due to posterior circulation ischemic infarct. Severe disease of posterior circulation involving vertebral, basilar and associated ischemia. Mechanism: TENISHA     NEURO: Neuro exam change on 11/19 with increase in drowsiness, but AxOx4, motor unchanged, later in PM patient reported double vision, on eval with notable dysconjugate gaze, Urgent head CT done: unchanged. Case discussed with NSCU and IR team, stat MRI without any ischemic changes. D/c Brilinta and started on Heparin drip on 11/19 as discussed with NSCU and monitor in stroke unit with continued BP support. PTT goal of 60-80. S/p diagnostic cerebral angiogram on 11/20 showing severe distal vertebral artery stenosis bilaterally. 11/20 PM noted to have coughing when eating dinner and brushing teeth. Dysphagia screen re-ordered and patient failed. Speech re-eval with recs for pureed diet with mildly thick liquids. Plan for repeat cerebral angio on 11/24 for stent placement with neurosurgery. Heparin gtt discontinued. Patient loaded with Brilinta 180mg x 1 and then started on brilinta 90mg BID + aspirin 81mg QD on 11/21. Will continue close monitoring for neurologic deterioration, permissive HTN with slow titration to normotensive. Will titrate statin to LDL goal less than 70, MRI Brain w/o, MRA Head w/o and Neck w/contrast results as above. Physical therapy/OT eval with recommendations for AR. As per Cardio eval: Acceptable cardiac/medical risk to proceed RCRI 1    ANTITHROMBOTIC THERAPY: s/p Brilinta 180mg load x1. Brilinta 90mg BID and aspirin 81mg QD     PULMONARY: Protecting airway, saturating well on room air     CARDIOVASCULAR: TTE with EF of 68%, c/w cardiac monitoring no events                              SBP goal: 140-200mmHg    GASTROINTESTINAL: Dysphagia screen  failed, S/S eval on 11/17 with recommendations for regular diet. 11/20 noted to be coughing on thin liquids, Speech re eval with reccs for pureed diet with mildly thick liquids. Will need MBS likely friday      Diet: pureed    RENAL: BUN/Cr stable, good urine output, urine toxicology positive for THC      Na Goal: Greater than 135     Mauricio: No    HEMATOLOGY: H/H stable, Platelets 177     DVT ppx:  LMWH     ID: afebrile, no leukocytosis     OTHER: Plan/goals of care discussed with pt and pt's family at bedside    DISPOSITION: Acute Rehab as per PT/OT eval once stable and workup is complete    CORE MEASURES:        Admission NIHSS: 4-->2     Tenecteplase: YES      LDL/HDL: 37/32     Depression Screen: 0     Statin Therapy: Y     Dysphagia Screen:  FAIL     Smoking Former: smoker quit 07/2023      Afib  NO     Stroke Education  YES    Obtain screening ultrasound in patients who meet 1 or more of the following criteria as patient is high risk for DVT/PE upon admission:   [] History of DVT/PE  [] Hypercoagulable states (Factor V Leiden, Cancer, OCP, etc. )  [] Prolonged immobility (hemiplegia/hemiparesis/post operative or any other extended immobilization)   [] Transferred from outside facility (Rehab or Long term care)  [] Age </= to 50

## 2023-11-22 NOTE — PROVIDER CONTACT NOTE (OTHER) - ACTION/TREATMENT ORDERED:
Complete a bedside dysphagia screen and notify provider with results.
NP evaluated pt at the bedside. NP order urgent CT scan of head.
Zofran ordered and administered, PA at bedside to assess. Emotional support and re-education provided with verbal understanding.

## 2023-11-22 NOTE — PROVIDER CONTACT NOTE (OTHER) - RECOMMENDATIONS
Notify provider
Notify provider for emesis episode and consider zofran.
NP evaluation at the bedside; CT scan of head

## 2023-11-22 NOTE — PROVIDER CONTACT NOTE (OTHER) - REASON
Pt noted to be coughing and choking briefly while brushing teeth during PM hygiene care
Large emesis episode
change in mental status

## 2023-11-22 NOTE — PROVIDER CONTACT NOTE (OTHER) - BACKGROUND
Pt presented to ED on 11/16 w/ sudden difficulty walking, LLE weakness, and speech changes. Pt had PCA infarct and s/p TNK on 11/16/23 at 19:47.
PCA infarct s/p tenecteplase
PCA infarct s/p tenecteplase, pending angio in AM 11/22

## 2023-11-23 NOTE — PROGRESS NOTE ADULT - SUBJECTIVE AND OBJECTIVE BOX
Name of Patient : ROB OSHEA  MRN: [id number]        Subjective: Patient seen and examined. No new events except as noted.     REVIEW OF SYSTEMS:    CONSTITUTIONAL: No weakness, fevers or chills  EYES/ENT: No visual changes;  No vertigo or throat pain   NECK: No pain or stiffness  RESPIRATORY: No cough, wheezing, hemoptysis; No shortness of breath  CARDIOVASCULAR: No chest pain or palpitations  GASTROINTESTINAL: No abdominal or epigastric pain. No nausea, vomiting, or hematemesis; No diarrhea or constipation. No melena or hematochezia.  GENITOURINARY: No dysuria, frequency or hematuria  NEUROLOGICAL: No numbness or weakness  SKIN: No itching, burning, rashes, or lesions   All other review of systems is negative unless indicated above.    MEDICATIONS:  MEDICATIONS  (STANDING):  aspirin  chewable 81 milliGRAM(s) Oral daily  atorvastatin 80 milliGRAM(s) Oral at bedtime  dextrose 5%. 1000 milliLiter(s) (50 mL/Hr) IV Continuous <Continuous>  dextrose 5%. 1000 milliLiter(s) (100 mL/Hr) IV Continuous <Continuous>  dextrose 50% Injectable 25 Gram(s) IV Push once  dextrose 50% Injectable 25 Gram(s) IV Push once  dextrose 50% Injectable 12.5 Gram(s) IV Push once  enoxaparin Injectable 40 milliGRAM(s) SubCutaneous every 24 hours  escitalopram 10 milliGRAM(s) Oral daily  glucagon  Injectable 1 milliGRAM(s) IntraMuscular once  influenza   Vaccine 0.5 milliLiter(s) IntraMuscular once  insulin lispro (ADMELOG) corrective regimen sliding scale   SubCutaneous Before meals and at bedtime  polyethylene glycol 3350 17 Gram(s) Oral daily  senna 2 Tablet(s) Oral at bedtime  sodium chloride 0.9%. 1000 milliLiter(s) (100 mL/Hr) IV Continuous <Continuous>  ticagrelor 90 milliGRAM(s) Oral every 12 hours  traZODone 50 milliGRAM(s) Oral at bedtime      PHYSICAL EXAM:  T(C): 37.1 (11-23-23 @ 08:00), Max: 37.2 (11-23-23 @ 06:00)  HR: 79 (11-23-23 @ 22:00) (64 - 82)  BP: 173/94 (11-23-23 @ 22:00) (119/67 - 178/96)  RR: 25 (11-23-23 @ 22:00) (18 - 25)  SpO2: 100% (11-23-23 @ 22:00) (95% - 100%)  Wt(kg): --  I&O's Summary    22 Nov 2023 07:01  -  23 Nov 2023 07:00  --------------------------------------------------------  IN: 2300 mL / OUT: 2850 mL / NET: -550 mL    23 Nov 2023 07:01  -  24 Nov 2023 00:01  --------------------------------------------------------  IN: 1200 mL / OUT: 2300 mL / NET: -1100 mL          Appearance: Normal	  HEENT:  PERRLA   Lymphatic: No lymphadenopathy   Cardiovascular: Normal S1 S2, no JVD  Respiratory: normal effort , clear  Gastrointestinal:  Soft, Non-tender  Skin: No rashes,  warm to touch  Psychiatry:  Mood & affect appropriate  Musculuskeletal: No edema    recent labs, Imaging and EKGs personally reviewed     11-22-23 @ 07:01  -  11-23-23 @ 07:00  --------------------------------------------------------  IN: 2300 mL / OUT: 2850 mL / NET: -550 mL    11-23-23 @ 07:01  -  11-24-23 @ 00:01  --------------------------------------------------------  IN: 1200 mL / OUT: 2300 mL / NET: -1100 mL                            13.0   7.39  )-----------( 184      ( 23 Nov 2023 05:42 )             37.5               11-23    140  |  107  |  10  ----------------------------<  98  3.9   |  22  |  1.08    Ca    8.7      23 Nov 2023 05:42      PT/INR - ( 23 Nov 2023 17:43 )   PT: 12.5 sec;   INR: 1.14 ratio         PTT - ( 23 Nov 2023 17:43 )  PTT:33.2 sec                   Urinalysis Basic - ( 23 Nov 2023 05:42 )    Color: x / Appearance: x / SG: x / pH: x  Gluc: 98 mg/dL / Ketone: x  / Bili: x / Urobili: x   Blood: x / Protein: x / Nitrite: x   Leuk Esterase: x / RBC: x / WBC x   Sq Epi: x / Non Sq Epi: x / Bacteria: x

## 2023-11-23 NOTE — PROGRESS NOTE ADULT - ASSESSMENT
42yo right handed man PMHx stroke (7/2023 - presenting with dysarthria - left inferior adrienne, b/l parieto-occipital juxtacortical WM w/mild residual dysarthria; 8 years ago - treated with TPA at [facility], Pt presented at that time with left hemiplegia, facial droop and dysarthria), T2DM, HTN, former smoker (quit in 7/2023), currently takes ASA 81mg daily every day. Pt presented to [facility] ED on 11/16/2023, with c/o sudden difficulty walking, LLE weakness, speech changes. Pt is s/p tenecteplase    Impression: Left facial weakness, LUE ataxia, dysarthria, gait instability due to posterior circulation ischemic infarct. Severe disease of posterior circulation involving vertebral, basilar and associated ischemia. Mechanism: TENISHA     NEURO: Neurologically stable compared to yesterday. Neuro exam change on 11/19 with increase in drowsiness, but AxOx4, motor unchanged, later in PM patient reported double vision, on eval with notable dysconjugate gaze, Urgent head CT done: unchanged. Case discussed with NSCU and IR team, stat MRI without any ischemic changes. D/c Brilinta and started on Heparin drip on 11/19 as discussed with NSCU and monitor in stroke unit with continued BP support. S/p diagnostic cerebral angiogram on 11/20 showing severe distal vertebral artery stenosis bilaterally. Plan for repeat cerebral angio on 11/24 for stent placement with neurosurgery. Heparin gtt discontinued. Patient loaded with Brilinta 180mg x 1 and then started on brilinta 90mg BID + aspirin 81mg QD on 11/21. Will continue close monitoring for neurologic deterioration, permissive HTN with slow titration to normotensive. MRI Brain w/o, MRA Head w/o and Neck w/contrast results as above. Physical therapy/OT eval with recommendations for AR. As per Cardio eval: Acceptable cardiac/medical risk to proceed RCRI 1    ANTITHROMBOTIC THERAPY: s/p Brilinta 180mg load x1. Brilinta 90mg BID and aspirin 81mg QD     PULMONARY: Protecting airway, saturating well on room air     CARDIOVASCULAR: TTE with EF of 68%, c/w cardiac monitoring no events                              SBP goal: 140-200mmHg    GASTROINTESTINAL: Dysphagia screen  failed, S/S eval on 11/17 with recommendations for regular diet. 11/20 noted to be coughing on thin liquids, Speech re eval with reccs for pureed diet with mildly thick liquids. Will need MBS likely Friday.      Diet: pureed    RENAL: BUN/Cr stable, good urine output, urine toxicology positive for THC      Na Goal: Greater than 135     Mauricio: No    HEMATOLOGY: H/H stable, Platelets 177     DVT ppx:  LMWH     ID: afebrile, no leukocytosis     OTHER: Plan/goals of care discussed with pt and pt's family at bedside    DISPOSITION: Acute Rehab as per PT/OT eval once stable and workup is complete    CORE MEASURES:        Admission NIHSS: 4-->2     Tenecteplase: YES      LDL/HDL: 37/32     Depression Screen: 0     Statin Therapy: Y     Dysphagia Screen:  FAIL     Smoking Former: smoker quit 07/2023      Afib  NO     Stroke Education  YES    Obtain screening ultrasound in patients who meet 1 or more of the following criteria as patient is high risk for DVT/PE upon admission:   [] History of DVT/PE  [] Hypercoagulable states (Factor V Leiden, Cancer, OCP, etc. )  [] Prolonged immobility (hemiplegia/hemiparesis/post operative or any other extended immobilization)   [] Transferred from outside facility (Rehab or Long term care)  [] Age </= to 50   42yo right handed man PMHx stroke (7/2023 - presenting with dysarthria - left inferior adrienne, b/l parieto-occipital juxtacortical WM w/mild residual dysarthria; 8 years ago - treated with TPA at [facility], Pt presented at that time with left hemiplegia, facial droop and dysarthria), T2DM, HTN, former smoker (quit in 7/2023), currently takes ASA 81mg daily every day. Pt presented to [facility] ED on 11/16/2023, with c/o sudden difficulty walking, LLE weakness, speech changes. Pt is s/p tenecteplase.    Impression: Left facial weakness, LUE ataxia, dysarthria, gait instability due to posterior circulation ischemic infarct. Severe disease of posterior circulation involving vertebral, basilar and associated ischemia. Mechanism: TENISHA     NEURO: Neurologically stable compared to yesterday. Neuro exam change on 11/19 with increase in drowsiness, but AxOx4, motor unchanged, later in PM patient reported double vision, on eval with notable dysconjugate gaze, Urgent head CT done: unchanged. Case discussed with NSCU and IR team, stat MRI without any ischemic changes. D/c Brilinta and started on Heparin drip on 11/19 as discussed with NSCU and monitor in stroke unit with continued BP support. S/p diagnostic cerebral angiogram on 11/20 showing severe distal vertebral artery stenosis bilaterally. Plan for repeat cerebral angio on 11/24 for stent placement with neurosurgery. Heparin gtt discontinued. Patient loaded with Brilinta 180mg x 1 and then started on brilinta 90mg BID + aspirin 81mg QD on 11/21. Will continue close monitoring for neurologic deterioration, permissive HTN with slow titration to normotensive. MRI Brain w/o, MRA Head w/o and Neck w/contrast results as above. Physical therapy/OT eval with recommendations for AR. As per Cardio eval: Acceptable cardiac/medical risk to proceed RCRI 1    ANTITHROMBOTIC THERAPY: s/p Brilinta 180mg load x1. Brilinta 90mg BID and aspirin 81mg QD     PULMONARY: Protecting airway, saturating well on room air     CARDIOVASCULAR: TTE with EF of 68%, c/w cardiac monitoring no events                              SBP goal: 140-200mmHg    GASTROINTESTINAL: Dysphagia screen  failed, S/S eval on 11/17 with recommendations for regular diet. 11/20 noted to be coughing on thin liquids, Speech re eval with recs for pureed diet with mildly thick liquids. Will need MBS likely Friday.      Diet: pureed    RENAL: BUN/Cr stable, good urine output, urine toxicology positive for THC      Na Goal: Greater than 135     Mauricio: No    HEMATOLOGY: H/H stable, Platelets 177     DVT ppx:  LMWH     ID: afebrile, no leukocytosis     OTHER: Plan/goals of care discussed with pt and pt's family at bedside    DISPOSITION: Acute Rehab as per PT/OT eval once stable and workup is complete    CORE MEASURES:        Admission NIHSS: 4-->2     Tenecteplase: YES      LDL/HDL: 37/32     Depression Screen: 0     Statin Therapy: Y     Dysphagia Screen:  FAIL     Smoking Former: smoker quit 07/2023      Afib  NO     Stroke Education  YES    Obtain screening ultrasound in patients who meet 1 or more of the following criteria as patient is high risk for DVT/PE upon admission:   [] History of DVT/PE  [] Hypercoagulable states (Factor V Leiden, Cancer, OCP, etc. )  [] Prolonged immobility (hemiplegia/hemiparesis/post operative or any other extended immobilization)   [] Transferred from outside facility (Rehab or Long term care)  [] Age </= to 50

## 2023-11-23 NOTE — CHART NOTE - NSCHARTNOTEFT_GEN_A_CORE
Patient ROB OSHEA is a 43y (1980) RIGHT handed man who presented to [facility] ED on 11/16/2023, as a CODE STROKE, with c/o difficulty walking, LLE weakness, speech changes.    PMH significant for: stroke (7/2023 - presenting with dysarthria - left inferior adrienne, b/l parieto-occipital juxtacortical WM w/mild residual dysarthria; 8 years ago - treated with TPA at [facility] - p/w L hemiplegia, facial droop and dysarthria), T2DM, HTN, former smoker (quit in 7/2023)    Presented as a CODE STROKE at 19:12. Neurology arrived promptly to evaluate patient. Per wife, patient having acute onset slurred speech (worse from baseline) and inability to stand/walk. This began at 17:00, 11/16/2023. On exam: patient has a L facial droop, ataxia in LUE (dysmetria with FTN), and struggles to perform HTS b/l. Patient also with pronounced slurred speech (moderate but is intelligible). Patient was rushed to the CT scanner on the first floor of the hospital d/t CT scanner in ED being used. Underwent NCCTH, which revealed no hemorrhage, but was initially read by Radiology as showing a nonacute left occipital infarct not seen on the CTH performed 7/26/2023. This infarct was not c/w patient's exam. Patient was emergently rushed back to ED for vital signs and weight. These were unable to be obtained on the first floor. After risk/benefit discussion with patient and wife - tenecteplase was administered at 11/16/2023, 19:47. DTN time was delayed d/t patient needing to be taken up to first floor CT scanner from ED (ED scanner in use), needed to return to ED after scans for vitals including BP and weight.      Called to patients bedside by nurse for worsening dysarthria.   Patient began to experience worsening slurred speech during routine neuro checks. All other categories in neuro exam remains the same.  No change in mental status or vital signs.         acetaminophen     Tablet .. 650 milliGRAM(s) Oral every 6 hours PRN  aspirin  chewable 81 milliGRAM(s) Oral daily  atorvastatin 80 milliGRAM(s) Oral at bedtime  dextrose 5%. 1000 milliLiter(s) IV Continuous <Continuous>  dextrose 5%. 1000 milliLiter(s) IV Continuous <Continuous>  dextrose 50% Injectable 25 Gram(s) IV Push once  dextrose 50% Injectable 12.5 Gram(s) IV Push once  dextrose 50% Injectable 25 Gram(s) IV Push once  dextrose Oral Gel 15 Gram(s) Oral once PRN  enoxaparin Injectable 40 milliGRAM(s) SubCutaneous every 24 hours  escitalopram 10 milliGRAM(s) Oral daily  glucagon  Injectable 1 milliGRAM(s) IntraMuscular once  influenza   Vaccine 0.5 milliLiter(s) IntraMuscular once  insulin lispro (ADMELOG) corrective regimen sliding scale   SubCutaneous Before meals and at bedtime  polyethylene glycol 3350 17 Gram(s) Oral daily  senna 2 Tablet(s) Oral at bedtime  sodium chloride 0.9%. 1000 milliLiter(s) IV Continuous <Continuous>  ticagrelor 90 milliGRAM(s) Oral every 12 hours  traZODone 50 milliGRAM(s) Oral at bedtime      CONSTITUTIONAL: No weight loss, weakness, fevers or chills  EYES/ENT: No visual changes;  No vertigo or throat pain   NECK: No pain or stiffness, swollen neck  RESPIRATORY: No shortness of breath, cough, wheezing, hemoptysis or orthopnea  CARDIOVASCULAR: No chest pain or palpitations, dyspnea on exertion, LE swelling  GASTROINTESTINAL: No abdominal or epigastric pain; No nausea, vomiting, or hematemesis; No diarrhea or constipation; No melena or hematochezia.  GENITOURINARY: No dysuria, frequency incontinence or hematuria.  NEUROLOGICAL: No loss of sensation,  numbness, tingling, or weakness, seizure or blackouts.   SKIN: No itching, burning, rashes, or lesions   All other review of systems is negative unless indicated above.    Family History  Surgical History    PHYSICAL EXAM:   Vital Signs Last 24 Hrs  T(C): 36.8 (22 Nov 2023 20:00), Max: 36.8 (22 Nov 2023 20:00)  T(F): 98.2 (22 Nov 2023 20:00), Max: 98.2 (22 Nov 2023 20:00)  HR: 71 (23 Nov 2023 02:00) (59 - 668)  BP: 168/96 (23 Nov 2023 02:00) (152/82 - 189/87)  BP(mean): 123 (23 Nov 2023 02:00) (108 - 128)  RR: 20 (23 Nov 2023 02:00) (10 - 25)  SpO2: 100% (23 Nov 2023 02:00) (95% - 100%)    Parameters below as of 23 Nov 2023 02:00  Patient On (Oxygen Delivery Method): BiPAP/CPAP        General: Alert and Oriented x 3. No acute Distress. Well Nourished, Well Developed  Cardiac: Regular Heart Rate and Rhythm. No Murmur. No Jugular Venous Distension. No Peripheral Edema.  Pulmonary: Clear Breath Sounds to Auscultation Bilateraly. No Accessory Muscle use.   Abdomen: Soft, Nontender, Nondistended. No palpable Mass. Normal Bowel Sounds    NEUROLOGICAL EXAM:  Mental status: Awake, alert, oriented x3, speech slurred, follows commands, no neglect, normal memory   Cranial Nerves: left facial asymmetry noted, no nystagmus, some dysarthria,  tongue midline  Motor exam: Normal tone, no drift, 5/5 RUE, 5/5 RLE, 5/5 LUE, 5/5 LLE, normal fine finger movements.  Sensation: Intact to light touch   Coordination/ Gait: No dysmetria, gait not tested    LABS:                        13.1   7.24  )-----------( 177      ( 22 Nov 2023 05:35 )             38.4    11-22    143  |  109<H>  |  12  ----------------------------<  106<H>  3.8   |  23  |  1.22    Ca    8.7      22 Nov 2023 05:35    PTT - ( 22 Nov 2023 05:35 )  PTT:32.3 sec      IMAGING: Reviewed by Stroke fellow.   CT Head  Impression: : prelaminarly shows no acute changes        Plan:  CT Head, Continue IV Fluids, monitor VS.  Nursing Interventions; Continue Telemetry Monitor, Continuous Pulse Oximeter, Neuro checks q2 h, Oxygen,   Will reassess in 1 hour,    Time spent with patient:  _65 minutes  Event discussed with: Nick Inman MD

## 2023-11-23 NOTE — PROGRESS NOTE ADULT - ASSESSMENT
patient is a 42 y/O RIGHT handed man who presented to [facility] ED on 11/16/2023, as a CODE STROKE, with c/o difficulty walking, LLE weakness, speech changes. PMH significant for: stroke (7/2023 - presenting with dysarthria - left inferior adrienne, b/l parieto-occipital juxtacortical WM w/mild residual dysarthria; 8 years ago - treated with TPA at [facility] - p/w L hemiplegia, facial droop and dysarthria), T2DM, HTN, former smoker (quit in 7/2023). Symptoms beginning at 17:00, 11/16/2023. Noted at the code to have a L facial, LUE ataxia, b/l LE ataxia, slurred speech. No appreciable drift in any of the extremities. Underwent CT scans with out sign of hemorrhage or jayla LVO. Tenecteplase administered at 19:47, with improvement from NIHSS 4 to 2. Patient previously taking clopidogrel and aspirin after last stroke. Currently only taking aspirin at the instructions of his vascular neurologist.    # CVA   neuro management appreciated   CT and MRI noted, multiple embolic strokes   S/P TPA  ASA and statin  Was on full dose heparin gtt, now on Lovenox 40 Qd PPX dose as patient planned for angio with stenting today 11/22   Neurosurg eval appreciated  TTE w/ EF of 68%, no WM  PT/OT, aspiration precautions   S/P Angio with NSGY  patient is medically optimized for angio - moderate risk candidate. Planned for angio/ stenting w/ NSGY   discussed with wife at the bedside       # HTN   BP parameters as per stroke neurology. If cleared, plan for BP medications for gradual normotension   TTE w/ EF of 68%, no WMA      # DM  sliding scale  A1C 5.5     # HLD    lipid panel  statin     DVT and Gi PPX

## 2023-11-23 NOTE — PROGRESS NOTE ADULT - ASSESSMENT
43y RIGHT handed man who presented to [facility] ED on 11/16/2023, as a CODE STROKE, with c/o difficulty walking, LLE weakness, speech changes. PMH significant for: stroke (7/2023 - presenting with dysarthria - left inferior adrienne, b/l parieto-occipital juxtacortical WM w/mild residual dysarthria; 8 years ago - treated with TPA at [facility] - p/w L hemiplegia, facial droop and dysarthria), T2DM, HTN, former smoker (quit in 7/2023). Symptoms beginning at 17:00, 11/16/2023. Noted at the code to have a L facial, LUE ataxia, b/l LE ataxia, slurred speech. No appreciable drift in any of the extremities. Underwent CT scans with out sign of hemorrhage or jayla LVO. Tenecteplase administered at 19:47, with improvement from NIHSS 4 to 2. Patient previously taking clopidogrel and aspirin after last stroke. Currently only taking aspirin at the instructions of his vascular neurologist.    ED vitals notable for: afebrile, 158/92, HR 79 RR 18, SpO2 97%. Labs notable for: glucose 156. CT imaging disclosed: no hemorrhage, left occipital/right cerebellar acute to subacute infarctions; chronic left basal ganglia, left thalamic and central pontine remote infarctions. There is bilateral atherosclerosis of the carotid system, as well as in the anterior and posterior circulation. The left vertebral artery, basial artery, and posterior cerebral arteries are with progressively reduced flow since last study. Initial exam notable for L facial droop, ataxia in the LUE, bilateral lower limbs, moderate dysarthria, patient unable to stand/walk without holding the transport chair handles.     NIHSS on admission: 4->2 (mild dysarthria, mild L facial - 30 minutes after tenecteplase)  LKN: 17:00, 11/16/2023  pre-MRS: 2 (walks with a cane, otherwise independent)    Impression: L facial weakness, LUE ataxia, dysarthria, gait instability d/t R brain dysfunction d/t acute ischemic stroke

## 2023-11-23 NOTE — PROGRESS NOTE ADULT - SUBJECTIVE AND OBJECTIVE BOX
Subjective: Patient seen and examined. No new events except as noted.   remains in Stroke unit     REVIEW OF SYSTEMS:    CONSTITUTIONAL: + weakness, fevers or chills  EYES/ENT: No visual changes;  No vertigo or throat pain   NECK: No pain or stiffness  RESPIRATORY: No cough, wheezing, hemoptysis; No shortness of breath  CARDIOVASCULAR: No chest pain or palpitations  GASTROINTESTINAL: No abdominal or epigastric pain. No nausea, vomiting, or hematemesis; No diarrhea or constipation. No melena or hematochezia.  GENITOURINARY: No dysuria, frequency or hematuria  NEUROLOGICAL: No numbness or weakness  SKIN: No itching, burning, rashes, or lesions   All other review of systems is negative unless indicated above.    MEDICATIONS:  MEDICATIONS  (STANDING):  aspirin  chewable 81 milliGRAM(s) Oral daily  atorvastatin 80 milliGRAM(s) Oral at bedtime  dextrose 5%. 1000 milliLiter(s) (50 mL/Hr) IV Continuous <Continuous>  dextrose 5%. 1000 milliLiter(s) (100 mL/Hr) IV Continuous <Continuous>  dextrose 50% Injectable 25 Gram(s) IV Push once  dextrose 50% Injectable 25 Gram(s) IV Push once  dextrose 50% Injectable 12.5 Gram(s) IV Push once  enoxaparin Injectable 40 milliGRAM(s) SubCutaneous every 24 hours  escitalopram 10 milliGRAM(s) Oral daily  glucagon  Injectable 1 milliGRAM(s) IntraMuscular once  influenza   Vaccine 0.5 milliLiter(s) IntraMuscular once  insulin lispro (ADMELOG) corrective regimen sliding scale   SubCutaneous Before meals and at bedtime  polyethylene glycol 3350 17 Gram(s) Oral daily  senna 2 Tablet(s) Oral at bedtime  sodium chloride 0.9%. 1000 milliLiter(s) (100 mL/Hr) IV Continuous <Continuous>  ticagrelor 90 milliGRAM(s) Oral every 12 hours  traZODone 50 milliGRAM(s) Oral at bedtime      PHYSICAL EXAM:  T(C): 37.2 (11-23-23 @ 06:00), Max: 37.2 (11-23-23 @ 06:00)  HR: 65 (11-23-23 @ 09:38) (65 - 84)  BP: 127/59 (11-23-23 @ 08:00) (127/59 - 189/87)  RR: 20 (11-23-23 @ 08:00) (19 - 25)  SpO2: 99% (11-23-23 @ 09:38) (95% - 100%)  Wt(kg): --  I&O's Summary    22 Nov 2023 07:01  -  23 Nov 2023 07:00  --------------------------------------------------------  IN: 2200 mL / OUT: 2850 mL / NET: -650 mL          Appearance: NAD  HEENT:  Dry  oral mucosa, PERRL, EOMI	  Lymphatic: No lymphadenopathy  Cardiovascular: Normal S1 S2, No JVD, No murmurs, No edema  Respiratory: Lungs clear to auscultation	  Psychiatry: A & O x 3, Mood & affect appropriate  Gastrointestinal:  Soft, Non-tender, + BS	  Skin: No rashes, No ecchymoses, No cyanosis	  Neurologic: Non-focal  Extremities: Normal range of motion, No clubbing, cyanosis or edema  Vascular: Peripheral pulses palpable 2+ bilaterally  Neurologic Exam:  Mental status:  Awake, Alert; Oriented to: person, place. Knows it is November 2023 but not exact date; Speech: moderate dysarthria; Repetition and naming: intact; Follows simple and complex commands; Attention/concentration: can spell WORLD forward but not backward; Recent and remote memory (including registration and recall): JULIETA; Fund of knowledge: intact    Cranial nerves - R pupil>L, both reactive, VFF on confrontational testing, EOM - inability to bury the sclera of the R eye with rightward gaze, L upper eyelid ptosis, face sensation (V1-V3) intact b/l, facial strength - L facial droop, hearing intact b/l with finger rub test, palate with symmetric elevation, trapezius 5/5 strength b/l, tongue midline on protrusion with full lateral movement    Motor - Normal bulk and tone throughout. No pronator drift.  Strength testing (R/L)  Deltoid:  5/5   Biceps:  5/5   Triceps:  5/5   Wrist Extension:  5/5   Wrist Flexion:  5/5   Interossei:  5/5   :  5/5  Hip Flexion:  5/5   Hip Extension:  5/5   Knee Flexion:  5/5   Knee Extension:  5/5   Dorsiflexion:  5/5   Plantar Flexion:  5/5    Sensation - Light touch intact throughout    DTRs (R/L)  3+ upper/lower  b/l sustained ankle clonus    Coordination - Finger to Nose, Heel to Shin intact b/l. No tremors appreciated.    LABS:    CARDIAC MARKERS:                                13.0   7.39  )-----------( 184      ( 23 Nov 2023 05:42 )             37.5     11-23    140  |  107  |  10  ----------------------------<  98  3.9   |  22  |  1.08    Ca    8.7      23 Nov 2023 05:42      proBNP:   Lipid Profile:   HgA1c:   TSH:             TELEMETRY: 	    ECG:  	  RADIOLOGY:   DIAGNOSTIC TESTING:  [ ] Echocardiogram:  [ ]  Catheterization:  [ ] Stress Test:    OTHER:

## 2023-11-23 NOTE — PROGRESS NOTE ADULT - SUBJECTIVE AND OBJECTIVE BOX
THE PATIENT WAS SEEN AND EXAMINED BY ME WITH THE HOUSESTAFF AND STROKE TEAM DURING MORNING ROUNDS.     HPI: Patient ROB OSHEA is a 43y (1980) RIGHT handed man who presented to [facility] ED on 11/16/2023, as a CODE STROKE, with c/o difficulty walking, LLE weakness, speech changes. PMH significant for: stroke (7/2023 - presenting with dysarthria - left inferior darienne, b/l parieto-occipital juxtacortical WM w/mild residual dysarthria; 8 years ago - treated with TPA at [facility], [place] - p/w L hemiplegia, facial droop and dysarthria), T2DM, HTN, former smoker (quit in 7/2023)  Presented as a CODE STROKE at 19:12. Per wife, patient having acute onset slurred speech (worse from baseline) and inability to stand/walk. This began at 17:00, 11/16/2023. On exam: patient has a L facial droop, ataxia in LUE (dysmetria with FTN), and struggles to perform HTS b/l. Patient also with pronounced slurred speech (moderate but is intelligible). Underwent NCCTH, which revealed no hemorrhage. After risk/benefit discussion with patient and wife - tenecteplase was administered at 11/16/2023, 19:47. 30 minutes after administration, NIHSS 4->2 (mild left facial but improved and mild dysarthria). Patient reports voice is much better. Patient currently takes ASA 81mg daily every day. Was originally on DAPT (ASA + clopidogrel) after his last stroke in 7/2023, but saw Dr. Guzman in 11/2/2023, who recommended patient switch over to aspirin monotherapy. Patient reports that he takes all of his medications every day including his aspirin. He still has slurred speech but it was "90% better." Walks with a cane, but is otherwise independent, is able to climb stairs with the assistance of his cane. Went to work last week. On Sunday (11/12/2023) - patient was feeling ill. Constipated, vomited. Had some room spinning dizziness. Has been sleeping a lot and has been diaphoretic. Symptoms of slurred speech and gait imbalance came on suddenly 11/16/2023. Wife says it was a "night and day difference," which triggered her to rush patient to the ED. Patient reports that he quit smoking after his stroke in July 2023.  Patient was using a nicotine patch for short while, but he is no longer smoking or using a nicotine patch.     SUBJECTIVE: Overnight pt with complaints of worsening dysarthria, repeat CT Head stable.    acetaminophen     Tablet .. 650 milliGRAM(s) Oral every 6 hours PRN  aspirin  chewable 81 milliGRAM(s) Oral daily  atorvastatin 80 milliGRAM(s) Oral at bedtime  dextrose 5%. 1000 milliLiter(s) IV Continuous <Continuous>  dextrose 5%. 1000 milliLiter(s) IV Continuous <Continuous>  dextrose 50% Injectable 25 Gram(s) IV Push once  dextrose 50% Injectable 25 Gram(s) IV Push once  dextrose 50% Injectable 12.5 Gram(s) IV Push once  dextrose Oral Gel 15 Gram(s) Oral once PRN  enoxaparin Injectable 40 milliGRAM(s) SubCutaneous every 24 hours  escitalopram 10 milliGRAM(s) Oral daily  glucagon  Injectable 1 milliGRAM(s) IntraMuscular once  influenza   Vaccine 0.5 milliLiter(s) IntraMuscular once  insulin lispro (ADMELOG) corrective regimen sliding scale   SubCutaneous Before meals and at bedtime  polyethylene glycol 3350 17 Gram(s) Oral daily  senna 2 Tablet(s) Oral at bedtime  sodium chloride 0.9%. 1000 milliLiter(s) IV Continuous <Continuous>  ticagrelor 90 milliGRAM(s) Oral every 12 hours  traZODone 50 milliGRAM(s) Oral at bedtime    PHYSICAL EXAM:   Vital Signs Last 24 Hrs  T(C): 37.2 (23 Nov 2023 06:00), Max: 37.2 (23 Nov 2023 06:00)  T(F): 98.9 (23 Nov 2023 06:00), Max: 98.9 (23 Nov 2023 06:00)  HR: 74 (23 Nov 2023 08:00) (66 - 84)  BP: 127/59 (23 Nov 2023 08:00) (127/59 - 189/87)  BP(mean): 85 (23 Nov 2023 08:00) (85 - 128)  RR: 20 (23 Nov 2023 08:00) (11 - 25)  SpO2: 99% (23 Nov 2023 08:00) (95% - 100%)    Parameters below as of 23 Nov 2023 08:00  Patient On (Oxygen Delivery Method): BiPAP/CPAP    General: No acute distress  HEENT: EOM intact  Abdomen: Soft, nontender, nondistended   Extremities: No edema    NEUROLOGICAL EXAM:  Mental status: Eyes closed, opens eyes to light touch and voice for a few minutes, drowsiness, awake, alert, oriented x4, fluent speech no neglect, able to follow commands  Cranial Nerves: Subtle left facial droop, dysconjugate gaze, no nystagmus, moderate dysarthria.  Motor exam: Normal tone, no drift, RUE 5/5, RLE 5/5, LUE 4/5 with drift, LLE 5/5,  Sensation: Intact to light touch   Coordination/ Gait: subtle left dysmetria, rapid alternating movements mildly impaired L>R.    LABS:                        13.0   7.39  )-----------( 184      ( 23 Nov 2023 05:42 )             37.5    11-23    140  |  107  |  10  ----------------------------<  98  3.9   |  22  |  1.08    Ca    8.7      23 Nov 2023 05:42    PTT - ( 22 Nov 2023 05:35 )  PTT:32.3 sec    IMAGING: Reviewed by me.     CTH 11/19/23: Evolving acute infarcts in the posterior circulation are unchanged from 11/17/2023.  No new intracranial findings.     MRI, MRA head/neck Brain 1/17/23: Small vessel white matter ischemic changes. Acute infarcts in the posterior circulation involving the left occipital lobe, left cerebellum and bilateral brachium pontis, right adrienne. Chronic appearing infarct adrienne. Extremely poor flow identified in the basilar artery due to   what appears to be severe distal vertebral artery stenosis. Flow in the posterior communicating arteries and posterior cerebral arteries is too small to measure.    CTH 11/17: Stable posterior circulation infarctions.    CTH/ CTA neck 11/16: No intracranial hemorrhage is appreciated. No intracranial mass lesion is appreciated.Left occipital and right cerebellar acute to subacute infarctions. Left basal ganglia, left thalamic and central pontine remote infarctions    CTA head/Neck CTP 11/16:    1. Right carotid system:  Atherosclerotic plaque without hemodynamically significant stenosis.  2. Left carotid system:  Atherosclerotic plaque without hemodynamically significant stenosis.  3. Vertebral circulation:  Patent.  4. Anterior intracranial circulation:  Intracranial atherosclerosis cavernous and clinoid segments of the internal carotid arteries, mild to moderate, and middle cerebral arteries, moderate on the right and mild-to-moderate on the left.  5.  Posterior intracranial circulation:  Severe atherosclerotic disease has progressed since July 2023 with progressive diminished flow of the left vertebral artery, basilar artery and posterior cerebral arteries. There is likely reversal flow within the basilar artery to perfuse the most distal left vertebral artery and PICA arteries. Occlusion of the right vertebral artery was present in July 2023  6. Brain perfusion:  No acute infarction of the brain is convincingly demonstrated.  However, there is extensive ischemia within the posterior circulation.    CT Head No Cont (11.21.23)  The left ventricles have a normal configuration  Old lacunar infarct involving the anterior left corona radiata region is   again seen  Abnormal areas of low-attenuation involving the left cerebellum left   occipital lobe and right brachium pontis region are again seen. These   findings are compatible with areas of acute infarct. No hemorrhagic   transformation is seen. No significant shift or herniation is seen.  The paranasal sinuses mastoid and middle ear regions appear clear.    Impression: Acute infarct again seen as described above. THE PATIENT WAS SEEN AND EXAMINED BY ME WITH THE HOUSESTAFF AND STROKE TEAM DURING MORNING ROUNDS.     HPI: Patient ROB OSHEA is a 43y (1980) RIGHT handed man who presented to [facility] ED on 11/16/2023, as a CODE STROKE, with c/o difficulty walking, LLE weakness, speech changes. PMH significant for: stroke (7/2023 - presenting with dysarthria - left inferior adrienne, b/l parieto-occipital juxtacortical WM w/mild residual dysarthria; 8 years ago - treated with TPA at [facility] - p/w L hemiplegia, facial droop and dysarthria), T2DM, HTN, former smoker (quit in 7/2023) Presented as a CODE STROKE at 19:12. Per wife, patient having acute onset slurred speech (worse from baseline) and inability to stand/walk. This began at 17:00, 11/16/2023. On exam: patient has a L facial droop, ataxia in LUE (dysmetria with FTN), and struggles to perform HTS b/l. Patient also with pronounced slurred speech (moderate but is intelligible). Underwent NCCTH, which revealed no hemorrhage. After risk/benefit discussion with patient and wife - tenecteplase was administered at 11/16/2023, 19:47. 30 minutes after administration, NIHSS 4->2 (mild left facial but improved and mild dysarthria). Patient reports voice is much better. Patient currently takes ASA 81mg daily every day. Was originally on DAPT (ASA + clopidogrel) after his last stroke in 7/2023, but saw Dr. Guzman in 11/2/2023, who recommended patient switch over to aspirin monotherapy. Patient reports that he takes all of his medications every day including his aspirin. He still has slurred speech but it was "90% better." Walks with a cane, but is otherwise independent, is able to climb stairs with the assistance of his cane. Went to work last week. On Sunday (11/12/2023) - patient was feeling ill. Constipated, vomited. Had some room spinning dizziness. Has been sleeping a lot and has been diaphoretic. Symptoms of slurred speech and gait imbalance came on suddenly 11/16/2023. Wife says it was a "night and day difference," which triggered her to rush patient to the ED. Patient reports that he quit smoking after his stroke in July 2023.  Patient was using a nicotine patch for short while, but he is no longer smoking or using a nicotine patch.     SUBJECTIVE: Overnight pt with complaints of worsening dysarthria, repeat CT Head stable.    acetaminophen     Tablet .. 650 milliGRAM(s) Oral every 6 hours PRN  aspirin  chewable 81 milliGRAM(s) Oral daily  atorvastatin 80 milliGRAM(s) Oral at bedtime  dextrose 5%. 1000 milliLiter(s) IV Continuous <Continuous>  dextrose 5%. 1000 milliLiter(s) IV Continuous <Continuous>  dextrose 50% Injectable 25 Gram(s) IV Push once  dextrose 50% Injectable 25 Gram(s) IV Push once  dextrose 50% Injectable 12.5 Gram(s) IV Push once  dextrose Oral Gel 15 Gram(s) Oral once PRN  enoxaparin Injectable 40 milliGRAM(s) SubCutaneous every 24 hours  escitalopram 10 milliGRAM(s) Oral daily  glucagon  Injectable 1 milliGRAM(s) IntraMuscular once  influenza   Vaccine 0.5 milliLiter(s) IntraMuscular once  insulin lispro (ADMELOG) corrective regimen sliding scale   SubCutaneous Before meals and at bedtime  polyethylene glycol 3350 17 Gram(s) Oral daily  senna 2 Tablet(s) Oral at bedtime  sodium chloride 0.9%. 1000 milliLiter(s) IV Continuous <Continuous>  ticagrelor 90 milliGRAM(s) Oral every 12 hours  traZODone 50 milliGRAM(s) Oral at bedtime    PHYSICAL EXAM:   Vital Signs Last 24 Hrs  T(C): 37.2 (23 Nov 2023 06:00), Max: 37.2 (23 Nov 2023 06:00)  T(F): 98.9 (23 Nov 2023 06:00), Max: 98.9 (23 Nov 2023 06:00)  HR: 74 (23 Nov 2023 08:00) (66 - 84)  BP: 127/59 (23 Nov 2023 08:00) (127/59 - 189/87)  BP(mean): 85 (23 Nov 2023 08:00) (85 - 128)  RR: 20 (23 Nov 2023 08:00) (11 - 25)  SpO2: 99% (23 Nov 2023 08:00) (95% - 100%)    Parameters below as of 23 Nov 2023 08:00  Patient On (Oxygen Delivery Method): BiPAP/CPAP    General: No acute distress  HEENT: EOM intact  Abdomen: Soft, nontender, nondistended   Extremities: No edema    NEUROLOGICAL EXAM:  Mental status: Eyes closed, opens eyes to light touch and voice for a few minutes, drowsiness, awake, alert, oriented x 4, fluent speech, no neglect, able to follow commands  Cranial Nerves: Subtle left facial droop, dysconjugate gaze, no nystagmus, moderate dysarthria.  Motor exam: Normal tone, RUE 5/5, RLE 5/5, LUE 4/5 with drift, LLE 5/5  Sensation: Intact to light touch   Coordination/ Gait: left dysmetria, rapid alternating movements mildly impaired L>R.    LABS:                        13.0   7.39  )-----------( 184      ( 23 Nov 2023 05:42 )             37.5    11-23    140  |  107  |  10  ----------------------------<  98  3.9   |  22  |  1.08    Ca    8.7      23 Nov 2023 05:42    PTT - ( 22 Nov 2023 05:35 )  PTT:32.3 sec    IMAGING: Reviewed by me.     CTH 11/19/23: Evolving acute infarcts in the posterior circulation are unchanged from 11/17/2023.  No new intracranial findings.     MRI, MRA head/neck Brain 1/17/23: Small vessel white matter ischemic changes. Acute infarcts in the posterior circulation involving the left occipital lobe, left cerebellum and bilateral brachium pontis, right adrienne. Chronic appearing infarct adrienne. Extremely poor flow identified in the basilar artery due to   what appears to be severe distal vertebral artery stenosis. Flow in the posterior communicating arteries and posterior cerebral arteries is too small to measure.    CTH 11/17: Stable posterior circulation infarctions.    CTH/ CTA neck 11/16: No intracranial hemorrhage is appreciated. No intracranial mass lesion is appreciated.Left occipital and right cerebellar acute to subacute infarctions. Left basal ganglia, left thalamic and central pontine remote infarctions    CTA head/Neck CTP 11/16:    1. Right carotid system:  Atherosclerotic plaque without hemodynamically significant stenosis.  2. Left carotid system:  Atherosclerotic plaque without hemodynamically significant stenosis.  3. Vertebral circulation:  Patent.  4. Anterior intracranial circulation:  Intracranial atherosclerosis cavernous and clinoid segments of the internal carotid arteries, mild to moderate, and middle cerebral arteries, moderate on the right and mild-to-moderate on the left.  5.  Posterior intracranial circulation:  Severe atherosclerotic disease has progressed since July 2023 with progressive diminished flow of the left vertebral artery, basilar artery and posterior cerebral arteries. There is likely reversal flow within the basilar artery to perfuse the most distal left vertebral artery and PICA arteries. Occlusion of the right vertebral artery was present in July 2023  6. Brain perfusion:  No acute infarction of the brain is convincingly demonstrated.  However, there is extensive ischemia within the posterior circulation.    CT Head No Cont (11.21.23)  The left ventricles have a normal configuration  Old lacunar infarct involving the anterior left corona radiata region is   again seen  Abnormal areas of low-attenuation involving the left cerebellum left   occipital lobe and right brachium pontis region are again seen. These   findings are compatible with areas of acute infarct. No hemorrhagic   transformation is seen. No significant shift or herniation is seen.  The paranasal sinuses mastoid and middle ear regions appear clear.    Impression: Acute infarct again seen as described above.

## 2023-11-24 NOTE — PRE-ANESTHESIA EVALUATION ADULT - LAST ECHOCARDIOGRAM
10/2023, LVEF=68%, no wall motion abnormalities or valvular pathology
7/23: normal LV/RV function, no sig valve issues
Opioid Counseling: I discussed with the patient the potential side effects of opioids including but not limited to addiction, altered mental status, and depression. I stressed avoiding alcohol, benzodiazepines, muscle relaxants and sleep aids unless specifically okayed by a physician. The patient verbalized understanding of the proper use and possible adverse effects of opioids. All of the patient's questions and concerns were addressed. They were instructed to flush the remaining pills down the toilet if they did not need them for pain.

## 2023-11-24 NOTE — PROGRESS NOTE ADULT - SUBJECTIVE AND OBJECTIVE BOX
HOSPITAL COURSE: 44 YO M with a PMHx stroke (7/2023 with residual dysarthria, and right gaze) T2DM, HTN, former smoker (quit in 7/2023), on ASA 81mg daily.   - 11/16/2023, with c/o sudden difficulty walking, LLE weakness, speech changes. s/p tenecteplase.  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  - 11/24/23:  recanalization and stenting of the L VA    Admission Scores  GCS:   HH:   MF:   NIHSS:   RASS:    CAM-ICU:   ICH:    24 hour Events:       Allergies    No Known Allergies    Intolerances        REVIEW OF SYSTEMS: [ ] Unable to Assess due to neurologic exam   [ ] All ROS addressed below are non-contributory, except:  Neuro: [ ] Headache [ ] Back pain [ ] Numbness [ ] Weakness [ ] Ataxia [ ] Dizziness [ ] Aphasia [ ] Dysarthria [ ] Visual disturbance  Resp: [ ] Shortness of breath/dyspnea, [ ] Orthopnea [ ] Cough  CV: [ ] Chest pain [ ] Palpitation [ ] Lightheadedness [ ] Syncope  Renal: [ ] Thirst [ ] Edema  GI: [ ] Nausea [ ] Emesis [ ] Abdominal pain [ ] Constipation [ ] Diarrhea  Hem: [ ] Hematemesis [ ] bright red blood per rectum  ID: [ ] Fever [ ] Chills [ ] Dysuria  ENT: [ ] Rhinorrhea      DEVICES:   [ ] Restraints [ ] ET tube [ ] central line [ ] arterial line [ ] willson [ ] NGT/OGT [ ] EVD [ ] LD [ ] WILFREDO/HMV [ ] Trach [ ] PEG [ ] Chest Tube     VITALS:   Vital Signs Last 24 Hrs  T(C): 36.2 (24 Nov 2023 08:00), Max: 37.1 (24 Nov 2023 07:46)  T(F): 97.1 (24 Nov 2023 08:00), Max: 97.1 (24 Nov 2023 08:00)  HR: 67 (24 Nov 2023 08:00) (62 - 79)  BP: 154/70 (24 Nov 2023 08:00) (131/79 - 181/90)  BP(mean): 105 (24 Nov 2023 08:00) (78 - 159)  RR: 19 (24 Nov 2023 08:00) (14 - 25)  SpO2: 96% (24 Nov 2023 08:00) (96% - 100%)    Parameters below as of 24 Nov 2023 08:00  Patient On (Oxygen Delivery Method): room air      CAPILLARY BLOOD GLUCOSE      POCT Blood Glucose.: 96 mg/dL (24 Nov 2023 07:47)  POCT Blood Glucose.: 121 mg/dL (23 Nov 2023 21:14)  POCT Blood Glucose.: 90 mg/dL (23 Nov 2023 16:34)    I&O's Summary    23 Nov 2023 07:01  -  24 Nov 2023 07:00  --------------------------------------------------------  IN: 2275 mL / OUT: 2300 mL / NET: -25 mL    24 Nov 2023 07:01  -  24 Nov 2023 12:53  --------------------------------------------------------  IN: 75 mL / OUT: 0 mL / NET: 75 mL        Respiratory:        LABS:                        12.4   6.56  )-----------( 181      ( 24 Nov 2023 06:21 )             35.7     11-24    143  |  108  |  11  ----------------------------<  86  3.8   |  25  |  1.18             MEDICATION LEVELS:     IVF FLUIDS/MEDICATIONS:   MEDICATIONS  (STANDING):  atorvastatin 80 milliGRAM(s) Oral at bedtime  dextrose 5%. 1000 milliLiter(s) (50 mL/Hr) IV Continuous <Continuous>  dextrose 5%. 1000 milliLiter(s) (100 mL/Hr) IV Continuous <Continuous>  dextrose 50% Injectable 25 Gram(s) IV Push once  dextrose 50% Injectable 25 Gram(s) IV Push once  dextrose 50% Injectable 12.5 Gram(s) IV Push once  enoxaparin Injectable 40 milliGRAM(s) SubCutaneous every 24 hours  eptifibatide Infusion 75 mg/100 mL 1 MICROgram(s)/kG/Min (8.94 mL/Hr) IV Continuous <Continuous>  escitalopram 10 milliGRAM(s) Oral daily  glucagon  Injectable 1 milliGRAM(s) IntraMuscular once  influenza   Vaccine 0.5 milliLiter(s) IntraMuscular once  insulin lispro (ADMELOG) corrective regimen sliding scale   SubCutaneous Before meals and at bedtime  polyethylene glycol 3350 17 Gram(s) Oral daily  senna 2 Tablet(s) Oral at bedtime  sodium chloride 0.9%. 1000 milliLiter(s) (100 mL/Hr) IV Continuous <Continuous>  ticagrelor 90 milliGRAM(s) Oral every 12 hours  traZODone 50 milliGRAM(s) Oral at bedtime    MEDICATIONS  (PRN):  acetaminophen     Tablet .. 650 milliGRAM(s) Oral every 6 hours PRN Moderate Pain (4 - 6)  dextrose Oral Gel 15 Gram(s) Oral once PRN Blood Glucose LESS THAN 70 milliGRAM(s)/deciliter        IMAGING:      EXAMINATION:  PHYSICAL EXAM:    Constitutional: No Acute Distress     Neurological: Awake, alert oriented to person, place and time, Following Commands, PERRL, EOMI, No Gaze Preference, Face Symmetrical, Speech Fluent, No dysmetria, No ataxia, No nystagmus     Motor exam:          Upper extremity                         Delt     Bicep     Tricep    HG                                                 R         5/5        5/5        5/5       5/5                                               L          5/5        5/5        5/5       5/5          Lower extremity                        HF         KF        KE       DF         PF                                                  R        5/5        5/5        5/5       5/5         5/5                                               L         5/5        5/5       5/5       5/5          5/5                                                 Sensation: [ ] intact to light touch  [ ] decreased:     Reflexes: Deep Tendon Reflexes Intact     Pulmonary: Clear to Auscultation, No rales, No rhonchi, No wheezes     Cardiovascular: S1, S2, Regular rate and rhythm     Gastrointestinal: Soft, Non-tender, Non-distended     Extremities: No calf tenderness     Incision:    HOSPITAL COURSE: 44 YO M with a PMHx stroke (7/2023 with residual dysarthria, and right gaze) T2DM, HTN, former smoker (quit in 7/2023), on ASA 81mg daily.   - 11/16/2023, with c/o sudden difficulty walking, LLE weakness, speech changes. s/p tenecteplase.  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  - 11/24/23:  recanalization and stenting of the L VA    Admission Scores  GCS:   HH:   MF:   NIHSS:   RASS:    CAM-ICU:   ICH:    24 hour Events:       Allergies    No Known Allergies    Intolerances        REVIEW OF SYSTEMS: [ ] Unable to Assess due to neurologic exam   [ ] All ROS addressed below are non-contributory, except:  Neuro: [ ] Headache [ ] Back pain [ ] Numbness [ ] Weakness [ ] Ataxia [ ] Dizziness [ ] Aphasia [ ] Dysarthria [ ] Visual disturbance  Resp: [ ] Shortness of breath/dyspnea, [ ] Orthopnea [ ] Cough  CV: [ ] Chest pain [ ] Palpitation [ ] Lightheadedness [ ] Syncope  Renal: [ ] Thirst [ ] Edema  GI: [ ] Nausea [ ] Emesis [ ] Abdominal pain [ ] Constipation [ ] Diarrhea  Hem: [ ] Hematemesis [ ] bright red blood per rectum  ID: [ ] Fever [ ] Chills [ ] Dysuria  ENT: [ ] Rhinorrhea      DEVICES:   [ ] Restraints [ ] ET tube [ ] central line [ ] arterial line [ ] willson [ ] NGT/OGT [ ] EVD [ ] LD [ ] WILFREDO/HMV [ ] Trach [ ] PEG [ ] Chest Tube     VITALS:   Vital Signs Last 24 Hrs  T(C): 36.2 (24 Nov 2023 08:00), Max: 37.1 (24 Nov 2023 07:46)  T(F): 97.1 (24 Nov 2023 08:00), Max: 97.1 (24 Nov 2023 08:00)  HR: 67 (24 Nov 2023 08:00) (62 - 79)  BP: 154/70 (24 Nov 2023 08:00) (131/79 - 181/90)  BP(mean): 105 (24 Nov 2023 08:00) (78 - 159)  RR: 19 (24 Nov 2023 08:00) (14 - 25)  SpO2: 96% (24 Nov 2023 08:00) (96% - 100%)    Parameters below as of 24 Nov 2023 08:00  Patient On (Oxygen Delivery Method): room air      CAPILLARY BLOOD GLUCOSE      POCT Blood Glucose.: 96 mg/dL (24 Nov 2023 07:47)  POCT Blood Glucose.: 121 mg/dL (23 Nov 2023 21:14)  POCT Blood Glucose.: 90 mg/dL (23 Nov 2023 16:34)    I&O's Summary    23 Nov 2023 07:01  -  24 Nov 2023 07:00  --------------------------------------------------------  IN: 2275 mL / OUT: 2300 mL / NET: -25 mL    24 Nov 2023 07:01  -  24 Nov 2023 12:53  --------------------------------------------------------  IN: 75 mL / OUT: 0 mL / NET: 75 mL        Respiratory:        LABS:                        12.4   6.56  )-----------( 181      ( 24 Nov 2023 06:21 )             35.7     11-24    143  |  108  |  11  ----------------------------<  86  3.8   |  25  |  1.18             MEDICATION LEVELS:     IVF FLUIDS/MEDICATIONS:   MEDICATIONS  (STANDING):  atorvastatin 80 milliGRAM(s) Oral at bedtime  dextrose 5%. 1000 milliLiter(s) (50 mL/Hr) IV Continuous <Continuous>  dextrose 5%. 1000 milliLiter(s) (100 mL/Hr) IV Continuous <Continuous>  dextrose 50% Injectable 25 Gram(s) IV Push once  dextrose 50% Injectable 25 Gram(s) IV Push once  dextrose 50% Injectable 12.5 Gram(s) IV Push once  enoxaparin Injectable 40 milliGRAM(s) SubCutaneous every 24 hours  eptifibatide Infusion 75 mg/100 mL 1 MICROgram(s)/kG/Min (8.94 mL/Hr) IV Continuous <Continuous>  escitalopram 10 milliGRAM(s) Oral daily  glucagon  Injectable 1 milliGRAM(s) IntraMuscular once  influenza   Vaccine 0.5 milliLiter(s) IntraMuscular once  insulin lispro (ADMELOG) corrective regimen sliding scale   SubCutaneous Before meals and at bedtime  polyethylene glycol 3350 17 Gram(s) Oral daily  senna 2 Tablet(s) Oral at bedtime  sodium chloride 0.9%. 1000 milliLiter(s) (100 mL/Hr) IV Continuous <Continuous>  ticagrelor 90 milliGRAM(s) Oral every 12 hours  traZODone 50 milliGRAM(s) Oral at bedtime    MEDICATIONS  (PRN):  acetaminophen     Tablet .. 650 milliGRAM(s) Oral every 6 hours PRN Moderate Pain (4 - 6)  dextrose Oral Gel 15 Gram(s) Oral once PRN Blood Glucose LESS THAN 70 milliGRAM(s)/deciliter        IMAGING:      EXAMINATION:  PHYSICAL EXAM:    Constitutional: No Acute Distress     Neurological: Awake, alert oriented to person, place and time, Following Commands, PERRL, EOMI, Right Gaze Preference, Left upper extremity pronator drift, LLE AG, 4/5 dorsiflexion and 4/5 plantar flexion, RLE dorsiflexion 5/5 and plantar flexion 5/5                                                 Sensation: [ ] intact to light touch  [ ] decreased:     Reflexes: Deep Tendon Reflexes Intact     Pulmonary: Clear to Auscultation, No rales, No rhonchi, No wheezes     Cardiovascular: S1, S2, Regular rate and rhythm     Gastrointestinal: Soft, Non-tender, Non-distended     Extremities: No calf tenderness     Incision: c.d.i.   HOSPITAL COURSE: 44 YO M with a PMHx stroke (7/2023 with residual dysarthria, and right gaze) T2DM, HTN, former smoker (quit in 7/2023), on ASA 81mg daily.   - 11/16/2023, with c/o sudden difficulty walking, LLE weakness, speech changes. s/p tenecteplase.  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  - 11/24/23:  recanalization and stenting of the L VA    Admission Scores  GCS:   HH:   MF:   NIHSS:   RASS:    CAM-ICU:   ICH:    24 hour Events:       Allergies    No Known Allergies    Intolerances      REVIEW OF SYSTEMS: [ ] Unable to Assess due to neurologic exam   [x ] All ROS addressed below are non-contributory, except:  Neuro: [ ] Headache [ ] Back pain [ ] Numbness [ x] Weakness [ ] Ataxia [ ] Dizziness [ ] Aphasia [x ] Dysarthria [ ] Visual disturbance  Resp: [ ] Shortness of breath/dyspnea, [ ] Orthopnea [ ] Cough  CV: [ ] Chest pain [ ] Palpitation [ ] Lightheadedness [ ] Syncope  Renal: [ ] Thirst [ ] Edema  GI: [ ] Nausea [ ] Emesis [ ] Abdominal pain [ ] Constipation [ ] Diarrhea  Hem: [ ] Hematemesis [ ] bright red blood per rectum  ID: [ ] Fever [ ] Chills [ ] Dysuria  ENT: [ ] Rhinorrhea      DEVICES:   [ ] Restraints [ ] ET tube [ ] central line [ x] arterial line [ ] willson [ ] NGT/OGT [ ] EVD [ ] LD [ ] WILFREDO/HMV [ ] Trach [ ] PEG [ ] Chest Tube     VITALS:   Vital Signs Last 24 Hrs  T(C): 36.2 (24 Nov 2023 08:00), Max: 37.1 (24 Nov 2023 07:46)  T(F): 97.1 (24 Nov 2023 08:00), Max: 97.1 (24 Nov 2023 08:00)  HR: 67 (24 Nov 2023 08:00) (62 - 79)  BP: 154/70 (24 Nov 2023 08:00) (131/79 - 181/90)  BP(mean): 105 (24 Nov 2023 08:00) (78 - 159)  RR: 19 (24 Nov 2023 08:00) (14 - 25)  SpO2: 96% (24 Nov 2023 08:00) (96% - 100%)    Parameters below as of 24 Nov 2023 08:00  Patient On (Oxygen Delivery Method): room air      CAPILLARY BLOOD GLUCOSE      POCT Blood Glucose.: 96 mg/dL (24 Nov 2023 07:47)  POCT Blood Glucose.: 121 mg/dL (23 Nov 2023 21:14)  POCT Blood Glucose.: 90 mg/dL (23 Nov 2023 16:34)    I&O's Summary    23 Nov 2023 07:01  -  24 Nov 2023 07:00  --------------------------------------------------------  IN: 2275 mL / OUT: 2300 mL / NET: -25 mL    24 Nov 2023 07:01  -  24 Nov 2023 12:53  --------------------------------------------------------  IN: 75 mL / OUT: 0 mL / NET: 75 mL        Respiratory:        LABS:                        12.4   6.56  )-----------( 181      ( 24 Nov 2023 06:21 )             35.7     11-24    143  |  108  |  11  ----------------------------<  86  3.8   |  25  |  1.18             MEDICATION LEVELS:     IVF FLUIDS/MEDICATIONS:   MEDICATIONS  (STANDING):  atorvastatin 80 milliGRAM(s) Oral at bedtime  dextrose 5%. 1000 milliLiter(s) (50 mL/Hr) IV Continuous <Continuous>  dextrose 5%. 1000 milliLiter(s) (100 mL/Hr) IV Continuous <Continuous>  dextrose 50% Injectable 25 Gram(s) IV Push once  dextrose 50% Injectable 25 Gram(s) IV Push once  dextrose 50% Injectable 12.5 Gram(s) IV Push once  enoxaparin Injectable 40 milliGRAM(s) SubCutaneous every 24 hours  eptifibatide Infusion 75 mg/100 mL 1 MICROgram(s)/kG/Min (8.94 mL/Hr) IV Continuous <Continuous>  escitalopram 10 milliGRAM(s) Oral daily  glucagon  Injectable 1 milliGRAM(s) IntraMuscular once  influenza   Vaccine 0.5 milliLiter(s) IntraMuscular once  insulin lispro (ADMELOG) corrective regimen sliding scale   SubCutaneous Before meals and at bedtime  polyethylene glycol 3350 17 Gram(s) Oral daily  senna 2 Tablet(s) Oral at bedtime  sodium chloride 0.9%. 1000 milliLiter(s) (100 mL/Hr) IV Continuous <Continuous>  ticagrelor 90 milliGRAM(s) Oral every 12 hours  traZODone 50 milliGRAM(s) Oral at bedtime    MEDICATIONS  (PRN):  acetaminophen     Tablet .. 650 milliGRAM(s) Oral every 6 hours PRN Moderate Pain (4 - 6)  dextrose Oral Gel 15 Gram(s) Oral once PRN Blood Glucose LESS THAN 70 milliGRAM(s)/deciliter        IMAGING:      EXAMINATION:  PHYSICAL EXAM:    Constitutional: No Acute Distress     Neurological: Awake, alert oriented to person, place and time, Following Commands, PERRL, EOMI, Right Gaze Preference, Left upper extremity pronator drift, LLE AG, 4/5 dorsiflexion and 4/5 plantar flexion, RLE dorsiflexion 5/5 and plantar flexion 5/5                                                 Sensation: [ ] intact to light touch  [ ] decreased:     Reflexes: Deep Tendon Reflexes Intact     Pulmonary: Clear to Auscultation, No rales, No rhonchi, No wheezes     Cardiovascular: S1, S2, Regular rate and rhythm     Gastrointestinal: Soft, Non-tender, Non-distended     Extremities: No calf tenderness     Incision: c.d.i.

## 2023-11-24 NOTE — PROGRESS NOTE ADULT - ASSESSMENT
ASSESSMENT/PLAN: s/p LVA recanalization and stent     NEURO:  Neurocheck Q1, Vitals Q1  CTH at 19:00  Integrilin ggt  ASA+ Brilinta   Activity: [] OOB as tolerated [x] Bedrest [] PT [] OT [] PMNR    PULM:  RA  sat > 92%  Incentive Spirometry     CV:  SBP goal:     RENAL:  Fluids: NS at 75 ccs/hour    GI:  Diet: NPO, pending dysphagia screen  GI prophylaxis [] not indicated [] PPI [] other:  Bowel regimen [] colace [x] senna [] other: miralax    ENDO:   Goal euglycemia (-180)    HEME/ONC:  VTE prophylaxis: [x] SCDs [] chemoprophylaxis [] high risk of DVT/PE on admission due to:    ID:  Afebrile   monitor for fevers    MISC:    SOCIAL/FAMILY:  [] updated at bedside [] family meeting    CODE STATUS:  [x] Full Code [] DNR [] DNI [] Palliative/Comfort Care    DISPOSITION:  [x] ICU [] Stroke Unit [] Floor [] EMU [] RCU [] PCU    [] Patient is at high risk of neurologic deterioration/death due to:     Time seen:  Time spent: ___ [] critical care minutes ASSESSMENT/PLAN: s/p LVA recanalization and stent     NEURO:  Neurocheck Q1, Vitals Q1  CTH at 19:00  Integrilin ggt  ASA+ Brilinta   Activity: [] OOB as tolerated [x] Bedrest [] PT [] OT [] PMNR    PULM:  RA  sat > 92%  Incentive Spirometry     CV:  SBP goal:     RENAL:  Fluids: NS at 75 ccs/hour    GI:  Diet: NPO, pending dysphagia screen  GI prophylaxis [] not indicated [] PPI [] other:  Bowel regimen [] colace [x] senna [] other: miralax    ENDO:   Goal euglycemia (-180)    HEME/ONC:  VTE prophylaxis: [x] SCDs [] chemoprophylaxis [] high risk of DVT/PE on admission due to:    ID:  Afebrile   monitor for fevers    MISC:    SOCIAL/FAMILY:  [] updated at bedside [] family meeting    CODE STATUS:  [x] Full Code [] DNR [] DNI [] Palliative/Comfort Care    DISPOSITION:  [x] ICU [] Stroke Unit [] Floor [] EMU [] RCU [] PCU    [x] Patient is at high risk of neurologic deterioration/death due to: post op hemorrhage, acute stroke

## 2023-11-24 NOTE — PROGRESS NOTE ADULT - SUBJECTIVE AND OBJECTIVE BOX
Name of Patient : ROB OSHEA  MRN: [id number]  Date of visit: 11-24-23 @ 15:01      Subjective: Patient seen and examined. No new events except as noted.   Patient seen S/P Angio with stent placement today in NSICU.   Lying down. States that he feels tired. Denies any pain or discomfort.     REVIEW OF SYSTEMS:  Limited ROS      MEDICATIONS:  MEDICATIONS  (STANDING):  amLODIPine   Tablet 10 milliGRAM(s) Oral daily  atorvastatin 80 milliGRAM(s) Oral at bedtime  carvedilol 25 milliGRAM(s) Oral every 12 hours  chlorhexidine 4% Liquid 1 Application(s) Topical daily  dextrose 5%. 1000 milliLiter(s) (50 mL/Hr) IV Continuous <Continuous>  dextrose 5%. 1000 milliLiter(s) (100 mL/Hr) IV Continuous <Continuous>  dextrose 50% Injectable 25 Gram(s) IV Push once  dextrose 50% Injectable 12.5 Gram(s) IV Push once  dextrose 50% Injectable 25 Gram(s) IV Push once  enoxaparin Injectable 40 milliGRAM(s) SubCutaneous every 24 hours  eptifibatide Infusion 75 mg/100 mL 1 MICROgram(s)/kG/Min (8.94 mL/Hr) IV Continuous <Continuous>  escitalopram 10 milliGRAM(s) Oral daily  glucagon  Injectable 1 milliGRAM(s) IntraMuscular once  influenza   Vaccine 0.5 milliLiter(s) IntraMuscular once  insulin lispro (ADMELOG) corrective regimen sliding scale   SubCutaneous Before meals and at bedtime  lisinopril 40 milliGRAM(s) Oral daily  niCARdipine Infusion 5 mG/Hr (25 mL/Hr) IV Continuous <Continuous>  polyethylene glycol 3350 17 Gram(s) Oral daily  senna 2 Tablet(s) Oral at bedtime  sodium chloride 0.9%. 1000 milliLiter(s) (100 mL/Hr) IV Continuous <Continuous>  ticagrelor 90 milliGRAM(s) Oral every 12 hours  traZODone 50 milliGRAM(s) Oral at bedtime      PHYSICAL EXAM:  T(C): 36.5 (11-24-23 @ 12:45), Max: 37.1 (11-24-23 @ 07:46)  HR: 76 (11-24-23 @ 14:30) (62 - 79)  BP: 154/70 (11-24-23 @ 08:00) (133/79 - 181/90)  RR: 18 (11-24-23 @ 14:30) (14 - 25)  SpO2: 98% (11-24-23 @ 14:30) (96% - 100%)  Wt(kg): --  I&O's Summary    23 Nov 2023 07:01  -  24 Nov 2023 07:00  --------------------------------------------------------  IN: 2275 mL / OUT: 2300 mL / NET: -25 mL    24 Nov 2023 07:01  -  24 Nov 2023 15:01  --------------------------------------------------------  IN: 327 mL / OUT: 210 mL / NET: 117 mL        Weight (kg): 111.7 (11-24 @ 07:46)      Appearance: Awake, weak appearing male, lying down in bed 	  HEENT: Eyes are open   Lymphatic: No lymphadenopathy   Cardiovascular: Normal    Respiratory: normal effort , clear  Gastrointestinal:  Soft, Non-tender  Skin: No rashes,  warm to touch  Psychiatry: Calm   Musculoskeletal: No edema          11-23-23 @ 07:01  -  11-24-23 @ 07:00  --------------------------------------------------------  IN: 2275 mL / OUT: 2300 mL / NET: -25 mL    11-24-23 @ 07:01  -  11-24-23 @ 15:01  --------------------------------------------------------  IN: 327 mL / OUT: 210 mL / NET: 117 mL                            12.4   6.56  )-----------( 181      ( 24 Nov 2023 06:21 )             35.7               11-24    143  |  108  |  11  ----------------------------<  86  3.8   |  25  |  1.18    Ca    8.7      24 Nov 2023 06:21      PT/INR - ( 23 Nov 2023 17:43 )   PT: 12.5 sec;   INR: 1.14 ratio         PTT - ( 23 Nov 2023 17:43 )  PTT:33.2 sec                   Urinalysis Basic - ( 24 Nov 2023 06:21 )    Color: x / Appearance: x / SG: x / pH: x  Gluc: 86 mg/dL / Ketone: x  / Bili: x / Urobili: x   Blood: x / Protein: x / Nitrite: x   Leuk Esterase: x / RBC: x / WBC x   Sq Epi: x / Non Sq Epi: x / Bacteria: x

## 2023-11-24 NOTE — PROGRESS NOTE ADULT - SUBJECTIVE AND OBJECTIVE BOX
THE PATIENT WAS SEEN AND EXAMINED BY ME WITH THE HOUSESTAFF AND STROKE TEAM DURING MORNING ROUNDS.     HPI: Patient ROB OSHEA is a 43y (1980) RIGHT handed man who presented to [facility] ED on 11/16/2023, as a CODE STROKE, with c/o difficulty walking, LLE weakness, speech changes. PMH significant for: stroke (7/2023 - presenting with dysarthria - left inferior adrienne, b/l parieto-occipital juxtacortical WM w/mild residual dysarthria; 8 years ago - treated with TPA at [facility] - p/w L hemiplegia, facial droop and dysarthria), T2DM, HTN, former smoker (quit in 7/2023) Presented as a CODE STROKE at 19:12. Per wife, patient having acute onset slurred speech (worse from baseline) and inability to stand/walk. This began at 17:00, 11/16/2023. On exam: patient has a L facial droop, ataxia in LUE (dysmetria with FTN), and struggles to perform HTS b/l. Patient also with pronounced slurred speech (moderate but is intelligible). Underwent NCCTH, which revealed no hemorrhage. After risk/benefit discussion with patient and wife - tenecteplase was administered at 11/16/2023, 19:47. 30 minutes after administration, NIHSS 4->2 (mild left facial but improved and mild dysarthria). Patient reports voice is much better. Patient currently takes ASA 81mg daily every day. Was originally on DAPT (ASA + clopidogrel) after his last stroke in 7/2023, but saw Dr. Guzman in 11/2/2023, who recommended patient switch over to aspirin monotherapy. Patient reports that he takes all of his medications every day including his aspirin. He still has slurred speech but it was "90% better." Walks with a cane, but is otherwise independent, is able to climb stairs with the assistance of his cane. Went to work last week. On Sunday (11/12/2023) - patient was feeling ill. Constipated, vomited. Had some room spinning dizziness. Has been sleeping a lot and has been diaphoretic. Symptoms of slurred speech and gait imbalance came on suddenly 11/16/2023. Wife says it was a "night and day difference," which triggered her to rush patient to the ED. Patient reports that he quit smoking after his stroke in July 2023.  Patient was using a nicotine patch for short while, but he is no longer smoking or using a nicotine patch.     SUBJECTIVE: No events overnight.  No new neurologic complaints.      acetaminophen     Tablet .. 650 milliGRAM(s) Oral every 6 hours PRN  aspirin  chewable 81 milliGRAM(s) Oral daily  atorvastatin 80 milliGRAM(s) Oral at bedtime  dextrose 5%. 1000 milliLiter(s) IV Continuous <Continuous>  dextrose 5%. 1000 milliLiter(s) IV Continuous <Continuous>  dextrose 50% Injectable 25 Gram(s) IV Push once  dextrose 50% Injectable 25 Gram(s) IV Push once  dextrose 50% Injectable 12.5 Gram(s) IV Push once  dextrose Oral Gel 15 Gram(s) Oral once PRN  enoxaparin Injectable 40 milliGRAM(s) SubCutaneous every 24 hours  escitalopram 10 milliGRAM(s) Oral daily  glucagon  Injectable 1 milliGRAM(s) IntraMuscular once  influenza   Vaccine 0.5 milliLiter(s) IntraMuscular once  insulin lispro (ADMELOG) corrective regimen sliding scale   SubCutaneous Before meals and at bedtime  polyethylene glycol 3350 17 Gram(s) Oral daily  senna 2 Tablet(s) Oral at bedtime  sodium chloride 0.9%. 1000 milliLiter(s) IV Continuous <Continuous>  ticagrelor 90 milliGRAM(s) Oral every 12 hours  traZODone 50 milliGRAM(s) Oral at bedtime    PHYSICAL EXAM:   Vital Signs Last 24 Hrs  T(C): 36.2 (24 Nov 2023 08:00), Max: 37.1 (24 Nov 2023 07:46)  T(F): 97.1 (24 Nov 2023 08:00), Max: 97.1 (24 Nov 2023 08:00)  HR: 67 (24 Nov 2023 08:00) (62 - 79)  BP: 154/70 (24 Nov 2023 08:00) (119/67 - 181/90)  BP(mean): 105 (24 Nov 2023 08:00) (78 - 159)  RR: 19 (24 Nov 2023 08:00) (14 - 25)  SpO2: 96% (24 Nov 2023 08:00) (95% - 100%)    Parameters below as of 24 Nov 2023 08:00  Patient On (Oxygen Delivery Method): room air    General: No acute distress  HEENT: EOM intact  Abdomen: Soft, nontender, nondistended   Extremities: No edema    NEUROLOGICAL EXAM:  Mental status: Eyes closed, opens eyes to light touch and voice for a few minutes, drowsiness, awake, alert, oriented x 4, fluent speech, no neglect, able to follow commands  Cranial Nerves: Subtle left facial droop, dysconjugate gaze, no nystagmus, moderate dysarthria.  Motor exam: Normal tone, RUE 5/5, RLE 5/5, LUE 4/5 with drift, LLE 5/5  Sensation: Intact to light touch   Coordination/ Gait: left dysmetria, rapid alternating movements mildly impaired L>R.    LABS:                        12.4   6.56  )-----------( 181      ( 24 Nov 2023 06:21 )             35.7    11-24    143  |  108  |  11  ----------------------------<  86  3.8   |  25  |  1.18    Ca    8.7      24 Nov 2023 06:21    PT/INR - ( 23 Nov 2023 17:43 )   PT: 12.5 sec;   INR: 1.14 ratio      PTT - ( 23 Nov 2023 17:43 )  PTT:33.2 sec    IMAGING: Reviewed by me.     CTH 11/19/23: Evolving acute infarcts in the posterior circulation are unchanged from 11/17/2023.  No new intracranial findings.     MRI, MRA head/neck Brain 1/17/23: Small vessel white matter ischemic changes. Acute infarcts in the posterior circulation involving the left occipital lobe, left cerebellum and bilateral brachium pontis, right adrienne. Chronic appearing infarct adrienne. Extremely poor flow identified in the basilar artery due to   what appears to be severe distal vertebral artery stenosis. Flow in the posterior communicating arteries and posterior cerebral arteries is too small to measure.    CTH 11/17: Stable posterior circulation infarctions.    CTH/ CTA neck 11/16: No intracranial hemorrhage is appreciated. No intracranial mass lesion is appreciated.Left occipital and right cerebellar acute to subacute infarctions. Left basal ganglia, left thalamic and central pontine remote infarctions    CTA head/Neck CTP 11/16:    1. Right carotid system:  Atherosclerotic plaque without hemodynamically significant stenosis.  2. Left carotid system:  Atherosclerotic plaque without hemodynamically significant stenosis.  3. Vertebral circulation:  Patent.  4. Anterior intracranial circulation:  Intracranial atherosclerosis cavernous and clinoid segments of the internal carotid arteries, mild to moderate, and middle cerebral arteries, moderate on the right and mild-to-moderate on the left.  5.  Posterior intracranial circulation:  Severe atherosclerotic disease has progressed since July 2023 with progressive diminished flow of the left vertebral artery, basilar artery and posterior cerebral arteries. There is likely reversal flow within the basilar artery to perfuse the most distal left vertebral artery and PICA arteries. Occlusion of the right vertebral artery was present in July 2023  6. Brain perfusion:  No acute infarction of the brain is convincingly demonstrated.  However, there is extensive ischemia within the posterior circulation.    CT Head No Cont (11.21.23)  The left ventricles have a normal configuration  Old lacunar infarct involving the anterior left corona radiata region is   again seen  Abnormal areas of low-attenuation involving the left cerebellum left   occipital lobe and right brachium pontis region are again seen. These   findings are compatible with areas of acute infarct. No hemorrhagic   transformation is seen. No significant shift or herniation is seen.  The paranasal sinuses mastoid and middle ear regions appear clear.    Impression: Acute infarct again seen as described above.

## 2023-11-24 NOTE — PRE-ANESTHESIA EVALUATION ADULT - NSANTHPMHFT_GEN_ALL_CORE
"PMH significant for: stroke (7/2023 - presenting with dysarthria - left inferior adrienne, b/l parieto-occipital juxtacortical WM w/mild residual dysarthria; 8 years ago - treated with TPA at [facility] - p/w L hemiplegia, facial droop and dysarthria), T2DM, HTN, former smoker (quit in 7/2023). Symptoms beginning at 17:00, 11/16/2023" per EMR documentation
no problems with anesthesia

## 2023-11-24 NOTE — PROGRESS NOTE ADULT - ASSESSMENT
42yo right handed man PMHx stroke (7/2023 - presenting with dysarthria - left inferior adrienne, b/l parieto-occipital juxtacortical WM w/mild residual dysarthria; 8 years ago - treated with TPA at [facility], Pt presented at that time with left hemiplegia, facial droop and dysarthria), T2DM, HTN, former smoker (quit in 7/2023), currently takes ASA 81mg daily every day. Pt presented to [facility] ED on 11/16/2023, with c/o sudden difficulty walking, LLE weakness, speech changes. Pt is s/p tenecteplase.    Impression: Left facial weakness, LUE ataxia, dysarthria, gait instability due to posterior circulation ischemic infarct. Severe disease of posterior circulation involving vertebral, basilar and associated ischemia. Mechanism: TENISHA     NEURO: Neurologically stable compared to yesterday. Neuro exam change on 11/19 with increase in drowsiness, but AxOx4, motor unchanged, later in PM patient reported double vision, on eval with notable dysconjugate gaze, Urgent head CT done: unchanged. Case discussed with NSCU and IR team, stat MRI without any ischemic changes. D/c Brilinta and started on Heparin drip on 11/19 as discussed with NSCU and monitor in stroke unit with continued BP support. S/p diagnostic cerebral angiogram on 11/20 showing severe distal vertebral artery stenosis bilaterally. Plan for repeat cerebral angio on 11/24 for stent placement with neurosurgery. Heparin gtt discontinued. Patient loaded with Brilinta 180mg x 1 and then started on brilinta 90mg BID + aspirin 81mg QD on 11/21. Will continue close monitoring for neurologic deterioration, permissive HTN with slow titration to normotensive. MRI Brain w/o, MRA Head w/o and Neck w/contrast results as above. Physical therapy/OT eval with recommendations for AR. As per Cardio eval: Acceptable cardiac/medical risk to proceed RCRI 1    ANTITHROMBOTIC THERAPY: s/p Brilinta 180mg load x1. Brilinta 90mg BID and aspirin 81mg QD     PULMONARY: Protecting airway, saturating well on room air     CARDIOVASCULAR: TTE with EF of 68%, c/w cardiac monitoring no events                              SBP goal: 140-200mmHg    GASTROINTESTINAL: Dysphagia screen  failed, S/S eval on 11/17 with recommendations for regular diet. 11/20 noted to be coughing on thin liquids, Speech re eval with recs for pureed diet with mildly thick liquids. Will need MBS likely Friday.      Diet: pureed    RENAL: BUN/Cr stable, good urine output, urine toxicology positive for THC      Na Goal: Greater than 135     Mauricio: No    HEMATOLOGY: H/H stable, Platelets 177     DVT ppx:  LMWH     ID: afebrile, no leukocytosis     OTHER: Plan/goals of care discussed with pt and pt's family at bedside    DISPOSITION: Acute Rehab as per PT/OT eval once stable and workup is complete    CORE MEASURES:        Admission NIHSS: 4-->2     Tenecteplase: YES      LDL/HDL: 37/32     Depression Screen: 0     Statin Therapy: Y     Dysphagia Screen:  FAIL     Smoking Former: smoker quit 07/2023      Afib  NO     Stroke Education  YES    Obtain screening ultrasound in patients who meet 1 or more of the following criteria as patient is high risk for DVT/PE upon admission:   [] History of DVT/PE  [] Hypercoagulable states (Factor V Leiden, Cancer, OCP, etc. )  [] Prolonged immobility (hemiplegia/hemiparesis/post operative or any other extended immobilization)   [] Transferred from outside facility (Rehab or Long term care)  [] Age </= to 50

## 2023-11-24 NOTE — CHART NOTE - NSCHARTNOTEFT_GEN_A_CORE
Interventional Neuro Radiology  Pre-Procedure Note PAJEANNETTE      This is a 43 year old right hand dominant male who presented to [facility] ED on 11/16/2023, as a CODE STROKE, with c/o difficulty walking, LLE weakness, speech changes.    PMH significant for: stroke (7/2023 - presenting with dysarthria - left inferior adrienne, b/l parieto-occipital juxtacortical WM w/mild residual dysarthria; 8 years ago - treated with TPA at [facility] - p/w L hemiplegia, facial droop and dysarthria), T2DM, HTN, former smoker (quit in 7/2023)    Presented as a CODE STROKE at 19:12. Neurology arrived promptly to evaluate patient. Per wife, patient having acute onset slurred speech (worse from baseline) and inability to stand/walk. This began at 17:00, 11/16/2023. On exam: patient has a L facial droop, ataxia in LUE (dysmetria with FTN), and struggles to perform HTS b/l. Patient also with pronounced slurred speech (moderate but is intelligible). Patient was rushed to the CT scanner on the first floor of the hospital d/t CT scanner in ED being used. Underwent NCCTH, which revealed no hemorrhage, but was initially read by Radiology as showing a nonacute left occipital infarct not seen on the CTH performed 7/26/2023. This infarct was not c/w patient's exam. Patient was emergently rushed back to ED for vital signs and weight. These were unable to be obtained on the first floor. After risk/benefit discussion with patient and wife - tenecteplase was administered at 11/16/2023, 19:47. Patient is transported to Neuro IR for a catheter cerebral angiogram and possible stent placement.       Allergies: No Known Allergies  PMHX: DM, Hypertension, CVA  PSHX:   Social History:   FAMILY HISTORY:    Current Medications: acetaminophen     Tablet .. 650 milliGRAM(s) Oral every 6 hours PRN  aspirin  chewable 81 milliGRAM(s) Oral daily  atorvastatin 80 milliGRAM(s) Oral at bedtime  dextrose 5%. 1000 milliLiter(s) IV Continuous   dextrose 5%. 1000 milliLiter(s) IV Continuous   dextrose 50% Injectable 25 Gram(s) IV Push once  dextrose 50% Injectable 12.5 Gram(s) IV Push once  dextrose 50% Injectable 25 Gram(s) IV Push once  dextrose Oral Gel 15 Gram(s) Oral once PRN  enoxaparin Injectable 40 milliGRAM(s) SubCutaneous every 24 hours  escitalopram 10 milliGRAM(s) Oral daily  glucagon  Injectable 1 milliGRAM(s) IntraMuscular once  influenza   Vaccine 0.5 milliLiter(s) IntraMuscular once  insulin lispro (ADMELOG) corrective regimen sliding scale   SubCutaneous Before meals and at bedtime  polyethylene glycol 3350 17 Gram(s) Oral daily  senna 2 Tablet(s) Oral at bedtime  sodium chloride 0.9%. 1000 milliLiter(s) IV Continuous   ticagrelor 90 milliGRAM(s) Oral every 12 hours  traZODone 50 milliGRAM(s) Oral at bedtime      Labs:                         12.4   6.56  )-----------( 181      ( 24 Nov 2023 06:21 )             35.7       11-24    143  |  108  |  11  ----------------------------<  86  3.8   |  25  |  1.18    Ca    8.7      24 Nov 2023 06:21      Blood Bank: A positive     Assessment/Plan:   This is a 43 year old right hand dominant male  Patient presents to neuro-IR for selective cerebral angiography.   Procedure, goals, risks, benefits and alternatives were discussed with patient and patient's family. All questions were answered. Risks include but are not limited to stroke, vessel injury, hemorrhage, and or right groin hematoma. Patient demonstrates understanding of all risks involved with this procedure and wishes to continue. Appropriate consent is signed bu patient and is in patient's chart. Interventional Neuro Radiology  Pre-Procedure Note PA-KHADIJAH    This is a 43 year old right hand dominant male who presented to [facility] ED on 11/16/2023, as a CODE STROKE, with c/o difficulty walking, LLE weakness, speech changes.    PMH significant for: stroke (7/2023 - presenting with dysarthria - left inferior adrienne, b/l parieto-occipital juxtacortical WM w/mild residual dysarthria; 8 years ago - treated with TPA at [facility] [place] - p/w L hemiplegia, facial droop and dysarthria), T2DM, HTN, former smoker (quit in 7/2023)    Presented as a CODE STROKE at 19:12. Neurology arrived promptly to evaluate patient. Per wife, patient having acute onset slurred speech (worse from baseline) and inability to stand/walk. This began at 17:00, 11/16/2023. On exam: patient has a L facial droop, ataxia in LUE (dysmetria with FTN), and struggles to perform HTS b/l. Patient also with pronounced slurred speech (moderate but is intelligible). Patient was rushed to the CT scanner on the first floor of the hospital d/t CT scanner in ED being used. Underwent NCCTH, which revealed no hemorrhage, but was initially read by Radiology as showing a nonacute left occipital infarct not seen on the CTH performed 7/26/2023. This infarct was not c/w patient's exam. Patient was emergently rushed back to ED for vital signs and weight. These were unable to be obtained on the first floor. After risk/benefit discussion with patient and wife - tenecteplase was administered at 11/16/2023, 19:47. Patient is transported to Neuro IR for a catheter cerebral angiogram and possible stent placement.     Upon exam patient is A + O x 3, speech is dysarthric, follows commands, right upper and lower ext 5/5, left upper and left lower extremity 4/5.       Allergies: No Known Allergies  PMHX: DM, Hypertension, CVA  PSHX:   Social History:   FAMILY HISTORY:    Current Medications: acetaminophen     Tablet .. 650 milliGRAM(s) Oral every 6 hours PRN  aspirin  chewable 81 milliGRAM(s) Oral daily  atorvastatin 80 milliGRAM(s) Oral at bedtime  dextrose 5%. 1000 milliLiter(s) IV Continuous   dextrose 5%. 1000 milliLiter(s) IV Continuous   dextrose 50% Injectable 25 Gram(s) IV Push once  dextrose 50% Injectable 12.5 Gram(s) IV Push once  dextrose 50% Injectable 25 Gram(s) IV Push once  dextrose Oral Gel 15 Gram(s) Oral once PRN  enoxaparin Injectable 40 milliGRAM(s) SubCutaneous every 24 hours  escitalopram 10 milliGRAM(s) Oral daily  glucagon  Injectable 1 milliGRAM(s) IntraMuscular once  influenza   Vaccine 0.5 milliLiter(s) IntraMuscular once  insulin lispro (ADMELOG) corrective regimen sliding scale   polyethylene glycol 3350 17 Gram(s) Oral daily  senna 2 Tablet(s) Oral at bedtime  sodium chloride 0.9%. 1000 milliLiter(s) IV Continuous   ticagrelor 90 milliGRAM(s) Oral every 12 hours  traZODone 50 milliGRAM(s) Oral at bedtime      Labs:                         12.4   6.56  )-----------( 181      ( 24 Nov 2023 06:21 )             35.7       11-24    143  |  108  |  11  ----------------------------<  86  3.8   |  25  |  1.18    Ca    8.7      24 Nov 2023 06:21      Blood Bank: A positive     Assessment/Plan:   This is a 43 year old right hand dominant male with vertebral stenosis, who is transported to Neuro IR for a catheter cerebral angiogram, angioplasty and left vertebral stent placement. Procedure, goals, risks, benefits and alternatives were discussed with patient and patient's wife. All questions were answered. Risks include but are not limited to stroke, vessel injury, hemorrhage, and or right groin hematoma. Patient demonstrates understanding of all risks involved with this procedure and wishes to continue. Appropriate consent is signed bu patient and is in patient's chart.

## 2023-11-24 NOTE — PROGRESS NOTE ADULT - SUBJECTIVE AND OBJECTIVE BOX
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 The consensus is to proceed with recanalization and stenting despite the high associated risks. We feel this will offer him the best chance for recovery.  Risks, benefits and alternatives were discussed with the patient and family in person. Risks include stroke, hemorrhage and death.  Informed consent obtained, All questions were answered.

## 2023-11-24 NOTE — PROGRESS NOTE ADULT - ASSESSMENT
43y RIGHT handed man who presented to [facility] ED on 11/16/2023, as a CODE STROKE, with c/o difficulty walking, LLE weakness, speech changes. PMH significant for: stroke (7/2023 - presenting with dysarthria - left inferior adrienne, b/l parieto-occipital juxtacortical WM w/mild residual dysarthria; 8 years ago - treated with TPA at [facility] - p/w L hemiplegia, facial droop and dysarthria), T2DM, HTN, former smoker (quit in 7/2023). Symptoms beginning at 17:00, 11/16/2023. Noted at the code to have a L facial, LUE ataxia, b/l LE ataxia, slurred speech. No appreciable drift in any of the extremities. Underwent CT scans with out sign of hemorrhage or jayla LVO. Tenecteplase administered at 19:47, with improvement from NIHSS 4 to 2. Patient previously taking clopidogrel and aspirin after last stroke. Currently only taking aspirin at the instructions of his vascular neurologist.    ED vitals notable for: afebrile, 158/92, HR 79 RR 18, SpO2 97%. Labs notable for: glucose 156. CT imaging disclosed: no hemorrhage, left occipital/right cerebellar acute to subacute infarctions; chronic left basal ganglia, left thalamic and central pontine remote infarctions. There is bilateral atherosclerosis of the carotid system, as well as in the anterior and posterior circulation. The left vertebral artery, basial artery, and posterior cerebral arteries are with progressively reduced flow since last study. Initial exam notable for L facial droop, ataxia in the LUE, bilateral lower limbs, moderate dysarthria, patient unable to stand/walk without holding the transport chair handles.     NIHSS on admission: 4->2 (mild dysarthria, mild L facial - 30 minutes after tenecteplase)  LKN: 17:00, 11/16/2023  pre-MRS: 2 (walks with a cane, otherwise independent)    Impression: L facial weakness, LUE ataxia, dysarthria, gait instability d/t R brain dysfunction d/t acute ischemic stroke

## 2023-11-24 NOTE — PROGRESS NOTE ADULT - ASSESSMENT
patient is a 44 y/O RIGHT handed man who presented to [facility] ED on 11/16/2023, as a CODE STROKE, with c/o difficulty walking, LLE weakness, speech changes. PMH significant for: stroke (7/2023 - presenting with dysarthria - left inferior adrienne, b/l parieto-occipital juxtacortical WM w/mild residual dysarthria; 8 years ago - treated with TPA at [facility] [place] - p/w L hemiplegia, facial droop and dysarthria), T2DM, HTN, former smoker (quit in 7/2023). Symptoms beginning at 17:00, 11/16/2023. Noted at the code to have a L facial, LUE ataxia, b/l LE ataxia, slurred speech. No appreciable drift in any of the extremities. Underwent CT scans with out sign of hemorrhage or jayla LVO. Tenecteplase administered at 19:47, with improvement from NIHSS 4 to 2. Patient previously taking clopidogrel and aspirin after last stroke. Currently only taking aspirin at the instructions of his vascular neurologist.    # CVA   - CT and MRI as noted, multiple embolic strokes   - Patient is S/P TPA  - Was on ASA, now on Brilinta and Lipitor ASA     - Was on full dose heparin gtt, now on Lovenox 40 Qd PPX dose as patient planned for angio with stenting with NSGY, completed 11/24   - TTE w/ EF of 68%, no WM  - PT/OT, aspiration precautions   - S/P Angio with NSGY  - Patient is medically optimized for angio - moderate risk candidate. S/P angio/ stenting w/ NSGY on 11/24   - S/P LVA recanalization and stent placement with NSGY; Neurochecks per protocol; Planned for repeat CT head   - Per Stroke Neurology/ NSICU     # HTN   - TTE w/ EF of 68%, no WMA    - BP parameters as per stroke neurology --> Now on Nicardepine gtt, Lisinopril 40, Norvasc 10, Coreg 25 PO BID   - Monitor BP, VS     # DM  - A1C of 5.5  - C/w sliding scale     # HLD    - C/w Statin therapy     DVT and GI PPX       Discussed with Attending.

## 2023-11-24 NOTE — CHART NOTE - NSCHARTNOTEFT_GEN_A_CORE
Interventional Neuro- Radiology   Procedure Note PA-KHADIJAH    Procedure: Catheter Cerebral Angiogram and stent placement   Pre- Procedure Diagnosis:  Post- Procedure Diagnosis:    : Dr Phoebe Xiao  Fellow:   Fellow:   Physician Assistant: Radha Peralta PA-C    Nurse:  Radiologic Tech:  Anesthesiologist:    Dr Jamari Pinon  Sheath:      I/Os: EBL less than 10cc  IV fluids:     cc  Urine output     cc    Contrast Visi       Vitals: BP         HR      Spo2     %          Preliminary Report:  Using a  [place] short sheath to the right groin under MAC sedation via left vertebral artery,  left internal carotid artery, left external carotid artery, right vertebral artery, right internal carotid artery, right external carotid artery a selective cerebral angiography was performed and demonstrated                       Official note to follow.  Patient tolerated procedure well, hemodynamically stable, no change in neurological status compared to baseline. Results discussed with neuro ICU team, patient and patient's family. Right groin sheath was removed, manual compression held to hemostasis for 20 minutes, no active bleeding, no hematoma, quick clot and safeguard balloon dressing applied at Interventional Neuro- Radiology   Procedure Note PA-C    Procedure: Catheter Cerebral Angiogram and stent placement   Pre- Procedure Diagnosis:  Post- Procedure Diagnosis:    : Dr Phoebe Xiao  Fellow:    Dr Remy Birmingham   Fellow:   Dr Nicholas Luna   Physician Assistant: Radha Peralta PA-C    Nurse:                    Karla Avalos RN  Radiologic Tech:     Rad Douglas LRT, Jessie Diaz LRT, Kiki lanza LRT  Anesthesiologist:    Dr Jamari Pinon  Sheath:                   8 [place] citizen short sheath       I/Os: EBL less than 10cc  IV fluids: 500cc  Urine output     cc     Contrast Visi     Milrinone 4mg Left vertebral artery            Vitals: BP 92/56       HR 88    Spo2 99%          Preliminary Report:  Using an 8 [place] citizen short sheath to the right groin under general anesthesia via left vertebral artery, left internal carotid artery, left external carotid artery, right vertebral artery, right internal carotid artery, right external carotid artery a selective cerebral angiography was performed and demonstrated                       Official note to follow.  Patient tolerated procedure well, hemodynamically stable, no change in neurological status compared to baseline. Results discussed with neuro ICU team, patient and patient's family. Right groin sheath was removed, a CELT device and manual compression held to hemostasis for 20 minutes, no active bleeding, no hematoma, quick clot and safeguard balloon dressing applied at Interventional Neuro- Radiology   Procedure Note PA-C    Procedure: Catheter Cerebral Angiogram and stent placement   Pre- Procedure Diagnosis: Severe atherosclerosis disease, vertebral stenosis   Post- Procedure Diagnosis:    : Dr Phoebe Xiao  Fellow:    Dr Remy Birmingham   Fellow:   Dr Nicholas Luna   Physician Assistant: Radha Peralta PA-C    Nurse:                     Karla Avalos RN  Radiologic Tech:     Rad Douglas LRT, Jesise Diaz LRT, Kiki lanza LRT  Anesthesiologist:    Dr Jamari Pinon  Sheath:                   8 [place]ym[place] short sheath       I/Os: EBL less than 10cc  IV fluids: 500cc  Urine output     cc     Contrast Visi     Milrinone 4mg Left vertebral artery       Heparin 5,ooo units         5.  Posterior intracranial circulation: Severe atherosclerotic disease   has progressed since July 2023 with progressive diminished flow of the   left vertebral artery, basilar artery and posterior cerebral arteries.   There is likely reversal flow within the basilar artery to perfuse the   most distal left vertebral artery and PICA arteries. Occlusion of the   right vertebral artery was present in July 2023    Vitals: BP 92/56       HR 88    Spo2 99%          Preliminary Report:  Using an 8 [place]ym[place] short sheath to the right groin under general anesthesia via left vertebral artery, left internal carotid artery, left external carotid artery, right vertebral artery, right internal carotid artery, right external carotid artery a selective cerebral angiography was performed and demonstrated                       Official note to follow.  Patient tolerated procedure well, hemodynamically stable, no change in neurological status compared to baseline. Results discussed with neuro ICU team, patient and patient's family. Right groin sheath was removed, a CELT device and manual compression held to hemostasis for 20 minutes, no active bleeding, no hematoma, quick clot and safeguard balloon dressing applied at Interventional Neuro- Radiology   Procedure Note PA-C    Procedure: Catheter Cerebral Angiogram, mechanical thrombectomy, angioplasty and left vertebral artery stent placement   Pre- Procedure Diagnosis: Severe atherosclerosis disease, vertebral stenosis   Post- Procedure Diagnosis:    : Dr Phoebe Xiao  Fellow:    Dr Remy Birmingham   Fellow:   Dr Nicholas Luna   Physician Assistant: TERI Medeiros-C    Nurse:                     Karla Avalos RN  Radiologic Tech:     Rad Douglas LRT, Jessie Diaz LRT, Kiki lanza LRT  Anesthesiologist:    Dr Jamari Lorenzo  Sheath:                   8 Tajik short sheath       I/Os: EBL less than 10cc  IV fluids: 500cc  Urine output 500cc  Contrast Visi  76cc    Milrinone 4mg Left vertebral artery         Heparin 5,ooo units         Heparin 500 units       [place] frontier stent 2mm by 18mm     Integrilin 2mg  Integrilin 2mg  Integrilin 2mg      Left vertebral artery   Integrilin 2mg     Vitals: BP 92/56       HR 88    Spo2 99%          Preliminary Report:  Using an 8 Tajik short sheath to the right groin under general anesthesia via left vertebral artery, left internal carotid artery, left external carotid artery, right vertebral artery, right internal carotid artery, right external carotid artery a selective cerebral angiography was performed and demonstrated left vertebral artery stenosis. Patient underwent mechanical thrombectomy, angioplasty and left vertebral artery stent placement. Official note to follow.  Patient tolerated procedure well, hemodynamically stable, no change in neurological status compared to baseline. Results discussed with neuro ICU team and patient's family. Right groin sheath was removed, a CELT device and manual compression held to hemostasis for 20 minutes, no active bleeding, no hematoma, quick clot and safeguard balloon dressing applied at 12:15pm. Integrilin infusion started in the angio-suite and ASA 300mg per rectal given in angio-suite. Interventional Neuro- Radiology   Procedure Note PA-C    Procedure: Catheter Cerebral Angiogram, mechanical thrombectomy, angioplasty and left vertebral artery stent placement   Pre- Procedure Diagnosis: Severe atherosclerosis disease, left vertebral stenosis   Post- Procedure Diagnosis: left vertebral occlusion, now status post left vertebral artery stent     : Dr Phoebe Xiao  Fellow:    Dr Remy Birmingham   Fellow:   Dr Nicholas Luna   Physician Assistant: Radha Peralta PA-C    Nurse:                     Karla Avalos RN  Radiologic Tech:     Rad Douglas LRT, Jessie Diaz LRT, Kiki lanza LRT  Anesthesiologist:    Dr Jamari Lorenzo  Sheath:                   8 [place] short sheath       I/Os: EBL less than 10cc  IV fluids: 500cc  Urine output 500cc  Contrast Visi  76cc    Milrinone 4mg Left vertebral artery         Heparin 5,ooo units         Heparin 500 units       [place] frontier stent 2mm by 18mm     Integrilin 2mg  Integrilin 2mg  Integrilin 2mg      Left vertebral artery   Integrilin 2mg     Vitals: BP 92/56       HR 88    Spo2 99%          Preliminary Report:  Using an 8 [place] short sheath to the right groin under general anesthesia via left vertebral artery, left internal carotid artery, left external carotid artery, right vertebral artery, right internal carotid artery, right external carotid artery a selective cerebral angiography was performed and demonstrated left vertebral artery stenosis. Patient underwent mechanical thrombectomy, angioplasty and left vertebral artery stent placement. Official note to follow.  Patient tolerated procedure well, hemodynamically stable, no change in neurological status compared to baseline. Results discussed with neuro ICU team and patient's family. Right groin sheath was removed, a CELT device and manual compression held to hemostasis for 20 minutes, no active bleeding, no hematoma, quick clot and safeguard balloon dressing applied at 12:15pm. Integrilin infusion started in the angio-suite and ASA 300mg per rectal given in angio-suite. ASA 81mg tomorrow at 81mg. Systolic blood pressure 110-140. Ct head at 6pm.

## 2023-11-24 NOTE — PROGRESS NOTE ADULT - SUBJECTIVE AND OBJECTIVE BOX
HOSPITAL COURSE: 42 YO M with a PMHx stroke (7/2023 with residual dysarthria, and right gaze) T2DM, HTN, former smoker (quit in 7/2023), on ASA 81mg daily.   - 11/16/2023, with c/o sudden difficulty walking, LLE weakness, speech changes. s/p tenecteplase.  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  - 11/24/23:  recanalization and stenting of the L VA    Admission Scores  GCS:   HH:   MF:   NIHSS:   RASS:    CAM-ICU:   ICH:    24 hour Events:       Allergies    No Known Allergies    Intolerances      REVIEW OF SYSTEMS: [ ] Unable to Assess due to neurologic exam   [x ] All ROS addressed below are non-contributory, except:  Neuro: [ ] Headache [ ] Back pain [ ] Numbness [ x] Weakness [ ] Ataxia [ ] Dizziness [ ] Aphasia [x ] Dysarthria [ ] Visual disturbance  Resp: [ ] Shortness of breath/dyspnea, [ ] Orthopnea [ ] Cough  CV: [ ] Chest pain [ ] Palpitation [ ] Lightheadedness [ ] Syncope  Renal: [ ] Thirst [ ] Edema  GI: [ ] Nausea [ ] Emesis [ ] Abdominal pain [ ] Constipation [ ] Diarrhea  Hem: [ ] Hematemesis [ ] bright red blood per rectum  ID: [ ] Fever [ ] Chills [ ] Dysuria  ENT: [ ] Rhinorrhea      DEVICES:   [ ] Restraints [ ] ET tube [ ] central line [ x] arterial line [ ] willson [ ] NGT/OGT [ ] EVD [ ] LD [ ] WILFREDO/HMV [ ] Trach [ ] PEG [ ] Chest Tube     VITALS:   Vital Signs Last 24 Hrs  T(C): 36.2 (24 Nov 2023 08:00), Max: 37.1 (24 Nov 2023 07:46)  T(F): 97.1 (24 Nov 2023 08:00), Max: 97.1 (24 Nov 2023 08:00)  HR: 67 (24 Nov 2023 08:00) (62 - 79)  BP: 154/70 (24 Nov 2023 08:00) (131/79 - 181/90)  BP(mean): 105 (24 Nov 2023 08:00) (78 - 159)  RR: 19 (24 Nov 2023 08:00) (14 - 25)  SpO2: 96% (24 Nov 2023 08:00) (96% - 100%)    Parameters below as of 24 Nov 2023 08:00  Patient On (Oxygen Delivery Method): room air      CAPILLARY BLOOD GLUCOSE      POCT Blood Glucose.: 96 mg/dL (24 Nov 2023 07:47)  POCT Blood Glucose.: 121 mg/dL (23 Nov 2023 21:14)  POCT Blood Glucose.: 90 mg/dL (23 Nov 2023 16:34)    I&O's Summary    23 Nov 2023 07:01  -  24 Nov 2023 07:00  --------------------------------------------------------  IN: 2275 mL / OUT: 2300 mL / NET: -25 mL    24 Nov 2023 07:01  -  24 Nov 2023 12:53  --------------------------------------------------------  IN: 75 mL / OUT: 0 mL / NET: 75 mL        Respiratory:        LABS:                        12.4   6.56  )-----------( 181      ( 24 Nov 2023 06:21 )             35.7     11-24    143  |  108  |  11  ----------------------------<  86  3.8   |  25  |  1.18             MEDICATION LEVELS:     IVF FLUIDS/MEDICATIONS:   MEDICATIONS  (STANDING):  atorvastatin 80 milliGRAM(s) Oral at bedtime  dextrose 5%. 1000 milliLiter(s) (50 mL/Hr) IV Continuous <Continuous>  dextrose 5%. 1000 milliLiter(s) (100 mL/Hr) IV Continuous <Continuous>  dextrose 50% Injectable 25 Gram(s) IV Push once  dextrose 50% Injectable 25 Gram(s) IV Push once  dextrose 50% Injectable 12.5 Gram(s) IV Push once  enoxaparin Injectable 40 milliGRAM(s) SubCutaneous every 24 hours  eptifibatide Infusion 75 mg/100 mL 1 MICROgram(s)/kG/Min (8.94 mL/Hr) IV Continuous <Continuous>  escitalopram 10 milliGRAM(s) Oral daily  glucagon  Injectable 1 milliGRAM(s) IntraMuscular once  influenza   Vaccine 0.5 milliLiter(s) IntraMuscular once  insulin lispro (ADMELOG) corrective regimen sliding scale   SubCutaneous Before meals and at bedtime  polyethylene glycol 3350 17 Gram(s) Oral daily  senna 2 Tablet(s) Oral at bedtime  sodium chloride 0.9%. 1000 milliLiter(s) (100 mL/Hr) IV Continuous <Continuous>  ticagrelor 90 milliGRAM(s) Oral every 12 hours  traZODone 50 milliGRAM(s) Oral at bedtime    MEDICATIONS  (PRN):  acetaminophen     Tablet .. 650 milliGRAM(s) Oral every 6 hours PRN Moderate Pain (4 - 6)  dextrose Oral Gel 15 Gram(s) Oral once PRN Blood Glucose LESS THAN 70 milliGRAM(s)/deciliter        IMAGING:      EXAMINATION:  PHYSICAL EXAM:    Constitutional: No Acute Distress     Neurological: Awake, alert oriented to person, place and time, Following Commands, PERRL, EOMI, Right Gaze Preference, Left upper extremity pronator drift, LLE AG, 4/5 dorsiflexion and 4/5 plantar flexion, RLE dorsiflexion 5/5 and plantar flexion 5/5                                                 Sensation: [ ] intact to light touch  [ ] decreased:     Reflexes: Deep Tendon Reflexes Intact     Pulmonary: Clear to Auscultation, No rales, No rhonchi, No wheezes     Cardiovascular: S1, S2, Regular rate and rhythm     Gastrointestinal: Soft, Non-tender, Non-distended     Extremities: No calf tenderness     Incision: c.d.i.   HOSPITAL COURSE: 44 YO M with a PMHx stroke (7/2023 with residual dysarthria, and right gaze) T2DM, HTN, former smoker (quit in 7/2023), on ASA 81mg daily.   - 11/16/2023, with c/o sudden difficulty walking, LLE weakness, speech changes. s/p tenecteplase.  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  - 11/24/23:  recanalization and stenting of the L VA    Admission Scores  GCS:   HH:   MF:   NIHSS:   RASS:    CAM-ICU:   ICH:    24 hour Events:       Allergies    No Known Allergies    Intolerances      REVIEW OF SYSTEMS: [ ] Unable to Assess due to neurologic exam   [x ] All ROS addressed below are non-contributory, except:  Neuro: [ ] Headache [ ] Back pain [ ] Numbness [ x] Weakness [ ] Ataxia [ ] Dizziness [ ] Aphasia [x ] Dysarthria [ ] Visual disturbance  Resp: [ ] Shortness of breath/dyspnea, [ ] Orthopnea [ ] Cough  CV: [ ] Chest pain [ ] Palpitation [ ] Lightheadedness [ ] Syncope  Renal: [ ] Thirst [ ] Edema  GI: [ ] Nausea [ ] Emesis [ ] Abdominal pain [ ] Constipation [ ] Diarrhea  Hem: [ ] Hematemesis [ ] bright red blood per rectum  ID: [ ] Fever [ ] Chills [ ] Dysuria  ENT: [ ] Rhinorrhea      DEVICES:   [ ] Restraints [ ] ET tube [ ] central line [ x] arterial line [ ] willson [ ] NGT/OGT [ ] EVD [ ] LD [ ] WILFREDO/HMV [ ] Trach [ ] PEG [ ] Chest Tube     Vital Signs Last 24 Hrs  T(C): 36.7 (24 Nov 2023 15:00), Max: 37.1 (24 Nov 2023 07:46)  T(F): 98 (24 Nov 2023 15:00), Max: 98 (24 Nov 2023 15:00)  HR: 83 (24 Nov 2023 18:30) (62 - 94)  BP: 154/70 (24 Nov 2023 08:00) (133/79 - 181/90)  BP(mean): 105 (24 Nov 2023 08:00) (78 - 159)  RR: 20 (24 Nov 2023 18:30) (14 - 25)  SpO2: 98% (24 Nov 2023 18:30) (96% - 100%)    Parameters below as of 24 Nov 2023 12:45  Patient On (Oxygen Delivery Method): nasal cannula  O2 Flow (L/min): 2      I&O's Summary    23 Nov 2023 07:01  -  24 Nov 2023 07:00  --------------------------------------------------------  IN: 2275 mL / OUT: 2300 mL / NET: -25 mL    24 Nov 2023 07:01  -  24 Nov 2023 18:46  --------------------------------------------------------  IN: 872 mL / OUT: 485 mL / NET: 387 mL          Respiratory:        LABS:                                   12.4   6.56  )-----------( 181      ( 24 Nov 2023 06:21 )             35.7   11-24    143  |  108  |  11  ----------------------------<  86  3.8   |  25  |  1.18    Ca    8.7      24 Nov 2023 06:21             MEDICATION LEVELS:     MEDICATIONS  (STANDING):  amLODIPine   Tablet 10 milliGRAM(s) Oral daily  atorvastatin 80 milliGRAM(s) Oral at bedtime  carvedilol 25 milliGRAM(s) Oral every 12 hours  chlorhexidine 4% Liquid 1 Application(s) Topical daily  dextrose 5%. 1000 milliLiter(s) (50 mL/Hr) IV Continuous <Continuous>  dextrose 5%. 1000 milliLiter(s) (100 mL/Hr) IV Continuous <Continuous>  dextrose 50% Injectable 25 Gram(s) IV Push once  dextrose 50% Injectable 12.5 Gram(s) IV Push once  dextrose 50% Injectable 25 Gram(s) IV Push once  eptifibatide Infusion 75 mg/100 mL 1 MICROgram(s)/kG/Min (8.94 mL/Hr) IV Continuous <Continuous>  escitalopram 10 milliGRAM(s) Oral daily  glucagon  Injectable 1 milliGRAM(s) IntraMuscular once  influenza   Vaccine 0.5 milliLiter(s) IntraMuscular once  insulin lispro (ADMELOG) corrective regimen sliding scale   SubCutaneous Before meals and at bedtime  lisinopril 40 milliGRAM(s) Oral daily  niCARdipine Infusion 5 mG/Hr (25 mL/Hr) IV Continuous <Continuous>  polyethylene glycol 3350 17 Gram(s) Oral daily  senna 2 Tablet(s) Oral at bedtime  sodium chloride 0.9%. 1000 milliLiter(s) (100 mL/Hr) IV Continuous <Continuous>  ticagrelor 90 milliGRAM(s) Oral every 12 hours  traZODone 50 milliGRAM(s) Oral at bedtime        IMAGING:      EXAMINATION:  PHYSICAL EXAM:    Constitutional: No Acute Distress     Neurological: Awake, alert oriented to person, place and time, Following Commands, PERRL, EOMI, Right Gaze Preference, Left upper extremity pronator drift, LLE AG, 4/5 dorsiflexion and 4/5 plantar flexion, RLE dorsiflexion 5/5 and plantar flexion 5/5                                                 Sensation: [ ] intact to light touch  [ ] decreased:     Reflexes: Deep Tendon Reflexes Intact     Pulmonary: Clear to Auscultation, No rales, No rhonchi, No wheezes     Cardiovascular: S1, S2, Regular rate and rhythm     Gastrointestinal: Soft, Non-tender, Non-distended     Extremities: No calf tenderness     Incision: c.d.i.

## 2023-11-24 NOTE — PROGRESS NOTE ADULT - SUBJECTIVE AND OBJECTIVE BOX
Subjective: Patient seen and examined. No new events except as noted.   remains in Stroke unit   for vertebral stent today     REVIEW OF SYSTEMS:    CONSTITUTIONAL: +weakness, fevers or chills  EYES/ENT: No visual changes;  No vertigo or throat pain   NECK: No pain or stiffness  RESPIRATORY: No cough, wheezing, hemoptysis; No shortness of breath  CARDIOVASCULAR: No chest pain or palpitations  GASTROINTESTINAL: No abdominal or epigastric pain. No nausea, vomiting, or hematemesis; No diarrhea or constipation. No melena or hematochezia.  GENITOURINARY: No dysuria, frequency or hematuria  NEUROLOGICAL: No numbness or weakness  SKIN: No itching, burning, rashes, or lesions   All other review of systems is negative unless indicated above.    MEDICATIONS:  MEDICATIONS  (STANDING):  atorvastatin 80 milliGRAM(s) Oral at bedtime  dextrose 5%. 1000 milliLiter(s) (50 mL/Hr) IV Continuous <Continuous>  dextrose 5%. 1000 milliLiter(s) (100 mL/Hr) IV Continuous <Continuous>  dextrose 50% Injectable 25 Gram(s) IV Push once  dextrose 50% Injectable 25 Gram(s) IV Push once  dextrose 50% Injectable 12.5 Gram(s) IV Push once  enoxaparin Injectable 40 milliGRAM(s) SubCutaneous every 24 hours  escitalopram 10 milliGRAM(s) Oral daily  glucagon  Injectable 1 milliGRAM(s) IntraMuscular once  influenza   Vaccine 0.5 milliLiter(s) IntraMuscular once  insulin lispro (ADMELOG) corrective regimen sliding scale   SubCutaneous Before meals and at bedtime  polyethylene glycol 3350 17 Gram(s) Oral daily  senna 2 Tablet(s) Oral at bedtime  sodium chloride 0.9%. 1000 milliLiter(s) (100 mL/Hr) IV Continuous <Continuous>  ticagrelor 90 milliGRAM(s) Oral every 12 hours  traZODone 50 milliGRAM(s) Oral at bedtime      PHYSICAL EXAM:  T(C): 36.2 (11-24-23 @ 08:00), Max: 37.1 (11-24-23 @ 07:46)  HR: 67 (11-24-23 @ 08:00) (62 - 79)  BP: 154/70 (11-24-23 @ 08:00) (119/67 - 181/90)  RR: 19 (11-24-23 @ 08:00) (14 - 25)  SpO2: 96% (11-24-23 @ 08:00) (95% - 100%)  Wt(kg): --  I&O's Summary    23 Nov 2023 07:01 - 24 Nov 2023 07:00  --------------------------------------------------------  IN: 2275 mL / OUT: 2300 mL / NET: -25 mL    24 Nov 2023 07:01  -  24 Nov 2023 10:42  --------------------------------------------------------  IN: 75 mL / OUT: 0 mL / NET: 75 mL        Weight (kg): 111.7 (11-24 @ 07:46)    Appearance: NAD  HEENT:  Dry  oral mucosa, PERRL, EOMI	  Lymphatic: No lymphadenopathy  Cardiovascular: Normal S1 S2, No JVD, No murmurs, No edema  Respiratory: Lungs clear to auscultation	  Psychiatry: A & O x 3, Mood & affect appropriate  Gastrointestinal:  Soft, Non-tender, + BS	  Skin: No rashes, No ecchymoses, No cyanosis	  Neurologic: Non-focal  Extremities: Normal range of motion, No clubbing, cyanosis or edema  Vascular: Peripheral pulses palpable 2+ bilaterally  Neurologic Exam:  Mental status:  Awake, Alert; Oriented to: person, place. Knows it is November 2023 but not exact date; Speech: moderate dysarthria; Repetition and naming: intact; Follows simple and complex commands; Attention/concentration: can spell WORLD forward but not backward; Recent and remote memory (including registration and recall): JULIETA; Fund of knowledge: intact    Cranial nerves - R pupil>L, both reactive, VFF on confrontational testing, EOM - inability to bury the sclera of the R eye with rightward gaze, L upper eyelid ptosis, face sensation (V1-V3) intact b/l, facial strength - L facial droop, hearing intact b/l with finger rub test, palate with symmetric elevation, trapezius 5/5 strength b/l, tongue midline on protrusion with full lateral movement    Motor - Normal bulk and tone throughout. No pronator drift.  Strength testing (R/L)  Deltoid:  5/5   Biceps:  5/5   Triceps:  5/5   Wrist Extension:  5/5   Wrist Flexion:  5/5   Interossei:  5/5   :  5/5  Hip Flexion:  5/5   Hip Extension:  5/5   Knee Flexion:  5/5   Knee Extension:  5/5   Dorsiflexion:  5/5   Plantar Flexion:  5/5    Sensation - Light touch intact throughout    DTRs (R/L)  3+ upper/lower  b/l sustained ankle clonus    Coordination - Finger to Nose, Heel to Shin intact b/l. No tremors appreciated.      LABS:    CARDIAC MARKERS:                                12.4   6.56  )-----------( 181      ( 24 Nov 2023 06:21 )             35.7     11-24    143  |  108  |  11  ----------------------------<  86  3.8   |  25  |  1.18    Ca    8.7      24 Nov 2023 06:21          TELEMETRY: 	  SR  ECG:  	  RADIOLOGY:   DIAGNOSTIC TESTING:  [ ] Echocardiogram:  [ ]  Catheterization:  [ ] Stress Test:    OTHER:

## 2023-11-24 NOTE — PRE-ANESTHESIA EVALUATION ADULT - NSANTHADDINFOFT_GEN_ALL_CORE
All r/b/a discussed with patient, all questions answered. Plan to start awake and then convert to GA if any planned intervention.
Risks and indications for general anesthesia were discussed including but not limited to nausea, throat soreness/dental injury, anaphylaxis, myocardial infarction and stroke. These risks were acknowledged and accepted by patient, all of their questions were answered as well.

## 2023-11-24 NOTE — PROGRESS NOTE ADULT - ASSESSMENT
ASSESSMENT/PLAN: s/p LVA recanalization and stent     NEURO:  Neurocheck Q1, Vitals Q1  CTH at 19:00  Integrilin ggt  ASA+ Brilinta   Activity: [] OOB as tolerated [x] Bedrest [] PT [] OT [] PMNR    PULM:  RA  sat > 92%  Incentive Spirometry     CV:  SBP goal:     RENAL:  Fluids: NS at 75 ccs/hour    GI:  Diet: NPO, pending dysphagia screen  GI prophylaxis [] not indicated [] PPI [] other:  Bowel regimen [] colace [x] senna [] other: miralax    ENDO:   Goal euglycemia (-180)    HEME/ONC:  VTE prophylaxis: [x] SCDs [] chemoprophylaxis [] high risk of DVT/PE on admission due to:    ID:  Afebrile   monitor for fevers    MISC:    SOCIAL/FAMILY:  [] updated at bedside [] family meeting    CODE STATUS:  [x] Full Code [] DNR [] DNI [] Palliative/Comfort Care    DISPOSITION:  [x] ICU [] Stroke Unit [] Floor [] EMU [] RCU [] PCU    [x] Patient is at high risk of neurologic deterioration/death due to: post op hemorrhage, acute stroke    ASSESSMENT/PLAN: s/p LVA recanalization and stent     NEURO:  Neurocheck Q1, Vitals Q1  CTH at 19:00  Integrilin ggt d/c sol  ASA+ Brilinta   Continue home med Lexapro, Trazodone  Activity: [] OOB as tolerated [x] Bedrest [] PT [] OT [] PMNR    PULM:  RA  sat > 92%  Incentive Spirometry     CV:  SBP goal: 110-140  Continue home meds, Norvasc, Coreg and lisinopril    RENAL:  Fluids: NS at 75 ccs/hour    GI:  Diet: NPO, start feedings in AM if stable  NG tube in place  GI prophylaxis [x] not indicated [] PPI [] other:  Bowel regimen [] colace [x] senna [] other: miralax    ENDO:   Goal euglycemia (-180)    HEME/ONC:  VTE prophylaxis: [x] SCDs [x] chemoprophylaxis on hold, today POD0 [] high risk of DVT/PE on admission due to:    ID:  Afebrile   monitor for fevers    MISC:    SOCIAL/FAMILY:  [] updated at bedside [] family meeting    CODE STATUS:  [x] Full Code [] DNR [] DNI [] Palliative/Comfort Care    DISPOSITION:  [x] ICU [] Stroke Unit [] Floor [] EMU [] RCU [] PCU    [x] Patient is at high risk of neurologic deterioration/death due to: post op hemorrhage, acute stroke    Critical care

## 2023-11-25 NOTE — PROGRESS NOTE ADULT - ASSESSMENT
-S/p Angio for Left vert stent placement on 11/24/2023  -Keep SBP <140  -Continue w/ ASA81 and Brilinta 90BID  -Integrilin drip was started intraop, continue for 24hrs

## 2023-11-25 NOTE — PROGRESS NOTE ADULT - ASSESSMENT
43y RIGHT handed man who presented to [facility] ED on 11/16/2023, as a CODE STROKE, with c/o difficulty walking, LLE weakness, speech changes. PMH significant for: stroke (7/2023 - presenting with dysarthria - left inferior adrienne, b/l parieto-occipital juxtacortical WM w/mild residual dysarthria; 8 years ago - treated with TPA at [facility] - p/w L hemiplegia, facial droop and dysarthria), T2DM, HTN, former smoker (quit in 7/2023). Symptoms beginning at 17:00, 11/16/2023. Noted at the code to have a L facial, LUE ataxia, b/l LE ataxia, slurred speech. No appreciable drift in any of the extremities. Underwent CT scans with out sign of hemorrhage or jayla LVO. Tenecteplase administered at 19:47, with improvement from NIHSS 4 to 2. Patient previously taking clopidogrel and aspirin after last stroke. Currently only taking aspirin at the instructions of his vascular neurologist.    ED vitals notable for: afebrile, 158/92, HR 79 RR 18, SpO2 97%. Labs notable for: glucose 156. CT imaging disclosed: no hemorrhage, left occipital/right cerebellar acute to subacute infarctions; chronic left basal ganglia, left thalamic and central pontine remote infarctions. There is bilateral atherosclerosis of the carotid system, as well as in the anterior and posterior circulation. The left vertebral artery, basial artery, and posterior cerebral arteries are with progressively reduced flow since last study. Initial exam notable for L facial droop, ataxia in the LUE, bilateral lower limbs, moderate dysarthria, patient unable to stand/walk without holding the transport chair handles.     NIHSS on admission: 4->2 (mild dysarthria, mild L facial - 30 minutes after tenecteplase)  LKN: 17:00, 11/16/2023  pre-MRS: 2 (walks with a cane, otherwise independent)    Impression: L facial weakness, LUE ataxia, dysarthria, gait instability d/t R brain dysfunction d/t acute ischemic stroke

## 2023-11-25 NOTE — PROGRESS NOTE ADULT - SUBJECTIVE AND OBJECTIVE BOX
Subjective: Patient seen and examined. No new events except as noted.   now in NSCU   s/p  recanalization and stenting of the L VA  REVIEW OF SYSTEMS:    CONSTITUTIONAL: No weakness, fevers or chills  EYES/ENT: No visual changes;  No vertigo or throat pain   NECK: No pain or stiffness  RESPIRATORY: No cough, wheezing, hemoptysis; No shortness of breath  CARDIOVASCULAR: No chest pain or palpitations  GASTROINTESTINAL: No abdominal or epigastric pain. No nausea, vomiting, or hematemesis; No diarrhea or constipation. No melena or hematochezia.  GENITOURINARY: No dysuria, frequency or hematuria  NEUROLOGICAL: No numbness or weakness  SKIN: No itching, burning, rashes, or lesions   All other review of systems is negative unless indicated above.    MEDICATIONS:  MEDICATIONS  (STANDING):  amLODIPine   Tablet 10 milliGRAM(s) Oral daily  aspirin  chewable 81 milliGRAM(s) Oral <User Schedule>  atorvastatin 80 milliGRAM(s) Oral at bedtime  carvedilol 25 milliGRAM(s) Oral every 12 hours  chlorhexidine 4% Liquid 1 Application(s) Topical daily  dextrose 5%. 1000 milliLiter(s) (50 mL/Hr) IV Continuous <Continuous>  dextrose 5%. 1000 milliLiter(s) (100 mL/Hr) IV Continuous <Continuous>  dextrose 50% Injectable 25 Gram(s) IV Push once  dextrose 50% Injectable 12.5 Gram(s) IV Push once  dextrose 50% Injectable 25 Gram(s) IV Push once  enoxaparin Injectable 40 milliGRAM(s) SubCutaneous <User Schedule>  escitalopram 10 milliGRAM(s) Oral daily  glucagon  Injectable 1 milliGRAM(s) IntraMuscular once  hydrALAZINE 50 milliGRAM(s) Oral every 8 hours  influenza   Vaccine 0.5 milliLiter(s) IntraMuscular once  insulin lispro (ADMELOG) corrective regimen sliding scale   SubCutaneous every 6 hours  lisinopril 40 milliGRAM(s) Oral daily  niCARdipine Infusion 5 mG/Hr (25 mL/Hr) IV Continuous <Continuous>  polyethylene glycol 3350 17 Gram(s) Oral daily  senna 2 Tablet(s) Oral at bedtime  sodium chloride 0.9%. 1000 milliLiter(s) (100 mL/Hr) IV Continuous <Continuous>  ticagrelor 90 milliGRAM(s) Enteral Tube two times a day  traZODone 50 milliGRAM(s) Oral at bedtime      PHYSICAL EXAM:  T(C): 36.4 (11-25-23 @ 07:00), Max: 36.7 (11-24-23 @ 19:00)  HR: 79 (11-25-23 @ 18:30) (74 - 108)  BP: --  RR: 25 (11-25-23 @ 18:30) (12 - 32)  SpO2: 93% (11-25-23 @ 18:30) (91% - 100%)  Wt(kg): --  I&O's Summary    24 Nov 2023 07:01  -  25 Nov 2023 07:00  --------------------------------------------------------  IN: 2867.5 mL / OUT: 1855 mL / NET: 1012.5 mL    25 Nov 2023 07:01  -  25 Nov 2023 18:58  --------------------------------------------------------  IN: 2755 mL / OUT: 1250 mL / NET: 1505 mL          Appearance: Normal	  HEENT:   Normal oral mucosa, PERRL, EOMI	  Lymphatic: No lymphadenopathy , no edema  Cardiovascular: Normal S1 S2, No JVD, No murmurs , Peripheral pulses palpable 2+ bilaterally  Respiratory: Lungs clear to auscultation, normal effort 	  Gastrointestinal:  Soft, Non-tender, + BS	  Skin: No rashes, No ecchymoses, No cyanosis, warm to touch  Musculoskeletal: Normal range of motion, normal strength  Psychiatry:  Mood & affect appropriate  Ext: No edema  Neurological: Awake, alert oriented to person, place and time, Following Commands, PERRL, EOMI, Right Gaze Preference, Left upper extremity pronator drift, LLE AG, 4/5 dorsiflexion and 4/5 plantar flexion, RLE dorsiflexion 5/5 and plantar flexion 5/5      LABS:    CARDIAC MARKERS:                                12.1   8.63  )-----------( 191      ( 24 Nov 2023 21:59 )             34.6     11-24    141  |  107  |  12  ----------------------------<  108<H>  3.8   |  19<L>  |  1.11    Ca    8.7      24 Nov 2023 22:00  Phos  3.5     11-24  Mg     1.9     11-24      proBNP:   Lipid Profile:   HgA1c:   TSH: Thyroid Stimulating Hormone, Serum: 1.33 uIU/mL (11-24 @ 21:59)              TELEMETRY: SR	    ECG:  	  RADIOLOGY: < from: CT Head No Cont (11.24.23 @ 18:08) >    ACC: [id number] EXAM:  CT BRAIN   ORDERED BY: MARLINE TORREZ     PROCEDURE DATE:  11/24/2023          INTERPRETATION:  CLINICAL INFORMATION: Left vertebral artery stent   placement.    TECHNIQUE:  Noncontrast axial CT images were acquired through the head.  Sagittal and coronal reformats were performed.    COMPARISON STUDY: 11/22/2023.    FINDINGS:  There is a stent in the intradural left vertebral artery.  There are   circumferential atherosclerotic calcifications in the intradural right   vertebral artery.    There are patchy small subacute infarcts in the right brachium   pontis/medial aspect of the right cerebellar hemisphere, and in the   posterior inferior aspect of the left cerebellar hemisphere.    There is no CT evidence of acute intracranial hemorrhage, mass effect or   midline shift.  There is no new loss of gray matter-white matter   differentiation.    The ventricles, sulci and extra-axial spaces appear within normal limits.    The there is patchy paranasal sinus mucosalthickening and an air-fluid   level in the right maxillary sinus.    The mastoids are clear bilaterally.    The calvarium, skull base, and orbits appear within normal limits.    IMPRESSION:  There is a stent in the intradural left vertebral artery.    There are patchy small subacute infarcts in the right brachium   pontis/medial aspect of the right cerebellar hemisphere and in the   posterior inferior aspect of the left cerebellar hemisphere.    No new intracranial findings.  No evidence of hemorrhagic conversion or   new cerebral infarction.    Patchy paranasal sinus mucosal thickening.  An air-fluid level in the   right maxillary sinus may represent trapped secretions versus sinusitis.    --- End of Report ---      < end of copied text >    DIAGNOSTIC TESTING:  [ ] Echocardiogram:  [ ]  Catheterization:  [ ] Stress Test:    OTHER:

## 2023-11-25 NOTE — PROGRESS NOTE ADULT - ASSESSMENT
ASSESSMENT/PLAN: s/p LVA recanalization and stent, s/p TNK on 11/16    NEURO:  Neurocheck Q2, Vitals Q2  Integrilin ggt to be stopped at noon   ASA+ Brilinta   Tylenol and OXy pRN  Activity: [] OOB as tolerated [] Bedrest [x] PT [x] OT [] PMNR    PULM:  RA  sat > 92%  Incentive Spirometry   CPAP at home for RADHA    CV:  SBP goal:   Cardene ggt at 10 ccs    RENAL:  Fluids: NS at 100 ccs/hour    GI:  Diet: NPO, pending dysphagia screen  GI prophylaxis [] not indicated [] PPI [] other:  Bowel regimen [] colace [x] senna [] other: miralax  Last BM: Pending     ENDO:   Goal euglycemia (-180)    HEME/ONC:  VTE prophylaxis: [x] SCDs [] chemoprophylaxis [] high risk of DVT/PE on admission due to:  DVT pppx Lovenox 40 mg QD to be started 11/25 at 18:00    ID:  Afebrile   monitor for fevers    MISC:    SOCIAL/FAMILY:  [] updated at bedside [] family meeting    CODE STATUS:  [x] Full Code [] DNR [] DNI [] Palliative/Comfort Care    DISPOSITION:  [x] ICU [] Stroke Unit [] Floor [] EMU [] RCU [] PCU    [x] Patient is at high risk of neurologic deterioration/death due to: post op hemorrhage, acute stroke    ASSESSMENT/PLAN: s/p LVA recanalization and stent, s/p TNK on 11/16    NEURO:  Neurocheck Q2, Vitals Q2  ASA+ Brilinta   Tylenol and oxy prn  Consult stroke in AM to follow  Activity: [] OOB as tolerated [] Bedrest [x] PT [x] OT [] PMNR    PULM:  RA  sat > 92%  Incentive Spirometry   CPAP at home for RADHA    CV:  SBP goal:   On norvasc 10 daily, hydralazine 50q8, coreg 25 BID home meds  Cardene ggt at 10 ccs    RENAL:  Fluids: NS at 100 ccs/hour    GI:  Diet: Soft Diet and sized  GI prophylaxis [] not indicated [] PPI [] other:  Bowel regimen [] colace [x] senna [] other: miralax  Last BM: Pending     ENDO:   Goal euglycemia (-180)    HEME/ONC:  VTE prophylaxis: [x] SCDs [] chemoprophylaxis [] high risk of DVT/PE on admission due to:  DVT ppx Lovenox 40 mg QD to be started 11/25 at 18:00    ID:  Afebrile   monitor for fevers    MISC:    SOCIAL/FAMILY:  [] updated at bedside [] family meeting    CODE STATUS:  [x] Full Code [] DNR [] DNI [] Palliative/Comfort Care    DISPOSITION:  [x] ICU [] Stroke Unit [] Floor [] EMU [] RCU [] PCU    [x] Patient is at high risk of neurologic deterioration/death due to: post op hemorrhage, acute stroke    ASSESSMENT/PLAN: s/p LVA recanalization and stent, s/p TNK on 11/16    NEURO:  Neurocheck Q2, Vitals Q2  ASA+ Brilinta   Tylenol and oxy prn  Consult stroke in AM to follow  Activity: [] OOB as tolerated [] Bedrest [x] PT [x] OT [] PMNR    PULM:  RA  sat > 92%  Incentive Spirometry   CPAP at home for RADHA    CV:  SBP goal:   On norvasc 10 daily, hydralazine 50q8, coreg 25 BID home meds  Cardene ggt at 10 ccs    RENAL:  Fluids: NS at 100 ccs/hour    GI:  Diet: Soft Diet and sized  GI prophylaxis [] not indicated [] PPI [] other:  Bowel regimen [] colace [x] senna [] other: miralax  Last BM: Pending     ENDO:   Goal euglycemia (-180)    HEME/ONC:  VTE prophylaxis: [x] SCDs [] chemoprophylaxis [] high risk of DVT/PE on admission due to:  DVT ppx Lovenox 40 mg QD to be started 11/25 at 18:00    ID:  Afebrile   monitor for fevers    MISC:    SOCIAL/FAMILY:  [] updated at bedside [] family meeting    CODE STATUS:  [x] Full Code [] DNR [] DNI [] Palliative/Comfort Care    DISPOSITION:  [x] ICU [] Stroke Unit [] Floor [] EMU [] RCU [] PCU    [x] Patient is at high risk of neurologic deterioration/death due to: post op hemorrhage, acute stroke     Critical care 20 min   ASSESSMENT/PLAN: s/p LVA recanalization and stent, s/p TNK on 11/16    NEURO:  Neurocheck Q2, Vitals Q2  ASA+ Brilinta   Tylenol and oxy prn  Trazodone, Lexapro home meds  Consult stroke in AM to follow  Activity: [] OOB as tolerated [] Bedrest [x] PT [x] OT [] PMNR    PULM:  RA  sat > 92%  Incentive Spirometry   CPAP at home for RADHA    CV:  SBP goal:   On norvasc 10 daily, hydralazine 50q8, lisinopril 40 daily home meds, hold coreg, start labetalol 200q8 for persistent HTN  Cardene ggt at 10 ccs    RENAL:  Fluids: NS change from 100 to 50ccs    GI:  Diet: Soft Diet and sized  GI prophylaxis [x] not indicated [] PPI [] other:  Bowel regimen [] colace [x] senna [] other: miralax  Last BM: Pending     ENDO:   Goal euglycemia (-180)    HEME/ONC:  VTE prophylaxis: [x] SCDs [] chemoprophylaxis [] high risk of DVT/PE on admission due to:  Lovenox 40 mg QD changed to Lovenox 30 BID for dvt ppx    ID:  Afebrile   monitor for fevers    MISC:    SOCIAL/FAMILY:  [] updated at bedside [] family meeting    CODE STATUS:  [x] Full Code [] DNR [] DNI [] Palliative/Comfort Care    DISPOSITION:  [x] ICU [] Stroke Unit [] Floor [] EMU [] RCU [] PCU    [x] Patient is at high risk of neurologic deterioration/death due to: post op hemorrhage, acute stroke     Critical care 20 min

## 2023-11-25 NOTE — PROGRESS NOTE ADULT - ASSESSMENT
patient is a 42 y/O RIGHT handed man who presented to [facility] ED on 11/16/2023, as a CODE STROKE, with c/o difficulty walking, LLE weakness, speech changes. PMH significant for: stroke (7/2023 - presenting with dysarthria - left inferior adrienne, b/l parieto-occipital juxtacortical WM w/mild residual dysarthria; 8 years ago - treated with TPA at [facility] - p/w L hemiplegia, facial droop and dysarthria), T2DM, HTN, former smoker (quit in 7/2023). Symptoms beginning at 17:00, 11/16/2023. Noted at the code to have a L facial, LUE ataxia, b/l LE ataxia, slurred speech. No appreciable drift in any of the extremities. Underwent CT scans with out sign of hemorrhage or jayla LVO. Tenecteplase administered at 19:47, with improvement from NIHSS 4 to 2. Patient previously taking clopidogrel and aspirin after last stroke. Currently only taking aspirin at the instructions of his vascular neurologist.    # CVA   - CT and MRI as noted, multiple embolic strokes   - Patient is S/P TPA  - Was on ASA, now on Brilinta and Lipitor ASA     - Was on full dose heparin gtt, now on Lovenox 40 Qd PPX dose as patient planned for angio with stenting with NSGY, completed 11/24   - TTE w/ EF of 68%, no WM  - PT/OT, aspiration precautions   - S/P Angio with NSGY  - Patient is medically optimized for angio - moderate risk candidate. S/P angio/ stenting w/ NSGY on 11/24   - S/P LVA recanalization and stent placement with NSGY; Neurochecks per protocol; Planned for repeat CT head   - Per Stroke Neurology/ NSICU     # HTN   - TTE w/ EF of 68%, no WMA    - BP parameters as per stroke neurology --> Now on Nicardepine gtt, Lisinopril 40, Norvasc 10, Coreg 25 PO BID   - Monitor BP, VS     # DM  - A1C of 5.5  - C/w sliding scale     # HLD    - C/w Statin therapy     DVT and GI PPX

## 2023-11-25 NOTE — PROGRESS NOTE ADULT - SUBJECTIVE AND OBJECTIVE BOX
Patient seen and examined at bedside.    --Anticoagulation--  aspirin  chewable 81 milliGRAM(s) Oral <User Schedule>  eptifibatide Infusion 75 mg/100 mL 1 MICROgram(s)/kG/Min IV Continuous <Continuous>  ticagrelor 90 milliGRAM(s) Enteral Tube two times a day    T(C): 36.7 (11-24-23 @ 19:00), Max: 37.1 (11-24-23 @ 07:46)  HR: 95 (11-25-23 @ 00:10) (62 - 97)  BP: 154/70 (11-24-23 @ 08:00) (133/79 - 174/83)  RR: 23 (11-24-23 @ 20:30) (14 - 25)  SpO2: 97% (11-25-23 @ 00:10) (96% - 100%)  Wt(kg): --    Exam: Dysarthric, AOx3, slight Left facial, LUE drift, but NORMAN 5/5

## 2023-11-25 NOTE — CHART NOTE - NSCHARTNOTEFT_GEN_A_CORE
43yM who presented to [facility] ED on 11/16/2023, as a CODE STROKE, with c/o difficulty walking, LLE weakness, speech changes. PMH significant for: stroke (7/2023 - presenting with dysarthria - left inferior adrienne, b/l parieto-occipital juxtacortical WM w/mild residual dysarthria; 8 years ago - p/w L hemiplegia, facial droop and dysarthria), T2DM, HTN, former smoker (quit in 7/2023). Pt admitted with acute onset slurred speech (worse from baseline) and inability to stand/walk. On exam: L facial droop, ataxia in LUE (dysmetria with FTN), and struggles to perform HTS b/l; pronounced slurred speech (moderate but intelligible).   11/20: s/p angio w/ occlusion of b/l vertebral arteries with some flow to the basilar from R pcom and anterior spinal artery. No stent placed on 11/20. Following multidisciplinary discussion recommend angio/ stenting on 11/22. 11/24 -> s/p IR for stent.  SWALLOW HISTORY:  7/2023: During previous admission, speech/language evaluation was completed - pt w/ moderate to severe dysarthria and hypophonia. No language or cognitive deficits. Had passed RN dysphagia screen and was initiated on po diet per MD. 11/17/2023: Failed dysphagia screen in ED. Initial bedside swallow Rx: regular and thin liquid diet. Called back 11/20 after change in status, Rx Puree/ Mildly thick liquids. MBS recommended, deferred due to rescheduling of IR for stent.    MRI 11/17: IMPRESSION: Small vessel white matter ischemic changes. Acute infarcts in the posterior circulation involving the left occipital lobe, left cerebellum and bilateral brachium pontis, right adrienne. Chronic appearing infarct adrienne. Extremely poor flow identified in the basilar artery due to what appears to be severe distal vertebral artery stenosis. Flow in the posterior communicating arteries and posterior cerebral arteries is too small to measure.    Today, Pt was tentatively scheduled for MBS. However, as per chart, and d/w NSCU TERI Pang, pt not able to come off unit today for exam. Therefore, Pt seen at bedside for repeat evaluation of swallow to monitor for initiation of oral diet post-op and plan for rescheduling of MBS. Pt awake and alert, oriented, verbally responsive, following directions for the purposes of the exam, + dysarthria. Pt reports good tolerance of PO intake of previously recommended textures. Pt's dysarthria appears to be improving slightly, and Pt appears to be independently attempting to compensate for dysarthria and incoordination, using over-articulation and reduced rate. Pt was administered PO trials of puree, mildly thick liquids, thin liquids, soft and hard solids. Pt continues to present with reduced coordination overall, and mild impulsivity with self-feeding. Pt presents with evidence of an oropharyngeal dysphagia notable for prolonged yet effective mastication for solids, reduced A-P transport for solids, suspected premature spillage of liquids, delay in swallow trigger, palpable hyolaryngeal elevation/excursion, and overt cough after thin liquids. No other s/s laryngeal penetration/aspiration observed during this evaluation. Purposeful proactive rounding reinforced and 5 Ps addressed. Pt left in no distress.     IMPRESSIONS: Pt appears to be a candidate for re-initiation of an oral diet. Pt continues to present with an oropharyngeal dysphagia notable for overt s/s laryngeal penetration/aspiration with thin liquids. Would suggest more conservative chewable solids, in setting of incoordination and impulsivity. Continue to recommend instrumental exam for more comprehensive evaluation of swallow function and safety.    RECOMMENDATIONS   Soft and Bite-sized with Mildly thick liquids  Strict aspiration and reflux precautions!   Maintain good oral hygiene.   MBS is recommended, tentatively scheduled for Monday, 11/27, as per d/w TERI Pang.  This service will continue to follow.   Acute Rehab on d/c.    Findings d/w BONY Veras, and [facility] TERI Pang.    Karol Friedman MA, CCC-SLP  Speech-Language Pathologist  office [phone]  [employer] preferred

## 2023-11-25 NOTE — PROGRESS NOTE ADULT - SUBJECTIVE AND OBJECTIVE BOX
Name of Patient : ROB OSHEA  MRN: [id number]  Date of visit: 11-25-23       Subjective: Patient seen and examined. No new events except as noted.     REVIEW OF SYSTEMS:    CONSTITUTIONAL: No weakness, fevers or chills  EYES/ENT: No visual changes;  No vertigo or throat pain   NECK: No pain or stiffness  RESPIRATORY: No cough, wheezing, hemoptysis; No shortness of breath  CARDIOVASCULAR: No chest pain or palpitations  GASTROINTESTINAL: No abdominal or epigastric pain. No nausea, vomiting, or hematemesis; No diarrhea or constipation. No melena or hematochezia.  GENITOURINARY: No dysuria, frequency or hematuria  NEUROLOGICAL: No numbness or weakness  SKIN: No itching, burning, rashes, or lesions   All other review of systems is negative unless indicated above.    MEDICATIONS:  MEDICATIONS  (STANDING):  amLODIPine   Tablet 10 milliGRAM(s) Oral daily  aspirin  chewable 81 milliGRAM(s) Oral <User Schedule>  atorvastatin 80 milliGRAM(s) Oral at bedtime  chlorhexidine 4% Liquid 1 Application(s) Topical daily  enoxaparin Injectable 30 milliGRAM(s) SubCutaneous <User Schedule>  escitalopram 10 milliGRAM(s) Oral daily  hydrALAZINE 50 milliGRAM(s) Oral every 8 hours  influenza   Vaccine 0.5 milliLiter(s) IntraMuscular once  labetalol 200 milliGRAM(s) Oral every 8 hours  lisinopril 40 milliGRAM(s) Oral daily  niCARdipine Infusion 5 mG/Hr (25 mL/Hr) IV Continuous <Continuous>  polyethylene glycol 3350 17 Gram(s) Oral daily  senna 2 Tablet(s) Oral at bedtime  sodium chloride 0.9%. 1000 milliLiter(s) (50 mL/Hr) IV Continuous <Continuous>  ticagrelor 90 milliGRAM(s) Enteral Tube two times a day  traZODone 50 milliGRAM(s) Oral at bedtime      PHYSICAL EXAM:  T(C): 37 (11-25-23 @ 19:00), Max: 37 (11-25-23 @ 19:00)  HR: 77 (11-25-23 @ 22:05) (74 - 108)  BP: --  RR: 25 (11-25-23 @ 22:00) (12 - 32)  SpO2: 100% (11-25-23 @ 22:05) (91% - 100%)  Wt(kg): --  I&O's Summary    24 Nov 2023 07:01  -  25 Nov 2023 07:00  --------------------------------------------------------  IN: 2867.5 mL / OUT: 1855 mL / NET: 1012.5 mL    25 Nov 2023 07:01  -  25 Nov 2023 23:57  --------------------------------------------------------  IN: 2755 mL / OUT: 1250 mL / NET: 1505 mL          Appearance: Normal	  HEENT:  PERRLA   Lymphatic: No lymphadenopathy   Cardiovascular: Normal S1 S2, no JVD  Respiratory: normal effort , clear  Gastrointestinal:  Soft, Non-tender  Skin: No rashes,  warm to touch  Psychiatry:  Mood & affect appropriate  Musculuskeletal: No edema    recent labs, Imaging and EKGs personally reviewed     11-24-23 @ 07:01  -  11-25-23 @ 07:00  --------------------------------------------------------  IN: 2867.5 mL / OUT: 1855 mL / NET: 1012.5 mL    11-25-23 @ 07:01  -  11-25-23 @ 23:57  --------------------------------------------------------  IN: 2755 mL / OUT: 1250 mL / NET: 1505 mL                          12.1   7.71  )-----------( 214      ( 25 Nov 2023 21:29 )             34.0               11-25    142  |  110<H>  |  13  ----------------------------<  128<H>  3.7   |  21<L>  |  1.10    Ca    8.2<L>      25 Nov 2023 21:29  Phos  2.5     11-25  Mg     1.9     11-25                         Urinalysis Basic - ( 25 Nov 2023 21:29 )    Color: x / Appearance: x / SG: x / pH: x  Gluc: 128 mg/dL / Ketone: x  / Bili: x / Urobili: x   Blood: x / Protein: x / Nitrite: x   Leuk Esterase: x / RBC: x / WBC x   Sq Epi: x / Non Sq Epi: x / Bacteria: x

## 2023-11-25 NOTE — PROGRESS NOTE ADULT - SUBJECTIVE AND OBJECTIVE BOX
HOSPITAL COURSE: 42 YO M with a PMHx stroke (7/2023 with residual dysarthria, and right gaze) T2DM, HTN, former smoker (quit in 7/2023), on ASA 81mg daily.   - 11/16/2023, with c/o sudden difficulty walking, LLE weakness, speech changes. s/p tenecteplase.  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  - 11/24/23:  recanalization and stenting of the L VA    Admission Scores  GCS:   HH:   MF:   NIHSS:   RASS:    CAM-ICU:   ICH:    24 hour Events:       Allergies    No Known Allergies    Intolerances      REVIEW OF SYSTEMS: [ ] Unable to Assess due to neurologic exam   [x ] All ROS addressed below are non-contributory, except:  Neuro: [ ] Headache [ ] Back pain [ ] Numbness [ x] Weakness [ ] Ataxia [ ] Dizziness [ ] Aphasia [x ] Dysarthria [ ] Visual disturbance  Resp: [ ] Shortness of breath/dyspnea, [ ] Orthopnea [ ] Cough  CV: [ ] Chest pain [ ] Palpitation [ ] Lightheadedness [ ] Syncope  Renal: [ ] Thirst [ ] Edema  GI: [ ] Nausea [ ] Emesis [ ] Abdominal pain [ ] Constipation [ ] Diarrhea  Hem: [ ] Hematemesis [ ] bright red blood per rectum  ID: [ ] Fever [ ] Chills [ ] Dysuria  ENT: [ ] Rhinorrhea      DEVICES:   [ ] Restraints [ ] ET tube [ ] central line [ x] arterial line [ ] willson [ ] NGT/OGT [ ] EVD [ ] LD [ ] WILFREDO/HMV [ ] Trach [ ] PEG [ ] Chest Tube     VITALS:   Vital Signs Last 24 Hrs  T(C): 36.2 (24 Nov 2023 08:00), Max: 37.1 (24 Nov 2023 07:46)  T(F): 97.1 (24 Nov 2023 08:00), Max: 97.1 (24 Nov 2023 08:00)  HR: 67 (24 Nov 2023 08:00) (62 - 79)  BP: 154/70 (24 Nov 2023 08:00) (131/79 - 181/90)  BP(mean): 105 (24 Nov 2023 08:00) (78 - 159)  RR: 19 (24 Nov 2023 08:00) (14 - 25)  SpO2: 96% (24 Nov 2023 08:00) (96% - 100%)    Parameters below as of 24 Nov 2023 08:00  Patient On (Oxygen Delivery Method): room air      CAPILLARY BLOOD GLUCOSE      POCT Blood Glucose.: 96 mg/dL (24 Nov 2023 07:47)  POCT Blood Glucose.: 121 mg/dL (23 Nov 2023 21:14)  POCT Blood Glucose.: 90 mg/dL (23 Nov 2023 16:34)    I&O's Summary    23 Nov 2023 07:01  -  24 Nov 2023 07:00  --------------------------------------------------------  IN: 2275 mL / OUT: 2300 mL / NET: -25 mL    24 Nov 2023 07:01  -  24 Nov 2023 12:53  --------------------------------------------------------  IN: 75 mL / OUT: 0 mL / NET: 75 mL        Respiratory:        LABS:                        12.4   6.56  )-----------( 181      ( 24 Nov 2023 06:21 )             35.7     11-24    143  |  108  |  11  ----------------------------<  86  3.8   |  25  |  1.18             MEDICATION LEVELS:     IVF FLUIDS/MEDICATIONS:   MEDICATIONS  (STANDING):  atorvastatin 80 milliGRAM(s) Oral at bedtime  dextrose 5%. 1000 milliLiter(s) (50 mL/Hr) IV Continuous <Continuous>  dextrose 5%. 1000 milliLiter(s) (100 mL/Hr) IV Continuous <Continuous>  dextrose 50% Injectable 25 Gram(s) IV Push once  dextrose 50% Injectable 25 Gram(s) IV Push once  dextrose 50% Injectable 12.5 Gram(s) IV Push once  enoxaparin Injectable 40 milliGRAM(s) SubCutaneous every 24 hours  eptifibatide Infusion 75 mg/100 mL 1 MICROgram(s)/kG/Min (8.94 mL/Hr) IV Continuous <Continuous>  escitalopram 10 milliGRAM(s) Oral daily  glucagon  Injectable 1 milliGRAM(s) IntraMuscular once  influenza   Vaccine 0.5 milliLiter(s) IntraMuscular once  insulin lispro (ADMELOG) corrective regimen sliding scale   SubCutaneous Before meals and at bedtime  polyethylene glycol 3350 17 Gram(s) Oral daily  senna 2 Tablet(s) Oral at bedtime  sodium chloride 0.9%. 1000 milliLiter(s) (100 mL/Hr) IV Continuous <Continuous>  ticagrelor 90 milliGRAM(s) Oral every 12 hours  traZODone 50 milliGRAM(s) Oral at bedtime    MEDICATIONS  (PRN):  acetaminophen     Tablet .. 650 milliGRAM(s) Oral every 6 hours PRN Moderate Pain (4 - 6)  dextrose Oral Gel 15 Gram(s) Oral once PRN Blood Glucose LESS THAN 70 milliGRAM(s)/deciliter        IMAGING:      EXAMINATION:  PHYSICAL EXAM:    Constitutional: No Acute Distress     Neurological: Awake, alert oriented to person, place and time, Following Commands, PERRL, EOMI, Right Gaze Preference, Left upper extremity pronator drift, LLE AG, 4/5 dorsiflexion and 4/5 plantar flexion, RLE dorsiflexion 5/5 and plantar flexion 5/5                                                 Sensation: [ ] intact to light touch  [ ] decreased:     Reflexes: Deep Tendon Reflexes Intact     Pulmonary: Clear to Auscultation, No rales, No rhonchi, No wheezes     Cardiovascular: S1, S2, Regular rate and rhythm     Gastrointestinal: Soft, Non-tender, Non-distended     Extremities: No calf tenderness     Incision: c.d.i.   HOSPITAL COURSE: 44 YO M with a PMHx stroke (7/2023 with residual dysarthria, and right gaze) T2DM, HTN, former smoker (quit in 7/2023), on ASA 81mg daily.   - 11/16/2023, with c/o sudden difficulty walking, LLE weakness, speech changes. s/p tenecteplase.  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  - 11/24/23:  recanalization and stenting of the L VA    Admission Scores  GCS:   HH:   MF:   NIHSS:   RASS:    CAM-ICU:   ICH:    24 hour Events:       11/25: Neurological physical exam unchanged, Integrillin drip to be dc/'ed    Allergies    No Known Allergies    Intolerances      REVIEW OF SYSTEMS: [ ] Unable to Assess due to neurologic exam   [x ] All ROS addressed below are non-contributory, except:  Neuro: [ ] Headache [ ] Back pain [ ] Numbness [ x] Weakness [ ] Ataxia [ ] Dizziness [ ] Aphasia [x ] Dysarthria [ ] Visual disturbance  Resp: [ ] Shortness of breath/dyspnea, [ ] Orthopnea [ ] Cough  CV: [ ] Chest pain [ ] Palpitation [ ] Lightheadedness [ ] Syncope  Renal: [ ] Thirst [ ] Edema  GI: [ ] Nausea [ ] Emesis [ ] Abdominal pain [ ] Constipation [ ] Diarrhea  Hem: [ ] Hematemesis [ ] bright red blood per rectum  ID: [ ] Fever [ ] Chills [ ] Dysuria  ENT: [ ] Rhinorrhea      DEVICES:   [ ] Restraints [ ] ET tube [ ] central line [ x] arterial line [ ] willson [ ] NGT/OGT [ ] EVD [ ] LD [ ] WILFREDO/HMV [ ] Trach [ ] PEG [ ] Chest Tube     VITALS:   Vital Signs Last 24 Hrs  T(C): 36.2 (24 Nov 2023 08:00), Max: 37.1 (24 Nov 2023 07:46)  T(F): 97.1 (24 Nov 2023 08:00), Max: 97.1 (24 Nov 2023 08:00)  HR: 67 (24 Nov 2023 08:00) (62 - 79)  BP: 154/70 (24 Nov 2023 08:00) (131/79 - 181/90)  BP(mean): 105 (24 Nov 2023 08:00) (78 - 159)  RR: 19 (24 Nov 2023 08:00) (14 - 25)  SpO2: 96% (24 Nov 2023 08:00) (96% - 100%)    Parameters below as of 24 Nov 2023 08:00  Patient On (Oxygen Delivery Method): room air      CAPILLARY BLOOD GLUCOSE      POCT Blood Glucose.: 96 mg/dL (24 Nov 2023 07:47)  POCT Blood Glucose.: 121 mg/dL (23 Nov 2023 21:14)  POCT Blood Glucose.: 90 mg/dL (23 Nov 2023 16:34)    I&O's Summary    23 Nov 2023 07:01  -  24 Nov 2023 07:00  --------------------------------------------------------  IN: 2275 mL / OUT: 2300 mL / NET: -25 mL    24 Nov 2023 07:01  -  24 Nov 2023 12:53  --------------------------------------------------------  IN: 75 mL / OUT: 0 mL / NET: 75 mL        Respiratory:        LABS:                        12.4   6.56  )-----------( 181      ( 24 Nov 2023 06:21 )             35.7     11-24    143  |  108  |  11  ----------------------------<  86  3.8   |  25  |  1.18             MEDICATION LEVELS:     IVF FLUIDS/MEDICATIONS:   MEDICATIONS  (STANDING):  atorvastatin 80 milliGRAM(s) Oral at bedtime  dextrose 5%. 1000 milliLiter(s) (50 mL/Hr) IV Continuous <Continuous>  dextrose 5%. 1000 milliLiter(s) (100 mL/Hr) IV Continuous <Continuous>  dextrose 50% Injectable 25 Gram(s) IV Push once  dextrose 50% Injectable 25 Gram(s) IV Push once  dextrose 50% Injectable 12.5 Gram(s) IV Push once  enoxaparin Injectable 40 milliGRAM(s) SubCutaneous every 24 hours  eptifibatide Infusion 75 mg/100 mL 1 MICROgram(s)/kG/Min (8.94 mL/Hr) IV Continuous <Continuous>  escitalopram 10 milliGRAM(s) Oral daily  glucagon  Injectable 1 milliGRAM(s) IntraMuscular once  influenza   Vaccine 0.5 milliLiter(s) IntraMuscular once  insulin lispro (ADMELOG) corrective regimen sliding scale   SubCutaneous Before meals and at bedtime  polyethylene glycol 3350 17 Gram(s) Oral daily  senna 2 Tablet(s) Oral at bedtime  sodium chloride 0.9%. 1000 milliLiter(s) (100 mL/Hr) IV Continuous <Continuous>  ticagrelor 90 milliGRAM(s) Oral every 12 hours  traZODone 50 milliGRAM(s) Oral at bedtime    MEDICATIONS  (PRN):  acetaminophen     Tablet .. 650 milliGRAM(s) Oral every 6 hours PRN Moderate Pain (4 - 6)  dextrose Oral Gel 15 Gram(s) Oral once PRN Blood Glucose LESS THAN 70 milliGRAM(s)/deciliter        IMAGING:      EXAMINATION:  PHYSICAL EXAM:    Constitutional: No Acute Distress     Neurological: Awake, alert oriented to person, place and time, Following Commands, PERRL, EOMI, Right Gaze Preference, Left upper extremity pronator drift, LLE AG, 4/5 dorsiflexion and 4/5 plantar flexion, RLE dorsiflexion 5/5 and plantar flexion 5/5                                                 Sensation: [ ] intact to light touch  [ ] decreased:     Reflexes: Deep Tendon Reflexes Intact     Pulmonary: Clear to Auscultation, No rales, No rhonchi, No wheezes     Cardiovascular: S1, S2, Regular rate and rhythm     Gastrointestinal: Soft, Non-tender, Non-distended     Extremities: No calf tenderness     Incision: c.d.i.

## 2023-11-25 NOTE — PROGRESS NOTE ADULT - ASSESSMENT
ASSESSMENT/PLAN: s/p LVA recanalization and stent     NEURO:  Neurocheck Q1, Vitals Q1  CTH at 19:00  Integrilin ggt  ASA+ Brilinta   Activity: [] OOB as tolerated [x] Bedrest [] PT [] OT [] PMNR    PULM:  RA  sat > 92%  Incentive Spirometry     CV:  SBP goal:     RENAL:  Fluids: NS at 75 ccs/hour    GI:  Diet: NPO, pending dysphagia screen  GI prophylaxis [] not indicated [] PPI [] other:  Bowel regimen [] colace [x] senna [] other: miralax    ENDO:   Goal euglycemia (-180)    HEME/ONC:  VTE prophylaxis: [x] SCDs [] chemoprophylaxis [] high risk of DVT/PE on admission due to:    ID:  Afebrile   monitor for fevers    MISC:    SOCIAL/FAMILY:  [] updated at bedside [] family meeting    CODE STATUS:  [x] Full Code [] DNR [] DNI [] Palliative/Comfort Care    DISPOSITION:  [x] ICU [] Stroke Unit [] Floor [] EMU [] RCU [] PCU    [x] Patient is at high risk of neurologic deterioration/death due to: post op hemorrhage, acute stroke    ASSESSMENT/PLAN: s/p LVA recanalization and stent, s/p TNK on 11/16    NEURO:  Neurocheck Q2, Vitals Q2  Integrilin ggt to be stopped at noon   ASA+ Brilinta   Tylenol and OXy pRN  Activity: [] OOB as tolerated [] Bedrest [x] PT [x] OT [] PMNR    PULM:  RA  sat > 92%  Incentive Spirometry   CPAP at home for RADHA    CV:  SBP goal:   Cardene ggt at 10 ccs    RENAL:  Fluids: NS at 100 ccs/hour    GI:  Diet: NPO, pending dysphagia screen  GI prophylaxis [] not indicated [] PPI [] other:  Bowel regimen [] colace [x] senna [] other: miralax  Last BM: Pending     ENDO:   Goal euglycemia (-180)    HEME/ONC:  VTE prophylaxis: [x] SCDs [] chemoprophylaxis [] high risk of DVT/PE on admission due to:  DVT pppx Lovenox 40 mg QD to be started 11/25 at 18:00    ID:  Afebrile   monitor for fevers    MISC:    SOCIAL/FAMILY:  [] updated at bedside [] family meeting    CODE STATUS:  [x] Full Code [] DNR [] DNI [] Palliative/Comfort Care    DISPOSITION:  [x] ICU [] Stroke Unit [] Floor [] EMU [] RCU [] PCU    [x] Patient is at high risk of neurologic deterioration/death due to: post op hemorrhage, acute stroke

## 2023-11-25 NOTE — PROGRESS NOTE ADULT - SUBJECTIVE AND OBJECTIVE BOX
HOSPITAL COURSE: 42 YO M with a PMHx stroke (7/2023 with residual dysarthria, and right gaze) T2DM, HTN, former smoker (quit in 7/2023), on ASA 81mg daily.   - 11/16/2023, with c/o sudden difficulty walking, LLE weakness, speech changes. s/p tenecteplase.  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  - 11/24/23:  recanalization and stenting of the L VA    Admission Scores  GCS:   HH:   MF:   NIHSS:   RASS:    CAM-ICU:   ICH:    24 hour Events:       11/25: Neurological physical exam unchanged, Integrillin drip to be dc/'ed    Allergies    No Known Allergies    Intolerances      REVIEW OF SYSTEMS: [ ] Unable to Assess due to neurologic exam   [x ] All ROS addressed below are non-contributory, except:  Neuro: [ ] Headache [ ] Back pain [ ] Numbness [ x] Weakness [ ] Ataxia [ ] Dizziness [ ] Aphasia [x ] Dysarthria [ ] Visual disturbance  Resp: [ ] Shortness of breath/dyspnea, [ ] Orthopnea [ ] Cough  CV: [ ] Chest pain [ ] Palpitation [ ] Lightheadedness [ ] Syncope  Renal: [ ] Thirst [ ] Edema  GI: [ ] Nausea [ ] Emesis [ ] Abdominal pain [ ] Constipation [ ] Diarrhea  Hem: [ ] Hematemesis [ ] bright red blood per rectum  ID: [ ] Fever [ ] Chills [ ] Dysuria  ENT: [ ] Rhinorrhea      DEVICES:   [ ] Restraints [ ] ET tube [ ] central line [ x] arterial line [ ] willson [ ] NGT/OGT [ ] EVD [ ] LD [ ] WILFREDO/HMV [ ] Trach [ ] PEG [ ] Chest Tube     VITALS:   Vital Signs Last 24 Hrs  T(C): 36.2 (24 Nov 2023 08:00), Max: 37.1 (24 Nov 2023 07:46)  T(F): 97.1 (24 Nov 2023 08:00), Max: 97.1 (24 Nov 2023 08:00)  HR: 67 (24 Nov 2023 08:00) (62 - 79)  BP: 154/70 (24 Nov 2023 08:00) (131/79 - 181/90)  BP(mean): 105 (24 Nov 2023 08:00) (78 - 159)  RR: 19 (24 Nov 2023 08:00) (14 - 25)  SpO2: 96% (24 Nov 2023 08:00) (96% - 100%)    Parameters below as of 24 Nov 2023 08:00  Patient On (Oxygen Delivery Method): room air      CAPILLARY BLOOD GLUCOSE      POCT Blood Glucose.: 96 mg/dL (24 Nov 2023 07:47)  POCT Blood Glucose.: 121 mg/dL (23 Nov 2023 21:14)  POCT Blood Glucose.: 90 mg/dL (23 Nov 2023 16:34)    I&O's Summary    23 Nov 2023 07:01  -  24 Nov 2023 07:00  --------------------------------------------------------  IN: 2275 mL / OUT: 2300 mL / NET: -25 mL    24 Nov 2023 07:01  -  24 Nov 2023 12:53  --------------------------------------------------------  IN: 75 mL / OUT: 0 mL / NET: 75 mL        Respiratory:        LABS:                        12.4   6.56  )-----------( 181      ( 24 Nov 2023 06:21 )             35.7     11-24    143  |  108  |  11  ----------------------------<  86  3.8   |  25  |  1.18             MEDICATION LEVELS:     IVF FLUIDS/MEDICATIONS:   MEDICATIONS  (STANDING):  atorvastatin 80 milliGRAM(s) Oral at bedtime  dextrose 5%. 1000 milliLiter(s) (50 mL/Hr) IV Continuous <Continuous>  dextrose 5%. 1000 milliLiter(s) (100 mL/Hr) IV Continuous <Continuous>  dextrose 50% Injectable 25 Gram(s) IV Push once  dextrose 50% Injectable 25 Gram(s) IV Push once  dextrose 50% Injectable 12.5 Gram(s) IV Push once  enoxaparin Injectable 40 milliGRAM(s) SubCutaneous every 24 hours  eptifibatide Infusion 75 mg/100 mL 1 MICROgram(s)/kG/Min (8.94 mL/Hr) IV Continuous <Continuous>  escitalopram 10 milliGRAM(s) Oral daily  glucagon  Injectable 1 milliGRAM(s) IntraMuscular once  influenza   Vaccine 0.5 milliLiter(s) IntraMuscular once  insulin lispro (ADMELOG) corrective regimen sliding scale   SubCutaneous Before meals and at bedtime  polyethylene glycol 3350 17 Gram(s) Oral daily  senna 2 Tablet(s) Oral at bedtime  sodium chloride 0.9%. 1000 milliLiter(s) (100 mL/Hr) IV Continuous <Continuous>  ticagrelor 90 milliGRAM(s) Oral every 12 hours  traZODone 50 milliGRAM(s) Oral at bedtime    MEDICATIONS  (PRN):  acetaminophen     Tablet .. 650 milliGRAM(s) Oral every 6 hours PRN Moderate Pain (4 - 6)  dextrose Oral Gel 15 Gram(s) Oral once PRN Blood Glucose LESS THAN 70 milliGRAM(s)/deciliter        IMAGING:      EXAMINATION:  PHYSICAL EXAM:    Constitutional: No Acute Distress     Neurological: Awake, alert oriented to person, place and time, Following Commands, PERRL, EOMI, Right Gaze Preference, Left upper extremity pronator drift, LLE AG, 4/5 dorsiflexion and 4/5 plantar flexion, RLE dorsiflexion 5/5 and plantar flexion 5/5                                                 Sensation: [ ] intact to light touch  [ ] decreased:     Reflexes: Deep Tendon Reflexes Intact     Pulmonary: Clear to Auscultation, No rales, No rhonchi, No wheezes     Cardiovascular: S1, S2, Regular rate and rhythm     Gastrointestinal: Soft, Non-tender, Non-distended     Extremities: No calf tenderness     Incision: c.d.i.   HOSPITAL COURSE: 44 YO M with a PMHx stroke (7/2023 with residual dysarthria, and right gaze) T2DM, HTN, former smoker (quit in 7/2023), on ASA 81mg daily.   - 11/16/2023, with c/o sudden difficulty walking, LLE weakness, speech changes. s/p tenecteplase.  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  - 11/24/23:  recanalization and stenting of the L VA    Admission Scores  GCS:   HH:   MF:   NIHSS:   RASS:    CAM-ICU:   ICH:    24 hour Events:       11/25: Neurological physical exam unchanged, Integrillin drip to be dc/'ed    Allergies    No Known Allergies    Intolerances      REVIEW OF SYSTEMS: [ ] Unable to Assess due to neurologic exam   [x ] All ROS addressed below are non-contributory, except:  Neuro: [ ] Headache [ ] Back pain [ ] Numbness [ x] Weakness [ ] Ataxia [ ] Dizziness [ ] Aphasia [x ] Dysarthria [ ] Visual disturbance  Resp: [ ] Shortness of breath/dyspnea, [ ] Orthopnea [ ] Cough  CV: [ ] Chest pain [ ] Palpitation [ ] Lightheadedness [ ] Syncope  Renal: [ ] Thirst [ ] Edema  GI: [ ] Nausea [ ] Emesis [ ] Abdominal pain [ ] Constipation [ ] Diarrhea  Hem: [ ] Hematemesis [ ] bright red blood per rectum  ID: [ ] Fever [ ] Chills [ ] Dysuria  ENT: [ ] Rhinorrhea      DEVICES:   [ ] Restraints [ ] ET tube [ ] central line [ x] arterial line [ ] willson [ ] NGT/OGT [ ] EVD [ ] LD [ ] WILFREDO/HMV [ ] Trach [ ] PEG [ ] Chest Tube     ICU Vital Signs Last 24 Hrs  T(C): 36.4 (25 Nov 2023 07:00), Max: 36.6 (25 Nov 2023 03:00)  T(F): 97.5 (25 Nov 2023 07:00), Max: 97.9 (25 Nov 2023 03:00)  HR: 84 (25 Nov 2023 18:45) (74 - 108)  BP: --  BP(mean): --  ABP: 134/56 (25 Nov 2023 18:45) (116/63 - 151/84)  ABP(mean): 78 (25 Nov 2023 18:45) (71 - 103)  RR: 19 (25 Nov 2023 18:45) (12 - 32)  SpO2: 94% (25 Nov 2023 18:45) (91% - 100%)    O2 Parameters below as of 25 Nov 2023 12:17  Patient On (Oxygen Delivery Method): room air            I&O's Summary    24 Nov 2023 07:01  -  25 Nov 2023 07:00  --------------------------------------------------------  IN: 2867.5 mL / OUT: 1855 mL / NET: 1012.5 mL    25 Nov 2023 07:01  -  25 Nov 2023 20:13  --------------------------------------------------------  IN: 2755 mL / OUT: 1250 mL / NET: 1505 mL        Respiratory:        LABS:  11-24    141  |  107  |  12  ----------------------------<  108<H>  3.8   |  19<L>  |  1.11    Ca    8.7      24 Nov 2023 22:00  Phos  3.5     11-24  Mg     1.9     11-24                            12.1   8.63  )-----------( 191      ( 24 Nov 2023 21:59 )             34.6                MEDICATIONS  (STANDING):  amLODIPine   Tablet 10 milliGRAM(s) Oral daily  aspirin  chewable 81 milliGRAM(s) Oral <User Schedule>  atorvastatin 80 milliGRAM(s) Oral at bedtime  carvedilol 25 milliGRAM(s) Oral every 12 hours  chlorhexidine 4% Liquid 1 Application(s) Topical daily  dextrose 5%. 1000 milliLiter(s) (50 mL/Hr) IV Continuous <Continuous>  dextrose 5%. 1000 milliLiter(s) (100 mL/Hr) IV Continuous <Continuous>  dextrose 50% Injectable 25 Gram(s) IV Push once  dextrose 50% Injectable 25 Gram(s) IV Push once  dextrose 50% Injectable 12.5 Gram(s) IV Push once  enoxaparin Injectable 40 milliGRAM(s) SubCutaneous <User Schedule>  escitalopram 10 milliGRAM(s) Oral daily  glucagon  Injectable 1 milliGRAM(s) IntraMuscular once  hydrALAZINE 50 milliGRAM(s) Oral every 8 hours  influenza   Vaccine 0.5 milliLiter(s) IntraMuscular once  insulin lispro (ADMELOG) corrective regimen sliding scale   SubCutaneous every 6 hours  lisinopril 40 milliGRAM(s) Oral daily  niCARdipine Infusion 5 mG/Hr (25 mL/Hr) IV Continuous <Continuous>  polyethylene glycol 3350 17 Gram(s) Oral daily  senna 2 Tablet(s) Oral at bedtime  sodium chloride 0.9%. 1000 milliLiter(s) (100 mL/Hr) IV Continuous <Continuous>  ticagrelor 90 milliGRAM(s) Enteral Tube two times a day  traZODone 50 milliGRAM(s) Oral at bedtime    MEDICATIONS  (PRN):  acetaminophen     Tablet .. 650 milliGRAM(s) Oral every 6 hours PRN Moderate Pain (4 - 6)  dextrose Oral Gel 15 Gram(s) Oral once PRN Blood Glucose LESS THAN 70 milliGRAM(s)/deciliter      IMAGING:      EXAMINATION:  PHYSICAL EXAM:    Constitutional: No Acute Distress     Neurological: EO spont, Ox3, FC, dysarthria, L facial, B/L dysmetria (L>R), LUE drift, NORMAN 5/5                                                   Sensation: [x] intact to light touch  [ ] decreased:     Reflexes: Deep Tendon Reflexes Intact     Pulmonary: Clear to Auscultation, No rales, No rhonchi, No wheezes     Cardiovascular: S1, S2, Regular rate and rhythm     Gastrointestinal: Soft, Non-tender, Non-distended     Extremities: No calf tenderness     Incision: c.d.i.

## 2023-11-26 NOTE — PROGRESS NOTE ADULT - ASSESSMENT
43y RIGHT handed man who presented to [facility] ED on 11/16/2023, as a CODE STROKE, with c/o difficulty walking, LLE weakness, speech changes. PMH significant for: stroke (7/2023 - presenting with dysarthria - left inferior adrienne, b/l parieto-occipital juxtacortical WM w/mild residual dysarthria; 8 years ago - treated with TPA at [facility] - p/w L hemiplegia, facial droop and dysarthria), T2DM, HTN, former smoker (quit in 7/2023). Symptoms beginning at 17:00, 11/16/2023. Noted at the code to have a L facial, LUE ataxia, b/l LE ataxia, slurred speech. No appreciable drift in any of the extremities. Underwent CT scans with out sign of hemorrhage or jayla LVO. Tenecteplase administered at 19:47, with improvement from NIHSS 4 to 2. Patient previously taking clopidogrel and aspirin after last stroke. Currently only taking aspirin at the instructions of his vascular neurologist.    ED vitals notable for: afebrile, 158/92, HR 79 RR 18, SpO2 97%. Labs notable for: glucose 156. CT imaging disclosed: no hemorrhage, left occipital/right cerebellar acute to subacute infarctions; chronic left basal ganglia, left thalamic and central pontine remote infarctions. There is bilateral atherosclerosis of the carotid system, as well as in the anterior and posterior circulation. The left vertebral artery, basial artery, and posterior cerebral arteries are with progressively reduced flow since last study. Initial exam notable for L facial droop, ataxia in the LUE, bilateral lower limbs, moderate dysarthria, patient unable to stand/walk without holding the transport chair handles.     NIHSS on admission: 4->2 (mild dysarthria, mild L facial - 30 minutes after tenecteplase)  LKN: 17:00, 11/16/2023  pre-MRS: 2 (walks with a cane, otherwise independent)    Impression: L facial weakness, LUE ataxia, dysarthria, gait instability d/t R brain dysfunction d/t acute ischemic stroke

## 2023-11-26 NOTE — PROGRESS NOTE ADULT - ATTENDING COMMENTS
I saw and examined the patient on AM stroke rounds with Zehra Marin.   Overnight, patient reported worsening dysarthria, repeat CTH performed stable.     This AM, on CPAP.  No new symptoms reported.     NEURO:   Awake, alert, oriented, speaking fluently.  Fully attending, and oriented.   Pupils 3-2mm, symmetric, disconjugate gaze, L exotropia, no facial weakness, moderate dysarthria, tongue midline.  MOTOR: 5/5 to confrontation.    COORDINATION: ataxia on FNF on the L.      MRI brain images reviewed: acute stroke, L PICA, R AICA, and L PCA territories.      AP: CTA H&N images reviewed: bilateral vertebral artery V4 occlusion.     AP: 43 year old man with hx of DM, HTN, HLD, tobacco use, recent pontine stroke, remote stroke, vertebral artery occlusion bilaterally, presenting with dysarthria, and gait instability on 11/16, received tenecteplase with improvement, progression of symptoms of diplopia/dysconjugate gaze on 11/19.  Exam with dysconjugate gaze, dysarthria, and ataxia, L>R.  Imaging showing the severe atherosclerotic stenosis bilateral vertebral arteries, and strokes in the posterior circulation, L PICA, R AICA, and L PCA.    Severe intracranial atherosclerotic disease, progressed from recent imaging.  Possible unstable plaque.     Brilinta loaded on 11/21, now on ASA/brilinta  Intracranial stenting planned for 11/24 in AM.
Agree with/edited as appropriate above  s/p R vert stent placement now on integrilin, brilinta, ASA  Q1 hr checks, CTH in am unless sooner if any exam change  aspiration precaution, sit up  has apnea at baseline
recovering s/p intracranial stent.  per NSG will d/c integrillin today.  likely start DVT prophy tonight.  stroke neurology following.
integrillin GTT is off.  stroke neurology saw this afternoon.  OK to transfer from NSCU to stroke unit when bed available.

## 2023-11-26 NOTE — PROGRESS NOTE ADULT - SUBJECTIVE AND OBJECTIVE BOX
HOSPITAL COURSE: 44 YO M with a PMHx stroke (7/2023 with residual dysarthria, and right gaze) T2DM, HTN, former smoker (quit in 7/2023), on ASA 81mg daily.   - 11/16/2023, with c/o sudden difficulty walking, LLE weakness, speech changes. s/p tenecteplase.  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  - 11/24/23:  recanalization and stenting of the L VA  - 11/25: Neurological physical exam unchanged, Integrillin drip to be dc/'ed  - 11/26- No events overnight, still on cardene     Allergies    No Known Allergies    Intolerances        REVIEW OF SYSTEMS: [ ] Unable to Assess due to neurologic exam   [x ] All ROS addressed below are non-contributory, except:  Neuro: [ ] Headache [ ] Back pain [ ] Numbness [ ] Weakness [ ] Ataxia [ ] Dizziness [ ] Aphasia [ ] Dysarthria [ ] Visual disturbance  Resp: [ ] Shortness of breath/dyspnea, [ ] Orthopnea [ ] Cough  CV: [ ] Chest pain [ ] Palpitation [ ] Lightheadedness [ ] Syncope  Renal: [ ] Thirst [ ] Edema  GI: [ ] Nausea [ ] Emesis [ ] Abdominal pain [ ] Constipation [ ] Diarrhea  Hem: [ ] Hematemesis [ ] bright red blood per rectum  ID: [ ] Fever [ ] Chills [ ] Dysuria  ENT: [ ] Rhinorrhea      DEVICES:   [ ] Restraints [ ] ET tube [ ] central line [ ] arterial line [ ] willson [ ] NGT/OGT [ ] EVD [ ] LD [ ] WILFREDO/HMV [ ] Trach [ ] PEG [ ] Chest Tube     VITALS:   Vital Signs Last 24 Hrs  T(C): 37 (25 Nov 2023 19:00), Max: 37 (25 Nov 2023 19:00)  T(F): 98.6 (25 Nov 2023 19:00), Max: 98.6 (25 Nov 2023 19:00)  HR: 71 (26 Nov 2023 00:00) (71 - 106)  BP: --  BP(mean): --  RR: 18 (26 Nov 2023 00:00) (14 - 32)  SpO2: 100% (26 Nov 2023 00:00) (91% - 100%)    Parameters below as of 25 Nov 2023 19:00  Patient On (Oxygen Delivery Method): room air      CAPILLARY BLOOD GLUCOSE      POCT Blood Glucose.: 118 mg/dL (25 Nov 2023 23:24)  POCT Blood Glucose.: 116 mg/dL (25 Nov 2023 17:17)  POCT Blood Glucose.: 110 mg/dL (25 Nov 2023 11:16)  POCT Blood Glucose.: 99 mg/dL (25 Nov 2023 06:03)    I&O's Summary    24 Nov 2023 07:01  -  25 Nov 2023 07:00  --------------------------------------------------------  IN: 2867.5 mL / OUT: 1855 mL / NET: 1012.5 mL    25 Nov 2023 07:01  -  26 Nov 2023 01:06  --------------------------------------------------------  IN: 3394 mL / OUT: 2450 mL / NET: 944 mL        Respiratory:        LABS:                        12.1   7.71  )-----------( 214      ( 25 Nov 2023 21:29 )             34.0     11-25    142  |  110<H>  |  13  ----------------------------<  128<H>  3.7   |  21<L>  |  1.10             MEDICATION LEVELS:     IVF FLUIDS/MEDICATIONS:   MEDICATIONS  (STANDING):  amLODIPine   Tablet 10 milliGRAM(s) Oral daily  aspirin  chewable 81 milliGRAM(s) Oral <User Schedule>  atorvastatin 80 milliGRAM(s) Oral at bedtime  chlorhexidine 4% Liquid 1 Application(s) Topical daily  enoxaparin Injectable 30 milliGRAM(s) SubCutaneous <User Schedule>  escitalopram 10 milliGRAM(s) Oral daily  hydrALAZINE 50 milliGRAM(s) Oral every 8 hours  influenza   Vaccine 0.5 milliLiter(s) IntraMuscular once  labetalol 200 milliGRAM(s) Oral every 8 hours  lisinopril 40 milliGRAM(s) Oral daily  niCARdipine Infusion 5 mG/Hr (25 mL/Hr) IV Continuous <Continuous>  polyethylene glycol 3350 17 Gram(s) Oral daily  potassium chloride   Solution 40 milliEquivalent(s) Oral once  potassium phosphate / sodium phosphate Powder (PHOS-NaK) 1 Packet(s) Oral once  senna 2 Tablet(s) Oral at bedtime  sodium chloride 0.9%. 1000 milliLiter(s) (50 mL/Hr) IV Continuous <Continuous>  ticagrelor 90 milliGRAM(s) Enteral Tube two times a day  traZODone 50 milliGRAM(s) Oral at bedtime    MEDICATIONS  (PRN):  acetaminophen     Tablet .. 650 milliGRAM(s) Oral every 6 hours PRN Moderate Pain (4 - 6)        EXAMINATION:  PHYSICAL EXAM:    Constitutional: No Acute Distress     Neurological: EO spont, Ox3, FC, dysarthria, L facial, B/L dysmetria (L>R), LUE drift, NOMRAN 5/5                                                 Sensation: [x] intact to light touch  [ ] decreased:     Reflexes: Deep Tendon Reflexes Intact     Pulmonary: Clear to Auscultation, No rales, No rhonchi, No wheezes     Cardiovascular: S1, S2, Regular rate and rhythm     Gastrointestinal: Soft, Non-tender, Non-distended     Extremities: No calf tenderness

## 2023-11-26 NOTE — PROGRESS NOTE ADULT - SUBJECTIVE AND OBJECTIVE BOX
Patient seen and examined at bedside.    --Anticoagulation--  aspirin  chewable 81 milliGRAM(s) Oral <User Schedule>  enoxaparin Injectable 30 milliGRAM(s) SubCutaneous <User Schedule>  ticagrelor 90 milliGRAM(s) Enteral Tube two times a day    T(C): 37 (11-26-23 @ 03:00), Max: 37 (11-25-23 @ 19:00)  HR: 77 (11-26-23 @ 03:24) (70 - 99)  BP: --  RR: 17 (11-26-23 @ 03:15) (14 - 32)  SpO2: 98% (11-26-23 @ 03:24) (91% - 100%)  Wt(kg): --    Exam:   Dysarthric, AOx3, slight Left facial, LUE drift, but NORMAN 5/5

## 2023-11-26 NOTE — PROGRESS NOTE ADULT - ASSESSMENT
ASSESSMENT/PLAN: s/p LVA recanalization and stent, s/p TNK on 11/16    NEURO:  Neurocheck Q2, Vitals Q2  ASA+ Brilinta   Tylenol and oxy prn  Trazodone, Lexapro home meds  Consult stroke in AM to follow  Activity: [] OOB as tolerated [] Bedrest [x] PT [x] OT [] PMNR    PULM:  RA  sat > 92%  Incentive Spirometry   CPAP at home for RADHA    CV:  SBP goal:   On norvasc 10 daily, hydralazine 50q8, lisinopril 40 daily home meds, switched coreg to labetalol 300q8 for persistent HTN  Cardene ggt at 10 ccs- will attempt to wean it    RENAL:  Fluids: NS change from 100 to 50ccs  IVL once tolerating sufficient diet    GI:  Diet: Soft Diet and sized  GI prophylaxis [x] not indicated [] PPI [] other:  Bowel regimen [] colace [x] senna [] other: miralax  Last BM: Pending     ENDO:   Goal euglycemia (-180)    HEME/ONC:  VTE prophylaxis: [x] SCDs [] chemoprophylaxis [] high risk of DVT/PE on admission due to:  Lovenox 30 BID for dvt ppx (weight based)    ID:  Afebrile   monitor for fevers    MISC:    SOCIAL/FAMILY:  [] updated at bedside [] family meeting    CODE STATUS:  [x] Full Code [] DNR [] DNI [] Palliative/Comfort Care    DISPOSITION:  [x] ICU [] Stroke Unit [] Floor [] EMU [] RCU [] PCU    [x] Patient is at high risk of neurologic deterioration/death due to: post op hemorrhage, acute stroke     Critical care 20 min   ASSESSMENT/PLAN: s/p LVA recanalization and stent, s/p TNK on 11/16    NEURO:  Neurocheck Q2, Vitals Q2  ASA+ Brilinta   Tylenol and oxy prn  Trazodone, Lexapro home meds  Consult stroke in AM to follow  Activity: [] OOB as tolerated [] Bedrest [x] PT [x] OT [] PMNR    PULM:  RA  sat > 92%  Incentive Spirometry   CPAP at home for RADHA    CV:  SBP goal:   On norvasc 10 daily, hydralazine 50q8, lisinopril 40 daily home meds, switched coreg to labetalol 300q8 for persistent HTN  Cardene ggt at 10 ccs- will attempt to wean it    RENAL:  Fluids: NS change from 100 to 50ccs  IVL once tolerating sufficient diet    GI:  Diet: Soft Diet and sized  GI prophylaxis [] not indicated [X] PPI - Complained of acid reflux   Bowel regimen [] colace [x] senna [] other: miralax  Last BM: Pending     ENDO:   Goal euglycemia (-180)    HEME/ONC:  VTE prophylaxis: [x] SCDs [] chemoprophylaxis [] high risk of DVT/PE on admission due to:  Lovenox 30 BID for dvt ppx (weight based)    ID:  Afebrile   monitor for fevers    MISC:    SOCIAL/FAMILY:  [] updated at bedside [] family meeting    CODE STATUS:  [x] Full Code [] DNR [] DNI [] Palliative/Comfort Care    DISPOSITION:  [x] ICU [] Stroke Unit [] Floor [] EMU [] RCU [] PCU    [x] Patient is at high risk of neurologic deterioration/death due to: post op hemorrhage, acute stroke     Critical care 20 min   ASSESSMENT/PLAN: s/p LVA recanalization and stent, s/p TNK on 11/16    NEURO:  Neurocheck Q2, Vitals Q2  ASA+ Brilinta   Tylenol and oxy prn  Trazodone, Lexapro home meds  Consult stroke in AM to follow  Activity: [] OOB as tolerated [] Bedrest [x] PT [x] OT [] PMNR    PULM:  RA  sat > 92%  Incentive Spirometry   CPAP at home for RADHA    CV:  SBP goal:   Norvasc 10 mg QD changed to Nifedipine 60 mg QD per cardiology recommendation, hydralazine 50q8, lisinopril 40 daily home meds, switched coreg to labetalol 400q8 for persistent HTN  Cardene ggt at 10 ccs- will attempt to wean it  Cardiology following  TTE: EF 68% otherwise unremarkable  LDL 36, on Atorvastatin 80  mg QD (home med)    RENAL:  Fluids: IVL   Renal function stable     GI:  Diet: Soft Diet and sized  GI prophylaxis [] not indicated [X] PPI - Complained of acid reflux   Bowel regimen [] colace [x] senna [] other: miralax  Last BM: PTA    ENDO:   Goal euglycemia (-180)  HbA1c 5.5    HEME/ONC:  H/H stable  VTE prophylaxis: [x] SCDs [] chemoprophylaxis [] high risk of DVT/PE on admission due to:  Lovenox 30 BID for dvt ppx (weight based)    ID:  Afebrile, no leukocytosis   monitor for fevers    MISC:    SOCIAL/FAMILY:  [] updated at bedside [] family meeting    CODE STATUS:  [x] Full Code [] DNR [] DNI [] Palliative/Comfort Care    DISPOSITION:  [x] ICU [] Stroke Unit [] Floor [] EMU [] RCU [] PCU    [x] Patient is at high risk of neurologic deterioration/death due to: post op hemorrhage, acute stroke     Critical care 20 min   ASSESSMENT/PLAN: s/p LVA recanalization and stent, s/p TNK on 11/16    NEURO:  Neurocheck Q2, Vitals Q2  ASA+ Brilinta, lipitor for secondary stroke prevention   Tylenol PRN   Trazodone, Lexapro home meds  Stroke team following   Activity: [x] OOB as tolerated [] Bedrest [x] PT [x] OT [] PMNR    PULM:  RA now, BiPAP at night   sat > 92%  Incentive Spirometry   CPAP at home for RADHA    CV:  SBP goal:   Norvasc 10 mg QD changed to Nifedipine 60 mg QD per cardiology recommendation, hydralazine 50q8, lisinopril 40 daily home meds, switched coreg to labetalol 400q8 for persistent HTN  Cardiology following  TTE: EF 68% otherwise unremarkable  LDL 36, on Atorvastatin 80  mg QD (home med)    RENAL:  Fluids: IVL   Renal function stable     GI:  Diet: Soft Diet and sized  GI prophylaxis [] not indicated [X] PPI - Complained of acid reflux   Bowel regimen [] colace [x] senna [] other: miralax  Last BM: PTA will add SMOG     ENDO:   Goal euglycemia (-180)  HbA1c 5.5    HEME/ONC:  H/H stable  VTE prophylaxis: [x] SCDs [x] chemoprophylaxis [] high risk of DVT/PE on admission due to:  Lovenox 30 BID for dvt ppx (weight based)    ID:  Afebrile, no leukocytosis   monitor for fevers    MISC:    SOCIAL/FAMILY:  [] updated at bedside [] family meeting    CODE STATUS:  [x] Full Code [] DNR [] DNI [] Palliative/Comfort Care    DISPOSITION:  [] ICU [x] Stroke Unit [] Floor [] EMU [] RCU [] PCU    [x] Patient is at high risk of neurologic deterioration/death due to: post op hemorrhage, acute stroke     Critical care 20 min

## 2023-11-26 NOTE — PROGRESS NOTE ADULT - SUBJECTIVE AND OBJECTIVE BOX
Name of Patient : ROB OSHEA  MRN: [id number]  Date of visit: 11-26-23      Subjective: Patient seen and examined. No new events except as noted.     REVIEW OF SYSTEMS:    CONSTITUTIONAL: No weakness, fevers or chills  EYES/ENT: No visual changes;  No vertigo or throat pain   NECK: No pain or stiffness  RESPIRATORY: No cough, wheezing, hemoptysis; No shortness of breath  CARDIOVASCULAR: No chest pain or palpitations  GASTROINTESTINAL: No abdominal or epigastric pain. No nausea, vomiting, or hematemesis; No diarrhea or constipation. No melena or hematochezia.  GENITOURINARY: No dysuria, frequency or hematuria  NEUROLOGICAL: No numbness or weakness  SKIN: No itching, burning, rashes, or lesions   All other review of systems is negative unless indicated above.    MEDICATIONS:  MEDICATIONS  (STANDING):  aspirin  chewable 81 milliGRAM(s) Oral daily  atorvastatin 80 milliGRAM(s) Oral at bedtime  enoxaparin Injectable 30 milliGRAM(s) SubCutaneous <User Schedule>  escitalopram 10 milliGRAM(s) Oral daily  hydrALAZINE 50 milliGRAM(s) Oral every 8 hours  influenza   Vaccine 0.5 milliLiter(s) IntraMuscular once  labetalol 400 milliGRAM(s) Oral every 8 hours  lisinopril 40 milliGRAM(s) Oral daily  NIFEdipine XL 60 milliGRAM(s) Oral daily  pantoprazole    Tablet 40 milliGRAM(s) Oral before breakfast  polyethylene glycol 3350 17 Gram(s) Oral daily  senna 2 Tablet(s) Oral at bedtime  ticagrelor 90 milliGRAM(s) Oral every 12 hours  traZODone 50 milliGRAM(s) Oral at bedtime      PHYSICAL EXAM:  T(C): 37.2 (11-26-23 @ 20:00), Max: 37.2 (11-26-23 @ 20:00)  HR: 72 (11-26-23 @ 20:00) (66 - 84)  BP: 103/52 (11-26-23 @ 20:00) (102/84 - 108/58)  RR: 25 (11-26-23 @ 20:00) (16 - 38)  SpO2: 96% (11-26-23 @ 20:00) (93% - 100%)  Wt(kg): --  I&O's Summary    25 Nov 2023 07:01 - 26 Nov 2023 07:00  --------------------------------------------------------  IN: 4081.5 mL / OUT: 3650 mL / NET: 431.5 mL    26 Nov 2023 07:01 - 26 Nov 2023 21:16  --------------------------------------------------------  IN: 225 mL / OUT: 700 mL / NET: -475 mL          Appearance: Normal	  HEENT:  PERRLA   Lymphatic: No lymphadenopathy   Cardiovascular: Normal S1 S2, no JVD  Respiratory: normal effort , clear  Gastrointestinal:  Soft, Non-tender  Skin: No rashes,  warm to touch  Psychiatry:  Mood & affect appropriate  Musculuskeletal: No edema    recent labs, Imaging and EKGs personally reviewed     11-25-23 @ 07:01  -  11-26-23 @ 07:00  --------------------------------------------------------  IN: 4081.5 mL / OUT: 3650 mL / NET: 431.5 mL    11-26-23 @ 07:01  -  11-26-23 @ 21:16  --------------------------------------------------------  IN: 225 mL / OUT: 700 mL / NET: -475 mL                            12.1   7.71  )-----------( 214      ( 25 Nov 2023 21:29 )             34.0               11-25    142  |  110<H>  |  13  ----------------------------<  128<H>  3.7   |  21<L>  |  1.10    Ca    8.2<L>      25 Nov 2023 21:29  Phos  2.5     11-25  Mg     1.9     11-25                         Urinalysis Basic - ( 25 Nov 2023 21:29 )    Color: x / Appearance: x / SG: x / pH: x  Gluc: 128 mg/dL / Ketone: x  / Bili: x / Urobili: x   Blood: x / Protein: x / Nitrite: x   Leuk Esterase: x / RBC: x / WBC x   Sq Epi: x / Non Sq Epi: x / Bacteria: x

## 2023-11-26 NOTE — PROGRESS NOTE ADULT - THIS PATIENT HAS THE FOLLOWING CONDITION(S)/DIAGNOSES ON THIS ADMISSION:
None
Encephalopathy/Functional Quadriplegia/Cerebral Edema/Brain Compression / Herniation
None

## 2023-11-26 NOTE — PROGRESS NOTE ADULT - ASSESSMENT
-S/p Angio for Left vert stent placement on 11/24/2023  -Continue w/ ASA81 and Brilinta 90BID  -Stroke to see  -Poss MR NOVA Monday

## 2023-11-26 NOTE — PROGRESS NOTE ADULT - SUBJECTIVE AND OBJECTIVE BOX
Subjective: Patient seen and examined. No new events except as noted.   remains in NSCU   remains on cardene gtt     REVIEW OF SYSTEMS:    CONSTITUTIONAL: No weakness, fevers or chills  EYES/ENT: No visual changes;  No vertigo or throat pain   NECK: No pain or stiffness  RESPIRATORY: No cough, wheezing, hemoptysis; No shortness of breath  CARDIOVASCULAR: No chest pain or palpitations  GASTROINTESTINAL: No abdominal or epigastric pain. No nausea, vomiting, or hematemesis; No diarrhea or constipation. No melena or hematochezia.  GENITOURINARY: No dysuria, frequency or hematuria  NEUROLOGICAL: No numbness or weakness  SKIN: No itching, burning, rashes, or lesions   All other review of systems is negative unless indicated above.    MEDICATIONS:  MEDICATIONS  (STANDING):  aspirin  chewable 81 milliGRAM(s) Oral daily  atorvastatin 80 milliGRAM(s) Oral at bedtime  chlorhexidine 4% Liquid 1 Application(s) Topical daily  enoxaparin Injectable 30 milliGRAM(s) SubCutaneous <User Schedule>  escitalopram 10 milliGRAM(s) Oral daily  hydrALAZINE 50 milliGRAM(s) Oral every 8 hours  influenza   Vaccine 0.5 milliLiter(s) IntraMuscular once  labetalol 400 milliGRAM(s) Oral every 8 hours  lisinopril 40 milliGRAM(s) Oral daily  niCARdipine Infusion 5 mG/Hr (25 mL/Hr) IV Continuous <Continuous>  NIFEdipine XL 60 milliGRAM(s) Oral daily  pantoprazole    Tablet 40 milliGRAM(s) Oral before breakfast  polyethylene glycol 3350 17 Gram(s) Oral daily  senna 2 Tablet(s) Oral at bedtime  ticagrelor 90 milliGRAM(s) Oral every 12 hours  traZODone 50 milliGRAM(s) Oral at bedtime      PHYSICAL EXAM:  T(C): 37.1 (11-26-23 @ 07:00), Max: 37.1 (11-26-23 @ 07:00)  HR: 81 (11-26-23 @ 09:15) (66 - 90)  BP: --  RR: 18 (11-26-23 @ 09:15) (16 - 32)  SpO2: 96% (11-26-23 @ 09:15) (91% - 100%)  Wt(kg): --  I&O's Summary    25 Nov 2023 07:01 - 26 Nov 2023 07:00  --------------------------------------------------------  IN: 4081.5 mL / OUT: 3650 mL / NET: 431.5 mL    26 Nov 2023 07:01  -  26 Nov 2023 09:18  --------------------------------------------------------  IN: 125 mL / OUT: 0 mL / NET: 125 mL          Appearance: NAD  HEENT:  Dry oral mucosa, PERRL, EOMI	  Lymphatic: No lymphadenopathy , no edema  Cardiovascular: Normal S1 S2, No JVD, No murmurs , Peripheral pulses palpable 2+ bilaterally  Respiratory: Lungs clear to auscultation, normal effort 	  Gastrointestinal:  Soft, Non-tender, + BS	  Skin: No rashes, No ecchymoses, No cyanosis, warm to touch  Musculoskeletal: Normal range of motion, normal strength  Psychiatry:  Mood & affect appropriate  Ext: No edema  Neurological: Awake, alert oriented to person, place and time, Following Commands, PERRL, EOMI, Right Gaze Preference, Left upper extremity pronator drift, LLE AG, 4/5 dorsiflexion and 4/5 plantar flexion, RLE dorsiflexion 5/5 and plantar flexion 5/5      LABS:    CARDIAC MARKERS:                                12.1   7.71  )-----------( 214      ( 25 Nov 2023 21:29 )             34.0     11-25    142  |  110<H>  |  13  ----------------------------<  128<H>  3.7   |  21<L>  |  1.10    Ca    8.2<L>      25 Nov 2023 21:29  Phos  2.5     11-25  Mg     1.9     11-25          TELEMETRY: SR	    ECG:  	  RADIOLOGY:   DIAGNOSTIC TESTING:  [ ] Echocardiogram:  [ ]  Catheterization:  [ ] Stress Test:    OTHER:

## 2023-11-26 NOTE — PROGRESS NOTE ADULT - ASSESSMENT
patient is a 44 y/O RIGHT handed man who presented to [facility] ED on 11/16/2023, as a CODE STROKE, with c/o difficulty walking, LLE weakness, speech changes. PMH significant for: stroke (7/2023 - presenting with dysarthria - left inferior adrienne, b/l parieto-occipital juxtacortical WM w/mild residual dysarthria; 8 years ago - treated with TPA at [facility] - p/w L hemiplegia, facial droop and dysarthria), T2DM, HTN, former smoker (quit in 7/2023). Symptoms beginning at 17:00, 11/16/2023. Noted at the code to have a L facial, LUE ataxia, b/l LE ataxia, slurred speech. No appreciable drift in any of the extremities. Underwent CT scans with out sign of hemorrhage or jayla LVO. Tenecteplase administered at 19:47, with improvement from NIHSS 4 to 2. Patient previously taking clopidogrel and aspirin after last stroke. Currently only taking aspirin at the instructions of his vascular neurologist.    # CVA   - CT and MRI as noted, multiple embolic strokes   - Patient is S/P TPA  - Was on ASA, now on Brilinta and Lipitor ASA     - Was on full dose heparin gtt, now on Lovenox 40 Qd PPX dose as patient planned for angio with stenting with NSGY, completed 11/24   - TTE w/ EF of 68%, no WM  - PT/OT, aspiration precautions   - S/P Angio with NSGY  - Patient is medically optimized for angio - moderate risk candidate. S/P angio/ stenting w/ NSGY on 11/24   - S/P LVA recanalization and stent placement with NSGY; Neurochecks per protocol; Planned for repeat CT head   - Per Stroke Neurology/ NSICU     # HTN   - TTE w/ EF of 68%, no WMA    - BP parameters as per stroke neurology --> Now on Nicardepine gtt, Lisinopril 40, Norvasc 10, Coreg 25 PO BID   - Monitor BP, VS     # DM  - A1C of 5.5  - C/w sliding scale     # HLD    - C/w Statin therapy     DVT and GI PPX

## 2023-11-27 NOTE — DISCHARGE NOTE NURSING/CASE MANAGEMENT/SOCIAL WORK - PATIENT PORTAL LINK FT
You can access the FollowMyHealth Patient Portal offered by [facility] by registering at the following website: [email or web address]. By joining [facility]’s FollowMyHealth portal, you will also be able to view your health information using other applications (apps) compatible with our system.

## 2023-11-27 NOTE — H&P ADULT - NSHPSOCIALHISTORY_GEN_ALL_CORE
SOCIAL HISTORY  Smoking - former smoker, cessation since 7/2023  EtOH - Denied   Drugs - Denied    FUNCTIONAL HISTORY  Lived with spouse in 1st Fl house w/ 3 MARIAM with B/L HR  Prior Level of Function: Independent in ADLs and ambulation  DME at home cane and RW    CURRENT FUNCTIONAL STATUS  - Bed Mobility: min - mod A  - Transfers: mod A  - Gait: 2 steps max A  - ADLs: LBD max A, SV grooming SOCIAL HISTORY  Smoking - former smoker, cessation since 7/2023  EtOH - Denied   Drugs - Denied    FUNCTIONAL HISTORY  Lived with spouse in 1st Fl house with 3 MARIAM with B/L HR  Prior Level of Function: Independent in ADLs and ambulation  DME at home cane and RW    CURRENT FUNCTIONAL STATUS  - Bed Mobility: min - mod A  - Transfers: mod A  - Gait: 2 steps max A  - ADLs: LBD max A, SV grooming

## 2023-11-27 NOTE — SWALLOW VFSS/MBS ASSESSMENT ADULT - H & P REVIEW
43yM who presented to [facility] ED on 11/16/2023, as a CODE STROKE, with c/o difficulty walking, LLE weakness, speech changes. PMH significant for: stroke (7/2023 - presenting with dysarthria - left inferior adrienne, b/l parieto-occipital juxtacortical WM w/mild residual dysarthria; 8 years ago - treated with TPA at [facility] - p/w L hemiplegia, facial droop and dysarthria), T2DM, HTN, former smoker (quit in 7/2023). Per wife, patient having acute onset slurred speech (worse from baseline) and inability to stand/walk. On exam: patient has a L facial droop, ataxia in LUE (dysmetria with FTN), and struggles to perform HTS b/l. Patient also with pronounced slurred speech (moderate but is intelligible). Underwent NCCTH, which revealed no hemorrhage, but was initially read by Radiology as showing a nonacute left occipital infarct not seen on CTH on 7/26/2023. This infarct was not c/w patient's exam. After risk/benefit discussion w/ pt and wife - tenecteplase was administered at 11/16/2023, 19:47. 30 minutes after administration, NIHSS 4->2 (mild left facial but improved and mild dysarthria). Pt reports voice is much better. Performs FTN without issue with LUE. Performs HTS b/l. L NLF flattening significantly improved. Unable to walk patient d/t safety concerns s/p tenecteplase (on bed rest). Pt reports he takes all of his medications every day including his aspirin. Pt and wife report after patient's last stroke, he went to inpatient rehab. He still has slurred speech but it was "90% better." Walks with a cane, but is otherwise independent, is able to climb stairs with the assistance of his cane./yes

## 2023-11-27 NOTE — PATIENT PROFILE ADULT - FALL HARM RISK - HARM RISK INTERVENTIONS

## 2023-11-27 NOTE — DISCHARGE NOTE PROVIDER - HOSPITAL COURSE
Patient ROB OSHEA is a 43y (1980) RIGHT handed man who presented to [facility] ED on 11/16/2023, as a CODE STROKE, with c/o difficulty walking, LLE weakness, speech changes.  PMH significant for: stroke (7/2023 - presenting with dysarthria - left inferior adrienne, b/l parieto-occipital juxtacortical WM w/mild residual dysarthria; 8 years ago - treated with TPA at [facility] - p/w L hemiplegia, facial droop and dysarthria), T2DM, HTN, former smoker (quit in 7/2023)  Presented as a CODE STROKE at 19:12. Neurology arrived promptly to evaluate patient. Per wife, patient having acute onset slurred speech (worse from baseline) and inability to stand/walk. This began at 17:00, 11/16/2023. On exam: patient has a L facial droop, ataxia in LUE (dysmetria with FTN), and struggles to perform HTS b/l. Patient also with pronounced slurred speech (moderate but is intelligible). Patient was rushed to the CT scanner on the first floor of the hospital d/t CT scanner in ED being used. Underwent NCCTH, which revealed no hemorrhage, but was initially read by Radiology as showing a nonacute left occipital infarct not seen on the CTH performed 7/26/2023. This infarct was not c/w patient's exam. Patient was emergently rushed back to ED for vital signs and weight. These were unable to be obtained on the first floor. After risk/benefit discussion with patient and wife - tenecteplase was administered at 11/16/2023, 19:47. DTN time was delayed d/t patient needing to be taken up to first floor CT scanner from ED (ED scanner in use), needed to return to ED after scans for vitals including BP and weight.  30 minutes after administration, NIHSS 4->2 (mild left facial but improved and mild dysarthria). Patient reports voice is much better. Performs FTN without issue with LUE. Performs HTS b/l. L NLF flattening significantly improved. Unable to walk patient d/t safety concerns s/p tenecteplase (on bed rest).    Imaging:   CTH 11/19/23: Evolving acute infarcts in the posterior circulation are unchanged from 11/17/2023.  No new intracranial findings.     MRI, MRA head/neck Brain 11/17/23: Small vessel white matter ischemic changes. Acute infarcts in the posterior circulation involving the left occipital lobe, left cerebellum and bilateral brachium pontis, right adrienne. Chronic appearing infarct adrienne. Extremely poor flow identified in the basilar artery due to   what appears to be severe distal vertebral artery stenosis. Flow in the posterior communicating arteries and posterior cerebral arteries is too small to measure.    CTH 11/17: Stable posterior circulation infarctions.    CTH/ CTA neck 11/16: No intracranial hemorrhage is appreciated. No intracranial mass lesion is appreciated.Left occipital and right cerebellar acute to subacute infarctions. Left basal ganglia, left thalamic and central pontine remote infarctions    CTA head/Neck CTP 11/16:    1. Right carotid system:  Atherosclerotic plaque without hemodynamically significant stenosis.  2. Left carotid system:  Atherosclerotic plaque without hemodynamically significant stenosis.  3. Vertebral circulation:  Patent.  4. Anterior intracranial circulation:  Intracranial atherosclerosis cavernous and clinoid segments of the internal carotid arteries, mild to moderate, and middle cerebral arteries, moderate on the right and mild-to-moderate on the left.  5.  Posterior intracranial circulation:  Severe atherosclerotic disease has progressed since July 2023 with progressive diminished flow of the left vertebral artery, basilar artery and posterior cerebral arteries. There is likely reversal flow within the basilar artery to perfuse the most distal left vertebral artery and PICA arteries. Occlusion of the right vertebral artery was present in July 2023  6. Brain perfusion:  No acute infarction of the brain is convincingly demonstrated.  However, there is extensive ischemia within the posterior circulation.    CT Head No Cont (11.21.23)  The left ventricles have a normal configuration  Old lacunar infarct involving the anterior left corona radiata region is   again seen  Abnormal areas of low-attenuation involving the left cerebellum left   occipital lobe and right brachium pontis region are again seen. These   findings are compatible with areas of acute infarct. No hemorrhagic   transformation is seen. No significant shift or herniation is seen.  The paranasal sinuses mastoid and middle ear regions appear clear.  Impression: Acute infarct again seen as described above.    MR Head No Cont (11.19.23  Redemonstration of multiple evolving acute/subacute posterior   fossa and left occipital lobe infarcts with associated cytotoxic edema   and without hemorrhagic transformation. Overall appearance is grossly   similar when compared with 11/17/2023.  Additional multiple chronic lacunar infarcts and similar-appearing   chronic white matter microvascular type changes.      CT Head No Cont (11.24.23   There is a stent in the intradural left vertebral artery.  There are patchy small subacute infarcts in the right brachium   pontis/medial aspect of the right cerebellar hemisphere and in the   posterior inferior aspect of the left cerebellar hemisphere.  No new intracranial findings.  No evidence of hemorrhagic conversion or   new cerebral infarction.  Patchy paranasal sinus mucosal thickening.  An air-fluid level in the   right maxillary sinus may represent trapped secretions versus sinusitis.      Impression: Left facial weakness, LUE ataxia, dysarthria, gait instability due to posterior circulation ischemic infarct. Severe disease of posterior circulation involving vertebral, basilar and associated ischemia. Mechanism: TENISHA. Now s/p recanalization and L vert stent placement on 11/24.    s/p L vert stent placement and recanalization on 11/24. Now on brilinta and aspirin. S/p diagnostic cerebral angiogram on 11/20 showing severe distal vertebral artery stenosis bilaterally. MR NOVA recc by neurosurgery as part of outpatient follow up.  ANTITHROMBOTIC THERAPY: Brilinta 90mg BID and aspirin 81mg QD for L vert stent placement and secondary stroke prevention.  TTE with EF of 68%    Evaluated by PT/OT and was recommended AR. Patient stable for discharge.

## 2023-11-27 NOTE — DISCHARGE NOTE PROVIDER - CARE PROVIDER_API CALL
Kumar Guzman  Neurology  [address], [address]  [place] [address]  Phone: [phone]  Fax: [phone]  Follow Up Time: 1 month    Phoebe Xiao  Radiology  [address]  [place] [address]  Phone: [phone]  Fax: [phone]  Follow Up Time: 1 month    Talib Dsouza  Cardiology  [address], [address]  [place] [address]  Phone: [phone]  Fax: [phone]  Follow Up Time: 1 month

## 2023-11-27 NOTE — PROGRESS NOTE ADULT - PROBLEM SELECTOR PLAN 1
s/p tenecteplase  s/p MRI   s/p cerebral angiogram  check CROW  neuro checks per Unit    Monitor on Tele.
s/p tenecteplase  s/p MRI   s/p cerebral angiogram  s/p vertebral stent  DAPT  neuro checks stroke unit   Monitor on Tele.
s/p tenecteplase  s/p MRI   s/p cerebral angiogram  for vertebral stent. Acceptable cardiac risk to proceed  neuro checks per Unit    Monitor on Tele.
s/p tenecteplase  s/p MRI   plan for cerebral angiogram. Acceptable cardiac/medical risk to proceed  RCRI 1  check CROW  neuro checks per Unit    Monitor on Tele.
s/p tenecteplase  s/p MRI   check CROW  neuro checks per Unit    Monitor on Tele.
s/p tenecteplase  s/p MRI   plan for cerebral angiogram. Acceptable cardiac/medical risk to proceed  RCRI 1  check CROW  neuro checks per Unit    Monitor on Tele.
s/p tenecteplase  s/p MRI   s/p cerebral angiogram  check CROW  neuro checks per Unit    Monitor on Tele.
s/p tenecteplase  s/p MRI   s/p cerebral angiogram  s/p vertebral stent  DAPT  neuro checks NSCU  Monitor on Tele.
s/p tenecteplase  s/p MRI   s/p cerebral angiogram  s/p vertebral stent  DAPT  neuro checks NSCU  Monitor on Tele.
s/p tenecteplase  s/p MRI   s/p cerebral angiogram  check CROW  neuro checks per Unit    Monitor on Tele.

## 2023-11-27 NOTE — PROGRESS NOTE ADULT - PROBLEM SELECTOR PLAN 3
Cont IV hydralazine as ordered PRN for SBP > 180.
Cont IV hydralazine as ordered PRN for SBP > 180.
Changed norvasc to Nifedipine 60   increase labetolol 400 tid   cont with rest of meds   titrate down on cardene gtt SBP goal < 150
Cont IV hydralazine as ordered PRN for SBP > 180.
has been hypotensive to 80s   DC Nifedipine 60   Decrease labetolol 300 tid . Hold all meds for SBP < 140  cont with rest of meds
Cont IV hydralazine as ordered PRN for SBP > 180.

## 2023-11-27 NOTE — SWALLOW VFSS/MBS ASSESSMENT ADULT - RESIDUE ALONG THE BASE OF THE TONGUE
COLONOSCOPY: GOLYTELY PREP     Re: Steve Marie      Provider: Keara Underwood MD     Your colon must be cleaned of stool in order to have a good view. You should follow these directions in order to have a successful colonoscopy.    Your exam is scheduled as an outpatient procedure on:     Day: Wednesday    Date: JUNE 26 2019    Arrival Time: 11:00 AM    Location: [facility], [address] [place] [id number] - Directions: Come all the way into the main lobby of the building and take the interior elevator down to the lower level. Turn left off the elevator and walk straight ahead to the Endoscopy reception area.     You will need the following in order to complete your prep:   1. Golytely/Nulytely  2. Two Simethicone tablets (OVER THE COUNTER)  3. Two Dulcolax (Bisacodyl) tablets (OVER THE COUNTER)    Your prep kit has been has been sent to    [facility] [address] [place] [address] AT [facility] & [address] [address]  Phone: [phone] Fax: [phone]  .    Please  the kit today.    7 days before colonoscopy: Review your colonoscopy instructions. (Instructions can also be found on Imnisht)  • Arrange a ride: you will be given medicine that makes you relax and sleepy, so you cannot drive a car or take a bus/taxi home. If you arrive without an 18 year or older escort, your procedure will need to be rescheduled. Your  MUST stay with you for the duration of the procedure.  • Stop eating popcorn, nuts, flax/sesame seeds, or any food that contains seeds  • If you are taking Coumadin/Plavix: Hold for 5 days prior to procedure PLETAL HOLD FOR 7 DAYS    3 BUSINESS DAYS BEFORE your procedure:  • Confirm your ride. Your  MUST stay with you for the duration of the procedure.  • If you need to cancel your appointment, call your doctor or nurse at [phone] to avoid a cancellation fee.  • Stop taking all  anti-inflammatory medicines. These include: Advil, Aleve, Naprosyn, (Tylenol is okay to take)  • Review the diet you need to follow for the next 2 days. Plan your meals according to this diet.      1 DAY BEFORE the procedure:   If you are diabetic: taking insulin: Our recommendation is that you take ½ your insulin dose the day before and day of your procedure but please confirm  insulin dose instructions with your physician (or Endocrinologist)    •Start a strict, clear liquid diet from the moment you wake. A clear liquid diet can include: Apple juice, white grape juice, and white cranberry juice; Beef or chicken broths that are clear - no noodles, vegetables, rice, etc; Tea and coffee without milk; -Soda pop, Gatorade, Donny-Aid and various -Jell-O Flavors (any color except red or purple)  •Avoid: juices with pulp, milk, cream, solid food, alcohol, and hard candy      • In the morning when you wake, follow the directions on the prep packaging to mix your prep.  Fill the gallon jug half full with lukewarm tap water, shake vigorously. Continue to fill the jug with lukewarm water until full. Shake until the powder is completely dissolved. Chill in refrigerator before drinking. You may mix 1-2 tubs of Crystal Light powder to flavor the prep.     Drink half of the gallon preparation at 4:00 p.m, drinking 8 ounces of solution every 10 minutes. Also chew one Simethicone tablet with the first glass of solution. Place remaining solution in the refrigerator.   • The laxative liquid will cause you to have many bowel movements. This is needed so that you will have a clean colon for the procedure. Plan to stay home for the duration of the prep.  If you have problems completing this preparation, call your doctor or nurse at [phone].   Continue to drink clear liquids throughout the evening but do not eat any solid food.    ON THE DAY OF the procedure:    •5:00 AM: Drink 8 ounces of solution every 10 minutes until complete.   Take one Dulcolax (Bisacodyl) tablet with each of the last two glasses of the solution. Chew the remaining Simethicone tablet. For a successful prep, you must drink the entire solution. Stop all clear liquids including water at 7:00 AM.    • Take your medications; GUANFACINE, LEVOTHYROXINE, LISINOPRIL, VILAZODONE, AMLODIPINE, ADDERALL, BUPROPION, CARVEDILOL, DOXAZOSIN, GABAPENTIN, HCTZ with a sip of water before 7:00 AM. You cannot have anything by mouth including water at this time.  Remember to review your diabetic medications instructions and follow accordingly.  If you are still having solid/formed stools please call the doctor's office at [phone].   • Bring your escort/ with you to your appointment. Remember the escort must be 18 years or older.    Bring photo ID, current insurance card, inhalers or nebulizers and an updated list of your current medications with you to your procedure. You will be at the [facility] or the hospital approximately 2-3 hours. Do not wear any jewelry, contact lenses, or bring any valuables. A pregnancy test is required if you are female and under the age of 56. Be aware that there is a chance that your procedure time may be changed.    Pre-Operative Instructions    Patient name: Steve Marie         We have scheduled you today for a GI procedure that will be performed sometime within the next few months. Because we realize that there may be some changes in the maintenance of your health after today, but prior to your procedure, we ask that you note the followin. If you have any additional medications that are added to the current medications you are taking, please notify our office so that we can properly instruct you in how to take this around the time of your procedure. For example,  if you started on any type of blood thinner, any type of anti inflammatory medication, or any type of iron supplement or vitamin, these medications will  need to be stopped, depending on what they are, several days BEFORE the procedure. If you are given pain medications or any new type of heart medication or blood pressure pills or are a newly diagnoses diabetic put on blood sugar medication, we may also need to make adjustments regarding these.  2. If you have any type of device implanted (pacemaker, defibrillator, implanted pain device or stimulator, or have any kind of joint replacement) please let us know, as special arrangements may need to be made in order for the procedure to be performed.  3. If you develop new allergies, such as Latex, Demerol or Versed, special arrangements may need to be made.  4. We will also need to be made aware of any changes in your insurance as we want to be sure that you receive the full benefit of your plan.      We understand that situations may arise causing you to cancel and/or reschedule your procedure date. We would appreciate at least two weeks notice. Thank you for your cooperation.    The staff of the [facility] offers these suggestions if you are scheduled for surgery.    1. Plan for unforeseen changes in surgery time. Although we try to maintain a set surgery schedule, there are times when a surgery case may take longer than expected. This may result in your being in the surgery center longer than originally planned.      2. Arrange for an adult to stay with you at the surgery center. It is very important that you have an adult stay with you at the surgery center during your procedure. The medications you receive can make you sleepy and forgetful. For this reason, the doctor may want to discuss your surgery and post-operative care with an adult friend or family member. Additionally, an adult will be needed to drive you home. An adult must stay with you for the first 24 hours after your surgery. IF YOU DO  NOT HAVE AN ADULT TO DRIVE YOU HOME, YOUR SURGERY WILL BE CANCELLED.     3. Remember to  Trace/Mild follow pre-operative dietary restrictions. An empty stomach is imperative to prevent nausea or vomiting during and after your procedure.      4. Things to bring with you:  · a list of your current medications (including \"over the counter\" and herbal medications)  · important legal documents such as Power of , Guardianship papers  · any assistive device you are currently using (crutches, walker, hearing aid, etc.)    5. Limit visitors while you are at the surgery center. The time spent at the surgery center is very brief and will be limited to preparing you for surgery and assisting you to recover afterwards. Surgery center rooms are very small, which requires us to limit the number of visitors allowed in at one time.      6. Avoid alcoholic beverages. Because you will be asked to \"fast\" before your procedure, your body will be in a naturally dehydrated state. Alcohol will add to this dehydration making you more prone to problems with blood pressure during and after your procedure You should avoid alcoholic beverages for at least 24 hours prior to your surgery.    7. Wear loose comfortable clothes. Clothes that are easy to put on and take off are best. Sweat pants, shirts with buttons, and slip-on shoes are wise choices. Avoid tight-fitting clothing such as blue jeans and turtlenecks.    8. Patients on Anticoagulation Medications:   IF YOU HAVE NOT RECEIVED INSTRUCTIONS REGARDING YOUR BLOOD THINNING MEDICATIONS WITHIN 7 DAYS OF YOUR SURGERY, PLEASE CALL THE GI DOCTOR'S OFFICE. FAILURE TO DO SO MAY RESULT IN CANCELLATION OF YOUR SURGERY.     9. Ask questions and insist on answers. Surgery can be a stressful time for anyone. If you have any questions or are unsure about any information given to you, be sure to ask for clarification. A patient can never ask too many questions. If there is something on your mind, let us know. We always want you to feel safe and confident in the care you are receiving.         TO  ALL [facility] AMBULATORY SURGERY PATIENTS    You can decide today about the care you will receive in the future. [facility] respects your rights and will support your decisions to the fullest extent permitted by law, including your right to refuse or accept medical or surgical treatment. Please be advised that the [facility] prohibits ambulatory surgery centers from upholding Advanced Directives during surgical procedures. For this reason, any Advanced Directive will be null and void during your stay in the [facility] [place].     Although not honored in the [facility], you are still entitled to be informed about your rights surrounding Advanced Directives. Advanced Directives are legal documents that enable you to specify what forms of treatment you want performed or withheld should you become unable to make or communicate these decisions on your own.  Although this is not a common decision made by outpatient surgery patients, we would like to let you know that an information booklet, \"A Personal Decision\" is available upon your request.      How do you know if an Advanced Directive is right for you?  It is important to realize your rights as an individual, what the nature of consent for treatment implies, what a durable power of  for health care is and what a living will is.      After reviewing the booklet, \"A Personal Decision,\" should you have any further questions, please call us at [phone].      Thank you.     PATIENT’S RIGHTS    I. To be treated with respect, consideration and dignity, free from all forms of abuse, harassment and discrimination.     II. To receive quality care and high professional standards in a safe environment.    II. To be provided with appropriate privacy.    III. To expect that all disclosures and records are treated confidentially and, except when  required by law, to be given the opportunity to approve or refuse their release.    IV. To be provided, to the degree known, complete information concerning their diagnosis, treatment and prognosis. When appropriate, the information is provided to a person designated by the patient to be a legally authorized person.    V. To review the records pertaining to his/her medical care and to have the information explained or interpreted as necessary except when restricted law.    VI. To expect that we will communicate with you in a matter that you can understand.     VII. To be given the names of all practitioners and health care personnel participating in his/her care.    VIII. To make decisions about the plan of care prior to and during the course of treatment.  IX. To refuse a recommended treatment or plan of care to the extent permitted by law and to be informed of the medical consequences of this action.     X. To expect that your treatment preferences will be responded to as delineated in your Advanced Directive, within the limits of the ASC bylaws regarding resuscitation    XI. To be informed as to:  A. Rights and Responsibilities.  B. Services available in the organization.  C. Provisions for after-hours and emergency care.  D.  The charges for services and available payment options.   E. The right to consent or decline participation in proposed research  studies.  F.  The available resources for resolving disputes, grievances, and conflicts.      XII. To expect emergency procedures to be implemented without unnecessary delay.    XIII. To expect a safe hospital transfer with appropriate medical records when necessary.     XIV. To know that all patients rights extend to the patient’s legal representatives.     XV. Complaints regarding Patients Rights or any issue surrounding care received during your stay can be made by contacting any of the following organizations:  i. Medicare patient: Visit the Office of Medicare  Beneficiary Sarah at www.medicare.gov on the web.  Or call 1-800-Medicare (1-842.237.2640).     TTY users should call 1-710.108.6592.  ii. Non-Medicare patients can contact the [facility] at [phone]; fax [phone], TTY [phone].  iii. Any patient can also contact the [employer] at [phone] (toll free 8:30 am to 5:00 pm, central time, weekdays).        Patient Responsibilities    It is your responsibility as a [facility] patient:    · To provide all personal and family health information needed to provide you with appropriate care.    · To participate to the best of your ability in making decisions about your medical treatment, and to comply with the agreed upon plan of care.    · To ask questions of your physician or other care providers when you do not understand any information or instructions.    · To maintain appointments as scheduled, or to reschedule in a timely fashion.    · To inform your physician and other care providers if you anticipate problems in following prescribed treatment.    · To recognize the impact of your lifestyle on your personal health.    · To inform your physician or other care provider if you desire a transfer of care to another physician.    · To be considerate of others receiving and providing care.  To observe relevant surgery center policies and procedures.    · To accept financial responsibility for health care services and to work cooperatively with [facility] to resolve financial obligations        Important Information about Your Insurance    The Affordable Care Act passed in March 2010 allows for several preventative services, such as colonoscopies, to be covered at no cost to the patient. However, there are several caveats that may prevent patients from taking advantage of this provision. Please know that there are strict guidelines on which colonoscopies are defined as a preventative  (screening) service.  Therefore some patients with gastrointestinal histories or signs and symptoms may be excluded from taking advantage of the “screening” service at no cost.  Co-pays and deductibles may apply.    Our practice has created this document to sort through some these guidelines. Here is what you need to know:    Colonoscopy Categories:     Diagnostic/therapeutic colonoscopy (Procedure CPT code is 05837)  Patient has past and/or present gastrointestinal symptoms, polyps, or gastrointestinal disease.      Your primary care physician may refer you for a “screening” colonoscopy; however, you may not qualify for the “screening” category. This will be determined in the pre-operative process by the Gastroenterologist.  Before the procedure, you should know your colonoscopy category. After establishing what type of procedure you are having, we encourage you to contact your insurance for benefit clarification.      Who will bill me?  You may receive bills from separate entities associated with your procedure, including the physician, facility, anesthesia, pathologist, and/or laboratory. The [facility] Business Office can only provide you with information associated with [facility] fees. Please contact the your insurance company if you have any questions. Please contact [facility] regarding billing to verify your coverage and any out of pocket responsibility at [phone] ext [phone]. Contact your insurance carrier and verify benefits and coverage with the preoperative CPT and Diagnosis codes provided above.    Can the physician change, add, or delete my diagnosis so that I can be considered a colon screening?  No. The patient encounter is documented as a medical record from information you have provided as well as an evaluation and assessment by the physician. It is a binding legal document that cannot be changed to facilitate better insurance coverage.    Patients need to  understand that strict government and insurance company documentation and coding guidelines prevent a physician from altering a chart or bill for the sole purpose of coverage determination. This may be considered insurance fraud and may be punishable by law.    What if my insurance company tells me that the diagnosis code can be changed, added, or deleted ?  This is actually a common occurrence. Often member service representatives will tell a patient that if only the physician coded the claim with a “screening” diagnosis it would have been covered at 100%. However, further questioning of the representative will reveal that the “screening” diagnosis can only be amended if it applies to the patient. Remember, many insurance carriers only consider a patient over the age of 50 with no personal or family history as well as no past or present gastrointestinal symptoms as a “screening “ (ICD-9CM code V76.51. ICD-10CM code Z12.11).    If you are given this information, please document the date, name, and phone number of the . Often the outcome results in the insurance company calling the patient back explaining that the  should never suggest a physician change their billing to produce better benefit coverage.    If your insurance plan has a high deductible, you may be asked to make a deposit prior to your procedure. For our fees, deposits, or an explanation of this form, please call our Business Office.    REGARDS TO BILLING ISSUES OR BILLING QUESTIONS, Please contact your insurance company.    Please contact [facility] regarding billing, to verify your coverage and any out of pocket responsibility at [phone]      I understand the information shared with me today regarding my insurance and responsibility for any potential out-of-pocket costs.

## 2023-11-27 NOTE — SWALLOW VFSS/MBS ASSESSMENT ADULT - DIAGNOSTIC IMPRESSIONS
Patient admitted as code stroke; found to have multiple evolving infarcts; s/p stent placement in IR. Seen for MBS today to further assess the swallow mechanism and r/o aspiration. Patient presents with an oropharyngeal dysphagia characterized by trace/mild oral residue with solids, premature spillage into the pharynx prior to airway closure resulting in laryngeal penetration before with swallow with less viscous fluids, and incomplete airway closure resulting in silent aspiration of mildly thick and thin liquids during the swallow. Pt is able to clear oral residue via spontaneous repeat swallow and/or liquid wash. +Latent cough response after aspiration event, however, not effective in fully clearing material. A chin tuck was unsuccessful in maintaining airway protection during the swallow. There was no aspiration of mildly thick liquids via teaspoon (only with cup), however, pt remains at risk for aspiration of less viscous fluids given impaired timing/airway closure.    Disorders: reduced lingual strength/ROM/Rate of motion, reduced BOT to posterior pharyngeal wall contact, delay in trigger of the swallow reflex, reduced hyo-laryngeal excursion, reduced laryngeal closure, reduced supraglottic sensation, reduced subglottic sensation.

## 2023-11-27 NOTE — PROGRESS NOTE ADULT - SUBJECTIVE AND OBJECTIVE BOX
Name of Patient : ROB OSHEA  MRN: [id number]  Date of visit: 23       Subjective: Patient seen and examined. No new events except as noted.     REVIEW OF SYSTEMS:    CONSTITUTIONAL: No weakness, fevers or chills  EYES/ENT: No visual changes;  No vertigo or throat pain   NECK: No pain or stiffness  RESPIRATORY: No cough, wheezing, hemoptysis; No shortness of breath  CARDIOVASCULAR: No chest pain or palpitations  GASTROINTESTINAL: No abdominal or epigastric pain. No nausea, vomiting, or hematemesis; No diarrhea or constipation. No melena or hematochezia.  GENITOURINARY: No dysuria, frequency or hematuria  NEUROLOGICAL: No numbness or weakness  SKIN: No itching, burning, rashes, or lesions   All other review of systems is negative unless indicated above.    MEDICATIONS:  MEDICATIONS  (STANDING):  aspirin  chewable 81 milliGRAM(s) Oral daily  atorvastatin 80 milliGRAM(s) Oral at bedtime  enoxaparin Injectable 30 milliGRAM(s) SubCutaneous <User Schedule>  escitalopram 10 milliGRAM(s) Oral daily  hydrALAZINE 25 milliGRAM(s) Oral two times a day  influenza   Vaccine 0.5 milliLiter(s) IntraMuscular once  labetalol 300 milliGRAM(s) Oral three times a day  lisinopril 40 milliGRAM(s) Oral daily  pantoprazole    Tablet 40 milliGRAM(s) Oral before breakfast  polyethylene glycol 3350 17 Gram(s) Oral daily  senna 2 Tablet(s) Oral at bedtime  ticagrelor 90 milliGRAM(s) Oral every 12 hours  traZODone 50 milliGRAM(s) Oral at bedtime      PHYSICAL EXAM:  T(C): 38.3 (23 @ 21:45), Max: 38.5 (23 @ 19:50)  HR: 89 (23 @ 21:45) (56 - 95)  BP: 142/86 (23 @ 21:45) (101/57 - 145/69)  RR: 20 (23 @ 21:45) (18 - 22)  SpO2: 94% (23 @ 21:45) (93% - 100%)  Wt(kg): --  I&O's Summary    2023 07:01  -  2023 07:00  --------------------------------------------------------  IN: 405 mL / OUT:  mL / NET: - mL    2023 07:  -  2023 22:46  --------------------------------------------------------  IN: 700 mL / OUT: 900 mL / NET: -200 mL      Height (cm): 177.8 ( 19:08)  Weight (kg): 119.9 (:08)  BMI (kg/m2): 37.9 (:08)  BSA (m2): 2.35 (:)    Appearance: Normal	  HEENT:  PERRLA   Lymphatic: No lymphadenopathy   Cardiovascular: Normal S1 S2, no JVD  Respiratory: normal effort , clear  Gastrointestinal:  Soft, Non-tender  Skin: No rashes,  warm to touch  Psychiatry:  Mood & affect appropriate  Musculuskeletal: No edema    recent labs, Imaging and EKGs personally reviewed     23 @ 07:01  -  23 @ 07:00  --------------------------------------------------------  IN: 405 mL / OUT:  mL / NET: -5 mL    23 @ 07:01  -  23 @ 22:46  --------------------------------------------------------  IN: 700 mL / OUT: 900 mL / NET: -200 mL                            12.5   11.04 )-----------( 231       20:10 )             36.0                   141  |  105  |  15  ----------------------------<  123<H>  3.7   |  26  |  1.10    Ca    8.7      2023 20:10  Phos  4.6       Mg     2.0         TPro  6.5  /  Alb  3.2<L>  /  TBili  0.7  /  DBili  x   /  AST  14  /  ALT  25  /  AlkPhos  58                         Urinalysis Basic - ( 2023 21:55 )    Color: Yellow / Appearance: Clear / S.017 / pH: x  Gluc: x / Ketone: Negative mg/dL  / Bili: Negative / Urobili: 1.0 E.U./dL   Blood: x / Protein: Negative mg/dL / Nitrite: Negative   Leuk Esterase: Small / RBC: 2 /HPF / WBC 5 /HPF   Sq Epi: x / Non Sq Epi: x / Bacteria: Negative /HPF

## 2023-11-27 NOTE — PROGRESS NOTE ADULT - NS ATTEND OPT1 GEN_ALL_CORE

## 2023-11-27 NOTE — DISCHARGE NOTE NURSING/CASE MANAGEMENT/SOCIAL WORK - NSDCPEFALRISK_GEN_ALL_CORE
For information on Fall & Injury Prevention, visit: [email or web address] OR  [email or web address] OR  https://www.cdc.gov/steadi/patient.html

## 2023-11-27 NOTE — PROGRESS NOTE ADULT - PROBLEM SELECTOR PROBLEM 2
DM (diabetes mellitus)
Detail Level: Detailed
DM (diabetes mellitus)
Detail Level: Zone

## 2023-11-27 NOTE — H&P ADULT - ASSESSMENT
44yo RHD M with PMH significant for strokes (2015 abd 7/2023),  T2DM, HTN, former smoker (quit 7/2023),  presented to [facility] ED (11/16) with c/o difficulty walking, LLE weakness, slurred speech (worse from baseline).  Images with acute/subacute infarcts.  Received TPA with some improvement.     #CVA (2015, 7/2023, 11/2023)  - Start Comprehensive Rehab Program: PT/OT/ST, 3hours daily and 5 days weekly.   - S/p TPA  - from 7/2023 s/p ASA + Plavix x 3 months; now: ASA 81mg + Ticagrelor 90 BID  - Atorvastatin 80 (LDL 36)    #Mood/sleep  -Lexapro 10mg QD  -Trazodone 25mg at bedtime  - Neuropsychology consult     #HTN  - Labetalol 300 TID  - Hydralazine 25mg BID  - Lisinopril 40mg daily  - Monitor    #DM II - A1C 5.5  - Previously on metformin  - FS: <120    #Pain management  - Tylenol PRN    #Skin:  -No active issues at this time  -At risk for pressure injury due to neurologic diagnosis and relative immobility  - Turn Q2 hr while in bed, air mattress  - Nursing to monitor skin Qshift    #FEN   - Diet: Regular + mod thick   - Dysphagia  SLP - evaluation and treatment    #GI/Bowel Mgmt   - At risk for constipation due to neurologic diagnosis, immobility and/or medication use  - Senna,  Miralax   - GI ppx: Protonix    #Bladder management  - PVR q 8 hours (SC if > 400)  - Encourage timed voids Q4hrs while awake for independence and to promote continence during therapy    #DVT ppx  - Lovenox 30 BID  - SCDs, TEDs     Precautions / PROPHYLAXIS:   - Falls, Cardiac  - Weight bearing status: WBAT   - Lungs: Aspiration, Incentive Spirometer   - Pressure injury/Skin: Turn Q2hrs while in bed, OOB to Chair, PT/OT      Follow ups:  Neuro: Dr. Kumar Guzman  Rad: Dr. Phoebe Xiao  Card: Dr. Talib Dsouza  IM: Appt 12/19/2023    MEDICAL PROGNOSIS: GOOD            REHAB POTENTIAL: GOOD             ESTIMATED DISPOSITION: HOME WITH HOME CARE            ELOS: 10-14 Days   EXPECTED THERAPY:     P.T. 1hr/day       O.T. 1hr/day      S.L.P. 1hr/day     P&O Unnecessary     EXP FREQUENCY: 5 days per 7 day period     RATIONALE FOR INPATIENT ADMISSION - Patient demonstrates the following: (check all that apply)  [X] Medically appropriate for rehabilitation admission  [X] Has attainable rehab goals with an appropriate initial discharge plan  [X] Has rehabilitation potential (expected to make a significant improvement within a reasonable period of time)   [X] Requires close medical management by a rehab physician, rehab nursing care, Hospitalist and comprehensive interdisciplinary team (including PT, OT, & or SLP, Prosthetics and Orthotics)     42yo RHD M with PMH significant for strokes (2015 abd 7/2023), T2DM, HTN, former smoker (quit 7/2023),  presented to [facility] ED (11/16) with c/o difficulty walking, LLE weakness, slurred speech (worse from baseline). Images with acute/subacute infarcts. Received TPA with some improvement.     #CVA (2015, 7/2023, 11/2023)  - Start Comprehensive Rehab Program: PT/OT/ST, 3hours daily and 5 days weekly.   - S/p TPA  - from 7/2023 s/p ASA + Plavix x 3 months; now: ASA 81mg + Ticagrelor 90 BID  - Atorvastatin 80 (LDL 36)    #Mood/sleep  - Lexapro 10mg QD  - Trazodone 25mg at bedtime  - Neuropsychology consult     #HTN  - Labetalol 300 TID  - Hydralazine 25mg BID  - Lisinopril 40mg daily  - Monitor    #DM II - A1C 5.5  - Previously on metformin  - FS: <120    #Pain management  - Tylenol PRN    #Skin:  -No active issues at this time  -At risk for pressure injury due to neurologic diagnosis and relative immobility  - Turn Q2 hr while in bed, air mattress  - Nursing to monitor skin Q shift    #FEN   - Diet: Regular + mod thick   - Dysphagia  SLP - evaluation and treatment    #GI/Bowel Mgmt   - At risk for constipation due to neurologic diagnosis, immobility and/or medication use  - Senna,  Miralax   - GI ppx: Protonix    #Bladder management  - PVR q 8 hours (SC if > 400)  - Encourage timed voids Q4hrs while awake for independence and to promote continence during therapy    #DVT ppx  - Lovenox 30 BID  - SCDs, TEDs     Precautions / PROPHYLAXIS:   - Falls, Cardiac  - Weight bearing status: WBAT   - Lungs: Aspiration, Incentive Spirometer   - Pressure injury/Skin: Turn Q2hrs while in bed, OOB to Chair, PT/OT      Follow ups:  Neuro: Dr. Kumar Guzman  Rad: Dr. Phoebe Xiao  Card: Dr. Talib Dsouza  IM: Appt 12/19/2023    MEDICAL PROGNOSIS: GOOD            REHAB POTENTIAL: GOOD             ESTIMATED DISPOSITION: HOME WITH HOME CARE            ELOS: 10-14 Days   EXPECTED THERAPY:     P.T. 1hr/day       O.T. 1hr/day      S.L.P. 1hr/day     P&O Unnecessary     EXP FREQUENCY: 5 days per 7 day period     RATIONALE FOR INPATIENT ADMISSION - Patient demonstrates the following: (check all that apply)  [X] Medically appropriate for rehabilitation admission  [X] Has attainable rehab goals with an appropriate initial discharge plan  [X] Has rehabilitation potential (expected to make a significant improvement within a reasonable period of time)   [X] Requires close medical management by a rehab physician, rehab nursing care, Hospitalist and comprehensive interdisciplinary team (including PT, OT, & or SLP, Prosthetics and Orthotics)     44yo RHD M with PMH significant for strokes (2015 abd 7/2023), T2DM, HTN, former smoker (quit 7/2023),  presented to [facility] ED (11/16) with c/o difficulty walking, LLE weakness, slurred speech (worse from baseline). Images with acute/subacute infarcts. Received TPA with some improvement.     #CVA (2015, 7/2023, 11/2023)  - Start Comprehensive Rehab Program: PT/OT/ST, 3hours daily and 5 days weekly.   - S/p TPA  - from 7/2023 s/p ASA + Plavix x 3 months; now: ASA 81mg + Ticagrelor 90 BID  - Atorvastatin 80 (LDL 36)    #Mood/sleep  - Lexapro 10mg QD  - Trazodone 25mg at bedtime  - Neuropsychology consult     #HTN  - Labetalol 300 TID  - Hydralazine 25mg BID  - Lisinopril 40mg daily  - Monitor    #DM II - A1C 5.5  - Previously on metformin  - FS: <120    #Resp  - IS during the day time  - BIPAP nightly    #Fever  - noted with temp of 101.3 on admission: f/u cbc, cmp, lactate, procalcitonin, UA, blood CXR X 2    #Pain management  - Tylenol PRN    #Skin:  - No active issues at this time  - At risk for pressure injury due to neurologic diagnosis and relative immobility  - Turn Q2 hr while in bed, air mattress  - Nursing to monitor skin Q shift    #FEN   - Diet: Regular + mod thick   - Dysphagia  SLP - evaluation and treatment    #GI/Bowel Mgmt   - At risk for constipation due to neurologic diagnosis, immobility and/or medication use  - Senna,  Miralax   - GI ppx: Protonix    #Bladder management  - PVR q 8 hours (SC if > 400)  - Encourage timed voids Q4hrs while awake for independence and to promote continence during therapy    #DVT ppx  - Lovenox 30 BID  - SCDs, TEDs     Precautions / PROPHYLAXIS:   - Falls, Cardiac  - Weight bearing status: WBAT   - Lungs: Aspiration, Incentive Spirometer   - Pressure injury/Skin: Turn Q2hrs while in bed, OOB to Chair, PT/OT      Follow ups:  Neuro: Dr. Kumar Guzman  Rad: Dr. Phoebe Xiao  Card: Dr. Talib Dsouza  IM: Appt 12/19/2023    MEDICAL PROGNOSIS: GOOD            REHAB POTENTIAL: GOOD             ESTIMATED DISPOSITION: HOME WITH HOME CARE            ELOS: 10-14 Days   EXPECTED THERAPY:     P.T. 1hr/day       O.T. 1hr/day      S.L.P. 1hr/day     P&O Unnecessary     EXP FREQUENCY: 5 days per 7 day period     RATIONALE FOR INPATIENT ADMISSION - Patient demonstrates the following: (check all that apply)  [X] Medically appropriate for rehabilitation admission  [X] Has attainable rehab goals with an appropriate initial discharge plan  [X] Has rehabilitation potential (expected to make a significant improvement within a reasonable period of time)   [X] Requires close medical management by a rehab physician, rehab nursing care, Hospitalist and comprehensive interdisciplinary team (including PT, OT, & or SLP, Prosthetics and Orthotics)     42yo RHD M with PMH significant for strokes (2015 abd 7/2023), T2DM, HTN, former smoker (quit 7/2023),  presented to [facility] ED (11/16) with c/o difficulty walking, LLE weakness, slurred speech (worse from baseline). Images with acute/subacute infarcts. Received TPA with some improvement.     #CVA (2015, 7/2023, 11/2023)  - Start Comprehensive Rehab Program: PT/OT/ST, 3hours daily and 5 days weekly.   - S/p TPA  - from 7/2023 s/p ASA + Plavix x 3 months; now: ASA 81mg + Ticagrelor 90 BID  - Atorvastatin 80 (LDL 36)    #Mood/sleep  - Lexapro 10mg QD  - Trazodone 25mg at bedtime  - Neuropsychology consult     #HTN  - Labetalol 300 TID  - Hydralazine 25mg BID  - Lisinopril 40mg daily  - Monitor    #DM II - A1C 5.5  - Previously on metformin  - FS: <120    #Resp  - IS during the day time  - BIPAP nightly    #Fever on admission   - Noted with temp of 101.3 on admission:cbc, cmp, lactate, procalcitonin, UA, blood CXR X 2--- wbc: 9.4. lactate: 0.6. UA negative. Chest x ray- no acute findings. Large BM yesterday afternoon. Covid negative. Pending blood culture results.     #Pain management  - Tylenol PRN    #Skin:  - No active issues at this time  - At risk for pressure injury due to neurologic diagnosis and relative immobility  - Turn Q2 hr while in bed, air mattress  - Nursing to monitor skin Q shift    #FEN   - Diet: Regular + mod thick   - Dysphagia  SLP - evaluation and treatment    #GI/Bowel Mgmt -- large BM yesterday evening, reports he was previously constipated  - At risk for constipation due to neurologic diagnosis, immobility and/or medication use  - Senna,  Miralax   - GI ppx: Protonix    #Bladder management  - PVR q 8 hours (SC if > 400)  - Encourage timed voids Q4hrs while awake for independence and to promote continence during therapy    #DVT ppx  - Lovenox 30 BID  - SCDs, TEDs     Precautions / PROPHYLAXIS:   - Falls, Cardiac  - Weight bearing status: WBAT   - Lungs: Aspiration, Incentive Spirometer   - Pressure injury/Skin: Turn Q2hrs while in bed, OOB to Chair, PT/OT      Follow ups:  Neuro: Dr. Kumar Guzman  Rad: Dr. Phoebe Xiao  Card: Dr. Talib Dsouza  IM: Appt 12/19/2023    MEDICAL PROGNOSIS: GOOD            REHAB POTENTIAL: GOOD             ESTIMATED DISPOSITION: HOME WITH HOME CARE            ELOS: 10-14 Days   EXPECTED THERAPY:     P.T. 1hr/day       O.T. 1hr/day      S.L.P. 1hr/day     P&O Unnecessary     EXP FREQUENCY: 5 days per 7 day period     RATIONALE FOR INPATIENT ADMISSION - Patient demonstrates the following: (check all that apply)  [X] Medically appropriate for rehabilitation admission  [X] Has attainable rehab goals with an appropriate initial discharge plan  [X] Has rehabilitation potential (expected to make a significant improvement within a reasonable period of time)   [X] Requires close medical management by a rehab physician, rehab nursing care, Hospitalist and comprehensive interdisciplinary team (including PT, OT, & or SLP, Prosthetics and Orthotics)

## 2023-11-27 NOTE — SWALLOW VFSS/MBS ASSESSMENT ADULT - ORAL PHASE COMMENTS
residue cleared with spontaneous repeat swallow and/or liquid wash intermittent spillage to the pyriform sinus; 1 episode of laryngeal penetration before the swallow intermittent spillage to the AE folds and pyriform sinus intermittent spillage to AE folds and pyriform sinus

## 2023-11-27 NOTE — DISCHARGE NOTE PROVIDER - PROVIDER TOKENS
PROVIDER:[TOKEN:[[id number]],FOLLOWUP:[1 month]],PROVIDER:[TOKEN:[[id number]],FOLLOWUP:[1 month]],PROVIDER:[TOKEN:[[id number]],FOLLOWUP:[1 month]]

## 2023-11-27 NOTE — SWALLOW VFSS/MBS ASSESSMENT ADULT - ADDITIONAL RECOMMENDATIONS
GOALS:  1. Pt/family/caregiver will demonstrate understanding and carryover of dysphagia management (safe swallow guidelines, compensatory strategies, dysphagia diet).  2. Pt will complete dysphagia exercises to improve swallow function.  3. Pt will tolerate recommended diet with no overt, clinical s/s of aspiration.  4. Can consider trials of mildly thick liquids via teaspoon with SLP only.

## 2023-11-27 NOTE — DISCHARGE NOTE PROVIDER - NSDCCPTREATMENT_GEN_ALL_CORE_FT
PRINCIPAL PROCEDURE  Procedure: Vertebral artery stenting  Findings and Treatment: s/p L vert stent placement and recanalization on 11/24. Now on brilinta and aspirin.

## 2023-11-27 NOTE — DISCHARGE NOTE PROVIDER - NSDCMRMEDTOKEN_GEN_ALL_CORE_FT
aspirin 81 mg oral tablet, chewable: 1 tab(s) orally once a day  atorvastatin 80 mg oral tablet: 1 tab(s) orally once a day (at bedtime)  enoxaparin: 30 milligram(s) subcutaneous once a day (at bedtime)  escitalopram 10 mg oral tablet: 1 tab(s) orally once a day  hydrALAZINE 25 mg oral tablet: 1 tab(s) orally 2 times a day hold for BP &lt; 100  labetalol 300 mg oral tablet: 1 tab(s) orally 3 times a day hold for BP &lt; 110  lisinopril 40 mg oral tablet: 1 tab(s) orally once a day hold for BP &lt; 110  pantoprazole 40 mg oral delayed release tablet: 1 tab(s) orally once a day (before a meal)  polyethylene glycol 3350 oral powder for reconstitution: 17 gram(s) orally once a day  senna leaf extract oral tablet: 2 tab(s) orally once a day (at bedtime)  ticagrelor 90 mg oral tablet: 1 tab(s) orally every 12 hours  traZODone 50 mg oral tablet: 1 tab(s) orally once a day (at bedtime)

## 2023-11-27 NOTE — H&P ADULT - NSHPREVIEWOFSYSTEMS_GEN_ALL_CORE
REVIEW OF SYSTEMS  Constitutional: No fever, No Chills, No fatigue  HEENT: No eye pain, No visual disturbances, No difficulty hearing  Pulm: + cough,  No shortness of breath  Cardio: No chest pain, No palpitations  GI:  No abdominal pain, No nausea, No vomiting, No diarrhea, No constipation  : No dysuria, No frequency, No hematuria  Neuro: No headaches, No memory loss, + loss of strength, + numbness to left finger tips and toes, No tremors  Skin: No itching, No rashes, No lesions   Endo: No temperature intolerance  MSK: No joint pain, No joint swelling, No muscle pain, No Neck pain,  No back pain  Psych:  No depression, No anxiety

## 2023-11-27 NOTE — PATIENT PROFILE ADULT - COMPLETE THE FOLLOWING IF THE PATIENT REFUSES THE INFLUENZA VACCINE:
Caller would like to discuss an/a Referral for a dermatologist. Should patient see Dr. Hidalgo first or go to the dermatologist?  Writer advised caller of callback within today.    Patient Name: Oma Esteban  Caller Name: Oma  Name of Facility:   Callback Number: [phone]  Best Availability: today  Can A Detailed Message Be left? yes  Fax Number:   Additional Info:   Did you confirm the message with the caller?: yes    Thank you,  Liss Ariza  
Patient referral to derm entered for face and body acne.  Patient transferred to call center to schedule with derm  
Risks/benefits discussed with patient/surrogate

## 2023-11-27 NOTE — SWALLOW VFSS/MBS ASSESSMENT ADULT - SLP GENERAL OBSERVATIONS
Pt received awake and alert, upright and secure in KENIA chair, on RA. Wife present per pt request. +Persistent dysarthria. Able to follow commands and make wants/needs known.

## 2023-11-27 NOTE — PROGRESS NOTE ADULT - SUBJECTIVE AND OBJECTIVE BOX
THE PATIENT WAS SEEN AND EXAMINED BY ME WITH THE HOUSESTAFF AND STROKE TEAM DURING MORNING ROUNDS.   HPI: Patient ROB OSHEA is a 43y (1980) RIGHT handed man who presented to [facility] ED on 11/16/2023, as a CODE STROKE, with c/o difficulty walking, LLE weakness, speech changes.  PMH significant for: stroke (7/2023 - presenting with dysarthria - left inferior adrienne, b/l parieto-occipital juxtacortical WM w/mild residual dysarthria; 8 years ago - treated with TPA at [facility] - p/w L hemiplegia, facial droop and dysarthria), T2DM, HTN, former smoker (quit in 7/2023)  Presented as a CODE STROKE at 19:12. Neurology arrived promptly to evaluate patient. Per wife, patient having acute onset slurred speech (worse from baseline) and inability to stand/walk. This began at 17:00, 11/16/2023. On exam: patient has a L facial droop, ataxia in LUE (dysmetria with FTN), and struggles to perform HTS b/l. Patient also with pronounced slurred speech (moderate but is intelligible). Patient was rushed to the CT scanner on the first floor of the hospital d/t CT scanner in ED being used. Underwent NCCTH, which revealed no hemorrhage, but was initially read by Radiology as showing a nonacute left occipital infarct not seen on the CTH performed 7/26/2023. This infarct was not c/w patient's exam. Patient was emergently rushed back to ED for vital signs and weight. These were unable to be obtained on the first floor. After risk/benefit discussion with patient and wife - tenecteplase was administered at 11/16/2023, 19:47. DTN time was delayed d/t patient needing to be taken up to first floor CT scanner from ED (ED scanner in use), needed to return to ED after scans for vitals including BP and weight.    30 minutes after administration, NIHSS 4->2 (mild left facial but improved and mild dysarthria). Patient reports voice is much better. Performs FTN without issue with LUE. Performs HTS b/l. L NLF flattening significantly improved. Unable to walk patient d/t safety concerns s/p tenecteplase (on bed rest).        SUBJECTIVE: No events overnight.  No new neurologic complaints.  ROS reported negative unless otherwise noted.    acetaminophen     Tablet .. 650 milliGRAM(s) Oral every 6 hours PRN  aspirin  chewable 81 milliGRAM(s) Oral daily  atorvastatin 80 milliGRAM(s) Oral at bedtime  enoxaparin Injectable 30 milliGRAM(s) SubCutaneous <User Schedule>  escitalopram 10 milliGRAM(s) Oral daily  hydrALAZINE 50 milliGRAM(s) Oral every 8 hours  influenza   Vaccine 0.5 milliLiter(s) IntraMuscular once  labetalol 400 milliGRAM(s) Oral every 8 hours  lisinopril 40 milliGRAM(s) Oral daily  NIFEdipine XL 60 milliGRAM(s) Oral daily  pantoprazole    Tablet 40 milliGRAM(s) Oral before breakfast  polyethylene glycol 3350 17 Gram(s) Oral daily  senna 2 Tablet(s) Oral at bedtime  ticagrelor 90 milliGRAM(s) Oral every 12 hours  traZODone 50 milliGRAM(s) Oral at bedtime      PHYSICAL EXAM:   Vital Signs Last 24 Hrs  T(C): 36.9 (27 Nov 2023 04:00), Max: 37.2 (26 Nov 2023 20:00)  T(F): 98.5 (27 Nov 2023 04:00), Max: 99 (26 Nov 2023 20:00)  HR: 56 (27 Nov 2023 06:35) (56 - 82)  BP: 107/55 (27 Nov 2023 06:35) (101/57 - 122/65)  BP(mean): 73 (27 Nov 2023 06:35) (73 - 87)  RR: 22 (27 Nov 2023 06:00) (18 - 38)  SpO2: 98% (27 Nov 2023 06:00) (94% - 100%)    Parameters below as of 27 Nov 2023 04:00  Patient On (Oxygen Delivery Method): BiPAP/CPAP    O2 Concentration (%): 40    General: No acute distress  HEENT: EOM intact, visual fields full  Abdomen: Soft, nontender, nondistended   Extremities: No edema    NEUROLOGICAL EXAM:  Mental status: Awake, alert, oriented x3, no neglect, normal memory   Cranial Nerves: mild L facial droop, no nystagmus, severe dysarthria,  tongue midline  Motor exam: Normal tone, 5/5 RUE, 5/5 RLE, 4/5 LUE with drift, 5/5 LLE, normal fine finger movements.  Sensation: Intact to light touch   Coordination/ Gait: Dysmetria present b/l L > R, gait deferred     LABS:                        12.0   8.88  )-----------( 209      ( 27 Nov 2023 06:13 )             35.3    11-27    144  |  110<H>  |  11  ----------------------------<  106<H>  3.8   |  22  |  1.21    Ca    8.7      27 Nov 2023 06:13  Phos  4.6     11-27  Mg     2.0     11-27          IMAGING: Reviewed by me.       CTH 11/19/23: Evolving acute infarcts in the posterior circulation are unchanged from 11/17/2023.  No new intracranial findings.     MRI, MRA head/neck Brain 1/17/23: Small vessel white matter ischemic changes. Acute infarcts in the posterior circulation involving the left occipital lobe, left cerebellum and bilateral brachium pontis, right adrienne. Chronic appearing infarct adrienne. Extremely poor flow identified in the basilar artery due to   what appears to be severe distal vertebral artery stenosis. Flow in the posterior communicating arteries and posterior cerebral arteries is too small to measure.    CTH 11/17: Stable posterior circulation infarctions.    CTH/ CTA neck 11/16: No intracranial hemorrhage is appreciated. No intracranial mass lesion is appreciated.Left occipital and right cerebellar acute to subacute infarctions. Left basal ganglia, left thalamic and central pontine remote infarctions    CTA head/Neck CTP 11/16:    1. Right carotid system:  Atherosclerotic plaque without hemodynamically significant stenosis.  2. Left carotid system:  Atherosclerotic plaque without hemodynamically significant stenosis.  3. Vertebral circulation:  Patent.  4. Anterior intracranial circulation:  Intracranial atherosclerosis cavernous and clinoid segments of the internal carotid arteries, mild to moderate, and middle cerebral arteries, moderate on the right and mild-to-moderate on the left.  5.  Posterior intracranial circulation:  Severe atherosclerotic disease has progressed since July 2023 with progressive diminished flow of the left vertebral artery, basilar artery and posterior cerebral arteries. There is likely reversal flow within the basilar artery to perfuse the most distal left vertebral artery and PICA arteries. Occlusion of the right vertebral artery was present in July 2023  6. Brain perfusion:  No acute infarction of the brain is convincingly demonstrated.  However, there is extensive ischemia within the posterior circulation.    CT Head No Cont (11.21.23)  The left ventricles have a normal configuration  Old lacunar infarct involving the anterior left corona radiata region is   again seen  Abnormal areas of low-attenuation involving the left cerebellum left   occipital lobe and right brachium pontis region are again seen. These   findings are compatible with areas of acute infarct. No hemorrhagic   transformation is seen. No significant shift or herniation is seen.  The paranasal sinuses mastoid and middle ear regions appear clear.  Impression: Acute infarct again seen as described above.    MR Head No Cont (11.19.23  Redemonstration of multiple evolving acute/subacute posterior   fossa and left occipital lobe infarcts with associated cytotoxic edema   and without hemorrhagic transformation. Overall appearance is grossly   similar when compared with 11/17/2023.  Additional multiple chronic lacunar infarcts and similar-appearing   chronic white matter microvascular type changes.      CT Head No Cont (11.24.23   There is a stent in the intradural left vertebral artery.  There are patchy small subacute infarcts in the right brachium   pontis/medial aspect of the right cerebellar hemisphere and in the   posterior inferior aspect of the left cerebellar hemisphere.  No new intracranial findings.  No evidence of hemorrhagic conversion or   new cerebral infarction.  Patchy paranasal sinus mucosal thickening.  An air-fluid level in the   right maxillary sinus may represent trapped secretions versus sinusitis.

## 2023-11-27 NOTE — H&P ADULT - HISTORY OF PRESENT ILLNESS
42yo RHD M with PMH significant for: stroke (7/2023 - presenting with dysarthria - left inferior adrienne, b/l parieto-occipital juxtacortical WM w/mild residual dysarthria; 8 years ago - treated with TPA at [facility] - p/w L hemiplegia, facial droop and dysarthria), T2DM, HTN, former smoker (quit in 7/2023),  presented to [facility] ED on 11/16/2023, with c/o difficulty walking, LLE weakness, slurred speech (worse from baseline).     ED exam: patient has a L facial droop, ataxia in LUE (dysmetria with FTN), and struggles to perform HTS b/l. Patient also with pronounced slurred speech (moderate but is intelligible). CTH showing a nonacute left occipital infarct (not seen on the CTH 7/2023). MR with acute/subacute posterior fossa and left occipital lobe infarcts with associated cytotoxic edema. Recieved tenecteplase - 30 minutes after administration, NIHSS 4->2 (mild left facial but improved and mild dysarthria). Patient reports voice is much better. Performs FTN without issue with LUE. Performs HTS b/l. L NLF flattening significantly improved.     Patient was evaluated by PM&R and therapy for functional deficits and gait/ADL impairments and recommend acute rehabilitation.  Patient was medically optimized for discharge to [facility] on 11/27/2023. 42yo RHD M with PMH significant for: stroke (7/2023 - presenting with dysarthria - left inferior adrienne, b/l parieto-occipital juxtacortical WM w/mild residual dysarthria; 8 years ago - treated with TPA at [facility] - p/w L hemiplegia, facial droop and dysarthria), T2DM, HTN, former smoker (quit in 7/2023), presented to [facility] ED on 11/16/2023, with c/o difficulty walking, LLE weakness, slurred speech (worse from baseline).     ED exam: patient has a L facial droop, ataxia in LUE (dysmetria with FTN), and struggles to perform HTS b/l. Patient also with pronounced slurred speech (moderate but is intelligible). CTH showing a nonacute left occipital infarct (not seen on the CTH 7/2023). MR with acute/subacute posterior fossa and left occipital lobe infarcts with associated cytotoxic edema. Recieved tenecteplase - 30 minutes after administration, NIHSS 4->2 (mild left facial but improved and mild dysarthria). Patient reports voice is much better. Performs FTN without issue with LUE. Performs HTS b/l. L NLF flattening significantly improved.     Patient was evaluated by PM&R and therapy for functional deficits and gait/ADL impairments and recommend acute rehabilitation.  Patient was medically optimized for discharge to [facility] on 11/27/2023.

## 2023-11-27 NOTE — SWALLOW VFSS/MBS ASSESSMENT ADULT - COMMENTS
IMAGIN/19: MRI Brain: Redemonstration of multiple evolving acute/subacute posterior fossa and left occipital lobe infarcts with associated cytotoxic edema and without hemorrhagic transformation. Overall appearance is grossly similar when compared with 2023.  : CTH: Evolving acute infarcts in the left occipital lobe, posterior inferior aspect of the left cerebellar hemisphere, and in the right brachium pontis/medial right cerebellum are stable from 2023.     Interval Events:  : s/p angio w/ occlusion of b/l vertebral arteries with some flow to the basilar from R pcom and anterior spinal artery. No stent placed on . Following multidisciplinary discussion recommend angio/ stenting on : Pt noted to be coughing and choking briefly while brushing teeth during PM hygiene care. RN at bedside, maintaining sats, pt able to clear airway on their own, pt in no distress. Verbalizing it "went down the wrong pipe". Pt to be kept NPO in the meantime, pending speech and swallow re-eval in AM."    S + S HISTORY:   Patient known to this service with assessments during previous admission and this admission.  Most recently pt seen for re-evaluation on  after being made NPO; recommendations were made to re-initiate an oral diet of soft and bite sized solids with mildly thick liquids. MBS was also recommended at that time (originally planned for , but deferred due to pt in IR procedure).

## 2023-11-27 NOTE — SWALLOW VFSS/MBS ASSESSMENT ADULT - RECOMMENDED FEEDING/EATING TECHNIQUES
maintain upright posture during/after eating for 30 mins/no straws/provide rest periods between swallows/small sips/bites

## 2023-11-27 NOTE — PROGRESS NOTE ADULT - REASON FOR ADMISSION
Stroke

## 2023-11-27 NOTE — PROGRESS NOTE ADULT - ASSESSMENT
43y RIGHT handed man who presented to [facility] ED on 11/16/2023, as a CODE STROKE, with c/o difficulty walking, LLE weakness, speech changes. PMH significant for: stroke (7/2023 - presenting with dysarthria - left inferior adrienne, b/l parieto-occipital juxtacortical WM w/mild residual dysarthria; 8 years ago - treated with TPA at [facility] - p/w L hemiplegia, facial droop and dysarthria), T2DM, HTN, former smoker (quit in 7/2023). Symptoms beginning at 17:00, 11/16/2023. Noted at the code to have a L facial, LUE ataxia, b/l LE ataxia, slurred speech. No appreciable drift in any of the extremities. Underwent CT scans with out sign of hemorrhage or jayla LVO. Tenecteplase administered at 19:47, with improvement from NIHSS 4 to 2. Patient previously taking clopidogrel and aspirin after last stroke. Currently only taking aspirin at the instructions of his vascular neurologist.    ED vitals notable for: afebrile, 158/92, HR 79 RR 18, SpO2 97%. Labs notable for: glucose 156. CT imaging disclosed: no hemorrhage, left occipital/right cerebellar acute to subacute infarctions; chronic left basal ganglia, left thalamic and central pontine remote infarctions. There is bilateral atherosclerosis of the carotid system, as well as in the anterior and posterior circulation. The left vertebral artery, basial artery, and posterior cerebral arteries are with progressively reduced flow since last study. Initial exam notable for L facial droop, ataxia in the LUE, bilateral lower limbs, moderate dysarthria, patient unable to stand/walk without holding the transport chair handles.     NIHSS on admission: 4->2 (mild dysarthria, mild L facial - 30 minutes after tenecteplase)  LKN: 17:00, 11/16/2023  pre-MRS: 2 (walks with a cane, otherwise independent)    Impression: L facial weakness, LUE ataxia, dysarthria, gait instability d/t R brain dysfunction d/t acute ischemic stroke

## 2023-11-27 NOTE — SWALLOW VFSS/MBS ASSESSMENT ADULT - ORAL PHASE
Residue in oral cavity/Incomplete tongue to palate contact within functional limits Laryngeal penetration before swallow - silent

## 2023-11-27 NOTE — H&P ADULT - NSHPPHYSICALEXAM_GEN_ALL_CORE
PHYSICAL EXAM  VITALS  T(C): 36.6 (11-27-23 @ 16:00), Max: 37.2 (11-26-23 @ 20:00)  HR: 85 (11-27-23 @ 17:00) (56 - 95)  BP: 145/69 (11-27-23 @ 17:00) (101/57 - 145/69)  RR: 22 (11-27-23 @ 17:00) (19 - 26)  SpO2: 96% (11-27-23 @ 17:00) (93% - 100%)    Gen - NAD, Comfortable  HEENT - NCAT, EOMI, MMM  Neck - Supple, No limited ROM  Pulm - CTAB, No wheeze, No rhonchi, No crackles  Cardiovascular - RRR, S1S2, No murmurs  Chest - good chest expansion, good respiratory effort  Abdomen - Soft, NT/ND, +BS  Extremities - No Cyanosis, no clubbing, no edema, no calf tenderness  Neuro-     Cognitive - awake, alert, oriented to person, place, date, year, and situation.  Able to follow command     Communication - + dysarthria     Attention: Intact     Judgement: Good evidence of judgement     Memory: Recall 3 objects immediate and 3 min later     Cranial Nerves - left facial weakness, decreases left shoulder shrug     Motor - Left hemiparesis                    LEFT    UE - 4/5                    RIGHT UE - 5/5                    LEFT    LE - 4/5                    RIGHT LE - 5/5        Sensory - Intact  to LT      Reflexes - DTR Intact, No primitive reflexive     Coordination - FTN impaired to left side     Tone - normal  Psychiatric - Mood stable, Affect WNL  Skin:  all skin intact PHYSICAL EXAM  VITALS  T(C): 36.6 (11-27-23 @ 16:00), Max: 37.2 (11-26-23 @ 20:00)  HR: 85 (11-27-23 @ 17:00) (56 - 95)  BP: 145/69 (11-27-23 @ 17:00) (101/57 - 145/69)  RR: 22 (11-27-23 @ 17:00) (19 - 26)  SpO2: 96% (11-27-23 @ 17:00) (93% - 100%)    Gen - NAD, Comfortable  HEENT - NCAT  Neck - Supple, No limited ROM  Pulm - CTAB, No wheeze, No rhonchi, No crackles  Cardiovascular - RRR, S1S2, No murmurs  Chest - good chest expansion,  decreased sounds on the left base  Abdomen - Soft, NT/ND, +BS  Extremities - No Cyanosis, no clubbing, no edema, no calf tenderness  Neuro-     Cognitive - awake, alert, oriented to person, place, date, year, and situation.  Able to follow command     Communication  + dysarthria, no aphasia-- repeats, follows 2 step commands, names objects and function      Memory: Recall 3 objects immediate and 3 min later     Cranial Nerves - left facial droop, left facial weakness, decreased left shoulder shrug     Motor - Left sided weakness                    LEFT    UE - 4/5 at shoulder, elbow flexion/extension, wrist extension and                      RIGHT UE - 5/5                    LEFT    LE - 4/5 at hip, 4/5 knee extension, 4/5 DF, 5/5 PF                    RIGHT LE - 5/5        Sensory - Intact  to LT      Reflexes - DTR Intact, No primitive reflexive     Coordination - FTN impaired to left side     Tone - normal  Psychiatric - Mood stable, Affect WNL  Skin:  all skin intact

## 2023-11-27 NOTE — PROGRESS NOTE ADULT - SUBJECTIVE AND OBJECTIVE BOX
Subjective: Patient seen and examined. No new events except as noted.   transferred to Stroke Unit  Hypotensive this am. AM BP meds held     REVIEW OF SYSTEMS:    CONSTITUTIONAL: + weakness, fevers or chills  EYES/ENT: No visual changes;  No vertigo or throat pain   NECK: No pain or stiffness  RESPIRATORY: No cough, wheezing, hemoptysis; No shortness of breath  CARDIOVASCULAR: No chest pain or palpitations  GASTROINTESTINAL: No abdominal or epigastric pain. No nausea, vomiting, or hematemesis; No diarrhea or constipation. No melena or hematochezia.  GENITOURINARY: No dysuria, frequency or hematuria  NEUROLOGICAL: No numbness or weakness  SKIN: No itching, burning, rashes, or lesions   All other review of systems is negative unless indicated above.    MEDICATIONS:  MEDICATIONS  (STANDING):  aspirin  chewable 81 milliGRAM(s) Oral daily  atorvastatin 80 milliGRAM(s) Oral at bedtime  enoxaparin Injectable 30 milliGRAM(s) SubCutaneous <User Schedule>  escitalopram 10 milliGRAM(s) Oral daily  hydrALAZINE 50 milliGRAM(s) Oral every 8 hours  influenza   Vaccine 0.5 milliLiter(s) IntraMuscular once  labetalol 400 milliGRAM(s) Oral every 8 hours  lisinopril 40 milliGRAM(s) Oral daily  NIFEdipine XL 60 milliGRAM(s) Oral daily  pantoprazole    Tablet 40 milliGRAM(s) Oral before breakfast  polyethylene glycol 3350 17 Gram(s) Oral daily  senna 2 Tablet(s) Oral at bedtime  ticagrelor 90 milliGRAM(s) Oral every 12 hours  traZODone 50 milliGRAM(s) Oral at bedtime      PHYSICAL EXAM:  T(C): 37 (11-27-23 @ 08:00), Max: 37.2 (11-26-23 @ 20:00)  HR: 66 (11-27-23 @ 08:00) (56 - 82)  BP: 115/58 (11-27-23 @ 08:00) (101/57 - 122/65)  RR: 20 (11-27-23 @ 08:00) (18 - 38)  SpO2: 95% (11-27-23 @ 08:00) (94% - 100%)  Wt(kg): --  I&O's Summary    26 Nov 2023 07:01  -  27 Nov 2023 07:00  --------------------------------------------------------  IN: 405 mL / OUT: 2050 mL / NET: -1645 mL          Appearance: NAD  HEENT:  Dry oral mucosa, PERRL, EOMI	  Lymphatic: No lymphadenopathy , no edema  Cardiovascular: Normal S1 S2, No JVD, No murmurs , Peripheral pulses palpable 2+ bilaterally  Respiratory: Lungs clear to auscultation, normal effort 	  Gastrointestinal:  Soft, Non-tender, + BS	  Skin: No rashes, No ecchymoses, No cyanosis, warm to touch  Musculoskeletal: Normal range of motion, normal strength  Psychiatry:  Mood & affect appropriate  Ext: No edema  Neurological: Awake, alert oriented to person, place and time, Following Commands, PERRL, EOMI, Right Gaze Preference, Left upper extremity pronator drift, LLE AG, 4/5 dorsiflexion and 4/5 plantar flexion, RLE dorsiflexion 5/5 and plantar flexion 5/5        LABS:    CARDIAC MARKERS:                                12.0   8.88  )-----------( 209      ( 27 Nov 2023 06:13 )             35.3     11-27    144  |  110<H>  |  11  ----------------------------<  106<H>  3.8   |  22  |  1.21    Ca    8.7      27 Nov 2023 06:13  Phos  4.6     11-27  Mg     2.0     11-27      proBNP:   Lipid Profile:   HgA1c:   TSH:             TELEMETRY: 	    ECG:  	  RADIOLOGY:   DIAGNOSTIC TESTING:  [ ] Echocardiogram:  [ ]  Catheterization:  [ ] Stress Test:    OTHER:

## 2023-11-27 NOTE — SWALLOW VFSS/MBS ASSESSMENT ADULT - NS SWALLOW VFSS REC ASPIR MON
Monitor for s/s aspiration/laryngeal penetration. If noted:  D/C p.o. intake, provide non-oral nutrition/hydration/meds, and contact this service @ [phone]/change of breathing pattern/cough/gurgly voice/fever/pneumonia/throat clearing/upper respiratory infection

## 2023-11-27 NOTE — PROGRESS NOTE ADULT - NS ATTEND AMEND GEN_ALL_CORE FT
Thirty five minutes of discharge time was spent on patient exam and discussion including test results, pathophysiology, natural history, stroke risk factors, medication changes and secondary prophylaxis and importance of medication compliance. We also discussed follow up plan. This was discussed with patient/family, who expresses understanding. s/p stent, asa brilinta.

## 2023-11-27 NOTE — PROGRESS NOTE ADULT - ASSESSMENT
patient is a 42 y/O RIGHT handed man who presented to [facility] ED on 11/16/2023, as a CODE STROKE, with c/o difficulty walking, LLE weakness, speech changes. PMH significant for: stroke (7/2023 - presenting with dysarthria - left inferior adrienne, b/l parieto-occipital juxtacortical WM w/mild residual dysarthria; 8 years ago - treated with TPA at [facility] - p/w L hemiplegia, facial droop and dysarthria), T2DM, HTN, former smoker (quit in 7/2023). Symptoms beginning at 17:00, 11/16/2023. Noted at the code to have a L facial, LUE ataxia, b/l LE ataxia, slurred speech. No appreciable drift in any of the extremities. Underwent CT scans with out sign of hemorrhage or jayla LVO. Tenecteplase administered at 19:47, with improvement from NIHSS 4 to 2. Patient previously taking clopidogrel and aspirin after last stroke. Currently only taking aspirin at the instructions of his vascular neurologist.    # CVA   - CT and MRI as noted, multiple embolic strokes   - Patient is S/P TPA  - Was on ASA, now on Brilinta and Lipitor ASA     - Was on full dose heparin gtt, now on Lovenox 40 Qd PPX dose as patient planned for angio with stenting with NSGY, completed 11/24   - TTE w/ EF of 68%, no WM  - PT/OT, aspiration precautions   - S/P Angio with NSGY  - Patient is medically optimized for angio - moderate risk candidate. S/P angio/ stenting w/ NSGY on 11/24   - S/P LVA recanalization and stent placement with NSGY; Neurochecks per protocol; Planned for repeat CT head   - Per Stroke Neurology/ NSICU     # HTN   - TTE w/ EF of 68%, no WMA    - BP parameters as per stroke neurology --> Now on Nicardepine gtt, Lisinopril 40, Norvasc 10, Coreg 25 PO BID   - Monitor BP, VS     # DM  - A1C of 5.5  - C/w sliding scale     # HLD    - C/w Statin therapy     DVT and GI PPX

## 2023-11-27 NOTE — PROGRESS NOTE ADULT - CRITICAL CARE ATTENDING COMMENT
Advanced care planning was discussed with patient and family.  Advanced care planning forms were reviewed and discussed as appropriate.  Differential diagnosis and plan of care discussed with patient after the evaluation.   Pain assessed and judicious use of narcotics when appropriate was discussed.  Importance of Fall prevention discussed.  Counseling on Smoking and Alcohol cessation was offered when appropriate.  Counseling on Diet, exercise, and medication compliance was done.

## 2023-11-27 NOTE — PROVIDER CONTACT NOTE (OTHER) - ASSESSMENT
Oral temp 99.9, rectal temp 101.3. Pt A&O x 4, able to make needs known. Denies any complaints at this time.

## 2023-11-27 NOTE — DISCHARGE NOTE PROVIDER - NSDCFUSCHEDAPPT_GEN_ALL_CORE_FT
[facility]  [phone]  Scheduled Appointment: 12/19/2023

## 2023-11-27 NOTE — H&P ADULT - NS ATTEND AMEND GEN_ALL_CORE FT
Patient admitted with acute infarcts in the posterior circulation involving the left occipital lobe, left cerebellum and bilateral brachium pontis, right abi resulting in left facial droop, dysarthria, dysphagia and left sided weakness.      Gen - NAD, Comfortable  HEENT - NCAT  Neck - Supple, No limited ROM  Pulm - CTAB, No wheeze, No rhonchi, No crackles  Cardiovascular - RRR, S1S2, No murmurs  Chest - good chest expansion,  decreased sounds on the left base  Abdomen - Soft, NT/ND, +BS  Extremities - No Cyanosis, no clubbing, no edema, no calf tenderness  Neuro-     Cognitive - awake, alert, oriented to person, place, date, year, and situation.  Able to follow command     Communication  + dysarthria, no aphasia-- repeats, follows 2 step commands, names objects and function      Memory: Recall 3 objects immediate and 3 min later     Cranial Nerves - left facial droop, left facial weakness, decreased left shoulder shrug     Motor - Left sided weakness                     LEFT    UE - 4/5 at shoulder, elbow flexion/extension, wrist extension and                      RIGHT UE - 5/5 throughout                    LEFT    LE - 4/5 at hip, 4/5 knee extension, 4/5 DF, 5/5 PF                    RIGHT LE - 5/5  throughout     Sensory - Intact  to LT      Reflexes - DTR Intact     Coordination - FTN impaired to left side     Tone - normal  Psychiatric - Mood stable, Affect WNL  Skin:  all skin intact    Reviewed expectations for rehab with patient, goals and LOS.  total time 75 mins

## 2023-11-27 NOTE — DISCHARGE NOTE PROVIDER - NSDCCPCAREPLAN_GEN_ALL_CORE_FT
PRINCIPAL DISCHARGE DIAGNOSIS  Diagnosis: Stroke  Assessment and Plan of Treatment: Please follow up with neurologist after being discharged from rehav, The office will call you to schedule an appointment, if you do not hear from them please call [phone]. Continue taking medications as prescribed. If you were prescribed a statin for your cholesterol please make sure you have your liver enzymes checked with your primary care physician. Monitor your blood pressure. Reduce fat, cholesterol and salt in your diet. Increase intake of fruits and vegetables. Limit alcohol to minimum and do not smoke. You may be at risk for falling, make changes to your home to help you walk easier. Keep up to date on vaccinations.  If you experience any symptoms of facial drooping, slurred speech, arm or leg weakness, severe headache, vision changes or any worsening symptoms, notify provider immediately and return to ER.

## 2023-11-27 NOTE — H&P ADULT - NSHPLABSRESULTS_GEN_ALL_CORE
12.0   8.88  )-----------( 209      ( 27 Nov 2023 06:13 )              35.3     11-27    144  |  110<H>  |  11  ----------------------------<  106<H>  3.8   |  22  |  1.21    Ca    8.7      27 Nov 2023 06:13  Phos  4.6     11-27  Mg     2.0     11-27    RADIOLOGY, EKG & ADDITIONAL TESTS:  CT Brain Stroke Protocol (11.16.23 @ 19:40) >  1. No intracranial hemorrhage is appreciated.  2. No intracranial mass lesion is appreciated.  3.  Left occipital and right cerebellar acute to subacute infarctions.   Left basal ganglia, left thalamic and central pontine remote infarctions  4. MR may provide higher sensitivity imaging evaluation for the clinical   indication of acute infarction.    CT Angio Brain Stroke Protocol  w/ IV Cont (11.16.23 @ 19:40) >  1.   Right carotid system:    Atherosclerotic plaque without    hemodynamically significant stenosis.  2.   Left carotid system:     Atherosclerotic plaque without    hemodynamically significant stenosis.  3.   Vertebral circulation:    Patent.  4.  Anterior intracranial circulation: Intracranial atherosclerosis   cavernous and clinoid segments of the internal carotid arteries, mild to   moderate, and middle cerebral arteries, moderate on the right and   mild-to-moderate on the left.  5.  Posterior intracranial circulation:Severe atherosclerotic disease   has progressed since July 2023 with progressive diminished flow of the left vertebral artery, basilar artery and posterior cerebral arteries.   There is likely reversal flow within the basilar artery to perfuse the   most distal left vertebral artery and PICA arteries. Occlusion of the   right vertebral artery was present in July 2023  6. Brain perfusion:   No acute infarction of the brain is convincingly   demonstrated.  However, there is extensive ischemia within the posterior circulation    MR Head No Cont (11.17.23 @ 15:31) >  Small vessel white matter ischemic changes. Acute infarcts in   the posterior circulation involving the left occipital lobe, left   cerebellum and bilateral brachium pontis, right adrienne. Chronic appearing infarct adrienne. Extremely poor flow identified in the basilar artery due to what appears to be severe distal vertebral artery stenosis. Flow in the posterior communicating arteries and posterior cerebral arteries is too small to measure.    CT Head No Cont (11.17.23 @ 19:35) >  Stable posterior circulation infarctions.    CT Head No Cont (11.19.23 @ 13:53) >  Evolving acute infarcts in the posterior circulation are unchanged from   11/17/2023.  No new intracranial findings.    MR Head No Cont (11.19.23 @ 17:02) >  Redemonstration of multiple evolving acute/subacute posterior   fossa and left occipital lobe infarcts with associated cytotoxic edema   and without hemorrhagic transformation. Overall appearance is grossly similar when compared with 11/17/2023.  Additional multiple chronic lacunar infarcts and similar-appearing   chronic white matter microvascular type changes.    IR Neuro (11.20.23 @ 11:29) >  Cerebral angiography shows occlusion of right and left vertebral arteries.  There is supply to the basilar artery via the right posterior   communicating artery (retrogradely) and via the anterior spinal artery.    CT Head No Cont (11.23.23 @ 00:44) >   Recent posterior circulation infarcts without change since   11/21/2023. No hemorrhage.    CT Head No Cont (11.24.23 @ 18:08) >  There is a stent in the intradural left vertebral artery.  There are patchy small subacute infarcts in the right brachium   pontis/medial aspect of the right cerebellar hemisphere and in the   posterior inferior aspect of the left cerebellar hemisphere. 12.0   8.88  )-----------( 209      ( 27 Nov 2023 06:13 )               35.3     11-27    144  |  110<H>  |  11  ----------------------------<  106<H>  3.8   |  22  |  1.21    Ca    8.7      27 Nov 2023 06:13  Phos  4.6     11-27  Mg     2.0     11-27    RADIOLOGY, EKG & ADDITIONAL TESTS:  CT Brain Stroke Protocol (11.16.23 @ 19:40) >  1. No intracranial hemorrhage is appreciated.  2. No intracranial mass lesion is appreciated.  3.  Left occipital and right cerebellar acute to subacute infarctions.   Left basal ganglia, left thalamic and central pontine remote infarctions  4. MR may provide higher sensitivity imaging evaluation for the clinical   indication of acute infarction.    CT Angio Brain Stroke Protocol  w/ IV Cont (11.16.23 @ 19:40) >  1.   Right carotid system:    Atherosclerotic plaque without    hemodynamically significant stenosis.  2.   Left carotid system:     Atherosclerotic plaque without    hemodynamically significant stenosis.  3.   Vertebral circulation:    Patent.  4.  Anterior intracranial circulation: Intracranial atherosclerosis   cavernous and clinoid segments of the internal carotid arteries, mild to   moderate, and middle cerebral arteries, moderate on the right and   mild-to-moderate on the left.  5.  Posterior intracranial circulation:Severe atherosclerotic disease   has progressed since July 2023 with progressive diminished flow of the left vertebral artery, basilar artery and posterior cerebral arteries.   There is likely reversal flow within the basilar artery to perfuse the   most distal left vertebral artery and PICA arteries. Occlusion of the   right vertebral artery was present in July 2023  6. Brain perfusion:   No acute infarction of the brain is convincingly   demonstrated.  However, there is extensive ischemia within the posterior circulation    MR Head No Cont (11.17.23 @ 15:31) >  Small vessel white matter ischemic changes. Acute infarcts in   the posterior circulation involving the left occipital lobe, left   cerebellum and bilateral brachium pontis, right adrienne. Chronic appearing infarct adrienne. Extremely poor flow identified in the basilar artery due to what appears to be severe distal vertebral artery stenosis. Flow in the posterior communicating arteries and posterior cerebral arteries is too small to measure.    CT Head No Cont (11.17.23 @ 19:35) >  Stable posterior circulation infarctions.    CT Head No Cont (11.19.23 @ 13:53) >  Evolving acute infarcts in the posterior circulation are unchanged from   11/17/2023.  No new intracranial findings.    MR Head No Cont (11.19.23 @ 17:02) >  Redemonstration of multiple evolving acute/subacute posterior   fossa and left occipital lobe infarcts with associated cytotoxic edema   and without hemorrhagic transformation. Overall appearance is grossly similar when compared with 11/17/2023.  Additional multiple chronic lacunar infarcts and similar-appearing   chronic white matter microvascular type changes.    IR Neuro (11.20.23 @ 11:29)   Cerebral angiography shows occlusion of right and left vertebral arteries.  There is supply to the basilar artery via the right posterior   communicating artery (retrogradely) and via the anterior spinal artery.    CT Head No Cont (11.23.23 @ 00:44)    Recent posterior circulation infarcts without change since   11/21/2023. No hemorrhage.    CT Head No Cont (11.24.23 @ 18:08)   There is a stent in the intradural left vertebral artery.  There are patchy small subacute infarcts in the right brachium   pontis/medial aspect of the right cerebellar hemisphere and in the   posterior inferior aspect of the left cerebellar hemisphere.

## 2023-11-27 NOTE — PROGRESS NOTE ADULT - PROVIDER SPECIALTY LIST ADULT
Cardiology
Cardiology
Internal Medicine
NSICU
Neurology
Neurosurgery
Neurosurgery
Cardiology
Internal Medicine
NSICU
Neurology
Neurosurgery
Neurology
Internal Medicine
Internal Medicine
Neurology
Neurosurgery
Neurosurgery
Cardiology
NSICU
Neurosurgery
Neurosurgery
Cardiology

## 2023-11-27 NOTE — PROGRESS NOTE ADULT - ASSESSMENT
44yo right handed man PMHx stroke (7/2023 - presenting with dysarthria - left inferior adrienne, b/l parieto-occipital juxtacortical WM w/mild residual dysarthria; 8 years ago - treated with TPA at [facility] [place], Pt presented at that time with left hemiplegia, facial droop and dysarthria), T2DM, HTN, former smoker (quit in 7/2023), currently takes ASA 81mg daily every day. Pt presented to [facility] ED on 11/16/2023, with c/o sudden difficulty walking, LLE weakness, speech changes. Pt is s/p tenecteplase.    Impression: Left facial weakness, LUE ataxia, dysarthria, gait instability due to posterior circulation ischemic infarct. Severe disease of posterior circulation involving vertebral, basilar and associated ischemia. Mechanism: TENISHA. Now s/p recanalization and L vert stent placement on 11/24.     NEURO: Neurologically stable s/p L vert stent placement and recanalization on 11/24. Now on brilinta and aspirin. S/p diagnostic cerebral angiogram on 11/20 showing severe distal vertebral artery stenosis bilaterally. Will continue close monitoring for neurologic deterioration. Goal for normotension for SBP < 160. MRI Brain w/o, MRA Head w/o and Neck w/contrast results as above. Physical therapy/OT eval with recommendations for AR.    ANTITHROMBOTIC THERAPY: Brilinta 90mg BID and aspirin 81mg QD for L vert stent placement and secondary stroke prevention     PULMONARY: Protecting airway, saturating well on room air     CARDIOVASCULAR: TTE with EF of 68%, c/w cardiac monitoring no events                              SBP goal: 140-200mmHg    GASTROINTESTINAL: Dysphagia screen  failed, S/S eval on 11/17 with recommendations for regular diet. 11/20 noted to be coughing on thin liquids, Speech re eval with recs for pureed diet with mildly thick liquids. Upgraded to soft and bite sized diet on 11/25      Diet: soft diet     RENAL: BUN/Cr stable, good urine output, urine toxicology positive for THC      Na Goal: Greater than 135     Mauricio: No    HEMATOLOGY: H/H stable, Platelets 209     DVT ppx:  LMWH     ID: afebrile, no leukocytosis     OTHER: Plan/goals of care discussed with pt and pt's family at bedside    DISPOSITION: Acute Rehab as per PT/OT eval once stable and workup is complete    CORE MEASURES:        Admission NIHSS: 4-->2     Tenecteplase: YES      LDL/HDL: 37/32     Depression Screen: 0     Statin Therapy: Y     Dysphagia Screen:  FAIL     Smoking Former: smoker quit 07/2023      Afib  NO     Stroke Education  YES    Obtain screening ultrasound in patients who meet 1 or more of the following criteria as patient is high risk for DVT/PE upon admission:   [] History of DVT/PE  [] Hypercoagulable states (Factor V Leiden, Cancer, OCP, etc. )  [] Prolonged immobility (hemiplegia/hemiparesis/post operative or any other extended immobilization)   [] Transferred from outside facility (Rehab or Long term care)  [] Age </= to 50   44yo right handed man PMHx stroke (7/2023 - presenting with dysarthria - left inferior adrienne, b/l parieto-occipital juxtacortical WM w/mild residual dysarthria; 8 years ago - treated with TPA at [facility], Pt presented at that time with left hemiplegia, facial droop and dysarthria), T2DM, HTN, former smoker (quit in 7/2023), currently takes ASA 81mg daily every day. Pt presented to [facility] ED on 11/16/2023, with c/o sudden difficulty walking, LLE weakness, speech changes. Pt is s/p tenecteplase.    Impression: Left facial weakness, LUE ataxia, dysarthria, gait instability due to posterior circulation ischemic infarct. Severe disease of posterior circulation involving vertebral, basilar and associated ischemia. Mechanism: TENISHA. Now s/p recanalization and L vert stent placement on 11/24.     NEURO: Neurologically stable s/p L vert stent placement and recanalization on 11/24. Now on brilinta and aspirin. S/p diagnostic cerebral angiogram on 11/20 showing severe distal vertebral artery stenosis bilaterally. MR NOVA recc by neurosurgery as part of outpatient follow up. Will continue close monitoring for neurologic deterioration. Goal for normotension for SBP < 160. MRI Brain w/o, MRA Head w/o and Neck w/contrast results as above. Physical therapy/OT eval with recommendations for AR.    ANTITHROMBOTIC THERAPY: Brilinta 90mg BID and aspirin 81mg QD for L vert stent placement and secondary stroke prevention     PULMONARY: Protecting airway, saturating well on room air     CARDIOVASCULAR: TTE with EF of 68%, c/w cardiac monitoring no events                              SBP goal: 140-200mmHg    GASTROINTESTINAL: Dysphagia screen  failed, S/S eval on 11/17 with recommendations for regular diet. 11/20 noted to be coughing on thin liquids, Speech re eval with recs for pureed diet with mildly thick liquids. Upgraded to soft and bite sized diet on 11/25. S/p MBS on 11/27, now on reg diet with mod thick liquids.      Diet: regular with mod thick liquids     RENAL: BUN/Cr stable, good urine output, urine toxicology positive for THC      Na Goal: Greater than 135     Mauricio: No    HEMATOLOGY: H/H stable, Platelets 209     DVT ppx:  LMWH     ID: afebrile, no leukocytosis     OTHER: Plan/goals of care discussed with pt and pt's family at bedside    DISPOSITION: Acute Rehab as work up is complete    CORE MEASURES:        Admission NIHSS: 4-->2     Tenecteplase: YES      LDL/HDL: 37/32     Depression Screen: 0     Statin Therapy: Y     Dysphagia Screen:  FAIL     Smoking Former: smoker quit 07/2023      Afib  NO     Stroke Education  YES    Obtain screening ultrasound in patients who meet 1 or more of the following criteria as patient is high risk for DVT/PE upon admission:   [] History of DVT/PE  [] Hypercoagulable states (Factor V Leiden, Cancer, OCP, etc. )  [] Prolonged immobility (hemiplegia/hemiparesis/post operative or any other extended immobilization)   [] Transferred from outside facility (Rehab or Long term care)  [] Age </= to 50

## 2023-11-28 NOTE — PROGRESS NOTE ADULT - SUBJECTIVE AND OBJECTIVE BOX
Pt was seen for 40 minutes. Pt c/o anger. Reviewed chart, approached Pt for an initial consult, introduced the role of neuropsychology in the rehab team, and explained the nature and purpose of the consult. Pt is a 44 y/o male with PMH significant for: stroke (7/2023 - presenting with dysarthria - left inferior adrienne, b/l parieto-occipital juxtacortical WM w/mild residual dysarthria; 8 years ago - treated with TPA at [facility] - p/w L hemiplegia, facial droop and dysarthria), T2DM, HTN, former smoker (quit in 7/2023), presented to [facility] ED on 11/16/2023, with c/o difficulty walking, LLE weakness, slurred speech (worse from baseline). Pt agreed to participate. Session focused on establishing rapport with Pt, gathering biographical information, and assessing his current emotional functioning. Pt's wife was present initially, but Pt asked her to leave the bedroom. Pt provided significant information about his medical hx and social hx. Pt also answered all questions during the clinical interview in order to elicit emotional symptoms. Pt reported experiencing angry at himself for having another CVA, helplessness/hopelessness, poor sleep and low energy. Pt's wife was seen after meeting with Pt and reported that Pt has been also angry at her and others, and that he has had depression for a long time but has not sought professional help. Pt is taking Lexapro 10  mg, which is a low dose considering Pt's hx of depression.  He c/o of sleep problems a night despite Desyrel's relatively high dose. Support and encouragement were provided.

## 2023-11-28 NOTE — CHART NOTE - NSCHARTNOTEFT_GEN_A_CORE
[facility] Interdisciplinary Plan of Care    REHABILITATION DIAGNOSIS:  Cerebral infarction          COMORBIDITIES/COMPLICATING CONDITIONS IMPACTING REHABILITATION:  HEALTH ISSUES - PROBLEM Dx:  Dysarthria  dysphagia  incoordination      PAST MEDICAL & SURGICAL HISTORY:  DM (diabetes mellitus)      HTN (hypertension)      CVA (cerebrovascular accident)          Based upon consideration of the patient's impairments, functional status, complicating conditions and any other contributing factors and after information garnered from the assessments of all therapy disciplines involved in treating the patient and other pertinent clinicians:    INTERDISCIPLINARY REHABILITATION INTERVENTIONS:    [ X  ] Transfer Training  [ X  ] Bed Mobility  [ X  ] Therapeutic Exercise  [ X ] Balance/Coordination Exercises  [ X ] Locomotion retraining  [ X  ] Stairs  [  X ] Functional Transfer Training  [ X  ] Bowel/Bladder program  [ X  ] Pain Management  [ X  ] Skin/Wound Care  [ X  ] Visual/Perceptual Training  [ X  ] Therapeutic Recreation Activities  [  X ] Neuromuscular Re-education  [ X  ] Activities of Daily Living  [ X  ] Speech Exercise  [X   ] Swallowing Exercises  [   ] Vital Stim  [   ] Dietary Supplements  [   ] Calorie Count  [ X  ] Cognitive Exercises  [  X ] Cognitive/Linguistic Treatment  [  x ] Behavior Program  [  x ] Neuropsych Therapy  [ X  ] Patient/Family Counseling  [ X ] Family Training  [ X  ] Community Re-entry  [   ] Orthotic Evaluation  [   ] Prosthetic Eval/Training    MEDICAL PROGNOSIS: good      REHAB POTENTIAL:  Good    EXPECTED DAILY THERAPY:  3 hours/day, 5 Days a week           ESTIMATED LOS:  [  ] 5-7 Days  [  ] 7-10 Days  [  ] 10- 14 Days  [ X ] 14- 18 Days  [  ] 18- 21 Days    ESTIMATED DISPOSITION:  [X  ] Home   [ X ] Home with Outpatient Therapies  [  ] Home with Home Therapies  [  ] Assisted Living  [  ] Nursing Home  [  ] Long Term Acute Care    INTERDISCIPLINARY FUNCTIONAL OUTCOMES/GOALS:         Gait/Mobility: modified independent        Transfers:  supervision        ADLs: supervision       Functional Transfers: supervision       Medication Management: supervision       Communication: Independent       Cognitive: independent        Dysphagia: independent       Bladder: independent       Bowel: independent     Functional Independent Measures:   7 = Independent  6 = Modified Independent  5 = Supervision  4 = Minimal Assist/ Contact Guard  3 = Moderate Assistance  2 = Maximum Assistance  1 = Total Assistance  0 = Unable to assess

## 2023-11-28 NOTE — DIETITIAN INITIAL EVALUATION ADULT - PERTINENT MEDS FT
MEDICATIONS  (STANDING):  aspirin  chewable 81 milliGRAM(s) Oral daily  atorvastatin 80 milliGRAM(s) Oral at bedtime  cefTRIAXone   IVPB 1000 milliGRAM(s) IV Intermittent every 24 hours  enoxaparin Injectable 30 milliGRAM(s) SubCutaneous <User Schedule>  escitalopram 10 milliGRAM(s) Oral daily  hydrALAZINE 25 milliGRAM(s) Oral two times a day  labetalol 300 milliGRAM(s) Oral three times a day  lisinopril 40 milliGRAM(s) Oral daily  pantoprazole    Tablet 40 milliGRAM(s) Oral before breakfast  polyethylene glycol 3350 17 Gram(s) Oral daily  senna 2 Tablet(s) Oral at bedtime  ticagrelor 90 milliGRAM(s) Oral two times a day  traZODone 50 milliGRAM(s) Oral at bedtime    MEDICATIONS  (PRN):  acetaminophen     Tablet .. 650 milliGRAM(s) Oral every 6 hours PRN Temp greater or equal to 38.5C (101.3F), Mild Pain (1 - 3)

## 2023-11-28 NOTE — DIETITIAN INITIAL EVALUATION ADULT - ADD RECOMMEND
1. Recommend continue current nutrition plan of care as tolerated   2. Follow up with SLP recommendations regarding appropriate textured/modified diet and consistency   3. Monitor daily PO intakes, GI tolerance, labs, weights, skin integrity, & BM regularity

## 2023-11-28 NOTE — CONSULT NOTE ADULT - SUBJECTIVE AND OBJECTIVE BOX
HPI:  44yo RHD M with PMH significant for: stroke (7/2023 - presenting with dysarthria - left inferior adrienne, b/l parieto-occipital juxtacortical WM w/mild residual dysarthria; 8 years ago - treated with TPA at [facility] - p/w L hemiplegia, facial droop and dysarthria), T2DM, HTN, former smoker (quit in 7/2023), presented to [facility] ED on 11/16/2023, with c/o difficulty walking, LLE weakness, slurred speech (worse from baseline).     ED exam: patient has a L facial droop, ataxia in LUE (dysmetria with FTN), and struggles to perform HTS b/l. Patient also with pronounced slurred speech (moderate but is intelligible). CTH showing a nonacute left occipital infarct (not seen on the CTH 7/2023). MR with acute/subacute posterior fossa and left occipital lobe infarcts with associated cytotoxic edema. Recieved tenecteplase - 30 minutes after administration, NIHSS 4->2 (mild left facial but improved and mild dysarthria). Patient reports voice is much better. Performs FTN without issue with LUE. Performs HTS b/l. L NLF flattening significantly improved.     Patient was evaluated by PM&R and therapy for functional deficits and gait/ADL impairments and recommend acute rehabilitation.  Patient was medically optimized for discharge to [facility] on 11/27/2023. (27 Nov 2023 13:08)    Subjective and overnight events:  Patient seen and examined at bedside.+ fever 101 last night. pt reports some coughs. no fever, chills this morning. admits to mild epigastric abd pain and reports previous constipation for one week. did have one BM yesterday with enema. no sob, cp, headahce, dizziness.      PAST MEDICAL & SURGICAL HISTORY:  DM (diabetes mellitus)      HTN (hypertension)      CVA (cerebrovascular accident)      Family history: mother has history of HTN and DM    Social history: denies smoking, ETOH or illicit drug     ALLERGIES:  No Known Allergies    MEDICATIONS  (STANDING):  aspirin  chewable 81 milliGRAM(s) Oral daily  atorvastatin 80 milliGRAM(s) Oral at bedtime  cefTRIAXone   IVPB 1000 milliGRAM(s) IV Intermittent every 24 hours  enoxaparin Injectable 30 milliGRAM(s) SubCutaneous <User Schedule>  escitalopram 10 milliGRAM(s) Oral daily  hydrALAZINE 25 milliGRAM(s) Oral two times a day  labetalol 300 milliGRAM(s) Oral three times a day  lisinopril 40 milliGRAM(s) Oral daily  pantoprazole    Tablet 40 milliGRAM(s) Oral before breakfast  polyethylene glycol 3350 17 Gram(s) Oral daily  senna 2 Tablet(s) Oral at bedtime  ticagrelor 90 milliGRAM(s) Oral two times a day  traZODone 50 milliGRAM(s) Oral at bedtime    MEDICATIONS  (PRN):  acetaminophen     Tablet .. 650 milliGRAM(s) Oral every 6 hours PRN Temp greater or equal to 38.5C (101.3F), Mild Pain (1 - 3)    Vital Signs Last 24 Hrs  T(F): 98.9 (28 Nov 2023 08:07), Max: 101.3 (27 Nov 2023 19:50)  HR: 69 (28 Nov 2023 08:07) (62 - 95)  BP: 92/58 (28 Nov 2023 08:07) (92/58 - 145/69)  RR: 16 (28 Nov 2023 08:07) (16 - 22)  SpO2: 96% (28 Nov 2023 08:07) (93% - 100%)  I&O's Summary    27 Nov 2023 07:01  -  28 Nov 2023 07:00  --------------------------------------------------------  IN: 0 mL / OUT: 450 mL / NET: -450 mL      PHYSICAL EXAM:  General: NAD, Awake alert   ENT: MMM  Neck: Supple, No JVD  Lungs: Clear to auscultation bilaterally  Cardio: RRR, S1/S2, No murmurs  Abdomen: Soft, Nontender, Nondistended; Bowel sounds present  Extremities: No calf tenderness, No pitting edema  neuro: dysarthria. left facial droop. LUE and LLE 4/5. RUE and RLE 5/5    LABS:                        11.8   9.45  )-----------( 211      ( 28 Nov 2023 06:00 )             34.6     11-28    143  |  106  |  17  ----------------------------<  108  3.5   |  26  |  1.34    Ca    8.7      28 Nov 2023 06:00  Phos  4.6     11-27  Mg     2.0     11-27    TPro  6.1  /  Alb  2.9  /  TBili  0.8  /  DBili  x   /  AST  11  /  ALT  21  /  AlkPhos  53  11-28        Lactate, Blood: 0.6 mmol/L (11-27 @ 20:10)        11-24 Chol 80 mg/dL LDL -- HDL 28 mg/dL Trig 73 mg/dL        Urinalysis Basic - ( 28 Nov 2023 06:00 )    Color: x / Appearance: x / SG: x / pH: x  Gluc: 108 mg/dL / Ketone: x  / Bili: x / Urobili: x   Blood: x / Protein: x / Nitrite: x   Leuk Esterase: x / RBC: x / WBC x   Sq Epi: x / Non Sq Epi: x / Bacteria: x        COVID-19 PCR: NotDetec (11-27-23 @ 20:20)      RADIOLOGY & ADDITIONAL TESTS:    Care Discussed with Consultants/Other Providers:

## 2023-11-28 NOTE — DIETITIAN INITIAL EVALUATION ADULT - OTHER INFO
T2DM noted. A1C: 5.5% (11/17/23) indicates good glycemic control. [facility] Reports tolerating regular diet w/ moderately thickened liquids. Last BM reported to be yesterday 11/27/23. Denies any N/V/D/C. Skin intact. No edema noted. Food preferences obtained and noted on patients file with nutrition office.

## 2023-11-28 NOTE — DIETITIAN INITIAL EVALUATION ADULT - PERTINENT LABORATORY DATA
11-28    143  |  106  |  17  ----------------------------<  108<H>  3.5   |  26  |  1.34<H>    Ca    8.7      28 Nov 2023 06:00  Phos  4.6     11-27  Mg     2.0     11-27    TPro  6.1  /  Alb  2.9<L>  /  TBili  0.8  /  DBili  x   /  AST  11  /  ALT  21  /  AlkPhos  53  11-28  A1C with Estimated Average Glucose Result: 5.5 % (11-17-23 @ 06:59)  A1C with Estimated Average Glucose Result: 7.0 % (07-25-23 @ 10:18)  A1C with Estimated Average Glucose Result: 6.8 % (07-25-23 @ 07:12)

## 2023-11-28 NOTE — DIETITIAN INITIAL EVALUATION ADULT - ORAL INTAKE PTA/DIET HISTORY
Patient endorses good appetite prior to admission. Reports consuming a low sodium and low sugar diet. No known food allergies/intolerances.

## 2023-11-28 NOTE — PROGRESS NOTE ADULT - ASSESSMENT
Pt alert, fair attention, dysarthric speech, intact expressive language, thought processes - goal-directed , thought contents - no abnormal thought contents noted, depressed/irritable affect, euthymic mood, denied AH/VH, denied SI/HI/I/P, calm behavior. Plan: Continue the assessment process. Provide individual supportive tx. Pt may benefit from increase in antidepressant medication dose when practically feasible.

## 2023-11-29 NOTE — PROGRESS NOTE ADULT - SUBJECTIVE AND OBJECTIVE BOX
No events overnight  No new complaints to offer me      Vital Signs Last 24 Hrs  T(F): 98 (29 Nov 2023 08:21), Max: 98.4 (28 Nov 2023 17:37)  HR: 75 (29 Nov 2023 08:21) (71 - 75)  BP: 121/79 (29 Nov 2023 08:21) (113/77 - 128/81)  RR: 16 (29 Nov 2023 08:21) (16 - 18)  SpO2: 97% (29 Nov 2023 08:21) (96% - 97%)      Examination:  General: NAD, Comfortable  Pulm: CTA BL No Rhonchi Rales or wheezes  Neck: Supple, No JVD  CVS: RRR No rubs murmurs or gallops  Abdomen: Soft, Nontender, Nondistended; No masses or organomegally  Extremities: No calf tenderness, No pitting edema    Labs:                        11.8   9.45  )-----------( 211      ( 28 Nov 2023 06:00 )             34.6       11-28    143  |  106  |  17  ----------------------------<  108  3.5   |  26  |  1.34    Ca    8.7      28 Nov 2023 06:00  Phos  4.6     11-27  Mg     2.0     11-27    TPro  6.1  /  Alb  2.9  /  TBili  0.8  /  DBili  x   /  AST  11  /  ALT  21  /  AlkPhos  53  11-28        Lactate, Blood: 0.6 mmol/L (11-27 @ 20:10)

## 2023-11-29 NOTE — PROGRESS NOTE ADULT - ASSESSMENT
43M  SP CVA (2015 abd 7/2023), Vertebral Artery Stenosis, DM, HTN, former smoker (quit 7/2023),   Came to [facility] ED (11/16) with difficulty walking, LLE weakness, slurred speech (worse from baseline).   Acute Ischemic CVA SP Thrombectomy. Mild improvement  Severe Bilateral vertebral artery occlusion  SP Stenting of intradural left vertebral artery Nov28    #Ischemic CVA  - HO multiple posterior circ ischemic CVA  - SP TPA with mild improvement   - Vertebral art stenting performed Nov28  - cont with ASA 81mg + Ticagrelor 90 BID  - cont with lipitor 80mg   - On rehab floor now    #Fever on admission  - Nov28 Fever 101 overnight. one episode, CXR clear. Empirc ABX given  - Nov29 stable today. Awaiting UC results. Otherwise cont rocephin for 5 days    #HTN  - BP goal < 140/90  - Labetalol 300 TID  - Hydralazine 25mg BID  - Lisinopril 40mg daily  - Nov29 Hold Hydralazine for mild/low BP. May start amlodipine 5 if needed    #DM II - A1C 5.5  - Previously on metformin  - serum glucose well controlled. no need for Accu-Cheks       #GI/Bowel Mgmt -- large BM yesterday evening, reports he was previously constipated  - At risk for constipation due to neurologic diagnosis, immobility and/or medication use  - Senna,  Miralax   - GI ppx: Protonix    #Bladder management  - PVR q 8 hours (SC if > 400)  - Encourage timed voids Q4hrs while awake for independence and to promote continence during therapy    #DVT ppx  - Lovenox 30 BID  - SCDs, TEDs

## 2023-11-29 NOTE — PROGRESS NOTE ADULT - SUBJECTIVE AND OBJECTIVE BOX
Patient is a 43y old  Male who presents with a chief complaint of CVA (2023 09:29)    HPI:  42yo RHD M with PMH significant for: stroke (2023 - presenting with dysarthria - left inferior adrienne, b/l parieto-occipital juxtacortical WM w/mild residual dysarthria; 8 years ago - treated with TPA at [facility] [place] - p/w L hemiplegia, facial droop and dysarthria), T2DM, HTN, former smoker (quit in 2023), presented to [facility] ED on 2023, with c/o difficulty walking, LLE weakness, slurred speech (worse from baseline).     ED exam: patient has a L facial droop, ataxia in LUE (dysmetria with FTN), and struggles to perform HTS b/l. Patient also with pronounced slurred speech (moderate but is intelligible). CTH showing a nonacute left occipital infarct (not seen on the CTH 2023). MR with acute/subacute posterior fossa and left occipital lobe infarcts with associated cytotoxic edema. Recieved tenecteplase - 30 minutes after administration, NIHSS 4->2 (mild left facial but improved and mild dysarthria). Patient reports voice is much better. Performs FTN without issue with LUE. Performs HTS b/l. L NLF flattening significantly improved.     Patient was evaluated by PM&R and therapy for functional deficits and gait/ADL impairments and recommend acute rehabilitation.  Patient was medically optimized for [facility]    PAST MEDICAL & SURGICAL HISTORY:  DM (diabetes mellitus)  HTN (hypertension)  CVA (cerebrovascular accident)    SUBJECTIVE: Patient seen and evaluated while sitting up in WC. Able to recall what he had for breakfast.  Multiple BM yesterday - formed, no soft or loose.     REVIEW OF SYSTEMS  Constitutional: No fever, No Chills  Pulm: +mild cough (unchange),  No shortness of breath, +SHARP without desaturation   Cardio: No chest pain, No palpitations  GI:  No abdominal pain, No nausea/vomiting, +constipation and was on BM regimen had multiple BM ()  : No dysuria, No frequency, +urinary retention requiring ISC  Neuro: No headaches, No memory loss, +loss of strength, No numbness, +dysarthria  MSK: No joint pain  Psych:  +depression, +frustrated with his situation    PHYSICAL EXAM  43y  Vital Signs Last 24 Hrs  T(C): 36.7 (2023 08:21), Max: 36.9 (2023 17:37)  T(F): 98 (2023 08:21), Max: 98.4 (2023 17:37)  HR: 75 (2023 08:21) (71 - 75)  BP: 121/79 (2023 08:21) (113/77 - 128/81)  RR: 16 (2023 08:21) (16 - 18)  SpO2: 97% (2023 08:21) (96% - 97%)    Daily Weight in k.5 (2023 23:13)    Gen - NAD, Comfortable  HEENT - NCAT  Neck - Supple, No limited ROM  Pulm -resp nonlabored  Cardiovascular - warm and well perfused, no cyanosis  Abdomen - Soft, NT/ND, +BS  Extremities - No Cyanosis, no clubbing, no edema, no calf tenderness  Neuro-     Cognitive - awake, alert, oriented x4. appropriate answers/conversation     Communication  +dysarthria, no aphasia-- repeats, follows 2 step commands, names objects and function     Cranial Nerves - left facial droop, left facial weakness, decreased left shoulder shrug     Motor - Left sided weakness                    LEFT    UE - 4/5                     RIGHT UE - 5/5                    LEFT    LE - 4/5                     RIGHT LE - 5/5        Sensory - Intact  to LT      Coordination - FTN impaired to left side     Tone - normal  Psychiatric - flat affect, withdrawn, limited eye contact  Skin:  all skin intact    RECENT LABS:                        11.8   9.45  )-----------( 211      ( 2023 06:00 )             34.6     11-28    143  |  106  |  17  ----------------------------<  108<H>  3.5   |  26  |  1.34<H>    Ca    8.7      2023 06:00    TPro  6.1  /  Alb  2.9<L>  /  TBili  0.8  /  DBili  x   /  AST  11  /  ALT  21  /  AlkPhos  53      LIVER FUNCTIONS - ( 2023 06:00 )  Alb: 2.9 g/dL / Pro: 6.1 g/dL / ALK PHOS: 53 U/L / ALT: 21 U/L / AST: 11 U/L / GGT: x           UA () negative    Culture - Blood (collected 23 @ 20:10)  Source: .Blood Blood-Peripheral  Preliminary Report (23 @ 01:03):    No growth at 24 hours    Allergies  No Known Allergies    MEDICATIONS  (STANDING):  aspirin  chewable 81 milliGRAM(s) Oral daily  atorvastatin 80 milliGRAM(s) Oral at bedtime  cefTRIAXone   IVPB 1000 milliGRAM(s) IV Intermittent every 24 hours  enoxaparin Injectable 30 milliGRAM(s) SubCutaneous <User Schedule>  escitalopram 20 milliGRAM(s) Oral daily  labetalol 300 milliGRAM(s) Oral three times a day  lisinopril 40 milliGRAM(s) Oral daily  pantoprazole    Tablet 40 milliGRAM(s) Oral before breakfast  polyethylene glycol 3350 17 Gram(s) Oral daily  senna 2 Tablet(s) Oral at bedtime  tamsulosin 0.4 milliGRAM(s) Oral at bedtime  ticagrelor 90 milliGRAM(s) Oral two times a day  traZODone 50 milliGRAM(s) Oral at bedtime    MEDICATIONS  (PRN):  acetaminophen     Tablet .. 650 milliGRAM(s) Oral every 6 hours PRN Temp greater or equal to 38.5C (101.3F), Mild Pain (1 - 3)   Patient is a 43y old  Male who presents with a chief complaint of CVA (2023 09:29)    HPI:  42yo RHD M with PMH significant for: stroke (2023 - presenting with dysarthria - left inferior adrienne, b/l parieto-occipital juxtacortical WM w/mild residual dysarthria; 8 years ago - treated with TPA at [facility] - p/w L hemiplegia, facial droop and dysarthria), T2DM, HTN, former smoker (quit in 2023), presented to [facility] ED on 2023, with c/o difficulty walking, LLE weakness, slurred speech (worse from baseline).     ED exam: patient has a L facial droop, ataxia in LUE (dysmetria with FTN), and struggles to perform HTS b/l. Patient also with pronounced slurred speech (moderate but is intelligible). CTH showing a nonacute left occipital infarct (not seen on the CTH 2023). MR with acute/subacute posterior fossa and left occipital lobe infarcts with associated cytotoxic edema. Recieved tenecteplase - 30 minutes after administration, NIHSS 4->2 (mild left facial but improved and mild dysarthria). Patient reports voice is much better. Performs FTN without issue with LUE. Performs HTS b/l. L NLF flattening significantly improved.     Patient was evaluated by PM&R and therapy for functional deficits and gait/ADL impairments and recommend acute rehabilitation.  Patient was medically optimized for [facility]    PAST MEDICAL & SURGICAL HISTORY:  DM (diabetes mellitus)  HTN (hypertension)  CVA (cerebrovascular accident)    SUBJECTIVE: Patient seen and evaluated while sitting up in WC. Able to recall what he had for breakfast.  Multiple BM yesterday - formed, no soft or loose.     REVIEW OF SYSTEMS  Constitutional: No fever, No Chills  Pulm: +mild cough (unchange),  No shortness of breath, +SHARP without desaturation   Cardio: No chest pain, No palpitations  GI:  No abdominal pain, No nausea/vomiting, +decreased bowel urge, +constipation and was on BM regimen had multiple BM ()  : No dysuria, No frequency, +decreased urinary urge +urinary retention requiring ISC  Neuro: No headaches, No memory loss, +loss of strength, No numbness, +dysarthria  MSK: No joint pain  Psych:  +depression, +frustrated with his situation    PHYSICAL EXAM  43y  Vital Signs Last 24 Hrs  T(C): 36.7 (2023 08:21), Max: 36.9 (2023 17:37)  T(F): 98 (2023 08:21), Max: 98.4 (2023 17:37)  HR: 75 (2023 08:21) (71 - 75)  BP: 121/79 (2023 08:21) (113/77 - 128/81)  RR: 16 (2023 08:21) (16 - 18)  SpO2: 97% (2023 08:21) (96% - 97%)    Daily Weight in k.5 (2023 23:13)    Gen - NAD, Comfortable  HEENT - NCAT  Neck - Supple, No limited ROM  Pulm -resp nonlabored  Cardiovascular - warm and well perfused, no cyanosis  Abdomen - Soft, NT/ND, +BS  Extremities - No Cyanosis, no clubbing, no edema, no calf tenderness  Neuro-     Cognitive - awake, alert, oriented x4. appropriate answers/conversation     Communication  +dysarthria, no aphasia-- repeats, follows 2 step commands, names objects and function     Cranial Nerves - left facial droop, left facial weakness, decreased left shoulder shrug     Motor - Left sided weakness                    LEFT    UE - 4/5                     RIGHT UE - 5/5                    LEFT    LE - 4/5                     RIGHT LE - 5/5        Sensory - Intact  to LT      Coordination - FTN impaired to left side     Tone - normal  Psychiatric - flat affect, withdrawn, limited eye contact  Skin:  all skin intact    RECENT LABS:                        11.8   9.45  )-----------( 211      ( 2023 06:00 )             34.6     11-    143  |  106  |  17  ----------------------------<  108<H>  3.5   |  26  |  1.34<H>    Ca    8.7      2023 06:00    TPro  6.1  /  Alb  2.9<L>  /  TBili  0.8  /  DBili  x   /  AST  11  /  ALT  21  /  AlkPhos  53      LIVER FUNCTIONS - ( 2023 06:00 )  Alb: 2.9 g/dL / Pro: 6.1 g/dL / ALK PHOS: 53 U/L / ALT: 21 U/L / AST: 11 U/L / GGT: x           UA () negative    Culture - Blood (collected 23 @ 20:10)  Source: .Blood Blood-Peripheral  Preliminary Report (23 @ 01:03):    No growth at 24 hours    Allergies  No Known Allergies    MEDICATIONS  (STANDING):  aspirin  chewable 81 milliGRAM(s) Oral daily  atorvastatin 80 milliGRAM(s) Oral at bedtime  cefTRIAXone   IVPB 1000 milliGRAM(s) IV Intermittent every 24 hours  enoxaparin Injectable 30 milliGRAM(s) SubCutaneous <User Schedule>  escitalopram 20 milliGRAM(s) Oral daily  labetalol 300 milliGRAM(s) Oral three times a day  lisinopril 40 milliGRAM(s) Oral daily  pantoprazole    Tablet 40 milliGRAM(s) Oral before breakfast  polyethylene glycol 3350 17 Gram(s) Oral daily  senna 2 Tablet(s) Oral at bedtime  tamsulosin 0.4 milliGRAM(s) Oral at bedtime  ticagrelor 90 milliGRAM(s) Oral two times a day  traZODone 50 milliGRAM(s) Oral at bedtime    MEDICATIONS  (PRN):  acetaminophen     Tablet .. 650 milliGRAM(s) Oral every 6 hours PRN Temp greater or equal to 38.5C (101.3F), Mild Pain (1 - 3)   Patient is a 43y old  Male who presents with a chief complaint of CVA (2023 09:29)    HPI:  42yo RHD M with PMH significant for: stroke (2023 - presenting with dysarthria - left inferior adrienne, b/l parieto-occipital juxtacortical WM w/mild residual dysarthria; 8 years ago - treated with TPA at [facility] - p/w L hemiplegia, facial droop and dysarthria), T2DM, HTN, former smoker (quit in 2023), presented to [facility] ED on 2023, with c/o difficulty walking, LLE weakness, slurred speech (worse from baseline).     ED exam: patient has a L facial droop, ataxia in LUE (dysmetria with FTN), and struggles to perform HTS b/l. Patient also with pronounced slurred speech (moderate but is intelligible). CTH showing a nonacute left occipital infarct (not seen on the CTH 2023). MR with acute/subacute posterior fossa and left occipital lobe infarcts with associated cytotoxic edema. Recieved tenecteplase - 30 minutes after administration, NIHSS 4->2 (mild left facial but improved and mild dysarthria). Patient reports voice is much better. Performs FTN without issue with LUE. Performs HTS b/l. L NLF flattening significantly improved.     Patient was evaluated by PM&R and therapy for functional deficits and gait/ADL impairments and recommend acute rehabilitation.  Patient was medically optimized for [facility]    PAST MEDICAL & SURGICAL HISTORY:  DM (diabetes mellitus)  HTN (hypertension)  CVA (cerebrovascular accident)    SUBJECTIVE: Patient seen and evaluated while sitting up in WC. Able to recall what he had for breakfast.  Multiple BM yesterday - formed, not soft or loose.     REVIEW OF SYSTEMS  Constitutional: No fever, No Chills  Pulm: +mild cough (unchange),  No shortness of breath, +SHARP without desaturation   Cardio: No chest pain, No palpitations  GI:  No abdominal pain, No nausea/vomiting, +decreased bowel urge, +constipation and was on BM regimen had multiple BM ()  : No dysuria, No frequency, +decreased urinary urge +urinary retention requiring ISC  Neuro: No headaches, No memory loss, +loss of strength, No numbness, +dysarthria  MSK: No joint pain  Psych:  +depression, +frustrated with his situation    PHYSICAL EXAM  43y  Vital Signs Last 24 Hrs  T(C): 36.7 (2023 08:21), Max: 36.9 (2023 17:37)  T(F): 98 (2023 08:21), Max: 98.4 (2023 17:37)  HR: 75 (2023 08:21) (71 - 75)  BP: 121/79 (2023 08:21) (113/77 - 128/81)  RR: 16 (2023 08:21) (16 - 18)  SpO2: 97% (2023 08:21) (96% - 97%)    Daily Weight in k.5 (2023 23:13)    Gen - NAD, Comfortable  HEENT - NCAT  Neck - Supple, No limited ROM  Pulm -resp nonlabored  Cardiovascular - warm and well perfused, no cyanosis  Abdomen - Soft, NT/ND, +BS  Extremities - No Cyanosis, no clubbing, no edema, no calf tenderness  Neuro-     Cognitive - awake, alert, oriented x4. appropriate answers/conversation     Communication  +dysarthria, no aphasia-- repeats, follows 2 step commands, names objects and function     Cranial Nerves - left facial droop, left facial weakness, decreased left shoulder shrug     Motor - Left sided weakness                    LEFT    UE - 4/5                     RIGHT UE - 5/5                    LEFT    LE - 4/5                     RIGHT LE - 5/5        Sensory - Intact  to LT      Coordination - FTN impaired to left side     Tone - normal  Psychiatric - flat affect, withdrawn, limited eye contact  Skin:  all skin intact    RECENT LABS:                        11.8   9.45  )-----------( 211      ( 2023 06:00 )             34.6     11-    143  |  106  |  17  ----------------------------<  108<H>  3.5   |  26  |  1.34<H>    Ca    8.7      2023 06:00    TPro  6.1  /  Alb  2.9<L>  /  TBili  0.8  /  DBili  x   /  AST  11  /  ALT  21  /  AlkPhos  53      LIVER FUNCTIONS - ( 2023 06:00 )  Alb: 2.9 g/dL / Pro: 6.1 g/dL / ALK PHOS: 53 U/L / ALT: 21 U/L / AST: 11 U/L / GGT: x           UA () negative    Culture - Blood (collected 23 @ 20:10)  Source: .Blood Blood-Peripheral  Preliminary Report (23 @ 01:03):    No growth at 24 hours    Allergies  No Known Allergies    MEDICATIONS  (STANDING):  aspirin  chewable 81 milliGRAM(s) Oral daily  atorvastatin 80 milliGRAM(s) Oral at bedtime  cefTRIAXone   IVPB 1000 milliGRAM(s) IV Intermittent every 24 hours  enoxaparin Injectable 30 milliGRAM(s) SubCutaneous <User Schedule>  escitalopram 20 milliGRAM(s) Oral daily  labetalol 300 milliGRAM(s) Oral three times a day  lisinopril 40 milliGRAM(s) Oral daily  pantoprazole    Tablet 40 milliGRAM(s) Oral before breakfast  polyethylene glycol 3350 17 Gram(s) Oral daily  senna 2 Tablet(s) Oral at bedtime  tamsulosin 0.4 milliGRAM(s) Oral at bedtime  ticagrelor 90 milliGRAM(s) Oral two times a day  traZODone 50 milliGRAM(s) Oral at bedtime    MEDICATIONS  (PRN):  acetaminophen     Tablet .. 650 milliGRAM(s) Oral every 6 hours PRN Temp greater or equal to 38.5C (101.3F), Mild Pain (1 - 3)

## 2023-11-29 NOTE — PROGRESS NOTE ADULT - ASSESSMENT
44yo RHD M with PMH significant for strokes (2015 abd 7/2023), T2DM, HTN, former smoker (quit 7/2023),  presented to [facility] ED (11/16) with c/o difficulty walking, LLE weakness, slurred speech (worse from baseline). Images with acute/subacute infarcts. Received TPA with some improvement.     #CVA (2015, 7/2023, 11/2023)  - Start Comprehensive Rehab Program: PT/OT/ST, 3hours daily and 5 days weekly.   - S/p TPA  - from 7/2023 s/p ASA + Plavix x 3 months; now: ASA 81mg + Ticagrelor 90 BID  - Atorvastatin 80 (LDL 36)    #Mood/sleep  - Lexapro 10mg QD- inc 20  - Trazodone 25mg at bedtime  - Neuropsychology consult     #HTN  - Labetalol 300 TID  - Hydralazine 25mg BID - hold  - Lisinopril 40mg daily  - Monitor: soft SBP 92     #DM II - A1C 5.5  - Previously on metformin  - FS: <120    #Resp  - Incentive Spirometry during the day time  - BIPAP nightly    #Fever on admission   - Noted with temp of 101.3 on admission:cbc, cmp, lactate, procalcitonin, UA (neg), blood CXR X 2 (unremarkable)--- wbc: 9.4. lactate: 0.6. UA negative. Chest x ray- no acute findings.   -blood culture - prelim NGTD   - no overt sign of infection - WBC 9.45 (11/28) afebrile    #Pain management  - Tylenol PRN    #Skin:  - No active issues at this time  - At risk for pressure injury due to neurologic diagnosis and relative immobility  - Turn Q2 hr while in bed, air mattress  - Nursing to monitor skin Q shift    #FEN   - Diet: Regular + mod thick. SV with meals  + free water protocol  - Dysphagia  SLP - evaluation and treatment    #GI/Bowel Mgmt   - At risk for constipation due to neurologic diagnosis, immobility and/or medication use  - Senna,  Miralax QD  - GI ppx: Protonix    #Bladder management  - PVR q 8 hours (SC if > 400) - -600 requiring ISC  - UA (11/27) negative  - Start flomax 0.4  - Encourage timed voids Q3hrs while awake for independence and to promote continence during therapy    #DVT ppx  - Lovenox 30 BID  - SCDs, TEDs     Precautions / PROPHYLAXIS:   - Falls, Cardiac  - Weight bearing status: WBAT   - Lungs: Aspiration, Incentive Spirometer   - Pressure injury/Skin: Turn Q2hrs while in bed, OOB to Chair, PT/OT      IDT 11/29  NSG: urinary retention with ISC.  SW: lives with wife and daughter in [place], no step inside  SLP: reg/mod thick liquid, free water protocol. cog and language WFL, mod dysarthria working on breath support + EMST  OT: SV eat/groom, mod A UBD/toilet transfer with RW, max A LBD/toileting/bathring.  Goal: mod i for adls, except SV bath/transfers  PT: mod A bed transfers, min A sit to stand, min A amb 45' RW with mild SOB with no desat. Goal: mod I bed mob/transfers/amb 150', SV on stairs  TDD: 12/19 Home    Follow ups:  Neuro: Dr. Kumar Guzman  Rad: Dr. Phoebe Xiao  Card: Dr. Talib Dsouza  IM: Appt 12/19/2023 42yo RHD M with PMH significant for strokes (2015 abd 7/2023), T2DM, HTN, former smoker (quit 7/2023),  presented to [facility] ED (11/16) with c/o difficulty walking, LLE weakness, slurred speech (worse from baseline). Images with acute/subacute infarcts. Received TPA with some improvement.     #CVA (2015, 7/2023, 11/2023)  - Start Comprehensive Rehab Program: PT/OT/ST, 3hours daily and 5 days weekly.   - S/p TPA  - from 7/2023 s/p ASA + Plavix x 3 months; now: ASA 81mg + Ticagrelor 90 BID  - Atorvastatin 80 (LDL 36)    #Mood/sleep  - Lexapro 10mg QD- inc 20  - Trazodone 25mg at bedtime  - Neuropsychology consult     #HTN  - Labetalol 300 TID  - Hydralazine 25mg BID - hold  - Lisinopril 40mg daily  - Monitor: soft SBP 92     #DM II - A1C 5.5  - Previously on metformin  - FS: <120    #Resp  - Incentive Spirometry during the day time  - BIPAP nightly    #Fever on admission   - Noted with temp of 101.3 on admission:cbc, cmp, lactate, procalcitonin, UA (neg), blood CXR X 2 (unremarkable)--- wbc: 9.4. lactate: 0.6. UA negative. Chest x ray- no acute findings.   -blood culture  NGTD   - no overt sign of infection - WBC 9.45 (11/28) afebrile    #Pain management  - Tylenol PRN    #Skin:  - No active issues at this time  - At risk for pressure injury due to neurologic diagnosis and relative immobility  - Turn Q2 hr while in bed, air mattress  - Nursing to monitor skin Q shift    #FEN   - Diet: Regular + mod thick. SV with meals  + free water protocol  - Dysphagia  SLP - evaluation and treatment    #GI/Bowel Mgmt   - At risk for constipation due to neurologic diagnosis, immobility and/or medication use  - Senna,  Miralax QD  - GI ppx: Protonix    #Bladder management  - PVR q 8 hours (SC if > 400) - -600 requiring ISC  - UA (11/27) negative  - Start flomax 0.4  - Encourage timed voids Q3hrs while awake for independence and to promote continence during therapy    #DVT ppx  - Lovenox 30 BID  - SCDs, TEDs     Precautions / PROPHYLAXIS:   - Falls, Cardiac  - Weight bearing status: WBAT   - Lungs: Aspiration, Incentive Spirometer   - Pressure injury/Skin: Turn Q2hrs while in bed, OOB to Chair, PT/OT      IDT 11/29  NSG: urinary retention with ISC.  SW: lives with wife and daughter in [place], no step inside  SLP: reg/mod thick liquid, free water protocol. cog and language WFL, mod dysarthria working on breath support + EMST  OT: SV eat/groom, mod A UBD/toilet transfer with RW, max A LBD/toileting/bathring.  Goal: mod i for adls, except SV bath/transfers  PT: mod A bed transfers, min A sit to stand, min A amb 45' RW with mild SOB with no desat. Goal: mod I bed mob/transfers/amb 150', SV on stairs  TDD: 12/19 Home    Follow ups:  Neuro: Dr. Kumar Guzman  Rad: Dr. Phoebe Xiao  Card: Dr. Talib Dsouza  IM: Appt 12/19/2023

## 2023-11-30 NOTE — PROGRESS NOTE ADULT - SUBJECTIVE AND OBJECTIVE BOX
Patient is a 43y old  Male who presents with a chief complaint of CVA     HPI:  42yo RHD M with PMH significant for: stroke (7/2023 - presenting with dysarthria - left inferior adrienne, b/l parieto-occipital juxtacortical WM w/mild residual dysarthria; 8 years ago - treated with TPA at [facility] [place] - p/w L hemiplegia, facial droop and dysarthria), T2DM, HTN, former smoker (quit in 7/2023), presented to [facility] ED on 11/16/2023, with c/o difficulty walking, LLE weakness, slurred speech (worse from baseline).     ED exam: patient has a L facial droop, ataxia in LUE (dysmetria with FTN), and struggles to perform HTS b/l. Patient also with pronounced slurred speech (moderate but is intelligible). CTH showing a nonacute left occipital infarct (not seen on the CTH 7/2023). MR with acute/subacute posterior fossa and left occipital lobe infarcts with associated cytotoxic edema. Recieved tenecteplase - 30 minutes after administration, NIHSS 4->2 (mild left facial but improved and mild dysarthria). Patient reports voice is much better. Performs FTN without issue with LUE. Performs HTS b/l. L NLF flattening significantly improved.     Patient was evaluated by PM&R and therapy for functional deficits and gait/ADL impairments and recommend acute rehabilitation.  Patient was medically optimized for discharge to [place] on 11/27/2023    SUBJECTIVE: Patient seen and evaluated while sitting up in WC. Slept with CPAP.  able to recall speaking with Dr. Parekh 2 days ago.  Been having multiple large BM x 2 days.  Reviewed Xray results - will order for enema to help assist.  Discussed IDT detail and progress, pt okay with tentative discharge date of 12/19 for home.    REVIEW OF SYSTEMS  Constitutional: No fever, No Chills  Pulm: +mild cough (unchange),  No shortness of breath, +SHARP without desaturation   Cardio: No chest pain, No palpitations  GI:  No abdominal pain, No nausea/vomiting, +decreased bowel urge, +constipation and was on BM regimen had multiple BM   : No dysuria, No frequency, +decreased urinary urge +urinary retention requiring ISC (typically needs SC AM)  Neuro: No headaches, No memory loss, +loss of strength, No numbness, +dysarthria  MSK: No joint pain  Psych:  +depression, +frustrated (with situation and deficits)    PHYSICAL EXAM  43y  Vital Signs Last 24 Hrs  T(C): 36.8 (11-30-23 @ 07:53), Max: 37.1 (11-29-23 @ 21:36)  T(F): 98.3 (11-30-23 @ 07:53), Max: 98.8 (11-29-23 @ 21:36)  HR: 77 (11-30-23 @ 08:13) (63 - 77)  BP: 115/70 (11-30-23 @ 08:13) (105/71 - 127/84)  RR: 16 (11-30-23 @ 08:13) (16 - 16)  SpO2: 99% (11-30-23 @ 08:13) (97% - 100%)    Gen - NAD, Comfortable  HEENT - NCAT  Neck - Supple, No limited ROM  Pulm - resp nonlabored  Cardiovascular - warm and well perfused, no cyanosis  Abdomen - Soft, NT/ND,  Extremities - No Cyanosis, no clubbing, no edema, no calf tenderness  Neuro-     Cognitive - awake, alert, oriented x4. appropriate  answers/conversation     Communication  +dysarthria, no aphasia-- repeats, follows 2 step commands     Cranial Nerves - left facial droop, left facial weakness, decreased left shoulder shrug     Motor - Left sided weakness                    LEFT    UE - 4/5                     RIGHT UE - 5/5                    LEFT    LE - 4/5                     RIGHT LE - 5/5        Sensory - Intact  to LT      Coordination - FTN impaired to left side     Tone - normal  Psychiatric - flat affect, withdrawn  Skin:  all skin intact    RECENT LABS:             11.7   6.05  )-----------( 239      ( 30 Nov 2023 06:14 )             35.7     11-30    145  |  109<H>  |  28<H>  ----------------------------<  122<H>  4.0   |  27  |  1.40<H>    Ca    8.8      30 Nov 2023 06:14    TPro  6.4  /  Alb  3.0<L>  /  TBili  0.4  /  DBili  x   /  AST  16  /  ALT  27  /  AlkPhos  47  11-30    LIVER FUNCTIONS - ( 30 Nov 2023 06:14 )  Alb: 3.0 g/dL / Pro: 6.4 g/dL / ALK PHOS: 47 U/L / ALT: 27 U/L / AST: 16 U/L / GGT: x           UA (11/27) negative    Culture - Blood (collected 27 Nov 2023 20:15)  Source: .Blood Blood-Peripheral  Preliminary Report (30 Nov 2023 01:02):    No growth at 48 Hours    Xray Abdomen 1 View Portable, IMMEDIATE (Xray Abdomen 1 View Portable, IMMEDIATE .) (11.29.23 @ 12:14) >  The bowel gas pattern is nonobstructive. There is oral   contrast throughout the colon. There is a paucity of small bowel gas.    Allergies  No Known Allergies    MEDICATIONS  (STANDING):  aspirin  chewable 81 milliGRAM(s) Oral daily  atorvastatin 80 milliGRAM(s) Oral at bedtime  cefTRIAXone   IVPB 1000 milliGRAM(s) IV Intermittent every 24 hours  enoxaparin Injectable 30 milliGRAM(s) SubCutaneous <User Schedule>  escitalopram 20 milliGRAM(s) Oral daily  labetalol 300 milliGRAM(s) Oral three times a day  lisinopril 40 milliGRAM(s) Oral daily  mineral oil enema 133 milliLiter(s) Rectal once  pantoprazole    Tablet 40 milliGRAM(s) Oral before breakfast  polyethylene glycol 3350 17 Gram(s) Oral daily  senna 2 Tablet(s) Oral at bedtime  tamsulosin 0.4 milliGRAM(s) Oral at bedtime  ticagrelor 90 milliGRAM(s) Oral two times a day  traZODone 50 milliGRAM(s) Oral at bedtime    MEDICATIONS  (PRN):  acetaminophen     Tablet .. 650 milliGRAM(s) Oral every 6 hours PRN Temp greater or equal to 38.5C (101.3F), Mild Pain (1 - 3)

## 2023-11-30 NOTE — PROGRESS NOTE ADULT - ASSESSMENT
42yo RHD M with PMH significant for strokes (2015 abd 7/2023), T2DM, HTN, former smoker (quit 7/2023),  presented to [facility] ED (11/16) with c/o difficulty walking, LLE weakness, slurred speech (worse from baseline). Images with acute/subacute infarcts. Received TPA with some improvement.     #CVA (2015, 7/2023, 11/2023) -- S/p TPA  - Start Comprehensive Rehab Program: PT/OT/ST, 3hours daily and 5 days weekly.   - from 7/2023 s/p ASA + Plavix x 3 months; now: ASA 81mg + Ticagrelor 90 BID  - Atorvastatin 80 (LDL 36)    #Mood/sleep  - Lexapro 10mg QD- inc 20 (11/29)  - Trazodone 25mg at bedtime  - Neuropsychology consult     #HTN  - Labetalol 300 TID  - Hydralazine 25mg BID - hold  - Lisinopril 40mg daily  - Monitor: 115/70 (11/30)    #DM II - A1C 5.5  - Previously on metformin  - FS: <120    #Respiratory health/RADHA  - Incentive Spirometry during the day time  - CPAP nightly    #Fever on admission   - Noted with temp of 101.3 on admission:cbc, cmp, lactate, procalcitonin, UA (neg), blood CXR X 2 (unremarkable)--- wbc: 9.4. lactate: 0.6. UA negative. Chest x ray- no acute findings.   -blood culture (11/27)  NGTD (Prelim)  - no overt sign of infection - WBC 9.45 (11/28)>6.05 (11/30) afebrile    #Pain management  - Tylenol PRN    #Skin:  - No active issues at this time  - At risk for pressure injury due to neurologic diagnosis and relative immobility  - Turn Q2 hr while in bed, air mattress  - Nursing to monitor skin Q shift    #FEN   - Diet: Regular + mod thick. SV with meals  + free water protocol  - Dysphagia  SLP - evaluation and treatment    #GI/Bowel Mgmt   - At risk for constipation due to neurologic diagnosis, immobility and/or medication use  - Senna,  Miralax QD  - Mineral oil enema x 1 (11/30)  - GI ppx: Protonix    #Bladder management  - PVR q 8 hours (SC if > 400) - -600 requiring daily SCx1 - last SC 11/30 AM @ 2am  - UA (11/27) negative  - Start flomax 0.4  - Encourage timed voids Q3hrs while awake for independence and to promote continence during therapy    #DVT ppx  - Lovenox 30 BID  - SCDs, TEDs     Precautions / PROPHYLAXIS:   - Falls, Cardiac  - Weight bearing status: WBAT   - Lungs: Aspiration, Incentive Spirometer   - Pressure injury/Skin: Turn Q2hrs while in bed, OOB to Chair, PT/OT      IDT 11/29  NSG: urinary retention with ISC.  SW: lives with wife and daughter in [place], no step inside  SLP: reg/mod thick liquid, free water protocol. cog and language WFL, mod dysarthria working on breath support + EMST  OT: SV eat/groom, mod A UBD/toilet transfer with RW, max A LBD/toileting/bathring.  Goal: mod i for adls, except SV bath/transfers  PT: mod A bed transfers, min A sit to stand, min A amb 45' RW with mild SOB with no desat. Goal: mod I bed mob/transfers/amb 150', SV on stairs  TDD: 12/19 Home    Follow ups:  Neuro: Dr. Kumar Guzman  Rad: Dr. Phoebe Xiao  Card: Dr. Talib Dsouza  IM: Appt 12/19/2023 42yo RHD M with PMH significant for strokes (2015 abd 7/2023), T2DM, HTN, former smoker (quit 7/2023),  presented to [facility] ED (11/16) with c/o difficulty walking, LLE weakness, slurred speech (worse from baseline). Images with acute/subacute infarcts. Received TPA with some improvement.     #CVA (2015, 7/2023, 11/2023) -- S/p TPA  - Start Comprehensive Rehab Program: PT/OT/ST, 3hours daily and 5 days weekly.   - from 7/2023 s/p ASA + Plavix x 3 months; now: ASA 81mg + Ticagrelor 90 BID  - Atorvastatin 80 (LDL 36)    #MALI  - Bun/Cr: 28/1.40, trending up (11/30)  - Encourage oral hydration    #Mood/sleep  - Lexapro 10mg QD- inc 20 (11/29)  - Trazodone 25mg at bedtime  - Neuropsychology consult     #HTN  - Labetalol 300 TID  - Hydralazine 25mg BID - hold  - Lisinopril 40mg daily  - Monitor: 115/70 (11/30)    #DM II - A1C 5.5  - Previously on metformin  - FS: <120    #Respiratory health/RADHA  - Incentive Spirometry during the day time  - CPAP nightly    #Fever on admission   - Noted with temp of 101.3 on admission:cbc, cmp, lactate, procalcitonin, UA (neg), blood CXR X 2 (unremarkable)--- wbc: 9.4. lactate: 0.6. UA negative. Chest x ray- no acute findings.   -blood culture (11/27)  NGTD (Prelim)  - no overt sign of infection - WBC 9.45 (11/28)>6.05 (11/30) afebrile    #Pain management  - Tylenol PRN    #Skin:  - No active issues at this time  - At risk for pressure injury due to neurologic diagnosis and relative immobility  - Turn Q2 hr while in bed, air mattress  - Nursing to monitor skin Q shift    #FEN   - Diet: Regular + mod thick. SV with meals  + free water protocol  - Dysphagia  SLP - evaluation and treatment    #GI/Bowel Mgmt   - At risk for constipation due to neurologic diagnosis, immobility and/or medication use  - Senna,  Miralax QD  - Mineral oil enema x 1 (11/30)  - GI ppx: Protonix    #Bladder management  - PVR q 8 hours (SC if > 400) - -600 requiring daily SCx1 - last SC 11/30 AM @ 2am  - UA (11/27) negative  - Start flomax 0.4  - Encourage timed voids Q3hrs while awake for independence and to promote continence during therapy    #DVT ppx  - Lovenox 30 BID  - SCDs, TEDs     Precautions / PROPHYLAXIS:   - Falls, Cardiac  - Weight bearing status: WBAT   - Lungs: Aspiration, Incentive Spirometer   - Pressure injury/Skin: Turn Q2hrs while in bed, OOB to Chair, PT/OT      IDT 11/29  NSG: urinary retention with ISC.  SW: lives with wife and daughter in [place], no step inside  SLP: reg/mod thick liquid, free water protocol. cog and language WFL, mod dysarthria working on breath support + EMST  OT: SV eat/groom, mod A UBD/toilet transfer with RW, max A LBD/toileting/bathring.  Goal: mod i for adls, except SV bath/transfers  PT: mod A bed transfers, min A sit to stand, min A amb 45' RW with mild SOB with no desat. Goal: mod I bed mob/transfers/amb 150', SV on stairs  TDD: 12/19 Home    Follow ups:  Neuro: Dr. Kumar Guzman  Rad: Dr. Phoebe Xiao  Card: Dr. Talib Dsouza  IM: Appt 12/19/2023

## 2023-11-30 NOTE — PROGRESS NOTE ADULT - ASSESSMENT
43M  SP CVA (2015 abd 7/2023), Vertebral Artery Stenosis, DM, HTN, former smoker (quit 7/2023),   Came to [facility] ED (11/16) with difficulty walking, LLE weakness, slurred speech (worse from baseline).   Acute Ischemic CVA SP Thrombectomy. Mild improvement  Severe Bilateral vertebral artery occlusion  SP Stenting of intradural left vertebral artery Nov28    #Ischemic CVA  - HO multiple posterior circ ischemic CVA  - SP TPA with mild improvement   - Vertebral art stenting performed Nov28  - cont with ASA 81mg + Ticagrelor 90 BID  - cont with lipitor 80mg   - On rehab floor now    #Fever on admission  - Nov28 Fever 101 overnight. one episode, CXR clear. Empirc ABX given  - Nov29 stable today. Awaiting UC results. Otherwise cont rocephin for 5 days  - Nov30 resolved    #HTN  - BP goal < 140/90  - Labetalol 300 TID  - Hydralazine 25mg BID  - Lisinopril 40mg daily  - Nov29 Hold Hydralazine for mild/low BP. May start amlodipine 5 if needed    #DM II - A1C 5.5  - Previously on metformin  - serum glucose well controlled. no need for Accu-Cheks       #GI/Bowel Mgmt -- large BM yesterday evening, reports he was previously constipated  - At risk for constipation due to neurologic diagnosis, immobility and/or medication use  - Senna,  Miralax   - GI ppx: Protonix    #Bladder management  - PVR q 8 hours (SC if > 400)  - Encourage timed voids Q4hrs while awake for independence and to promote continence during therapy    #DVT ppx  - Lovenox 30 BID  - Alexa Ozuna      43M  SP CVA (2015 abd 7/2023), Vertebral Artery Stenosis, DM, HTN, former smoker (quit 7/2023),   Came to [facility] ED (11/16) with difficulty walking, LLE weakness, slurred speech (worse from baseline).   Acute Ischemic CVA SP Thrombectomy. Mild improvement  Severe Bilateral vertebral artery occlusion  SP Stenting of intradural left vertebral artery Nov28    #Ischemic CVA  - HO multiple posterior circ ischemic CVA  - SP TPA with mild improvement   - Vertebral art stenting performed Nov28  - cont with ASA 81mg + Ticagrelor 90 BID  - cont with lipitor 80mg   - On rehab floor now    #Fever on admission  - Nov28 Fever 101 overnight. one episode, CXR clear. Empirc ABX given  - Nov29 stable today. Awaiting UC results. Otherwise cont rocephin for 5 days  - Nov30 fevers resolved. patient comfortable    #HTN  - BP goal < 140/90  - Labetalol 300 TID  - Hydralazine 25mg BID  - Lisinopril 40mg daily  - Nov29 Hold Hydralazine for mild/low BP. May start amlodipine 5 if needed  - Nov30 at goal    #DM II - A1C 5.5  - Previously on metformin  - serum glucose well controlled. no need for Accu-Cheks     #GI/Bowel Mgmt -- large BM yesterday evening, reports he was previously constipated  - At risk for constipation due to neurologic diagnosis, immobility and/or medication use  - Senna,  Miralax   - GI ppx: Protonix    #Bladder management  - PVR q 8 hours (SC if > 400)  - Encourage timed voids Q4hrs while awake for independence and to promote continence during therapy    #DVT ppx  - Lovenox 30 BID  - SCDs, TEDs

## 2023-12-01 NOTE — PROGRESS NOTE ADULT - SUBJECTIVE AND OBJECTIVE BOX
Patient is a 43y old  Male who presents with a chief complaint of CVA     HPI:  44yo RHD M with PMH significant for: stroke (7/2023 - presenting with dysarthria - left inferior adrienne, b/l parieto-occipital juxtacortical WM w/mild residual dysarthria; 8 years ago - treated with TPA at [facility] - p/w L hemiplegia, facial droop and dysarthria), T2DM, HTN, former smoker (quit in 7/2023), presented to [facility] ED on 11/16/2023, with c/o difficulty walking, LLE weakness, slurred speech (worse from baseline).     ED exam: patient has a L facial droop, ataxia in LUE (dysmetria with FTN), and struggles to perform HTS b/l. Patient also with pronounced slurred speech (moderate but is intelligible). CTH showing a nonacute left occipital infarct (not seen on the CTH 7/2023). MR with acute/subacute posterior fossa and left occipital lobe infarcts with associated cytotoxic edema. Recieved tenecteplase - 30 minutes after administration, NIHSS 4->2 (mild left facial but improved and mild dysarthria). Patient reports voice is much better. Performs FTN without issue with LUE. Performs HTS b/l. L NLF flattening significantly improved.     Patient was evaluated by PM&R and therapy for functional deficits and gait/ADL impairments and recommend acute rehabilitation.  Patient was medically optimized for discharge to [facility] on 11/27/2023    SUBJECTIVE: Patient seen and evaluated while resting in bed.  Slept better overnight given room change.  Reviewed Xray result, had enema yesterday with no benefit.  Will try for bowel prep today.  Still requiring intermittent straight cath - typically between midnight to early morning? - may also be affect of constipation.    REVIEW OF SYSTEMS  Constitutional: No fever, No Chills  Pulm: +mild cough (unchange),  No shortness of breath, +SHARP without desaturation   Cardio: No chest pain, No palpitations  GI:  No abdominal pain, No nausea/vomiting, +decreased bowel urge, +constipation and was on BM regimen had multiple BM   : No dysuria, No frequency, +decreased urinary urge +urinary retention requiring ISC (typically needs SC early AM between midnight-5AM)  Neuro: No headaches, No memory loss, +loss of strength, No numbness, +dysarthria  MSK: No joint pain  Psych:  +depression, +frustrated (with situation and deficits)    PHYSICAL EXAM  43y  Vital Signs Last 24 Hrs  T(C): 36.4 (12-01-23 @ 09:05), Max: 36.5 (11-30-23 @ 19:31)  T(F): 97.5 (12-01-23 @ 09:05), Max: 97.7 (11-30-23 @ 19:31)  HR: 62 (12-01-23 @ 09:05) (62 - 75)  BP: 158/89 (12-01-23 @ 09:05) (116/76 - 158/89)  RR: 16 (12-01-23 @ 09:05) (16 - 16)  SpO2: 97% (12-01-23 @ 09:05) (96% - 100%)    Gen - NAD, Comfortable  HEENT - NCAT  Neck - Supple, No limited ROM  Pulm - CTA - mildly diminished to right base.  Most congestion is upper respiratory  Cardiovascular - RRR  Abdomen - Soft, NT/ND, +BS (normal active to right and some slowing to left)  Extremities - No Cyanosis, no clubbing, no edema, no calf tenderness  Neuro-     Cognitive - awake, alert, oriented x4. appropriate   answers/conversation     Communication  +dysarthria, no aphasia-- repeats, follows 2 step commands     Cranial Nerves - left facial droop, left facial weakness, decreased left shoulder shrug     Motor - Left sided weakness                    LEFT    UE - 4/5                     RIGHT UE - 5/5                    LEFT    LE - 4/5                     RIGHT LE - 5/5        Sensory - Intact  to LT      Coordination - FTN impaired to left side     Tone - normal  Psychiatric - flat affect, withdrawn  Skin:  all skin intact    RECENT LABS:             11.7   6.05  )-----------( 239      ( 30 Nov 2023 06:14 )             35.7     11-30    145  |  109<H>  |  28<H>  ----------------------------<  122<H>  4.0   |  27  |  1.40<H>    Ca    8.8      30 Nov 2023 06:14    TPro  6.4  /  Alb  3.0<L>  /  TBili  0.4  /  DBili  x   /  AST  16  /  ALT  27  /  AlkPhos  47  11-30    LIVER FUNCTIONS - ( 30 Nov 2023 06:14 )  Alb: 3.0 g/dL / Pro: 6.4 g/dL / ALK PHOS: 47 U/L / ALT: 27 U/L / AST: 16 U/L / GGT: x           UA (11/27) negative    Culture - Blood (collected 27 Nov 2023 20:15)  Source: .Blood Blood-Peripheral  Preliminary Report (30 Nov 2023 01:02):    No growth at 48 Hours    Xray Abdomen 1 View Portable, IMMEDIATE (Xray Abdomen 1 View Portable, IMMEDIATE .) (11.29.23 @ 12:14) >  The bowel gas pattern is nonobstructive. There is oral   contrast throughout the colon. There is a paucity of small bowel gas.    Allergies  No Known Allergies    MEDICATIONS  (STANDING):  aspirin  chewable 81 milliGRAM(s) Oral daily  atorvastatin 80 milliGRAM(s) Oral at bedtime  bisacodyl Suppository 10 milliGRAM(s) Rectal at bedtime  cefTRIAXone   IVPB 1000 milliGRAM(s) IV Intermittent every 24 hours  enoxaparin Injectable 30 milliGRAM(s) SubCutaneous <User Schedule>  escitalopram 20 milliGRAM(s) Oral daily  labetalol 200 milliGRAM(s) Oral three times a day  lisinopril 40 milliGRAM(s) Oral daily  pantoprazole    Tablet 40 milliGRAM(s) Oral before breakfast  polyethylene glycol 3350 17 Gram(s) Oral two times a day  polyethylene glycol/electrolyte Solution. 4000 milliLiter(s) Oral once  senna 2 Tablet(s) Oral at bedtime  tamsulosin 0.4 milliGRAM(s) Oral at bedtime  ticagrelor 90 milliGRAM(s) Oral two times a day  traZODone 50 milliGRAM(s) Oral at bedtime    MEDICATIONS  (PRN):  acetaminophen     Tablet .. 650 milliGRAM(s) Oral every 6 hours PRN Temp greater or equal to 38.5C (101.3F), Mild Pain (1 - 3)

## 2023-12-01 NOTE — PROGRESS NOTE ADULT - ASSESSMENT
43M  SP CVA (2015 abd 7/2023), Vertebral Artery Stenosis, DM, HTN, former smoker (quit 7/2023),   Came to [facility] ED (11/16) with difficulty walking, LLE weakness, slurred speech (worse from baseline).   Acute Ischemic CVA SP Thrombectomy. Mild improvement  Severe Bilateral vertebral artery occlusion  SP Stenting of intradural left vertebral artery Nov28    #Ischemic CVA  - HO multiple posterior circ ischemic CVA  - SP TPA with mild improvement   - Vertebral art stenting performed Nov28  - cont with ASA 81mg + Ticagrelor 90 BID  - cont with lipitor 80mg   - On rehab floor now    #Fever on admission  - Nov28 Fever 101 overnight. one episode, CXR clear. Empirc ABX given  - Nov29 stable today. Awaiting UC results. Otherwise cont rocephin for 5 days  - Nov30 fevers resolved. patient comfortable  - Dec1 No fevers overnight or source seen, may dc abx if not needed    #HTN  - BP goal < 140/90  - Labetalol 300 TID  - Hydralazine 25mg BID  - Lisinopril 40mg daily  - Nov29 Hold Hydralazine for mild/low BP. May start amlodipine 5 if needed  - Nov30 at goal    #DM II - A1C 5.5  - Previously on metformin  - serum glucose well controlled. no need for Accu-Cheks     #GI/Bowel Mgmt -- large BM yesterday evening, reports he was previously constipated  - At risk for constipation due to neurologic diagnosis, immobility and/or medication use  - Senna,  Miralax   - GI ppx: Protonix    #Bladder management  - PVR q 8 hours (SC if > 400)  - Encourage timed voids Q4hrs while awake for independence and to promote continence during therapy    #DVT ppx  - Lovenox 30 BID  - Alexa Ozuna      43M  SP CVA (2015 abd 7/2023), Vertebral Artery Stenosis, DM, HTN, former smoker (quit 7/2023),   Came to [facility] ED (11/16) with difficulty walking, LLE weakness, slurred speech (worse from baseline).   Acute Ischemic CVA SP Thrombectomy. Mild improvement  Severe Bilateral vertebral artery occlusion  SP Stenting of intradural left vertebral artery Nov28    #Ischemic CVA  - HO multiple posterior circ ischemic CVA  - SP TPA with mild improvement   - Vertebral art stenting performed Nov28  - cont with ASA 81mg + Ticagrelor 90 BID  - cont with lipitor 80mg   - On rehab floor now    #elevated cre  -Dec1 repeat labs. UA      #Fever on admission  - Nov28 Fever 101 overnight. one episode, CXR clear. Empirc ABX given  - Nov29 stable today. Awaiting  results. Otherwise cont rocephin for 5 days  - Nov30 fevers resolved. patient comfortable  - Dec1 No fevers overnight or source seen, may dc abx if not needed    #HTN  - BP goal < 140/90  - Labetalol 300 TID  - Hydralazine 25mg BID  - Lisinopril 40mg daily  - Nov29 Hold Hydralazine for mild/low BP. May start amlodipine 5 if needed  - Nov30 at goal    #DM II - A1C 5.5  - Previously on metformin  - serum glucose well controlled. no need for Accu-Cheks     #GI/Bowel Mgmt -- large BM yesterday evening, reports he was previously constipated  - At risk for constipation due to neurologic diagnosis, immobility and/or medication use  - Senna,  Miralax   - GI ppx: Protonix    #Bladder management  - PVR q 8 hours (SC if > 400)  - Encourage timed voids Q4hrs while awake for independence and to promote continence during therapy    #DVT ppx  - Lovenox 30 BID  - SCDs, TEDs

## 2023-12-01 NOTE — PROGRESS NOTE ADULT - ASSESSMENT
44yo RHD M with PMH significant for strokes (2015 abd 7/2023), T2DM, HTN, former smoker (quit 7/2023),  presented to [facility] ED (11/16) with c/o difficulty walking, LLE weakness, slurred speech (worse from baseline). Images with acute/subacute infarcts. Received TPA with some improvement.     #CVA (2015, 7/2023, 11/2023) -- S/p TPA  - Start Comprehensive Rehab Program: PT/OT/ST, 3hours daily and 5 days weekly.   - from 7/2023 s/p ASA + Plavix x 3 months; now: ASA 81mg + Ticagrelor 90 BID  - Atorvastatin 80 (LDL 36)    #MALI  - Bun/Cr: 28/1.40, trending up (11/30)  - Encourage oral hydration    #Mood/sleep  - Lexapro 10mg QD- inc 20 (11/29)  - Trazodone 25mg at bedtime  - Neuropsychology consult     #HTN  - Labetalol 300 TID  - Hydralazine 25mg BID - hold  - Lisinopril 40mg daily  - Monitor: 132/82 - 150/97 (12/1)    #DM II - A1C 5.5  - Previously on metformin  - FS: <120    #Respiratory health/RADHA  - Incentive Spirometry during the day time  - CPAP nightly    #Fever on admission   - Noted with temp of 101.3 on admission: cbc, cmp, lactate, procalcitonin, UA (neg), blood CXR X 2 (unremarkable)--- wbc: 9.4. lactate: 0.6. UA negative. Chest x ray- no acute findings.   -blood culture (11/27)  NGTD (Prelim)  - no overt sign of infection - WBC 9.45 (11/28)>6.05 (11/30) afebrile    #Pain management  - Tylenol PRN    #Skin:  - No active issues at this time  - At risk for pressure injury due to neurologic diagnosis and relative immobility  - Turn Q2 hr while in bed, air mattress  - Nursing to monitor skin Q shift    #FEN   - Diet: Regular + mod thick. SV with meals  + free water protocol  - Dysphagia  SLP - evaluation and treatment    #GI/Bowel Mgmt   - At risk for constipation due to neurologic diagnosis, immobility and/or medication use  - Senna,  Miralax QD  - Mineral oil enema x 1 (11/30) - no BM  - Golytely bowel prep x 1 (12/1)  - GI ppx: Protonix    #Bladder management/Urinary retention  - PVR q 8 hours (SC if > 400) - -600 requiring daily SCx1 - last SC 12/1 AM @ 2am  - Urinary retention may be attribute to constipation  - UA (11/27) negative  - Start flomax 0.4  - Encourage timed voids Q3hrs while awake for independence and to promote continence during therapy    #DVT ppx  - Lovenox 30 BID  - SCDs, TEDs     Precautions / PROPHYLAXIS:   - Falls, Cardiac  - Weight bearing status: WBAT   - Lungs: Aspiration, Incentive Spirometer   - Pressure injury/Skin: Turn Q2hrs while in bed, OOB to Chair, PT/OT      IDT 11/29  NSG: urinary retention with ISC.  SW: lives with wife and daughter in [place], no step inside  SLP: reg/mod thick liquid, free water protocol. cog and language WFL, mod dysarthria working on breath support + EMST  OT: SV eat/groom, mod A UBD/toilet transfer with RW, max A LBD/toileting/bathring.  Goal: mod i for adls, except SV bath/transfers  PT: mod A bed transfers, min A sit to stand, min A amb 45' RW with mild SOB with no desat. Goal: mod I bed mob/transfers/amb 150', SV on stairs  TDD: 12/19 Home    Follow ups:  Neuro: Dr. Kumar Guzman  Rad: Dr. Phoebe Xiao  Card: Dr. Talib Dsouza  IM: Appt 12/19/2023 42yo RHD M with PMH significant for strokes (2015 abd 7/2023), T2DM, HTN, former smoker (quit 7/2023),  presented to [facility] ED (11/16) with c/o difficulty walking, LLE weakness, slurred speech (worse from baseline). Images with acute/subacute infarcts. Received TPA with some improvement.     #CVA (2015, 7/2023, 11/2023) -- S/p TPA  - Start Comprehensive Rehab Program: PT/OT/ST, 3hours daily and 5 days weekly.   - from 7/2023 s/p ASA + Plavix x 3 months; now: ASA 81mg + Ticagrelor 90 BID  - Atorvastatin 80 (LDL 36)    #MALI  - Bun/Cr: 28/1.40, trending up (11/30)  - Encourage oral hydration    #Mood/sleep  - Lexapro 10mg QD- inc 20 (11/29)  - Trazodone 25mg at bedtime  - Neuropsychology consult     #HTN  - Labetalol 300 TID  - Hydralazine 25mg BID - hold  - Lisinopril 40mg daily  - Monitor: 132/82 - 150/97 (12/1)    #DM II - A1C 5.5  - Previously on metformin  - FS: <120    #Respiratory health/RADHA  - Incentive Spirometry during the day time  - CPAP nightly    #Fever on admission   - Noted with temp of 101.3 on admission: cbc, cmp, lactate, procalcitonin, UA (neg), blood CXR X 2 (unremarkable)--- wbc: 9.4. lactate: 0.6. UA negative. Chest x ray- no acute findings.   -blood culture (11/27)  NGTD (Prelim)  - no overt sign of infection - WBC 9.45 (11/28)>6.05 (11/30) afebrile    #Pain management  - Tylenol PRN    #Skin:  - No active issues at this time  - At risk for pressure injury due to neurologic diagnosis and relative immobility  - Turn Q2 hr while in bed, air mattress  - Nursing to monitor skin Q shift    #FEN   - Diet: Regular + mod thick. SV with meals  + free water protocol  - Dysphagia  SLP - evaluation and treatment    #GI/Bowel Mgmt   - At risk for constipation due to neurologic diagnosis, immobility and/or medication use  - Senna,  Miralax QD  - Mineral oil enema x 1 (11/30) - no BM  - Golytely bowel prep x 1 (12/1)  - Suppository tonight   - GI ppx: Protonix    #Bladder management/Urinary retention  - PVR q 8 hours (SC if > 400) - -600 requiring daily SCx1 - last SC 12/1 AM @ 2am  - Urinary retention may be attribute to constipation  - UA (11/27) negative  - Start flomax 0.4  - Encourage timed voids Q3hrs while awake for independence and to promote continence during therapy    #DVT ppx  - Lovenox 30 BID  - SCDs, TEDs     Precautions / PROPHYLAXIS:   - Falls, Cardiac  - Weight bearing status: WBAT   - Lungs: Aspiration, Incentive Spirometer   - Pressure injury/Skin: Turn Q2hrs while in bed, OOB to Chair, PT/OT      IDT 11/29  NSG: urinary retention with ISC.  SW: lives with wife and daughter in [place], no step inside  SLP: reg/mod thick liquid, free water protocol. cog and language WFL, mod dysarthria working on breath support + EMST  OT: SV eat/groom, mod A UBD/toilet transfer with RW, max A LBD/toileting/bathring.  Goal: mod i for adls, except SV bath/transfers  PT: mod A bed transfers, min A sit to stand, min A amb 45' RW with mild SOB with no desat. Goal: mod I bed mob/transfers/amb 150', SV on stairs  TDD: 12/19 Home    Follow ups:  Neuro: Dr. Kumar Guzman  Rad: Dr. Phoebe Xiao  Card: Dr. Talib Dsouza  IM: Appt 12/19/2023

## 2023-12-02 NOTE — PROGRESS NOTE ADULT - SUBJECTIVE AND OBJECTIVE BOX
Patient is a 43y old  Male who presents with a chief complaint of CVA (01 Dec 2023 11:19)      HPI:  44yo RHD M with PMH significant for: stroke (7/2023 - presenting with dysarthria - left inferior adrienne, b/l parieto-occipital juxtacortical WM w/mild residual dysarthria; 8 years ago - treated with TPA at [facility] - p/w L hemiplegia, facial droop and dysarthria), T2DM, HTN, former smoker (quit in 7/2023), presented to [facility] ED on 11/16/2023, with c/o difficulty walking, LLE weakness, slurred speech (worse from baseline).     ED exam: patient has a L facial droop, ataxia in LUE (dysmetria with FTN), and struggles to perform HTS b/l. Patient also with pronounced slurred speech (moderate but is intelligible). CTH showing a nonacute left occipital infarct (not seen on the CTH 7/2023). MR with acute/subacute posterior fossa and left occipital lobe infarcts with associated cytotoxic edema. Recieved tenecteplase - 30 minutes after administration, NIHSS 4->2 (mild left facial but improved and mild dysarthria). Patient reports voice is much better. Performs FTN without issue with LUE. Performs HTS b/l. L NLF flattening significantly improved.     Patient was evaluated by PM&R and therapy for functional deficits and gait/ADL impairments and recommend acute rehabilitation.  Patient was medically optimized for discharge to [facility] on 11/27/2023. (27 Nov 2023 13:08)      PAST MEDICAL & SURGICAL HISTORY:  DM (diabetes mellitus)      HTN (hypertension)      CVA (cerebrovascular accident)          MEDICATIONS  (STANDING):  aspirin  chewable 81 milliGRAM(s) Oral daily  atorvastatin 80 milliGRAM(s) Oral at bedtime  bisacodyl Suppository 10 milliGRAM(s) Rectal at bedtime  enoxaparin Injectable 30 milliGRAM(s) SubCutaneous <User Schedule>  escitalopram 20 milliGRAM(s) Oral daily  labetalol 200 milliGRAM(s) Oral three times a day  lisinopril 40 milliGRAM(s) Oral daily  pantoprazole    Tablet 40 milliGRAM(s) Oral before breakfast  polyethylene glycol 3350 17 Gram(s) Oral two times a day  senna 2 Tablet(s) Oral at bedtime  tamsulosin 0.4 milliGRAM(s) Oral at bedtime  ticagrelor 90 milliGRAM(s) Oral two times a day  traZODone 50 milliGRAM(s) Oral at bedtime    MEDICATIONS  (PRN):  acetaminophen     Tablet .. 650 milliGRAM(s) Oral every 6 hours PRN Temp greater or equal to 38.5C (101.3F), Mild Pain (1 - 3)      Allergies    No Known Allergies    Intolerances          VITALS  43y  Vital Signs Last 24 Hrs  T(C): 36.9 (02 Dec 2023 08:41), Max: 36.9 (02 Dec 2023 08:41)  T(F): 98.4 (02 Dec 2023 08:41), Max: 98.4 (02 Dec 2023 08:41)  HR: 71 (02 Dec 2023 08:41) (63 - 73)  BP: 152/97 (02 Dec 2023 08:41) (147/88 - 154/90)  BP(mean): --  RR: 16 (02 Dec 2023 08:41) (16 - 16)  SpO2: 96% (02 Dec 2023 08:41) (96% - 98%)    Parameters below as of 02 Dec 2023 08:41  Patient On (Oxygen Delivery Method): room air      Daily     Daily         RECENT LABS:                    Culture - Blood (collected 11-27-23 @ 20:15)  Source: .Blood Blood-Peripheral  Preliminary Report (12-02-23 @ 01:02):    No growth at 4 days    Culture - Blood (collected 11-27-23 @ 20:10)  Source: .Blood Blood-Peripheral  Preliminary Report (12-02-23 @ 01:02):    No growth at 4 days        CAPILLARY BLOOD GLUCOSE

## 2023-12-02 NOTE — PROGRESS NOTE ADULT - ASSESSMENT
42yo RHD M with PMH significant for strokes (2015 abd 7/2023), T2DM, HTN, former smoker (quit 7/2023),  presented to [facility] ED (11/16) with c/o difficulty walking, LLE weakness, slurred speech (worse from baseline). Images with acute/subacute infarcts. Received TPA with some improvement.     #L hemiparesis and L facial weakness due to CVA   Severe disease of posterior circulation involving vertebral, basilar and associated ischemia. Mechanism: TENISHA. Now s/p recanalization and L vert stent placement on 11/24.   - s/p diagnostic cerebral angiogram on 11/20  - cont with ASA 81mg + Ticagrelor 90 BID  - cont with lipitor 80mg     #Fever on admission  - infectious work up negative. fever resolved.   - CXR negative  - UA negative. blood cx negative   - COVID negative   - s/p 4 days of rocephin. will discontinue abx today Dec 2 and monitor off abx    #HTN  - BP goal < 140/90  - Labetalol 200 TID  - Lisinopril 40mg daily  - Dec 2: SBP persistently > 150. add amlodipine 5mg    #DM II - A1C 5.5  - Previously on metformin  - serum glucose well controlled. no need for Accu-Cheks     #DVT ppx  - Lovenox 30 BID  - SCDs, TEDs

## 2023-12-02 NOTE — PROGRESS NOTE ADULT - SUBJECTIVE AND OBJECTIVE BOX
Patient is a 43y old  Male who presents with a chief complaint of CVA (02 Dec 2023 09:36)      Subjective and overnight events:  Patient seen and examined at bedside. no complaints. no fever, chills, headache, sob, cp, abd pain. + BM    ALLERGIES:  No Known Allergies    MEDICATIONS  (STANDING):  aspirin  chewable 81 milliGRAM(s) Oral daily  atorvastatin 80 milliGRAM(s) Oral at bedtime  bisacodyl Suppository 10 milliGRAM(s) Rectal at bedtime  enoxaparin Injectable 30 milliGRAM(s) SubCutaneous <User Schedule>  escitalopram 20 milliGRAM(s) Oral daily  labetalol 200 milliGRAM(s) Oral three times a day  lisinopril 40 milliGRAM(s) Oral daily  pantoprazole    Tablet 40 milliGRAM(s) Oral before breakfast  polyethylene glycol 3350 17 Gram(s) Oral two times a day  senna 2 Tablet(s) Oral at bedtime  tamsulosin 0.4 milliGRAM(s) Oral at bedtime  ticagrelor 90 milliGRAM(s) Oral two times a day  traZODone 50 milliGRAM(s) Oral at bedtime    MEDICATIONS  (PRN):  acetaminophen     Tablet .. 650 milliGRAM(s) Oral every 6 hours PRN Temp greater or equal to 38.5C (101.3F), Mild Pain (1 - 3)    Vital Signs Last 24 Hrs  T(F): 98.4 (02 Dec 2023 08:41), Max: 98.4 (02 Dec 2023 08:41)  HR: 71 (02 Dec 2023 08:41) (63 - 73)  BP: 152/97 (02 Dec 2023 08:41) (147/88 - 154/90)  RR: 16 (02 Dec 2023 08:41) (16 - 16)  SpO2: 96% (02 Dec 2023 08:41) (96% - 98%)  I&O's Summary    01 Dec 2023 07:01  -  02 Dec 2023 07:00  --------------------------------------------------------  IN: 100 mL / OUT: 560 mL / NET: -460 mL      PHYSICAL EXAM:  General: NAD, A/O x 3  ENT: MMM  Neck: Supple, No JVD  Lungs: Clear to auscultation bilaterally  Cardio: RRR, S1/S2, No murmurs  Abdomen: Soft, Nontender, Nondistended; Bowel sounds present  Extremities: No calf tenderness, No pitting edema    LABS:                        11.7   6.05  )-----------( 239      ( 30 Nov 2023 06:14 )             35.7     11-30    145  |  109  |  28  ----------------------------<  122  4.0   |  27  |  1.40    Ca    8.8      30 Nov 2023 06:14    TPro  6.4  /  Alb  3.0  /  TBili  0.4  /  DBili  x   /  AST  16  /  ALT  27  /  AlkPhos  47  11-30 11-24 Chol 80 mg/dL LDL -- HDL 28 mg/dL Trig 73 mg/dL        Urinalysis Basic - ( 30 Nov 2023 06:14 )    Color: x / Appearance: x / SG: x / pH: x  Gluc: 122 mg/dL / Ketone: x  / Bili: x / Urobili: x   Blood: x / Protein: x / Nitrite: x   Leuk Esterase: x / RBC: x / WBC x   Sq Epi: x / Non Sq Epi: x / Bacteria: x        Culture - Blood (collected 27 Nov 2023 20:15)  Source: .Blood Blood-Peripheral  Preliminary Report (02 Dec 2023 01:02):    No growth at 4 days    Culture - Blood (collected 27 Nov 2023 20:10)  Source: .Blood Blood-Peripheral  Preliminary Report (02 Dec 2023 01:02):    No growth at 4 days      COVID-19 PCR: NotDetec (11-27-23 @ 20:20)      RADIOLOGY & ADDITIONAL TESTS:    Care Discussed with Consultants/Other Providers:

## 2023-12-02 NOTE — PROGRESS NOTE ADULT - COMMENTS
Patient sleeping, arousable, but expresses desire to go back to sleep. complies partially with exam. Denies any pain, no headache, no respiratory difficulty. reports slept well overnight    No urinary output on his own, required continued SC per patient report Patient sleeping, arousable, but expresses desire to go back to sleep. complies partially with exam. Denies any pain, no headache, no respiratory difficulty. reports slept well overnight    Had retention with 600 ml obtained with cath    No urinary output on his own, required continued SC per patient report

## 2023-12-02 NOTE — PROGRESS NOTE ADULT - ASSESSMENT
44yo RHD M with PMH significant for HTN, type 2 DM, strokes (2015 abd 7/2023), former smoker (quit 7/2023),  presented to [facility] ED (11/16) with c/o difficulty walking, LLE weakness, slurred speech (worse from baseline). Images with acute/subacute infarcts.    # CVA (2015, 7/2023, 11/2023)   - S/p TPA  - ASA 81mg + Ticagrelor 90 BID  - Atorvastatin 80 (LDL 36)  - continue Comprehensive Rehab Program: PT/OT/ST, 3hours daily and 5 days weekly.     # Mood/sleep  - Lexapro  inc 20 (11/29)  - Trazodone 25mg at bedtime  - Neuropsychology support    # HTN  - Labetalol 300 TID  - Lisinopril 40mg daily  - BP  (147/88 - 154/90) 12/2    # DM II   - A1C 5.5  - Previously on metformin  - FS: <120    # Respiratory health/RADHA  - Incentive Spirometry during the day time  - CPAP nightly    # Fever on admission   - Noted with temp of 101.3 on admission: cbc, cmp, lactate, procalcitonin, UA (neg), blood CXR X 2 (unremarkable)  - blood culture (11/27)  NGTD (day #4 12/2)  -  WBC 6.05 (11/30) afebrile    # MALI  - Bun/Cr: 28/1.40 (11/30)  - Encourage oral hydration  - BMP 12/4    # Pain management  - Tylenol PRN    # FEN   - Diet: Regular + mod thick. SV with meals  + free water protocol    # GI/Bowel Mgmt   - Senna,  Miralax QD  - Mineral oil enema x 1 (11/30)   - Golytely bowel prep x 1 (12/1) and suppository. Had BM yesterday 6 PM 12/1  - GI ppx: Protonix    #Bladder management/Urinary retention  - PVR q 8 hours (SC if > 400)  - UA (11/27) negative  - continue flomax 0.4  - Encourage timed voids Q3hrs while awake for independence and to promote continence during therapy    # DVT ppx  - Lovenox 30 BID  - SCDs, TEDs           Follow ups:  Neuro: Dr. Kumar Guzman  Rad: Dr. Phoebe Xiao  Card: Dr. Talib Dsouza  IM: Appt 12/19/2023 44yo RHD M with PMH significant for HTN, type 2 DM, strokes (2015 abd 7/2023), former smoker (quit 7/2023),  presented to [facility] ED (11/16) with c/o difficulty walking, LLE weakness, slurred speech (worse from baseline). Images with acute/subacute infarcts.    # CVA (2015, 7/2023, 11/2023)   - S/p TPA  - ASA 81mg + Ticagrelor 90 BID  - Atorvastatin 80 (LDL 36)  - continue Comprehensive Rehab Program: PT/OT/ST, 3hours daily and 5 days weekly.     # Mood/sleep  - Lexapro  inc 20 (11/29)  - Trazodone 25mg at bedtime  - Neuropsychology support    # HTN  - Labetalol 300 TID  - Lisinopril 40mg daily  - BP  (147/88 - 154/90) 12/2    # DM II   - A1C 5.5  - Previously on metformin  - FS: <120    # Respiratory health/RADHA  - Incentive Spirometry during the day time  - CPAP nightly    # Fever on admission   - Noted with temp of 101.3 on admission: cbc, cmp, lactate, procalcitonin, UA (neg), blood CXR X 2 (unremarkable)  - blood culture (11/27)  NGTD (day #4 12/2)  -  WBC 6.05 (11/30) afebrile    # MALI  - Bun/Cr: 28/1.40 (11/30)  - Encourage oral hydration  - BMP 12/4    # Pain management  - Tylenol PRN    # FEN   - Diet: Regular + mod thick. SV with meals--> upgraded to min thick with supervision in meals 12/2  + free water protocol    # GI/Bowel Mgmt   - Senna,  Miralax QD  - Mineral oil enema x 1 (11/30)   - Golytely bowel prep x 1 (12/1) and suppository. Had multple BM yesterday 6 PM 12/1  - GI ppx: Protonix    #Bladder management/Urinary retention  - PVR q 8 hours (SC if > 400)  - UA (11/27) negative  - continue flomax 0.4  - Encourage timed voids Q3hrs while awake for independence and to promote continence during therapy    # DVT ppx  - Lovenox 30 BID  - SCDs, TEDs           Follow ups:  Neuro: Dr. Kumar Guzman  Rad: Dr. Phoebe Xiao  Card: Dr. Talib Dsouza  IM: Appt 12/19/2023

## 2023-12-03 NOTE — PROGRESS NOTE ADULT - GASTROINTESTINAL
----- Message from Tracey Cat Tracey Cta RN, Eboni Nicolas    Good Morning ladies    Jaelyn(daughter) of Nolvia Balbuena called in to schedule a f/u appointment with one of Dr. Jiang's NP's. Can you please call Jaelyn and schedule the appointment.     Thank you,    Miesha      
soft/nontender
vinod mei r/G/soft/nontender

## 2023-12-03 NOTE — PROGRESS NOTE ADULT - MOTOR
bilateral calves: no erythema or warmth  soft +distensible no TTP  no pedal edema bilaterally
withdraws BLE hip flexors 3-4/5  calves soft no TTP no erythema or warmth

## 2023-12-03 NOTE — PROGRESS NOTE ADULT - SUBJECTIVE AND OBJECTIVE BOX
Patient is a 43y old  Male who presents with a chief complaint of CVA (02 Dec 2023 10:36)      HPI:  44yo RHD M with PMH significant for: stroke (2023 - presenting with dysarthria - left inferior adrienne, b/l parieto-occipital juxtacortical WM w/mild residual dysarthria; 8 years ago - treated with TPA at [facility] - p/w L hemiplegia, facial droop and dysarthria), T2DM, HTN, former smoker (quit in 2023), presented to [facility] ED on 2023, with c/o difficulty walking, LLE weakness, slurred speech (worse from baseline).     ED exam: patient has a L facial droop, ataxia in LUE (dysmetria with FTN), and struggles to perform HTS b/l. Patient also with pronounced slurred speech (moderate but is intelligible). CTH showing a nonacute left occipital infarct (not seen on the CTH 2023). MR with acute/subacute posterior fossa and left occipital lobe infarcts with associated cytotoxic edema. Recieved tenecteplase - 30 minutes after administration, NIHSS 4->2 (mild left facial but improved and mild dysarthria). Patient reports voice is much better. Performs FTN without issue with LUE. Performs HTS b/l. L NLF flattening significantly improved.     Patient was evaluated by PM&R and therapy for functional deficits and gait/ADL impairments and recommend acute rehabilitation.  Patient was medically optimized for discharge to [facility] on 2023. (2023 13:08)      PAST MEDICAL & SURGICAL HISTORY:  DM (diabetes mellitus)      HTN (hypertension)      CVA (cerebrovascular accident)          MEDICATIONS  (STANDING):  amLODIPine   Tablet 5 milliGRAM(s) Oral daily  aspirin  chewable 81 milliGRAM(s) Oral daily  atorvastatin 80 milliGRAM(s) Oral at bedtime  bisacodyl Suppository 10 milliGRAM(s) Rectal at bedtime  enoxaparin Injectable 30 milliGRAM(s) SubCutaneous <User Schedule>  escitalopram 20 milliGRAM(s) Oral daily  labetalol 200 milliGRAM(s) Oral three times a day  lisinopril 40 milliGRAM(s) Oral daily  pantoprazole    Tablet 40 milliGRAM(s) Oral before breakfast  polyethylene glycol 3350 17 Gram(s) Oral two times a day  senna 2 Tablet(s) Oral at bedtime  tamsulosin 0.4 milliGRAM(s) Oral at bedtime  ticagrelor 90 milliGRAM(s) Oral two times a day  traZODone 50 milliGRAM(s) Oral at bedtime    MEDICATIONS  (PRN):  acetaminophen     Tablet .. 650 milliGRAM(s) Oral every 6 hours PRN Temp greater or equal to 38.5C (101.3F), Mild Pain (1 - 3)      Allergies    No Known Allergies    Intolerances          VITALS  43y  Vital Signs Last 24 Hrs  T(C): 36.7 (03 Dec 2023 08:30), Max: 36.7 (02 Dec 2023 22:00)  T(F): 98.1 (03 Dec 2023 08:30), Max: 98.1 (03 Dec 2023 08:30)  HR: 69 (03 Dec 2023 08:30) (64 - 72)  BP: 122/82 (03 Dec 2023 08:30) (122/82 - 155/83)  BP(mean): --  RR: 16 (03 Dec 2023 08:30) (16 - 16)  SpO2: 98% (03 Dec 2023 08:30) (98% - 99%)    Parameters below as of 03 Dec 2023 08:30  Patient On (Oxygen Delivery Method): room air      Daily     Daily Weight in k.3 (02 Dec 2023 23:19)        RECENT LABS:                      CAPILLARY BLOOD GLUCOSE                   Patient is a 43y old  Male who presents with a chief complaint of CVA (02 Dec 2023 10:36)      HPI:  44yo RHD M with PMH significant for: stroke (2023 - presenting with dysarthria - left inferior adrienne, b/l parieto-occipital juxtacortical WM w/mild residual dysarthria; 8 years ago - treated with TPA at [facility] - p/w L hemiplegia, facial droop and dysarthria), T2DM, HTN, former smoker (quit in 2023), presented to [facility] ED on 2023, with c/o difficulty walking, LLE weakness, slurred speech (worse from baseline).     ED exam: patient has a L facial droop, ataxia in LUE (dysmetria with FTN), and struggles to perform HTS b/l. Patient also with pronounced slurred speech (moderate but is intelligible). CTH showing a nonacute left occipital infarct (not seen on the CTH 2023). MR with acute/subacute posterior fossa and left occipital lobe infarcts with associated cytotoxic edema. Recieved tenecteplase - 30 minutes after administration, NIHSS 4->2 (mild left facial but improved and mild dysarthria). Patient reports voice is much better. Performs FTN without issue with LUE. Performs HTS b/l. L NLF flattening significantly improved.     Patient was evaluated by PM&R and therapy for functional deficits and gait/ADL impairments and recommend acute rehabilitation.  Patient was medically optimized for discharge to [facility] on 2023. (2023 13:08)      PAST MEDICAL & SURGICAL HISTORY:  DM (diabetes mellitus)      HTN (hypertension)      CVA (cerebrovascular accident)          MEDICATIONS  (STANDING):  amLODIPine   Tablet 5 milliGRAM(s) Oral daily  aspirin  chewable 81 milliGRAM(s) Oral daily  atorvastatin 80 milliGRAM(s) Oral at bedtime  bisacodyl Suppository 10 milliGRAM(s) Rectal at bedtime  enoxaparin Injectable 30 milliGRAM(s) SubCutaneous <User Schedule>  escitalopram 20 milliGRAM(s) Oral daily  labetalol 200 milliGRAM(s) Oral three times a day  lisinopril 40 milliGRAM(s) Oral daily  pantoprazole    Tablet 40 milliGRAM(s) Oral before breakfast  polyethylene glycol 3350 17 Gram(s) Oral two times a day  senna 2 Tablet(s) Oral at bedtime  tamsulosin 0.4 milliGRAM(s) Oral at bedtime  ticagrelor 90 milliGRAM(s) Oral two times a day  traZODone 50 milliGRAM(s) Oral at bedtime    MEDICATIONS  (PRN):  acetaminophen     Tablet .. 650 milliGRAM(s) Oral every 6 hours PRN Temp greater or equal to 38.5C (101.3F), Mild Pain (1 - 3)      Allergies    No Known Allergies    Intolerances          VITALS  43y  Vital Signs Last 24 Hrs  T(C): 36.7 (03 Dec 2023 08:30), Max: 36.7 (02 Dec 2023 22:00)  T(F): 98.1 (03 Dec 2023 08:30), Max: 98.1 (03 Dec 2023 08:30)  HR: 69 (03 Dec 2023 08:30) (64 - 72)  BP: 122/82 (03 Dec 2023 08:30) (122/82 - 155/83)  BP(mean): --  RR: 16 (03 Dec 2023 08:30) (16 - 16)  SpO2: 98% (03 Dec 2023 08:30) (98% - 99%)    Parameters below as of 03 Dec 2023 08:30  Patient On (Oxygen Delivery Method): room air      Daily     Daily Weight in k.3 (02 Dec 2023 23:19)        RECENT LABS:                      CAPILLARY BLOOD GLUCOSE                   Patient is a 43y old  Male who presents with a chief complaint of CVA (02 Dec 2023 10:36)      HPI:  42yo RHD M with PMH significant for: stroke (2023 - presenting with dysarthria - left inferior adrienne, b/l parieto-occipital juxtacortical WM w/mild residual dysarthria; 8 years ago - treated with TPA at [facility] - p/w L hemiplegia, facial droop and dysarthria), T2DM, HTN, former smoker (quit in 2023), presented to [facility] ED on 2023, with c/o difficulty walking, LLE weakness, slurred speech (worse from baseline).     ED exam: patient has a L facial droop, ataxia in LUE (dysmetria with FTN), and struggles to perform HTS b/l. Patient also with pronounced slurred speech (moderate but is intelligible). CTH showing a nonacute left occipital infarct (not seen on the CTH 2023). MR with acute/subacute posterior fossa and left occipital lobe infarcts with associated cytotoxic edema. Recieved tenecteplase - 30 minutes after administration, NIHSS 4->2 (mild left facial but improved and mild dysarthria). Patient reports voice is much better. Performs FTN without issue with LUE. Performs HTS b/l. L NLF flattening significantly improved.     Patient was evaluated by PM&R and therapy for functional deficits and gait/ADL impairments and recommend acute rehabilitation.  Patient was medically optimized for discharge to [facility] on 2023. (2023 13:08)      PAST MEDICAL & SURGICAL HISTORY:  DM (diabetes mellitus)      HTN (hypertension)      CVA (cerebrovascular accident)          MEDICATIONS  (STANDING):  amLODIPine   Tablet 5 milliGRAM(s) Oral daily  aspirin  chewable 81 milliGRAM(s) Oral daily  atorvastatin 80 milliGRAM(s) Oral at bedtime  bisacodyl Suppository 10 milliGRAM(s) Rectal at bedtime  enoxaparin Injectable 30 milliGRAM(s) SubCutaneous <User Schedule>  escitalopram 20 milliGRAM(s) Oral daily  labetalol 200 milliGRAM(s) Oral three times a day  lisinopril 40 milliGRAM(s) Oral daily  pantoprazole    Tablet 40 milliGRAM(s) Oral before breakfast  polyethylene glycol 3350 17 Gram(s) Oral two times a day  senna 2 Tablet(s) Oral at bedtime  tamsulosin 0.4 milliGRAM(s) Oral at bedtime  ticagrelor 90 milliGRAM(s) Oral two times a day  traZODone 50 milliGRAM(s) Oral at bedtime    MEDICATIONS  (PRN):  acetaminophen     Tablet .. 650 milliGRAM(s) Oral every 6 hours PRN Temp greater or equal to 38.5C (101.3F), Mild Pain (1 - 3)      Allergies    No Known Allergies    Intolerances          VITALS  43y  Vital Signs Last 24 Hrs  T(C): 36.7 (03 Dec 2023 08:30), Max: 36.7 (02 Dec 2023 22:00)  T(F): 98.1 (03 Dec 2023 08:30), Max: 98.1 (03 Dec 2023 08:30)  HR: 69 (03 Dec 2023 08:30) (64 - 72)  BP: 122/82 (03 Dec 2023 08:30) (122/82 - 155/83)  BP(mean): --  RR: 16 (03 Dec 2023 08:30) (16 - 16)  SpO2: 98% (03 Dec 2023 08:30) (98% - 99%)    Parameters below as of 03 Dec 2023 08:30  Patient On (Oxygen Delivery Method): room air      Daily     Daily Weight in k.3 (02 Dec 2023 23:19)        RECENT LABS:                      CAPILLARY BLOOD GLUCOSE

## 2023-12-03 NOTE — PROGRESS NOTE ADULT - COMMENTS
Patient was sleepy this morning, arousable but reduced sustained eye opening. Breathing comfortably, snoring. Later this morning, awoke, spent time with family. Had urinary retention with bladder scan 439 ml but will attempt toileting with nursing as deferred catheterization until then    no acute issue overnight. Last BM 2 days ago

## 2023-12-03 NOTE — PROGRESS NOTE ADULT - ASSESSMENT
42yo RHD M with PMH significant for HTN, type 2 DM, strokes (2015 abd 7/2023), former smoker (quit 7/2023),  presented to [facility] ED (11/16) with c/o difficulty walking, LLE weakness, slurred speech (worse from baseline). Images with acute/subacute infarcts.    # CVA (2015, 7/2023, 11/2023)   - S/p TPA  - ASA 81mg + Ticagrelor 90 BID  - Atorvastatin 80 (LDL 36)  - continue Comprehensive Rehab Program: PT/OT/ST, 3hours daily and 5 days weekly.     # Mood/sleep  - Lexapro  inc 20 (11/29)  - Trazodone 25mg at bedtime  - Neuropsychology support    # HTN  - Labetalol 300 TID  - Lisinopril 40mg daily  - BP (122/82 - 155/83) 12/3    # DM II   - A1C 5.5  - Previously on metformin  - FS: <120.     # Respiratory health/RADHA  - Incentive Spirometry during the day time  - CPAP nightly    # Fever on admission   - Noted with temp of 101.3 on admission: cbc, cmp, lactate, procalcitonin, UA (neg), blood CXR X 2 (unremarkable)  - blood culture no growth  -  WBC 6.05 (11/30) afebrile    # MALI  - Bun/Cr: 28/1.40 (11/30)  - Encourage oral hydration, monitior on thickened liquids  - BMP 12/4    # Pain management  - Tylenol PRN    # FEN   - Diet: upgraded to regular and mildly thick with supervision in meals 12/2  + free water protocol    # GI/Bowel Mgmt   - Senna,  Miralax QD  - Mineral oil enema x 1 (11/30)   - Golytely bowel prep x 1 (12/1) and suppository. Last BM 12/1  - GI ppx: Protonix    #Bladder management/Urinary retention  - UA (11/27) negative  - continue flomax 0.4  - Encourage timed voids Q3hrs while awake for independence and to promote continence during therapy  - PVR q 8 hours (SC if > 400)    # DVT ppx  - Lovenox 30 BID  - SCDs, TEDs           Follow ups:  Neuro: Dr. Kumar Guzman  Rad: Dr. Phoebe Xiao  Card: Dr. Talib Dsouza  IM: Appt 12/19/2023 42yo RHD M with PMH significant for HTN, type 2 DM, strokes (2015 abd 7/2023), former smoker (quit 7/2023),  presented to [facility] ED (11/16) with c/o difficulty walking, LLE weakness, slurred speech (worse from baseline). Images with acute/subacute infarcts.    # CVA (2015, 7/2023, 11/2023)   - S/p TPA  - ASA 81mg + Ticagrelor 90 BID  - Atorvastatin 80 (LDL 36)  - continue Comprehensive Rehab Program: PT/OT/ST, 3hours daily and 5 days weekly.     # Mood/sleep  - Lexapro  inc 20 (11/29)  - Trazodone 25mg at bedtime  - Neuropsychology support    # HTN  - Labetalol 300 TID  - Lisinopril 40mg daily  - BP (122/82 - 155/83) 12/3    # DM II   - A1C 5.5  - Previously on metformin  - FS: <120.     # Respiratory health/RADHA  - Incentive Spirometry during the day time  - CPAP nightly    # Fever on admission   - Noted with temp of 101.3 on admission: cbc, cmp, lactate, procalcitonin, UA (neg), blood CXR X 2 (unremarkable)  - blood culture no growth  -  WBC 6.05 (11/30) afebrile    # MALI  - Bun/Cr: 28/1.40 (11/30)  - Encourage oral hydration, monitior on thickened liquids  - BMP 12/4    # Pain management  - Tylenol PRN    # FEN   - Diet: upgraded to regular and mildly thick with supervision in meals 12/2  + free water protocol    # GI/Bowel Mgmt   - Senna,  Miralax QD  - Mineral oil enema x 1 (11/30)   - Golytely bowel prep x 1 (12/1) and suppository. Last BM 12/1  - GI ppx: Protonix    #Bladder management/Urinary retention  - UA (11/27) negative  - continue flomax 0.4  - Encourage timed voids Q3hrs while awake for independence and to promote continence during therapy  - PVR q 8 hours (SC if > 400)    # DVT ppx  - Lovenox 30 BID  - SCDs, TEDs           Follow ups:  Neuro: Dr. Kumar Guzman  Rad: Dr. Phoebe Xiao  Card: Dr. Talib Dsouza  IM: Appt 12/19/2023 42yo RHD M with PMH significant for HTN, type 2 DM, strokes (2015 abd 7/2023), former smoker (quit 7/2023),  presented to [facility] ED (11/16) with c/o difficulty walking, LLE weakness, slurred speech (worse from baseline). Images with acute/subacute infarcts.    # CVA (2015, 7/2023, 11/2023)   - S/p TPA  - ASA 81mg + Ticagrelor 90 BID  - Atorvastatin 80 (LDL 36)  - continue Comprehensive Rehab Program: PT/OT/ST, 3hours daily and 5 days weekly.     # Mood/sleep  - Lexapro  inc 20 (11/29)  - Trazodone 25mg at bedtime  - Neuropsychology support    # HTN  - Labetalol 300 TID  - Lisinopril 40mg daily  - BP (122/82 - 155/83) 12/3    # DM II   - A1C 5.5  - Previously on metformin  - FS: <120.     # Respiratory health/RADHA  - Incentive Spirometry during the day time  - CPAP nightly    # Fever on admission   - Noted with temp of 101.3 on admission: cbc, cmp, lactate, procalcitonin, UA (neg), blood CXR X 2 (unremarkable)  - blood culture no growth  -  WBC 6.05 (11/30) afebrile    # MALI  - Bun/Cr: 28/1.40 (11/30)  - Encourage oral hydration, monitior on thickened liquids  - BMP 12/4    # Pain management  - Tylenol PRN    # FEN   - Diet: upgraded to regular and mildly thick with supervision in meals 12/2  + free water protocol    # GI/Bowel Mgmt   - Senna,  Miralax QD  - Mineral oil enema x 1 (11/30)   - Golytely bowel prep x 1 (12/1) and suppository. Last BM 12/1  - GI ppx: Protonix    #Bladder management/Urinary retention  - UA (11/27) negative  - continue flomax 0.4  - Encourage timed voids Q3hrs while awake for independence and to promote continence during therapy  - PVR q 8 hours (SC if > 400)    # DVT ppx  - Lovenox 30 BID  - SCDs, TEDs           Follow ups:  Neuro: Dr. Kumar Guzman  Rad: Dr. Phoebe Xiao  Card: Dr. Talib Dsouza  IM: Appt 12/19/2023

## 2023-12-04 NOTE — PROGRESS NOTE ADULT - SUBJECTIVE AND OBJECTIVE BOX
Patient is a 43y old  Male who presents with a chief complaint of CVA (03 Dec 2023 11:27)      HPI:  42yo RHD M with PMH significant for: stroke (2023 - presenting with dysarthria - left inferior adrienne, b/l parieto-occipital juxtacortical WM w/mild residual dysarthria; 8 years ago - treated with TPA at [facility] - p/w L hemiplegia, facial droop and dysarthria), T2DM, HTN, former smoker (quit in 2023), presented to [facility] ED on 2023, with c/o difficulty walking, LLE weakness, slurred speech (worse from baseline).     ED exam: patient has a L facial droop, ataxia in LUE (dysmetria with FTN), and struggles to perform HTS b/l. Patient also with pronounced slurred speech (moderate but is intelligible). CTH showing a nonacute left occipital infarct (not seen on the CTH 2023). MR with acute/subacute posterior fossa and left occipital lobe infarcts with associated cytotoxic edema. Recieved tenecteplase - 30 minutes after administration, NIHSS 4->2 (mild left facial but improved and mild dysarthria). Patient reports voice is much better. Performs FTN without issue with LUE. Performs HTS b/l. L NLF flattening significantly improved.     Patient was evaluated by PM&R and therapy for functional deficits and gait/ADL impairments and recommend acute rehabilitation.  Patient was medically optimized for discharge to [facility] on 2023. (2023 13:08)        SUBJECTIVE: Patient seen and examined. No acute overnight events, slept well. Needed to be straight cath last night and yesterday morning. Endorses feeling the urge to urinate now. RN to toilet patient every 3 hours while awake to establish routine.    REVIEW OF SYSTEMS  Constitutional: No fever, + fatigue  Cardio: No chest pain, No palpitations  Resp: No SOB, no respiratory distress   Neuro: No headaches +weakness    VITALS  43y  Vital Signs Last 24 Hrs  T(C): 36.7 (04 Dec 2023 09:29), Max: 37 (03 Dec 2023 22:45)  T(F): 98 (04 Dec 2023 09:29), Max: 98.6 (03 Dec 2023 22:45)  HR: 70 (04 Dec 2023 13:34) (65 - 70)  BP: 120/79 (04 Dec 2023 13:34) (120/79 - 155/93)  BP(mean): --  RR: 16 (04 Dec 2023 13:34) (16 - 16)  SpO2: 97% (04 Dec 2023 13:34) (96% - 98%)    Parameters below as of 04 Dec 2023 09:29  Patient On (Oxygen Delivery Method): room air      Daily     Daily Weight in k.3 (03 Dec 2023 22:49)        PHYSICAL EXAM:   Gen - NAD, Comfortable  HEENT - NCAT  Neck - Supple, No limited ROM  Pulm - CTA   Cardiovascular - RRR  Abdomen - Soft, NT/ND, +BS (normal active to right and some slowing to left)  Extremities - No Cyanosis, no clubbing, no edema, no calf tenderness  Neuro-     Cognitive - awake, alert, oriented x4. appropriate   answers/conversation     Communication  +dysarthria, no aphasia-- repeats, follows 2 step commands     Cranial Nerves - left facial droop, left facial weakness, decreased left shoulder shrug     Motor - Left sided weakness                    LEFT    UE - 4/5                     RIGHT UE - 5/5                    LEFT    LE - 4/5                     RIGHT LE - 5/5        Sensory - Intact  to LT      Coordination - FTN impaired to left side     Tone - normal  Psychiatric - flat affect, withdrawn  Skin:  all skin intact    RECENT LABS:                        12.0   6.53  )-----------( 262      ( 04 Dec 2023 06:42 )             36.6     12-04    142  |  105  |  21  ----------------------------<  121<H>  4.3   |  28  |  1.23    Ca    9.1      04 Dec 2023 06:42    TPro  6.6  /  Alb  3.2<L>  /  TBili  0.5  /  DBili  x   /  AST  18  /  ALT  36  /  AlkPhos  52  12-04    LIVER FUNCTIONS - ( 04 Dec 2023 06:42 )  Alb: 3.2 g/dL / Pro: 6.6 g/dL / ALK PHOS: 52 U/L / ALT: 36 U/L / AST: 18 U/L / GGT: x             Urinalysis Basic - ( 04 Dec 2023 06:42 )    Color: x / Appearance: x / SG: x / pH: x  Gluc: 121 mg/dL / Ketone: x  / Bili: x / Urobili: x   Blood: x / Protein: x / Nitrite: x   Leuk Esterase: x / RBC: x / WBC x   Sq Epi: x / Non Sq Epi: x / Bacteria: x          CAPILLARY BLOOD GLUCOSE            MEDICATIONS:  MEDICATIONS  (STANDING):  amLODIPine   Tablet 5 milliGRAM(s) Oral daily  aspirin  chewable 81 milliGRAM(s) Oral <User Schedule>  atorvastatin 80 milliGRAM(s) Oral at bedtime  bisacodyl Suppository 10 milliGRAM(s) Rectal at bedtime  enoxaparin Injectable 30 milliGRAM(s) SubCutaneous <User Schedule>  escitalopram 20 milliGRAM(s) Oral daily  labetalol 200 milliGRAM(s) Oral three times a day  lisinopril 40 milliGRAM(s) Oral daily  pantoprazole    Tablet 40 milliGRAM(s) Oral before breakfast  polyethylene glycol 3350 17 Gram(s) Oral two times a day  senna 2 Tablet(s) Oral at bedtime  tamsulosin 0.4 milliGRAM(s) Oral at bedtime  ticagrelor 90 milliGRAM(s) Oral two times a day  traZODone 50 milliGRAM(s) Oral at bedtime    MEDICATIONS  (PRN):  acetaminophen     Tablet .. 650 milliGRAM(s) Oral every 6 hours PRN Temp greater or equal to 38.5C (101.3F), Mild Pain (1 - 3)     Patient is a 43y old  Male who presents with a chief complaint of CVA (03 Dec 2023 11:27)      HPI:  44yo RHD M with PMH significant for: stroke (2023 - presenting with dysarthria - left inferior adrienne, b/l parieto-occipital juxtacortical WM w/mild residual dysarthria; 8 years ago - treated with TPA at [facility] - p/w L hemiplegia, facial droop and dysarthria), T2DM, HTN, former smoker (quit in 2023), presented to [facility] ED on 2023, with c/o difficulty walking, LLE weakness, slurred speech (worse from baseline).     ED exam: patient has a L facial droop, ataxia in LUE (dysmetria with FTN), and struggles to perform HTS b/l. Patient also with pronounced slurred speech (moderate but is intelligible). CTH showing a nonacute left occipital infarct (not seen on the CTH 2023). MR with acute/subacute posterior fossa and left occipital lobe infarcts with associated cytotoxic edema. Recieved tenecteplase - 30 minutes after administration, NIHSS 4->2 (mild left facial but improved and mild dysarthria). Patient reports voice is much better. Performs FTN without issue with LUE. Performs HTS b/l. L NLF flattening significantly improved.     Patient was evaluated by PM&R and therapy for functional deficits and gait/ADL impairments and recommend acute rehabilitation.  Patient was medically optimized for discharge to [facility] on 2023. (2023 13:08)        SUBJECTIVE: Patient seen and examined. No acute overnight events, slept well. Needed to be straight cath last night and yesterday morning. Endorses feeling the urge to urinate now. RN to toilet patient every 3 hours while awake to establish routine.    REVIEW OF SYSTEMS  Constitutional: No fever, + fatigue  Cardio: No chest pain, No palpitations  Resp: No SOB, no respiratory distress   Neuro: No headaches +weakness    VITALS  43y  Vital Signs Last 24 Hrs  T(C): 36.7 (04 Dec 2023 09:29), Max: 37 (03 Dec 2023 22:45)  T(F): 98 (04 Dec 2023 09:29), Max: 98.6 (03 Dec 2023 22:45)  HR: 70 (04 Dec 2023 13:34) (65 - 70)  BP: 120/79 (04 Dec 2023 13:34) (120/79 - 155/93)  BP(mean): --  RR: 16 (04 Dec 2023 13:34) (16 - 16)  SpO2: 97% (04 Dec 2023 13:34) (96% - 98%)    Parameters below as of 04 Dec 2023 09:29  Patient On (Oxygen Delivery Method): room air      Daily     Daily Weight in k.3 (03 Dec 2023 22:49)        PHYSICAL EXAM:   Gen - NAD, Comfortable  HEENT - NCAT  Neck - Supple, No limited ROM  Pulm - CTA   Cardiovascular - RRR  Abdomen - Soft, NT/ND, +BS (normal active to right and some slowing to left)  Extremities - No Cyanosis, no clubbing, no edema, no calf tenderness  Neuro-     Cognitive - awake, alert, oriented x4. appropriate   answers/conversation     Communication  +dysarthria, no aphasia-- repeats, follows 2 step commands     Cranial Nerves - left facial droop, left facial weakness, decreased left shoulder shrug     Motor - Left sided weakness                    LEFT    UE - 4/5                     RIGHT UE - 5/5                    LEFT    LE - 4/5                     RIGHT LE - 5/5        Sensory - Intact  to LT      Coordination - FTN impaired to left side     Tone - normal  Psychiatric - flat affect, withdrawn  Skin:  all skin intact    RECENT LABS:                        12.0   6.53  )-----------( 262      ( 04 Dec 2023 06:42 )             36.6     12-04    142  |  105  |  21  ----------------------------<  121<H>  4.3   |  28  |  1.23    Ca    9.1      04 Dec 2023 06:42    TPro  6.6  /  Alb  3.2<L>  /  TBili  0.5  /  DBili  x   /  AST  18  /  ALT  36  /  AlkPhos  52  12-04    LIVER FUNCTIONS - ( 04 Dec 2023 06:42 )  Alb: 3.2 g/dL / Pro: 6.6 g/dL / ALK PHOS: 52 U/L / ALT: 36 U/L / AST: 18 U/L / GGT: x             Urinalysis Basic - ( 04 Dec 2023 06:42 )    Color: x / Appearance: x / SG: x / pH: x  Gluc: 121 mg/dL / Ketone: x  / Bili: x / Urobili: x   Blood: x / Protein: x / Nitrite: x   Leuk Esterase: x / RBC: x / WBC x   Sq Epi: x / Non Sq Epi: x / Bacteria: x          CAPILLARY BLOOD GLUCOSE            MEDICATIONS:  MEDICATIONS  (STANDING):  amLODIPine   Tablet 5 milliGRAM(s) Oral daily  aspirin  chewable 81 milliGRAM(s) Oral <User Schedule>  atorvastatin 80 milliGRAM(s) Oral at bedtime  bisacodyl Suppository 10 milliGRAM(s) Rectal at bedtime  enoxaparin Injectable 30 milliGRAM(s) SubCutaneous <User Schedule>  escitalopram 20 milliGRAM(s) Oral daily  labetalol 200 milliGRAM(s) Oral three times a day  lisinopril 40 milliGRAM(s) Oral daily  pantoprazole    Tablet 40 milliGRAM(s) Oral before breakfast  polyethylene glycol 3350 17 Gram(s) Oral two times a day  senna 2 Tablet(s) Oral at bedtime  tamsulosin 0.4 milliGRAM(s) Oral at bedtime  ticagrelor 90 milliGRAM(s) Oral two times a day  traZODone 50 milliGRAM(s) Oral at bedtime    MEDICATIONS  (PRN):  acetaminophen     Tablet .. 650 milliGRAM(s) Oral every 6 hours PRN Temp greater or equal to 38.5C (101.3F), Mild Pain (1 - 3)

## 2023-12-04 NOTE — PROGRESS NOTE ADULT - ASSESSMENT
42yo RHD M with PMH significant for HTN, type 2 DM, strokes (2015 abd 7/2023), former smoker (quit 7/2023),  presented to [facility] ED (11/16) with c/o difficulty walking, LLE weakness, slurred speech (worse from baseline). Images with acute/subacute infarcts.    # CVA (2015, 7/2023, 11/2023)   - S/p TPA  - ASA 81mg + Ticagrelor 90 BID  - Atorvastatin 80 (LDL 36)  - continue Comprehensive Rehab Program: PT/OT/ST, 3hours daily and 5 days weekly.     # Mood/sleep  - Lexapro  inc 20 (11/29)  - Trazodone 25mg at bedtime  - Neuropsychology support    # HTN  - Labetalol 300 TID  - Lisinopril 40mg daily  - BP stable    # DM II   - A1C 5.5  - Previously on metformin  - FS: <120.     # Respiratory health/RADHA  - Incentive Spirometry during the day time  - CPAP nightly    # Fever on admission   - Noted with temp of 101.3 on admission: cbc, cmp, lactate, procalcitonin, UA (neg), blood CXR X 2 (unremarkable)  - blood culture no growth  -  WBC 6.05 (11/30) afebrile    # MALI  - Bun/Cr: 28/1.40 (11/30)  - Encourage oral hydration, monitior on thickened liquids  - Bun/Cr improved-- 21/1.23 (12/4)    # Pain management  - Tylenol PRN    # FEN   - Diet: upgraded to regular and mildly thick with supervision in meals 12/2  + free water protocol  - Repeat MBS friday 12/8    # GI/Bowel Mgmt --BM yesterday  - Senna,  Miralax QD  - Mineral oil enema x 1 (11/30)   - Golytely bowel prep x 1 (12/1) and suppository.   - GI ppx: Protonix    #Bladder management/Urinary retention  - UA (11/27) negative  - continue flomax 0.4  - Encourage timed voids Q3hrs while awake for independence and to promote continence during therapy-- now has urge to urinate. still needing straight cath intermittently   - PVR q 8 hours (SC if > 400)    # DVT ppx  - Lovenox 30 BID  - SCDs, TEDs           Follow ups:  Neuro: Dr. Kumar Guzman  Rad: Dr. Phoebe Xiao  Card: Dr. Talib Dsouza  IM: Appt 12/19/2023 42yo RHD M with PMH significant for HTN, type 2 DM, strokes (2015 abd 7/2023), former smoker (quit 7/2023),  presented to [facility] ED (11/16) with c/o difficulty walking, LLE weakness, slurred speech (worse from baseline). Images with acute/subacute infarcts.    # CVA (2015, 7/2023, 11/2023)   - S/p TPA  - ASA 81mg + Ticagrelor 90 BID  - Atorvastatin 80 (LDL 36)  - continue Comprehensive Rehab Program: PT/OT/ST, 3hours daily and 5 days weekly.     # Mood/sleep  - Lexapro  inc 20 (11/29)  - Trazodone 25mg at bedtime  - Neuropsychology support    # HTN  - Labetalol 300 TID  - Lisinopril 40mg daily  - BP stable    # DM II   - A1C 5.5  - Previously on metformin  - FS: <120.     # Respiratory health/RADHA  - Incentive Spirometry during the day time  - CPAP nightly    # Fever on admission   - Noted with temp of 101.3 on admission: cbc, cmp, lactate, procalcitonin, UA (neg), blood CXR X 2 (unremarkable)  - blood culture no growth  -  WBC 6.05 (11/30) afebrile    # MALI  - Bun/Cr: 28/1.40 (11/30)  - Encourage oral hydration, monitior on thickened liquids  - Bun/Cr improved-- 21/1.23 (12/4)    # Pain management  - Tylenol PRN    # FEN   - Diet: upgraded to regular and mildly thick with supervision in meals 12/2  + free water protocol  - Repeat MBS friday 12/8    # GI/Bowel Mgmt --BM yesterday  - Senna,  Miralax QD  - Mineral oil enema x 1 (11/30)   - Golytely bowel prep x 1 (12/1) and suppository.   - GI ppx: Protonix    #Bladder management/Urinary retention  - UA (11/27) negative  - continue flomax 0.4  - Encourage timed voids Q3hrs while awake for independence and to promote continence during therapy-- now has urge to urinate. still needing straight cath intermittently   - PVR q 8 hours (SC if > 400)    # DVT ppx  - Lovenox 30 BID  - SCDs, TEDs           Follow ups:  Neuro: Dr. Kumar Guzman  Rad: Dr. Phoebe Xiao  Card: Dr. Talib Dsouza  IM: Appt 12/19/2023

## 2023-12-05 NOTE — PROGRESS NOTE ADULT - SUBJECTIVE AND OBJECTIVE BOX
CC: Patient is a 43y old  Male who presents with a chief complaint of CVA (04 Dec 2023 15:13)      Interval History: Patient seen and examined at bedside. No acute overnight events. No complaints this morning. Denies any pain    ALLERGIES:  No Known Allergies    MEDICATIONS  (STANDING):  amLODIPine   Tablet 5 milliGRAM(s) Oral daily  aspirin  chewable 81 milliGRAM(s) Oral <User Schedule>  atorvastatin 80 milliGRAM(s) Oral at bedtime  bisacodyl Suppository 10 milliGRAM(s) Rectal at bedtime  enoxaparin Injectable 30 milliGRAM(s) SubCutaneous <User Schedule>  escitalopram 20 milliGRAM(s) Oral daily  labetalol 200 milliGRAM(s) Oral three times a day  lisinopril 40 milliGRAM(s) Oral daily  pantoprazole    Tablet 40 milliGRAM(s) Oral before breakfast  polyethylene glycol 3350 17 Gram(s) Oral two times a day  senna 2 Tablet(s) Oral at bedtime  tamsulosin 0.4 milliGRAM(s) Oral at bedtime  ticagrelor 90 milliGRAM(s) Oral two times a day  traZODone 50 milliGRAM(s) Oral at bedtime    MEDICATIONS  (PRN):  acetaminophen     Tablet .. 650 milliGRAM(s) Oral every 6 hours PRN Temp greater or equal to 38.5C (101.3F), Mild Pain (1 - 3)    Vital Signs Last 24 Hrs  T(F): 98.2 (05 Dec 2023 08:53), Max: 98.2 (05 Dec 2023 08:53)  HR: 64 (05 Dec 2023 08:53) (64 - 70)  BP: 118/76 (05 Dec 2023 08:53) (118/76 - 120/79)  RR: 16 (05 Dec 2023 08:53) (16 - 16)  SpO2: 97% (05 Dec 2023 08:53) (97% - 97%)  I&O's Summary    04 Dec 2023 07:01  -  05 Dec 2023 07:00  --------------------------------------------------------  IN: 0 mL / OUT: 750 mL / NET: -750 mL          PHYSICAL EXAM:  GENERAL: pt laying in bed in NAD  CHEST/LUNG: Clear to percussion bilaterally; No rales, rhonchi, wheezing, or rubs; normal respiratory effort   HEART: Regular rate and rhythm; No murmurs noted  ABDOMEN: Soft, Nontender, Nondistended; Bowel sounds present   MUSCULOSKELETAL/EXTREMITIES:  moves BLE without difficulty, no edema noted to BLE  NERVOUS SYSTEM:  slow speech; follows commands; 4/5  strength to BUE  PSYCH: Appropriate affect, Alert & Oriented x 3     LABS:                        12.0   6.53  )-----------( 262      ( 04 Dec 2023 06:42 )             36.6       12-04    142  |  105  |  21  ----------------------------<  121  4.3   |  28  |  1.23    Ca    9.1      04 Dec 2023 06:42    TPro  6.6  /  Alb  3.2  /  TBili  0.5  /  DBili  x   /  AST  18  /  ALT  36  /  AlkPhos  52  12-04                11-24 Chol 80 mg/dL LDL -- HDL 28 mg/dL Trig 73 mg/dL                  Urinalysis Basic - ( 04 Dec 2023 06:42 )    Color: x / Appearance: x / SG: x / pH: x  Gluc: 121 mg/dL / Ketone: x  / Bili: x / Urobili: x   Blood: x / Protein: x / Nitrite: x   Leuk Esterase: x / RBC: x / WBC x   Sq Epi: x / Non Sq Epi: x / Bacteria: x        COVID-19 PCR: [place] (11-27-23 @ 20:20)      Care Discussed with Consultants/Other Providers: Yes - with rehab team

## 2023-12-05 NOTE — PROGRESS NOTE ADULT - SUBJECTIVE AND OBJECTIVE BOX
Patient is a 43y old  Male who presents with a chief complaint of CVA (05 Dec 2023 11:23)      HPI:  44yo RHD M with PMH significant for: stroke (7/2023 - presenting with dysarthria - left inferior adrienne, b/l parieto-occipital juxtacortical WM w/mild residual dysarthria; 8 years ago - treated with TPA at [facility] - p/w L hemiplegia, facial droop and dysarthria), T2DM, HTN, former smoker (quit in 7/2023), presented to [facility] ED on 11/16/2023, with c/o difficulty walking, LLE weakness, slurred speech (worse from baseline).     ED exam: patient has a L facial droop, ataxia in LUE (dysmetria with FTN), and struggles to perform HTS b/l. Patient also with pronounced slurred speech (moderate but is intelligible). CTH showing a nonacute left occipital infarct (not seen on the CTH 7/2023). MR with acute/subacute posterior fossa and left occipital lobe infarcts with associated cytotoxic edema. Recieved tenecteplase - 30 minutes after administration, NIHSS 4->2 (mild left facial but improved and mild dysarthria). Patient reports voice is much better. Performs FTN without issue with LUE. Performs HTS b/l. L NLF flattening significantly improved.     Patient was evaluated by PM&R and therapy for functional deficits and gait/ADL impairments and recommend acute rehabilitation.  Patient was medically optimized for discharge to [facility] on 11/27/2023. (27 Nov 2023 13:08)    SUBJECTIVE/OBJECTIVE: Patient seen and examined. No acute overnight events, slept well. Needed to be straight cath once yesterday, bladder scan this am 12. Reports urge to urinate.     REVIEW OF SYSTEMS  Constitutional: No fever, + fatigue  Cardio: No chest pain, No palpitations  Resp: No SOB, no respiratory distress   Neuro: No headaches +weakness +dysphagia +dysarhria     VITALS  43y  Vital Signs Last 24 Hrs  T(C): 36.8 (05 Dec 2023 08:53), Max: 36.8 (05 Dec 2023 08:53)  T(F): 98.2 (05 Dec 2023 08:53), Max: 98.2 (05 Dec 2023 08:53)  HR: 64 (05 Dec 2023 08:53) (64 - 70)  BP: 118/76 (05 Dec 2023 08:53) (118/76 - 120/79)  RR: 16 (05 Dec 2023 08:53) (16 - 16)  SpO2: 97% (05 Dec 2023 08:53) (97% - 97%)    Parameters below as of 05 Dec 2023 08:53  Patient On (Oxygen Delivery Method): room air    PHYSICAL EXAM:   Gen - NAD, Comfortable, sitting in wheelchair   HEENT - NCAT  Neck - Supple, No limited ROM  Pulm - CTA   Cardiovascular - RRR  Abdomen - Soft, NT/ND, +BS   Extremities - No Cyanosis, no clubbing, no edema, no calf tenderness  Neuro-     Cognitive - awake, alert, oriented x4      Communication  +dysarthria, +dysphagia,  no aphasia-- repeats, follows 2 step commands     Cranial Nerves - left facial droop, left facial weakness, decreased left shoulder shrug     Motor - Left sided weakness                    LEFT    UE - 4/5 proximal, 3/5                       RIGHT UE - 5/5                    LEFT    LE - 5/5                     RIGHT LE - 5/5        Sensory - Intact  to LT      Coordination - FTN impaired to left side     Tone - normal  Psychiatric - flat affect, withdrawn  Skin:  all skin intact    RECENT LABS:                        12.0   6.53  )-----------( 262      ( 04 Dec 2023 06:42 )             36.6     12-04    142  |  105  |  21  ----------------------------<  121<H>  4.3   |  28  |  1.23    Ca    9.1      04 Dec 2023 06:42    TPro  6.6  /  Alb  3.2<L>  /  TBili  0.5  /  DBili  x   /  AST  18  /  ALT  36  /  AlkPhos  52  12-04    LIVER FUNCTIONS - ( 04 Dec 2023 06:42 )  Alb: 3.2 g/dL / Pro: 6.6 g/dL / ALK PHOS: 52 U/L / ALT: 36 U/L / AST: 18 U/L / GGT: x             Urinalysis Basic - ( 04 Dec 2023 06:42 )    Color: x / Appearance: x / SG: x / pH: x  Gluc: 121 mg/dL / Ketone: x  / Bili: x / Urobili: x   Blood: x / Protein: x / Nitrite: x   Leuk Esterase: x / RBC: x / WBC x   Sq Epi: x / Non Sq Epi: x / Bacteria: x        CAPILLARY BLOOD GLUCOSE        MEDICATIONS:  MEDICATIONS  (STANDING):  amLODIPine   Tablet 5 milliGRAM(s) Oral daily  aspirin  chewable 81 milliGRAM(s) Oral <User Schedule>  atorvastatin 80 milliGRAM(s) Oral at bedtime  bisacodyl Suppository 10 milliGRAM(s) Rectal at bedtime  enoxaparin Injectable 30 milliGRAM(s) SubCutaneous <User Schedule>  escitalopram 20 milliGRAM(s) Oral daily  labetalol 200 milliGRAM(s) Oral three times a day  lisinopril 40 milliGRAM(s) Oral daily  pantoprazole    Tablet 40 milliGRAM(s) Oral before breakfast  polyethylene glycol 3350 17 Gram(s) Oral two times a day  senna 2 Tablet(s) Oral at bedtime  tamsulosin 0.4 milliGRAM(s) Oral at bedtime  ticagrelor 90 milliGRAM(s) Oral two times a day  traZODone 50 milliGRAM(s) Oral at bedtime    MEDICATIONS  (PRN):  acetaminophen     Tablet .. 650 milliGRAM(s) Oral every 6 hours PRN Temp greater or equal to 38.5C (101.3F), Mild Pain (1 - 3)     Patient is a 43y old  Male who presents with a chief complaint of CVA (05 Dec 2023 11:23)      HPI:  42yo RHD M with PMH significant for: stroke (7/2023 - presenting with dysarthria - left inferior adrienne, b/l parieto-occipital juxtacortical WM w/mild residual dysarthria; 8 years ago - treated with TPA at [facility] - p/w L hemiplegia, facial droop and dysarthria), T2DM, HTN, former smoker (quit in 7/2023), presented to [facility] ED on 11/16/2023, with c/o difficulty walking, LLE weakness, slurred speech (worse from baseline).     ED exam: patient has a L facial droop, ataxia in LUE (dysmetria with FTN), and struggles to perform HTS b/l. Patient also with pronounced slurred speech (moderate but is intelligible). CTH showing a nonacute left occipital infarct (not seen on the CTH 7/2023). MR with acute/subacute posterior fossa and left occipital lobe infarcts with associated cytotoxic edema. Recieved tenecteplase - 30 minutes after administration, NIHSS 4->2 (mild left facial but improved and mild dysarthria). Patient reports voice is much better. Performs FTN without issue with LUE. Performs HTS b/l. L NLF flattening significantly improved.     Patient was evaluated by PM&R and therapy for functional deficits and gait/ADL impairments and recommend acute rehabilitation.  Patient was medically optimized for discharge to [facility] on 11/27/2023. (27 Nov 2023 13:08)    SUBJECTIVE/OBJECTIVE: Patient seen and examined. No acute overnight events, slept well. Needed to be straight cath once yesterday, bladder scan this am 12. Reports urge to urinate.     REVIEW OF SYSTEMS  Constitutional: No fever, + fatigue  Cardio: No chest pain, No palpitations  Resp: No SOB, no respiratory distress   Neuro: No headaches +weakness +dysphagia +dysarhria     VITALS  43y  Vital Signs Last 24 Hrs  T(C): 36.8 (05 Dec 2023 08:53), Max: 36.8 (05 Dec 2023 08:53)  T(F): 98.2 (05 Dec 2023 08:53), Max: 98.2 (05 Dec 2023 08:53)  HR: 64 (05 Dec 2023 08:53) (64 - 70)  BP: 118/76 (05 Dec 2023 08:53) (118/76 - 120/79)  RR: 16 (05 Dec 2023 08:53) (16 - 16)  SpO2: 97% (05 Dec 2023 08:53) (97% - 97%)    Parameters below as of 05 Dec 2023 08:53  Patient On (Oxygen Delivery Method): room air    PHYSICAL EXAM:   Gen - NAD, Comfortable, sitting in wheelchair   HEENT - NCAT  Neck - Supple, No limited ROM  Pulm - CTA   Cardiovascular - RRR  Abdomen - Soft, NT/ND, +BS   Extremities - No Cyanosis, no clubbing, no edema, no calf tenderness  Neuro-     Cognitive - awake, alert, oriented x4      Communication  +dysarthria, +dysphagia,  no aphasia-- repeats, follows 2 step commands     Cranial Nerves - left facial droop, left facial weakness, decreased left shoulder shrug     Motor - Left sided weakness                    LEFT    UE - 4/5 proximal, 3/5                       RIGHT UE - 5/5                    LEFT    LE - 5/5                     RIGHT LE - 5/5        Sensory - Intact  to LT      Coordination - FTN impaired to left side     Tone - normal  Psychiatric - flat affect, withdrawn  Skin:  all skin intact    RECENT LABS:                        12.0   6.53  )-----------( 262      ( 04 Dec 2023 06:42 )             36.6     12-04    142  |  105  |  21  ----------------------------<  121<H>  4.3   |  28  |  1.23    Ca    9.1      04 Dec 2023 06:42    TPro  6.6  /  Alb  3.2<L>  /  TBili  0.5  /  DBili  x   /  AST  18  /  ALT  36  /  AlkPhos  52  12-04    LIVER FUNCTIONS - ( 04 Dec 2023 06:42 )  Alb: 3.2 g/dL / Pro: 6.6 g/dL / ALK PHOS: 52 U/L / ALT: 36 U/L / AST: 18 U/L / GGT: x             Urinalysis Basic - ( 04 Dec 2023 06:42 )    Color: x / Appearance: x / SG: x / pH: x  Gluc: 121 mg/dL / Ketone: x  / Bili: x / Urobili: x   Blood: x / Protein: x / Nitrite: x   Leuk Esterase: x / RBC: x / WBC x   Sq Epi: x / Non Sq Epi: x / Bacteria: x        CAPILLARY BLOOD GLUCOSE        MEDICATIONS:  MEDICATIONS  (STANDING):  amLODIPine   Tablet 5 milliGRAM(s) Oral daily  aspirin  chewable 81 milliGRAM(s) Oral <User Schedule>  atorvastatin 80 milliGRAM(s) Oral at bedtime  bisacodyl Suppository 10 milliGRAM(s) Rectal at bedtime  enoxaparin Injectable 30 milliGRAM(s) SubCutaneous <User Schedule>  escitalopram 20 milliGRAM(s) Oral daily  labetalol 200 milliGRAM(s) Oral three times a day  lisinopril 40 milliGRAM(s) Oral daily  pantoprazole    Tablet 40 milliGRAM(s) Oral before breakfast  polyethylene glycol 3350 17 Gram(s) Oral two times a day  senna 2 Tablet(s) Oral at bedtime  tamsulosin 0.4 milliGRAM(s) Oral at bedtime  ticagrelor 90 milliGRAM(s) Oral two times a day  traZODone 50 milliGRAM(s) Oral at bedtime    MEDICATIONS  (PRN):  acetaminophen     Tablet .. 650 milliGRAM(s) Oral every 6 hours PRN Temp greater or equal to 38.5C (101.3F), Mild Pain (1 - 3)

## 2023-12-05 NOTE — PROGRESS NOTE ADULT - ASSESSMENT
44yo RHD M with PMH significant for HTN, type 2 DM, strokes (2015 abd 7/2023), former smoker (quit 7/2023),  presented to [facility] ED (11/16) with c/o difficulty walking, LLE weakness, slurred speech (worse from baseline). Images with acute/subacute infarcts.    # CVA (2015, 7/2023, 11/2023)   - S/p TPA  - ASA 81mg + Ticagrelor 90 BID  - Atorvastatin 80 (LDL 36)  - continue Comprehensive Rehab Program: PT/OT/ST, 3hours daily and 5 days weekly.     # Mood/sleep  - Lexapro  inc 20 (11/29)  - Trazodone 25mg at bedtime  - Neuropsychology support  -Recreation therapy     # HTN  - Labetalol 300 TID  - Lisinopril 40mg daily  - BP stable    # DM II   - A1C 5.5  - Previously on metformin  - FS: <120.     # Respiratory health/RADHA  - Incentive Spirometry during the day time  - CPAP nightly    # Fever on admission --since resolved  - Noted with temp of 101.3 on admission: cbc, cmp, lactate, procalcitonin, UA (neg), blood CXR X 2 (unremarkable)  - blood culture no growth-final  -  WBC 6.53 (12/4)    # MALI  - Bun/Cr: 28/1.40 (11/30)  - Encourage oral hydration, monitor on thickened liquids  - Bun/Cr improved-- 21/1.23 (12/4)    # Pain management  - Tylenol PRN    # FEN   - Diet: upgraded to regular and mildly thick with supervision in meals 12/2  + free water protocol  - Repeat MBS friday 12/8    # GI/Bowel Mgmt --BM yesterday  - Senna,  Miralax QD  - Mineral oil enema x 1 (11/30)   - Golytely bowel prep x 1 (12/1) and suppository.   - GI ppx: Protonix    #Bladder management/Urinary retention  - UA (11/27) negative  - continue flomax 0.4  - Encourage timed voids Q3hrs while awake for independence and to promote continence during therapy-- now has urge to urinate. straight cath yesterday x1   - PVR q 8 hours (SC if > 400)    # DVT ppx  - Lovenox 30 BID  - SCDs, TEDs       Follow ups:  Neuro: Dr. Kumar Guzman  Rad: Dr. Phoebe Xiao  Card: Dr. Talib Dsouza  IM: Appt 12/19/2023 44yo RHD M with PMH significant for HTN, type 2 DM, strokes (2015 abd 7/2023), former smoker (quit 7/2023),  presented to [facility] ED (11/16) with c/o difficulty walking, LLE weakness, slurred speech (worse from baseline). Images with acute/subacute infarcts.    # CVA (2015, 7/2023, 11/2023)   - S/p TPA  - ASA 81mg + Ticagrelor 90 BID  - Atorvastatin 80 (LDL 36)  - continue Comprehensive Rehab Program: PT/OT/ST, 3hours daily and 5 days weekly.     # Mood/sleep  - Lexapro  inc 20 (11/29)  - Trazodone 25mg at bedtime  - Neuropsychology support  -Recreation therapy     # HTN  - Labetalol 300 TID  - Lisinopril 40mg daily  - BP stable    # DM II   - A1C 5.5  - Previously on metformin  - FS: <120.     # Respiratory health/RADHA  - Incentive Spirometry during the day time  - CPAP nightly    # Fever on admission --since resolved  - Noted with temp of 101.3 on admission: cbc, cmp, lactate, procalcitonin, UA (neg), blood CXR X 2 (unremarkable)  - blood culture no growth-final  -  WBC 6.53 (12/4)    # MALI  - Bun/Cr: 28/1.40 (11/30)  - Encourage oral hydration, monitor on thickened liquids  - Bun/Cr improved-- 21/1.23 (12/4)    # Pain management  - Tylenol PRN    # FEN   - Diet: upgraded to regular and mildly thick with supervision in meals 12/2  + free water protocol  - Repeat MBS friday 12/8    # GI/Bowel Mgmt --BM yesterday  - Senna,  Miralax QD  - Mineral oil enema x 1 (11/30)   - Golytely bowel prep x 1 (12/1) and suppository.   - GI ppx: Protonix    #Bladder management/Urinary retention  - UA (11/27) negative  - continue flomax 0.4  - Encourage timed voids Q3hrs while awake for independence and to promote continence during therapy-- now has urge to urinate. straight cath yesterday x1   - PVR q 8 hours (SC if > 400)    # DVT ppx  - Lovenox 30 BID  - SCDs, TEDs       Follow ups:  Neuro: Dr. Kumar Guzman  Rad: Dr. Phoebe Xiao  Card: Dr. Talib Dsouza  IM: Appt 12/19/2023

## 2023-12-05 NOTE — CHART NOTE - NSCHARTNOTEFT_GEN_A_CORE
NUTRITION Follow Up Note    SOURCE: Patient [X]  Medical Record [X]  Nursing Staff [X]  Family Member []    DIET: Diet, Regular:   Mildly Thick Liquids (MILDTHICKLIQS) (23 @ 11:09) [Active]    Patient reports good appetite, consuming % of meals at this time as per nursing flowsheets. Continues with mildly thickened liquids. Will follow up with SLP recommendations regarding appropriate textured/modified diet and consistency. Will provide Chocolate Magic Cup BID (Provides 580kcal-18grams of Protein) at lunch and dinner.    EDEMA: none     LAST BM: 23 per patient    SKIN: bruised (ecchymotic)    WEIGHT TRENDS:  Weight in k.3 (03 Dec 2023 22:49)  Weight in k.3 (02 Dec 2023 23:19)  Weight in k.5 (2023 23:13)      PERTINENT MEDICATIONS: MEDICATIONS  (STANDING):  amLODIPine   Tablet 5 milliGRAM(s) Oral daily  aspirin  chewable 81 milliGRAM(s) Oral <User Schedule>  atorvastatin 80 milliGRAM(s) Oral at bedtime  bisacodyl Suppository 10 milliGRAM(s) Rectal at bedtime  enoxaparin Injectable 30 milliGRAM(s) SubCutaneous <User Schedule>  escitalopram 20 milliGRAM(s) Oral daily  labetalol 200 milliGRAM(s) Oral three times a day  lisinopril 40 milliGRAM(s) Oral daily  pantoprazole    Tablet 40 milliGRAM(s) Oral before breakfast  polyethylene glycol 3350 17 Gram(s) Oral two times a day  senna 2 Tablet(s) Oral at bedtime  tamsulosin 0.4 milliGRAM(s) Oral at bedtime  ticagrelor 90 milliGRAM(s) Oral two times a day  traZODone 50 milliGRAM(s) Oral at bedtime    MEDICATIONS  (PRN):  acetaminophen     Tablet .. 650 milliGRAM(s) Oral every 6 hours PRN Temp greater or equal to 38.5C (101.3F), Mild Pain (1 - 3)      PERTINENT LABS:   Na142 mmol/L Glu 121 mg/dL<H> K+ 4.3 mmol/L Cr  1.23 mg/dL BUN 21 mg/dL  Alb 3.2 g/dL<L> 11-24 Chol 80 mg/dL LDL --    HDL 28 mg/dL<L> Trig 73 mg/dL        ESTIMATED NEEDS:   [X] No Change Since Previous Assessment    PREVIOUS NUTRITION DIAGNOSIS:  1. Overweight/Obesity     NUTRITION DIAGNOSIS IS [X] Ongoing -     NEW NUTRITION DIAGNOSIS: [X] Not Applicable    INTERVENTIONS:   1. Magic Cup BID (Provides 580kcal-18grams of Protein)   2. Honor patients daily food preferences as feasible with prescribed diet   3. Follow up with SLP recommendations regarding appropriate textured/modified diet and consistency     MONITORING & EVALUATION:   1. Weights   2. PO intakes   3. Skin integrity   4. Tolerance to diet prescription   5. Labs & POCT  6. Follow up (per protocol)    Registered Dietitian/Nutritionist Remains Available.  Arie Thomas RD, CDN    Contact: [phone] or via [place] TEAMS NUTRITION Follow Up Note    SOURCE: Patient [X]  Medical Record [X]  Nursing Staff [X]  Family Member []    DIET: Diet, Regular:   Mildly Thick Liquids (MILDTHICKLIQS) (23 @ 11:09) [Active]    Patient reports good appetite, consuming % of meals at this time as per nursing flowsheets. Continues with mildly thickened liquids. Will follow up with SLP recommendations regarding appropriate textured/modified diet and consistency. Will provide Chocolate Magic Cup BID (Provides 580kcal-18grams of Protein) at lunch and dinner.    EDEMA: none     LAST BM: 23 per patient    SKIN: bruised (ecchymotic)    WEIGHT TRENDS:  Weight in k.3 (03 Dec 2023 22:49)  Weight in k.3 (02 Dec 2023 23:19)  Weight in k.5 (2023 23:13)      PERTINENT MEDICATIONS: MEDICATIONS  (STANDING):  amLODIPine   Tablet 5 milliGRAM(s) Oral daily  aspirin  chewable 81 milliGRAM(s) Oral <User Schedule>  atorvastatin 80 milliGRAM(s) Oral at bedtime  bisacodyl Suppository 10 milliGRAM(s) Rectal at bedtime  enoxaparin Injectable 30 milliGRAM(s) SubCutaneous <User Schedule>  escitalopram 20 milliGRAM(s) Oral daily  labetalol 200 milliGRAM(s) Oral three times a day  lisinopril 40 milliGRAM(s) Oral daily  pantoprazole    Tablet 40 milliGRAM(s) Oral before breakfast  polyethylene glycol 3350 17 Gram(s) Oral two times a day  senna 2 Tablet(s) Oral at bedtime  tamsulosin 0.4 milliGRAM(s) Oral at bedtime  ticagrelor 90 milliGRAM(s) Oral two times a day  traZODone 50 milliGRAM(s) Oral at bedtime    MEDICATIONS  (PRN):  acetaminophen     Tablet .. 650 milliGRAM(s) Oral every 6 hours PRN Temp greater or equal to 38.5C (101.3F), Mild Pain (1 - 3)      PERTINENT LABS:   Na142 mmol/L Glu 121 mg/dL<H> K+ 4.3 mmol/L Cr  1.23 mg/dL BUN 21 mg/dL  Alb 3.2 g/dL<L> 11-24 Chol 80 mg/dL LDL --    HDL 28 mg/dL<L> Trig 73 mg/dL        ESTIMATED NEEDS:   [X] No Change Since Previous Assessment    PREVIOUS NUTRITION DIAGNOSIS:  1. Overweight/Obesity     NUTRITION DIAGNOSIS IS [X] Ongoing -     NEW NUTRITION DIAGNOSIS: [X] Not Applicable    INTERVENTIONS:   1. Magic Cup BID (Provides 580kcal-18grams of Protein)   2. Honor patients daily food preferences as feasible with prescribed diet   3. Follow up with SLP recommendations regarding appropriate textured/modified diet and consistency     MONITORING & EVALUATION:   1. Weights   2. PO intakes   3. Skin integrity   4. Tolerance to diet prescription   5. Labs & POCT  6. Follow up (per protocol)    Registered Dietitian/Nutritionist Remains Available.  Arie Thomas RD, CDN    Contact: [phone] or via MS TEAMS

## 2023-12-05 NOTE — CONSULT NOTE ADULT - ASSESSMENT
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 Pt exhibits difficulties with  language (i.e., dysarthric speech, writing, fluency, repetition), short-term memory for auditory/verbal material which he can compensate with cueing, visuospatial skills (including visuomotor integration and figure-ground perception) and aspects of executive functioning (including organizational skills, abstract reasoning, problem solving and initiation). Pt's affect is depressed and he reports a few depressive symptoms in response to his current medical condition. FIM scores: Social Interaction 5; Problem Solving 5; Memory 5.    Plan: Individual supportive psychotherapy to monitor cognition, affect/mood, and behavior. Continue with antidepressant and sleep medication. Cognitive remediation during speech and occupational therapy sessions is recommended. Participation in recreation/art therapy in order to have pleasure and mastery experiences and regain/reestablish a sense of routine.    Time spent with Pt, 30 minutes.    
44yo RHD M with PMH significant for strokes (2015 abd 7/2023), T2DM, HTN, former smoker (quit 7/2023),  presented to [facility] ED (11/16) with c/o difficulty walking, LLE weakness, slurred speech (worse from baseline). Images with acute/subacute infarcts. Received TPA with some improvement.     #L hemiparesis and L facial weakness due to CVA   Severe disease of posterior circulation involving vertebral, basilar and associated ischemia. Mechanism: TENISHA. Now s/p recanalization and L vert stent placement on 11/24.   - s/p diagnostic cerebral angiogram on 11/20  - cont with ASA 81mg + Ticagrelor 90 BID  - cont with lipitor 80mg     #Fever on admission  - Fever 101 overnight. unclear etiology  - CXR clear lungs on my read. f/u official report  - UA negative   - COVID negative   - start empiric abx with IV rocephin.   - blood cx pending     #HTN  - BP goal < 140/90  - Labetalol 300 TID  - Hydralazine 25mg BID  - Lisinopril 40mg daily  - Monitor    #DM II - A1C 5.5  - Previously on metformin  - serum glucose well controlled. no need for Accu-Cheks       #GI/Bowel Mgmt -- large BM yesterday evening, reports he was previously constipated  - At risk for constipation due to neurologic diagnosis, immobility and/or medication use  - Senna,  Miralax   - GI ppx: Protonix    #Bladder management  - PVR q 8 hours (SC if > 400)  - Encourage timed voids Q4hrs while awake for independence and to promote continence during therapy    #DVT ppx  - Lovenox 30 BID  - SCDs TEDs

## 2023-12-05 NOTE — PROGRESS NOTE ADULT - ASSESSMENT
44yo RHD M with PMH significant for strokes (2015 abd 7/2023), T2DM, HTN, former smoker (quit 7/2023),  presented to [facility] ED (11/16) with c/o difficulty walking, LLE weakness, slurred speech (worse from baseline). Images with acute/subacute infarcts. Received TPA with some improvement.     #L hemiparesis and L facial weakness due to CVA   Severe disease of posterior circulation involving vertebral, basilar and associated ischemia. Mechanism: TENISHA. Now s/p recanalization and L vert stent placement on 11/24.   - s/p diagnostic cerebral angiogram on 11/20  - cont with ASA 81mg + Ticagrelor 90 BID  - cont with lipitor 80mg     #Fever on admission  - infectious work up negative. fever resolved.   - CXR negative  - UA negative. blood cx negative   - COVID negative   - s/p 4 days of rocephin. will discontinue abx today Dec 2 and monitor off abx    #HTN  - BP goal < 140/90  - Labetalol 200 TID  - Lisinopril 40mg daily   - Dec 2: SBP persistently > 150. added amlodipine 5mg    #DM II - A1C 5.5  - Previously on metformin  - serum glucose well controlled. no need for Accu-Cheks     #DVT ppx  - Lovenox 30 BID  - SCDs, TEDs      44yo RHD M with PMH significant for strokes (2015 abd 7/2023), T2DM, HTN, former smoker (quit 7/2023),  presented to [facility] ED (11/16) with c/o difficulty walking, LLE weakness, slurred speech (worse from baseline). Images with acute/subacute infarcts. Received TPA with some improvement.     #L hemiparesis and L facial weakness due to CVA   Severe disease of posterior circulation involving vertebral, basilar and associated ischemia. Mechanism: TENISHA. Now s/p recanalization and L vert stent placement on 11/24.   - s/p diagnostic cerebral angiogram on 11/20  - cont with ASA 81mg + Ticagrelor 90 BID  - cont with lipitor 80mg     #Fever on admission  - infectious work up negative. fever resolved.   - CXR negative  - UA negative. blood cx negative   - COVID negative   - s/p 4 days of rocephin. will discontinue abx today Dec 2 and monitor off abx    #HTN  - BP goal < 140/90  - Labetalol 200 TID  - Lisinopril 40mg daily   - Dec 2: SBP persistently > 150. added amlodipine 5mg    #DM II - A1C 5.5  - Previously on metformin  - serum glucose well controlled. no need for Accu-Cheks     #DVT ppx  - Lovenox 30 BID  - SCDs, TEDs

## 2023-12-05 NOTE — CONSULT NOTE ADULT - SUBJECTIVE AND OBJECTIVE BOX
Pt is a43 y/o right-handed male with PMHx significant for: stroke (7/2023 - presenting with dysarthria - left inferior adrienne, b/l parieto-occipital juxtacortical WM w/mild residual dysarthria; 8 years ago - treated with TPA at [facility] - p/w L hemiplegia, facial droop and dysarthria), T2DM, HTN, former smoker (quit in 7/2023), presented to [facility] ED on 11/16/2023, with c/o difficulty walking, LLE weakness, slurred speech (worse from baseline). ED exam: patient has a L facial droop, ataxia in LUE (dysmetria with FTN), and struggles to perform HTS b/l. Pt also with pronounced slurred speech (moderate but is intelligible). CTH showing a nonacute left occipital infarct (not seen on the CTH 7/2023). MR with acute/subacute posterior fossa and left occipital lobe infarcts with associated cytotoxic edema. Recieved tenecteplase - 30 minutes after administration, NIHSS 4->2 (mild left facial but improved and mild dysarthria). Pt reports voice is much better. Performs FTN without issue with LUE. Performs HTS b/l. L NLF flattening significantly improved. Pt was evaluated by PM&R and therapy for functional deficits and gait/ADL impairments and recommend acute rehabilitation.  Pt was medically optimized for discharge to [facility] on 11/27/2023. PMHx: As noted above. Current meds: Lexapro 20 mg QD, Desyrel 50 mg HS. Please see list in Pt’s chart. Social Hx: Pt is  and has an 7 y/o daughter. 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 Pt has hx of adjustment issues related to previous CVAs. He was smoking a pack of cigarettes until this past July, and smokes marihuana, 1-2 joints at night. Pt is Catholic. He lacks structured leisure activity.      Findings: Pt was seen for an initial assessment of his cognitive and emotional functioning. The Modified MMSE (3MS) was administered; Pt’s results (89/100) were in the Mildly Impaired range. His scores in standardized mood measures suggested levels of anxiety and depression (GAD7 = 0/21; PHQ9 = 6/27). Pt was alert, fully Ox3, and his attitude was cooperative. Attn/Conc: Simple auditory attention - intact.  Concentration/Working memory for reversed counting and spelling – intact (7/7). Language: Pt’s speech was dysarthric, with normal volume and pitch. Naming - intact. Sentence repetition - mildly impaired (4/5 words correctly pronounced). Auditory Comprehension - intact. Reading - intact. Writing - mildly impaired (4/5 words correctly written and legible). Memory: Encoding of 3 words was intact (3/3); short-delayed verbal recall – intact (3/3); long-delayed verbal recall – mildly impaired (2/3, improving to 3/3 with saturated cueing). LTM was mildly impaired for US presidents (3/4, improving to 4/4 with cueing). Visual memory – not assessed. Visuospatial: Visuomotor integration – moderately impaired for copy of a 2D figure (6/10), closure difficulties and poor integration were noted. Figure-ground discrimination was impaired (5/4) for the Poppelreuter figure. Executive Functions: Motor Planning - intact. Organizational skills - moderately impaired. Abstract reasoning - mildly impaired (5/6 for similarities). Initiation/Phonemic Fluency - mildly impaired (8/10). Verbal problem solving – mildly impaired. Emotional functioning: Affect - depressed. Mood - euthymic ("okay"); Pt reported experiencing anger, helplessness, poor sleep, low energy. On mood measures he additionally reported insomnia, fatigue/low energy low self-esteem and psychomotor retardation. Thought processes were goal-directed.  No abnormal thought contents were observed.  Pt denied any AH/VH. Pt also denied SI/HI/I/P. Insight - WFL. Judgment - closely WFL. Impulsivity was noted.      Pt is a43 y/o right-handed male with PMHx significant for: stroke (7/2023 - presenting with dysarthria - left inferior adrienne, b/l parieto-occipital juxtacortical WM w/mild residual dysarthria; 8 years ago - treated with TPA at [facility] [place] - p/w L hemiplegia, facial droop and dysarthria), T2DM, HTN, former smoker (quit in 7/2023), presented to [facility] ED on 11/16/2023, with c/o difficulty walking, LLE weakness, slurred speech (worse from baseline). ED exam: patient has a L facial droop, ataxia in LUE (dysmetria with FTN), and struggles to perform HTS b/l. Pt also with pronounced slurred speech (moderate but is intelligible). CTH showing a nonacute left occipital infarct (not seen on the CTH 7/2023). MR with acute/subacute posterior fossa and left occipital lobe infarcts with associated cytotoxic edema. Recieved tenecteplase - 30 minutes after administration, NIHSS 4->2 (mild left facial but improved and mild dysarthria). Pt reports voice is much better. Performs FTN without issue with LUE. Performs HTS b/l. L NLF flattening significantly improved. Pt was evaluated by PM&R and therapy for functional deficits and gait/ADL impairments and recommend acute rehabilitation.  Pt was medically optimized for discharge to [place] on 11/27/2023. PMHx: As noted above. Current meds: Lexapro 20 mg QD, Desyrel 50 mg HS. Please see list in Pt’s chart. Social Hx: Pt is  and has an 7 y/o daughter. 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 Pt has hx of adjustment issues related to previous CVAs. He was smoking a pack of cigarettes until this past July, and smokes marihuana, 1-2 joints at night. Pt is Hinduism. He lacks structured leisure activity.      Findings: Pt was seen for an initial assessment of his cognitive and emotional functioning. The Modified MMSE (3MS) was administered; Pt’s results (89/100) were in the Mildly Impaired range. His scores in standardized mood measures suggested levels of anxiety and depression (GAD7 = 0/21; PHQ9 = 6/27). Pt was alert, fully Ox3, and his attitude was cooperative. Attn/Conc: Simple auditory attention - intact.  Concentration/Working memory for reversed counting and spelling – intact (7/7). Language: Pt’s speech was dysarthric, with normal volume and pitch. Naming - intact. Sentence repetition - mildly impaired (4/5 words correctly pronounced). Auditory Comprehension - intact. Reading - intact. Writing - mildly impaired (4/5 words correctly written and legible). Memory: Encoding of 3 words was intact (3/3); short-delayed verbal recall – intact (3/3); long-delayed verbal recall – mildly impaired (2/3, improving to 3/3 with saturated cueing). LTM was mildly impaired for US presidents (3/4, improving to 4/4 with cueing). Visual memory – not assessed. Visuospatial: Visuomotor integration – moderately impaired for copy of a 2D figure (6/10), closure difficulties and poor integration were noted. Figure-ground discrimination was impaired (5/4) for the Poppelreuter figure. Executive Functions: Motor Planning - intact. Organizational skills - moderately impaired. Abstract reasoning - mildly impaired (5/6 for similarities). Initiation/Phonemic Fluency - mildly impaired (8/10). Verbal problem solving – mildly impaired. Emotional functioning: Affect - depressed. Mood - euthymic ("okay"); Pt reported experiencing anger, helplessness, poor sleep, low energy. On mood measures he additionally reported insomnia, fatigue/low energy low self-esteem and psychomotor retardation. Thought processes were goal-directed.  No abnormal thought contents were observed.  Pt denied any AH/VH. Pt also denied SI/HI/I/P. Insight - WFL. Judgment - closely WFL. Impulsivity was noted.

## 2023-12-06 NOTE — PROGRESS NOTE ADULT - SUBJECTIVE AND OBJECTIVE BOX
Patient is a 43y old  Male who presents with a chief complaint of CVA     HPI:  42yo RHD M with PMH significant for: stroke (7/2023 - presenting with dysarthria - left inferior adrienne, b/l parieto-occipital juxtacortical WM w/mild residual dysarthria; 8 years ago - treated with TPA at [facility] - p/w L hemiplegia, facial droop and dysarthria), T2DM, HTN, former smoker (quit in 7/2023), presented to [facility] ED on 11/16/2023, with c/o difficulty walking, LLE weakness, slurred speech (worse from baseline).     ED exam: patient has a L facial droop, ataxia in LUE (dysmetria with FTN), and struggles to perform HTS b/l. Patient also with pronounced slurred speech (moderate but is intelligible). CTH showing a nonacute left occipital infarct (not seen on the CTH 7/2023). MR with acute/subacute posterior fossa and left occipital lobe infarcts with associated cytotoxic edema. Recieved tenecteplase - 30 minutes after administration, NIHSS 4->2 (mild left facial but improved and mild dysarthria). Patient reports voice is much better. Performs FTN without issue with LUE. Performs HTS b/l. L NLF flattening significantly improved.     Patient was evaluated by PM&R and therapy for functional deficits and gait/ADL impairments and recommend acute rehabilitation.  Patient was medically optimized for discharge to [facility] on 11/27/2023.     SUBJECTIVE/OBJECTIVE: Patient seen and examined while participating in OT - in gym doing bean bag toss using LUE. Doing well - no complaint at this time, slept.  Said he was bladder scanned twice this morning and didn't need straight cath.  Is being toilet every 3-4 hours during day    REVIEW OF SYSTEMS  Constitutional: No fever, no chills  Cardio: No chest pain, No palpitations  Resp: +nonproductive cough, No SOB, no respiratory distress   Neuro: No headaches, +left sided weakness +dysphagia dysarthria     VITALS  43y  Vital Signs Last 24 Hrs  T(C): 36.8 (12-06-23 @ 10:11), Max: 36.8 (12-06-23 @ 10:11)  T(F): 98.2 (12-06-23 @ 10:11), Max: 98.2 (12-06-23 @ 10:11)  HR: 65 (12-06-23 @ 10:11) (57 - 65)  BP: 106/59 (12-06-23 @ 10:11) (106/59 - 142/92)  RR: 16 (12-06-23 @ 10:11) (16 - 16)  SpO2: 98% (12-06-23 @ 10:11) (95% - 99%)    PHYSICAL EXAM:   Gen - NAD, Comfortable, sitting in wheelchair   HEENT - NCAT  Neck - Supple, No limited ROM  Pulm - resp nonlabored  Cardiovascular - warm and well perfused  Abdomen - Soft, NT/ND   Extremities - No peripheral edema, no calf tenderness  Neuro-     Cognitive - awake, alert, oriented x4  - delayed processing vs response     Communication  +dysarthria, +dysphagia,  no aphasia, able to repeat, follows 2 step commands     Cranial Nerves - left facial droop, left facial weakness, decreased left shoulder shrug     Motor - Left sided weakness                    LEFT    UE - 4/5 proximal, 3/5                       RIGHT UE - 5/5                    LEFT    LE - 5/5                     RIGHT LE - 5/5        Sensory - Intact  to LT      Tone - normal  Psychiatric - flat affect, withdrawn  Skin:  all skin intact    RECENT LABS:                        12.0   6.53  )-----------( 262      ( 04 Dec 2023 06:42 )             36.6     12-04    142  |  105  |  21  ----------------------------<  121<H>  4.3   |  28  |  1.23    Ca    9.1      04 Dec 2023 06:42    TPro  6.6  /  Alb  3.2<L>  /  TBili  0.5  /  DBili  x   /  AST  18  /  ALT  36  /  AlkPhos  52  12-04    LIVER FUNCTIONS - ( 04 Dec 2023 06:42 )  Alb: 3.2 g/dL / Pro: 6.6 g/dL / ALK PHOS: 52 U/L / ALT: 36 U/L / AST: 18 U/L / GGT: x           MEDICATIONS:  MEDICATIONS  (STANDING):  amLODIPine   Tablet 5 milliGRAM(s) Oral daily  aspirin  chewable 81 milliGRAM(s) Oral <User Schedule>  atorvastatin 80 milliGRAM(s) Oral at bedtime  bisacodyl Suppository 10 milliGRAM(s) Rectal at bedtime  enoxaparin Injectable 30 milliGRAM(s) SubCutaneous <User Schedule>  escitalopram 20 milliGRAM(s) Oral daily  labetalol 200 milliGRAM(s) Oral three times a day  lisinopril 40 milliGRAM(s) Oral daily  pantoprazole    Tablet 40 milliGRAM(s) Oral before breakfast  polyethylene glycol 3350 17 Gram(s) Oral two times a day  senna 2 Tablet(s) Oral at bedtime  tamsulosin 0.4 milliGRAM(s) Oral at bedtime  ticagrelor 90 milliGRAM(s) Oral two times a day  traZODone 50 milliGRAM(s) Oral at bedtime    MEDICATIONS  (PRN):  acetaminophen     Tablet .. 650 milliGRAM(s) Oral every 6 hours PRN Temp greater or equal to 38.5C (101.3F), Mild Pain (1 - 3)   Patient is a 43y old  Male who presents with a chief complaint of CVA     HPI:  42yo RHD M with PMH significant for: stroke (7/2023 - presenting with dysarthria - left inferior adrienne, b/l parieto-occipital juxtacortical WM w/mild residual dysarthria; 8 years ago - treated with TPA at [facility] - p/w L hemiplegia, facial droop and dysarthria), T2DM, HTN, former smoker (quit in 7/2023), presented to [facility] ED on 11/16/2023, with c/o difficulty walking, LLE weakness, slurred speech (worse from baseline).     ED exam: patient has a L facial droop, ataxia in LUE (dysmetria with FTN), and struggles to perform HTS b/l. Patient also with pronounced slurred speech (moderate but is intelligible). CTH showing a nonacute left occipital infarct (not seen on the CTH 7/2023). MR with acute/subacute posterior fossa and left occipital lobe infarcts with associated cytotoxic edema. Recieved tenecteplase - 30 minutes after administration, NIHSS 4->2 (mild left facial but improved and mild dysarthria). Patient reports voice is much better. Performs FTN without issue with LUE. Performs HTS b/l. L NLF flattening significantly improved.     Patient was evaluated by PM&R and therapy for functional deficits and gait/ADL impairments and recommend acute rehabilitation.  Patient was medically optimized for discharge to [facility] on 11/27/2023.     SUBJECTIVE/OBJECTIVE: Patient seen and examined while participating in OT - in gym doing bean bag toss using LUE. Doing well - no complaint at this time, slept.  Said he was bladder scanned twice this morning and didn't need straight cath.  Is being toilet every 3-4 hours during day    REVIEW OF SYSTEMS  Constitutional: No fever, no chills  Cardio: No chest pain, No palpitations  Resp: +nonproductive cough, No SOB, no respiratory distress   Neuro: No headaches, +left sided weakness +dysphagia dysarthria     VITALS  43y  Vital Signs Last 24 Hrs  T(C): 36.8 (12-06-23 @ 10:11), Max: 36.8 (12-06-23 @ 10:11)  T(F): 98.2 (12-06-23 @ 10:11), Max: 98.2 (12-06-23 @ 10:11)  HR: 65 (12-06-23 @ 10:11) (57 - 65)  BP: 106/59 (12-06-23 @ 10:11) (106/59 - 142/92)  RR: 16 (12-06-23 @ 10:11) (16 - 16)  SpO2: 98% (12-06-23 @ 10:11) (95% - 99%)    PHYSICAL EXAM:   Gen - NAD, Comfortable, sitting in wheelchair   HEENT - NCAT  Neck - Supple, No limited ROM  Pulm - resp nonlabored  Cardiovascular - warm and well perfused  Abdomen - Soft, NT/ND   Extremities - No peripheral edema, no calf tenderness  Neuro-     Cognitive - awake, alert, oriented x4  - delayed processing vs response     Communication  +dysarthria, +dysphagia,  no aphasia, able to repeat, follows 2 step commands     Cranial Nerves - left facial droop, left facial weakness, decreased left shoulder shrug     Motor - Left sided weakness                    LEFT    UE - 4/5 proximal, 3/5                       RIGHT UE - 5/5                    LEFT    LE - 5/5                     RIGHT LE - 5/5        Sensory - Intact  to LT      Tone - normal  Psychiatric - flat affect, withdrawn  Skin:  all skin intact    RECENT LABS:                        12.0   6.53  )-----------( 262      ( 04 Dec 2023 06:42 )             36.6     12-04    142  |  105  |  21  ----------------------------<  121<H>  4.3   |  28  |  1.23    Ca    9.1      04 Dec 2023 06:42    TPro  6.6  /  Alb  3.2<L>  /  TBili  0.5  /  DBili  x   /  AST  18  /  ALT  36  /  AlkPhos  52  12-04    LIVER FUNCTIONS - ( 04 Dec 2023 06:42 )  Alb: 3.2 g/dL / Pro: 6.6 g/dL / ALK PHOS: 52 U/L / ALT: 36 U/L / AST: 18 U/L / GGT: x           MEDICATIONS:  MEDICATIONS  (STANDING):  amLODIPine   Tablet 5 milliGRAM(s) Oral daily  aspirin  chewable 81 milliGRAM(s) Oral <User Schedule>  atorvastatin 80 milliGRAM(s) Oral at bedtime  bisacodyl Suppository 10 milliGRAM(s) Rectal at bedtime  enoxaparin Injectable 30 milliGRAM(s) SubCutaneous <User Schedule>  escitalopram 20 milliGRAM(s) Oral daily  labetalol 200 milliGRAM(s) Oral three times a day  lisinopril 40 milliGRAM(s) Oral daily  pantoprazole    Tablet 40 milliGRAM(s) Oral before breakfast  polyethylene glycol 3350 17 Gram(s) Oral two times a day  senna 2 Tablet(s) Oral at bedtime  tamsulosin 0.4 milliGRAM(s) Oral at bedtime  ticagrelor 90 milliGRAM(s) Oral two times a day  traZODone 50 milliGRAM(s) Oral at bedtime    MEDICATIONS  (PRN):  acetaminophen     Tablet .. 650 milliGRAM(s) Oral every 6 hours PRN Temp greater or equal to 38.5C (101.3F), Mild Pain (1 - 3)

## 2023-12-06 NOTE — PROGRESS NOTE ADULT - SUBJECTIVE AND OBJECTIVE BOX
Medicine Progress Note    Patient is a 43y old  Male who presents with a chief complaint of CVA (06 Dec 2023 11:07)      SUBJECTIVE / OVERNIGHT EVENTS:  seen and examined  Chart reviewed  No overnight events  Limb weakness improving with therapy  BM+  No pain  No complaints    ADDITIONAL REVIEW OF SYSTEMS:  denied fever/chills/CP/SOB/cough/palpitation/dizziness/abdominal pian/nausea/vomiting/diarrhoea/constipation/dysuria/leg or calf pain/headaches.all other ROS neg    MEDICATIONS  (STANDING):  amLODIPine   Tablet 5 milliGRAM(s) Oral daily  aspirin  chewable 81 milliGRAM(s) Oral <User Schedule>  atorvastatin 80 milliGRAM(s) Oral at bedtime  bisacodyl Suppository 10 milliGRAM(s) Rectal at bedtime  enoxaparin Injectable 30 milliGRAM(s) SubCutaneous <User Schedule>  escitalopram 20 milliGRAM(s) Oral daily  labetalol 200 milliGRAM(s) Oral three times a day  lisinopril 40 milliGRAM(s) Oral daily  pantoprazole    Tablet 40 milliGRAM(s) Oral before breakfast  polyethylene glycol 3350 17 Gram(s) Oral two times a day  senna 2 Tablet(s) Oral at bedtime  tamsulosin 0.4 milliGRAM(s) Oral at bedtime  ticagrelor 90 milliGRAM(s) Oral two times a day  traZODone 50 milliGRAM(s) Oral at bedtime    MEDICATIONS  (PRN):  acetaminophen     Tablet .. 650 milliGRAM(s) Oral every 6 hours PRN Temp greater or equal to 38.5C (101.3F), Mild Pain (1 - 3)    CAPILLARY BLOOD GLUCOSE        I&O's Summary      PHYSICAL EXAM:  Vital Signs Last 24 Hrs  T(C): 36.8 (06 Dec 2023 10:11), Max: 36.8 (06 Dec 2023 10:11)  T(F): 98.2 (06 Dec 2023 10:11), Max: 98.2 (06 Dec 2023 10:11)  HR: 65 (06 Dec 2023 10:11) (57 - 65)  BP: 106/59 (06 Dec 2023 10:11) (106/59 - 142/92)  BP(mean): --  RR: 16 (06 Dec 2023 10:11) (16 - 16)  SpO2: 98% (06 Dec 2023 10:11) (95% - 99%)    Parameters below as of 06 Dec 2023 10:11  Patient On (Oxygen Delivery Method): room air    GENERAL: Not in distress. Alert    HEENT: clear conjuctiva, MMM. no pallor or icterus  CARDIOVASCULAR: RRR S1, S2. No murmur/rubs/gallop  LUNGS: BLAE+, no rales, no wheezing, no rhonchi.    ABDOMEN: ND. Soft,  NT, no guarding / rebound / rigidity. BS normoactive  BACK: No spine tenderness.  EXTREMITIES: no edema. no leg or calf TP.  SKIN: warm and dry  PSYCHIATRIC: Calm.  No agitation.  CNS: AAO. moves limbs, follows commands. left sided weakness. dysarthria. slow processing of information.     LABS:                    COVID-19 PCR: NotDetec (27 Nov 2023 20:20)      RADIOLOGY & ADDITIONAL TESTS:  Imaging from Last 24 Hours:    Electrocardiogram/QTc Interval:    COORDINATION OF CARE:  Care Discussed with Consultants/Other Providers:

## 2023-12-06 NOTE — PROGRESS NOTE ADULT - ASSESSMENT
42yo RHD M with PMH significant for strokes (2015 abd 7/2023), T2DM, HTN, former smoker (quit 7/2023),  presented to [facility] ED (11/16) with c/o difficulty walking, LLE weakness, slurred speech (worse from baseline). Images with acute/subacute infarcts. Received TPA with some improvement.     #L hemiparesis and L facial weakness due to CVA   Severe disease of posterior circulation involving vertebral, basilar and associated ischemia. Mechanism: TENISHA. Now s/p recanalization and L vert stent placement on 11/24.   - s/p diagnostic cerebral angiogram on 11/20  - cont with ASA 81mg + Ticagrelor 90 BID  - cont with lipitor 80mg   - tolerating comprehensive rehab    #Fever on admission  - infectious work up negative. fever resolved.   - CXR negative  - UA negative. blood cx negative   - COVID negative   - completed 5 days of Rocephin on 12/2     #HTN  - BP goal < 140/90  - Labetalol 200 TID  - Lisinopril 40mg daily   - amlodipine 5 mg daily.  - monitor VS including OH and adjust meds    #DM II - A1C 5.5  - Previously on metformin  - serum glucose well controlled. no need for Accu-Cheks     #DVT ppx  - Lovenox 30 BID  - SCDAlexa yao     d/w rehab team at [facility]     44yo RHD M with PMH significant for strokes (2015 abd 7/2023), T2DM, HTN, former smoker (quit 7/2023),  presented to [facility] ED (11/16) with c/o difficulty walking, LLE weakness, slurred speech (worse from baseline). Images with acute/subacute infarcts. Received TPA with some improvement.     #L hemiparesis and L facial weakness due to CVA   Severe disease of posterior circulation involving vertebral, basilar and associated ischemia. Mechanism: TENISHA. Now s/p recanalization and L vert stent placement on 11/24.   - s/p diagnostic cerebral angiogram on 11/20  - cont with ASA 81mg + Ticagrelor 90 BID  - cont with lipitor 80mg   - tolerating comprehensive rehab    #Fever on admission  - infectious work up negative. fever resolved.   - CXR negative  - UA negative. blood cx negative   - COVID negative   - completed 5 days of Rocephin on 12/2     #HTN  - BP goal < 140/90  - Labetalol 200 TID  - Lisinopril 40mg daily   - amlodipine 5 mg daily.  - monitor VS including OH and adjust meds    #DM II - A1C 5.5  - Previously on metformin  - serum glucose well controlled. no need for Accu-Cheks     #DVT ppx  - Lovenox 30 BID  - SCDAlexa yao     d/w rehab team at [facility]

## 2023-12-06 NOTE — PROGRESS NOTE ADULT - ASSESSMENT
42yo RHD M with PMH significant for HTN, type 2 DM, strokes (2015 abd 7/2023), former smoker (quit 7/2023),  presented to [facility] ED (11/16) with c/o difficulty walking, LLE weakness, slurred speech (worse from baseline). Images with acute/subacute infarcts.    # CVA (2015, 7/2023, 11/2023)   - S/p TPA  - ASA 81mg + Ticagrelor 90 BID  - Atorvastatin 80 (LDL 36)  - continue Comprehensive Rehab Program: PT/OT/ST, 3hours daily and 5 days weekly.     # Mood/sleep  - Lexapro inc 20 (11/29)  - Trazodone 25mg at bedtime  - Neuropsychology support  - Recreation therapy     # HTN  - Labetalol 200 TID  - Amlodipine 5  - Lisinopril 40  - BP pre med 142/92, post med 106/59 (12/6) - asymptomatic    # DM II   - A1C 5.5  - Previously on metformin  - FS: <120  - Glucose via lab: 121 (12/4) - controlled    # Respiratory health/RADHA  - Incentive Spirometry during the day time  - CPAP nightly    # Fever on admission --since resolved  - Noted with temp of 101.3 on admission: cbc, cmp, lactate, procalcitonin, UA (neg), blood CXR X 2 (unremarkable)  - blood culture no growth-final  -  WBC 6.53 (12/4)    # MALI  - Bun/Cr: 28/1.40 (11/30)  - Encourage oral hydration, monitor on thickened liquids  - Bun/Cr improved-- 21/1.23 (12/4) - improving    # Pain management  - Tylenol PRN    # FEN   - Diet: upgraded to regular and mildly thick with supervision in meals 12/2  + free water protocol  - Repeat MBS friday 12/8**    # GI/Bowel Mgmt   - Senna,  Miralax QD  - Mineral oil enema x 1 (11/30)   - Golytely bowel prep x 1 (12/1) and suppository.   - GI ppx: Protonix    #Bladder management/Urinary retention  - UA (11/27) negative  - continue flomax 0.4  - Encourage timed voids Q3hrs while awake for independence and to promote continence during therapy-- now has urge to urinate. last straight cath 12/4 @ 10a**  - PVR q 8 hours (SC if > 400)    # DVT ppx  - Lovenox 30 BID  - SCDs, TEDs     Follow ups:  Neuro: Dr. Kumar Guzman  Rad: Dr. Phoebe Xiao  Card: Dr. Talib Dsouza  IM: Appt 12/19/2023 42yo RHD M with PMH significant for HTN, type 2 DM, strokes (2015 abd 7/2023), former smoker (quit 7/2023),  presented to [facility] ED (11/16) with c/o difficulty walking, LLE weakness, slurred speech (worse from baseline). Images with acute/subacute infarcts.    # CVA (2015, 7/2023, 11/2023)   - S/p TPA  - ASA 81mg + Ticagrelor 90 BID  - Atorvastatin 80 (LDL 36)  - continue Comprehensive Rehab Program: PT/OT/ST, 3hours daily and 5 days weekly.     # Mood/sleep  - Lexapro inc 20 (11/29)  - Trazodone 25mg at bedtime  - Neuropsychology support  - Recreation therapy     # HTN  - Labetalol 200 TID  - Amlodipine 5  - Lisinopril 40  - BP pre med 142/92, post med 106/59 (12/6) - asymptomatic    # DM II   - A1C 5.5  - Previously on metformin  - FS: <120  - Glucose via lab: 121 (12/4) - controlled    # Respiratory health/RADHA  - Incentive Spirometry during the day time  - CPAP nightly    # Fever on admission --since resolved  - Noted with temp of 101.3 on admission: cbc, cmp, lactate, procalcitonin, UA (neg), blood CXR X 2 (unremarkable)  - blood culture no growth-final  -  WBC 6.53 (12/4)    # MALI  - Bun/Cr: 28/1.40 (11/30)  - Encourage oral hydration, monitor on thickened liquids  - Bun/Cr improved-- 21/1.23 (12/4) - improving    # Pain management  - Tylenol PRN    # FEN   - Diet: upgraded to regular and mildly thick with supervision in meals 12/2  + free water protocol  - Repeat MBS friday 12/8**    # GI/Bowel Mgmt   - Senna,  Miralax QD  - Mineral oil enema x 1 (11/30)   - Golytely bowel prep x 1 (12/1) and suppository.   - GI ppx: Protonix    #Bladder management/Urinary retention  - UA (11/27) negative  - continue flomax 0.4  - Encourage timed voids Q3hrs while awake for independence and to promote continence during therapy-- now has urge to urinate. last straight cath 12/4 @ 10a**  - PVR q 8 hours (SC if > 400)    # DVT ppx  - Lovenox 30 BID  - SCDs, TEDs     Follow ups:  Neuro: Dr. Kumar Guzman  Rad: Dr. Phoebe Xiao  Card: Dr. Talib Dsouza  IM: Appt 12/19/2023 42yo RHD M with PMH significant for HTN, type 2 DM, strokes (2015 abd 7/2023), former smoker (quit 7/2023),  presented to [facility] ED (11/16) with c/o difficulty walking, LLE weakness, slurred speech (worse from baseline). Images with acute/subacute infarcts.    # CVA (2015, 7/2023, 11/2023)   - S/p TPA  - ASA 81mg + Ticagrelor 90 BID  - Atorvastatin 80 (LDL 36)  - continue Comprehensive Rehab Program: PT/OT/ST, 3hours daily and 5 days weekly.     # Mood/sleep  - Lexapro inc 20 (11/29)  - Trazodone 25mg at bedtime  - Neuropsychology support  - Recreation therapy     # HTN  - Labetalol 200 TID  - Amlodipine 5  - Lisinopril 40  - BP pre med 142/92, post med 106/59 (12/6) - asymptomatic    # DM II   - A1C 5.5  - Previously on metformin  - FS: <120  - Glucose via lab: 121 (12/4) - controlled    # Respiratory health/RADHA  - Incentive Spirometry during the day time  - CPAP nightly    # Fever on admission --since resolved  - Noted with temp of 101.3 on admission: cbc, cmp, lactate, procalcitonin, UA (neg), blood CXR X 2 (unremarkable)  - blood culture no growth-final  -  WBC 6.53 (12/4)    # MALI  - Bun/Cr: 28/1.40 (11/30)  - Encourage oral hydration, monitor on thickened liquids  - Bun/Cr improved-- 21/1.23 (12/4) - improving    # Pain management  - Tylenol PRN    # FEN   - Diet: upgraded to regular and mildly thick with supervision in meals 12/2  + free water protocol  - Repeat MBS friday 12/8**    # GI/Bowel Mgmt   - Senna,  Miralax QD  - Mineral oil enema x 1 (11/30)   - Golytely bowel prep x 1 (12/1) and suppository.   - GI ppx: Protonix    #Bladder management/Urinary retention  - UA (11/27) negative  - continue flomax 0.4  - Encourage timed voids Q3hrs while awake for independence and to promote continence during therapy-- now has urge to urinate. last straight cath 12/4 @ 10a**  - PVR q 8 hours (SC if > 400)    # DVT ppx  - Lovenox 30 BID  - SCDs, TEDs     IDT 12/6  NSG: urinary retention with ISC.  SW: lives with wife and daughter in [place], no step inside  SLP: reg/mod thick liquid, free water protocol. cog and language WFL, mod dysarthria, dec coordination of resp/swallowing  OT: overall min A.  Goal: mod i for adls, except SV bath/transfers  PT: min  A bed mob, min - CGA transfer, min A 75' RW/8 steps 2 HR. Goal: SV   TDD: 12/19 H    Follow ups:  Neuro: Dr. Kumar Guzman  Rad: Dr. Phoebe Xiao  Card: Dr. Talib Dsouza  IM: Appt 12/19/2023 44yo RHD M with PMH significant for HTN, type 2 DM, strokes (2015 abd 7/2023), former smoker (quit 7/2023),  presented to [facility] ED (11/16) with c/o difficulty walking, LLE weakness, slurred speech (worse from baseline). Images with acute/subacute infarcts.    # CVA (2015, 7/2023, 11/2023)   - S/p TPA  - ASA 81mg + Ticagrelor 90 BID  - Atorvastatin 80 (LDL 36)  - continue Comprehensive Rehab Program: PT/OT/ST, 3hours daily and 5 days weekly.     # Mood/sleep  - Lexapro inc 20 (11/29)  - Trazodone 25mg at bedtime  - Neuropsychology support  - Recreation therapy     # HTN  - Labetalol 200 TID  - Amlodipine 5  - Lisinopril 40  - BP pre med 142/92, post med 106/59 (12/6) - asymptomatic    # DM II   - A1C 5.5  - Previously on metformin  - FS: <120  - Glucose via lab: 121 (12/4) - controlled    # Respiratory health/RADHA  - Incentive Spirometry during the day time  - CPAP nightly    # Fever on admission --since resolved  - Noted with temp of 101.3 on admission: cbc, cmp, lactate, procalcitonin, UA (neg), blood CXR X 2 (unremarkable)  - blood culture no growth-final  -  WBC 6.53 (12/4)    # MALI  - Bun/Cr: 28/1.40 (11/30)  - Encourage oral hydration, monitor on thickened liquids  - Bun/Cr improved-- 21/1.23 (12/4) - improving    # Pain management  - Tylenol PRN    # FEN   - Diet: upgraded to regular and mildly thick with supervision in meals 12/2  + free water protocol  - Repeat MBS friday 12/8**    # GI/Bowel Mgmt   - Senna,  Miralax QD  - Mineral oil enema x 1 (11/30)   - Golytely bowel prep x 1 (12/1) and suppository.   - GI ppx: Protonix    #Bladder management/Urinary retention  - UA (11/27) negative  - continue flomax 0.4  - Encourage timed voids Q3hrs while awake for independence and to promote continence during therapy-- now has urge to urinate. last straight cath 12/4 @ 10a**  - PVR q 8 hours (SC if > 400)    # DVT ppx  - Lovenox 30 BID  - SCDs, TEDs     IDT 12/6  NSG: urinary retention with ISC.  SW: lives with wife and daughter in [place], no step inside  SLP: reg/mod thick liquid, free water protocol. cog and language WFL, mod dysarthria, dec coordination of resp/swallowing  OT: overall min A.  Goal: mod i for adls, except SV bath/transfers  PT: min  A bed mob, min - CGA transfer, min A 75' RW/8 steps 2 HR. Goal: SV   TDD: 12/19 H    Follow ups:  Neuro: Dr. Kumar Guzman  Rad: Dr. Phoebe Xiao  Card: Dr. Talib Dsouza  IM: Appt 12/19/2023 44yo RHD M with PMH significant for HTN, type 2 DM, strokes (2015 abd 7/2023), former smoker (quit 7/2023),  presented to [facility] ED (11/16) with c/o difficulty walking, LLE weakness, slurred speech (worse from baseline). Images with acute/subacute infarcts.    # CVA (2015, 7/2023, 11/2023)   - S/p TPA  - ASA 81mg + Ticagrelor 90 BID  - Atorvastatin 80 (LDL 36)  - continue Comprehensive Rehab Program: PT/OT/ST, 3hours daily and 5 days weekly.     # Mood/sleep  - Lexapro inc 20 (11/29)  - Trazodone 25mg at bedtime  - Neuropsychology support  - Recreation therapy     # HTN  - Labetalol 200 TID  - Amlodipine 5  - Lisinopril 40  - BP pre med 142/92, post med 106/59 (12/6) - asymptomatic    # DM II   - A1C 5.5  - Previously on metformin  - FS: <120  - Glucose via lab: 121 (12/4) - controlled    # Respiratory health/ARDHA  - Incentive Spirometry during the day time  - CPAP nightly    # Fever on admission --since resolved  - Noted with temp of 101.3 on admission: cbc, cmp, lactate, procalcitonin, UA (neg), blood CXR X 2 (unremarkable)  - blood culture no growth-final  -  WBC 6.53 (12/4)    # MALI  - Bun/Cr: 28/1.40 (11/30)  - Encourage oral hydration, monitor on thickened liquids  - Bun/Cr improved-- 21/1.23 (12/4) - improving    # Pain management  - Tylenol PRN    # FEN   - Diet: upgraded to regular and mildly thick with supervision in meals 12/2  + free water protocol  - Repeat MBS friday 12/8**    # GI/Bowel Mgmt   - Senna,  Miralax QD  - Mineral oil enema x 1 (11/30)   - Golytely bowel prep x 1 (12/1) and suppository.   - GI ppx: Protonix    #Bladder management/Urinary retention  - UA (11/27) negative  - continue flomax 0.4  - Encourage timed voids Q3hrs while awake for independence and to promote continence during therapy-- now has urge to urinate. last straight cath 12/4 @ 10a**  - PVR q 8 hours (SC if > 400)    # DVT ppx  - Lovenox 30 BID  - SCDs, TEDs     IDT today, discussed patient's progress with therapies thus far, team goals and expectations upon discharge. , nursing and therapists were present and provided input during meeting.      IDT 12/6  NSG: urinary retention with ISC.  SW: lives with wife and daughter in [place], no step inside  SLP: reg/mod thick liquid, free water protocol. cog and language WFL, mod dysarthria, dec coordination of resp/swallowing  OT: overall min A.  Goal: mod i for adls, except SV bath/transfers  PT: min  A bed mob, min - CGA transfer, min A 75' RW/8 steps 2 HR. Goal: SV   TDD: 12/19 Home    Follow ups:  Neuro: Dr. Kumar Guzman  Rad: Dr. Phoebe Xiao  Card: Dr. Talib Dsouza  IM: Appt 12/19/2023 44yo RHD M with PMH significant for HTN, type 2 DM, strokes (2015 abd 7/2023), former smoker (quit 7/2023),  presented to [facility] ED (11/16) with c/o difficulty walking, LLE weakness, slurred speech (worse from baseline). Images with acute/subacute infarcts.    # CVA (2015, 7/2023, 11/2023)   - S/p TPA  - ASA 81mg + Ticagrelor 90 BID  - Atorvastatin 80 (LDL 36)  - continue Comprehensive Rehab Program: PT/OT/ST, 3hours daily and 5 days weekly.     # Mood/sleep  - Lexapro inc 20 (11/29)  - Trazodone 25mg at bedtime  - Neuropsychology support  - Recreation therapy     # HTN  - Labetalol 200 TID  - Amlodipine 5  - Lisinopril 40  - BP pre med 142/92, post med 106/59 (12/6) - asymptomatic    # DM II   - A1C 5.5  - Previously on metformin  - FS: <120  - Glucose via lab: 121 (12/4) - controlled    # Respiratory health/RADHA  - Incentive Spirometry during the day time  - CPAP nightly    # Fever on admission --since resolved  - Noted with temp of 101.3 on admission: cbc, cmp, lactate, procalcitonin, UA (neg), blood CXR X 2 (unremarkable)  - blood culture no growth-final  -  WBC 6.53 (12/4)    # MALI  - Bun/Cr: 28/1.40 (11/30)  - Encourage oral hydration, monitor on thickened liquids  - Bun/Cr improved-- 21/1.23 (12/4) - improving    # Pain management  - Tylenol PRN    # FEN   - Diet: upgraded to regular and mildly thick with supervision in meals 12/2  + free water protocol  - Repeat MBS friday 12/8**    # GI/Bowel Mgmt   - Senna,  Miralax QD  - Mineral oil enema x 1 (11/30)   - Golytely bowel prep x 1 (12/1) and suppository.   - GI ppx: Protonix    #Bladder management/Urinary retention  - UA (11/27) negative  - continue flomax 0.4  - Encourage timed voids Q3hrs while awake for independence and to promote continence during therapy-- now has urge to urinate. last straight cath 12/4 @ 10a**  - PVR q 8 hours (SC if > 400)    # DVT ppx  - Lovenox 30 BID  - SCDs, TEDs     IDT today, discussed patient's progress with therapies thus far, team goals and expectations upon discharge. , nursing and therapists were present and provided input during meeting.      IDT 12/6  NSG: urinary retention with ISC.  SW: lives with wife and daughter in [place], no step inside  SLP: reg/mod thick liquid, free water protocol. cog and language WFL, mod dysarthria, dec coordination of resp/swallowing  OT: overall min A.  Goal: mod i for adls, except SV bath/transfers  PT: min  A bed mob, min - CGA transfer, min A 75' RW/8 steps 2 HR. Goal: SV   TDD: 12/19 Home    Follow ups:  Neuro: Dr. Kumar Guzman  Rad: Dr. Phoebe Xiao  Card: Dr. Talib Dsouza  IM: Appt 12/19/2023

## 2023-12-07 NOTE — PROGRESS NOTE ADULT - SUBJECTIVE AND OBJECTIVE BOX
Patient is a 43y old  Male who presents with a chief complaint of CVA     HPI:  42yo RHD M with PMH significant for: stroke (7/2023 - presenting with dysarthria - left inferior adrienne, b/l parieto-occipital juxtacortical WM w/mild residual dysarthria; 8 years ago - treated with TPA at [facility] - p/w L hemiplegia, facial droop and dysarthria), T2DM, HTN, former smoker (quit in 7/2023), presented to [facility] ED on 11/16/2023, with c/o difficulty walking, LLE weakness, slurred speech (worse from baseline).     ED exam: patient has a L facial droop, ataxia in LUE (dysmetria with FTN), and struggles to perform HTS b/l. Patient also with pronounced slurred speech (moderate but is intelligible). CTH showing a nonacute left occipital infarct (not seen on the CTH 7/2023). MR with acute/subacute posterior fossa and left occipital lobe infarcts with associated cytotoxic edema. Recieved tenecteplase - 30 minutes after administration, NIHSS 4->2 (mild left facial but improved and mild dysarthria). Patient reports voice is much better. Performs FTN without issue with LUE. Performs HTS b/l. L NLF flattening significantly improved.     Patient was evaluated by PM&R and therapy for functional deficits and gait/ADL impairments and recommend acute rehabilitation.  Patient was medically optimized for [facility].     SUBJECTIVE/OBJECTIVE: Patient seen and examined while participating in therapy - in gym working on LUE strength and coordination. Slept overnight, said his mood is good.  Have not needed straight cath (but also not getting bladder scanned?)    REVIEW OF SYSTEMS  Constitutional: No fever, no chills  Cardio: No chest pain, No palpitations  Resp: +nonproductive cough, No SOB, no respiratory distress   Neuro: No headaches, +left sided weakness (improving) +dysphagia dysarthria     VITALS  43y  Vital Signs Last 24 Hrs  T(C): 36.8 (12-06-23 @ 10:11), Max: 36.8 (12-06-23 @ 10:11)  T(F): 98.2 (12-06-23 @ 10:11), Max: 98.2 (12-06-23 @ 10:11)  HR: 65 (12-06-23 @ 10:11) (57 - 65)  BP: 106/59 (12-06-23 @ 10:11) (106/59 - 142/92)  RR: 16 (12-06-23 @ 10:11) (16 - 16)  SpO2: 98% (12-06-23 @ 10:11) (95% - 99%)    PHYSICAL EXAM:   Gen - NAD, Comfortable, sitting in wheelchair   HEENT - NCAT  Neck - Supple, No limited ROM  Pulm - resp nonlabored  Cardiovascular - warm and well perfused  Abdomen - Soft, NT/ND   Extremities - No peripheral edema, no calf tenderness  Neuro-     Cognitive - awake, alert, oriented x4  - delayed processing vs response     Communication  +dysarthria, +dysphagia,  no aphasia, able to repeat, follows 2 step commands     Cranial Nerves - left facial droop, left facial weakness, decreased left shoulder shrug     Motor - Left sided weakness                    LEFT    UE - 4/5 proximal, 3/5                       RIGHT UE - 5/5                    LEFT    LE - 5/5                     RIGHT LE - 5/5        Sensory - Intact  to LT      Tone - normal  Psychiatric - flat affect  Skin:  all skin intact    RECENT LABS:             11.7   6.40  )-----------( 238      ( 07 Dec 2023 05:28 )             35.6     12-07    143  |  105  |  27<H>  ----------------------------<  113<H>  4.3   |  27  |  1.33<H>    Ca    9.1      07 Dec 2023 05:28    TPro  6.5  /  Alb  3.3  /  TBili  0.5  /  DBili  x   /  AST  21  /  ALT  45  /  AlkPhos  51  12-07    LIVER FUNCTIONS - ( 07 Dec 2023 05:28 )  Alb: 3.3 g/dL / Pro: 6.5 g/dL / ALK PHOS: 51 U/L / ALT: 45 U/L / AST: 21 U/L / GGT: x           MEDICATIONS:  MEDICATIONS  (STANDING):  amLODIPine   Tablet 5 milliGRAM(s) Oral daily  aspirin  chewable 81 milliGRAM(s) Oral <User Schedule>  atorvastatin 80 milliGRAM(s) Oral at bedtime  bisacodyl Suppository 10 milliGRAM(s) Rectal at bedtime  enoxaparin Injectable 30 milliGRAM(s) SubCutaneous <User Schedule>  escitalopram 20 milliGRAM(s) Oral daily  labetalol 200 milliGRAM(s) Oral three times a day  lisinopril 40 milliGRAM(s) Oral daily  pantoprazole    Tablet 40 milliGRAM(s) Oral before breakfast  polyethylene glycol 3350 17 Gram(s) Oral two times a day  senna 2 Tablet(s) Oral at bedtime  sodium chloride 0.9%. 1000 milliLiter(s) (60 mL/Hr) IV Continuous <Continuous>  tamsulosin 0.4 milliGRAM(s) Oral at bedtime  ticagrelor 90 milliGRAM(s) Oral two times a day  traZODone 50 milliGRAM(s) Oral at bedtime    MEDICATIONS  (PRN):  acetaminophen     Tablet .. 650 milliGRAM(s) Oral every 6 hours PRN Temp greater or equal to 38.5C (101.3F), Mild Pain (1 - 3)   Patient is a 43y old  Male who presents with a chief complaint of CVA     HPI:  42yo RHD M with PMH significant for: stroke (7/2023 - presenting with dysarthria - left inferior adrienne, b/l parieto-occipital juxtacortical WM w/mild residual dysarthria; 8 years ago - treated with TPA at [facility] [place] - p/w L hemiplegia, facial droop and dysarthria), T2DM, HTN, former smoker (quit in 7/2023), presented to [facility] ED on 11/16/2023, with c/o difficulty walking, LLE weakness, slurred speech (worse from baseline).     ED exam: patient has a L facial droop, ataxia in LUE (dysmetria with FTN), and struggles to perform HTS b/l. Patient also with pronounced slurred speech (moderate but is intelligible). CTH showing a nonacute left occipital infarct (not seen on the CTH 7/2023). MR with acute/subacute posterior fossa and left occipital lobe infarcts with associated cytotoxic edema. Recieved tenecteplase - 30 minutes after administration, NIHSS 4->2 (mild left facial but improved and mild dysarthria). Patient reports voice is much better. Performs FTN without issue with LUE. Performs HTS b/l. L NLF flattening significantly improved.     Patient was evaluated by PM&R and therapy for functional deficits and gait/ADL impairments and recommend acute rehabilitation.  Patient was medically optimized for [facility].     SUBJECTIVE/OBJECTIVE: Patient seen and examined while participating in therapy - in gym working on LUE strength and coordination. Slept overnight, said his mood is good.  Have not needed straight cath (but also not getting bladder scanned?)    REVIEW OF SYSTEMS  Constitutional: No fever, no chills  Cardio: No chest pain, No palpitations  Resp: +nonproductive cough, No SOB, no respiratory distress   Neuro: No headaches, +left sided weakness (improving) +dysphagia dysarthria     VITALS  43y  Vital Signs Last 24 Hrs  T(C): 36.8 (12-06-23 @ 10:11), Max: 36.8 (12-06-23 @ 10:11)  T(F): 98.2 (12-06-23 @ 10:11), Max: 98.2 (12-06-23 @ 10:11)  HR: 65 (12-06-23 @ 10:11) (57 - 65)  BP: 106/59 (12-06-23 @ 10:11) (106/59 - 142/92)  RR: 16 (12-06-23 @ 10:11) (16 - 16)  SpO2: 98% (12-06-23 @ 10:11) (95% - 99%)    PHYSICAL EXAM:   Gen - NAD, Comfortable, sitting in wheelchair   HEENT - NCAT  Neck - Supple, No limited ROM  Pulm - resp nonlabored  Cardiovascular - warm and well perfused  Abdomen - Soft, NT/ND   Extremities - No peripheral edema, no calf tenderness  Neuro-     Cognitive - awake, alert, oriented x4  - delayed processing vs response     Communication  +dysarthria, +dysphagia,  no aphasia, able to repeat, follows 2 step commands     Cranial Nerves - left facial droop, left facial weakness, decreased left shoulder shrug     Motor - Left sided weakness                    LEFT    UE - 4/5 proximal, 3/5                       RIGHT UE - 5/5                    LEFT    LE - 5/5                     RIGHT LE - 5/5        Sensory - Intact  to LT      Tone - normal  Psychiatric - flat affect  Skin:  all skin intact    RECENT LABS:             11.7   6.40  )-----------( 238      ( 07 Dec 2023 05:28 )             35.6     12-07    143  |  105  |  27<H>  ----------------------------<  113<H>  4.3   |  27  |  1.33<H>    Ca    9.1      07 Dec 2023 05:28    TPro  6.5  /  Alb  3.3  /  TBili  0.5  /  DBili  x   /  AST  21  /  ALT  45  /  AlkPhos  51  12-07    LIVER FUNCTIONS - ( 07 Dec 2023 05:28 )  Alb: 3.3 g/dL / Pro: 6.5 g/dL / ALK PHOS: 51 U/L / ALT: 45 U/L / AST: 21 U/L / GGT: x           MEDICATIONS:  MEDICATIONS  (STANDING):  amLODIPine   Tablet 5 milliGRAM(s) Oral daily  aspirin  chewable 81 milliGRAM(s) Oral <User Schedule>  atorvastatin 80 milliGRAM(s) Oral at bedtime  bisacodyl Suppository 10 milliGRAM(s) Rectal at bedtime  enoxaparin Injectable 30 milliGRAM(s) SubCutaneous <User Schedule>  escitalopram 20 milliGRAM(s) Oral daily  labetalol 200 milliGRAM(s) Oral three times a day  lisinopril 40 milliGRAM(s) Oral daily  pantoprazole    Tablet 40 milliGRAM(s) Oral before breakfast  polyethylene glycol 3350 17 Gram(s) Oral two times a day  senna 2 Tablet(s) Oral at bedtime  sodium chloride 0.9%. 1000 milliLiter(s) (60 mL/Hr) IV Continuous <Continuous>  tamsulosin 0.4 milliGRAM(s) Oral at bedtime  ticagrelor 90 milliGRAM(s) Oral two times a day  traZODone 50 milliGRAM(s) Oral at bedtime    MEDICATIONS  (PRN):  acetaminophen     Tablet .. 650 milliGRAM(s) Oral every 6 hours PRN Temp greater or equal to 38.5C (101.3F), Mild Pain (1 - 3)   Patient is a 43y old  Male who presents with a chief complaint of CVA     HPI:  44yo RHD M with PMH significant for: stroke (7/2023 - presenting with dysarthria - left inferior adrienne, b/l parieto-occipital juxtacortical WM w/mild residual dysarthria; 8 years ago - treated with TPA at [facility] - p/w L hemiplegia, facial droop and dysarthria), T2DM, HTN, former smoker (quit in 7/2023), presented to [facility] ED on 11/16/2023, with c/o difficulty walking, LLE weakness, slurred speech (worse from baseline).     ED exam: patient has a L facial droop, ataxia in LUE (dysmetria with FTN), and struggles to perform HTS b/l. Patient also with pronounced slurred speech (moderate but is intelligible). CTH showing a nonacute left occipital infarct (not seen on the CTH 7/2023). MR with acute/subacute posterior fossa and left occipital lobe infarcts with associated cytotoxic edema. Recieved tenecteplase - 30 minutes after administration, NIHSS 4->2 (mild left facial but improved and mild dysarthria). Patient reports voice is much better. Performs FTN without issue with LUE. Performs HTS b/l. L NLF flattening significantly improved.     Patient was evaluated by PM&R and therapy for functional deficits and gait/ADL impairments and recommend acute rehabilitation.  Patient was medically optimized for [facility].     SUBJECTIVE/OBJECTIVE: Patient seen and examined while participating in therapy - in gym working on LUE strength and coordination. Slept overnight, said his mood is good.  Have not needed straight cath    REVIEW OF SYSTEMS  Constitutional: No fever, no chills  Cardio: No chest pain, No palpitations  Resp: +nonproductive cough, No SOB, no respiratory distress   Neuro: No headaches, +left sided weakness (improving) +dysphagia dysarthria     VITALS  43y  Vital Signs Last 24 Hrs  T(C): 36.8 (12-06-23 @ 10:11), Max: 36.8 (12-06-23 @ 10:11)  T(F): 98.2 (12-06-23 @ 10:11), Max: 98.2 (12-06-23 @ 10:11)  HR: 65 (12-06-23 @ 10:11) (57 - 65)  BP: 106/59 (12-06-23 @ 10:11) (106/59 - 142/92)  RR: 16 (12-06-23 @ 10:11) (16 - 16)  SpO2: 98% (12-06-23 @ 10:11) (95% - 99%)    PHYSICAL EXAM:   Gen - NAD, Comfortable, sitting in wheelchair   HEENT - NCAT  Neck - Supple, No limited ROM  Pulm - resp nonlabored  Cardiovascular - warm and well perfused  Abdomen - Soft, NT/ND   Extremities - No peripheral edema, no calf tenderness  Neuro-     Cognitive - awake, alert, oriented x4  - delayed processing vs response     Communication  +dysarthria, +dysphagia,  no aphasia, able to repeat, follows 2 step commands     Cranial Nerves - left facial droop, left facial weakness, decreased left shoulder shrug     Motor - Left sided weakness                    LEFT    UE - 4/5 proximal, 3/5                       RIGHT UE - 5/5                    LEFT    LE - 5/5                     RIGHT LE - 5/5        Sensory - Intact  to LT      Tone - normal  Psychiatric - flat affect  Skin:  all skin intact    RECENT LABS:             11.7   6.40  )-----------( 238      ( 07 Dec 2023 05:28 )             35.6     12-07    143  |  105  |  27<H>  ----------------------------<  113<H>  4.3   |  27  |  1.33<H>    Ca    9.1      07 Dec 2023 05:28    TPro  6.5  /  Alb  3.3  /  TBili  0.5  /  DBili  x   /  AST  21  /  ALT  45  /  AlkPhos  51  12-07    LIVER FUNCTIONS - ( 07 Dec 2023 05:28 )  Alb: 3.3 g/dL / Pro: 6.5 g/dL / ALK PHOS: 51 U/L / ALT: 45 U/L / AST: 21 U/L / GGT: x           MEDICATIONS:  MEDICATIONS  (STANDING):  amLODIPine   Tablet 5 milliGRAM(s) Oral daily  aspirin  chewable 81 milliGRAM(s) Oral <User Schedule>  atorvastatin 80 milliGRAM(s) Oral at bedtime  bisacodyl Suppository 10 milliGRAM(s) Rectal at bedtime  enoxaparin Injectable 30 milliGRAM(s) SubCutaneous <User Schedule>  escitalopram 20 milliGRAM(s) Oral daily  labetalol 200 milliGRAM(s) Oral three times a day  lisinopril 40 milliGRAM(s) Oral daily  pantoprazole    Tablet 40 milliGRAM(s) Oral before breakfast  polyethylene glycol 3350 17 Gram(s) Oral two times a day  senna 2 Tablet(s) Oral at bedtime  sodium chloride 0.9%. 1000 milliLiter(s) (60 mL/Hr) IV Continuous <Continuous>  tamsulosin 0.4 milliGRAM(s) Oral at bedtime  ticagrelor 90 milliGRAM(s) Oral two times a day  traZODone 50 milliGRAM(s) Oral at bedtime    MEDICATIONS  (PRN):  acetaminophen     Tablet .. 650 milliGRAM(s) Oral every 6 hours PRN Temp greater or equal to 38.5C (101.3F), Mild Pain (1 - 3)   Patient is a 43y old  Male who presents with a chief complaint of CVA     HPI:  42yo RHD M with PMH significant for: stroke (7/2023 - presenting with dysarthria - left inferior adrienne, b/l parieto-occipital juxtacortical WM w/mild residual dysarthria; 8 years ago - treated with TPA at [facility] - p/w L hemiplegia, facial droop and dysarthria), T2DM, HTN, former smoker (quit in 7/2023), presented to [facility] ED on 11/16/2023, with c/o difficulty walking, LLE weakness, slurred speech (worse from baseline).     ED exam: patient has a L facial droop, ataxia in LUE (dysmetria with FTN), and struggles to perform HTS b/l. Patient also with pronounced slurred speech (moderate but is intelligible). CTH showing a nonacute left occipital infarct (not seen on the CTH 7/2023). MR with acute/subacute posterior fossa and left occipital lobe infarcts with associated cytotoxic edema. Recieved tenecteplase - 30 minutes after administration, NIHSS 4->2 (mild left facial but improved and mild dysarthria). Patient reports voice is much better. Performs FTN without issue with LUE. Performs HTS b/l. L NLF flattening significantly improved.     Patient was evaluated by PM&R and therapy for functional deficits and gait/ADL impairments and recommend acute rehabilitation.  Patient was medically optimized for [facility].     SUBJECTIVE/OBJECTIVE: Patient seen and examined while participating in therapy - in gym working on LUE strength and coordination. Slept overnight, said his mood is good.  Have not needed straight cath    REVIEW OF SYSTEMS  Constitutional: No fever, no chills  Cardio: No chest pain, No palpitations  Resp: +nonproductive cough, No SOB, no respiratory distress   Neuro: No headaches, +left sided weakness (improving) +dysphagia dysarthria     VITALS  43y  Vital Signs Last 24 Hrs  T(C): 36.8 (12-06-23 @ 10:11), Max: 36.8 (12-06-23 @ 10:11)  T(F): 98.2 (12-06-23 @ 10:11), Max: 98.2 (12-06-23 @ 10:11)  HR: 65 (12-06-23 @ 10:11) (57 - 65)  BP: 106/59 (12-06-23 @ 10:11) (106/59 - 142/92)  RR: 16 (12-06-23 @ 10:11) (16 - 16)  SpO2: 98% (12-06-23 @ 10:11) (95% - 99%)    PHYSICAL EXAM:   Gen - NAD, Comfortable, sitting in wheelchair   HEENT - NCAT  Neck - Supple, No limited ROM  Pulm - resp nonlabored  Cardiovascular - warm and well perfused  Abdomen - Soft, NT/ND   Extremities - No peripheral edema, no calf tenderness  Neuro-     Cognitive - awake, alert, oriented x4  - delayed processing vs response     Communication  +dysarthria, +dysphagia,  no aphasia, able to repeat, follows 2 step commands     Cranial Nerves - left facial droop, left facial weakness, decreased left shoulder shrug     Motor - Left sided weakness                    LEFT    UE - 4/5 proximal, 3/5                       RIGHT UE - 5/5                    LEFT    LE - 5/5                     RIGHT LE - 5/5        Sensory - Intact  to LT      Tone - normal  Psychiatric - flat affect  Skin:  all skin intact    RECENT LABS:             11.7   6.40  )-----------( 238      ( 07 Dec 2023 05:28 )             35.6     12-07    143  |  105  |  27<H>  ----------------------------<  113<H>  4.3   |  27  |  1.33<H>    Ca    9.1      07 Dec 2023 05:28    TPro  6.5  /  Alb  3.3  /  TBili  0.5  /  DBili  x   /  AST  21  /  ALT  45  /  AlkPhos  51  12-07    LIVER FUNCTIONS - ( 07 Dec 2023 05:28 )  Alb: 3.3 g/dL / Pro: 6.5 g/dL / ALK PHOS: 51 U/L / ALT: 45 U/L / AST: 21 U/L / GGT: x           MEDICATIONS:  MEDICATIONS  (STANDING):  amLODIPine   Tablet 5 milliGRAM(s) Oral daily  aspirin  chewable 81 milliGRAM(s) Oral <User Schedule>  atorvastatin 80 milliGRAM(s) Oral at bedtime  bisacodyl Suppository 10 milliGRAM(s) Rectal at bedtime  enoxaparin Injectable 30 milliGRAM(s) SubCutaneous <User Schedule>  escitalopram 20 milliGRAM(s) Oral daily  labetalol 200 milliGRAM(s) Oral three times a day  lisinopril 40 milliGRAM(s) Oral daily  pantoprazole    Tablet 40 milliGRAM(s) Oral before breakfast  polyethylene glycol 3350 17 Gram(s) Oral two times a day  senna 2 Tablet(s) Oral at bedtime  sodium chloride 0.9%. 1000 milliLiter(s) (60 mL/Hr) IV Continuous <Continuous>  tamsulosin 0.4 milliGRAM(s) Oral at bedtime  ticagrelor 90 milliGRAM(s) Oral two times a day  traZODone 50 milliGRAM(s) Oral at bedtime    MEDICATIONS  (PRN):  acetaminophen     Tablet .. 650 milliGRAM(s) Oral every 6 hours PRN Temp greater or equal to 38.5C (101.3F), Mild Pain (1 - 3)

## 2023-12-07 NOTE — PROGRESS NOTE ADULT - ASSESSMENT
44yo RHD M with PMH significant for HTN, type 2 DM, strokes (2015 abd 7/2023), former smoker (quit 7/2023),  presented to [facility] ED (11/16) with c/o difficulty walking, LLE weakness, slurred speech (worse from baseline). Images with acute/subacute infarcts.    # CVA (2015, 7/2023, 11/2023)   - S/p TPA  - ASA 81mg + Ticagrelor 90 BID  - Atorvastatin 80 (LDL 36)  - continue Comprehensive Rehab Program: PT/OT/ST, 3hours daily and 5 days weekly.     # Mood/sleep  - Lexapro inc 20 (11/29)  - Trazodone 25mg at bedtime  - Neuropsychology support  - Recreation therapy     # HTN  - Labetalol 200 TID  - Amlodipine 5  - Lisinopril 40  - BP pre med 126/84, post med 108/73 (12/7) - asymptomatic    # DM II   - A1C 5.5  - Previously on metformin  - FS: <120  - Glucose via lab: 113 (12/7) - controlled    # Respiratory health/RADHA  - Incentive Spirometry during the day time  - CPAP nightly    # Fever on admission --since resolved  - Noted with temp of 101.3 on admission: cbc, cmp, lactate, procalcitonin, UA (neg), blood CXR X 2 (unremarkable)  - blood culture no growth-final  -  WBC 6.53 (12/4)    # MALI  - Bun/Cr: 28/1.40 (11/30)  - Encourage oral hydration, monitor on thickened liquids  - Bun/Cr improved-- 21/1.23 (12/4) > 27/1.33 (12/7) --  Fluctuating, still on thicken liquid.  Reviewed with Hospitalist: rec IVF    # Pain management  - Tylenol PRN    # FEN   - Diet: upgraded to regular and mildly thick with supervision in meals 12/2  + free water protocol  - Repeat MBS friday 12/8**    # GI/Bowel Mgmt   - Senna,  Miralax BID  - Mineral oil enema x 1 (11/30)   - Golytely bowel prep x 1 (12/1) and suppository.   - GI ppx: Protonix    #Bladder management/Urinary retention  - UA (11/27) negative  - continue flomax 0.4  - Encourage timed voids Q3hrs while awake for independence and to promote continence during therapy-- now has urge to urinate. last straight cath 12/4 @ 10a**  - PVR q 8 hours (SC if > 400) - last documented PVR 12/5 (70cc)    # DVT ppx  - Lovenox 30 BID  - SCDs, TEDs     IDT today, discussed patient's progress with therapies thus far, team goals and expectations upon discharge. , nursing and therapists were present and provided input during meeting.      IDT 12/6  NSG: urinary retention with ISC.  SW: lives with [place] and daughter in [place], no step inside  SLP: reg/mod thick liquid, free water protocol. cog and language WFL, mod dysarthria, dec coordination of resp/swallowing  OT: overall min A.  Goal: mod i for adls, except SV bath/transfers  PT: min  A bed mob, min - CGA transfer, min A 75' RW/8 steps 2 HR. Goal: SV   TDD: 12/19 Home    Follow ups:  Neuro: Dr. Kumar Guzman  Rad: Dr. Phoebe Xiao  Card: Dr. Talib Dsouza  IM: Appt 12/19/2023 42yo RHD M with PMH significant for HTN, type 2 DM, strokes (2015 abd 7/2023), former smoker (quit 7/2023),  presented to [facility] ED (11/16) with c/o difficulty walking, LLE weakness, slurred speech (worse from baseline). Images with acute/subacute infarcts.    # CVA (2015, 7/2023, 11/2023)   - S/p TPA  - ASA 81mg + Ticagrelor 90 BID  - Atorvastatin 80 (LDL 36)  - continue Comprehensive Rehab Program: PT/OT/ST, 3hours daily and 5 days weekly.     # Mood/sleep  - Lexapro inc 20 (11/29)  - Trazodone 25mg at bedtime  - Neuropsychology support  - Recreation therapy     # HTN  - Labetalol 200 TID  - Amlodipine 5  - Lisinopril 40  - BP pre med 126/84, post med 108/73 (12/7) - asymptomatic    # DM II   - A1C 5.5  - Previously on metformin  - FS: <120  - Glucose via lab: 113 (12/7) - controlled    # Respiratory health/RADHA  - Incentive Spirometry during the day time  - CPAP nightly    # Fever on admission --since resolved  - Noted with temp of 101.3 on admission: cbc, cmp, lactate, procalcitonin, UA (neg), blood CXR X 2 (unremarkable)  - blood culture no growth-final  -  WBC 6.53 (12/4)    # MALI  - Bun/Cr: 28/1.40 (11/30)  - Encourage oral hydration, monitor on thickened liquids  - Bun/Cr improved-- 21/1.23 (12/4) > 27/1.33 (12/7) --  Fluctuating, still on thicken liquid.  Reviewed with Hospitalist: rec IVF    # Pain management  - Tylenol PRN    # FEN   - Diet: upgraded to regular and mildly thick with supervision in meals 12/2  + free water protocol  - Repeat MBS friday 12/8**    # GI/Bowel Mgmt   - Senna,  Miralax BID  - Mineral oil enema x 1 (11/30)   - Golytely bowel prep x 1 (12/1) and suppository.   - GI ppx: Protonix    #Bladder management/Urinary retention  - UA (11/27) negative  - continue flomax 0.4  - Encourage timed voids Q3hrs while awake for independence and to promote continence during therapy-- now has urge to urinate. last straight cath 12/4 @ 10a**  - PVR q 8 hours (SC if > 400) - last documented PVR 12/5 (70cc)    # DVT ppx  - Lovenox 30 BID  - SCDs, TEDs     IDT today, discussed patient's progress with therapies thus far, team goals and expectations upon discharge. , nursing and therapists were present and provided input during meeting.      IDT 12/6  NSG: urinary retention with ISC.  SW: lives with wife and daughter in [place], no step inside  SLP: reg/mod thick liquid, free water protocol. cog and language WFL, mod dysarthria, dec coordination of resp/swallowing  OT: overall min A.  Goal: mod i for adls, except SV bath/transfers  PT: min  A bed mob, min - CGA transfer, min A 75' RW/8 steps 2 HR. Goal: SV   TDD: 12/19 Home    Follow ups:  Neuro: Dr. Kumar Guzman  Rad: Dr. Phoebe Xiao  Card: Dr. Talib Dsouza  IM: Appt 12/19/2023 42yo RHD M with PMH significant for HTN, type 2 DM, strokes (2015 abd 7/2023), former smoker (quit 7/2023),  presented to [facility] ED (11/16) with c/o difficulty walking, LLE weakness, slurred speech (worse from baseline). Images with acute/subacute infarcts.    # CVA (2015, 7/2023, 11/2023)   - S/p TPA  - ASA 81mg + Ticagrelor 90 BID  - Atorvastatin 80 (LDL 36)  - continue Comprehensive Rehab Program: PT/OT/ST, 3hours daily and 5 days weekly.     # MALI  - Bun/Cr: 28/1.40 (11/30)  - Encourage oral hydration, monitor on thickened liquids  - Bun/Cr improved-- 21/1.23 (12/4) > 27/1.33 (12/7) --  Fluctuating, still on thicken liquid.  Discussed with hospitalist. IVF 1000 ml    # Mood/sleep  - Lexapro inc 20 (11/29)  - Trazodone 25mg at bedtime  - Neuropsychology support  - Recreation therapy     # HTN  - Labetalol 200 TID  - Amlodipine 5  - Lisinopril 40  - BP pre med 126/84, post med 108/73 (12/7) - asymptomatic    # DM II   - A1C 5.5  - Previously on metformin  - FS: <120  - Glucose via lab: 113 (12/7) - controlled    # Respiratory health/RADHA  - Incentive Spirometry during the day time  - CPAP nightly    # Fever on admission --since resolved  - Noted with temp of 101.3 on admission: cbc, cmp, lactate, procalcitonin, UA (neg), blood CXR X 2 (unremarkable)  - blood culture no growth-final  -  WBC 6.53 (12/4)    # Pain management  - Tylenol PRN    # FEN   - Diet: upgraded to regular and mildly thick with supervision in meals 12/2  + free water protocol  - Repeat MBS friday 12/8    # GI/Bowel Mgmt   - Senna,  Miralax BID  - Mineral oil enema x 1 (11/30)   - Golytely bowel prep x 1 (12/1) and suppository.   - GI ppx: Protonix    #Bladder management/Urinary retention  - UA (11/27) negative  - continue flomax 0.4  - Encourage timed voids Q3hrs while awake for independence and to promote continence during therapy-- now has urge to urinate. last straight cath 12/4 @ 10am  - PVR q 8 hours (SC if > 400) - last documented PVR 12/5 (70cc),will check with nursing bladder scan readings    # DVT ppx  - Lovenox 30 BID  - SCDsAlexa     IDT today, discussed patient's progress with therapies thus far, team goals and expectations upon discharge. , nursing and therapists were present and provided input during meeting.      IDT 12/6  NSG: urinary retention with ISC.  SW: lives with wife and daughter in [place], no step inside  SLP: reg/mod thick liquid, free water protocol. cog and language WFL, mod dysarthria, dec coordination of resp/swallowing  OT: overall min A.  Goal: mod i for adls, except SV bath/transfers  PT: min  A bed mob, min - CGA transfer, min A 75' RW/8 steps 2 HR. Goal: SV   TDD: 12/19 Home    Follow ups:  Neuro: Dr. Kumar Guzman  Rad: Dr. Phoebe Xiao  Card: Dr. Talib Dsouza  IM: Appt 12/19/2023 44yo RHD M with PMH significant for HTN, type 2 DM, strokes (2015 abd 7/2023), former smoker (quit 7/2023),  presented to [facility] ED (11/16) with c/o difficulty walking, LLE weakness, slurred speech (worse from baseline). Images with acute/subacute infarcts.    # CVA (2015, 7/2023, 11/2023)   - S/p TPA  - ASA 81mg + Ticagrelor 90 BID  - Atorvastatin 80 (LDL 36)  - continue Comprehensive Rehab Program: PT/OT/ST, 3hours daily and 5 days weekly.     # MALI  - Bun/Cr: 28/1.40 (11/30)  - Encourage oral hydration, monitor on thickened liquids  - Bun/Cr improved-- 21/1.23 (12/4) > 27/1.33 (12/7) --  Fluctuating, still on thicken liquid.  Discussed with hospitalist. IVF 1000 ml    # Mood/sleep  - Lexapro inc 20 (11/29)  - Trazodone 25mg at bedtime  - Neuropsychology support  - Recreation therapy     # HTN  - Labetalol 200 TID  - Amlodipine 5  - Lisinopril 40  - BP pre med 126/84, post med 108/73 (12/7) - asymptomatic    # DM II   - A1C 5.5  - Previously on metformin  - FS: <120  - Glucose via lab: 113 (12/7) - controlled    # Respiratory health/RADHA  - Incentive Spirometry during the day time  - CPAP nightly    # Fever on admission --since resolved  - Noted with temp of 101.3 on admission: cbc, cmp, lactate, procalcitonin, UA (neg), blood CXR X 2 (unremarkable)  - blood culture no growth-final  -  WBC 6.53 (12/4)    # Pain management  - Tylenol PRN    # FEN   - Diet: upgraded to regular and mildly thick with supervision in meals 12/2  + free water protocol  - Repeat MBS friday 12/8    # GI/Bowel Mgmt   - Senna,  Miralax BID  - Mineral oil enema x 1 (11/30)   - Golytely bowel prep x 1 (12/1) and suppository.   - GI ppx: Protonix    #Bladder management/Urinary retention  - UA (11/27) negative  - continue flomax 0.4  - Encourage timed voids Q3hrs while awake for independence and to promote continence during therapy-- now has urge to urinate. last straight cath 12/4 @ 10am  - PVR q 8 hours (SC if > 400) - last documented PVR 12/5 (70cc),will check with nursing bladder scan readings    # DVT ppx  - Lovenox 30 BID  - SCDsAlexa     IDT today, discussed patient's progress with therapies thus far, team goals and expectations upon discharge. , nursing and therapists were present and provided input during meeting.      IDT 12/6  NSG: urinary retention with ISC.  SW: lives with wife and daughter in [place], no step inside  SLP: reg/mod thick liquid, free water protocol. cog and language WFL, mod dysarthria, dec coordination of resp/swallowing  OT: overall min A.  Goal: mod i for adls, except SV bath/transfers  PT: min  A bed mob, min - CGA transfer, min A 75' RW/8 steps 2 HR. Goal: SV   TDD: 12/19 Home    Follow ups:  Neuro: Dr. Kumar Guzman  Rad: Dr. Phoebe Xiao  Card: Dr. Talib Dsouza  IM: Appt 12/19/2023

## 2023-12-08 NOTE — PROGRESS NOTE ADULT - ASSESSMENT
42yo RHD M with PMH significant for HTN, type 2 DM, strokes (2015 abd 7/2023), former smoker (quit 7/2023),  presented to [facility] ED (11/16) with c/o difficulty walking, LLE weakness, slurred speech (worse from baseline). Images with acute/subacute infarcts.    # CVA (2015, 7/2023, 11/2023)   - S/p TPA  - ASA 81mg + Ticagrelor 90 BID  - Atorvastatin 80 (LDL 36)  - continue Comprehensive Rehab Program: PT/OT/ST, 3hours daily and 5 days weekly.     # MALI  - Bun/Cr: 28/1.40 (11/30)  - Encourage oral hydration, monitor on thickened liquids  - Bun/Cr improved-- 21/1.23 (12/4) > 27/1.33 (12/7 AM) -- repeat 24/1.29 (12/7PM) Fluctuating, still on thicken liquid.  Discussed with hospitalist. IVF 1000 ml  - Encourage oral fluid, but on mildly thicken (MBS today)    # Mood/sleep  - Lexapro inc 20 (11/29)  - Trazodone 25mg at bedtime  - Neuropsychology support  - Recreation therapy     # HTN  - Labetalol 200 TID  - Amlodipine 5  - Lisinopril 40  - BP pre med 132/86, post med 148/72 (12/8) - asymptomatic    # DM II   - A1C 5.5  - Previously on metformin  - FS: <120  - Glucose via lab: 113 (12/7) - controlled    # Respiratory health/RADHA  - Incentive Spirometry during the day time  - CPAP nightly    # Fever on admission --since resolved  - Noted with temp of 101.3 on admission: cbc, cmp, lactate, procalcitonin, UA (neg), blood CXR X 2 (unremarkable)  - blood culture no growth-final  -  WBC 6.53 (12/4)    # Pain management  - Tylenol PRN    # FEN   - Diet: upgraded to regular and mildly thick with supervision in meals 12/2  + free water protocol  - Repeat MBS 12/8***    # GI/Bowel Mgmt - having daily BM  - Senna,  Miralax BID  - Mineral oil enema x 1 (11/30)   - Golytely bowel prep x 1 (12/1) and suppository.   - GI ppx: Protonix    #Bladder management/Urinary retention  - UA (11/27) negative  - continue flomax 0.4  - Encourage timed voids Q3hrs while awake for independence and to promote continence during therapy-- now has urge to urinate. last straight cath 12/4 @ 10am  - PVR q 8 hours (SC if > 400) - last documented PVR 12/5 (70cc),Will monitor for another day - if low will DC monitoring****    # DVT ppx  - Lovenox 30 BID  - SCDs, TEDs     IDT 12/6  NSG: urinary retention with ISC.  SW: lives with wife and daughter in [place], no step inside  SLP: reg/mod thick liquid, free water protocol. cog and language WFL, mod dysarthria, dec coordination of resp/swallowing  OT: overall min A.  Goal: mod i for adls, except SV bath/transfers  PT: min  A bed mob, min - CGA transfer, min A 75' RW/8 steps 2 HR. Goal: SV   TDD: 12/19 Home    Follow ups:  Neuro: Dr. Kumar Guzman  Rad: Dr. Phoebe Xiao  Card: Dr. Talib Dsouza  IM: Appt 12/19/2023 42yo RHD M with PMH significant for HTN, type 2 DM, strokes (2015 abd 7/2023), former smoker (quit 7/2023),  presented to [facility] ED (11/16) with c/o difficulty walking, LLE weakness, slurred speech (worse from baseline). Images with acute/subacute infarcts.    # CVA (2015, 7/2023, 11/2023)   - S/p TPA  - ASA 81mg + Ticagrelor 90 BID  - Atorvastatin 80 (LDL 36)  - continue Comprehensive Rehab Program: PT/OT/ST, 3hours daily and 5 days weekly.     # MALI  - Bun/Cr: 28/1.40 (11/30)  - Encourage oral hydration, monitor on thickened liquids  - Bun/Cr improved-- 21/1.23 (12/4) > 27/1.33 (12/7 AM) -- repeat 24/1.29 (12/7PM) Fluctuating, still on thicken liquid.  Discussed with hospitalist. IVF 1000 ml  - Encourage oral fluid, but on mildly thicken (MBS today)    # Mood/sleep  - Lexapro inc 20 (11/29)  - Trazodone 25mg at bedtime  - Neuropsychology support  - Recreation therapy     # HTN  - Labetalol 200 TID  - Amlodipine 5  - Lisinopril 40  - BP pre med 132/86, post med 148/72 (12/8) - asymptomatic    # DM II   - A1C 5.5  - Previously on metformin  - FS: <120  - Glucose via lab: 113 (12/7) - controlled    # Respiratory health/RADHA  - Incentive Spirometry during the day time  - CPAP nightly    # Fever on admission --since resolved  - Noted with temp of 101.3 on admission: cbc, cmp, lactate, procalcitonin, UA (neg), blood CXR X 2 (unremarkable)  - blood culture no growth-final  -  WBC 6.53 (12/4)    # Pain management  - Tylenol PRN    # FEN   - Diet: upgraded to regular and mildly thick with supervision in meals 12/2  + free water protocol  - Repeat MBS 12/8***    # GI/Bowel Mgmt - having daily BM  - Senna,  Miralax BID  - Mineral oil enema x 1 (11/30)   - Golytely bowel prep x 1 (12/1) and suppository.   - GI ppx: Protonix    #Bladder management/Urinary retention  - UA (11/27) negative  - continue flomax 0.4  - Encourage timed voids Q3hrs while awake for independence and to promote continence during therapy-- now has urge to urinate. last straight cath 12/4 @ 10am  - PVR q 8 hours (SC if > 400) - last documented PVR 12/5 (70cc),Will monitor for another day - if low will DC monitoring****    # DVT ppx  - Lovenox 30 BID  - SCDs, TEDs     IDT 12/6  NSG: urinary retention with ISC.  SW: lives with wife and daughter in [place], no step inside  SLP: reg/mod thick liquid, free water protocol. cog and language WFL, mod dysarthria, dec coordination of resp/swallowing  OT: overall min A.  Goal: mod i for adls, except SV bath/transfers  PT: min  A bed mob, min - CGA transfer, min A 75' RW/8 steps 2 HR. Goal: SV   TDD: 12/19 Home    Follow ups:  Neuro: Dr. Kumar Guzman  Rad: Dr. Phoebe Xiao  Card: Dr. Talib Dsouza  IM: Appt 12/19/2023 44yo RHD M with PMH significant for HTN, type 2 DM, strokes (2015 abd 7/2023), former smoker (quit 7/2023),  presented to [facility] ED (11/16) with c/o difficulty walking, LLE weakness, slurred speech (worse from baseline). Images with acute/subacute infarcts.    # CVA (2015, 7/2023, 11/2023)   - S/p TPA  - ASA 81mg + Ticagrelor 90 BID  - Atorvastatin 80 (LDL 36)  - continue Comprehensive Rehab Program: PT/OT/ST, 3hours daily and 5 days weekly.     # MALI-- improving  - Bun/Cr: 28/1.40 (11/30)  - Encourage oral hydration  - Bun/Cr improved-- 21/1.23 (12/4) > 27/1.33 (12/7 AM) -- repeat 24/1.29 (12/7PM). Discussed with hospitalist. s/p IVF 1000 ml on 12/7  - Upgraded to thin liquids after MBS today    # Mood/sleep  - Lexapro inc 20 (11/29)  - Trazodone 25mg at bedtime  - Neuropsychology support  - Recreation therapy     # HTN  - Labetalol 200 TID  - Amlodipine 5  - Lisinopril 40  - BP pre med 132/86, post med 148/72 (12/8) - asymptomatic    # DM II   - A1C 5.5  - Previously on metformin  - FS: <120  - Glucose via lab: 113 (12/7) - controlled    # Respiratory health/RADHA  - Incentive Spirometry during the day time  - CPAP nightly    # Fever on admission --since resolved  - Noted with temp of 101.3 on admission: cbc, cmp, lactate, procalcitonin, UA (neg), blood CXR X 2 (unremarkable)  - blood culture no growth-final  -  WBC 6.53 (12/4)    # Pain management  - Tylenol PRN    # FEN   - Diet: upgraded to regular and mildly thick with supervision in meals 12/2  + free water protocol  - Repeat MBS 12/8- upgraded to thin liquids, continue with regular diet    # GI/Bowel Mgmt - having daily BM  - Senna,  Miralax BID  - Mineral oil enema x 1 (11/30)   - Golytely bowel prep x 1 (12/1) and suppository.   - GI ppx: Protonix    #Bladder management/Urinary retention  - UA (11/27) negative  - continue flomax 0.4  - Encourage timed voids Q3hrs while awake for independence and to promote continence during therapy-- now has urge to urinate. last straight cath 12/4 @ 10am  - PVR q 8 hours (SC if > 400) - last documented PVR 12/5 (70cc),Will monitor for another day - if low will DC monitoring****    # DVT ppx  - Lovenox 30 BID    IDT 12/6  NSG: urinary retention with ISC.  SW: lives with wife and daughter in [place], no step inside  SLP: reg/mod thick liquid, free water protocol. cog and language WFL, mod dysarthria, dec coordination of resp/swallowing  OT: overall min A.  Goal: mod i for adls, except SV bath/transfers  PT: min  A bed mob, min - CGA transfer, min A 75' RW/8 steps 2 HR. Goal: SV   TDD: 12/19 Home    Follow ups:  Neuro: Dr. Kumar Guzman  Rad: Dr. Phoebe Xiao  Card: Dr. Talib Dsouza  IM: Appt 12/19/2023 42yo RHD M with PMH significant for HTN, type 2 DM, strokes (2015 abd 7/2023), former smoker (quit 7/2023),  presented to [facility] ED (11/16) with c/o difficulty walking, LLE weakness, slurred speech (worse from baseline). Images with acute/subacute infarcts.    # CVA (2015, 7/2023, 11/2023)   - S/p TPA  - ASA 81mg + Ticagrelor 90 BID  - Atorvastatin 80 (LDL 36)  - continue Comprehensive Rehab Program: PT/OT/ST, 3hours daily and 5 days weekly.     # MALI-- improving  - Bun/Cr: 28/1.40 (11/30)  - Encourage oral hydration  - Bun/Cr improved-- 21/1.23 (12/4) > 27/1.33 (12/7 AM) -- repeat 24/1.29 (12/7PM). Discussed with hospitalist. s/p IVF 1000 ml on 12/7  - Upgraded to thin liquids after MBS today    # Mood/sleep  - Lexapro inc 20 (11/29)  - Trazodone 25mg at bedtime  - Neuropsychology support  - Recreation therapy     # HTN  - Labetalol 200 TID  - Amlodipine 5  - Lisinopril 40  - BP pre med 132/86, post med 148/72 (12/8) - asymptomatic    # DM II   - A1C 5.5  - Previously on metformin  - FS: <120  - Glucose via lab: 113 (12/7) - controlled    # Respiratory health/RADHA  - Incentive Spirometry during the day time  - CPAP nightly    # Fever on admission --since resolved  - Noted with temp of 101.3 on admission: cbc, cmp, lactate, procalcitonin, UA (neg), blood CXR X 2 (unremarkable)  - blood culture no growth-final  -  WBC 6.53 (12/4)    # Pain management  - Tylenol PRN    # FEN   - Diet: upgraded to regular and mildly thick with supervision in meals 12/2  + free water protocol  - Repeat MBS 12/8- upgraded to thin liquids, continue with regular diet    # GI/Bowel Mgmt - having daily BM  - Senna,  Miralax BID  - Mineral oil enema x 1 (11/30)   - Golytely bowel prep x 1 (12/1) and suppository.   - GI ppx: Protonix    #Bladder management/Urinary retention  - UA (11/27) negative  - continue flomax 0.4  - Encourage timed voids Q3hrs while awake for independence and to promote continence during therapy-- now has urge to urinate. last straight cath 12/4 @ 10am  - PVR q 8 hours (SC if > 400) - last documented PVR 12/5 (70cc),Will monitor for another day - if low will DC monitoring****    # DVT ppx  - Lovenox 30 BID    IDT 12/6  NSG: urinary retention with ISC.  SW: lives with wife and daughter in [place], no step inside  SLP: reg/mod thick liquid, free water protocol. cog and language WFL, mod dysarthria, dec coordination of resp/swallowing  OT: overall min A.  Goal: mod i for adls, except SV bath/transfers  PT: min  A bed mob, min - CGA transfer, min A 75' RW/8 steps 2 HR. Goal: SV   TDD: 12/19 Home    Follow ups:  Neuro: Dr. Kumar Guzman  Rad: Dr. Phoebe Xiao  Card: Dr. Talib Dsouza  IM: Appt 12/19/2023

## 2023-12-08 NOTE — PROGRESS NOTE ADULT - ASSESSMENT
Pt alert, attentive, dysarthric speech, thought processes - goal-directed, no abnormal thought contents noted, depressed affect, euthymic mood, denied AH/VH, denied SI/HI/I/P, calm behavior, improved coping.  Pt alert, attentive, dysarthric speech, thought processes - goal-directed, no abnormal thought contents noted, depressed affect, euthymic mood, denied AH/VH, denied SI/HI/I/P, calm behavior, improved coping. Plan: Continue individual supportive tx.

## 2023-12-08 NOTE — PROGRESS NOTE ADULT - SUBJECTIVE AND OBJECTIVE BOX
Patient is a 43y old  Male who presents with a chief complaint of CVA     HPI:  42yo RHD M with PMH significant for: stroke (7/2023 - presenting with dysarthria - left inferior adrienne, b/l parieto-occipital juxtacortical WM w/mild residual dysarthria; 8 years ago - treated with TPA at [facility] - p/w L hemiplegia, facial droop and dysarthria), T2DM, HTN, former smoker (quit in 7/2023), presented to [facility] ED on 11/16/2023, with c/o difficulty walking, LLE weakness, slurred speech (worse from baseline).     ED exam: patient has a L facial droop, ataxia in LUE (dysmetria with FTN), and struggles to perform HTS b/l. Patient also with pronounced slurred speech (moderate but is intelligible). CTH showing a nonacute left occipital infarct (not seen on the CTH 7/2023). MR with acute/subacute posterior fossa and left occipital lobe infarcts with associated cytotoxic edema. Recieved tenecteplase - 30 minutes after administration, NIHSS 4->2 (mild left facial but improved and mild dysarthria). Patient reports voice is much better. Performs FTN without issue with LUE. Performs HTS b/l. L NLF flattening significantly improved.     Patient was evaluated by PM&R and therapy for functional deficits and gait/ADL impairments and recommend acute rehabilitation.  Patient was medically optimized for discharge to [place] on 11/27/2023.     SUBJECTIVE/OBJECTIVE: Patient seen and examined while participating in OT- in gym working on LUE strength and coordination. Slept overnight.  Have not been getting bladder scan or straight cath.  Will monitor for 1 more day and if remains low, will discontinue.     REVIEW OF SYSTEMS  Constitutional: No fever, no chills  Cardio: No chest pain, No palpitations  Resp: +coughing less, No SOB, no respiratory distress   Neuro: No headaches, +left sided weakness (improving) +dysphagia, +dysarthria     VITALS  43y  Vital Signs Last 24 Hrs  T(C): 36.6 (12-08-23 @ 09:52), Max: 36.6 (12-08-23 @ 09:52)  T(F): 97.8 (12-08-23 @ 09:52), Max: 97.8 (12-08-23 @ 09:52)  HR: 73 (12-08-23 @ 09:52) (62 - 73)  BP: 148/72 (12-08-23 @ 09:52) (116/73 - 148/72)  RR: 16 (12-08-23 @ 09:52) (16 - 16)  SpO2: 98% (12-08-23 @ 09:52) (97% - 98%)    PHYSICAL EXAM:   Gen - NAD, Comfortable, sitting in wheelchair   HEENT - NCAT  Neck - Supple, No limited ROM  Pulm - resp nonlabored  Cardiovascular - warm and well perfused  Abdomen - Soft, NT/ND   Extremities - No peripheral edema, no calf tenderness  Neuro-     Cognitive - awake, alert, oriented x4  - delayed processing vs response     Communication  +dysarthria, +dysphagia,  no aphasia, able to repeat, follows 2 step commands     Cranial Nerves - left facial droop, left facial weakness, decreased left shoulder shrug     Motor - Left sided weakness                    LEFT    UE - 4/5 proximal, 3/5                       RIGHT UE - 5/5                    LEFT    LE - 5/5                     RIGHT LE - 5/5        Sensory - Intact  to LT      Tone - normal  Psychiatric - flat affect  Skin:  all skin intact    RECENT LABS:  12-07    142  |  105  |  24<H>  ----------------------------<  109<H>  4.4   |  28  |  1.29    Ca    9.1      07 Dec 2023 12:15  Phos  3.8     12-07               11.7   6.40  )-----------( 238      ( 07 Dec 2023 05:28 )             35.6     12-07    143  |  105  |  27<H>  ----------------------------<  113<H>  4.3   |  27  |  1.33<H>    Ca    9.1      07 Dec 2023 05:28    TPro  6.5  /  Alb  3.3  /  TBili  0.5  /  DBili  x   /  AST  21  /  ALT  45  /  AlkPhos  51  12-07    LIVER FUNCTIONS - ( 07 Dec 2023 05:28 )  Alb: 3.3 g/dL / Pro: 6.5 g/dL / ALK PHOS: 51 U/L / ALT: 45 U/L / AST: 21 U/L / GGT: x           MEDICATIONS:  MEDICATIONS  (STANDING):  amLODIPine   Tablet 5 milliGRAM(s) Oral daily  aspirin  chewable 81 milliGRAM(s) Oral <User Schedule>  atorvastatin 80 milliGRAM(s) Oral at bedtime  bisacodyl Suppository 10 milliGRAM(s) Rectal at bedtime  enoxaparin Injectable 30 milliGRAM(s) SubCutaneous <User Schedule>  escitalopram 20 milliGRAM(s) Oral daily  labetalol 200 milliGRAM(s) Oral three times a day  lisinopril 40 milliGRAM(s) Oral daily  pantoprazole    Tablet 40 milliGRAM(s) Oral before breakfast  polyethylene glycol 3350 17 Gram(s) Oral two times a day  senna 2 Tablet(s) Oral at bedtime  sodium chloride 0.9%. 1000 milliLiter(s) (60 mL/Hr) IV Continuous <Continuous>  tamsulosin 0.4 milliGRAM(s) Oral at bedtime  ticagrelor 90 milliGRAM(s) Oral two times a day  traZODone 50 milliGRAM(s) Oral at bedtime    MEDICATIONS  (PRN):  acetaminophen     Tablet .. 650 milliGRAM(s) Oral every 6 hours PRN Temp greater or equal to 38.5C (101.3F), Mild Pain (1 - 3)   Patient is a 43y old  Male who presents with a chief complaint of CVA     HPI:  44yo RHD M with PMH significant for: stroke (7/2023 - presenting with dysarthria - left inferior adrienne, b/l parieto-occipital juxtacortical WM w/mild residual dysarthria; 8 years ago - treated with TPA at [facility] - p/w L hemiplegia, facial droop and dysarthria), T2DM, HTN, former smoker (quit in 7/2023), presented to [facility] ED on 11/16/2023, with c/o difficulty walking, LLE weakness, slurred speech (worse from baseline).     ED exam: patient has a L facial droop, ataxia in LUE (dysmetria with FTN), and struggles to perform HTS b/l. Patient also with pronounced slurred speech (moderate but is intelligible). CTH showing a nonacute left occipital infarct (not seen on the CTH 7/2023). MR with acute/subacute posterior fossa and left occipital lobe infarcts with associated cytotoxic edema. Recieved tenecteplase - 30 minutes after administration, NIHSS 4->2 (mild left facial but improved and mild dysarthria). Patient reports voice is much better. Performs FTN without issue with LUE. Performs HTS b/l. L NLF flattening significantly improved.     Patient was evaluated by PM&R and therapy for functional deficits and gait/ADL impairments and recommend acute rehabilitation.  Patient was medically optimized for discharge to [place] on 11/27/2023.     SUBJECTIVE/OBJECTIVE: Patient seen and examined while participating in OT- in gym working on LUE strength and coordination. Slept overnight.  Have not been getting bladder scan or straight cath.  Will monitor for 1 more day and if remains low, will discontinue.     REVIEW OF SYSTEMS  Constitutional: No fever, no chills  Cardio: No chest pain, No palpitations  Resp: +coughing less, No SOB, no respiratory distress   Neuro: No headaches, +left sided weakness (improving) +dysphagia, +dysarthria     VITALS  43y  Vital Signs Last 24 Hrs  T(C): 36.6 (12-08-23 @ 09:52), Max: 36.6 (12-08-23 @ 09:52)  T(F): 97.8 (12-08-23 @ 09:52), Max: 97.8 (12-08-23 @ 09:52)  HR: 73 (12-08-23 @ 09:52) (62 - 73)  BP: 148/72 (12-08-23 @ 09:52) (116/73 - 148/72)  RR: 16 (12-08-23 @ 09:52) (16 - 16)  SpO2: 98% (12-08-23 @ 09:52) (97% - 98%)    PHYSICAL EXAM:   Gen - NAD, Comfortable, sitting in wheelchair   HEENT - NCAT  Neck - Supple, No limited ROM  Pulm - resp nonlabored  Cardiovascular - warm and well perfused  Abdomen - Soft, NT/ND   Extremities - No peripheral edema, no calf tenderness  Neuro-     Cognitive - awake, alert, oriented x4  - delayed processing vs response     Communication  +dysarthria, +dysphagia,  no aphasia, able to repeat, follows 2 step commands     Cranial Nerves - left facial droop, left facial weakness, decreased left shoulder shrug     Motor - Left sided weakness                    LEFT    UE - 4/5 proximal, 3/5                       RIGHT UE - 5/5                    LEFT    LE - 5/5                     RIGHT LE - 5/5        Sensory - Intact  to LT      Tone - normal  Psychiatric - flat affect  Skin:  all skin intact    RECENT LABS:  12-07    142  |  105  |  24<H>  ----------------------------<  109<H>  4.4   |  28  |  1.29    Ca    9.1      07 Dec 2023 12:15  Phos  3.8     12-07               11.7   6.40  )-----------( 238      ( 07 Dec 2023 05:28 )             35.6     12-07    143  |  105  |  27<H>  ----------------------------<  113<H>  4.3   |  27  |  1.33<H>    Ca    9.1      07 Dec 2023 05:28    TPro  6.5  /  Alb  3.3  /  TBili  0.5  /  DBili  x   /  AST  21  /  ALT  45  /  AlkPhos  51  12-07    LIVER FUNCTIONS - ( 07 Dec 2023 05:28 )  Alb: 3.3 g/dL / Pro: 6.5 g/dL / ALK PHOS: 51 U/L / ALT: 45 U/L / AST: 21 U/L / GGT: x           MEDICATIONS:  MEDICATIONS  (STANDING):  amLODIPine   Tablet 5 milliGRAM(s) Oral daily  aspirin  chewable 81 milliGRAM(s) Oral <User Schedule>  atorvastatin 80 milliGRAM(s) Oral at bedtime  bisacodyl Suppository 10 milliGRAM(s) Rectal at bedtime  enoxaparin Injectable 30 milliGRAM(s) SubCutaneous <User Schedule>  escitalopram 20 milliGRAM(s) Oral daily  labetalol 200 milliGRAM(s) Oral three times a day  lisinopril 40 milliGRAM(s) Oral daily  pantoprazole    Tablet 40 milliGRAM(s) Oral before breakfast  polyethylene glycol 3350 17 Gram(s) Oral two times a day  senna 2 Tablet(s) Oral at bedtime  sodium chloride 0.9%. 1000 milliLiter(s) (60 mL/Hr) IV Continuous <Continuous>  tamsulosin 0.4 milliGRAM(s) Oral at bedtime  ticagrelor 90 milliGRAM(s) Oral two times a day  traZODone 50 milliGRAM(s) Oral at bedtime    MEDICATIONS  (PRN):  acetaminophen     Tablet .. 650 milliGRAM(s) Oral every 6 hours PRN Temp greater or equal to 38.5C (101.3F), Mild Pain (1 - 3)   Patient is a 43y old  Male who presents with a chief complaint of CVA     HPI:  42yo RHD M with PMH significant for: stroke (7/2023 - presenting with dysarthria - left inferior adrienne, b/l parieto-occipital juxtacortical WM w/mild residual dysarthria; 8 years ago - treated with TPA at [facility] - p/w L hemiplegia, facial droop and dysarthria), T2DM, HTN, former smoker (quit in 7/2023), presented to [facility] ED on 11/16/2023, with c/o difficulty walking, LLE weakness, slurred speech (worse from baseline).     ED exam: patient has a L facial droop, ataxia in LUE (dysmetria with FTN), and struggles to perform HTS b/l. Patient also with pronounced slurred speech (moderate but is intelligible). CTH showing a nonacute left occipital infarct (not seen on the CTH 7/2023). MR with acute/subacute posterior fossa and left occipital lobe infarcts with associated cytotoxic edema. Recieved tenecteplase - 30 minutes after administration, NIHSS 4->2 (mild left facial but improved and mild dysarthria). Patient reports voice is much better. Performs FTN without issue with LUE. Performs HTS b/l. L NLF flattening significantly improved.     Patient was evaluated by PM&R and therapy for functional deficits and gait/ADL impairments and recommend acute rehabilitation.  Patient was medically optimized for discharge to [place] on 11/27/2023.     SUBJECTIVE/OBJECTIVE: Patient seen and examined while participating in OT- in gym working on LUE strength and coordination. Slept overnight.  Have not been getting bladder scan or straight cath.  Will monitor for 1 more day and if remains low, will discontinue. No reported abdominal pain or urinary complaints     REVIEW OF SYSTEMS  Constitutional: No fever, no chills  Cardio: No chest pain, No palpitations  Resp: +coughing less, No SOB, no respiratory distress   Neuro: No headaches, +left sided weakness (improving) +dysphagia, +dysarthria     VITALS  43y  Vital Signs Last 24 Hrs  T(C): 36.6 (12-08-23 @ 09:52), Max: 36.6 (12-08-23 @ 09:52)  T(F): 97.8 (12-08-23 @ 09:52), Max: 97.8 (12-08-23 @ 09:52)  HR: 73 (12-08-23 @ 09:52) (62 - 73)  BP: 148/72 (12-08-23 @ 09:52) (116/73 - 148/72)  RR: 16 (12-08-23 @ 09:52) (16 - 16)  SpO2: 98% (12-08-23 @ 09:52) (97% - 98%)    PHYSICAL EXAM:   Gen - NAD, Comfortable, sitting in wheelchair   HEENT - NCAT  Neck - Supple, No limited ROM  Pulm - resp nonlabored  Cardiovascular - warm and well perfused  Abdomen - Soft, NT/ND   Extremities - No peripheral edema, no calf tenderness  Neuro-     Cognitive - awake, alert, oriented x4  - delayed processing vs response     Communication  +dysarthria, +dysphagia,  no aphasia, able to repeat, follows 2 step commands     Cranial Nerves - left facial droop, left facial weakness, decreased left shoulder shrug     Motor - Left sided weakness                    LEFT    UE - 4/5 proximal, 3/5                       RIGHT UE - 5/5                    LEFT    LE - 5/5                     RIGHT LE - 5/5        Sensory - Intact  to LT      Tone - normal  Psychiatric - flat affect  Skin:  all skin intact    RECENT LABS:  12-07    142  |  105  |  24<H>  ----------------------------<  109<H>  4.4   |  28  |  1.29    Ca    9.1      07 Dec 2023 12:15  Phos  3.8     12-07               11.7   6.40  )-----------( 238      ( 07 Dec 2023 05:28 )             35.6     12-07    143  |  105  |  27<H>  ----------------------------<  113<H>  4.3   |  27  |  1.33<H>    Ca    9.1      07 Dec 2023 05:28    TPro  6.5  /  Alb  3.3  /  TBili  0.5  /  DBili  x   /  AST  21  /  ALT  45  /  AlkPhos  51  12-07    LIVER FUNCTIONS - ( 07 Dec 2023 05:28 )  Alb: 3.3 g/dL / Pro: 6.5 g/dL / ALK PHOS: 51 U/L / ALT: 45 U/L / AST: 21 U/L / GGT: x           MEDICATIONS:  MEDICATIONS  (STANDING):  amLODIPine   Tablet 5 milliGRAM(s) Oral daily  aspirin  chewable 81 milliGRAM(s) Oral <User Schedule>  atorvastatin 80 milliGRAM(s) Oral at bedtime  bisacodyl Suppository 10 milliGRAM(s) Rectal at bedtime  enoxaparin Injectable 30 milliGRAM(s) SubCutaneous <User Schedule>  escitalopram 20 milliGRAM(s) Oral daily  labetalol 200 milliGRAM(s) Oral three times a day  lisinopril 40 milliGRAM(s) Oral daily  pantoprazole    Tablet 40 milliGRAM(s) Oral before breakfast  polyethylene glycol 3350 17 Gram(s) Oral two times a day  senna 2 Tablet(s) Oral at bedtime  sodium chloride 0.9%. 1000 milliLiter(s) (60 mL/Hr) IV Continuous <Continuous>  tamsulosin 0.4 milliGRAM(s) Oral at bedtime  ticagrelor 90 milliGRAM(s) Oral two times a day  traZODone 50 milliGRAM(s) Oral at bedtime    MEDICATIONS  (PRN):  acetaminophen     Tablet .. 650 milliGRAM(s) Oral every 6 hours PRN Temp greater or equal to 38.5C (101.3F), Mild Pain (1 - 3)   Patient is a 43y old  Male who presents with a chief complaint of CVA     HPI:  44yo RHD M with PMH significant for: stroke (7/2023 - presenting with dysarthria - left inferior adrienne, b/l parieto-occipital juxtacortical WM w/mild residual dysarthria; 8 years ago - treated with TPA at [facility] - p/w L hemiplegia, facial droop and dysarthria), T2DM, HTN, former smoker (quit in 7/2023), presented to [facility] ED on 11/16/2023, with c/o difficulty walking, LLE weakness, slurred speech (worse from baseline).     ED exam: patient has a L facial droop, ataxia in LUE (dysmetria with FTN), and struggles to perform HTS b/l. Patient also with pronounced slurred speech (moderate but is intelligible). CTH showing a nonacute left occipital infarct (not seen on the CTH 7/2023). MR with acute/subacute posterior fossa and left occipital lobe infarcts with associated cytotoxic edema. Recieved tenecteplase - 30 minutes after administration, NIHSS 4->2 (mild left facial but improved and mild dysarthria). Patient reports voice is much better. Performs FTN without issue with LUE. Performs HTS b/l. L NLF flattening significantly improved.     Patient was evaluated by PM&R and therapy for functional deficits and gait/ADL impairments and recommend acute rehabilitation.  Patient was medically optimized for discharge to [place] on 11/27/2023.     SUBJECTIVE/OBJECTIVE: Patient seen and examined while participating in OT- in gym working on LUE strength and coordination. Slept overnight.  Have not been getting bladder scan or straight cath.  Will monitor for 1 more day and if remains low, will discontinue. No reported abdominal pain or urinary complaints     REVIEW OF SYSTEMS  Constitutional: No fever, no chills  Cardio: No chest pain, No palpitations  Resp: +coughing less, No SOB, no respiratory distress   Neuro: No headaches, +left sided weakness (improving) +dysphagia, +dysarthria     VITALS  43y  Vital Signs Last 24 Hrs  T(C): 36.6 (12-08-23 @ 09:52), Max: 36.6 (12-08-23 @ 09:52)  T(F): 97.8 (12-08-23 @ 09:52), Max: 97.8 (12-08-23 @ 09:52)  HR: 73 (12-08-23 @ 09:52) (62 - 73)  BP: 148/72 (12-08-23 @ 09:52) (116/73 - 148/72)  RR: 16 (12-08-23 @ 09:52) (16 - 16)  SpO2: 98% (12-08-23 @ 09:52) (97% - 98%)    PHYSICAL EXAM:   Gen - NAD, Comfortable, sitting in wheelchair   HEENT - NCAT  Neck - Supple, No limited ROM  Pulm - resp nonlabored  Cardiovascular - warm and well perfused  Abdomen - Soft, NT/ND   Extremities - No peripheral edema, no calf tenderness  Neuro-     Cognitive - awake, alert, oriented x4  - delayed processing vs response     Communication  +dysarthria, +dysphagia,  no aphasia, able to repeat, follows 2 step commands     Cranial Nerves - left facial droop, left facial weakness, decreased left shoulder shrug     Motor - Left sided weakness                    LEFT    UE - 4/5 proximal, 3/5                       RIGHT UE - 5/5                    LEFT    LE - 5/5                     RIGHT LE - 5/5        Sensory - Intact  to LT      Tone - normal  Psychiatric - flat affect  Skin:  all skin intact    RECENT LABS:  12-07    142  |  105  |  24<H>  ----------------------------<  109<H>  4.4   |  28  |  1.29    Ca    9.1      07 Dec 2023 12:15  Phos  3.8     12-07               11.7   6.40  )-----------( 238      ( 07 Dec 2023 05:28 )             35.6     12-07    143  |  105  |  27<H>  ----------------------------<  113<H>  4.3   |  27  |  1.33<H>    Ca    9.1      07 Dec 2023 05:28    TPro  6.5  /  Alb  3.3  /  TBili  0.5  /  DBili  x   /  AST  21  /  ALT  45  /  AlkPhos  51  12-07    LIVER FUNCTIONS - ( 07 Dec 2023 05:28 )  Alb: 3.3 g/dL / Pro: 6.5 g/dL / ALK PHOS: 51 U/L / ALT: 45 U/L / AST: 21 U/L / GGT: x           MEDICATIONS:  MEDICATIONS  (STANDING):  amLODIPine   Tablet 5 milliGRAM(s) Oral daily  aspirin  chewable 81 milliGRAM(s) Oral <User Schedule>  atorvastatin 80 milliGRAM(s) Oral at bedtime  bisacodyl Suppository 10 milliGRAM(s) Rectal at bedtime  enoxaparin Injectable 30 milliGRAM(s) SubCutaneous <User Schedule>  escitalopram 20 milliGRAM(s) Oral daily  labetalol 200 milliGRAM(s) Oral three times a day  lisinopril 40 milliGRAM(s) Oral daily  pantoprazole    Tablet 40 milliGRAM(s) Oral before breakfast  polyethylene glycol 3350 17 Gram(s) Oral two times a day  senna 2 Tablet(s) Oral at bedtime  sodium chloride 0.9%. 1000 milliLiter(s) (60 mL/Hr) IV Continuous <Continuous>  tamsulosin 0.4 milliGRAM(s) Oral at bedtime  ticagrelor 90 milliGRAM(s) Oral two times a day  traZODone 50 milliGRAM(s) Oral at bedtime    MEDICATIONS  (PRN):  acetaminophen     Tablet .. 650 milliGRAM(s) Oral every 6 hours PRN Temp greater or equal to 38.5C (101.3F), Mild Pain (1 - 3)

## 2023-12-08 NOTE — PROGRESS NOTE ADULT - SUBJECTIVE AND OBJECTIVE BOX
Pt was seen for 25 min for supportive tx. Pt had no complaints. Pt reported doing well since last seen. He continues to participate in all his txs and to make progress. Pt is walking with a walker, and practicing stairs climbing. He still requires assistance for some of his ADLs. His speech remains slow but intelligible. Pt reported improved mood, not feeling as angry as when admitted to [facility]. Discussed about the importance of self-forgiving for past issues, as Pt blames himself for not stopping to smoke. Encouraged Pt to remain smoke free, he actually denied having any cravings so far, and to focus on the present to make the changes he needs to do to remain healthy. Also, Pt reported doing better with his wife, with whom he had been experiencing tension and directing his anger at since he got sick with his most recent CVA. P Pt was seen for 25 min for supportive tx. Pt had no complaints. Pt reported doing well since last seen. He continues to participate in all his txs and to make progress. Pt is walking with a walker, and practicing stairs climbing. He still requires assistance for some of his ADLs. His speech remains slow but intelligible. Pt reported improved mood, not feeling as angry as when admitted to [facility]. Discussed about the importance of self-forgiving for past issues, as Pt blames himself for not stopping to smoke. Encouraged Pt to remain smoke free, he actually denied having any cravings so far, and to focus on the present to make the changes he needs to do to remain healthy. Also, Pt reported doing better with his wife, with whom he had been experiencing tension and directing his anger at since he got sick with his most recent CVA. Discussed the importance of the caregiver and Pt's supportive network as crucial for his long-term recovery. Also agreed about some other topics to discuss in subsequent meetings, e.g., return to work, couple's issues. Pt reported improved sleep, stable appetite, increased energy. Support and encouragement were provided.

## 2023-12-09 NOTE — PROGRESS NOTE ADULT - ASSESSMENT
42yo RHD M with PMH significant for strokes (2015 abd 7/2023), T2DM, HTN, former smoker (quit 7/2023),  presented to [facility] ED (11/16) with c/o difficulty walking, LLE weakness, slurred speech (worse from baseline). Images with acute/subacute infarcts. Received TPA with some improvement.     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 Mechanism: TENISHA.  - s/p diagnostic cerebral angiogram on 11/20  -  s/p recanalization and L vert stent placement on 11/24.   - cont with ASA 81mg + Ticagrelor 90 BID  - cont with lipitor 80mg   - tolerating comprehensive rehab  - fall, aspiration precautions    #Fever on admission  - infectious work up negative. fever resolved.   - CXR negative  - UA negative. blood cx negative   - COVID negative   - completed 5 days of Rocephin on 12/2     #HTN  - BP goal < 140/90  - Labetalol 200 TID  - Lisinopril 40mg daily   - amlodipine 5 mg daily.  - monitor VS including OH and adjust meds    #DM II - A1C 5.5  - Previously on metformin  - serum glucose well controlled. no need for Accu-Cheks     #DVT ppx  - Lovenox 30 BID  - Alexa Ozuna     d/w rehab team at [facility]     44yo RHD M with PMH significant for strokes (2015 abd 7/2023), T2DM, HTN, former smoker (quit 7/2023),  presented to [facility] ED (11/16) with c/o difficulty walking, LLE weakness, slurred speech (worse from baseline). Images with acute/subacute infarcts. Received TPA with some improvement.     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 Mechanism: TENISHA.  - s/p diagnostic cerebral angiogram on 11/20  -  s/p recanalization and L vert stent placement on 11/24.   - cont with ASA 81mg + Ticagrelor 90 BID  - cont with lipitor 80mg   - tolerating comprehensive rehab  - fall, aspiration precautions    #Fever on admission  - infectious work up negative. fever resolved.   - CXR negative  - UA negative. blood cx negative   - COVID negative   - completed 5 days of Rocephin on 12/2     #HTN  - BP goal < 140/90  - Labetalol 200 TID  - Lisinopril 40mg daily   - amlodipine 5 mg daily.  - monitor VS including OH and adjust meds    #DM II - A1C 5.5  - Previously on metformin  - serum glucose well controlled. no need for Accu-Cheks     #DVT ppx  - Lovenox 30 BID  - Alexa Ozuna     d/w rehab team at [facility]

## 2023-12-09 NOTE — PROGRESS NOTE ADULT - SUBJECTIVE AND OBJECTIVE BOX
Medicine Progress Note    Patient is a 43y old  Male who presents with a chief complaint of CVA (09 Dec 2023 11:25)      SUBJECTIVE / OVERNIGHT EVENTS:  seen and examined  Chart reviewed  No overnight events  Limb weakness improving with therapy  BM+  No pain  No complaints    ADDITIONAL REVIEW OF SYSTEMS:  denied fever/chills/CP/SOB/cough/palpitation/dizziness/abdominal pian/nausea/vomiting/diarrhoea/constipation/dysuria/leg or calf pain/headaches.all other ROS neg    MEDICATIONS  (STANDING):  amLODIPine   Tablet 5 milliGRAM(s) Oral daily  aspirin  chewable 81 milliGRAM(s) Oral <User Schedule>  atorvastatin 80 milliGRAM(s) Oral at bedtime  bisacodyl Suppository 10 milliGRAM(s) Rectal at bedtime  enoxaparin Injectable 30 milliGRAM(s) SubCutaneous <User Schedule>  escitalopram 20 milliGRAM(s) Oral daily  labetalol 200 milliGRAM(s) Oral three times a day  lisinopril 40 milliGRAM(s) Oral daily  pantoprazole    Tablet 40 milliGRAM(s) Oral before breakfast  polyethylene glycol 3350 17 Gram(s) Oral two times a day  senna 2 Tablet(s) Oral at bedtime  sodium chloride 0.9%. 1000 milliLiter(s) (60 mL/Hr) IV Continuous <Continuous>  tamsulosin 0.4 milliGRAM(s) Oral at bedtime  ticagrelor 90 milliGRAM(s) Oral two times a day  traZODone 50 milliGRAM(s) Oral at bedtime    MEDICATIONS  (PRN):  acetaminophen     Tablet .. 650 milliGRAM(s) Oral every 6 hours PRN Temp greater or equal to 38.5C (101.3F), Mild Pain (1 - 3)    CAPILLARY BLOOD GLUCOSE        I&O's Summary      PHYSICAL EXAM:  Vital Signs Last 24 Hrs  T(C): 36.7 (09 Dec 2023 09:50), Max: 36.7 (09 Dec 2023 09:50)  T(F): 98 (09 Dec 2023 09:50), Max: 98 (09 Dec 2023 09:50)  HR: 70 (09 Dec 2023 09:50) (56 - 71)  BP: 116/75 (09 Dec 2023 09:50) (116/75 - 131/83)  BP(mean): --  RR: 16 (09 Dec 2023 09:50) (16 - 16)  SpO2: 96% (09 Dec 2023 09:50) (96% - 100%)    Parameters below as of 08 Dec 2023 20:51  Patient On (Oxygen Delivery Method): room air    GENERAL: Not in distress. Alert    HEENT: clear conjuctiva, MMM. no pallor or icterus  CARDIOVASCULAR: RRR S1, S2. No murmur/rubs/gallop  LUNGS: BLAE+, no rales, no wheezing, no rhonchi.    ABDOMEN: ND. Soft,  NT, no guarding / rebound / rigidity. BS normoactive  EXTREMITIES: no edema. no leg or calf TP.  SKIN: warm and dry  PSYCHIATRIC: Calm.  No agitation.  CNS: AAO*3. moves limbs, follows commands. left sided weakness. dysarthria. slow processing of information.       LABS:                    COVID-19 PCR: NotDetec (27 Nov 2023 20:20)      RADIOLOGY & ADDITIONAL TESTS:  Imaging from Last 24 Hours:    Electrocardiogram/QTc Interval:    COORDINATION OF CARE:  Care Discussed with Consultants/Other Providers:

## 2023-12-09 NOTE — PROGRESS NOTE ADULT - SUBJECTIVE AND OBJECTIVE BOX
Subjective: Seen this AM.  Pt. continent.  Voiding -- PVR = 0,   denies any dysuria or discomfort.   No pain.  Slept during the night.     VITALS  T(C): 36.7 (12-09-23 @ 09:50), Max: 36.7 (12-09-23 @ 09:50)  HR: 70 (12-09-23 @ 09:50) (56 - 71)  BP: 116/75 (12-09-23 @ 09:50) (116/75 - 131/83)  RR: 16 (12-09-23 @ 09:50) (16 - 16)  SpO2: 96% (12-09-23 @ 09:50) (96% - 100%)  Wt(kg): --    REVIEW OF SYSTEMS  Pertinent in last 24 h: Neurological deficits      Physical Exam:  Constitutional - NAD, Comfortable  HEENT - NCAT,   Chest -  breathing comfortably on RA  Cardiovascular -  Warm well perfused  Abdomen - Soft, NTND  Extremities - No edema, No calf tenderness   Neurologic Exam -    Stable                Cognitive - Awake, Alert, AAO x3     Cranial Nerves - Dysarthria     Motor - no new deficit      Psychiatric - Mood stable, Affect WNL  -    RECENT LABS/IMAGING    12-07    142  |  105  |  24<H>  ----------------------------<  109<H>  4.4   |  28  |  1.29    Ca    9.1      07 Dec 2023 12:15  Phos  3.8     12-07    TPro  x   /  Alb  3.5  /  TBili  x   /  DBili  x   /  AST  x   /  ALT  x   /  AlkPhos  x   12-07    Urinalysis Basic - ( 07 Dec 2023 12:15 )    Color: x / Appearance: x / SG: x / pH: x  Gluc: 109 mg/dL / Ketone: x  / Bili: x / Urobili: x   Blood: x / Protein: x / Nitrite: x   Leuk Esterase: x / RBC: x / WBC x   Sq Epi: x / Non Sq Epi: x / Bacteria: x            MEDICATIONS   acetaminophen     Tablet .. 650 milliGRAM(s) every 6 hours PRN  amLODIPine   Tablet 5 milliGRAM(s) daily  aspirin  chewable 81 milliGRAM(s) <User Schedule>  atorvastatin 80 milliGRAM(s) at bedtime  bisacodyl Suppository 10 milliGRAM(s) at bedtime  enoxaparin Injectable 30 milliGRAM(s) <User Schedule>  escitalopram 20 milliGRAM(s) daily  labetalol 200 milliGRAM(s) three times a day  lisinopril 40 milliGRAM(s) daily  pantoprazole    Tablet 40 milliGRAM(s) before breakfast  polyethylene glycol 3350 17 Gram(s) two times a day  senna 2 Tablet(s) at bedtime  sodium chloride 0.9%. 1000 milliLiter(s) <Continuous>  tamsulosin 0.4 milliGRAM(s) at bedtime  ticagrelor 90 milliGRAM(s) two times a day  traZODone 50 milliGRAM(s) at bedtime      --------------------------------------------------------------------

## 2023-12-09 NOTE — PROGRESS NOTE ADULT - ASSESSMENT
42 yo M  admitted to acute Rehabilitation with left occipital lobe infarcts    Pt. Stable  Active Issues    CVA-- Cont. ASA, Brillinta, atorvastatin    HTN-- BP controlled  --cont. amlodipine, labetalol and lisinopril    Depression/anxiety-- cont. lexapro 20mg    Sleep-- cont. trazodone     retention-- resolved.  Cont. flomax    DVT ppx-- cont. lovenox    d/w hospitalist    Cont. comprehensive inpatient PT, OT and Speech    No acute issues,   Cont. current plan of care and medications as per primary team       44 yo M  admitted to acute Rehabilitation with left occipital lobe infarcts    Pt. Stable  Active Issues    CVA-- Cont. ASA, Brillinta, atorvastatin    HTN-- BP controlled  --cont. amlodipine, labetalol and lisinopril    Depression/anxiety-- cont. lexapro 20mg    Sleep-- cont. trazodone     retention-- resolved.  Cont. flomax    DVT ppx-- cont. lovenox    d/w hospitalist    Cont. comprehensive inpatient PT, OT and Speech    No acute issues,   Cont. current plan of care and medications as per primary team

## 2023-12-10 NOTE — PROGRESS NOTE ADULT - SUBJECTIVE AND OBJECTIVE BOX
Subjective: Seen this AM.  No new complaints or overnight issues    VITALS  T(C): 36.9 (12-09-23 @ 20:08), Max: 36.9 (12-09-23 @ 20:08)  HR: 58 (12-10-23 @ 06:13) (58 - 75)  BP: 122/80 (12-10-23 @ 06:13) (117/78 - 122/80)  RR: 16 (12-09-23 @ 20:08) (16 - 16)  SpO2: 99% (12-09-23 @ 22:42) (97% - 99%)  Wt(kg): --    REVIEW OF SYSTEMS  Pertinent in last 24 h: Neurological deficits      Physical Exam:  Constitutional - NAD, Comfortable  HEENT - NCAT,   Chest -  breathing comfortably on RA  Cardiovascular -  Warm well perfused  Abdomen - Soft, NTND  Extremities - No edema, No calf tenderness   Neurologic Exam -    Stable                Cognitive - Awake, Alert, AAO x3     Cranial Nerves - Dysarthria     Motor - no new deficit      Psychiatric - Mood stable, Affect WNL    -    RECENT LABS/IMAGING                  MEDICATIONS   acetaminophen     Tablet .. 650 milliGRAM(s) every 6 hours PRN  amLODIPine   Tablet 5 milliGRAM(s) daily  aspirin  chewable 81 milliGRAM(s) <User Schedule>  atorvastatin 80 milliGRAM(s) at bedtime  bisacodyl Suppository 10 milliGRAM(s) at bedtime  enoxaparin Injectable 30 milliGRAM(s) <User Schedule>  escitalopram 20 milliGRAM(s) daily  labetalol 200 milliGRAM(s) three times a day  lisinopril 40 milliGRAM(s) daily  pantoprazole    Tablet 40 milliGRAM(s) before breakfast  polyethylene glycol 3350 17 Gram(s) two times a day  senna 2 Tablet(s) at bedtime  sodium chloride 0.9%. 1000 milliLiter(s) <Continuous>  tamsulosin 0.4 milliGRAM(s) at bedtime  ticagrelor 90 milliGRAM(s) two times a day  traZODone 50 milliGRAM(s) at bedtime      --------------------------------------------------------------------

## 2023-12-11 NOTE — PROGRESS NOTE ADULT - SUBJECTIVE AND OBJECTIVE BOX
Patient is a 43y old  Male who presents with a chief complaint of CVA (10 Dec 2023 15:38)      HPI:  44yo RHD M with PMH significant for: stroke (7/2023 - presenting with dysarthria - left inferior adrienne, b/l parieto-occipital juxtacortical WM w/mild residual dysarthria; 8 years ago - treated with TPA at [facility] - p/w L hemiplegia, facial droop and dysarthria), T2DM, HTN, former smoker (quit in 7/2023), presented to [facility] ED on 11/16/2023, with c/o difficulty walking, LLE weakness, slurred speech (worse from baseline).     ED exam: patient has a L facial droop, ataxia in LUE (dysmetria with FTN), and struggles to perform HTS b/l. Patient also with pronounced slurred speech (moderate but is intelligible). CTH showing a nonacute left occipital infarct (not seen on the CTH 7/2023). MR with acute/subacute posterior fossa and left occipital lobe infarcts with associated cytotoxic edema. Recieved tenecteplase - 30 minutes after administration, NIHSS 4->2 (mild left facial but improved and mild dysarthria). Patient reports voice is much better. Performs FTN without issue with LUE. Performs HTS b/l. L NLF flattening significantly improved.     Patient was evaluated by PM&R and therapy for functional deficits and gait/ADL impairments and recommend acute rehabilitation.  Patient was medically optimized for discharge to [facility] on 11/27/2023. (27 Nov 2023 13:08)        SUBJECTIVE/OBJECTIVE: Patient seen and examined. No acute overnight events, slept well. No other complaints. Not needed to be straight cath anymore for urinary retention. Wife present at bedside along with OT during eval. She submitted his disability papers that was filled out last week.       REVIEW OF SYSTEMS  Constitutional: No fever, no chills  Cardio: No chest pain, No palpitations  Resp: +coughing less, No SOB, no respiratory distress   Neuro: No headaches, +left sided weakness (improving) +dysphagia, +dysarthria     VITALS  43y  Vital Signs Last 24 Hrs  T(C): 36.8 (11 Dec 2023 07:55), Max: 36.8 (11 Dec 2023 07:55)  T(F): 98.2 (11 Dec 2023 07:55), Max: 98.2 (11 Dec 2023 07:55)  HR: 66 (11 Dec 2023 07:55) (62 - 69)  BP: 109/73 (11 Dec 2023 07:55) (109/73 - 125/76)  BP(mean): --  RR: 16 (11 Dec 2023 07:55) (16 - 16)  SpO2: 97% (11 Dec 2023 07:55) (97% - 98%)    Parameters below as of 11 Dec 2023 07:55  Patient On (Oxygen Delivery Method): room air      PHYSICAL EXAM:   Gen - NAD, Comfortable, sitting in wheelchair   HEENT - NCAT  Neck - Supple, No limited ROM  Pulm - resp nonlabored  Cardiovascular - warm and well perfused  Abdomen - Soft, NT/ND   Extremities - No peripheral edema, no calf tenderness  Neuro-     Cognitive - awake, alert, oriented x4  - delayed processing vs response     Communication  +dysarthria, +dysphagia,  no aphasia, able to repeat, follows 2 step commands     Cranial Nerves - left facial droop, left facial weakness, decreased left shoulder shrug     Motor - Left sided weakness                    LEFT    UE - 4/5 proximal, 3/5                       RIGHT UE - 5/5                    LEFT    LE - 5/5                     RIGHT LE - 5/5        Sensory - Intact  to LT      Tone - normal  Psychiatric - flat affect  Skin:  all skin intact    RECENT LABS:                        11.6   6.14  )-----------( 196      ( 11 Dec 2023 06:21 )             35.0     12-11    141  |  106  |  23  ----------------------------<  122<H>  4.1   |  26  |  1.23    Ca    8.9      11 Dec 2023 06:21    TPro  6.4  /  Alb  3.3  /  TBili  0.4  /  DBili  x   /  AST  36  /  ALT  83<H>  /  AlkPhos  50  12-11    LIVER FUNCTIONS - ( 11 Dec 2023 06:21 )  Alb: 3.3 g/dL / Pro: 6.4 g/dL / ALK PHOS: 50 U/L / ALT: 83 U/L / AST: 36 U/L / GGT: x             Urinalysis Basic - ( 11 Dec 2023 06:21 )    Color: x / Appearance: x / SG: x / pH: x  Gluc: 122 mg/dL / Ketone: x  / Bili: x / Urobili: x   Blood: x / Protein: x / Nitrite: x   Leuk Esterase: x / RBC: x / WBC x   Sq Epi: x / Non Sq Epi: x / Bacteria: x          CAPILLARY BLOOD GLUCOSE            MEDICATIONS:  MEDICATIONS  (STANDING):  amLODIPine   Tablet 5 milliGRAM(s) Oral daily  aspirin  chewable 81 milliGRAM(s) Oral <User Schedule>  atorvastatin 80 milliGRAM(s) Oral at bedtime  bisacodyl Suppository 10 milliGRAM(s) Rectal at bedtime  enoxaparin Injectable 30 milliGRAM(s) SubCutaneous <User Schedule>  escitalopram 20 milliGRAM(s) Oral daily  labetalol 200 milliGRAM(s) Oral three times a day  lisinopril 40 milliGRAM(s) Oral daily  pantoprazole    Tablet 40 milliGRAM(s) Oral before breakfast  polyethylene glycol 3350 17 Gram(s) Oral two times a day  senna 2 Tablet(s) Oral at bedtime  sodium chloride 0.9%. 1000 milliLiter(s) (60 mL/Hr) IV Continuous <Continuous>  tamsulosin 0.4 milliGRAM(s) Oral at bedtime  ticagrelor 90 milliGRAM(s) Oral two times a day  traZODone 50 milliGRAM(s) Oral at bedtime    MEDICATIONS  (PRN):  acetaminophen     Tablet .. 650 milliGRAM(s) Oral every 6 hours PRN Temp greater or equal to 38.5C (101.3F), Mild Pain (1 - 3)     Patient is a 43y old  Male who presents with a chief complaint of CVA (10 Dec 2023 15:38)      HPI:  44yo RHD M with PMH significant for: stroke (7/2023 - presenting with dysarthria - left inferior adrienne, b/l parieto-occipital juxtacortical WM w/mild residual dysarthria; 8 years ago - treated with TPA at [facility] - p/w L hemiplegia, facial droop and dysarthria), T2DM, HTN, former smoker (quit in 7/2023), presented to [facility] ED on 11/16/2023, with c/o difficulty walking, LLE weakness, slurred speech (worse from baseline).     ED exam: patient has a L facial droop, ataxia in LUE (dysmetria with FTN), and struggles to perform HTS b/l. Patient also with pronounced slurred speech (moderate but is intelligible). CTH showing a nonacute left occipital infarct (not seen on the CTH 7/2023). MR with acute/subacute posterior fossa and left occipital lobe infarcts with associated cytotoxic edema. Recieved tenecteplase - 30 minutes after administration, NIHSS 4->2 (mild left facial but improved and mild dysarthria). Patient reports voice is much better. Performs FTN without issue with LUE. Performs HTS b/l. L NLF flattening significantly improved.     Patient was evaluated by PM&R and therapy for functional deficits and gait/ADL impairments and recommend acute rehabilitation.  Patient was medically optimized for discharge to [facility] on 11/27/2023. (27 Nov 2023 13:08)        SUBJECTIVE/OBJECTIVE: Patient seen and examined. No acute overnight events, slept well. No other complaints. Not needed to be straight cath anymore for urinary retention. Wife present at bedside along with OT during eval. She submitted his disability papers that was filled out last week.       REVIEW OF SYSTEMS  Constitutional: No fever, no chills  Cardio: No chest pain, No palpitations  Resp: +coughing less, No SOB, no respiratory distress   Neuro: No headaches, +left sided weakness (improving) +dysphagia, +dysarthria     VITALS  43y  Vital Signs Last 24 Hrs  T(C): 36.8 (11 Dec 2023 07:55), Max: 36.8 (11 Dec 2023 07:55)  T(F): 98.2 (11 Dec 2023 07:55), Max: 98.2 (11 Dec 2023 07:55)  HR: 66 (11 Dec 2023 07:55) (62 - 69)  BP: 109/73 (11 Dec 2023 07:55) (109/73 - 125/76)  BP(mean): --  RR: 16 (11 Dec 2023 07:55) (16 - 16)  SpO2: 97% (11 Dec 2023 07:55) (97% - 98%)    Parameters below as of 11 Dec 2023 07:55  Patient On (Oxygen Delivery Method): room air      PHYSICAL EXAM:   Gen - NAD, Comfortable, sitting in wheelchair   HEENT - NCAT  Neck - Supple, No limited ROM  Pulm - resp nonlabored  Cardiovascular - warm and well perfused  Abdomen - Soft, NT/ND   Extremities - No peripheral edema, no calf tenderness  Neuro-     Cognitive - awake, alert, oriented x4  - delayed processing vs response     Communication  +dysarthria, +dysphagia,  no aphasia, able to repeat, follows 2 step commands     Cranial Nerves - left facial droop, left facial weakness, decreased left shoulder shrug     Motor - Left sided weakness                    LEFT    UE - 4/5 proximal, 3/5                       RIGHT UE - 5/5                    LEFT    LE - 5/5                     RIGHT LE - 5/5        Sensory - Intact  to LT      Tone - normal  Psychiatric - flat affect  Skin:  all skin intact    RECENT LABS:                        11.6   6.14  )-----------( 196      ( 11 Dec 2023 06:21 )             35.0     12-11    141  |  106  |  23  ----------------------------<  122<H>  4.1   |  26  |  1.23    Ca    8.9      11 Dec 2023 06:21    TPro  6.4  /  Alb  3.3  /  TBili  0.4  /  DBili  x   /  AST  36  /  ALT  83<H>  /  AlkPhos  50  12-11    LIVER FUNCTIONS - ( 11 Dec 2023 06:21 )  Alb: 3.3 g/dL / Pro: 6.4 g/dL / ALK PHOS: 50 U/L / ALT: 83 U/L / AST: 36 U/L / GGT: x             Urinalysis Basic - ( 11 Dec 2023 06:21 )    Color: x / Appearance: x / SG: x / pH: x  Gluc: 122 mg/dL / Ketone: x  / Bili: x / Urobili: x   Blood: x / Protein: x / Nitrite: x   Leuk Esterase: x / RBC: x / WBC x   Sq Epi: x / Non Sq Epi: x / Bacteria: x          CAPILLARY BLOOD GLUCOSE            MEDICATIONS:  MEDICATIONS  (STANDING):  amLODIPine   Tablet 5 milliGRAM(s) Oral daily  aspirin  chewable 81 milliGRAM(s) Oral <User Schedule>  atorvastatin 80 milliGRAM(s) Oral at bedtime  bisacodyl Suppository 10 milliGRAM(s) Rectal at bedtime  enoxaparin Injectable 30 milliGRAM(s) SubCutaneous <User Schedule>  escitalopram 20 milliGRAM(s) Oral daily  labetalol 200 milliGRAM(s) Oral three times a day  lisinopril 40 milliGRAM(s) Oral daily  pantoprazole    Tablet 40 milliGRAM(s) Oral before breakfast  polyethylene glycol 3350 17 Gram(s) Oral two times a day  senna 2 Tablet(s) Oral at bedtime  sodium chloride 0.9%. 1000 milliLiter(s) (60 mL/Hr) IV Continuous <Continuous>  tamsulosin 0.4 milliGRAM(s) Oral at bedtime  ticagrelor 90 milliGRAM(s) Oral two times a day  traZODone 50 milliGRAM(s) Oral at bedtime    MEDICATIONS  (PRN):  acetaminophen     Tablet .. 650 milliGRAM(s) Oral every 6 hours PRN Temp greater or equal to 38.5C (101.3F), Mild Pain (1 - 3)

## 2023-12-12 NOTE — PROGRESS NOTE ADULT - ASSESSMENT
42yo RHD M with PMH significant for strokes (2015 abd 7/2023), T2DM, HTN, former smoker (quit 7/2023),  presented to [facility] ED (11/16) with c/o difficulty walking, LLE weakness, slurred speech (worse from baseline). Images with acute/subacute infarcts. Received TPA with some improvement.     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 Mechanism: TENISHA.  - s/p diagnostic cerebral angiogram on 11/20  - s/p recanalization and L vert stent placement on 11/24.   - cont with ASA 81mg + Ticagrelor 90 BID  - cont with lipitor 80mg   - tolerating comprehensive rehab    #Fever on admission  - infectious work up negative. fever resolved.   - CXR negative  - UA negative. blood cx negative   - COVID negative   - completed 5 days of Rocephin on 12/2     #HTN  - BP goal < 140/90  - Labetalol 200 TID  - Lisinopril 40mg daily   - amlodipine 5 mg daily  - monitor VS including OH and adjust meds    #DM II - A1C 5.5  - Previously on metformin  - serum glucose well controlled. no need for Accu-Cheks     #DVT ppx  - Lovenox 30 BID 42yo RHD M with PMH significant for strokes (2015 abd 7/2023), T2DM, HTN, former smoker (quit 7/2023),  presented to [facility] ED (11/16) with c/o difficulty walking, LLE weakness, slurred speech (worse from baseline). Images with acute/subacute infarcts. Received TPA with some improvement.     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 Mechanism: TENISHA.  - s/p diagnostic cerebral angiogram on 11/20  - s/p recanalization and L vert stent placement on 11/24.   - cont with ASA 81mg + Ticagrelor 90 BID  - cont with lipitor 80mg   - tolerating comprehensive rehab    #Fever on admission  - infectious work up negative. fever resolved.   - CXR negative  - UA negative. blood cx negative   - COVID negative   - completed 5 days of Rocephin on 12/2     #HTN  - BP goal < 140/90  - Labetalol 200 TID  - Lisinopril 40mg daily   - amlodipine 5 mg daily  - monitor VS including OH and adjust meds    #DM II - A1C 5.5  - Previously on metformin  - serum glucose well controlled. no need for Accu-Cheks     #DVT ppx  - Lovenox 30 BID

## 2023-12-12 NOTE — CHART NOTE - NSCHARTNOTEFT_GEN_A_CORE
Nutrition Follow Up Note  Hospital Course   (Per Electronic Medical Record)    Source:  Patient [X]  Family Member [X]   Nursing Staff [X]   Medical Record [X]      Diet: Diet, Regular (23 @ 11:34) [Active]    At this time patient tolerating diet w/ adequate appetite/intake consuming % of meals. Obtained and noted meal preferences with nutrition office. No issues w/ dentition or chewing and swallowing current diet texture. Denies N/V/C/D, however last BM  Per IDR report.     Current Weight: 262.7lb on   Weight in k.3 (03 Dec 2023 22:49)  Weight in k.3 (02 Dec 2023 23:19)  Weight in k.5 (2023 23:13)      Pertinent Medications: MEDICATIONS  (STANDING):  amLODIPine   Tablet 5 milliGRAM(s) Oral daily  aspirin  chewable 81 milliGRAM(s) Oral <User Schedule>  atorvastatin 80 milliGRAM(s) Oral at bedtime  bisacodyl Suppository 10 milliGRAM(s) Rectal at bedtime  enoxaparin Injectable 30 milliGRAM(s) SubCutaneous <User Schedule>  escitalopram 20 milliGRAM(s) Oral daily  labetalol 200 milliGRAM(s) Oral three times a day  lisinopril 40 milliGRAM(s) Oral daily  pantoprazole    Tablet 40 milliGRAM(s) Oral before breakfast  polyethylene glycol 3350 17 Gram(s) Oral two times a day  senna 2 Tablet(s) Oral at bedtime  sodium chloride 0.9%. 1000 milliLiter(s) (60 mL/Hr) IV Continuous <Continuous>  tamsulosin 0.4 milliGRAM(s) Oral at bedtime  ticagrelor 90 milliGRAM(s) Oral two times a day  traZODone 50 milliGRAM(s) Oral at bedtime    MEDICATIONS  (PRN):  acetaminophen     Tablet .. 650 milliGRAM(s) Oral every 6 hours PRN Temp greater or equal to 38.5C (101.3F), Mild Pain (1 - 3)      Pertinent Labs:   Na141 mmol/L Glu 122 mg/dL<H> K+ 4.1 mmol/L Cr  1.23 mg/dL BUN 23 mg/dL  Phos 3.8 mg/dL  Alb 3.3 g/dL  Chol 80 mg/dL LDL --    HDL 28 mg/dL<L> Trig 73 mg/dL    Skin: No pressure injury per nursing flowsheets    Edema: No edema noted per nursing flowsheet    Last Bowel Movement: on  per IDR report    Estimated Needs:   [X] No Change Since Previous Assessment    Previous Nutrition Diagnosis:   1. Overweight/Obesity     NUTRITION DIAGNOSIS IS [X] Ongoing     New Nutrition Diagnosis: [X] Not Applicable    Interventions:   1. Recommend continuing with current plan of care  2. Obtain and honor food preferences as able  3. Ongoing diet education     Monitoring & Evaluation:   [X] Weights   [X] PO Intake   [X] Skin Integrity   [X] Follow Up (Per Protocol)  [X] Tolerance to Diet Prescription   [X] Other: Labs     Registered Dietitian/Nutritionist Remains Available.  Staci Story RD    Phone# [phone] Nutrition Follow Up Note  Hospital Course   (Per Electronic Medical Record)    Source:  Patient [X]  Family Member [X]   Nursing Staff [X]   Medical Record [X]      Diet: Diet, Regular (23 @ 11:34) [Active]    At this time patient tolerating diet w/ adequate appetite/intake consuming % of meals. Obtained and noted meal preferences with nutrition office. No issues w/ dentition or chewing and swallowing current diet texture. Denies N/V/C/D, however last BM  Per IDR report.     Current Weight: 262.7lb on   Weight in k.3 (03 Dec 2023 22:49)  Weight in k.3 (02 Dec 2023 23:19)  Weight in k.5 (2023 23:13)      Pertinent Medications: MEDICATIONS  (STANDING):  amLODIPine   Tablet 5 milliGRAM(s) Oral daily  aspirin  chewable 81 milliGRAM(s) Oral <User Schedule>  atorvastatin 80 milliGRAM(s) Oral at bedtime  bisacodyl Suppository 10 milliGRAM(s) Rectal at bedtime  enoxaparin Injectable 30 milliGRAM(s) SubCutaneous <User Schedule>  escitalopram 20 milliGRAM(s) Oral daily  labetalol 200 milliGRAM(s) Oral three times a day  lisinopril 40 milliGRAM(s) Oral daily  pantoprazole    Tablet 40 milliGRAM(s) Oral before breakfast  polyethylene glycol 3350 17 Gram(s) Oral two times a day  senna 2 Tablet(s) Oral at bedtime  sodium chloride 0.9%. 1000 milliLiter(s) (60 mL/Hr) IV Continuous <Continuous>  tamsulosin 0.4 milliGRAM(s) Oral at bedtime  ticagrelor 90 milliGRAM(s) Oral two times a day  traZODone 50 milliGRAM(s) Oral at bedtime    MEDICATIONS  (PRN):  acetaminophen     Tablet .. 650 milliGRAM(s) Oral every 6 hours PRN Temp greater or equal to 38.5C (101.3F), Mild Pain (1 - 3)      Pertinent Labs:   Na141 mmol/L Glu 122 mg/dL<H> K+ 4.1 mmol/L Cr  1.23 mg/dL BUN 23 mg/dL  Phos 3.8 mg/dL  Alb 3.3 g/dL  Chol 80 mg/dL LDL --    HDL 28 mg/dL<L> Trig 73 mg/dL    Skin: No pressure injury per nursing flowsheets    Edema: No edema noted per nursing flowsheet    Last Bowel Movement: on  per IDR report    Estimated Needs:   [X] No Change Since Previous Assessment    Previous Nutrition Diagnosis:   1. Overweight/Obesity     NUTRITION DIAGNOSIS IS [X] Ongoing     New Nutrition Diagnosis: [X] Not Applicable    Interventions:   1. Recommend continuing with current plan of care  2. Obtain and honor food preferences as able  3. Ongoing diet education     Monitoring & Evaluation:   [X] Weights   [X] PO Intake   [X] Skin Integrity   [X] Follow Up (Per Protocol)  [X] Tolerance to Diet Prescription   [X] Other: Labs     Registered Dietitian/Nutritionist Remains Available.  Staci Story RD    Phone# [phone]

## 2023-12-12 NOTE — PROGRESS NOTE ADULT - ASSESSMENT
42yo RHD M with PMH significant for HTN, type 2 DM, strokes (2015 abd 7/2023), former smoker (quit 7/2023),  presented to [facility] ED (11/16) with c/o difficulty walking, LLE weakness, slurred speech (worse from baseline). Images with acute/subacute infarcts.    # CVA (2015, 7/2023, 11/2023)   - S/p TPA  - ASA 81mg + Ticagrelor 90 BID  - Atorvastatin 80 (LDL 36)  - continue Comprehensive Rehab Program: PT/OT/ST, 3hours daily and 5 days weekly.     # MALI-- improved  - Bun/Cr improved-- 21/1.23 (12/4) > 27/1.33 (12/7 AM) -- repeat 24/1.29 (12/7PM) >23/1.23 (12/11)    - s/p IVF 1000 ml on 12/7  - Now on thin liquids, can encourage oral intake     # Mood/sleep  - Lexapro inc 20 (11/29)  - Trazodone 25mg at bedtime  - Neuropsychology support  - Recreation therapy     # HTN  - Labetalol 200 TID  - Amlodipine 5  - Lisinopril 40  - /71 (12/12)    # DM II   - A1C 5.5  - Previously on metformin  - FS: <120  - Glucose via lab: 122 (12/11)     # Respiratory health/RADHA  - Incentive Spirometry during the day time  - CPAP nightly    # Fever on admission --since resolved  - Noted with temp of 101.3 on admission: cbc, cmp, lactate, procalcitonin, UA (neg), blood CXR X 2 (unremarkable)  - blood culture no growth-final  -  WBC wnl     # Pain management  - Tylenol PRN    # FEN   - Previously on regular and mildly thick with supervision in meals 12/2+ free water protocol  -  MBS 12/8- upgraded to thin liquids, continue with regular diet    # GI/Bowel Mgmt - having daily BM  - Senna,  Miralax BID  - Mineral oil enema x 1 (11/30)   - Golytely bowel prep x 1 (12/1) and suppository.   - GI ppx: Protonix    #Bladder management/Urinary retention-- previously needing to be straight cath   - UA (11/27) negative  - continue flomax 0.4  - No longer retaining-- dc'd bladder scans 12/8    # DVT ppx  - Lovenox 30 BID    IDT 12/6  NSG: urinary retention with ISC.  SW: lives with wife and daughter in [place], no step inside  SLP: reg/mod thick liquid, free water protocol. cog and language WFL, mod dysarthria, dec coordination of resp/swallowing  OT: overall min A.  Goal: mod i for adls, except SV bath/transfers  PT: min  A bed mob, min - CGA transfer, min A 75' RW/8 steps 2 HR. Goal: SV   TDD: 12/19 Home    Follow ups:  Neuro: Dr. Kumar Guzman  Rad: Dr. Phoebe Xiao  Card: Dr. Talib Dsouza  IM: Appt 12/19/2023 44yo RHD M with PMH significant for HTN, type 2 DM, strokes (2015 abd 7/2023), former smoker (quit 7/2023),  presented to [facility] ED (11/16) with c/o difficulty walking, LLE weakness, slurred speech (worse from baseline). Images with acute/subacute infarcts.    # CVA (2015, 7/2023, 11/2023)   - S/p TPA  - ASA 81mg + Ticagrelor 90 BID  - Atorvastatin 80 (LDL 36)  - continue Comprehensive Rehab Program: PT/OT/ST, 3hours daily and 5 days weekly.     # MALI-- improved  - Bun/Cr improved-- 21/1.23 (12/4) > 27/1.33 (12/7 AM) -- repeat 24/1.29 (12/7PM) >23/1.23 (12/11)    - s/p IVF 1000 ml on 12/7  - Now on thin liquids, can encourage oral intake     # Mood/sleep  - Lexapro inc 20 (11/29)  - Trazodone 25mg at bedtime  - Neuropsychology support  - Recreation therapy     # HTN  - Labetalol 200 TID  - Amlodipine 5  - Lisinopril 40  - /71 (12/12)    # DM II   - A1C 5.5  - Previously on metformin  - FS: <120  - Glucose via lab: 122 (12/11)     # Respiratory health/RADHA  - Incentive Spirometry during the day time  - CPAP nightly    # Fever on admission --since resolved  - Noted with temp of 101.3 on admission: cbc, cmp, lactate, procalcitonin, UA (neg), blood CXR X 2 (unremarkable)  - blood culture no growth-final  -  WBC wnl     # Pain management  - Tylenol PRN    # FEN   - Previously on regular and mildly thick with supervision in meals 12/2+ free water protocol  -  MBS 12/8- upgraded to thin liquids, continue with regular diet    # GI/Bowel Mgmt - having daily BM  - Senna,  Miralax BID  - Mineral oil enema x 1 (11/30)   - Golytely bowel prep x 1 (12/1) and suppository.   - GI ppx: Protonix    #Bladder management/Urinary retention-- previously needing to be straight cath   - UA (11/27) negative  - continue flomax 0.4  - No longer retaining-- dc'd bladder scans 12/8    # DVT ppx  - Lovenox 30 BID    IDT 12/6  NSG: urinary retention with ISC.  SW: lives with wife and daughter in [place], no step inside  SLP: reg/mod thick liquid, free water protocol. cog and language WFL, mod dysarthria, dec coordination of resp/swallowing  OT: overall min A.  Goal: mod i for adls, except SV bath/transfers  PT: min  A bed mob, min - CGA transfer, min A 75' RW/8 steps 2 HR. Goal: SV   TDD: 12/19 Home    Follow ups:  Neuro: Dr. Kumar Guzman  Rad: Dr. Phoebe Xiao  Card: Dr. Talib Dsouza  IM: Appt 12/19/2023

## 2023-12-12 NOTE — PROGRESS NOTE ADULT - SUBJECTIVE AND OBJECTIVE BOX
Patient is a 43y old  Male who presents with a chief complaint of CVA (11 Dec 2023 12:34)    HPI:  44yo RHD M with PMH significant for: stroke (2023 - presenting with dysarthria - left inferior adrienne, b/l parieto-occipital juxtacortical WM w/mild residual dysarthria; 8 years ago - treated with TPA at [facility] [place] - p/w L hemiplegia, facial droop and dysarthria), T2DM, HTN, former smoker (quit in 2023), presented to [facility] ED on 2023, with c/o difficulty walking, LLE weakness, slurred speech (worse from baseline).     ED exam: patient has a L facial droop, ataxia in LUE (dysmetria with FTN), and struggles to perform HTS b/l. Patient also with pronounced slurred speech (moderate but is intelligible). CTH showing a nonacute left occipital infarct (not seen on the CTH 2023). MR with acute/subacute posterior fossa and left occipital lobe infarcts with associated cytotoxic edema. Recieved tenecteplase - 30 minutes after administration, NIHSS 4->2 (mild left facial but improved and mild dysarthria). Patient reports voice is much better. Performs FTN without issue with LUE. Performs HTS b/l. L NLF flattening significantly improved.     Patient was evaluated by PM&R and therapy for functional deficits and gait/ADL impairments and recommend acute rehabilitation.  Patient was medically optimized for discharge to [facility] on 2023. (2023 13:08)    SUBJECTIVE/OBJECTIVE: Patient seen and examined. No acute overnight events, slept well. Reports he has not been taken to the bathroom every 3 hours for toileting and he has been waiting for 20-30 mins after pressing the call bell to be taken to the bathroom. Nursing manager and staff made aware.    REVIEW OF SYSTEMS  Constitutional: No fever, + fatigue  Cardio: No chest pain, No palpitations  Resp: No SOB, no respiratory distress   Neuro: No headaches +weakness      VITALS  43y  Vital Signs Last 24 Hrs  T(C): 36.7 (12 Dec 2023 08:01), Max: 36.7 (12 Dec 2023 08:01)  T(F): 98.1 (12 Dec 2023 08:01), Max: 98.1 (12 Dec 2023 08:01)  HR: 63 (12 Dec 2023 08:01) (62 - 66)  BP: 118/71 (12 Dec 2023 08:01) (118/71 - 150/93)  RR: 16 (12 Dec 2023 08:01) (16 - 16)  SpO2: 97% (12 Dec 2023 08:01) (97% - 97%)    Parameters below as of 12 Dec 2023 08:01  Patient On (Oxygen Delivery Method): room air    Daily     Daily Weight in k.2 (11 Dec 2023 22:52)        PHYSICAL EXAM:   Gen - NAD, Comfortable, sitting in wheelchair   HEENT - NCAT  Neck - Supple, No limited ROM  Pulm - resp nonlabored  Cardiovascular - warm and well perfused  Abdomen - Soft, NT/ND   Extremities - No peripheral edema, no calf tenderness  Neuro-     Cognitive - awake, alert, oriented x4  - delayed processing vs response     Communication  +dysarthria, +dysphagia,  no aphasia, able to repeat, follows 2 step commands     Cranial Nerves - left facial droop, left facial weakness, decreased left shoulder shrug     Motor - Left sided weakness                    LEFT    UE - 4/5 proximal, 3/5                       RIGHT UE - 5/5                    LEFT    LE - 5/5                     RIGHT LE - 5/5        Sensory - Intact  to LT      Tone - normal  Psychiatric - flat affect  Skin:  all skin intact    RECENT LABS:                        11.6   6.14  )-----------( 196      ( 11 Dec 2023 06:21 )             35.0     12-11    141  |  106  |  23  ----------------------------<  122<H>  4.1   |  26  |  1.23    Ca    8.9      11 Dec 2023 06:21    TPro  6.4  /  Alb  3.3  /  TBili  0.4  /  DBili  x   /  AST  36  /  ALT  83<H>  /  AlkPhos  50  12-11    LIVER FUNCTIONS - ( 11 Dec 2023 06:21 )  Alb: 3.3 g/dL / Pro: 6.4 g/dL / ALK PHOS: 50 U/L / ALT: 83 U/L / AST: 36 U/L / GGT: x             Urinalysis Basic - ( 11 Dec 2023 06:21 )    Color: x / Appearance: x / SG: x / pH: x  Gluc: 122 mg/dL / Ketone: x  / Bili: x / Urobili: x   Blood: x / Protein: x / Nitrite: x   Leuk Esterase: x / RBC: x / WBC x   Sq Epi: x / Non Sq Epi: x / Bacteria: x          CAPILLARY BLOOD GLUCOSE            MEDICATIONS:  MEDICATIONS  (STANDING):  amLODIPine   Tablet 5 milliGRAM(s) Oral daily  aspirin  chewable 81 milliGRAM(s) Oral <User Schedule>  atorvastatin 80 milliGRAM(s) Oral at bedtime  bisacodyl Suppository 10 milliGRAM(s) Rectal at bedtime  enoxaparin Injectable 30 milliGRAM(s) SubCutaneous <User Schedule>  escitalopram 20 milliGRAM(s) Oral daily  labetalol 200 milliGRAM(s) Oral three times a day  lisinopril 40 milliGRAM(s) Oral daily  pantoprazole    Tablet 40 milliGRAM(s) Oral before breakfast  polyethylene glycol 3350 17 Gram(s) Oral two times a day  senna 2 Tablet(s) Oral at bedtime  sodium chloride 0.9%. 1000 milliLiter(s) (60 mL/Hr) IV Continuous <Continuous>  tamsulosin 0.4 milliGRAM(s) Oral at bedtime  ticagrelor 90 milliGRAM(s) Oral two times a day  traZODone 50 milliGRAM(s) Oral at bedtime    MEDICATIONS  (PRN):  acetaminophen     Tablet .. 650 milliGRAM(s) Oral every 6 hours PRN Temp greater or equal to 38.5C (101.3F), Mild Pain (1 - 3)     Patient is a 43y old  Male who presents with a chief complaint of CVA (11 Dec 2023 12:34)    HPI:  42yo RHD M with PMH significant for: stroke (2023 - presenting with dysarthria - left inferior adrienne, b/l parieto-occipital juxtacortical WM w/mild residual dysarthria; 8 years ago - treated with TPA at [facility] - p/w L hemiplegia, facial droop and dysarthria), T2DM, HTN, former smoker (quit in 2023), presented to [facility] ED on 2023, with c/o difficulty walking, LLE weakness, slurred speech (worse from baseline).     ED exam: patient has a L facial droop, ataxia in LUE (dysmetria with FTN), and struggles to perform HTS b/l. Patient also with pronounced slurred speech (moderate but is intelligible). CTH showing a nonacute left occipital infarct (not seen on the CTH 2023). MR with acute/subacute posterior fossa and left occipital lobe infarcts with associated cytotoxic edema. Recieved tenecteplase - 30 minutes after administration, NIHSS 4->2 (mild left facial but improved and mild dysarthria). Patient reports voice is much better. Performs FTN without issue with LUE. Performs HTS b/l. L NLF flattening significantly improved.     Patient was evaluated by PM&R and therapy for functional deficits and gait/ADL impairments and recommend acute rehabilitation.  Patient was medically optimized for discharge to [facility] on 2023. (2023 13:08)    SUBJECTIVE/OBJECTIVE: Patient seen and examined. No acute overnight events, slept well. Reports he has not been taken to the bathroom every 3 hours for toileting and he has been waiting for 20-30 mins after pressing the call bell to be taken to the bathroom. Nursing manager and staff made aware.    REVIEW OF SYSTEMS  Constitutional: No fever, + fatigue  Cardio: No chest pain, No palpitations  Resp: No SOB, no respiratory distress   Neuro: No headaches +weakness      VITALS  43y  Vital Signs Last 24 Hrs  T(C): 36.7 (12 Dec 2023 08:01), Max: 36.7 (12 Dec 2023 08:01)  T(F): 98.1 (12 Dec 2023 08:01), Max: 98.1 (12 Dec 2023 08:01)  HR: 63 (12 Dec 2023 08:01) (62 - 66)  BP: 118/71 (12 Dec 2023 08:01) (118/71 - 150/93)  RR: 16 (12 Dec 2023 08:01) (16 - 16)  SpO2: 97% (12 Dec 2023 08:01) (97% - 97%)    Parameters below as of 12 Dec 2023 08:01  Patient On (Oxygen Delivery Method): room air    Daily     Daily Weight in k.2 (11 Dec 2023 22:52)        PHYSICAL EXAM:   Gen - NAD, Comfortable, sitting in wheelchair   HEENT - NCAT  Neck - Supple, No limited ROM  Pulm - resp nonlabored  Cardiovascular - warm and well perfused  Abdomen - Soft, NT/ND   Extremities - No peripheral edema, no calf tenderness  Neuro-     Cognitive - awake, alert, oriented x4  - delayed processing vs response     Communication  +dysarthria, +dysphagia,  no aphasia, able to repeat, follows 2 step commands     Cranial Nerves - left facial droop, left facial weakness, decreased left shoulder shrug     Motor - Left sided weakness                    LEFT    UE - 4/5 proximal, 3/5                       RIGHT UE - 5/5                    LEFT    LE - 5/5                     RIGHT LE - 5/5        Sensory - Intact  to LT      Tone - normal  Psychiatric - flat affect  Skin:  all skin intact    RECENT LABS:                        11.6   6.14  )-----------( 196      ( 11 Dec 2023 06:21 )             35.0     12-11    141  |  106  |  23  ----------------------------<  122<H>  4.1   |  26  |  1.23    Ca    8.9      11 Dec 2023 06:21    TPro  6.4  /  Alb  3.3  /  TBili  0.4  /  DBili  x   /  AST  36  /  ALT  83<H>  /  AlkPhos  50  12-11    LIVER FUNCTIONS - ( 11 Dec 2023 06:21 )  Alb: 3.3 g/dL / Pro: 6.4 g/dL / ALK PHOS: 50 U/L / ALT: 83 U/L / AST: 36 U/L / GGT: x             Urinalysis Basic - ( 11 Dec 2023 06:21 )    Color: x / Appearance: x / SG: x / pH: x  Gluc: 122 mg/dL / Ketone: x  / Bili: x / Urobili: x   Blood: x / Protein: x / Nitrite: x   Leuk Esterase: x / RBC: x / WBC x   Sq Epi: x / Non Sq Epi: x / Bacteria: x          CAPILLARY BLOOD GLUCOSE            MEDICATIONS:  MEDICATIONS  (STANDING):  amLODIPine   Tablet 5 milliGRAM(s) Oral daily  aspirin  chewable 81 milliGRAM(s) Oral <User Schedule>  atorvastatin 80 milliGRAM(s) Oral at bedtime  bisacodyl Suppository 10 milliGRAM(s) Rectal at bedtime  enoxaparin Injectable 30 milliGRAM(s) SubCutaneous <User Schedule>  escitalopram 20 milliGRAM(s) Oral daily  labetalol 200 milliGRAM(s) Oral three times a day  lisinopril 40 milliGRAM(s) Oral daily  pantoprazole    Tablet 40 milliGRAM(s) Oral before breakfast  polyethylene glycol 3350 17 Gram(s) Oral two times a day  senna 2 Tablet(s) Oral at bedtime  sodium chloride 0.9%. 1000 milliLiter(s) (60 mL/Hr) IV Continuous <Continuous>  tamsulosin 0.4 milliGRAM(s) Oral at bedtime  ticagrelor 90 milliGRAM(s) Oral two times a day  traZODone 50 milliGRAM(s) Oral at bedtime    MEDICATIONS  (PRN):  acetaminophen     Tablet .. 650 milliGRAM(s) Oral every 6 hours PRN Temp greater or equal to 38.5C (101.3F), Mild Pain (1 - 3)

## 2023-12-12 NOTE — PROGRESS NOTE ADULT - SUBJECTIVE AND OBJECTIVE BOX
Patient is a 43y old  Male who presents with a chief complaint of CVA (12 Dec 2023 11:18)      Patient seen and examined at bedside. No events overnight. Sitting in chair comfortably, no acute complaints at this time. Denies headache, chest pain, sob, abd pain. States he has been having about 2 BMs daily for last few days but normal BM, has not had one yet today.    ALLERGIES:  No Known Allergies    MEDICATIONS  (STANDING):  amLODIPine   Tablet 5 milliGRAM(s) Oral daily  aspirin  chewable 81 milliGRAM(s) Oral <User Schedule>  atorvastatin 80 milliGRAM(s) Oral at bedtime  bisacodyl Suppository 10 milliGRAM(s) Rectal at bedtime  enoxaparin Injectable 30 milliGRAM(s) SubCutaneous <User Schedule>  escitalopram 20 milliGRAM(s) Oral daily  labetalol 200 milliGRAM(s) Oral three times a day  lisinopril 40 milliGRAM(s) Oral daily  pantoprazole    Tablet 40 milliGRAM(s) Oral before breakfast  polyethylene glycol 3350 17 Gram(s) Oral two times a day  senna 2 Tablet(s) Oral at bedtime  sodium chloride 0.9%. 1000 milliLiter(s) (60 mL/Hr) IV Continuous <Continuous>  tamsulosin 0.4 milliGRAM(s) Oral at bedtime  ticagrelor 90 milliGRAM(s) Oral two times a day  traZODone 50 milliGRAM(s) Oral at bedtime    MEDICATIONS  (PRN):  acetaminophen     Tablet .. 650 milliGRAM(s) Oral every 6 hours PRN Temp greater or equal to 38.5C (101.3F), Mild Pain (1 - 3)    Vital Signs Last 24 Hrs  T(F): 98.1 (12 Dec 2023 08:01), Max: 98.1 (12 Dec 2023 08:01)  HR: 63 (12 Dec 2023 08:01) (62 - 66)  BP: 118/71 (12 Dec 2023 08:01) (118/71 - 150/93)  RR: 16 (12 Dec 2023 08:01) (16 - 16)  SpO2: 97% (12 Dec 2023 08:01) (97% - 97%)  I&O's Summary      PHYSICAL EXAM:  GENERAL: NAD, sitting in chair  HEAD:  Atraumatic, Normocephalic  EYES: PEERL, conjunctiva and sclera clear  ENMT: Moist mucous membranes, Supple, No JVD  CHEST/LUNG: Clear to auscultation bilaterally, good air entry, non-labored breathing  HEART: RRR; S1/S2, No murmur  ABDOMEN: Soft, Nontender, Nondistended; Bowel sounds present  EXTREMITIES: No calf tenderness, No cyanosis, No edema  PSYCH: Normal mood, Normal affect  NERVOUS SYSTEM:  A/O x3, L sided weakness, dysarthria, follows commands    LABS:                        11.6   6.14  )-----------( 196      ( 11 Dec 2023 06:21 )             35.0     12-11    141  |  106  |  23  ----------------------------<  122  4.1   |  26  |  1.23    Ca    8.9      11 Dec 2023 06:21    TPro  6.4  /  Alb  3.3  /  TBili  0.4  /  DBili  x   /  AST  36  /  ALT  83  /  AlkPhos  50  12-11 11-24 Chol 80 mg/dL LDL -- HDL 28 mg/dL Trig 73 mg/dL                  Urinalysis Basic - ( 11 Dec 2023 06:21 )    Color: x / Appearance: x / SG: x / pH: x  Gluc: 122 mg/dL / Ketone: x  / Bili: x / Urobili: x   Blood: x / Protein: x / Nitrite: x   Leuk Esterase: x / RBC: x / WBC x   Sq Epi: x / Non Sq Epi: x / Bacteria: x        COVID-19 PCR: [place] (11-27-23 @ 20:20)      RADIOLOGY & ADDITIONAL TESTS:    Care Discussed with Consultants/Other Providers:

## 2023-12-13 NOTE — PROGRESS NOTE ADULT - SUBJECTIVE AND OBJECTIVE BOX
PROGRESS NOTE:     Patient is a 43y old  Male who presents with a chief complaint of CVA (13 Dec 2023 10:30)          SUBJECTIVE & OBJECTIVE:   Pt seen and examined at bedside in AM. sitting in chair comfortably   no overnight events.   no new complaints    REVIEW OF SYSTEMS: remaining ROS negative     PHYSICAL EXAM:  VITALS:  Vital Signs Last 24 Hrs  T(C): 36.9 (13 Dec 2023 08:31), Max: 37.1 (12 Dec 2023 20:37)  T(F): 98.4 (13 Dec 2023 08:31), Max: 98.8 (12 Dec 2023 20:37)  HR: 69 (13 Dec 2023 08:31) (65 - 73)  BP: 120/75 (13 Dec 2023 08:31) (120/75 - 135/84)  BP(mean): --  RR: 16 (13 Dec 2023 08:31) (16 - 16)  SpO2: 96% (13 Dec 2023 08:31) (96% - 99%)    Parameters below as of 13 Dec 2023 08:31  Patient On (Oxygen Delivery Method): room air          GENERAL: NAD,  no increased WOB  HEAD:  Atraumatic, Normocephalic  EYES: EOMI, conjunctiva and sclera clear  ENMT: Moist mucous membranes  NECK: Supple, No JVD  NERVOUS SYSTEM:  Alert & Oriented, Left sided weakness, dysarthria  CHEST/LUNG: Clear to auscultation bilaterally; No rales, rhonchi, wheezing, or rubs  HEART: Regular rate and rhythm; S1 S2  ABDOMEN: Soft, Nontender, Nondistended; Bowel sounds present  EXTREMITIES:  No clubbing, cyanosis, calf tenderness or edema b/l      MEDICATIONS  (STANDING):  amLODIPine   Tablet 5 milliGRAM(s) Oral daily  aspirin  chewable 81 milliGRAM(s) Oral <User Schedule>  atorvastatin 80 milliGRAM(s) Oral at bedtime  bisacodyl Suppository 10 milliGRAM(s) Rectal at bedtime  enoxaparin Injectable 30 milliGRAM(s) SubCutaneous <User Schedule>  escitalopram 20 milliGRAM(s) Oral daily  labetalol 200 milliGRAM(s) Oral three times a day  lisinopril 40 milliGRAM(s) Oral daily  pantoprazole    Tablet 40 milliGRAM(s) Oral before breakfast  polyethylene glycol 3350 17 Gram(s) Oral two times a day  senna 2 Tablet(s) Oral at bedtime  sodium chloride 0.9%. 1000 milliLiter(s) (60 mL/Hr) IV Continuous <Continuous>  tamsulosin 0.4 milliGRAM(s) Oral at bedtime  ticagrelor 90 milliGRAM(s) Oral two times a day  traZODone 50 milliGRAM(s) Oral at bedtime    MEDICATIONS  (PRN):  acetaminophen     Tablet .. 650 milliGRAM(s) Oral every 6 hours PRN Temp greater or equal to 38.5C (101.3F), Mild Pain (1 - 3)      Allergies    No Known Allergies    Intolerances              LABS:                 CAPILLARY BLOOD GLUCOSE                    RECENT CULTURES:          COVID-19 PCR: NotDetec (11-27-23 @ 20:20)      RADIOLOGY & ADDITIONAL TESTS:    Care Discussed with Consultants/Other Providers:

## 2023-12-13 NOTE — PROGRESS NOTE ADULT - ASSESSMENT
44yo RHD M with PMH significant for strokes (2015 abd 7/2023), T2DM, HTN, former smoker (quit 7/2023),  presented to [facility] ED (11/16) with c/o difficulty walking, LLE weakness, slurred speech (worse from baseline). Images with acute/subacute infarcts. Received TPA with some improvement.     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 Mechanism: TENISHA.  - s/p diagnostic cerebral angiogram on 11/20  - s/p recanalization and L vert stent placement on 11/24.   - cont with ASA 81mg + Ticagrelor 90 BID  - cont with lipitor 80mg   - tolerating comprehensive rehab    #Fever on admission  - infectious work up negative. fever resolved.   - CXR negative  - UA negative. blood cx negative   - COVID negative   - completed 5 days of Rocephin on 12/2     #HTN  - BP goal < 140/90  - Labetalol 200 TID  - Lisinopril 40mg daily   - amlodipine 5 mg daily  - monitor VS including OH and adjust meds    #DM II - A1C 5.5  - Previously on metformin  - serum glucose well controlled. no need for Accu-Cheks     #DVT ppx  - Lovenox 30 BID 44yo RHD M with PMH significant for strokes (2015 abd 7/2023), T2DM, HTN, former smoker (quit 7/2023),  presented to [facility] ED (11/16) with c/o difficulty walking, LLE weakness, slurred speech (worse from baseline). Images with acute/subacute infarcts. Received TPA with some improvement.     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 Mechanism: TENISHA.  - s/p diagnostic cerebral angiogram on 11/20  - s/p recanalization and L vert stent placement on 11/24.   - cont with ASA 81mg + Ticagrelor 90 BID  - cont with lipitor 80mg   - tolerating comprehensive rehab    #Fever on admission  - infectious work up negative. fever resolved.   - CXR negative  - UA negative. blood cx negative   - COVID negative   - completed 5 days of Rocephin on 12/2     #HTN  - BP goal < 140/90  - Labetalol 200 TID  - Lisinopril 40mg daily   - amlodipine 5 mg daily  - monitor VS including OH and adjust meds    #DM II - A1C 5.5  - Previously on metformin  - serum glucose well controlled. no need for Accu-Cheks     #DVT ppx  - Lovenox 30 BID

## 2023-12-13 NOTE — PROGRESS NOTE ADULT - ASSESSMENT
42yo RHD M with PMH significant for HTN, type 2 DM, strokes (2015 abd 7/2023), former smoker (quit 7/2023),  presented to [facility] ED (11/16) with c/o difficulty walking, LLE weakness, slurred speech (worse from baseline). Images with acute/subacute infarcts.    # CVA (2015, 7/2023, 11/2023)   - S/p TPA  - ASA 81mg + Ticagrelor 90 BID  - Atorvastatin 80 (LDL 36)  - continue Comprehensive Rehab Program: PT/OT/ST, 3hours daily and 5 days weekly.     # MALI-- improved  - Bun/Cr improved-- 21/1.23 (12/4) > 27/1.33 (12/7 AM) -- repeat 24/1.29 (12/7PM) >23/1.23 (12/11)    - s/p IVF 1000 ml on 12/7  - Now on thin liquids, can encourage oral intake   -Repeat 12/14    # Mood/sleep  - Lexapro inc 20 (11/29)  - Trazodone 25mg at bedtime  - Neuropsychology support  - Recreation therapy     # HTN  - Labetalol 200 TID  - Amlodipine 5  - Lisinopril 40  - /75 (12/13)    # DM II   - A1C 5.5  - Previously on metformin  - FS: <120  - Glucose via lab: 122 (12/11)     # Respiratory health/RADHA  - Incentive Spirometry during the day time  - CPAP nightly    # Fever on admission --since resolved  - Noted with temp of 101.3 on admission: cbc, cmp, lactate, procalcitonin, UA (neg), blood CXR X 2 (unremarkable)  - blood culture no growth-final  -  WBC wnl     # Pain management  - Tylenol PRN    # FEN   - Previously on regular and mildly thick with supervision in meals 12/2+ free water protocol  -  MBS 12/8- upgraded to thin liquids, continue with regular diet    # GI/Bowel Mgmt - having daily BM  - Senna,  Miralax BID  - Mineral oil enema x 1 (11/30)   - Golytely bowel prep x 1 (12/1) and suppository.   - GI ppx: Protonix    #Bladder management/Urinary retention-- previously needing to be straight cath   - UA (11/27) negative  - continue flomax 0.4  - No longer retaining-- dc'd bladder scans 12/8    # DVT ppx  - Lovenox 30 BID    IDT today, discussed patient's progress with therapies thus far, team goals and expectations upon discharge. , nursing and therapists were present and provided input during meeting.    IDT 12/13  NSG: urinary retention with ISC.  SW: lives with wife and daughter in [place], no step inside  SLP: recent MBS with upgrade: reg/ THIN, doing breath support exercises  OT: Setup eat/groom, min bathing, CGA dressing/transfer, min A shower transfer.  Goal: CGA bathing.   PT: min - CGA bed mob, transfer, min A amb 100' RW/4 step 2 HR Goal: CGA  TDD: 12/19 H    Follow ups:  Neuro: Dr. Kumar Guzman  Rad: Dr. Phoebe Xiao  Card: Dr. Talib Dsouza  IM: Appt 12/19/2023 44yo RHD M with PMH significant for HTN, type 2 DM, strokes (2015 abd 7/2023), former smoker (quit 7/2023),  presented to [facility] ED (11/16) with c/o difficulty walking, LLE weakness, slurred speech (worse from baseline). Images with acute/subacute infarcts.    # CVA (2015, 7/2023, 11/2023)   - S/p TPA  - ASA 81mg + Ticagrelor 90 BID  - Atorvastatin 80 (LDL 36)  - continue Comprehensive Rehab Program: PT/OT/ST, 3hours daily and 5 days weekly.     # MALI-- improved  - Bun/Cr improved-- 21/1.23 (12/4) > 27/1.33 (12/7 AM) -- repeat 24/1.29 (12/7PM) >23/1.23 (12/11)    - s/p IVF 1000 ml on 12/7  - Now on thin liquids, can encourage oral intake   -Repeat 12/14    # Mood/sleep  - Lexapro inc 20 (11/29)  - Trazodone 25mg at bedtime  - Neuropsychology support  - Recreation therapy     # HTN  - Labetalol 200 TID  - Amlodipine 5  - Lisinopril 40  - /75 (12/13)    # DM II   - A1C 5.5  - Previously on metformin  - FS: <120  - Glucose via lab: 122 (12/11)     # Respiratory health/RADHA  - Incentive Spirometry during the day time  - CPAP nightly    # Fever on admission --since resolved  - Noted with temp of 101.3 on admission: cbc, cmp, lactate, procalcitonin, UA (neg), blood CXR X 2 (unremarkable)  - blood culture no growth-final  -  WBC wnl     # Pain management  - Tylenol PRN    # FEN   - Previously on regular and mildly thick with supervision in meals 12/2+ free water protocol  -  MBS 12/8- upgraded to thin liquids, continue with regular diet    # GI/Bowel Mgmt - having daily BM  - Senna,  Miralax BID  - Mineral oil enema x 1 (11/30)   - Golytely bowel prep x 1 (12/1) and suppository.   - GI ppx: Protonix    #Bladder management/Urinary retention-- previously needing to be straight cath   - UA (11/27) negative  - continue flomax 0.4  - No longer retaining-- dc'd bladder scans 12/8    # DVT ppx  - Lovenox 30 BID    IDT today, discussed patient's progress with therapies thus far, team goals and expectations upon discharge. , nursing and therapists were present and provided input during meeting.    IDT 12/13  NSG: urinary retention with ISC.  SW: lives with wife and daughter in [place], no step inside  SLP: recent MBS with upgrade: reg/ THIN, doing breath support exercises  OT: Setup eat/groom, min bathing, CGA dressing/transfer, min A shower transfer.  Goal: CGA bathing.   PT: min - CGA bed mob, transfer, min A amb 100' RW/4 step 2 HR Goal: CGA  TDD: 12/19 H    Follow ups:  Neuro: Dr. Kumar Guzman  Rad: Dr. Phoebe Xiao  Card: Dr. Talib Dsouza  IM: Appt 12/19/2023

## 2023-12-13 NOTE — PROGRESS NOTE ADULT - SUBJECTIVE AND OBJECTIVE BOX
Patient is a 43y old  Male who presents with a chief complaint of CVA (13 Dec 2023 13:00)      HPI:  44yo RHD M with PMH significant for: stroke (7/2023 - presenting with dysarthria - left inferior adrienne, b/l parieto-occipital juxtacortical WM w/mild residual dysarthria; 8 years ago - treated with TPA at [facility] - p/w L hemiplegia, facial droop and dysarthria), T2DM, HTN, former smoker (quit in 7/2023), presented to [facility] ED on 11/16/2023, with c/o difficulty walking, LLE weakness, slurred speech (worse from baseline).     ED exam: patient has a L facial droop, ataxia in LUE (dysmetria with FTN), and struggles to perform HTS b/l. Patient also with pronounced slurred speech (moderate but is intelligible). CTH showing a nonacute left occipital infarct (not seen on the CTH 7/2023). MR with acute/subacute posterior fossa and left occipital lobe infarcts with associated cytotoxic edema. Recieved tenecteplase - 30 minutes after administration, NIHSS 4->2 (mild left facial but improved and mild dysarthria). Patient reports voice is much better. Performs FTN without issue with LUE. Performs HTS b/l. L NLF flattening significantly improved.     Patient was evaluated by PM&R and therapy for functional deficits and gait/ADL impairments and recommend acute rehabilitation.  Patient was medically optimized for discharge to [facility] on 11/27/2023. (27 Nov 2023 13:08)      SUBJECTIVE: Patient seen and examined. No acute overnight events, slept well. Upset he has not been toileted every 3 hours. Spoke to nursing and patient's wife on facetime       REVIEW OF SYSTEMS  Constitutional: No fever, + fatigue  Cardio: No chest pain, No palpitations  Resp: No SOB, no respiratory distress   Neuro: No headaches +weakness    VITALS  43y  Vital Signs Last 24 Hrs  T(C): 36.9 (13 Dec 2023 08:31), Max: 37.1 (12 Dec 2023 20:37)  T(F): 98.4 (13 Dec 2023 08:31), Max: 98.8 (12 Dec 2023 20:37)  HR: 69 (13 Dec 2023 08:31) (65 - 72)  BP: 120/75 (13 Dec 2023 08:31) (120/75 - 135/84)  BP(mean): --  RR: 16 (13 Dec 2023 08:31) (16 - 16)  SpO2: 96% (13 Dec 2023 08:31) (96% - 98%)    Parameters below as of 13 Dec 2023 08:31  Patient On (Oxygen Delivery Method): room air      Daily     Daily         PHYSICAL EXAM:   Gen - NAD, Comfortable, sitting in wheelchair   HEENT - NCAT  Neck - Supple, No limited ROM  Pulm - resp nonlabored  Cardiovascular - warm and well perfused  Abdomen - Soft, NT/ND   Extremities - No peripheral edema, no calf tenderness  Neuro-     Cognitive - awake, alert, oriented x4  - delayed processing vs response     Communication  +dysarthria, +dysphagia,  no aphasia, able to repeat, follows 2 step commands     Cranial Nerves - left facial droop, left facial weakness, decreased left shoulder shrug     Motor - Left sided weakness                    LEFT    UE - 4/5 proximal, 3/5                       RIGHT UE - 5/5                    LEFT    LE - 5/5                     RIGHT LE - 5/5        Sensory - Intact  to LT      Tone - normal  Psychiatric - flat affect  Skin:  all skin intact    RECENT LABS:                    CAPILLARY BLOOD GLUCOSE            MEDICATIONS:  MEDICATIONS  (STANDING):  amLODIPine   Tablet 5 milliGRAM(s) Oral daily  aspirin  chewable 81 milliGRAM(s) Oral <User Schedule>  atorvastatin 80 milliGRAM(s) Oral at bedtime  bisacodyl Suppository 10 milliGRAM(s) Rectal at bedtime  enoxaparin Injectable 30 milliGRAM(s) SubCutaneous <User Schedule>  escitalopram 20 milliGRAM(s) Oral daily  labetalol 200 milliGRAM(s) Oral three times a day  lisinopril 40 milliGRAM(s) Oral daily  pantoprazole    Tablet 40 milliGRAM(s) Oral before breakfast  polyethylene glycol 3350 17 Gram(s) Oral two times a day  senna 2 Tablet(s) Oral at bedtime  sodium chloride 0.9%. 1000 milliLiter(s) (60 mL/Hr) IV Continuous <Continuous>  tamsulosin 0.4 milliGRAM(s) Oral at bedtime  ticagrelor 90 milliGRAM(s) Oral two times a day  traZODone 50 milliGRAM(s) Oral at bedtime    MEDICATIONS  (PRN):  acetaminophen     Tablet .. 650 milliGRAM(s) Oral every 6 hours PRN Temp greater or equal to 38.5C (101.3F), Mild Pain (1 - 3)     Patient is a 43y old  Male who presents with a chief complaint of CVA (13 Dec 2023 13:00)      HPI:  44yo RHD M with PMH significant for: stroke (7/2023 - presenting with dysarthria - left inferior adrienne, b/l parieto-occipital juxtacortical WM w/mild residual dysarthria; 8 years ago - treated with TPA at [facility] [place] - p/w L hemiplegia, facial droop and dysarthria), T2DM, HTN, former smoker (quit in 7/2023), presented to [facility] ED on 11/16/2023, with c/o difficulty walking, LLE weakness, slurred speech (worse from baseline).     ED exam: patient has a L facial droop, ataxia in LUE (dysmetria with FTN), and struggles to perform HTS b/l. Patient also with pronounced slurred speech (moderate but is intelligible). CTH showing a nonacute left occipital infarct (not seen on the CTH 7/2023). MR with acute/subacute posterior fossa and left occipital lobe infarcts with associated cytotoxic edema. Recieved tenecteplase - 30 minutes after administration, NIHSS 4->2 (mild left facial but improved and mild dysarthria). Patient reports voice is much better. Performs FTN without issue with LUE. Performs HTS b/l. L NLF flattening significantly improved.     Patient was evaluated by PM&R and therapy for functional deficits and gait/ADL impairments and recommend acute rehabilitation.  Patient was medically optimized for discharge to [facility] on 11/27/2023. (27 Nov 2023 13:08)      SUBJECTIVE: Patient seen and examined. No acute overnight events, slept well. Upset he has not been toileted every 3 hours. Spoke to nursing and patient's wife on facetime       REVIEW OF SYSTEMS  Constitutional: No fever, + fatigue  Cardio: No chest pain, No palpitations  Resp: No SOB, no respiratory distress   Neuro: No headaches +weakness    VITALS  43y  Vital Signs Last 24 Hrs  T(C): 36.9 (13 Dec 2023 08:31), Max: 37.1 (12 Dec 2023 20:37)  T(F): 98.4 (13 Dec 2023 08:31), Max: 98.8 (12 Dec 2023 20:37)  HR: 69 (13 Dec 2023 08:31) (65 - 72)  BP: 120/75 (13 Dec 2023 08:31) (120/75 - 135/84)  BP(mean): --  RR: 16 (13 Dec 2023 08:31) (16 - 16)  SpO2: 96% (13 Dec 2023 08:31) (96% - 98%)    Parameters below as of 13 Dec 2023 08:31  Patient On (Oxygen Delivery Method): room air      Daily     Daily         PHYSICAL EXAM:   Gen - NAD, Comfortable, sitting in wheelchair   HEENT - NCAT  Neck - Supple, No limited ROM  Pulm - resp nonlabored  Cardiovascular - warm and well perfused  Abdomen - Soft, NT/ND   Extremities - No peripheral edema, no calf tenderness  Neuro-     Cognitive - awake, alert, oriented x4  - delayed processing vs response     Communication  +dysarthria, +dysphagia,  no aphasia, able to repeat, follows 2 step commands     Cranial Nerves - left facial droop, left facial weakness, decreased left shoulder shrug     Motor - Left sided weakness                    LEFT    UE - 4/5 proximal, 3/5                       RIGHT UE - 5/5                    LEFT    LE - 5/5                     RIGHT LE - 5/5        Sensory - Intact  to LT      Tone - normal  Psychiatric - flat affect  Skin:  all skin intact    RECENT LABS:                    CAPILLARY BLOOD GLUCOSE            MEDICATIONS:  MEDICATIONS  (STANDING):  amLODIPine   Tablet 5 milliGRAM(s) Oral daily  aspirin  chewable 81 milliGRAM(s) Oral <User Schedule>  atorvastatin 80 milliGRAM(s) Oral at bedtime  bisacodyl Suppository 10 milliGRAM(s) Rectal at bedtime  enoxaparin Injectable 30 milliGRAM(s) SubCutaneous <User Schedule>  escitalopram 20 milliGRAM(s) Oral daily  labetalol 200 milliGRAM(s) Oral three times a day  lisinopril 40 milliGRAM(s) Oral daily  pantoprazole    Tablet 40 milliGRAM(s) Oral before breakfast  polyethylene glycol 3350 17 Gram(s) Oral two times a day  senna 2 Tablet(s) Oral at bedtime  sodium chloride 0.9%. 1000 milliLiter(s) (60 mL/Hr) IV Continuous <Continuous>  tamsulosin 0.4 milliGRAM(s) Oral at bedtime  ticagrelor 90 milliGRAM(s) Oral two times a day  traZODone 50 milliGRAM(s) Oral at bedtime    MEDICATIONS  (PRN):  acetaminophen     Tablet .. 650 milliGRAM(s) Oral every 6 hours PRN Temp greater or equal to 38.5C (101.3F), Mild Pain (1 - 3)

## 2023-12-13 NOTE — PROGRESS NOTE ADULT - ASSESSMENT
Pt alert, attentive, mildly impaired expressive language, thought processes - goal-directed, thought contents - no abnormalities noted, depressed affect, euthymic mood, denied AH/VH, denied SI/HI/I/P, calm behavior, improved coping, relatedness and communication. Plan: Continue with individual supportive tx.

## 2023-12-13 NOTE — PROGRESS NOTE ADULT - SUBJECTIVE AND OBJECTIVE BOX
Pt was seen for 25 minutes for supportive tx. Pt c/o . Pt reported doing well since last seen. He continues to participate in all his rehab txs and reports continued progress in mobility during PT sessions (increased endurance, walking with walker for longer distances, practicing on stairs), performance of ADLs (still requires assistance for a few of them) and speech (volume and fluency). Pt also reported breathing better during his speech, and also at night with CPAP. Pt expressed concern about assistance sometimes taking a long time to arrive when he needs to go to the bathroom, and reported he advocated for himself and told the nurse manager. Pt was verbally reinforced for advocating for himself. Pt also expressed concern about his speech which still remains slow, rigid and sounding different than baseline. Pt feels somewhat uncomfortable with current way his speech sound, but understands that it will take some time and continued treatment to return closely to how it was prior to this CVA. Discussed how Pt has been responding to tx, with consistent improvement, and how personal factors, including young age, college education, and multilingual status (Pt speaks English, Cantonese and French) work on his favor for a good recovery. Pt expressed no significant concerns about returning home on his tentative d/c date 12/19, and feels that his wife will be able to assist him from home. Pt reported good sleep and appetite, and increased energy levels.  Support and encouragement were provided.      Pt was seen for 25 minutes for supportive tx. Pt c/o . Pt reported doing well since last seen. He continues to participate in all his rehab txs and reports continued progress in mobility during PT sessions (increased endurance, walking with walker for longer distances, practicing on stairs), performance of ADLs (still requires assistance for a few of them) and speech (volume and fluency). Pt also reported breathing better during his speech, and also at night with CPAP. Pt expressed concern about assistance sometimes taking a long time to arrive when he needs to go to the bathroom, and reported he advocated for himself and told the nurse manager. Pt was verbally reinforced for advocating for himself. Pt also expressed concern about his speech which still remains slow, rigid and sounding different than baseline. Pt feels somewhat uncomfortable with current way his speech sound, but understands that it will take some time and continued treatment to return closely to how it was prior to this CVA. Discussed how Pt has been responding to tx, with consistent improvement, and how personal factors, including young age, college education, and multilingual status (Pt speaks English, Cantonese and Croatian) work on his favor for a good recovery. Pt expressed no significant concerns about returning home on his tentative d/c date 12/19, and feels that his wife will be able to assist him from home. Pt reported good sleep and appetite, and increased energy levels.  Support and encouragement were provided.

## 2023-12-14 NOTE — PROGRESS NOTE ADULT - SUBJECTIVE AND OBJECTIVE BOX
Patient is a 43y old  Male who presents with a chief complaint of CVA    HPI:  44yo RHD M with PMH significant for: stroke (7/2023 - presenting with dysarthria - left inferior adrienne, b/l parieto-occipital juxtacortical WM w/mild residual dysarthria; 8 years ago - treated with TPA at [facility] [place] - p/w L hemiplegia, facial droop and dysarthria), T2DM, HTN, former smoker (quit in 7/2023), presented to [facility] ED on 11/16/2023, with c/o difficulty walking, LLE weakness, slurred speech (worse from baseline).     ED exam: patient has a L facial droop, ataxia in LUE (dysmetria with FTN), and struggles to perform HTS b/l. Patient also with pronounced slurred speech (moderate but is intelligible). CTH showing a nonacute left occipital infarct (not seen on the CTH 7/2023). MR with acute/subacute posterior fossa and left occipital lobe infarcts with associated cytotoxic edema. Recieved tenecteplase - 30 minutes after administration, NIHSS 4->2 (mild left facial but improved and mild dysarthria). Patient reports voice is much better. Performs FTN without issue with LUE. Performs HTS b/l. L NLF flattening significantly improved.     Patient was evaluated by PM&R and therapy for functional deficits and gait/ADL impairments and recommend acute rehabilitation.  Patient was medically optimized for discharge to [place] on 11/27/2023.    SUBJECTIVE: Patient seen and examined while sitting up in WC at bedside.  Sleeping well.  Better response time with call ball.     REVIEW OF SYSTEMS  Constitutional: No fever, no chills  Cardio: No chest pain, No palpitations  Resp: No SOB, no respiratory distress   GI: No abd pain, no nausea; last BM 12/11  Neuro: No headaches, no dizziness, +weakness    VITALS  43y  Vital Signs Last 24 Hrs  T(C): 36.8 (14 Dec 2023 09:05), Max: 36.8 (14 Dec 2023 09:05)  T(F): 98.3 (14 Dec 2023 09:05), Max: 98.3 (14 Dec 2023 09:05)  HR: 67 (14 Dec 2023 09:05) (61 - 72)  BP: 118/76 (14 Dec 2023 09:05) (112/73 - 122/75)  RR: 16 (14 Dec 2023 09:05) (16 - 16)  SpO2: 98% (14 Dec 2023 09:05) (97% - 100%)    PHYSICAL EXAM:   Gen - NAD, Comfortable, sitting in wheelchair   HEENT - NCAT  Neck - Supple, No limited ROM  Pulm - resp nonlabored  Cardiovascular - warm and well perfused  Abdomen - Soft, NT/ND   Extremities - No peripheral edema, no calf tenderness  Neuro-     Cognitive: awake, alert, oriented x4  - delayed processing vs response     Communication:  +dysarthria, +dysphagia - reg/thin with strategy,  no aphasia, able to repeat, follows 2 step commands     Cranial Nerves - left facial droop, left facial weakness, decreased left shoulder shrug (improving)     Motor -                     LEFT    UE - 5/5 proximal, 4/5                       RIGHT UE - 5/5                    LEFT    LE - 5/5                     RIGHT LE - 5/5        Sensory - Intact  to LT      Coordination: FTN dysmetric to left     Tone - normal  Psychiatric - flat affect  Skin:  all skin intact    RECENT LABS:             12.7   6.25  )-----------( 198      ( 14 Dec 2023 08:47 )             36.7     12-14    142  |  106  |  19  ----------------------------<  127<H>  4.2   |  26  |  1.21    Ca    9.1      14 Dec 2023 08:47    TPro  6.6  /  Alb  3.3  /  TBili  0.5  /  DBili  x   /  AST  26  /  ALT  74<H>  /  AlkPhos  50  12-14    LIVER FUNCTIONS - ( 14 Dec 2023 08:47 )  Alb: 3.3 g/dL / Pro: 6.6 g/dL / ALK PHOS: 50 U/L / ALT: 74 U/L / AST: 26 U/L / GGT: x             MEDICATIONS:  MEDICATIONS  (STANDING):  amLODIPine   Tablet 5 milliGRAM(s) Oral daily  aspirin  chewable 81 milliGRAM(s) Oral <User Schedule>  atorvastatin 80 milliGRAM(s) Oral at bedtime  bisacodyl Suppository 10 milliGRAM(s) Rectal at bedtime  enoxaparin Injectable 30 milliGRAM(s) SubCutaneous <User Schedule>  escitalopram 20 milliGRAM(s) Oral daily  labetalol 200 milliGRAM(s) Oral three times a day  lisinopril 40 milliGRAM(s) Oral daily  pantoprazole    Tablet 40 milliGRAM(s) Oral before breakfast  polyethylene glycol 3350 17 Gram(s) Oral two times a day  senna 2 Tablet(s) Oral at bedtime  sodium chloride 0.9%. 1000 milliLiter(s) (60 mL/Hr) IV Continuous <Continuous>  tamsulosin 0.4 milliGRAM(s) Oral at bedtime  ticagrelor 90 milliGRAM(s) Oral two times a day  traZODone 50 milliGRAM(s) Oral at bedtime    MEDICATIONS  (PRN):  acetaminophen     Tablet .. 650 milliGRAM(s) Oral every 6 hours PRN Temp greater or equal to 38.5C (101.3F), Mild Pain (1 - 3)   Patient is a 43y old  Male who presents with a chief complaint of CVA    HPI:  44yo RHD M with PMH significant for: stroke (7/2023 - presenting with dysarthria - left inferior adrienne, b/l parieto-occipital juxtacortical WM w/mild residual dysarthria; 8 years ago - treated with TPA at [facility] - p/w L hemiplegia, facial droop and dysarthria), T2DM, HTN, former smoker (quit in 7/2023), presented to [facility] ED on 11/16/2023, with c/o difficulty walking, LLE weakness, slurred speech (worse from baseline).     ED exam: patient has a L facial droop, ataxia in LUE (dysmetria with FTN), and struggles to perform HTS b/l. Patient also with pronounced slurred speech (moderate but is intelligible). CTH showing a nonacute left occipital infarct (not seen on the CTH 7/2023). MR with acute/subacute posterior fossa and left occipital lobe infarcts with associated cytotoxic edema. Recieved tenecteplase - 30 minutes after administration, NIHSS 4->2 (mild left facial but improved and mild dysarthria). Patient reports voice is much better. Performs FTN without issue with LUE. Performs HTS b/l. L NLF flattening significantly improved.     Patient was evaluated by PM&R and therapy for functional deficits and gait/ADL impairments and recommend acute rehabilitation.  Patient was medically optimized for discharge to [place] on 11/27/2023.    SUBJECTIVE: Patient seen and examined while sitting up in WC at bedside.  Sleeping well.  Better response time with call ball.     REVIEW OF SYSTEMS  Constitutional: No fever, no chills  Cardio: No chest pain, No palpitations  Resp: No SOB, no respiratory distress   GI: No abd pain, no nausea; last BM 12/11  Neuro: No headaches, no dizziness, +weakness    VITALS  43y  Vital Signs Last 24 Hrs  T(C): 36.8 (14 Dec 2023 09:05), Max: 36.8 (14 Dec 2023 09:05)  T(F): 98.3 (14 Dec 2023 09:05), Max: 98.3 (14 Dec 2023 09:05)  HR: 67 (14 Dec 2023 09:05) (61 - 72)  BP: 118/76 (14 Dec 2023 09:05) (112/73 - 122/75)  RR: 16 (14 Dec 2023 09:05) (16 - 16)  SpO2: 98% (14 Dec 2023 09:05) (97% - 100%)    PHYSICAL EXAM:   Gen - NAD, Comfortable, sitting in wheelchair   HEENT - NCAT  Neck - Supple, No limited ROM  Pulm - resp nonlabored  Cardiovascular - warm and well perfused  Abdomen - Soft, NT/ND   Extremities - No peripheral edema, no calf tenderness  Neuro-     Cognitive: awake, alert, oriented x4  - delayed processing vs response     Communication:  +dysarthria, +dysphagia - reg/thin with strategy,  no aphasia, able to repeat, follows 2 step commands     Cranial Nerves - left facial droop, left facial weakness, decreased left shoulder shrug (improving)     Motor -                     LEFT    UE - 5/5 proximal, 4/5                       RIGHT UE - 5/5                    LEFT    LE - 5/5                     RIGHT LE - 5/5        Sensory - Intact  to LT      Coordination: FTN dysmetric to left     Tone - normal  Psychiatric - flat affect  Skin:  all skin intact    RECENT LABS:             12.7   6.25  )-----------( 198      ( 14 Dec 2023 08:47 )             36.7     12-14    142  |  106  |  19  ----------------------------<  127<H>  4.2   |  26  |  1.21    Ca    9.1      14 Dec 2023 08:47    TPro  6.6  /  Alb  3.3  /  TBili  0.5  /  DBili  x   /  AST  26  /  ALT  74<H>  /  AlkPhos  50  12-14    LIVER FUNCTIONS - ( 14 Dec 2023 08:47 )  Alb: 3.3 g/dL / Pro: 6.6 g/dL / ALK PHOS: 50 U/L / ALT: 74 U/L / AST: 26 U/L / GGT: x             MEDICATIONS:  MEDICATIONS  (STANDING):  amLODIPine   Tablet 5 milliGRAM(s) Oral daily  aspirin  chewable 81 milliGRAM(s) Oral <User Schedule>  atorvastatin 80 milliGRAM(s) Oral at bedtime  bisacodyl Suppository 10 milliGRAM(s) Rectal at bedtime  enoxaparin Injectable 30 milliGRAM(s) SubCutaneous <User Schedule>  escitalopram 20 milliGRAM(s) Oral daily  labetalol 200 milliGRAM(s) Oral three times a day  lisinopril 40 milliGRAM(s) Oral daily  pantoprazole    Tablet 40 milliGRAM(s) Oral before breakfast  polyethylene glycol 3350 17 Gram(s) Oral two times a day  senna 2 Tablet(s) Oral at bedtime  sodium chloride 0.9%. 1000 milliLiter(s) (60 mL/Hr) IV Continuous <Continuous>  tamsulosin 0.4 milliGRAM(s) Oral at bedtime  ticagrelor 90 milliGRAM(s) Oral two times a day  traZODone 50 milliGRAM(s) Oral at bedtime    MEDICATIONS  (PRN):  acetaminophen     Tablet .. 650 milliGRAM(s) Oral every 6 hours PRN Temp greater or equal to 38.5C (101.3F), Mild Pain (1 - 3)

## 2023-12-14 NOTE — PROGRESS NOTE ADULT - ASSESSMENT
44yo RHD M with PMH significant for HTN, type 2 DM, strokes (2015 abd 7/2023), former smoker (quit 7/2023),  presented to [facility] ED (11/16) with c/o difficulty walking, LLE weakness, slurred speech (worse from baseline). Images with acute/subacute infarcts.    # CVA (2015, 7/2023, 11/2023)   - S/p TPA  - ASA 81mg + Ticagrelor 90 BID  - Atorvastatin 80 (LDL 36)  - continue Comprehensive Rehab Program: PT/OT/ST, 3hours daily and 5 days weekly.     # MALI-- improved  - Bun/Cr improved-- 23/1.23 (12/11) >19/1.21 (12/14)  - s/p IVF 1000 ml on 12/7  - Now on thin liquids, encourage oral intake     # Mood/sleep  - Lexapro inc 20 (11/29)  - Trazodone 25mg at bedtime  - Neuropsychology support  - Recreation therapy     # HTN  - Labetalol 200 TID  - Amlodipine 5  - Lisinopril 40  - /73 (norvasc and lisinopril held, received betablocker), repeat BP 3 hours later 118/76 - asymptomatic    # DM II   - A1C 5.5  - Previously on metformin  - FS: <120  - Glucose via lab: 127 (12/14)    # Respiratory health/RADHA  - Incentive Spirometry during the day time  - CPAP nightly    # Fever on admission --since resolved  - Noted with temp of 101.3 on admission: cbc, cmp, lactate, procalcitonin, UA (neg), blood CXR X 2 (unremarkable)  - blood culture no growth-final  -  WBC wnl     # Pain management  - Tylenol PRN    # FEN   - Previously on regular and mildly thick with supervision in meals 12/2+ free water protocol  -  MBS 12/8- upgraded to thin liquids, continue with regular diet    # GI/Bowel Mgmt   - Senna (last dose 12/10*),  Miralax BID-have been refusing since 12/9*** last BM 12/11****  - Mineral oil enema x 1 (11/30)   - Golytely bowel prep x 1 (12/1) and suppository.   - GI ppx: Protonix    #Bladder management/Urinary retention-- previously needing to be straight cath   - UA (11/27) negative  - continue flomax 0.4  - No longer retaining-- dc'd bladder scans 12/8    # DVT ppx  - Lovenox 30 BID    IDT 12/13  NSG: urinary retention with ISC.  SW: lives with wife and daughter in [place], no step inside  SLP: recent MBS with upgrade: reg/ THIN, doing breath support exercises  OT: Setup eat/groom, min bathing, CGA dressing/transfer, min A shower transfer.  Goal: CGA bathing.   PT: min - CGA bed mob, transfer, min A amb 100' RW/4 step 2 HR Goal: CGA  TDD: 12/19 H    Follow ups:  Neuro: Dr. Kumar Guzman  Rad: Dr. Phoebe Xiao  Card: Dr. Talib Dsouza  IM: Appt 12/19/2023 44yo RHD M with PMH significant for HTN, type 2 DM, strokes (2015 abd 7/2023), former smoker (quit 7/2023),  presented to [facility] ED (11/16) with c/o difficulty walking, LLE weakness, slurred speech (worse from baseline). Images with acute/subacute infarcts.    # CVA (2015, 7/2023, 11/2023)   - S/p TPA  - ASA 81mg + Ticagrelor 90 BID  - Atorvastatin 80 (LDL 36)  - continue Comprehensive Rehab Program: PT/OT/ST, 3hours daily and 5 days weekly.     # MALI-- improved  - Bun/Cr improved-- 23/1.23 (12/11) >19/1.21 (12/14)  - s/p IVF 1000 ml on 12/7  - Now on thin liquids, encourage oral intake     # Mood/sleep  - Lexapro inc 20 (11/29)  - Trazodone 25mg at bedtime  - Neuropsychology support  - Recreation therapy     # HTN  - Labetalol 200 TID  - Amlodipine 5  - Lisinopril 40  - /73 (norvasc and lisinopril held, received betablocker), repeat BP 3 hours later 118/76 - asymptomatic    # DM II   - A1C 5.5  - Previously on metformin  - FS: <120  - Glucose via lab: 127 (12/14)    # Respiratory health/RADHA  - Incentive Spirometry during the day time  - CPAP nightly    # Fever on admission --since resolved  - Noted with temp of 101.3 on admission: cbc, cmp, lactate, procalcitonin, UA (neg), blood CXR X 2 (unremarkable)  - blood culture no growth-final  -  WBC wnl     # Pain management  - Tylenol PRN    # FEN   - Previously on regular and mildly thick with supervision in meals 12/2+ free water protocol  -  MBS 12/8- upgraded to thin liquids, continue with regular diet    # GI/Bowel Mgmt   - Senna (last dose 12/10*),  Miralax BID-have been refusing since 12/9*** last BM 12/11****  - Mineral oil enema x 1 (11/30)   - Golytely bowel prep x 1 (12/1) and suppository.   - GI ppx: Protonix    #Bladder management/Urinary retention-- previously needing to be straight cath   - UA (11/27) negative  - continue flomax 0.4  - No longer retaining-- dc'd bladder scans 12/8    # DVT ppx  - Lovenox 30 BID    IDT 12/13  NSG: urinary retention with ISC.  SW: lives with wife and daughter in [place], no step inside  SLP: recent MBS with upgrade: reg/ THIN, doing breath support exercises  OT: Setup eat/groom, min bathing, CGA dressing/transfer, min A shower transfer.  Goal: CGA bathing.   PT: min - CGA bed mob, transfer, min A amb 100' RW/4 step 2 HR Goal: CGA  TDD: 12/19 H    Follow ups:  Neuro: Dr. Kumar Guzman  Rad: Dr. Phoebe Xiao  Card: Dr. Talib Dsouza  IM: Appt 12/19/2023

## 2023-12-15 NOTE — PROGRESS NOTE ADULT - ASSESSMENT
44yo RHD M with PMH significant for HTN, type 2 DM, strokes (2015 abd 7/2023), former smoker (quit 7/2023),  presented to [facility] ED (11/16) with c/o difficulty walking, LLE weakness, slurred speech (worse from baseline). Images with acute/subacute infarcts.    # CVA (2015, 7/2023, 11/2023)   - S/p TPA  - ASA 81mg + Ticagrelor 90 BID  - Atorvastatin 80 (LDL 36)  - continue Comprehensive Rehab Program: PT/OT/ST, 3hours daily and 5 days weekly.     # MALI-- improved  - Bun/Cr improved-- 23/1.23 (12/11) >19/1.21 (12/14)  - s/p IVF 1000 ml on 12/7  - Now on thin liquids, encourage oral intake     # Mood/sleep  - Lexapro inc 20 (11/29)  - Trazodone 25mg at bedtime  - Neuropsychology support  - Recreation therapy     # HTN  - Labetalol 200 TID  - Amlodipine 5  - Lisinopril 40  - /73 (norvasc and lisinopril held, received betablocker), repeat BP 3 hours later 118/76 - asymptomatic    # DM II   - A1C 5.5  - Previously on metformin  - FS: <120  - Glucose via lab: 127 (12/14)    # Respiratory health/RADHA  - Incentive Spirometry during the day time  - CPAP nightly    # Fever on admission --since resolved  - Noted with temp of 101.3 on admission: cbc, cmp, lactate, procalcitonin, UA (neg), blood CXR X 2 (unremarkable)  - blood culture no growth-final  -  WBC wnl     # Pain management  - Tylenol PRN    # FEN   - Previously on regular and mildly thick with supervision in meals 12/2+ free water protocol  -  MBS 12/8- upgraded to thin liquids, continue with regular diet    # GI/Bowel Mgmt   - Senna (last dose 12/10*),  Miralax BID-have been refusing since 12/9*** last BM 12/11****  - Mineral oil enema x 1 (11/30)   - Golytely bowel prep x 1 (12/1) and suppository.   - GI ppx: Protonix    #Bladder management/Urinary retention-- previously needing to be straight cath   - UA (11/27) negative  - continue flomax 0.4  - No longer retaining-- dc'd bladder scans 12/8    # DVT ppx  - Lovenox 30 BID    IDT 12/13  NSG: urinary retention with ISC.  SW: lives with wife and daughter in [place], no step inside  SLP: recent MBS with upgrade: reg/ THIN, doing breath support exercises  OT: Setup eat/groom, min bathing, CGA dressing/transfer, min A shower transfer.  Goal: CGA bathing.   PT: min - CGA bed mob, transfer, min A amb 100' RW/4 step 2 HR Goal: CGA  TDD: 12/19 H    Follow ups:  Neuro: Dr. Kumar Guzman  Rad: Dr. Phoebe Xiao  Card: Dr. Talib Dsouza  IM: Appt 12/19/2023 42yo RHD M with PMH significant for HTN, type 2 DM, strokes (2015 abd 7/2023), former smoker (quit 7/2023),  presented to [facility] ED (11/16) with c/o difficulty walking, LLE weakness, slurred speech (worse from baseline). Images with acute/subacute infarcts.    # CVA (2015, 7/2023, 11/2023)   - S/p TPA  - ASA 81mg + Ticagrelor 90 BID  - Atorvastatin 80 (LDL 36)  - continue Comprehensive Rehab Program: PT/OT/ST, 3hours daily and 5 days weekly.     # MALI-- improved  - Bun/Cr improved-- 23/1.23 (12/11) >19/1.21 (12/14)  - s/p IVF 1000 ml on 12/7  - Now on thin liquids, encourage oral intake     # Mood/sleep  - Lexapro inc 20 (11/29)  - Trazodone 25mg at bedtime  - Neuropsychology support  - Recreation therapy     # HTN  - Labetalol 200 TID  - Amlodipine 5  - Lisinopril 40  - /73 (norvasc and lisinopril held, received betablocker), repeat BP 3 hours later 118/76 - asymptomatic    # DM II   - A1C 5.5  - Previously on metformin  - FS: <120  - Glucose via lab: 127 (12/14)    # Respiratory health/RADHA  - Incentive Spirometry during the day time  - CPAP nightly    # Fever on admission --since resolved  - Noted with temp of 101.3 on admission: cbc, cmp, lactate, procalcitonin, UA (neg), blood CXR X 2 (unremarkable)  - blood culture no growth-final  -  WBC wnl     # Pain management  - Tylenol PRN    # FEN   - Previously on regular and mildly thick with supervision in meals 12/2+ free water protocol  -  MBS 12/8- upgraded to thin liquids, continue with regular diet    # GI/Bowel Mgmt   - Senna (last dose 12/10*),  Miralax BID-have been refusing since 12/9*** last BM 12/11****  - Mineral oil enema x 1 (11/30)   - Golytely bowel prep x 1 (12/1) and suppository.   - GI ppx: Protonix    #Bladder management/Urinary retention-- previously needing to be straight cath   - UA (11/27) negative  - continue flomax 0.4  - No longer retaining-- dc'd bladder scans 12/8    # DVT ppx  - Lovenox 30 BID    IDT 12/13  NSG: urinary retention with ISC.  SW: lives with wife and daughter in [place], no step inside  SLP: recent MBS with upgrade: reg/ THIN, doing breath support exercises  OT: Setup eat/groom, min bathing, CGA dressing/transfer, min A shower transfer.  Goal: CGA bathing.   PT: min - CGA bed mob, transfer, min A amb 100' RW/4 step 2 HR Goal: CGA  TDD: 12/19 H    Follow ups:  Neuro: Dr. Kumar Guzman  Rad: Dr. Phoebe Xiao  Card: Dr. Talib Dsouza  IM: Appt 12/19/2023 42yo RHD M with PMH significant for HTN, type 2 DM, strokes (2015 abd 7/2023), former smoker (quit 7/2023),  presented to [facility] ED (11/16) with c/o difficulty walking, LLE weakness, slurred speech (worse from baseline). Images with acute/subacute infarcts.    # CVA (2015, 7/2023, 11/2023)   - S/p TPA  - ASA 81mg + Ticagrelor 90 BID  - Atorvastatin 80 (LDL 36)  - continue Comprehensive Rehab Program: PT/OT/ST, 3hours daily and 5 days weekly.     # MALI-- improved  - Bun/Cr improved-- 23/1.23 (12/11) >19/1.21 (12/14)  - s/p IVF 1000 ml on 12/7  - Now on thin liquids, encourage oral intake     # Mood/sleep  - Lexapro inc 20 (11/29)  - Trazodone bedtime+ PRN  - Neuropsychology support  - Recreation therapy     # HTN  - Labetalol 200 TID  - Amlodipine 5  - Lisinopril 40  - /81 pre med; 127/77 post med (12/15)    # DM II   - A1C 5.5  - Previously on metformin  - FS: <120  - Glucose via lab: 127 (12/14)    # Respiratory health/RADHA  - Incentive Spirometry during the day time  - CPAP nightly    # Fever on admission --since resolved  - Noted with temp of 101.3 on admission: cbc, cmp, lactate, procalcitonin, UA (neg), blood CXR X 2 (unremarkable)  - blood culture no growth-final  -  WBC wnl     # Pain management  - Tylenol PRN    # FEN   - Previously on regular and mildly thick with supervision in meals 12/2+ free water protocol  -  MBS 12/8- upgraded to thin liquids, continue with regular diet    # GI/Bowel Mgmt   - Senna,  Miralax BID PRN  - Mineral oil enema x 1 (11/30)   - Golytely bowel prep x 1 (12/1) and suppository.   - GI ppx: Protonix    #Bladder management/Urinary retention-- previously needing to be straight cath   - UA (11/27) negative  - continue flomax 0.4  - No longer retaining-- dc'd bladder scans 12/8    # DVT ppx  - Lovenox 30 BID    IDT 12/13  NSG: urinary retention with ISC.  SW: lives with wife and daughter in [place], no step inside  SLP: recent MBS with upgrade: reg/ THIN, doing breath support exercises  OT: Setup eat/groom, min bathing, CGA dressing/transfer, min A shower transfer.  Goal: CGA bathing.   PT: min - CGA bed mob, transfer, min A amb 100' RW/4 step 2 HR Goal: CGA  TDD: 12/19 H    Follow ups:  Neuro: Dr. Kumar Guzman  Rad: Dr. Phoebe Xiao  Card: Dr. Talib Dsouza  IM: Appt 12/19/2023 44yo RHD M with PMH significant for HTN, type 2 DM, strokes (2015 abd 7/2023), former smoker (quit 7/2023),  presented to [facility] ED (11/16) with c/o difficulty walking, LLE weakness, slurred speech (worse from baseline). Images with acute/subacute infarcts.    # CVA (2015, 7/2023, 11/2023)   - S/p TPA  - ASA 81mg + Ticagrelor 90 BID  - Atorvastatin 80 (LDL 36)  - continue Comprehensive Rehab Program: PT/OT/ST, 3hours daily and 5 days weekly.     # MALI-- improved  - Bun/Cr improved-- 23/1.23 (12/11) >19/1.21 (12/14)  - s/p IVF 1000 ml on 12/7  - Now on thin liquids, encourage oral intake     # Mood/sleep  - Lexapro inc 20 (11/29)  - Trazodone bedtime+ PRN  - Neuropsychology support  - Recreation therapy     # HTN  - Labetalol 200 TID  - Amlodipine 5  - Lisinopril 40  - /81 pre med; 127/77 post med (12/15)    # DM II   - A1C 5.5  - Previously on metformin  - FS: <120  - Glucose via lab: 127 (12/14)    # Respiratory health/RADHA  - Incentive Spirometry during the day time  - CPAP nightly    # Fever on admission --since resolved  - Noted with temp of 101.3 on admission: cbc, cmp, lactate, procalcitonin, UA (neg), blood CXR X 2 (unremarkable)  - blood culture no growth-final  -  WBC wnl     # Pain management  - Tylenol PRN    # FEN   - Previously on regular and mildly thick with supervision in meals 12/2+ free water protocol  -  MBS 12/8- upgraded to thin liquids, continue with regular diet    # GI/Bowel Mgmt   - Senna,  Miralax BID PRN  - Mineral oil enema x 1 (11/30)   - Golytely bowel prep x 1 (12/1) and suppository.   - GI ppx: Protonix    #Bladder management/Urinary retention-- previously needing to be straight cath   - UA (11/27) negative  - continue flomax 0.4  - No longer retaining-- dc'd bladder scans 12/8    # DVT ppx  - Lovenox 30 BID    IDT 12/13  NSG: urinary retention with ISC.  SW: lives with wife and daughter in [place], no step inside  SLP: recent MBS with upgrade: reg/ THIN, doing breath support exercises  OT: Setup eat/groom, min bathing, CGA dressing/transfer, min A shower transfer.  Goal: CGA bathing.   PT: min - CGA bed mob, transfer, min A amb 100' RW/4 step 2 HR Goal: CGA  TDD: 12/19 H    Follow ups:  Neuro: Dr. Kumar Guzman  Rad: Dr. Phoebe Xiao  Card: Dr. Talib Dsouza  IM: Appt 12/19/2023

## 2023-12-15 NOTE — PROGRESS NOTE ADULT - SUBJECTIVE AND OBJECTIVE BOX
Patient is a 43y old  Male who presents with a chief complaint of CVA    HPI:  42yo RHD M with PMH significant for: stroke (7/2023 - presenting with dysarthria - left inferior adrienne, b/l parieto-occipital juxtacortical WM w/mild residual dysarthria; 8 years ago - treated with TPA at [facility] - p/w L hemiplegia, facial droop and dysarthria), T2DM, HTN, former smoker (quit in 7/2023), presented to [facility] ED on 11/16/2023, with c/o difficulty walking, LLE weakness, slurred speech (worse from baseline).     ED exam: patient has a L facial droop, ataxia in LUE (dysmetria with FTN), and struggles to perform HTS b/l. Patient also with pronounced slurred speech (moderate but is intelligible). CTH showing a nonacute left occipital infarct (not seen on the CTH 7/2023). MR with acute/subacute posterior fossa and left occipital lobe infarcts with associated cytotoxic edema. Recieved tenecteplase - 30 minutes after administration, NIHSS 4->2 (mild left facial but improved and mild dysarthria). Patient reports voice is much better. Performs FTN without issue with LUE. Performs HTS b/l. L NLF flattening significantly improved.     Patient was evaluated by PM&R and therapy for functional deficits and gait/ADL impairments and recommend acute rehabilitation.  Patient was medically optimized for discharge to [place] on 11/27/2023.    SUBJECTIVE: Patient seen and examined while sitting up in WC having breakfast independently.  Discussed discharge plan and medications.   Discussed weaning trazodone use, patient actually take trazodone 25 PRN at home.  Will wean to home dose at this time. Sleeping well    REVIEW OF SYSTEMS  Constitutional: No fever, no chills  Cardio: No chest pain, No palpitations  Resp: No SOB, no respiratory distress   GI: No abd pain, no nausea; last BM 12/15  Neuro: No headaches, no dizziness, +weakness    VITALS  43y  Vital Signs Last 24 Hrs  T(C): 36.6 (12-15-23 @ 08:10), Max: 36.6 (12-14-23 @ 21:55)  T(F): 97.9 (12-15-23 @ 08:10), Max: 97.9 (12-14-23 @ 21:55)  HR: 59 (12-15-23 @ 08:10) (56 - 75)  BP: 127/77 (12-15-23 @ 08:10) (127/77 - 137/87)  RR: 16 (12-15-23 @ 08:10) (15 - 16)  SpO2: 98% (12-15-23 @ 08:10) (97% - 98%)    PHYSICAL EXAM:   Gen - NAD, Comfortable, sitting in wheelchair   HEENT - NCAT  Neck - Supple, No limited ROM  Pulm - resp nonlabored  Cardiovascular - warm and well perfused  Abdomen - Soft, NT/ND   Extremities - No peripheral edema, no calf tenderness  Neuro-     Cognitive: awake, alert, oriented x4  - delayed processing vs response     Communication:  +dysarthria, +dysphagia - reg/thin with strategy,  no aphasia, able to repeat, follows 2 step commands     Cranial Nerves - left facial droop, left facial weakness, decreased left shoulder shrug (improving)     Motor -                     LEFT    UE - 5/5 proximal, 4/5                       RIGHT UE - 5/5                    LEFT    LE - 5/5                     RIGHT LE - 5/5        Sensory - Intact  to LT      Coordination: FTN dysmetric to left     Tone - normal  Psychiatric - flat affect  Skin:  all skin intact    RECENT LABS:             12.7   6.25  )-----------( 198      ( 14 Dec 2023 08:47 )             36.7     12-14    142  |  106  |  19  ----------------------------<  127<H>  4.2   |  26  |  1.21    Ca    9.1      14 Dec 2023 08:47    TPro  6.6  /  Alb  3.3  /  TBili  0.5  /  DBili  x   /  AST  26  /  ALT  74<H>  /  AlkPhos  50  12-14    LIVER FUNCTIONS - ( 14 Dec 2023 08:47 )  Alb: 3.3 g/dL / Pro: 6.6 g/dL / ALK PHOS: 50 U/L / ALT: 74 U/L / AST: 26 U/L / GGT: x             MEDICATIONS:  MEDICATIONS  (STANDING):  amLODIPine   Tablet 5 milliGRAM(s) Oral daily  aspirin  chewable 81 milliGRAM(s) Oral <User Schedule>  atorvastatin 80 milliGRAM(s) Oral at bedtime  bisacodyl Suppository 10 milliGRAM(s) Rectal at bedtime  enoxaparin Injectable 30 milliGRAM(s) SubCutaneous <User Schedule>  escitalopram 20 milliGRAM(s) Oral daily  labetalol 200 milliGRAM(s) Oral three times a day  lisinopril 40 milliGRAM(s) Oral daily  pantoprazole    Tablet 40 milliGRAM(s) Oral before breakfast  polyethylene glycol 3350 17 Gram(s) Oral two times a day  senna 2 Tablet(s) Oral at bedtime  sodium chloride 0.9%. 1000 milliLiter(s) (60 mL/Hr) IV Continuous <Continuous>  tamsulosin 0.4 milliGRAM(s) Oral at bedtime  ticagrelor 90 milliGRAM(s) Oral two times a day  traZODone 50 milliGRAM(s) Oral at bedtime    MEDICATIONS  (PRN):  acetaminophen     Tablet .. 650 milliGRAM(s) Oral every 6 hours PRN Temp greater or equal to 38.5C (101.3F), Mild Pain (1 - 3)   Patient is a 43y old  Male who presents with a chief complaint of CVA    HPI:  42yo RHD M with PMH significant for: stroke (7/2023 - presenting with dysarthria - left inferior adrienne, b/l parieto-occipital juxtacortical WM w/mild residual dysarthria; 8 years ago - treated with TPA at [facility] - p/w L hemiplegia, facial droop and dysarthria), T2DM, HTN, former smoker (quit in 7/2023), presented to [facility] ED on 11/16/2023, with c/o difficulty walking, LLE weakness, slurred speech (worse from baseline).     ED exam: patient has a L facial droop, ataxia in LUE (dysmetria with FTN), and struggles to perform HTS b/l. Patient also with pronounced slurred speech (moderate but is intelligible). CTH showing a nonacute left occipital infarct (not seen on the CTH 7/2023). MR with acute/subacute posterior fossa and left occipital lobe infarcts with associated cytotoxic edema. Recieved tenecteplase - 30 minutes after administration, NIHSS 4->2 (mild left facial but improved and mild dysarthria). Patient reports voice is much better. Performs FTN without issue with LUE. Performs HTS b/l. L NLF flattening significantly improved.     Patient was evaluated by PM&R and therapy for functional deficits and gait/ADL impairments and recommend acute rehabilitation.  Patient was medically optimized for discharge to [facility] on 11/27/2023.    SUBJECTIVE: Patient seen and examined while sitting up in WC having breakfast independently.  Discussed discharge plan and medications.   Discussed weaning trazodone use, patient actually take trazodone 25 PRN at home.  Will wean to home dose at this time. Sleeping well    REVIEW OF SYSTEMS  Constitutional: No fever, no chills  Cardio: No chest pain, No palpitations  Resp: No SOB, no respiratory distress   GI: No abd pain, no nausea; last BM 12/15  Neuro: No headaches, no dizziness, +weakness    VITALS  43y  Vital Signs Last 24 Hrs  T(C): 36.6 (12-15-23 @ 08:10), Max: 36.6 (12-14-23 @ 21:55)  T(F): 97.9 (12-15-23 @ 08:10), Max: 97.9 (12-14-23 @ 21:55)  HR: 59 (12-15-23 @ 08:10) (56 - 75)  BP: 127/77 (12-15-23 @ 08:10) (127/77 - 137/87)  RR: 16 (12-15-23 @ 08:10) (15 - 16)  SpO2: 98% (12-15-23 @ 08:10) (97% - 98%)    PHYSICAL EXAM:   Gen - NAD, Comfortable, sitting in wheelchair   HEENT - NCAT  Neck - Supple, No limited ROM  Pulm - resp nonlabored  Cardiovascular - warm and well perfused  Abdomen - Soft, NT/ND   Extremities - No peripheral edema, no calf tenderness  Neuro-     Cognitive: awake, alert, oriented x4  - delayed processing vs response     Communication:  +dysarthria, +dysphagia - reg/thin with strategy,  no aphasia, able to repeat, follows 2 step commands     Cranial Nerves - left facial droop, left facial weakness, decreased left shoulder shrug (improving)     Motor -                     LEFT    UE - 5/5 proximal, 4/5                       RIGHT UE - 5/5                    LEFT    LE - 5/5                     RIGHT LE - 5/5        Sensory - Intact  to LT      Coordination: FTN dysmetric to left     Tone - normal  Psychiatric - flat affect  Skin:  all skin intact    RECENT LABS:             12.7   6.25  )-----------( 198      ( 14 Dec 2023 08:47 )             36.7     12-14    142  |  106  |  19  ----------------------------<  127<H>  4.2   |  26  |  1.21    Ca    9.1      14 Dec 2023 08:47    TPro  6.6  /  Alb  3.3  /  TBili  0.5  /  DBili  x   /  AST  26  /  ALT  74<H>  /  AlkPhos  50  12-14    LIVER FUNCTIONS - ( 14 Dec 2023 08:47 )  Alb: 3.3 g/dL / Pro: 6.6 g/dL / ALK PHOS: 50 U/L / ALT: 74 U/L / AST: 26 U/L / GGT: x             MEDICATIONS:  MEDICATIONS  (STANDING):  amLODIPine   Tablet 5 milliGRAM(s) Oral daily  aspirin  chewable 81 milliGRAM(s) Oral <User Schedule>  atorvastatin 80 milliGRAM(s) Oral at bedtime  bisacodyl Suppository 10 milliGRAM(s) Rectal at bedtime  enoxaparin Injectable 30 milliGRAM(s) SubCutaneous <User Schedule>  escitalopram 20 milliGRAM(s) Oral daily  labetalol 200 milliGRAM(s) Oral three times a day  lisinopril 40 milliGRAM(s) Oral daily  pantoprazole    Tablet 40 milliGRAM(s) Oral before breakfast  polyethylene glycol 3350 17 Gram(s) Oral two times a day  senna 2 Tablet(s) Oral at bedtime  sodium chloride 0.9%. 1000 milliLiter(s) (60 mL/Hr) IV Continuous <Continuous>  tamsulosin 0.4 milliGRAM(s) Oral at bedtime  ticagrelor 90 milliGRAM(s) Oral two times a day  traZODone 50 milliGRAM(s) Oral at bedtime    MEDICATIONS  (PRN):  acetaminophen     Tablet .. 650 milliGRAM(s) Oral every 6 hours PRN Temp greater or equal to 38.5C (101.3F), Mild Pain (1 - 3)

## 2023-12-15 NOTE — PROGRESS NOTE ADULT - ASSESSMENT
42yo RHD M with PMH significant for strokes (2015 abd 7/2023), T2DM, HTN, former smoker (quit 7/2023),  presented to [facility] ED (11/16) with c/o difficulty walking, LLE weakness, slurred speech (worse from baseline). Images with acute/subacute infarcts. Received TPA with some improvement.     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 Mechanism: TENISHA.  - s/p diagnostic cerebral angiogram on 11/20  - s/p recanalization and L vert stent placement on 11/24.   - cont with ASA 81mg + Ticagrelor 90 BID  - cont with lipitor 80mg   - tolerating comprehensive rehab    #Fever on admission  - infectious work up negative. fever resolved.   - CXR negative  - UA negative. blood cx negative   - COVID negative   - completed 5 days of Rocephin on 12/2     #HTN  - BP goal < 140/90  - Labetalol 200 TID  - Lisinopril 40mg daily   - amlodipine 5 mg daily  - monitor VS including OH and adjust meds    #DM II - A1C 5.5  - Previously on metformin  - serum glucose well controlled. no need for Accu-Cheks     #DVT ppx  - Lovenox 30 BID    discussed with primary team. 44yo RHD M with PMH significant for strokes (2015 abd 7/2023), T2DM, HTN, former smoker (quit 7/2023),  presented to [facility] ED (11/16) with c/o difficulty walking, LLE weakness, slurred speech (worse from baseline). Images with acute/subacute infarcts. Received TPA with some improvement.     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 Mechanism: TENISHA.  - s/p diagnostic cerebral angiogram on 11/20  - s/p recanalization and L vert stent placement on 11/24.   - cont with ASA 81mg + Ticagrelor 90 BID  - cont with lipitor 80mg   - tolerating comprehensive rehab    #Fever on admission  - infectious work up negative. fever resolved.   - CXR negative  - UA negative. blood cx negative   - COVID negative   - completed 5 days of Rocephin on 12/2     #HTN  - BP goal < 140/90  - Labetalol 200 TID  - Lisinopril 40mg daily   - amlodipine 5 mg daily  - monitor VS including OH and adjust meds    #DM II - A1C 5.5  - Previously on metformin  - serum glucose well controlled. no need for Accu-Cheks     #DVT ppx  - Lovenox 30 BID    discussed with primary team.

## 2023-12-15 NOTE — PROGRESS NOTE ADULT - SUBJECTIVE AND OBJECTIVE BOX
PHYSICAL EXAM:    Vital Signs Last 24 Hrs  T(F): 97.9 (15 Dec 2023 08:10), Max: 97.9 (14 Dec 2023 21:55)  HR: 59 (15 Dec 2023 08:10) (56 - 75)  BP: 127/77 (15 Dec 2023 08:10) (127/77 - 131/81)  RR: 16 (15 Dec 2023 08:10) (15 - 16)  SpO2: 98% (15 Dec 2023 08:10) (97% - 98%)  I&O's Summary      GENERAL: NAD  HEENT: NCAT  CHEST/LUNG: No increased WOB, Clear to percussion bilaterally; No rales, rhonchi, wheezing  HEART: Regular rate and rhythm; No murmurs  ABDOMEN: Soft, Nontender, Nondistended; Bowel sounds present  MUSCULOSKELETAL/EXTREMITIES:  2+ Peripheral Pulses, No LE edema  PSYCH: Appropriate affect, Alert & Oriented    LABS:                        12.7   6.25  )-----------( 198      ( 14 Dec 2023 08:47 )             36.7       12-14    142  |  106  |  19  ----------------------------<  127  4.2   |  26  |  1.21    Ca    9.1      14 Dec 2023 08:47    TPro  6.6  /  Alb  3.3  /  TBili  0.5  /  DBili  x   /  AST  26  /  ALT  74  /  AlkPhos  50  12-14                11-24 Chol 80 mg/dL LDL -- HDL 28 mg/dL Trig 73 mg/dL                  Urinalysis Basic - ( 14 Dec 2023 08:47 )    Color: x / Appearance: x / SG: x / pH: x  Gluc: 127 mg/dL / Ketone: x  / Bili: x / Urobili: x   Blood: x / Protein: x / Nitrite: x   Leuk Esterase: x / RBC: x / WBC x   Sq Epi: x / Non Sq Epi: x / Bacteria: x        COVID-19 PCR: NotDetec (11-27-23 @ 20:20)      MEDICATIONS  (STANDING):  amLODIPine   Tablet 5 milliGRAM(s) Oral daily  aspirin  chewable 81 milliGRAM(s) Oral <User Schedule>  atorvastatin 80 milliGRAM(s) Oral at bedtime  bisacodyl Suppository 10 milliGRAM(s) Rectal at bedtime  enoxaparin Injectable 30 milliGRAM(s) SubCutaneous <User Schedule>  escitalopram 20 milliGRAM(s) Oral daily  labetalol 200 milliGRAM(s) Oral three times a day  lisinopril 40 milliGRAM(s) Oral daily  pantoprazole    Tablet 40 milliGRAM(s) Oral before breakfast  senna 2 Tablet(s) Oral at bedtime  sodium chloride 0.9%. 1000 milliLiter(s) (60 mL/Hr) IV Continuous <Continuous>  tamsulosin 0.4 milliGRAM(s) Oral at bedtime  ticagrelor 90 milliGRAM(s) Oral two times a day  traZODone 25 milliGRAM(s) Oral at bedtime    MEDICATIONS  (PRN):  acetaminophen     Tablet .. 650 milliGRAM(s) Oral every 6 hours PRN Temp greater or equal to 38.5C (101.3F), Mild Pain (1 - 3)  polyethylene glycol 3350 17 Gram(s) Oral two times a day PRN Constipation  traZODone 25 milliGRAM(s) Oral at bedtime PRN insomnia      Care Discussed with Consultants/Other Providers: Yes   no acute events overnight      PHYSICAL EXAM:    Vital Signs Last 24 Hrs  T(F): 97.9 (15 Dec 2023 08:10), Max: 97.9 (14 Dec 2023 21:55)  HR: 59 (15 Dec 2023 08:10) (56 - 75)  BP: 127/77 (15 Dec 2023 08:10) (127/77 - 131/81)  RR: 16 (15 Dec 2023 08:10) (15 - 16)  SpO2: 98% (15 Dec 2023 08:10) (97% - 98%)  I&O's Summary      GENERAL: NAD  HEENT: NCAT  CHEST/LUNG: No increased WOB, Clear to percussion bilaterally; No rales, rhonchi, wheezing  HEART: Regular rate and rhythm; No murmurs  ABDOMEN: Soft, Nontender, Nondistended; Bowel sounds present  MUSCULOSKELETAL/EXTREMITIES:  2+ Peripheral Pulses, No LE edema  PSYCH: Appropriate affect, Alert   Neuro: +dysarthria    LABS:                        12.7   6.25  )-----------( 198      ( 14 Dec 2023 08:47 )             36.7       12-14    142  |  106  |  19  ----------------------------<  127  4.2   |  26  |  1.21    Ca    9.1      14 Dec 2023 08:47    TPro  6.6  /  Alb  3.3  /  TBili  0.5  /  DBili  x   /  AST  26  /  ALT  74  /  AlkPhos  50  12-14                11-24 Chol 80 mg/dL LDL -- HDL 28 mg/dL Trig 73 mg/dL                  Urinalysis Basic - ( 14 Dec 2023 08:47 )    Color: x / Appearance: x / SG: x / pH: x  Gluc: 127 mg/dL / Ketone: x  / Bili: x / Urobili: x   Blood: x / Protein: x / Nitrite: x   Leuk Esterase: x / RBC: x / WBC x   Sq Epi: x / Non Sq Epi: x / Bacteria: x        COVID-19 PCR: NotDetec (11-27-23 @ 20:20)      MEDICATIONS  (STANDING):  amLODIPine   Tablet 5 milliGRAM(s) Oral daily  aspirin  chewable 81 milliGRAM(s) Oral <User Schedule>  atorvastatin 80 milliGRAM(s) Oral at bedtime  bisacodyl Suppository 10 milliGRAM(s) Rectal at bedtime  enoxaparin Injectable 30 milliGRAM(s) SubCutaneous <User Schedule>  escitalopram 20 milliGRAM(s) Oral daily  labetalol 200 milliGRAM(s) Oral three times a day  lisinopril 40 milliGRAM(s) Oral daily  pantoprazole    Tablet 40 milliGRAM(s) Oral before breakfast  senna 2 Tablet(s) Oral at bedtime  sodium chloride 0.9%. 1000 milliLiter(s) (60 mL/Hr) IV Continuous <Continuous>  tamsulosin 0.4 milliGRAM(s) Oral at bedtime  ticagrelor 90 milliGRAM(s) Oral two times a day  traZODone 25 milliGRAM(s) Oral at bedtime    MEDICATIONS  (PRN):  acetaminophen     Tablet .. 650 milliGRAM(s) Oral every 6 hours PRN Temp greater or equal to 38.5C (101.3F), Mild Pain (1 - 3)  polyethylene glycol 3350 17 Gram(s) Oral two times a day PRN Constipation  traZODone 25 milliGRAM(s) Oral at bedtime PRN insomnia      Care Discussed with Consultants/Other Providers: Yes

## 2023-12-16 NOTE — PROGRESS NOTE ADULT - SUBJECTIVE AND OBJECTIVE BOX
Cc: Stroke    HPI: Patient with no new medical issues today.  Pain controlled, no chest pain, no N/V, no Fevers/Chills. No other new ROS  Has been tolerating rehabilitation program.    acetaminophen     Tablet .. 650 milliGRAM(s) Oral every 6 hours PRN  amLODIPine   Tablet 5 milliGRAM(s) Oral daily  aspirin  chewable 81 milliGRAM(s) Oral <User Schedule>  atorvastatin 80 milliGRAM(s) Oral at bedtime  bisacodyl Suppository 10 milliGRAM(s) Rectal at bedtime  enoxaparin Injectable 30 milliGRAM(s) SubCutaneous <User Schedule>  escitalopram 20 milliGRAM(s) Oral daily  labetalol 200 milliGRAM(s) Oral three times a day  lisinopril 40 milliGRAM(s) Oral daily  pantoprazole    Tablet 40 milliGRAM(s) Oral before breakfast  polyethylene glycol 3350 17 Gram(s) Oral two times a day PRN  senna 2 Tablet(s) Oral at bedtime  sodium chloride 0.9%. 1000 milliLiter(s) IV Continuous <Continuous>  tamsulosin 0.4 milliGRAM(s) Oral at bedtime  ticagrelor 90 milliGRAM(s) Oral two times a day  traZODone 25 milliGRAM(s) Oral at bedtime PRN  traZODone 25 milliGRAM(s) Oral at bedtime    T(C): 36.5 (12-15-23 @ 22:00), Max: 36.6 (12-15-23 @ 08:10)  HR: 69 (12-16-23 @ 06:50) (58 - 69)  BP: 137/83 (12-16-23 @ 06:50) (126/82 - 137/83)  RR: 16 (12-15-23 @ 22:00) (16 - 16)  SpO2: 98% (12-16-23 @ 03:26) (98% - 99%)    In NAD  HEENT- EOMI  Heart- Well Perfused  Lungs- No resp distress, no use of accessory resp muscles  Abd- + BS, NT  Ext- No calf pain  Neuro- Exam unchanged  Psych- Affect wnl    Imp: Patient with diagnosis of CVA admitted for comprehensive acute rehabilitation.    Plan:  - Continue therapies  - DVT prophylaxis- Lovenox  - Skin- Turn q2h, check skin daily  - Continue current medications; patient medically stable.   - Patient is stable to continue current rehabilitation program- requires active and ongoing therapeutic interventions of multiple disciplines and can tolerate 3h/d of therapies. Can actively participate and benefit from an intensive rehabilitation program. Requires supervision of a rehabilitation physician and a coordinated interdisciplinary approach to providing rehabilitation.

## 2023-12-17 NOTE — PROGRESS NOTE ADULT - SUBJECTIVE AND OBJECTIVE BOX
Cc: Stroke    HPI: Patient with no new medical issues today.  Sitting in bed and comfortable.  Pain controlled, no chest pain, no N/V, no Fevers/Chills. No other new ROS  Has been tolerating rehabilitation program.    MEDICATIONS  (STANDING):  amLODIPine   Tablet 5 milliGRAM(s) Oral daily  aspirin  chewable 81 milliGRAM(s) Oral <User Schedule>  atorvastatin 80 milliGRAM(s) Oral at bedtime  bisacodyl Suppository 10 milliGRAM(s) Rectal at bedtime  enoxaparin Injectable 30 milliGRAM(s) SubCutaneous <User Schedule>  escitalopram 20 milliGRAM(s) Oral daily  labetalol 200 milliGRAM(s) Oral three times a day  lisinopril 40 milliGRAM(s) Oral daily  pantoprazole    Tablet 40 milliGRAM(s) Oral before breakfast  senna 2 Tablet(s) Oral at bedtime  sodium chloride 0.9%. 1000 milliLiter(s) (60 mL/Hr) IV Continuous <Continuous>  tamsulosin 0.4 milliGRAM(s) Oral at bedtime  ticagrelor 90 milliGRAM(s) Oral two times a day  traZODone 25 milliGRAM(s) Oral at bedtime    MEDICATIONS  (PRN):  acetaminophen     Tablet .. 650 milliGRAM(s) Oral every 6 hours PRN Temp greater or equal to 38.5C (101.3F), Mild Pain (1 - 3)  polyethylene glycol 3350 17 Gram(s) Oral two times a day PRN Constipation  traZODone 25 milliGRAM(s) Oral at bedtime PRN insomnia    Vital Signs Last 24 Hrs  T(C): 36.8 (17 Dec 2023 08:10), Max: 36.8 (17 Dec 2023 08:10)  T(F): 98.3 (17 Dec 2023 08:10), Max: 98.3 (17 Dec 2023 08:10)  HR: 63 (17 Dec 2023 08:10) (56 - 71)  BP: 133/84 (17 Dec 2023 08:10) (113/73 - 143/79)  BP(mean): --  RR: 16 (17 Dec 2023 08:10) (15 - 16)  SpO2: 98% (17 Dec 2023 08:10) (96% - 98%)    In NAD  HEENT- EOMI  Heart- Well Perfused  Lungs- No resp distress, no use of accessory resp muscles  Abd- + BS, NT  Ext- No calf pain  Neuro- Exam unchanged  Psych- Affect wnl    Imp: Patient with diagnosis of CVA admitted for comprehensive acute rehabilitation.    Plan:  - Continue therapies  - DVT prophylaxis- Lovenox  - Skin- Turn q2h, check skin daily  - Continue current medications; patient medically stable.   - Patient is stable to continue current rehabilitation program- requires active and ongoing therapeutic interventions of multiple disciplines and can tolerate 3h/d of therapies. Can actively participate and benefit from an intensive rehabilitation program. Requires supervision of a rehabilitation physician and a coordinated interdisciplinary approach to providing rehabilitation.

## 2023-12-17 NOTE — PROGRESS NOTE ADULT - ASSESSMENT
42yo RHD M with PMH significant for strokes (2015 abd 7/2023), T2DM, HTN, former smoker (quit 7/2023),  presented to [facility] ED (11/16) with c/o difficulty walking, LLE weakness, slurred speech (worse from baseline). Images with acute/subacute infarcts. Received TPA with some improvement.     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 Mechanism: TENISHA.  - s/p diagnostic cerebral angiogram on 11/20  - s/p recanalization and L vert stent placement on 11/24.   - cont with ASA 81mg + Ticagrelor 90 BID  - cont with lipitor 80mg   - tolerating comprehensive rehab    #Fever on admission  - infectious work up negative. fever resolved.   - CXR negative  - UA negative. blood cx negative   - COVID negative   - completed 5 days of Rocephin on 12/2     #HTN  - BP goal < 140/90  - Labetalol 200 TID  - Lisinopril 40mg daily   - amlodipine 5 mg daily  - monitor VS including OH and adjust meds    #DM II - A1C 5.5  - Previously on metformin  - serum glucose well controlled. no need for Accu-Cheks     #DVT ppx  - Lovenox 30 BID    discussed with primary team. 42yo RHD M with PMH significant for strokes (2015 abd 7/2023), T2DM, HTN, former smoker (quit 7/2023),  presented to [facility] ED (11/16) with c/o difficulty walking, LLE weakness, slurred speech (worse from baseline). Images with acute/subacute infarcts. Received TPA with some improvement.     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 Mechanism: TENISHA.  - s/p diagnostic cerebral angiogram on 11/20  - s/p recanalization and L vert stent placement on 11/24.   - cont with ASA 81mg + Ticagrelor 90 BID  - cont with lipitor 80mg   - tolerating comprehensive rehab    #Fever on admission  - infectious work up negative. fever resolved.   - CXR negative  - UA negative. blood cx negative   - COVID negative   - completed 5 days of Rocephin on 12/2     #HTN  - BP goal < 140/90  - Labetalol 200 TID  - Lisinopril 40mg daily   - amlodipine 5 mg daily  - monitor VS including OH and adjust meds    #DM II - A1C 5.5  - Previously on metformin  - serum glucose well controlled. no need for Accu-Cheks     #DVT ppx  - Lovenox 30 BID    discussed with primary team.

## 2023-12-17 NOTE — PROGRESS NOTE ADULT - SUBJECTIVE AND OBJECTIVE BOX
no acute events overnight      PHYSICAL EXAM:    GENERAL: NAD  HEENT: NCAT  CHEST/LUNG: No increased WOB, Clear to percussion bilaterally; No rales, rhonchi, wheezing  HEART: Regular rate and rhythm; No murmurs  ABDOMEN: Soft, Nontender, Nondistended; Bowel sounds present  MUSCULOSKELETAL/EXTREMITIES:  2+ Peripheral Pulses, No LE edema  PSYCH: Appropriate affect, Alert   Neuro: +dysarthria    LABS:               MEDICATIONS  (STANDING):  amLODIPine   Tablet 5 milliGRAM(s) Oral daily  aspirin  chewable 81 milliGRAM(s) Oral <User Schedule>  atorvastatin 80 milliGRAM(s) Oral at bedtime  bisacodyl Suppository 10 milliGRAM(s) Rectal at bedtime  enoxaparin Injectable 30 milliGRAM(s) SubCutaneous <User Schedule>  escitalopram 20 milliGRAM(s) Oral daily  labetalol 200 milliGRAM(s) Oral three times a day  lisinopril 40 milliGRAM(s) Oral daily  pantoprazole    Tablet 40 milliGRAM(s) Oral before breakfast  senna 2 Tablet(s) Oral at bedtime  sodium chloride 0.9%. 1000 milliLiter(s) (60 mL/Hr) IV Continuous <Continuous>  tamsulosin 0.4 milliGRAM(s) Oral at bedtime  ticagrelor 90 milliGRAM(s) Oral two times a day  traZODone 25 milliGRAM(s) Oral at bedtime    MEDICATIONS  (PRN):  acetaminophen     Tablet .. 650 milliGRAM(s) Oral every 6 hours PRN Temp greater or equal to 38.5C (101.3F), Mild Pain (1 - 3)  polyethylene glycol 3350 17 Gram(s) Oral two times a day PRN Constipation  traZODone 25 milliGRAM(s) Oral at bedtime PRN insomnia      Care Discussed with Consultants/Other Providers: Yes

## 2023-12-18 NOTE — DISCHARGE NOTE PROVIDER - NPI NUMBER (FOR SYSADMIN USE ONLY) :
[[id number]],[[id number]],[[id number]] [[id number]],[[id number]],[[id number]],[[id number]]

## 2023-12-18 NOTE — PROGRESS NOTE ADULT - ASSESSMENT
42yo RHD M with PMH significant for HTN, type 2 DM, strokes (2015 abd 7/2023), former smoker (quit 7/2023),  presented to [facility] ED (11/16) with c/o difficulty walking, LLE weakness, slurred speech (worse from baseline). Images with acute/subacute infarcts.    # CVA (2015, 7/2023, 11/2023)   - S/p TPA  - ASA 81mg + Ticagrelor 90 BID  - Atorvastatin 80 (LDL 36)  - continue Comprehensive Rehab Program: PT/OT/ST, 3hours daily and 5 days weekly.     # MALI-- improved  - Bun/Cr improved-- 23/1.23 (12/11) >19/1.21 (12/14)>18/1.17 (12/18)  - s/p IVF 1000 ml on 12/7  - Now on thin liquids, encourage oral intake     # Mood/sleep  - Lexapro inc 20 (11/29)  - Trazodone bedtime+ PRN  - Neuropsychology support  - Recreation therapy     # HTN  - Labetalol 200 TID  - Amlodipine 5  - Lisinopril 40  - /90 pre med; 157/65 post med (12/18)    # DM II   - A1C 5.5  - Previously on metformin  - FS: <120  - Glucose via lab: 119 (12/18)    # Respiratory health/RADHA  - Incentive Spirometry during the day time  - CPAP nightly    # Fever on admission --since resolved  - Noted with temp of 101.3 on admission: cbc, cmp, lactate, procalcitonin, UA (neg), blood CXR X 2 (unremarkable)  - blood culture no growth-final  -  WBC wnl     # Pain management  - Tylenol PRN    # FEN   - Previously on regular and mildly thick with supervision in meals 12/2+ free water protocol  -  MBS 12/8- upgraded to thin liquids, continue with regular diet    # GI/Bowel Mgmt   - Senna,  Miralax BID PRN  - Mineral oil enema x 1 (11/30)   - Golytely bowel prep x 1 (12/1) and suppository.   - GI ppx: Protonix    #Bladder management/Urinary retention-- previously needing to be straight cath   - UA (11/27) negative  - continue flomax 0.4  - No longer retaining-- dc'd bladder scans 12/8    # DVT ppx  - Lovenox 30 BID    IDT 12/13  NSG: urinary retention with ISC.  SW: lives with wife and daughter in [place], no step inside  SLP: recent MBS with upgrade: reg/ THIN, doing breath support exercises  OT: Setup eat/groom, min bathing, CGA dressing/transfer, min A shower transfer.  Goal: CGA bathing.   PT: min - CGA bed mob, transfer, min A amb 100' RW/4 step 2 HR Goal: CGA  TDD: 12/19 H    Follow ups:  Neuro: Dr. Kumar Guzman  Rad: Dr. Phoebe Xiao  Card: Dr. Talib Dsouza  IM: Appt 12/19/2023 42yo RHD M with PMH significant for HTN, type 2 DM, strokes (2015 abd 7/2023), former smoker (quit 7/2023),  presented to [facility] ED (11/16) with c/o difficulty walking, LLE weakness, slurred speech (worse from baseline). Images with acute/subacute infarcts.    # CVA (2015, 7/2023, 11/2023)   - S/p TPA  - ASA 81mg + Ticagrelor 90 BID  - Atorvastatin 80 (LDL 36)  - continue Comprehensive Rehab Program: PT/OT/ST, 3hours daily and 5 days weekly.     # MALI-- improved  - Bun/Cr improved-- 23/1.23 (12/11) >19/1.21 (12/14)>18/1.17 (12/18)  - s/p IVF 1000 ml on 12/7  - Now on thin liquids, encourage oral intake     # Mood/sleep  - Lexapro inc 20 (11/29)  - Trazodone bedtime+ PRN  - Neuropsychology support  - Recreation therapy     # HTN  - Labetalol 200 TID  - Amlodipine 5  - Lisinopril 40  - /90 pre med; 157/65 post med (12/18)    # DM II   - A1C 5.5  - Previously on metformin  - FS: <120  - Glucose via lab: 119 (12/18)    # Respiratory health/RADHA  - Incentive Spirometry during the day time  - CPAP nightly    # Fever on admission --since resolved  - Noted with temp of 101.3 on admission: cbc, cmp, lactate, procalcitonin, UA (neg), blood CXR X 2 (unremarkable)  - blood culture no growth-final  -  WBC wnl     # Pain management  - Tylenol PRN    # FEN   - Previously on regular and mildly thick with supervision in meals 12/2+ free water protocol  -  MBS 12/8- upgraded to thin liquids, continue with regular diet    # GI/Bowel Mgmt   - Senna,  Miralax BID PRN  - Mineral oil enema x 1 (11/30)   - Golytely bowel prep x 1 (12/1) and suppository.   - GI ppx: Protonix    #Bladder management/Urinary retention-- previously needing to be straight cath   - UA (11/27) negative  - continue flomax 0.4  - No longer retaining-- dc'd bladder scans 12/8    # DVT ppx  - Lovenox 30 BID    IDT 12/13  NSG: urinary retention with ISC.  SW: lives with wife and daughter in [place], no step inside  SLP: recent MBS with upgrade: reg/ THIN, doing breath support exercises  OT: Setup eat/groom, min bathing, CGA dressing/transfer, min A shower transfer.  Goal: CGA bathing.   PT: min - CGA bed mob, transfer, min A amb 100' RW/4 step 2 HR Goal: CGA  TDD: 12/19 H    Follow ups:  Neuro: Dr. Kumar Guzman  Rad: Dr. Phoebe Xiao  Card: Dr. Talib Dsouza  IM: Appt 12/19/2023 42yo RHD M with PMH significant for HTN, type 2 DM, strokes (2015 abd 7/2023), former smoker (quit 7/2023),  presented to [facility] ED (11/16) with c/o difficulty walking, LLE weakness, slurred speech (worse from baseline). Images with acute/subacute infarcts.    # CVA (2015, 7/2023, 11/2023)   - S/p TPA  - ASA 81mg + Ticagrelor 90 BID  - Atorvastatin 80 (LDL 36)  - continue Comprehensive Rehab Program: PT/OT/ST, 3hours daily and 5 days weekly.     # MALI-- improved  - Bun/Cr improved-- 23/1.23 (12/11) >19/1.21 (12/14)>18/1.17 (12/18)  - s/p IVF 1000 ml on 12/7  -encourage oral intake     # Mood/sleep  - Lexapro inc 20 (11/29)  - Trazodone bedtime+ PRN  - Neuropsychology support  - Recreation therapy     # HTN  - Labetalol 200 TID  - Amlodipine 5  - Lisinopril 40  - /90 pre med; 157/65 post med (12/18)    # DM II   - A1C 5.5  - Previously on metformin  - FS: <120  - Glucose via lab: 119 (12/18)    # Respiratory health/RADHA  - Incentive Spirometry during the day time  - CPAP nightly    # Fever on admission --since resolved  - Noted with temp of 101.3 on admission: cbc, cmp, lactate, procalcitonin, UA (neg), blood CXR X 2 (unremarkable)  - blood culture no growth-final  -  WBC wnl     # Pain management  - Tylenol PRN    # FEN   - Previously on regular and mildly thick with supervision in meals 12/2+ free water protocol  -  MBS 12/8- upgraded to thin liquids, continue with regular diet    # GI/Bowel Mgmt   - Senna,  Miralax BID PRN  - Mineral oil enema x 1 (11/30)   - Golytely bowel prep x 1 (12/1) and suppository.   - GI ppx: Protonix    #Bladder management/Urinary retention-- previously needing to be straight cath   - UA (11/27) negative  - continue flomax 0.4  - No longer retaining-- dc'd bladder scans 12/8    # DVT ppx  - Lovenox 30 BID    IDT 12/13  NSG: urinary retention with ISC.  SW: lives with wife and daughter in [place], no step inside  SLP: recent MBS with upgrade: reg/ THIN, doing breath support exercises  OT: Setup eat/groom, min bathing, CGA dressing/transfer, min A shower transfer.  Goal: CGA bathing.   PT: min - CGA bed mob, transfer, min A amb 100' RW/4 step 2 HR Goal: CGA  TDD: 12/19 H    Follow ups:  Neuro: Dr. Kumar Guzman  Rad: Dr. Phoebe Xiao  Card: Dr. Talib Dsouza  IM: Appt 12/19/2023 42yo RHD M with PMH significant for HTN, type 2 DM, strokes (2015 abd 7/2023), former smoker (quit 7/2023),  presented to [facility] ED (11/16) with c/o difficulty walking, LLE weakness, slurred speech (worse from baseline). Images with acute/subacute infarcts.    # CVA (2015, 7/2023, 11/2023)   - S/p TPA  - ASA 81mg + Ticagrelor 90 BID  - Atorvastatin 80 (LDL 36)  - continue Comprehensive Rehab Program: PT/OT/ST, 3hours daily and 5 days weekly.     # MALI-- improved  - Bun/Cr improved-- 23/1.23 (12/11) >19/1.21 (12/14)>18/1.17 (12/18)  - s/p IVF 1000 ml on 12/7  -encourage oral intake     # Mood/sleep  - Lexapro inc 20 (11/29)  - Trazodone bedtime+ PRN  - Neuropsychology support  - Recreation therapy     # HTN  - Labetalol 200 TID  - Amlodipine 5  - Lisinopril 40  - /90 pre med; 157/65 post med (12/18)    # DM II   - A1C 5.5  - Previously on metformin  - FS: <120  - Glucose via lab: 119 (12/18)    # Respiratory health/RADHA  - Incentive Spirometry during the day time  - CPAP nightly    # Fever on admission --since resolved  - Noted with temp of 101.3 on admission: cbc, cmp, lactate, procalcitonin, UA (neg), blood CXR X 2 (unremarkable)  - blood culture no growth-final  -  WBC wnl     # Pain management  - Tylenol PRN    # FEN   - Previously on regular and mildly thick with supervision in meals 12/2+ free water protocol  -  MBS 12/8- upgraded to thin liquids, continue with regular diet    # GI/Bowel Mgmt   - Senna,  Miralax BID PRN  - Mineral oil enema x 1 (11/30)   - Golytely bowel prep x 1 (12/1) and suppository.   - GI ppx: Protonix    #Bladder management/Urinary retention-- previously needing to be straight cath   - UA (11/27) negative  - continue flomax 0.4  - No longer retaining-- dc'd bladder scans 12/8    # DVT ppx  - Lovenox 30 BID    IDT 12/13  NSG: urinary retention with ISC.  SW: lives with wife and daughter in [place], no step inside  SLP: recent MBS with upgrade: reg/ THIN, doing breath support exercises  OT: Setup eat/groom, min bathing, CGA dressing/transfer, min A shower transfer.  Goal: CGA bathing.   PT: min - CGA bed mob, transfer, min A amb 100' RW/4 step 2 HR Goal: CGA  TDD: 12/19 H    Follow ups:  Neuro: Dr. Kumar Guzman  Rad: Dr. Phoebe Xiao  Card: Dr. Talib Dsouza  IM: Appt 12/19/2023

## 2023-12-18 NOTE — DISCHARGE NOTE PROVIDER - NSDCMRMEDTOKEN_GEN_ALL_CORE_FT
aspirin 81 mg oral tablet, chewable: 1 tab(s) orally once a day  atorvastatin 80 mg oral tablet: 1 tab(s) orally once a day (at bedtime)  escitalopram 10 mg oral tablet: 1 tab(s) orally once a day  labetalol 300 mg oral tablet: 1 tab(s) orally 3 times a day hold for BP &lt; 110  lisinopril 40 mg oral tablet: 1 tab(s) orally once a day hold for BP &lt; 110  Occupational therapy: 3x/week s/p CVA  pantoprazole 40 mg oral delayed release tablet: 1 tab(s) orally once a day (before a meal)  physical therapy: 3x/week s/p CVA  polyethylene glycol 3350 oral powder for reconstitution: 17 gram(s) orally once a day  senna leaf extract oral tablet: 2 tab(s) orally once a day (at bedtime)  Speech therapy: 3x/week s/p CVA  tamsulosin 0.4 mg oral capsule: 1 cap(s) orally once a day (at bedtime)  ticagrelor 90 mg oral tablet: 1 tab(s) orally 2 times a day  traZODone 50 mg oral tablet: 1 tab(s) orally once a day (at bedtime)   acetaminophen 325 mg oral tablet: 2 tab(s) orally every 6 hours As needed Temp greater or equal to 38.5C (101.3F), Mild Pain (1 - 3)  amLODIPine 5 mg oral tablet: 1 tab(s) orally once a day  aspirin 81 mg oral tablet, chewable: 1 tab(s) orally once a day  atorvastatin 80 mg oral tablet: 1 tab(s) orally once a day (at bedtime)  escitalopram 20 mg oral tablet: 1 tab(s) orally once a day  labetalol 200 mg oral tablet: 1 tab(s) orally 3 times a day  lisinopril 40 mg oral tablet: 1 tab(s) orally once a day  Occupational therapy: 3x/week s/p CVA  physical therapy: 3x/week s/p CVA  polyethylene glycol 3350 oral powder for reconstitution: 17 gram(s) orally 2 times a day As needed Constipation  senna leaf extract oral tablet: 2 tab(s) orally once a day (at bedtime)  Speech therapy: 3x/week s/p CVA  tamsulosin 0.4 mg oral capsule: 1 cap(s) orally once a day (at bedtime)  ticagrelor 90 mg oral tablet: 1 tab(s) orally 2 times a day  traZODone 50 mg oral tablet: 0.5 tab(s) orally once a day (at bedtime) as needed for  insomnia

## 2023-12-18 NOTE — DISCHARGE NOTE PROVIDER - NSDCCPCAREPLAN_GEN_ALL_CORE_FT
PRINCIPAL DISCHARGE DIAGNOSIS  Diagnosis: CVA (cerebrovascular accident)  Assessment and Plan of Treatment: you had 2 prior stroke (most recent being 7/2023) with residual dysarthria.  You went into ER on 11/16 with increased dysarthria/speech changes and difficulty walking/leg leg weakness.    You were given intravenous blood thinner called tenecteplase since you came within the time window since your last known normal.   You also had left vertibral stent placement and recanalization on 11/24. Continue on aspirin and Brilinta.  You're prescribed a statin for after stroke/for cholesterol, please make sure you have your liver enzymes checked routinely, this medication may cause your liver enzyme to elevate.  Monitor your blood pressure.  Reduce fat, cholesterol and salt intake in your diet.  Increase intake of fruits and vegetables.  Limit alcohol to minimum and do not smoke. Avoid nonsteroidal anti-inflammatory drugs (NSAIDs). You may be at risk for falling, make changes to your home to help you walk easier.  Keep up to date on vaccinations.  If you experience any symptoms of faical drooping, slurred speech, arm or leg weakness, severe headache, vision changes or any worsening symptoms, notify provider immediately and return to ER.    Follow up with Neurology, radiologist and Cardiologist      SECONDARY DISCHARGE DIAGNOSES  Diagnosis: Urinary retention  Assessment and Plan of Treatment: you initially report reduced urge/sensation to pass urine - noted with retention requiring straight cath.  Was started on Flomax and toileting schedule which helped.  No recent evidence of retention or need for straight cath.  Please continue with medication  Follow up with Primary care provider    Diagnosis: Insomnia  Assessment and Plan of Treatment: insomnia is a common occurance when in an unfamiliar setting. Your room was changed and medication for sleep was increased from home dose.  Sleeping better - now taperd back to home dose of Trazodone 25mg.   Nonpharm: also reduce your screen time (TV/phone use) 1 hour prior to bedtime.  Further control environmental stimuli (sounds/bright light)    Diagnosis: Diabetes  Assessment and Plan of Treatment: A1C checked during admission: result 5.5.  Has been controlled without medications or insulin.  Continue to monitor glucose at least daily - fasting before breakfast  Please keep a log and if you're consistently >150 or <100 plese follow up with Primary care provider     PRINCIPAL DISCHARGE DIAGNOSIS  Diagnosis: CVA (cerebrovascular accident)  Assessment and Plan of Treatment: you had 2 prior stroke (most recent being 7/2023) with residual dysarthria.  You went into ER on 11/16 with increased dysarthria/speech changes and difficulty walking/leg leg weakness.    You were given intravenous blood thinner called tenecteplase since you came within the time window since your last known normal.   You also had left vertibral stent placement and recanalization on 11/24. Continue on aspirin and Brilinta.  You're prescribed a statin for after stroke/for cholesterol, please make sure you have your liver enzymes checked routinely, this medication may cause your liver enzyme to elevate.  Monitor your blood pressure.  Reduce fat, cholesterol and salt intake in your diet.  Increase intake of fruits and vegetables.  Limit alcohol to minimum and do not smoke. Avoid nonsteroidal anti-inflammatory drugs (NSAIDs). You may be at risk for falling, make changes to your home to help you walk easier.  Keep up to date on vaccinations.  If you experience any symptoms of faical drooping, slurred speech, arm or leg weakness, severe headache, vision changes or any worsening symptoms, notify provider immediately and return to ER.    Follow up with Neurology, radiologist and Cardiologist      SECONDARY DISCHARGE DIAGNOSES  Diagnosis: Urinary retention  Assessment and Plan of Treatment: you initially report reduced urge/sensation to pass urine - noted with retention requiring straight cath.  Was started on Flomax and toileting schedule which helped.  No recent evidence of retention or need for straight cath.  Please continue with medication  Constipation is a factor that may effect urinary retention  Follow up with Primary care provider    Diagnosis: Insomnia  Assessment and Plan of Treatment: insomnia is a common occurance when in an unfamiliar setting. Your room was changed and medication for sleep was increased from home dose.  Sleeping better - now taperd back to home dose of Trazodone 25mg.   Nonpharm: also reduce your screen time (TV/phone use) 1 hour prior to bedtime.  Further control environmental stimuli (sounds/bright light)    Diagnosis: Diabetes  Assessment and Plan of Treatment: A1C checked during admission: result 5.5.  Has been controlled without medications or insulin.  Continue to monitor glucose at least daily - fasting before breakfast  Please keep a log and if you're consistently >150 or <100 plese follow up with Primary care provider    Diagnosis: Mood disorder  Assessment and Plan of Treatment: Stroke is a life changing/traumatic event that may affect your mood.  you were on Escitalopram at home which was increased.  Please do not stop medication abruptly - should be weaned.  When appropriate, Primary care provider can guide you on weaning medication.

## 2023-12-18 NOTE — PROGRESS NOTE ADULT - SUBJECTIVE AND OBJECTIVE BOX
Patient is a 43y old  Male who presents with a chief complaint of CVA    HPI:  42yo RHD M with PMH significant for: stroke (7/2023 - presenting with dysarthria - left inferior adrienne, b/l parieto-occipital juxtacortical WM w/mild residual dysarthria; 8 years ago - treated with TPA at [facility] - p/w L hemiplegia, facial droop and dysarthria), T2DM, HTN, former smoker (quit in 7/2023), presented to [facility] ED on 11/16/2023, with c/o difficulty walking, LLE weakness, slurred speech (worse from baseline).     ED exam: patient has a L facial droop, ataxia in LUE (dysmetria with FTN), and struggles to perform HTS b/l. Patient also with pronounced slurred speech (moderate but is intelligible). CTH showing a nonacute left occipital infarct (not seen on the CTH 7/2023). MR with acute/subacute posterior fossa and left occipital lobe infarcts with associated cytotoxic edema. Recieved tenecteplase - 30 minutes after administration, NIHSS 4->2 (mild left facial but improved and mild dysarthria). Patient reports voice is much better. Performs FTN without issue with LUE. Performs HTS b/l. L NLF flattening significantly improved.     Patient was evaluated by PM&R and therapy for functional deficits and gait/ADL impairments and recommend acute rehabilitation.  Patient was medically optimized for discharge to [place] on 11/27/2023.    SUBJECTIVE: Patient seen and examined while sitting up in WC. Uneventful weekend, said responsiveness to call bell was good.  Trazodone 25mg was sufficient - didn't ask for additional PRN.      REVIEW OF SYSTEMS  Constitutional: No fever, no chills  Cardio: No chest pain, No palpitations  Resp: No SOB, no respiratory distress   GI: No abd pain, no nausea; last BM 12/16  Neuro: No headaches, no dizziness, +weakness, +dysarthria    VITALS  43y  Vital Signs Last 24 Hrs  T(C): 36.4 (12-18-23 @ 07:47), Max: 36.7 (12-17-23 @ 20:30)  T(F): 97.5 (12-18-23 @ 07:47), Max: 98 (12-17-23 @ 20:30)  HR: 65 (12-18-23 @ 07:47) (54 - 69)  BP: 157/65 (12-18-23 @ 07:47) (135/85 - 157/65)  RR: 16 (12-18-23 @ 07:47) (16 - 16)  SpO2: 95% (12-18-23 @ 07:47) (95% - 100%)    PHYSICAL EXAM:   Gen - NAD, Comfortable, sitting in wheelchair   HEENT - NCAT  Neck - Supple, No limited ROM  Pulm - resp nonlabored  Cardiovascular - warm and well perfused  Abdomen - Soft, NT/ND   Extremities - No peripheral edema, no calf tenderness  Neuro-     Cognitive: awake, alert, oriented x4  - mild delayed processing vs response     Communication:  +dysarthria, +dysphagia,  no aphasia, able to repeat, follows 2 step commands     Cranial Nerves - left facial droop, left facial weakness, decreased left shoulder shrug (improving)     Motor -                     LEFT    UE - 5/5 proximal, 4/5                       RIGHT UE - 5/5                    LEFT    LE - 5/5                     RIGHT LE - 5/5        Sensory - Intact  to LT      Coordination: FTN dysmetric to left     Tone - normal  Psychiatric - flat affect  Skin:  all skin intact    RECENT LABS:             12.4   5.44  )-----------( 180      ( 18 Dec 2023 06:48 )             36.4     12-18    143  |  106  |  18  ----------------------------<  119<H>  4.1   |  27  |  1.17    Ca    9.4      18 Dec 2023 06:48    TPro  7.0  /  Alb  3.5  /  TBili  0.7  /  DBili  x   /  AST  20  /  ALT  51<H>  /  AlkPhos  50  12-18    LIVER FUNCTIONS - ( 18 Dec 2023 06:48 )  Alb: 3.5 g/dL / Pro: 7.0 g/dL / ALK PHOS: 50 U/L / ALT: 51 U/L / AST: 20 U/L / GGT: x           MEDICATIONS:  MEDICATIONS  (STANDING):  amLODIPine   Tablet 5 milliGRAM(s) Oral daily  aspirin  chewable 81 milliGRAM(s) Oral <User Schedule>  atorvastatin 80 milliGRAM(s) Oral at bedtime  bisacodyl Suppository 10 milliGRAM(s) Rectal at bedtime  enoxaparin Injectable 30 milliGRAM(s) SubCutaneous <User Schedule>  escitalopram 20 milliGRAM(s) Oral daily  labetalol 200 milliGRAM(s) Oral three times a day  lisinopril 40 milliGRAM(s) Oral daily  pantoprazole    Tablet 40 milliGRAM(s) Oral before breakfast  senna 2 Tablet(s) Oral at bedtime  sodium chloride 0.9%. 1000 milliLiter(s) (60 mL/Hr) IV Continuous <Continuous>  tamsulosin 0.4 milliGRAM(s) Oral at bedtime  ticagrelor 90 milliGRAM(s) Oral two times a day  traZODone 25 milliGRAM(s) Oral at bedtime    MEDICATIONS  (PRN):  acetaminophen     Tablet .. 650 milliGRAM(s) Oral every 6 hours PRN Temp greater or equal to 38.5C (101.3F), Mild Pain (1 - 3)  polyethylene glycol 3350 17 Gram(s) Oral two times a day PRN Constipation  traZODone 25 milliGRAM(s) Oral at bedtime PRN insomnia   Patient is a 43y old  Male who presents with a chief complaint of CVA    HPI:  44yo RHD M with PMH significant for: stroke (7/2023 - presenting with dysarthria - left inferior adrienne, b/l parieto-occipital juxtacortical WM w/mild residual dysarthria; 8 years ago - treated with TPA at [facility] - p/w L hemiplegia, facial droop and dysarthria), T2DM, HTN, former smoker (quit in 7/2023), presented to [facility] ED on 11/16/2023, with c/o difficulty walking, LLE weakness, slurred speech (worse from baseline).     ED exam: patient has a L facial droop, ataxia in LUE (dysmetria with FTN), and struggles to perform HTS b/l. Patient also with pronounced slurred speech (moderate but is intelligible). CTH showing a nonacute left occipital infarct (not seen on the CTH 7/2023). MR with acute/subacute posterior fossa and left occipital lobe infarcts with associated cytotoxic edema. Recieved tenecteplase - 30 minutes after administration, NIHSS 4->2 (mild left facial but improved and mild dysarthria). Patient reports voice is much better. Performs FTN without issue with LUE. Performs HTS b/l. L NLF flattening significantly improved.     Patient was evaluated by PM&R and therapy for functional deficits and gait/ADL impairments and recommend acute rehabilitation.  Patient was medically optimized for discharge to [place] on 11/27/2023.    SUBJECTIVE: Patient seen and examined while sitting up in WC. Uneventful weekend, said responsiveness to call bell was good.  Trazodone 25mg was sufficient - didn't ask for additional PRN.      REVIEW OF SYSTEMS  Constitutional: No fever, no chills  Cardio: No chest pain, No palpitations  Resp: No SOB, no respiratory distress   GI: No abd pain, no nausea; last BM 12/16  Neuro: No headaches, no dizziness, +weakness, +dysarthria    VITALS  43y  Vital Signs Last 24 Hrs  T(C): 36.4 (12-18-23 @ 07:47), Max: 36.7 (12-17-23 @ 20:30)  T(F): 97.5 (12-18-23 @ 07:47), Max: 98 (12-17-23 @ 20:30)  HR: 65 (12-18-23 @ 07:47) (54 - 69)  BP: 157/65 (12-18-23 @ 07:47) (135/85 - 157/65)  RR: 16 (12-18-23 @ 07:47) (16 - 16)  SpO2: 95% (12-18-23 @ 07:47) (95% - 100%)    PHYSICAL EXAM:   Gen - NAD, Comfortable, sitting in wheelchair   HEENT - NCAT  Neck - Supple, No limited ROM  Pulm - resp nonlabored  Cardiovascular - warm and well perfused  Abdomen - Soft, NT/ND   Extremities - No peripheral edema, no calf tenderness  Neuro-     Cognitive: awake, alert, oriented x4  - mild delayed processing vs response     Communication:  +dysarthria, +dysphagia,  no aphasia, able to repeat, follows 2 step commands     Cranial Nerves - left facial droop, left facial weakness, decreased left shoulder shrug (improving)     Motor -                     LEFT    UE - 5/5 proximal, 4/5                       RIGHT UE - 5/5                    LEFT    LE - 5/5                     RIGHT LE - 5/5        Sensory - Intact  to LT      Coordination: FTN dysmetric to left     Tone - normal  Psychiatric - flat affect  Skin:  all skin intact    RECENT LABS:             12.4   5.44  )-----------( 180      ( 18 Dec 2023 06:48 )             36.4     12-18    143  |  106  |  18  ----------------------------<  119<H>  4.1   |  27  |  1.17    Ca    9.4      18 Dec 2023 06:48    TPro  7.0  /  Alb  3.5  /  TBili  0.7  /  DBili  x   /  AST  20  /  ALT  51<H>  /  AlkPhos  50  12-18    LIVER FUNCTIONS - ( 18 Dec 2023 06:48 )  Alb: 3.5 g/dL / Pro: 7.0 g/dL / ALK PHOS: 50 U/L / ALT: 51 U/L / AST: 20 U/L / GGT: x           MEDICATIONS:  MEDICATIONS  (STANDING):  amLODIPine   Tablet 5 milliGRAM(s) Oral daily  aspirin  chewable 81 milliGRAM(s) Oral <User Schedule>  atorvastatin 80 milliGRAM(s) Oral at bedtime  bisacodyl Suppository 10 milliGRAM(s) Rectal at bedtime  enoxaparin Injectable 30 milliGRAM(s) SubCutaneous <User Schedule>  escitalopram 20 milliGRAM(s) Oral daily  labetalol 200 milliGRAM(s) Oral three times a day  lisinopril 40 milliGRAM(s) Oral daily  pantoprazole    Tablet 40 milliGRAM(s) Oral before breakfast  senna 2 Tablet(s) Oral at bedtime  sodium chloride 0.9%. 1000 milliLiter(s) (60 mL/Hr) IV Continuous <Continuous>  tamsulosin 0.4 milliGRAM(s) Oral at bedtime  ticagrelor 90 milliGRAM(s) Oral two times a day  traZODone 25 milliGRAM(s) Oral at bedtime    MEDICATIONS  (PRN):  acetaminophen     Tablet .. 650 milliGRAM(s) Oral every 6 hours PRN Temp greater or equal to 38.5C (101.3F), Mild Pain (1 - 3)  polyethylene glycol 3350 17 Gram(s) Oral two times a day PRN Constipation  traZODone 25 milliGRAM(s) Oral at bedtime PRN insomnia

## 2023-12-18 NOTE — DISCHARGE NOTE PROVIDER - HOSPITAL COURSE
42yo RHD M with PMH significant for: stroke (7/2023 - presenting with dysarthria - left inferior adrienne, b/l parieto-occipital juxtacortical WM w/mild residual dysarthria; 8 years ago - treated with TPA at [facility] - p/w L hemiplegia, facial droop and dysarthria), T2DM, HTN, former smoker (quit in 7/2023), presented to [facility] ED on 11/16/2023, with c/o difficulty walking, LLE weakness, slurred speech (worse from baseline).     ED exam: patient has a L facial droop, ataxia in LUE (dysmetria with FTN), and struggles to perform HTS b/l. Patient also with pronounced slurred speech (moderate but is intelligible). CTH showing a nonacute left occipital infarct (not seen on the CTH 7/2023). MR with acute/subacute posterior fossa and left occipital lobe infarcts with associated cytotoxic edema. Recieved tenecteplase - 30 minutes after administration, NIHSS 4->2 (mild left facial but improved and mild dysarthria). Patient reports voice is much better. Performs FTN without issue with LUE. Performs HTS b/l. L NLF flattening significantly improved.     Patient was evaluated by PM&R and therapy for functional deficits and gait/ADL impairments and recommend acute rehabilitation.  Patient was medically optimized for discharge to [facility] on 11/27/2023.    Pt was stable upon rehab admission to  [facility]. Admitted with gait instabilty, ADL, and functional impairments.     Rehab Course significant for:  - Insomnia - better with room change and trazodone increase.  Tapered back to home does of 25mg  - reduced urinary urge - required straight cath.  Started on flomax and toileting program q3hrs while awake.  No longer in retention  - dysphagia - now on Reg/thin liquid - compliant with strategy    All other medical co-morbidities were stable.     Pt tolerated course of inpatient PT/OT/SLP rehab with significant functional improvements and met rehab goals prior to discharge.    Pt was medically cleared on _12/19__  for discharged to __Home_      44yo RHD M with PMH significant for: stroke (7/2023 - presenting with dysarthria - left inferior adrienne, b/l parieto-occipital juxtacortical WM w/mild residual dysarthria; 8 years ago - treated with TPA at [facility] - p/w L hemiplegia, facial droop and dysarthria), T2DM, HTN, former smoker (quit in 7/2023), presented to [facility] ED on 11/16/2023, with c/o difficulty walking, LLE weakness, slurred speech (worse from baseline).     ED exam: patient has a L facial droop, ataxia in LUE (dysmetria with FTN), and struggles to perform HTS b/l. Patient also with pronounced slurred speech (moderate but is intelligible). CTH showing a nonacute left occipital infarct (not seen on the CTH 7/2023). MR with acute/subacute posterior fossa and left occipital lobe infarcts with associated cytotoxic edema. Recieved tenecteplase - 30 minutes after administration, NIHSS 4->2 (mild left facial but improved and mild dysarthria). Patient reports voice is much better. Performs FTN without issue with LUE. Performs HTS b/l. L NLF flattening significantly improved.     Patient was evaluated by PM&R and therapy for functional deficits and gait/ADL impairments and recommend acute rehabilitation.  Patient was medically optimized for discharge to [facility] on 11/27/2023.    Pt was stable upon rehab admission to  [facility]. Admitted with gait instabilty, ADL, and functional impairments.     Rehab Course significant for:  - Insomnia - better with room change and trazodone increase.  Tapered back to home does of 25mg  - reduced urinary urge - required straight cath.  Started on flomax and toileting program q3hrs while awake.  No longer in retention  - dysphagia - now on Reg/thin liquid - compliant with strategy    All other medical co-morbidities were stable.     Pt tolerated course of inpatient PT/OT/SLP rehab with significant functional improvements and met rehab goals prior to discharge.    Pt was medically cleared on _12/19__  for discharged to __Home_

## 2023-12-18 NOTE — DISCHARGE NOTE PROVIDER - PROVIDER TOKENS
PROVIDER:[TOKEN:[[id number]],FOLLOWUP:[2 weeks]],PROVIDER:[TOKEN:[[id number]],FOLLOWUP:[1 month]],PROVIDER:[TOKEN:[[id number]],FOLLOWUP:[1 month]],PROVIDER:[TOKEN:[[id number]],FOLLOWUP:[1 month]] PROVIDER:[TOKEN:[[id number]],FOLLOWUP:[2 weeks]],PROVIDER:[TOKEN:[[id number]],FOLLOWUP:[1 month]],PROVIDER:[TOKEN:[[id number]],FOLLOWUP:[1 month]],PROVIDER:[TOKEN:[[id number]],FOLLOWUP:[1 month]]

## 2023-12-18 NOTE — DISCHARGE NOTE PROVIDER - NSDCFUSCHEDAPPT_GEN_ALL_CORE_FT
Lillian Nieto  [facility] Physician [facility]  NEUROSURG [address] [place] [address]  Scheduled Appointment: 12/29/2023     Lillian Nieto  [facility] Physician [facility]  NEUROSURG [address] [place] [address]  Scheduled Appointment: 12/29/2023

## 2023-12-18 NOTE — DISCHARGE NOTE PROVIDER - ATTENDING DISCHARGE PHYSICAL EXAMINATION:
PHYSICAL EXAM:   Gen - NAD, Comfortable, sitting in wheelchair   HEENT - NCAT  Neck - Supple, No limited ROM  Pulm - resp nonlabored  Cardiovascular - warm and well perfused  Abdomen - Soft, NT/ND   Extremities - No peripheral edema, no calf tenderness  Neuro-     Cognitive: awake, alert, oriented x4  - mild delayed processing vs response     Communication:  +dysarthria, +dysphagia,  no aphasia, able to repeat, follows 2 step commands     Cranial Nerves - left facial droop, left facial weakness, decreased left shoulder shrug (improving)     Motor -                     LEFT    UE - 5/5 proximal, 4/5                       RIGHT UE - 5/5                    LEFT    LE - 5/5                     RIGHT LE - 5/5        Sensory - Intact  to LT      Coordination: FTN dysmetric to left (minimal)     Tone - normal  Psychiatric - flat affect  Skin:  all skin intact

## 2023-12-18 NOTE — DISCHARGE NOTE PROVIDER - CARE PROVIDER_API CALL
Kumar Guzman  Neurology  [address], [address]  [place] [address]  Phone: [phone]  Fax: [phone]  Follow Up Time: 2 weeks    Phoebe Xiao  Radiology  [address], [address]  [place] [address]  Phone: [phone]  Fax: [phone]  Follow Up Time: 1 month    Talib Dsouza  Cardiology  [address], [address]  [place] [address]  Phone: [phone]  Fax: [phone]  Follow Up Time: 1 month    Meri Anthony  Physical/Rehab Medicine  [address] [place] [address]  Phone: [phone]  Fax: [phone]  Follow Up Time: 1 month   Kumar Guzman  Neurology  [address], [address]  [place] [address]  Phone: [phone]  Fax: [phone]  Follow Up Time: 2 weeks    Phoebe Xiao  Radiology  [address]  [place] [address]  Phone: [phone]  Fax: [phone]  Follow Up Time: 1 month    Talib Dsouza  Cardiology  [address] [facility], [address]  [place] [address]  Phone: [phone]  Fax: [phone]  Follow Up Time: 1 month    Meri Anthony  Physical/Rehab Medicine  [address] [place] [address]  Phone: [phone]  Fax: [phone]  Follow Up Time: 1 month

## 2023-12-19 NOTE — PROGRESS NOTE ADULT - NS ATTEND OPT1A GEN_ALL_CORE
Exam/Medical decision making
Exam/Medical decision making
History/Exam/Medical decision making
Exam/Medical decision making
Medical decision making
Exam/Medical decision making

## 2023-12-19 NOTE — DISCHARGE NOTE NURSING/CASE MANAGEMENT/SOCIAL WORK - NSDCPEFALRISK_GEN_ALL_CORE
For information on Fall & Injury Prevention, visit: [email or web address] OR  [email or web address] OR  https://www.cdc.gov/steadi/patient.html For information on Fall & Injury Prevention, visit: [email or web address] OR  [email or web address] OR  https://www.cdc.gov/steadi/patient.html

## 2023-12-19 NOTE — PROGRESS NOTE ADULT - ASSESSMENT
42yo RHD M with PMH significant for HTN, type 2 DM, strokes (2015 abd 7/2023), former smoker (quit 7/2023),  presented to [facility] ED (11/16) with c/o difficulty walking, LLE weakness, slurred speech (worse from baseline). Images with acute/subacute infarcts.    To be discharged home today. Wife will be picking patient up around 11a    # CVA (2015, 7/2023, 11/2023)   - S/p TPA  - ASA 81mg + Ticagrelor 90 BID  - Atorvastatin 80 (LDL 36)  - continue Comprehensive Rehab Program: PT/OT/ST, 3hours daily and 5 days weekly.     # MALI-- improved  - Bun/Cr improved-- 23/1.23 (12/11) >19/1.21 (12/14)>18/1.17 (12/18)  - s/p IVF 1000 ml on 12/7  - encourage oral intake     # Mood/sleep  - Lexapro inc 20 (11/29)  - Trazodone bedtime+ PRN  - Neuropsychology support  - Recreation therapy     # HTN  - Labetalol 200 TID  - Amlodipine 5  - Lisinopril 40  - /75 - 141/89 (12/19)    # DM II   - A1C 5.5  - Previously on metformin  - FS: <120  - Glucose via lab: 119 (12/18)  - will hold home medications - continue to monitor daily fasting glucose and follow up with PCP and/or Endo    # Respiratory health/RADHA  - Incentive Spirometry during the day time  - CPAP nightly    # Fever on admission --since resolved  - Noted with temp of 101.3 on admission: cbc, cmp, lactate, procalcitonin, UA (neg), blood CXR X 2 (unremarkable)  - blood culture no growth-final  -  WBC wnl     # Pain management  - Tylenol PRN    # FEN   - Previously on regular and mildly thick with supervision in meals 12/2+ free water protocol  -  MBS 12/8- upgraded to thin liquids, continue with regular diet  - Reminded of swallowing strategies.  Will continue with outpatient speech therapy    # GI/Bowel Mgmt   - Senna,  Miralax BID PRN  - Mineral oil enema x 1 (11/30)   - Golytely bowel prep x 1 (12/1) and suppository.   - GI ppx: Protonix    #Bladder management/Urinary retention-- previously needing to be straight cath   - UA (11/27) negative  - continue flomax 0.4  - No longer retaining-- dc'd bladder scans 12/8    # DVT ppx  - Lovenox 30 BID- discontinue upon discharge    IDT 12/13  NSG: urinary retention with ISC.  SW: lives with wife and daughter in [place], no step inside  SLP: recent MBS with upgrade: reg/ THIN, doing breath support exercises  OT: Setup eat/groom, min bathing, CGA dressing/transfer, min A shower transfer.  Goal: CGA bathing.   PT: min - CGA bed mob, transfer, min A amb 100' RW/4 step 2 HR Goal: CGA  TDD: 12/19 H    Follow ups:  Neuro: Dr. Kumar Guzman  Rad: Dr. Phoebe Xiao  Card: Dr. Talib Dsouza  IM: Appt 12/19/2023 42yo RHD M with PMH significant for HTN, type 2 DM, strokes (2015 abd 7/2023), former smoker (quit 7/2023),  presented to [facility] ED (11/16) with c/o difficulty walking, LLE weakness, slurred speech (worse from baseline). Images with acute/subacute infarcts.    To be discharged home today. Wife will be picking patient up around 11a    # CVA (2015, 7/2023, 11/2023)   - S/p TPA  - ASA 81mg + Ticagrelor 90 BID  - Atorvastatin 80 (LDL 36)  - continue Comprehensive Rehab Program: PT/OT/ST, 3hours daily and 5 days weekly.     # MALI-- improved  - Bun/Cr improved-- 23/1.23 (12/11) >19/1.21 (12/14)>18/1.17 (12/18)  - s/p IVF 1000 ml on 12/7  - encourage oral intake     # Mood/sleep  - Lexapro inc 20 (11/29)  - Trazodone bedtime+ PRN  - Neuropsychology support  - Recreation therapy     # HTN  - Labetalol 200 TID  - Amlodipine 5  - Lisinopril 40  - /75 - 141/89 (12/19)    # DM II   - A1C 5.5  - Previously on metformin  - FS: <120  - Glucose via lab: 119 (12/18)  - will hold home medications - continue to monitor daily fasting glucose and follow up with PCP and/or Endo    # Respiratory health/RADHA  - Incentive Spirometry during the day time  - CPAP nightly    # Fever on admission --since resolved  - Noted with temp of 101.3 on admission: cbc, cmp, lactate, procalcitonin, UA (neg), blood CXR X 2 (unremarkable)  - blood culture no growth-final  -  WBC wnl     # Pain management  - Tylenol PRN    # FEN   - Previously on regular and mildly thick with supervision in meals 12/2+ free water protocol  -  MBS 12/8- upgraded to thin liquids, continue with regular diet  - Reminded of swallowing strategies.  Will continue with outpatient speech therapy    # GI/Bowel Mgmt   - Senna,  Miralax BID PRN  - Mineral oil enema x 1 (11/30)   - Golytely bowel prep x 1 (12/1) and suppository.   - GI ppx: Protonix    #Bladder management/Urinary retention-- previously needing to be straight cath   - UA (11/27) negative  - continue flomax 0.4  - No longer retaining-- dc'd bladder scans 12/8    # DVT ppx  - Lovenox 30 BID- discontinue upon discharge    IDT 12/13  NSG: urinary retention with ISC.  SW: lives with wife and daughter in [place], no step inside  SLP: recent MBS with upgrade: reg/ THIN, doing breath support exercises  OT: Setup eat/groom, min bathing, CGA dressing/transfer, min A shower transfer.  Goal: CGA bathing.   PT: min - CGA bed mob, transfer, min A amb 100' RW/4 step 2 HR Goal: CGA  TDD: 12/19 H    Follow ups:  Neuro: Dr. Kumar Guzman  Rad: Dr. Phoebe Xiao  Card: Dr. Talib Dsouza  IM: Appt 12/19/2023

## 2023-12-19 NOTE — DISCHARGE NOTE NURSING/CASE MANAGEMENT/SOCIAL WORK - PATIENT PORTAL LINK FT
You can access the FollowMyHealth Patient Portal offered by [facility] by registering at the following website: [email or web address]. By joining [employer]’s FollowMyHealth portal, you will also be able to view your health information using other applications (apps) compatible with our system. You can access the FollowMyHealth Patient Portal offered by [facility] by registering at the following website: [email or web address]. By joining [employer]’s FollowMyHealth portal, you will also be able to view your health information using other applications (apps) compatible with our system.

## 2023-12-19 NOTE — PROGRESS NOTE ADULT - SUBJECTIVE AND OBJECTIVE BOX
Patient is a 43y old  Male who presents with a chief complaint of CVA    HPI:  42yo RHD M with PMH significant for: stroke (7/2023 - presenting with dysarthria - left inferior adrienne, b/l parieto-occipital juxtacortical WM w/mild residual dysarthria; 8 years ago - treated with TPA at [facility] - p/w L hemiplegia, facial droop and dysarthria), T2DM, HTN, former smoker (quit in 7/2023), presented to [facility] ED on 11/16/2023, with c/o difficulty walking, LLE weakness, slurred speech (worse from baseline).     ED exam: patient has a L facial droop, ataxia in LUE (dysmetria with FTN), and struggles to perform HTS b/l. Patient also with pronounced slurred speech (moderate but is intelligible). CTH showing a nonacute left occipital infarct (not seen on the CTH 7/2023). MR with acute/subacute posterior fossa and left occipital lobe infarcts with associated cytotoxic edema. Recieved tenecteplase - 30 minutes after administration, NIHSS 4->2 (mild left facial but improved and mild dysarthria). Patient reports voice is much better. Performs FTN without issue with LUE. Performs HTS b/l. L NLF flattening significantly improved.     Patient was evaluated by PM&R and therapy for functional deficits and gait/ADL impairments and recommend acute rehabilitation.  Patient was medically optimized for discharge to [place] on 11/27/2023.    SUBJECTIVE: Patient seen and examined while sitting up in WC at bedside. Patient in good spirit and excited to be discharged today.     REVIEW OF SYSTEMS  Constitutional: No fever, no chills  Cardio: No chest pain, No palpitations  Resp: No SOB, no respiratory distress   GI: No abd pain, no nausea  Neuro: No headaches, no dizziness, +weakness, +dysarthria    VITALS  43y  Vital Signs Last 24 Hrs  T(C): 36.7 (19 Dec 2023 07:46), Max: 36.8 (18 Dec 2023 22:14)  T(F): 98.1 (19 Dec 2023 07:46), Max: 98.3 (18 Dec 2023 22:14)  HR: 67 (19 Dec 2023 07:46) (65 - 75)  BP: 117/75 (19 Dec 2023 07:46) (117/75 - 141/89)  RR: 16 (19 Dec 2023 07:46) (16 - 16)  SpO2: 95% (19 Dec 2023 07:46) (95% - 100%)    PHYSICAL EXAM:   Gen - NAD, Comfortable, sitting in wheelchair   HEENT - NCAT  Neck - Supple, No limited ROM  Pulm - resp nonlabored  Cardiovascular - warm and well perfused  Abdomen - Soft, NT/ND   Extremities - No peripheral edema, no calf tenderness  Neuro-     Cognitive: awake, alert, oriented x4  - mild delayed processing vs response     Communication:  +dysarthria, +dysphagia,  no aphasia, able to repeat, follows 2 step commands     Cranial Nerves - left facial droop, left facial weakness, decreased left shoulder shrug (improving)     Motor -                     LEFT    UE - 5/5 proximal, 4/5                       RIGHT UE - 5/5                    LEFT    LE - 5/5                     RIGHT LE - 5/5        Sensory - Intact  to LT      Coordination: FTN dysmetric to left (minimal)     Tone - normal  Psychiatric - flat affect  Skin:  all skin intact    RECENT LABS:             12.4   5.44  )-----------( 180      ( 18 Dec 2023 06:48 )             36.4     12-18    143  |  106  |  18  ----------------------------<  119<H>  4.1   |  27  |  1.17    Ca    9.4      18 Dec 2023 06:48    TPro  7.0  /  Alb  3.5  /  TBili  0.7  /  DBili  x   /  AST  20  /  ALT  51<H>  /  AlkPhos  50  12-18    LIVER FUNCTIONS - ( 18 Dec 2023 06:48 )  Alb: 3.5 g/dL / Pro: 7.0 g/dL / ALK PHOS: 50 U/L / ALT: 51 U/L / AST: 20 U/L / GGT: x           MEDICATIONS:  MEDICATIONS  (STANDING):  amLODIPine   Tablet 5 milliGRAM(s) Oral daily  aspirin  chewable 81 milliGRAM(s) Oral <User Schedule>  atorvastatin 80 milliGRAM(s) Oral at bedtime  bisacodyl Suppository 10 milliGRAM(s) Rectal at bedtime  enoxaparin Injectable 30 milliGRAM(s) SubCutaneous <User Schedule>  escitalopram 20 milliGRAM(s) Oral daily  labetalol 200 milliGRAM(s) Oral three times a day  lisinopril 40 milliGRAM(s) Oral daily  pantoprazole    Tablet 40 milliGRAM(s) Oral before breakfast  senna 2 Tablet(s) Oral at bedtime  sodium chloride 0.9%. 1000 milliLiter(s) (60 mL/Hr) IV Continuous <Continuous>  tamsulosin 0.4 milliGRAM(s) Oral at bedtime  ticagrelor 90 milliGRAM(s) Oral two times a day  traZODone 25 milliGRAM(s) Oral at bedtime    MEDICATIONS  (PRN):  acetaminophen     Tablet .. 650 milliGRAM(s) Oral every 6 hours PRN Temp greater or equal to 38.5C (101.3F), Mild Pain (1 - 3)  polyethylene glycol 3350 17 Gram(s) Oral two times a day PRN Constipation  traZODone 25 milliGRAM(s) Oral at bedtime PRN insomnia   Patient is a 43y old  Male who presents with a chief complaint of CVA    HPI:  42yo RHD M with PMH significant for: stroke (7/2023 - presenting with dysarthria - left inferior adrienne, b/l parieto-occipital juxtacortical WM w/mild residual dysarthria; 8 years ago - treated with TPA at [facility] - p/w L hemiplegia, facial droop and dysarthria), T2DM, HTN, former smoker (quit in 7/2023), presented to [facility] ED on 11/16/2023, with c/o difficulty walking, LLE weakness, slurred speech (worse from baseline).     ED exam: patient has a L facial droop, ataxia in LUE (dysmetria with FTN), and struggles to perform HTS b/l. Patient also with pronounced slurred speech (moderate but is intelligible). CTH showing a nonacute left occipital infarct (not seen on the CTH 7/2023). MR with acute/subacute posterior fossa and left occipital lobe infarcts with associated cytotoxic edema. Recieved tenecteplase - 30 minutes after administration, NIHSS 4->2 (mild left facial but improved and mild dysarthria). Patient reports voice is much better. Performs FTN without issue with LUE. Performs HTS b/l. L NLF flattening significantly improved.     Patient was evaluated by PM&R and therapy for functional deficits and gait/ADL impairments and recommend acute rehabilitation.  Patient was medically optimized for discharge to [place] on 11/27/2023.    SUBJECTIVE: Patient seen and examined while sitting up in WC at bedside. Patient in good spirit and excited to be discharged today.     REVIEW OF SYSTEMS  Constitutional: No fever, no chills  Cardio: No chest pain, No palpitations  Resp: No SOB, no respiratory distress   GI: No abd pain, no nausea  Neuro: No headaches, no dizziness, +weakness, +dysarthria    VITALS  43y  Vital Signs Last 24 Hrs  T(C): 36.7 (19 Dec 2023 07:46), Max: 36.8 (18 Dec 2023 22:14)  T(F): 98.1 (19 Dec 2023 07:46), Max: 98.3 (18 Dec 2023 22:14)  HR: 67 (19 Dec 2023 07:46) (65 - 75)  BP: 117/75 (19 Dec 2023 07:46) (117/75 - 141/89)  RR: 16 (19 Dec 2023 07:46) (16 - 16)  SpO2: 95% (19 Dec 2023 07:46) (95% - 100%)    PHYSICAL EXAM:   Gen - NAD, Comfortable, sitting in wheelchair   HEENT - NCAT  Neck - Supple, No limited ROM  Pulm - resp nonlabored  Cardiovascular - warm and well perfused  Abdomen - Soft, NT/ND   Extremities - No peripheral edema, no calf tenderness  Neuro-     Cognitive: awake, alert, oriented x4  - mild delayed processing vs response     Communication:  +dysarthria, +dysphagia,  no aphasia, able to repeat, follows 2 step commands     Cranial Nerves - left facial droop, left facial weakness, decreased left shoulder shrug (improving)     Motor -                     LEFT    UE - 5/5 proximal, 4/5                       RIGHT UE - 5/5                    LEFT    LE - 5/5                     RIGHT LE - 5/5        Sensory - Intact  to LT      Coordination: FTN dysmetric to left (minimal)     Tone - normal  Psychiatric - flat affect  Skin:  all skin intact    RECENT LABS:             12.4   5.44  )-----------( 180      ( 18 Dec 2023 06:48 )             36.4     12-18    143  |  106  |  18  ----------------------------<  119<H>  4.1   |  27  |  1.17    Ca    9.4      18 Dec 2023 06:48    TPro  7.0  /  Alb  3.5  /  TBili  0.7  /  DBili  x   /  AST  20  /  ALT  51<H>  /  AlkPhos  50  12-18    LIVER FUNCTIONS - ( 18 Dec 2023 06:48 )  Alb: 3.5 g/dL / Pro: 7.0 g/dL / ALK PHOS: 50 U/L / ALT: 51 U/L / AST: 20 U/L / GGT: x           MEDICATIONS:  MEDICATIONS  (STANDING):  amLODIPine   Tablet 5 milliGRAM(s) Oral daily  aspirin  chewable 81 milliGRAM(s) Oral <User Schedule>  atorvastatin 80 milliGRAM(s) Oral at bedtime  bisacodyl Suppository 10 milliGRAM(s) Rectal at bedtime  enoxaparin Injectable 30 milliGRAM(s) SubCutaneous <User Schedule>  escitalopram 20 milliGRAM(s) Oral daily  labetalol 200 milliGRAM(s) Oral three times a day  lisinopril 40 milliGRAM(s) Oral daily  pantoprazole    Tablet 40 milliGRAM(s) Oral before breakfast  senna 2 Tablet(s) Oral at bedtime  sodium chloride 0.9%. 1000 milliLiter(s) (60 mL/Hr) IV Continuous <Continuous>  tamsulosin 0.4 milliGRAM(s) Oral at bedtime  ticagrelor 90 milliGRAM(s) Oral two times a day  traZODone 25 milliGRAM(s) Oral at bedtime    MEDICATIONS  (PRN):  acetaminophen     Tablet .. 650 milliGRAM(s) Oral every 6 hours PRN Temp greater or equal to 38.5C (101.3F), Mild Pain (1 - 3)  polyethylene glycol 3350 17 Gram(s) Oral two times a day PRN Constipation  traZODone 25 milliGRAM(s) Oral at bedtime PRN insomnia

## 2023-12-19 NOTE — PROGRESS NOTE ADULT - NS ATTEND AMEND GEN_ALL_CORE FT
I have personally seen and examined the patient.  I fully participated in the care of this patient.  I have made amendments to the documentation where necessary, and agree with the history, physical exam, and plan as documented above  Labs reviewed  Discussed team meeting with patient-- expected dc home on 12/19  Ongoing constipation--enema tonight
I have personally seen and examined the patient.  I fully participated in the care of this patient.  I have made amendments to the documentation where necessary, and agree with the history, physical exam, and plan as documented above    IDT today, discussed patient's progress with therapies thus far, team goals and expectations upon discharge. , nursing and therapists were present and provided input during meeting.    IDT 11/29  NSG: urinary retention with ISC.  SW: lives with wife and daughter in [place], no step inside  SLP: reg/mod thick liquid, free water protocol. cog and language WFL, mod dysarthria working on breath support + EMST  OT: SV eat/groom, mod A UBD/toilet transfer with RW, max A LBD/toileting/bathring.  Goal: mod i for adls, except SV bath/transfers  PT: mod A bed transfers, min A sit to stand, min A amb 45' RW with mild SOB with no desat. Goal: mod I bed mob/transfers/amb 150', SV on stairs  TDD: 12/19 Home
I have personally seen and examined the patient.  I fully participated in the care of this patient.  I have made amendments to the documentation where necessary, and agree with the history, physical exam, and plan as documented above  Patient endorses constipation. Had large BM first night arrived, however no BM yesterday.  Golytely and suppository ordered for tonight.
I have personally seen and examined the patient.  I fully participated in the care of this patient.  I have made amendments to the documentation where necessary, and agree with the history, physical exam, and plan as documented above  Doing well, being taken to bathroom more regularly by staff
I have personally seen and examined the patient.  I fully participated in the care of this patient.  I have made amendments to the documentation where necessary, and agree with the history, physical exam, and plan as documented above    IDT today, discussed patient's progress with therapies thus far, team goals and expectations upon discharge. , nursing and therapists were present and provided input during meeting.    Spoke to wife at bedside. Filled out FMLA paperwork
I have personally seen and examined the patient.  I fully participated in the care of this patient.  I have made amendments to the documentation where necessary, and agree with the history, physical exam, and plan as documented above  DC today
I have personally seen and examined the patient.  I fully participated in the care of this patient.  I have made amendments to the documentation where necessary, and agree with the history, physical exam, and plan as documented above
I have personally seen and examined the patient.  I fully participated in the care of this patient.  I have made amendments to the documentation where necessary, and agree with the history, physical exam, and plan as documented above  Check PVR, discussed with RN and dc bladder scans ( patient has not been bladder scanned, last PVR from 12/5 with 70 cc)
I have personally seen and examined the patient.  I fully participated in the care of this patient.  I have made amendments to the documentation where necessary, and agree with the history, physical exam, and plan as documented above  IVF for MALI, discussed with hospitalist  Per patient, no straight cath for the past few days, no recorded bladder scans on EMR, will check with nursing.
I have personally seen and examined the patient.  I fully participated in the care of this patient.  I have made amendments to the documentation where necessary, and agree with the history, physical exam, and plan as documented above  Doing well, sleeping without issue. In better spirits, looking forward to discharge home tomorrow

## 2023-12-19 NOTE — PROGRESS NOTE ADULT - PROVIDER SPECIALTY LIST ADULT
Hospitalist
Internal Medicine
Neuropsychology
Physiatry
Hospitalist
Internal Medicine
Internal Medicine
Physiatry
Physiatry
Hospitalist
Hospitalist
Physiatry
Rehab Medicine
Hospitalist
Neuropsychology
Physiatry
Rehab Medicine
Hospitalist
Physiatry
Neuropsychology
Physiatry
Physiatry

## 2024-01-01 ENCOUNTER — RESULT REVIEW (OUTPATIENT)
Age: 44
End: 2024-01-01

## 2024-01-01 ENCOUNTER — APPOINTMENT (OUTPATIENT)
Dept: NEUROLOGY | Facility: CLINIC | Age: 44
End: 2024-01-01
Payer: COMMERCIAL

## 2024-01-01 ENCOUNTER — APPOINTMENT (OUTPATIENT)
Dept: PHYSICAL MEDICINE AND REHAB | Facility: CLINIC | Age: 44
End: 2024-01-01
Payer: COMMERCIAL

## 2024-01-01 ENCOUNTER — RX RENEWAL (OUTPATIENT)
Age: 44
End: 2024-01-01

## 2024-01-01 ENCOUNTER — EMERGENCY (EMERGENCY)
Facility: HOSPITAL | Age: 44
LOS: 1 days | Discharge: HOME | End: 2024-01-01
Attending: EMERGENCY MEDICINE
Payer: COMMERCIAL

## 2024-01-01 ENCOUNTER — APPOINTMENT (OUTPATIENT)
Dept: INTERNAL MEDICINE | Facility: CLINIC | Age: 44
End: 2024-01-01

## 2024-01-01 ENCOUNTER — OUTPATIENT (OUTPATIENT)
Dept: OUTPATIENT SERVICES | Facility: HOSPITAL | Age: 44
LOS: 1 days | End: 2024-01-01
Payer: COMMERCIAL

## 2024-01-01 ENCOUNTER — APPOINTMENT (OUTPATIENT)
Dept: NEUROSURGERY | Facility: CLINIC | Age: 44
End: 2024-01-01
Payer: COMMERCIAL

## 2024-01-01 ENCOUNTER — APPOINTMENT (OUTPATIENT)
Dept: MRI IMAGING | Facility: HOSPITAL | Age: 44
End: 2024-01-01

## 2024-01-01 ENCOUNTER — INPATIENT (INPATIENT)
Facility: HOSPITAL | Age: 44
LOS: 2 days | DRG: 3 | End: 2024-05-30
Attending: STUDENT IN AN ORGANIZED HEALTH CARE EDUCATION/TRAINING PROGRAM | Admitting: RADIOLOGY
Payer: COMMERCIAL

## 2024-01-01 ENCOUNTER — TRANSCRIPTION ENCOUNTER (OUTPATIENT)
Age: 44
End: 2024-01-01

## 2024-01-01 VITALS
TEMPERATURE: 98 F | RESPIRATION RATE: 18 BRPM | WEIGHT: 235.01 LBS | OXYGEN SATURATION: 97 % | DIASTOLIC BLOOD PRESSURE: 113 MMHG | HEART RATE: 83 BPM | HEIGHT: 70 IN | SYSTOLIC BLOOD PRESSURE: 168 MMHG

## 2024-01-01 VITALS
DIASTOLIC BLOOD PRESSURE: 92 MMHG | TEMPERATURE: 98 F | HEART RATE: 82 BPM | SYSTOLIC BLOOD PRESSURE: 148 MMHG | OXYGEN SATURATION: 98 % | RESPIRATION RATE: 18 BRPM

## 2024-01-01 VITALS — DIASTOLIC BLOOD PRESSURE: 98 MMHG | HEART RATE: 75 BPM | SYSTOLIC BLOOD PRESSURE: 159 MMHG | RESPIRATION RATE: 17 BRPM

## 2024-01-01 VITALS
WEIGHT: 235 LBS | SYSTOLIC BLOOD PRESSURE: 129 MMHG | HEIGHT: 69 IN | HEART RATE: 77 BPM | DIASTOLIC BLOOD PRESSURE: 74 MMHG | BODY MASS INDEX: 34.8 KG/M2

## 2024-01-01 VITALS
HEART RATE: 86 BPM | DIASTOLIC BLOOD PRESSURE: 84 MMHG | WEIGHT: 235 LBS | HEIGHT: 69 IN | BODY MASS INDEX: 34.8 KG/M2 | SYSTOLIC BLOOD PRESSURE: 143 MMHG

## 2024-01-01 DIAGNOSIS — I10 ESSENTIAL (PRIMARY) HYPERTENSION: ICD-10-CM

## 2024-01-01 DIAGNOSIS — R26.1 PARALYTIC GAIT: ICD-10-CM

## 2024-01-01 DIAGNOSIS — D64.9 ANEMIA, UNSPECIFIED: ICD-10-CM

## 2024-01-01 DIAGNOSIS — N17.9 ACUTE KIDNEY FAILURE, UNSPECIFIED: ICD-10-CM

## 2024-01-01 DIAGNOSIS — I63.9 CEREBRAL INFARCTION, UNSPECIFIED: ICD-10-CM

## 2024-01-01 DIAGNOSIS — M79.2 NEURALGIA AND NEURITIS, UNSPECIFIED: ICD-10-CM

## 2024-01-01 DIAGNOSIS — E11.9 TYPE 2 DIABETES MELLITUS W/OUT COMPLICATIONS: ICD-10-CM

## 2024-01-01 DIAGNOSIS — G81.14 SPASTIC HEMIPLEGIA AFFECTING LEFT NONDOMINANT SIDE: ICD-10-CM

## 2024-01-01 DIAGNOSIS — R33.9 RETENTION OF URINE, UNSPECIFIED: ICD-10-CM

## 2024-01-01 DIAGNOSIS — I65.09 OCCLUSION AND STENOSIS OF UNSPECIFIED VERTEBRAL ARTERY: ICD-10-CM

## 2024-01-01 DIAGNOSIS — E78.5 HYPERLIPIDEMIA, UNSPECIFIED: ICD-10-CM

## 2024-01-01 DIAGNOSIS — R47.9 UNSPECIFIED SPEECH DISTURBANCES: ICD-10-CM

## 2024-01-01 DIAGNOSIS — R57.0 CARDIOGENIC SHOCK: ICD-10-CM

## 2024-01-01 DIAGNOSIS — F32.A DEPRESSION, UNSPECIFIED: ICD-10-CM

## 2024-01-01 DIAGNOSIS — Z00.00 ENCOUNTER FOR GENERAL ADULT MEDICAL EXAMINATION WITHOUT ABNORMAL FINDINGS: ICD-10-CM

## 2024-01-01 DIAGNOSIS — E87.5 HYPERKALEMIA: ICD-10-CM

## 2024-01-01 LAB
A1C WITH ESTIMATED AVERAGE GLUCOSE RESULT: 5.6 % — SIGNIFICANT CHANGE UP (ref 4–5.6)
ALBUMIN SERPL ELPH-MCNC: 3.4 G/DL — SIGNIFICANT CHANGE UP (ref 3.3–5)
ALBUMIN SERPL ELPH-MCNC: 3.6 G/DL — SIGNIFICANT CHANGE UP (ref 3.3–5)
ALBUMIN SERPL ELPH-MCNC: 3.6 G/DL — SIGNIFICANT CHANGE UP (ref 3.3–5)
ALBUMIN SERPL ELPH-MCNC: 4.6 G/DL — SIGNIFICANT CHANGE UP (ref 3.3–5)
ALBUMIN SERPL ELPH-MCNC: 4.6 G/DL — SIGNIFICANT CHANGE UP (ref 3.3–5)
ALBUMIN SERPL ELPH-MCNC: 5.2 G/DL — HIGH (ref 3.3–5)
ALP SERPL-CCNC: 58 U/L — SIGNIFICANT CHANGE UP (ref 40–120)
ALP SERPL-CCNC: 58 U/L — SIGNIFICANT CHANGE UP (ref 40–120)
ALP SERPL-CCNC: 69 U/L — SIGNIFICANT CHANGE UP (ref 40–120)
ALP SERPL-CCNC: 71 U/L — SIGNIFICANT CHANGE UP (ref 40–120)
ALP SERPL-CCNC: 89 U/L — SIGNIFICANT CHANGE UP (ref 40–120)
ALP SERPL-CCNC: 99 U/L — SIGNIFICANT CHANGE UP (ref 40–120)
ALT FLD-CCNC: 18 U/L — SIGNIFICANT CHANGE UP (ref 10–45)
ALT FLD-CCNC: 18 U/L — SIGNIFICANT CHANGE UP (ref 10–45)
ALT FLD-CCNC: 24 U/L — SIGNIFICANT CHANGE UP (ref 10–45)
ALT FLD-CCNC: 534 U/L — HIGH (ref 10–45)
ALT FLD-CCNC: 614 U/L — HIGH (ref 10–45)
ALT FLD-CCNC: 908 U/L — HIGH (ref 10–45)
ANION GAP SERPL CALC-SCNC: 11 MMOL/L — SIGNIFICANT CHANGE UP (ref 5–17)
ANION GAP SERPL CALC-SCNC: 11 MMOL/L — SIGNIFICANT CHANGE UP (ref 5–17)
ANION GAP SERPL CALC-SCNC: 16 MMOL/L — SIGNIFICANT CHANGE UP (ref 5–17)
ANION GAP SERPL CALC-SCNC: 18 MMOL/L — HIGH (ref 5–17)
ANION GAP SERPL CALC-SCNC: 19 MMOL/L — HIGH (ref 5–17)
ANION GAP SERPL CALC-SCNC: 32 MMOL/L — HIGH (ref 5–17)
ANISOCYTOSIS BLD QL: SLIGHT — SIGNIFICANT CHANGE UP
APPEARANCE UR: CLEAR — SIGNIFICANT CHANGE UP
APPEARANCE UR: CLEAR — SIGNIFICANT CHANGE UP
APTT BLD: 137 SEC — CRITICAL HIGH (ref 24.5–35.6)
APTT BLD: 23.9 SEC — LOW (ref 24.5–35.6)
APTT BLD: 30.9 SEC — SIGNIFICANT CHANGE UP (ref 24.5–35.6)
APTT BLD: 32.9 SEC — SIGNIFICANT CHANGE UP (ref 24.5–35.6)
APTT BLD: 32.9 SEC — SIGNIFICANT CHANGE UP (ref 24.5–35.6)
AST SERPL-CCNC: 1028 U/L — HIGH (ref 10–40)
AST SERPL-CCNC: 13 U/L — SIGNIFICANT CHANGE UP (ref 10–40)
AST SERPL-CCNC: 13 U/L — SIGNIFICANT CHANGE UP (ref 10–40)
AST SERPL-CCNC: 28 U/L — SIGNIFICANT CHANGE UP (ref 10–40)
AST SERPL-CCNC: 392 U/L — HIGH (ref 10–40)
AST SERPL-CCNC: 476 U/L — HIGH (ref 10–40)
BASE EXCESS BLDCOV CALC-SCNC: -9.7 MMOL/L — LOW (ref -2–3)
BASE EXCESS BLDV CALC-SCNC: -0.1 MMOL/L — SIGNIFICANT CHANGE UP (ref -2–3)
BASE EXCESS BLDV CALC-SCNC: -0.8 MMOL/L — SIGNIFICANT CHANGE UP (ref -2–3)
BASE EXCESS BLDV CALC-SCNC: -1 MMOL/L — SIGNIFICANT CHANGE UP (ref -2–3)
BASE EXCESS BLDV CALC-SCNC: -1.1 MMOL/L — SIGNIFICANT CHANGE UP (ref -2–3)
BASE EXCESS BLDV CALC-SCNC: -10.7 MMOL/L — LOW (ref -2–3)
BASE EXCESS BLDV CALC-SCNC: -2.9 MMOL/L — LOW (ref -2–3)
BASE EXCESS BLDV CALC-SCNC: -4 MMOL/L — LOW (ref -2–3)
BASE EXCESS BLDV CALC-SCNC: -4.3 MMOL/L — LOW (ref -2–3)
BASE EXCESS BLDV CALC-SCNC: -6.9 MMOL/L — LOW (ref -2–3)
BASE EXCESS BLDV CALC-SCNC: -8.9 MMOL/L — LOW (ref -2–3)
BASE EXCESS BLDV CALC-SCNC: 0.4 MMOL/L — SIGNIFICANT CHANGE UP (ref -2–3)
BASE EXCESS BLDV CALC-SCNC: 0.4 MMOL/L — SIGNIFICANT CHANGE UP (ref -2–3)
BASOPHILS # BLD AUTO: 0 K/UL — SIGNIFICANT CHANGE UP (ref 0–0.2)
BASOPHILS # BLD AUTO: 0.02 K/UL — SIGNIFICANT CHANGE UP (ref 0–0.2)
BASOPHILS # BLD AUTO: 0.05 K/UL — SIGNIFICANT CHANGE UP (ref 0–0.2)
BASOPHILS # BLD AUTO: 0.05 K/UL — SIGNIFICANT CHANGE UP (ref 0–0.2)
BASOPHILS NFR BLD AUTO: 0 % — SIGNIFICANT CHANGE UP (ref 0–2)
BASOPHILS NFR BLD AUTO: 0.1 % — SIGNIFICANT CHANGE UP (ref 0–2)
BASOPHILS NFR BLD AUTO: 0.2 % — SIGNIFICANT CHANGE UP (ref 0–2)
BASOPHILS NFR BLD AUTO: 0.2 % — SIGNIFICANT CHANGE UP (ref 0–2)
BASOPHILS NFR BLD AUTO: 0.6 % — SIGNIFICANT CHANGE UP (ref 0–2)
BASOPHILS NFR BLD AUTO: 0.6 % — SIGNIFICANT CHANGE UP (ref 0–2)
BILIRUB SERPL-MCNC: 0.2 MG/DL — SIGNIFICANT CHANGE UP (ref 0.2–1.2)
BILIRUB SERPL-MCNC: 0.3 MG/DL — SIGNIFICANT CHANGE UP (ref 0.2–1.2)
BILIRUB SERPL-MCNC: 0.3 MG/DL — SIGNIFICANT CHANGE UP (ref 0.2–1.2)
BILIRUB SERPL-MCNC: 0.4 MG/DL — SIGNIFICANT CHANGE UP (ref 0.2–1.2)
BILIRUB SERPL-MCNC: 0.4 MG/DL — SIGNIFICANT CHANGE UP (ref 0.2–1.2)
BILIRUB SERPL-MCNC: 0.8 MG/DL — SIGNIFICANT CHANGE UP (ref 0.2–1.2)
BILIRUB UR-MCNC: NEGATIVE — SIGNIFICANT CHANGE UP
BILIRUB UR-MCNC: NEGATIVE — SIGNIFICANT CHANGE UP
BLD GP AB SCN SERPL QL: NEGATIVE — SIGNIFICANT CHANGE UP
BLD GP AB SCN SERPL QL: NEGATIVE — SIGNIFICANT CHANGE UP
BLOOD GAS ECMO POST MEMBRANE - ARTERIAL RESULT: SIGNIFICANT CHANGE UP
BLOOD GAS ECMO PRE MEMBRANE - VENOUS RESULT: SIGNIFICANT CHANGE UP
BLOOD GAS PRE MEMBRANE - GLUCOSE: 299 MG/DL — HIGH (ref 70–99)
BLOOD GAS PRE MEMBRANE - ICALCIUM: 1.51 MMOL/L — HIGH (ref 1.15–1.33)
BLOOD GAS PRE MEMBRANE - POTASSIUM: 6.9 MMOL/L — CRITICAL HIGH (ref 3.5–5.1)
BLOOD GAS PRE MEMBRANE - SODIUM: 143 MMOL/L — SIGNIFICANT CHANGE UP (ref 136–146)
BUN SERPL-MCNC: 23 MG/DL — SIGNIFICANT CHANGE UP (ref 7–23)
BUN SERPL-MCNC: 23 MG/DL — SIGNIFICANT CHANGE UP (ref 7–23)
BUN SERPL-MCNC: 30 MG/DL — HIGH (ref 7–23)
BUN SERPL-MCNC: 31 MG/DL — HIGH (ref 7–23)
BUN SERPL-MCNC: 32 MG/DL — HIGH (ref 7–23)
BUN SERPL-MCNC: 35 MG/DL — HIGH (ref 7–23)
CA-I SERPL-SCNC: 1.16 MMOL/L — SIGNIFICANT CHANGE UP (ref 1.15–1.33)
CA-I SERPL-SCNC: 1.17 MMOL/L — SIGNIFICANT CHANGE UP (ref 1.15–1.33)
CA-I SERPL-SCNC: 1.18 MMOL/L — SIGNIFICANT CHANGE UP (ref 1.15–1.33)
CA-I SERPL-SCNC: 1.2 MMOL/L — SIGNIFICANT CHANGE UP (ref 1.15–1.33)
CA-I SERPL-SCNC: 1.21 MMOL/L — SIGNIFICANT CHANGE UP (ref 1.15–1.33)
CA-I SERPL-SCNC: 1.23 MMOL/L — SIGNIFICANT CHANGE UP (ref 1.15–1.33)
CA-I SERPL-SCNC: 1.23 MMOL/L — SIGNIFICANT CHANGE UP (ref 1.15–1.33)
CA-I SERPL-SCNC: 2.67 MMOL/L — CRITICAL HIGH (ref 1.15–1.33)
CALCIUM SERPL-MCNC: 10.9 MG/DL — HIGH (ref 8.4–10.5)
CALCIUM SERPL-MCNC: 11.8 MG/DL — HIGH (ref 8.4–10.5)
CALCIUM SERPL-MCNC: 8.8 MG/DL — SIGNIFICANT CHANGE UP (ref 8.4–10.5)
CALCIUM SERPL-MCNC: 9.5 MG/DL — SIGNIFICANT CHANGE UP (ref 8.4–10.5)
CALCIUM SERPL-MCNC: 9.9 MG/DL — SIGNIFICANT CHANGE UP (ref 8.4–10.5)
CALCIUM SERPL-MCNC: 9.9 MG/DL — SIGNIFICANT CHANGE UP (ref 8.4–10.5)
CHLORIDE BLDV-SCNC: 104 MMOL/L — SIGNIFICANT CHANGE UP (ref 96–108)
CHLORIDE BLDV-SCNC: 105 MMOL/L — SIGNIFICANT CHANGE UP (ref 96–108)
CHLORIDE BLDV-SCNC: 108 MMOL/L — SIGNIFICANT CHANGE UP (ref 96–108)
CHLORIDE BLDV-SCNC: 109 MMOL/L — HIGH (ref 96–108)
CHLORIDE BLDV-SCNC: 110 MMOL/L — HIGH (ref 96–108)
CHLORIDE BLDV-SCNC: 110 MMOL/L — HIGH (ref 96–108)
CHLORIDE SERPL-SCNC: 100 MMOL/L — SIGNIFICANT CHANGE UP (ref 96–108)
CHLORIDE SERPL-SCNC: 103 MMOL/L — SIGNIFICANT CHANGE UP (ref 96–108)
CHLORIDE SERPL-SCNC: 105 MMOL/L — SIGNIFICANT CHANGE UP (ref 96–108)
CHLORIDE SERPL-SCNC: 106 MMOL/L — SIGNIFICANT CHANGE UP (ref 96–108)
CK MB BLD-MCNC: 2.4 % — SIGNIFICANT CHANGE UP (ref 0–3.5)
CK MB CFR SERPL CALC: 8.1 NG/ML — HIGH (ref 0–6.7)
CK SERPL-CCNC: 336 U/L — HIGH (ref 30–200)
CO2 BLDV-SCNC: 19 MMOL/L — LOW (ref 22–26)
CO2 BLDV-SCNC: 21 MMOL/L — LOW (ref 22–26)
CO2 BLDV-SCNC: 22 MMOL/L — SIGNIFICANT CHANGE UP (ref 22–26)
CO2 BLDV-SCNC: 24 MMOL/L — SIGNIFICANT CHANGE UP (ref 22–26)
CO2 BLDV-SCNC: 26 MMOL/L — SIGNIFICANT CHANGE UP (ref 22–26)
CO2 BLDV-SCNC: 26 MMOL/L — SIGNIFICANT CHANGE UP (ref 22–26)
CO2 BLDV-SCNC: 27 MMOL/L — HIGH (ref 22–26)
CO2 BLDV-SCNC: 28 MMOL/L — HIGH (ref 22–26)
CO2 BLDV-SCNC: 29 MMOL/L — HIGH (ref 22–26)
CO2 BLDV-SCNC: 29 MMOL/L — HIGH (ref 22–26)
CO2 SERPL-SCNC: 16 MMOL/L — LOW (ref 22–31)
CO2 SERPL-SCNC: 20 MMOL/L — LOW (ref 22–31)
CO2 SERPL-SCNC: 23 MMOL/L — SIGNIFICANT CHANGE UP (ref 22–31)
CO2 SERPL-SCNC: 23 MMOL/L — SIGNIFICANT CHANGE UP (ref 22–31)
CO2 SERPL-SCNC: 26 MMOL/L — SIGNIFICANT CHANGE UP (ref 22–31)
CO2 SERPL-SCNC: 26 MMOL/L — SIGNIFICANT CHANGE UP (ref 22–31)
COHGB MFR BLDA: 1.9 % — SIGNIFICANT CHANGE UP
COLOR SPEC: YELLOW — SIGNIFICANT CHANGE UP
COLOR SPEC: YELLOW — SIGNIFICANT CHANGE UP
CREAT SERPL-MCNC: 1.06 MG/DL — SIGNIFICANT CHANGE UP (ref 0.5–1.3)
CREAT SERPL-MCNC: 1.18 MG/DL — SIGNIFICANT CHANGE UP (ref 0.5–1.3)
CREAT SERPL-MCNC: 1.18 MG/DL — SIGNIFICANT CHANGE UP (ref 0.5–1.3)
CREAT SERPL-MCNC: 1.63 MG/DL — HIGH (ref 0.5–1.3)
CREAT SERPL-MCNC: 1.83 MG/DL — HIGH (ref 0.5–1.3)
CREAT SERPL-MCNC: 2.72 MG/DL — HIGH (ref 0.5–1.3)
CULTURE RESULTS: NO GROWTH — SIGNIFICANT CHANGE UP
D DIMER BLD IA.RAPID-MCNC: HIGH NG/ML DDU
DIFF PNL FLD: NEGATIVE — SIGNIFICANT CHANGE UP
DIFF PNL FLD: NEGATIVE — SIGNIFICANT CHANGE UP
EGFR: 28 ML/MIN/1.73M2 — LOW
EGFR: 46 ML/MIN/1.73M2 — LOW
EGFR: 52 ML/MIN/1.73M2 — LOW
EGFR: 78 ML/MIN/1.73M2 — SIGNIFICANT CHANGE UP
EGFR: 78 ML/MIN/1.73M2 — SIGNIFICANT CHANGE UP
EGFR: 88 ML/MIN/1.73M2 — SIGNIFICANT CHANGE UP
EOSINOPHIL # BLD AUTO: 0 K/UL — SIGNIFICANT CHANGE UP (ref 0–0.5)
EOSINOPHIL # BLD AUTO: 0.11 K/UL — SIGNIFICANT CHANGE UP (ref 0–0.5)
EOSINOPHIL # BLD AUTO: 0.11 K/UL — SIGNIFICANT CHANGE UP (ref 0–0.5)
EOSINOPHIL NFR BLD AUTO: 0 % — SIGNIFICANT CHANGE UP (ref 0–6)
EOSINOPHIL NFR BLD AUTO: 1.2 % — SIGNIFICANT CHANGE UP (ref 0–6)
EOSINOPHIL NFR BLD AUTO: 1.2 % — SIGNIFICANT CHANGE UP (ref 0–6)
ESTIMATED AVERAGE GLUCOSE: 114 MG/DL — SIGNIFICANT CHANGE UP (ref 68–114)
FIBRINOGEN PPP-MCNC: 109 MG/DL — LOW (ref 200–445)
FIBRINOGEN PPP-MCNC: 305 MG/DL — SIGNIFICANT CHANGE UP (ref 200–445)
FIBRINOGEN PPP-MCNC: 45 MG/DL — CRITICAL LOW (ref 200–445)
FIBRINOGEN PPP-MCNC: 70 MG/DL — CRITICAL LOW (ref 200–445)
FLUAV AG NPH QL: SIGNIFICANT CHANGE UP
FLUBV AG NPH QL: SIGNIFICANT CHANGE UP
GAS PNL BLDA: SIGNIFICANT CHANGE UP
GAS PNL BLDV: 136 MMOL/L — SIGNIFICANT CHANGE UP (ref 136–145)
GAS PNL BLDV: 139 MMOL/L — SIGNIFICANT CHANGE UP (ref 136–145)
GAS PNL BLDV: 141 MMOL/L — SIGNIFICANT CHANGE UP (ref 136–145)
GAS PNL BLDV: 146 MMOL/L — HIGH (ref 136–145)
GAS PNL BLDV: 146 MMOL/L — HIGH (ref 136–145)
GAS PNL BLDV: SIGNIFICANT CHANGE UP
GIANT PLATELETS BLD QL SMEAR: PRESENT — SIGNIFICANT CHANGE UP
GLUCOSE BLDC GLUCOMTR-MCNC: 100 MG/DL — HIGH (ref 70–99)
GLUCOSE BLDC GLUCOMTR-MCNC: 101 MG/DL — HIGH (ref 70–99)
GLUCOSE BLDC GLUCOMTR-MCNC: 102 MG/DL — HIGH (ref 70–99)
GLUCOSE BLDC GLUCOMTR-MCNC: 103 MG/DL — HIGH (ref 70–99)
GLUCOSE BLDC GLUCOMTR-MCNC: 104 MG/DL — HIGH (ref 70–99)
GLUCOSE BLDC GLUCOMTR-MCNC: 110 MG/DL — HIGH (ref 70–99)
GLUCOSE BLDC GLUCOMTR-MCNC: 112 MG/DL — HIGH (ref 70–99)
GLUCOSE BLDC GLUCOMTR-MCNC: 112 MG/DL — HIGH (ref 70–99)
GLUCOSE BLDC GLUCOMTR-MCNC: 115 MG/DL — HIGH (ref 70–99)
GLUCOSE BLDC GLUCOMTR-MCNC: 117 MG/DL — HIGH (ref 70–99)
GLUCOSE BLDC GLUCOMTR-MCNC: 125 MG/DL — HIGH (ref 70–99)
GLUCOSE BLDC GLUCOMTR-MCNC: 127 MG/DL — HIGH (ref 70–99)
GLUCOSE BLDC GLUCOMTR-MCNC: 129 MG/DL — HIGH (ref 70–99)
GLUCOSE BLDC GLUCOMTR-MCNC: 131 MG/DL — HIGH (ref 70–99)
GLUCOSE BLDC GLUCOMTR-MCNC: 142 MG/DL — HIGH (ref 70–99)
GLUCOSE BLDC GLUCOMTR-MCNC: 147 MG/DL — HIGH (ref 70–99)
GLUCOSE BLDC GLUCOMTR-MCNC: 158 MG/DL — HIGH (ref 70–99)
GLUCOSE BLDC GLUCOMTR-MCNC: 177 MG/DL — HIGH (ref 70–99)
GLUCOSE BLDC GLUCOMTR-MCNC: 89 MG/DL — SIGNIFICANT CHANGE UP (ref 70–99)
GLUCOSE BLDC GLUCOMTR-MCNC: 91 MG/DL — SIGNIFICANT CHANGE UP (ref 70–99)
GLUCOSE BLDC GLUCOMTR-MCNC: 94 MG/DL — SIGNIFICANT CHANGE UP (ref 70–99)
GLUCOSE BLDV-MCNC: 100 MG/DL — HIGH (ref 70–99)
GLUCOSE BLDV-MCNC: 108 MG/DL — HIGH (ref 70–99)
GLUCOSE BLDV-MCNC: 120 MG/DL — HIGH (ref 70–99)
GLUCOSE BLDV-MCNC: 120 MG/DL — HIGH (ref 70–99)
GLUCOSE BLDV-MCNC: 142 MG/DL — HIGH (ref 70–99)
GLUCOSE BLDV-MCNC: 151 MG/DL — HIGH (ref 70–99)
GLUCOSE BLDV-MCNC: 99 MG/DL — SIGNIFICANT CHANGE UP (ref 70–99)
GLUCOSE BLDV-MCNC: 99 MG/DL — SIGNIFICANT CHANGE UP (ref 70–99)
GLUCOSE SERPL-MCNC: 105 MG/DL — HIGH (ref 70–99)
GLUCOSE SERPL-MCNC: 105 MG/DL — HIGH (ref 70–99)
GLUCOSE SERPL-MCNC: 130 MG/DL — HIGH (ref 70–99)
GLUCOSE SERPL-MCNC: 150 MG/DL — HIGH (ref 70–99)
GLUCOSE SERPL-MCNC: 266 MG/DL — HIGH (ref 70–99)
GLUCOSE SERPL-MCNC: 84 MG/DL — SIGNIFICANT CHANGE UP (ref 70–99)
GLUCOSE UR QL: NEGATIVE MG/DL — SIGNIFICANT CHANGE UP
GLUCOSE UR QL: NEGATIVE MG/DL — SIGNIFICANT CHANGE UP
GRAM STN FLD: SIGNIFICANT CHANGE UP
HAPTOGLOB SERPL-MCNC: 139 MG/DL — SIGNIFICANT CHANGE UP (ref 34–200)
HAPTOGLOB SERPL-MCNC: 145 MG/DL — SIGNIFICANT CHANGE UP (ref 34–200)
HCO3 BLDV-SCNC: 17 MMOL/L — LOW (ref 22–29)
HCO3 BLDV-SCNC: 19 MMOL/L — LOW (ref 22–29)
HCO3 BLDV-SCNC: 21 MMOL/L — LOW (ref 22–29)
HCO3 BLDV-SCNC: 23 MMOL/L — SIGNIFICANT CHANGE UP (ref 22–29)
HCO3 BLDV-SCNC: 24 MMOL/L — SIGNIFICANT CHANGE UP (ref 22–29)
HCO3 BLDV-SCNC: 24 MMOL/L — SIGNIFICANT CHANGE UP (ref 22–29)
HCO3 BLDV-SCNC: 25 MMOL/L — SIGNIFICANT CHANGE UP (ref 22–29)
HCO3 BLDV-SCNC: 26 MMOL/L — SIGNIFICANT CHANGE UP (ref 22–29)
HCO3 BLDV-SCNC: 27 MMOL/L — SIGNIFICANT CHANGE UP (ref 22–29)
HCO3, PRE MEMBRANE VENOUS: 20 MMOL/L — LOW (ref 22–29)
HCT VFR BLD CALC: 23.1 % — LOW (ref 39–50)
HCT VFR BLD CALC: 25.7 % — LOW (ref 39–50)
HCT VFR BLD CALC: 25.7 % — LOW (ref 39–50)
HCT VFR BLD CALC: 29 % — LOW (ref 39–50)
HCT VFR BLD CALC: 34.5 % — LOW (ref 39–50)
HCT VFR BLD CALC: 41.5 % — SIGNIFICANT CHANGE UP (ref 39–50)
HCT VFR BLD CALC: 41.5 % — SIGNIFICANT CHANGE UP (ref 39–50)
HCT VFR BLDA CALC: 23 % — LOW (ref 39–51)
HCT VFR BLDA CALC: 23 % — LOW (ref 39–51)
HCT VFR BLDA CALC: 24 % — LOW (ref 39–51)
HCT VFR BLDA CALC: 24 % — LOW (ref 39–51)
HCT VFR BLDA CALC: 25 % — LOW (ref 39–51)
HCT VFR BLDA CALC: 26 % — LOW (ref 39–51)
HCT VFR BLDA CALC: 43 % — SIGNIFICANT CHANGE UP (ref 39–51)
HCT VFR BLDA CALC: 43 % — SIGNIFICANT CHANGE UP (ref 39–51)
HGB BLD CALC-MCNC: 14.4 G/DL — SIGNIFICANT CHANGE UP (ref 12.6–17.4)
HGB BLD CALC-MCNC: 14.4 G/DL — SIGNIFICANT CHANGE UP (ref 12.6–17.4)
HGB BLD CALC-MCNC: 7.5 G/DL — LOW (ref 12.6–17.4)
HGB BLD CALC-MCNC: 7.7 G/DL — LOW (ref 12.6–17.4)
HGB BLD CALC-MCNC: 8 G/DL — LOW (ref 12.6–17.4)
HGB BLD CALC-MCNC: 8.1 G/DL — LOW (ref 12.6–17.4)
HGB BLD CALC-MCNC: 8.3 G/DL — LOW (ref 12.6–17.4)
HGB BLD CALC-MCNC: 8.5 G/DL — LOW (ref 12.6–17.4)
HGB BLD-MCNC: 10.4 G/DL — LOW (ref 13–17)
HGB BLD-MCNC: 13.8 G/DL — SIGNIFICANT CHANGE UP (ref 13–17)
HGB BLD-MCNC: 13.8 G/DL — SIGNIFICANT CHANGE UP (ref 13–17)
HGB BLD-MCNC: 7.1 G/DL — LOW (ref 13–17)
HGB BLD-MCNC: 7.4 G/DL — LOW (ref 13–17)
HGB BLD-MCNC: 8.1 G/DL — LOW (ref 13–17)
HGB BLD-MCNC: 9.4 G/DL — LOW (ref 13–17)
HOROWITZ INDEX BLDV+IHG-RTO: 100 — SIGNIFICANT CHANGE UP
HOROWITZ INDEX BLDV+IHG-RTO: 40 — SIGNIFICANT CHANGE UP
HOROWITZ INDEX BLDV+IHG-RTO: 50 — SIGNIFICANT CHANGE UP
HOROWITZ INDEX BLDV+IHG-RTO: 50 — SIGNIFICANT CHANGE UP
IMM GRANULOCYTES NFR BLD AUTO: 0.3 % — SIGNIFICANT CHANGE UP (ref 0–0.9)
IMM GRANULOCYTES NFR BLD AUTO: 0.4 % — SIGNIFICANT CHANGE UP (ref 0–0.9)
IMM GRANULOCYTES NFR BLD AUTO: 0.6 % — SIGNIFICANT CHANGE UP (ref 0–0.9)
INR BLD: 1.05 RATIO — SIGNIFICANT CHANGE UP (ref 0.85–1.18)
INR BLD: 1.4 RATIO — HIGH (ref 0.85–1.18)
INR BLD: 1.49 RATIO — HIGH (ref 0.85–1.18)
INR BLD: 1.52 RATIO — HIGH (ref 0.85–1.18)
INR BLD: 1.62 RATIO — HIGH (ref 0.85–1.18)
KETONES UR-MCNC: ABNORMAL MG/DL
KETONES UR-MCNC: ABNORMAL MG/DL
LACTATE BLDV-MCNC: 2 MMOL/L — SIGNIFICANT CHANGE UP (ref 0.5–2)
LACTATE BLDV-MCNC: 2 MMOL/L — SIGNIFICANT CHANGE UP (ref 0.5–2)
LACTATE BLDV-MCNC: 2.8 MMOL/L — HIGH (ref 0.5–2)
LACTATE BLDV-MCNC: 4 MMOL/L — CRITICAL HIGH (ref 0.5–2)
LACTATE BLDV-MCNC: 4.1 MMOL/L — CRITICAL HIGH (ref 0.5–2)
LACTATE BLDV-MCNC: 4.5 MMOL/L — CRITICAL HIGH (ref 0.5–2)
LACTATE BLDV-MCNC: 8.9 MMOL/L — CRITICAL HIGH (ref 0.5–2)
LACTATE BLDV-MCNC: 9.5 MMOL/L — CRITICAL HIGH (ref 0.5–2)
LACTATE, PRE MEMBRANE VENOUS: >16 MMOL/L — CRITICAL HIGH (ref 0.5–2)
LDH SERPL L TO P-CCNC: 1137 U/L — HIGH (ref 50–242)
LDH SERPL L TO P-CCNC: 746 U/L — HIGH (ref 50–242)
LDH SERPL L TO P-CCNC: 852 U/L — HIGH (ref 50–242)
LEUKOCYTE ESTERASE UR-ACNC: NEGATIVE — SIGNIFICANT CHANGE UP
LEUKOCYTE ESTERASE UR-ACNC: NEGATIVE — SIGNIFICANT CHANGE UP
LYMPHOCYTES # BLD AUTO: 0.99 K/UL — LOW (ref 1–3.3)
LYMPHOCYTES # BLD AUTO: 1.18 K/UL — SIGNIFICANT CHANGE UP (ref 1–3.3)
LYMPHOCYTES # BLD AUTO: 1.36 K/UL — SIGNIFICANT CHANGE UP (ref 1–3.3)
LYMPHOCYTES # BLD AUTO: 1.41 K/UL — SIGNIFICANT CHANGE UP (ref 1–3.3)
LYMPHOCYTES # BLD AUTO: 1.65 K/UL — SIGNIFICANT CHANGE UP (ref 1–3.3)
LYMPHOCYTES # BLD AUTO: 1.65 K/UL — SIGNIFICANT CHANGE UP (ref 1–3.3)
LYMPHOCYTES # BLD AUTO: 10.4 % — LOW (ref 13–44)
LYMPHOCYTES # BLD AUTO: 10.5 % — LOW (ref 13–44)
LYMPHOCYTES # BLD AUTO: 18.4 % — SIGNIFICANT CHANGE UP (ref 13–44)
LYMPHOCYTES # BLD AUTO: 18.4 % — SIGNIFICANT CHANGE UP (ref 13–44)
LYMPHOCYTES # BLD AUTO: 7.9 % — LOW (ref 13–44)
LYMPHOCYTES # BLD AUTO: 9.8 % — LOW (ref 13–44)
MAGNESIUM SERPL-MCNC: 2.2 MG/DL — SIGNIFICANT CHANGE UP (ref 1.6–2.6)
MAGNESIUM SERPL-MCNC: 2.3 MG/DL — SIGNIFICANT CHANGE UP (ref 1.6–2.6)
MANUAL SMEAR VERIFICATION: SIGNIFICANT CHANGE UP
MCHC RBC-ENTMCNC: 24 PG — LOW (ref 27–34)
MCHC RBC-ENTMCNC: 24.2 PG — LOW (ref 27–34)
MCHC RBC-ENTMCNC: 25.4 PG — LOW (ref 27–34)
MCHC RBC-ENTMCNC: 25.9 PG — LOW (ref 27–34)
MCHC RBC-ENTMCNC: 26 PG — LOW (ref 27–34)
MCHC RBC-ENTMCNC: 28.8 GM/DL — LOW (ref 32–36)
MCHC RBC-ENTMCNC: 29.1 PG — SIGNIFICANT CHANGE UP (ref 27–34)
MCHC RBC-ENTMCNC: 29.1 PG — SIGNIFICANT CHANGE UP (ref 27–34)
MCHC RBC-ENTMCNC: 30.1 GM/DL — LOW (ref 32–36)
MCHC RBC-ENTMCNC: 30.7 GM/DL — LOW (ref 32–36)
MCHC RBC-ENTMCNC: 31.5 GM/DL — LOW (ref 32–36)
MCHC RBC-ENTMCNC: 32.4 GM/DL — SIGNIFICANT CHANGE UP (ref 32–36)
MCHC RBC-ENTMCNC: 33.3 GM/DL — SIGNIFICANT CHANGE UP (ref 32–36)
MCHC RBC-ENTMCNC: 33.3 GM/DL — SIGNIFICANT CHANGE UP (ref 32–36)
MCV RBC AUTO: 79.5 FL — LOW (ref 80–100)
MCV RBC AUTO: 79.9 FL — LOW (ref 80–100)
MCV RBC AUTO: 82.4 FL — SIGNIFICANT CHANGE UP (ref 80–100)
MCV RBC AUTO: 82.5 FL — SIGNIFICANT CHANGE UP (ref 80–100)
MCV RBC AUTO: 84 FL — SIGNIFICANT CHANGE UP (ref 80–100)
MCV RBC AUTO: 87.6 FL — SIGNIFICANT CHANGE UP (ref 80–100)
MCV RBC AUTO: 87.6 FL — SIGNIFICANT CHANGE UP (ref 80–100)
METAMYELOCYTES # FLD: 3.5 % — HIGH (ref 0–0)
METHGB MFR BLDMV: 0.8 % — SIGNIFICANT CHANGE UP (ref 0–1.5)
MICROCYTES BLD QL: SLIGHT — SIGNIFICANT CHANGE UP
MONOCYTES # BLD AUTO: 0.12 K/UL — SIGNIFICANT CHANGE UP (ref 0–0.9)
MONOCYTES # BLD AUTO: 0.49 K/UL — SIGNIFICANT CHANGE UP (ref 0–0.9)
MONOCYTES # BLD AUTO: 0.49 K/UL — SIGNIFICANT CHANGE UP (ref 0–0.9)
MONOCYTES # BLD AUTO: 0.55 K/UL — SIGNIFICANT CHANGE UP (ref 0–0.9)
MONOCYTES # BLD AUTO: 0.58 K/UL — SIGNIFICANT CHANGE UP (ref 0–0.9)
MONOCYTES # BLD AUTO: 0.84 K/UL — SIGNIFICANT CHANGE UP (ref 0–0.9)
MONOCYTES NFR BLD AUTO: 0.9 % — LOW (ref 2–14)
MONOCYTES NFR BLD AUTO: 4.6 % — SIGNIFICANT CHANGE UP (ref 2–14)
MONOCYTES NFR BLD AUTO: 4.9 % — SIGNIFICANT CHANGE UP (ref 2–14)
MONOCYTES NFR BLD AUTO: 5.5 % — SIGNIFICANT CHANGE UP (ref 2–14)
MONOCYTES NFR BLD AUTO: 5.5 % — SIGNIFICANT CHANGE UP (ref 2–14)
MONOCYTES NFR BLD AUTO: 6 % — SIGNIFICANT CHANGE UP (ref 2–14)
NEUTROPHILS # BLD AUTO: 10.87 K/UL — HIGH (ref 1.8–7.4)
NEUTROPHILS # BLD AUTO: 11.41 K/UL — HIGH (ref 1.8–7.4)
NEUTROPHILS # BLD AUTO: 11.63 K/UL — HIGH (ref 1.8–7.4)
NEUTROPHILS # BLD AUTO: 6.65 K/UL — SIGNIFICANT CHANGE UP (ref 1.8–7.4)
NEUTROPHILS # BLD AUTO: 6.65 K/UL — SIGNIFICANT CHANGE UP (ref 1.8–7.4)
NEUTROPHILS # BLD AUTO: 9.53 K/UL — HIGH (ref 1.8–7.4)
NEUTROPHILS NFR BLD AUTO: 74 % — SIGNIFICANT CHANGE UP (ref 43–77)
NEUTROPHILS NFR BLD AUTO: 74 % — SIGNIFICANT CHANGE UP (ref 43–77)
NEUTROPHILS NFR BLD AUTO: 77.2 % — HIGH (ref 43–77)
NEUTROPHILS NFR BLD AUTO: 83.5 % — HIGH (ref 43–77)
NEUTROPHILS NFR BLD AUTO: 84.1 % — HIGH (ref 43–77)
NEUTROPHILS NFR BLD AUTO: 87 % — HIGH (ref 43–77)
NEUTS BAND # BLD: 7.9 % — SIGNIFICANT CHANGE UP (ref 0–8)
NITRITE UR-MCNC: NEGATIVE — SIGNIFICANT CHANGE UP
NITRITE UR-MCNC: NEGATIVE — SIGNIFICANT CHANGE UP
NRBC # BLD: 0 /100 WBCS — SIGNIFICANT CHANGE UP (ref 0–0)
NRBC # BLD: 1 /100 WBCS — HIGH (ref 0–0)
OXYGEN SATURATION, PRE MEMBRANE VENOUS: 95.4 % — HIGH (ref 67–88)
OXYHEMOGLOBIN, PRE MEMBRANE VENOUS: 92.8 % — HIGH (ref 59–84)
PCO2 BLDV: 43 MMHG — SIGNIFICANT CHANGE UP (ref 42–55)
PCO2 BLDV: 48 MMHG — SIGNIFICANT CHANGE UP (ref 42–55)
PCO2 BLDV: 50 MMHG — SIGNIFICANT CHANGE UP (ref 42–55)
PCO2 BLDV: 52 MMHG — SIGNIFICANT CHANGE UP (ref 42–55)
PCO2 BLDV: 53 MMHG — SIGNIFICANT CHANGE UP (ref 42–55)
PCO2 BLDV: 58 MMHG — HIGH (ref 42–55)
PCO2 BLDV: 61 MMHG — HIGH (ref 42–55)
PCO2, PRE MEMBRANE VENOUS: 65 MMHG — HIGH (ref 42–55)
PH BLDV: 7.15 — CRITICAL LOW (ref 7.32–7.43)
PH BLDV: 7.17 — CRITICAL LOW (ref 7.32–7.43)
PH BLDV: 7.22 — LOW (ref 7.32–7.43)
PH BLDV: 7.22 — LOW (ref 7.32–7.43)
PH BLDV: 7.24 — LOW (ref 7.32–7.43)
PH BLDV: 7.26 — LOW (ref 7.32–7.43)
PH BLDV: 7.26 — LOW (ref 7.32–7.43)
PH BLDV: 7.27 — LOW (ref 7.32–7.43)
PH BLDV: 7.31 — LOW (ref 7.32–7.43)
PH BLDV: 7.34 — SIGNIFICANT CHANGE UP (ref 7.32–7.43)
PH BLDV: 7.34 — SIGNIFICANT CHANGE UP (ref 7.32–7.43)
PH BLDV: 7.36 — SIGNIFICANT CHANGE UP (ref 7.32–7.43)
PH UR: 6 — SIGNIFICANT CHANGE UP (ref 5–8)
PH UR: 6 — SIGNIFICANT CHANGE UP (ref 5–8)
PH, PRE MEMBRANE VENOUS: 7.09 — CRITICAL LOW (ref 7.32–7.43)
PHOSPHATE SERPL-MCNC: 4.3 MG/DL — SIGNIFICANT CHANGE UP (ref 2.5–4.5)
PHOSPHATE SERPL-MCNC: 6.6 MG/DL — HIGH (ref 2.5–4.5)
PLAT MORPH BLD: NORMAL — SIGNIFICANT CHANGE UP
PLATELET # BLD AUTO: 163 K/UL — SIGNIFICANT CHANGE UP (ref 150–400)
PLATELET # BLD AUTO: 228 K/UL — SIGNIFICANT CHANGE UP (ref 150–400)
PLATELET # BLD AUTO: 245 K/UL — SIGNIFICANT CHANGE UP (ref 150–400)
PLATELET # BLD AUTO: 255 K/UL — SIGNIFICANT CHANGE UP (ref 150–400)
PLATELET # BLD AUTO: 255 K/UL — SIGNIFICANT CHANGE UP (ref 150–400)
PLATELET # BLD AUTO: 356 K/UL — SIGNIFICANT CHANGE UP (ref 150–400)
PLATELET # BLD AUTO: 68 K/UL — LOW (ref 150–400)
PO2 BLDV: 35 MMHG — SIGNIFICANT CHANGE UP (ref 25–45)
PO2 BLDV: 37 MMHG — SIGNIFICANT CHANGE UP (ref 25–45)
PO2 BLDV: 38 MMHG — SIGNIFICANT CHANGE UP (ref 25–45)
PO2 BLDV: 39 MMHG — SIGNIFICANT CHANGE UP (ref 25–45)
PO2 BLDV: 398 MMHG — HIGH (ref 25–45)
PO2 BLDV: 41 MMHG — SIGNIFICANT CHANGE UP (ref 25–45)
PO2 BLDV: 42 MMHG — SIGNIFICANT CHANGE UP (ref 25–45)
PO2 BLDV: 47 MMHG — HIGH (ref 25–45)
PO2 BLDV: 61 MMHG — HIGH (ref 25–45)
PO2 BLDV: 64 MMHG — HIGH (ref 25–45)
PO2, PRE MEMBRANE VENOUS: 80 MMHG — HIGH (ref 25–45)
POIKILOCYTOSIS BLD QL AUTO: SLIGHT — SIGNIFICANT CHANGE UP
POTASSIUM BLDA-SCNC: 4.3 MMOL/L — SIGNIFICANT CHANGE UP (ref 3.5–5.1)
POTASSIUM BLDV-SCNC: 3.8 MMOL/L — SIGNIFICANT CHANGE UP (ref 3.5–5.1)
POTASSIUM BLDV-SCNC: 4.1 MMOL/L — SIGNIFICANT CHANGE UP (ref 3.5–5.1)
POTASSIUM BLDV-SCNC: 4.7 MMOL/L — SIGNIFICANT CHANGE UP (ref 3.5–5.1)
POTASSIUM BLDV-SCNC: 4.7 MMOL/L — SIGNIFICANT CHANGE UP (ref 3.5–5.1)
POTASSIUM BLDV-SCNC: 4.8 MMOL/L — SIGNIFICANT CHANGE UP (ref 3.5–5.1)
POTASSIUM BLDV-SCNC: 4.8 MMOL/L — SIGNIFICANT CHANGE UP (ref 3.5–5.1)
POTASSIUM BLDV-SCNC: 5.2 MMOL/L — HIGH (ref 3.5–5.1)
POTASSIUM BLDV-SCNC: 5.2 MMOL/L — HIGH (ref 3.5–5.1)
POTASSIUM SERPL-MCNC: 3.9 MMOL/L — SIGNIFICANT CHANGE UP (ref 3.5–5.3)
POTASSIUM SERPL-MCNC: 4.4 MMOL/L — SIGNIFICANT CHANGE UP (ref 3.5–5.3)
POTASSIUM SERPL-MCNC: 4.4 MMOL/L — SIGNIFICANT CHANGE UP (ref 3.5–5.3)
POTASSIUM SERPL-MCNC: 4.6 MMOL/L — SIGNIFICANT CHANGE UP (ref 3.5–5.3)
POTASSIUM SERPL-MCNC: 5.9 MMOL/L — HIGH (ref 3.5–5.3)
POTASSIUM SERPL-MCNC: 6.5 MMOL/L — CRITICAL HIGH (ref 3.5–5.3)
POTASSIUM SERPL-SCNC: 3.9 MMOL/L — SIGNIFICANT CHANGE UP (ref 3.5–5.3)
POTASSIUM SERPL-SCNC: 4.4 MMOL/L — SIGNIFICANT CHANGE UP (ref 3.5–5.3)
POTASSIUM SERPL-SCNC: 4.4 MMOL/L — SIGNIFICANT CHANGE UP (ref 3.5–5.3)
POTASSIUM SERPL-SCNC: 4.6 MMOL/L — SIGNIFICANT CHANGE UP (ref 3.5–5.3)
POTASSIUM SERPL-SCNC: 5.9 MMOL/L — HIGH (ref 3.5–5.3)
POTASSIUM SERPL-SCNC: 6.5 MMOL/L — CRITICAL HIGH (ref 3.5–5.3)
PROCALCITONIN SERPL-MCNC: >100 NG/ML — HIGH (ref 0.02–0.1)
PROT SERPL-MCNC: 5.2 G/DL — LOW (ref 6–8.3)
PROT SERPL-MCNC: 5.3 G/DL — LOW (ref 6–8.3)
PROT SERPL-MCNC: 5.5 G/DL — LOW (ref 6–8.3)
PROT SERPL-MCNC: 7.1 G/DL — SIGNIFICANT CHANGE UP (ref 6–8.3)
PROT SERPL-MCNC: 7.1 G/DL — SIGNIFICANT CHANGE UP (ref 6–8.3)
PROT SERPL-MCNC: 8.4 G/DL — HIGH (ref 6–8.3)
PROT UR-MCNC: NEGATIVE MG/DL — SIGNIFICANT CHANGE UP
PROT UR-MCNC: NEGATIVE MG/DL — SIGNIFICANT CHANGE UP
PROTHROM AB SERPL-ACNC: 11.5 SEC — SIGNIFICANT CHANGE UP (ref 9.5–13)
PROTHROM AB SERPL-ACNC: 15.3 SEC — HIGH (ref 9.5–13)
PROTHROM AB SERPL-ACNC: 15.8 SEC — HIGH (ref 9.5–13)
PROTHROM AB SERPL-ACNC: 16.2 SEC — HIGH (ref 9.5–13)
PROTHROM AB SERPL-ACNC: 17.6 SEC — HIGH (ref 9.5–13)
RBC # BLD: 2.8 M/UL — LOW (ref 4.2–5.8)
RBC # BLD: 3.06 M/UL — LOW (ref 4.2–5.8)
RBC # BLD: 3.12 M/UL — LOW (ref 4.2–5.8)
RBC # BLD: 3.63 M/UL — LOW (ref 4.2–5.8)
RBC # BLD: 4.34 M/UL — SIGNIFICANT CHANGE UP (ref 4.2–5.8)
RBC # BLD: 4.74 M/UL — SIGNIFICANT CHANGE UP (ref 4.2–5.8)
RBC # BLD: 4.74 M/UL — SIGNIFICANT CHANGE UP (ref 4.2–5.8)
RBC # FLD: 13 % — SIGNIFICANT CHANGE UP (ref 10.3–14.5)
RBC # FLD: 13 % — SIGNIFICANT CHANGE UP (ref 10.3–14.5)
RBC # FLD: 14.5 % — SIGNIFICANT CHANGE UP (ref 10.3–14.5)
RBC # FLD: 14.5 % — SIGNIFICANT CHANGE UP (ref 10.3–14.5)
RBC # FLD: 14.8 % — HIGH (ref 10.3–14.5)
RBC # FLD: 15 % — HIGH (ref 10.3–14.5)
RBC # FLD: 15.3 % — HIGH (ref 10.3–14.5)
RBC BLD AUTO: SIGNIFICANT CHANGE UP
RH IG SCN BLD-IMP: POSITIVE — SIGNIFICANT CHANGE UP
RH IG SCN BLD-IMP: POSITIVE — SIGNIFICANT CHANGE UP
RSV RNA NPH QL NAA+NON-PROBE: SIGNIFICANT CHANGE UP
SAO2 % BLDV: 59.8 % — LOW (ref 67–88)
SAO2 % BLDV: 59.9 % — LOW (ref 67–88)
SAO2 % BLDV: 60.9 % — LOW (ref 67–88)
SAO2 % BLDV: 62.7 % — LOW (ref 67–88)
SAO2 % BLDV: 62.8 % — LOW (ref 67–88)
SAO2 % BLDV: 62.8 % — LOW (ref 67–88)
SAO2 % BLDV: 69.3 % — SIGNIFICANT CHANGE UP (ref 67–88)
SAO2 % BLDV: 71.9 % — SIGNIFICANT CHANGE UP (ref 67–88)
SAO2 % BLDV: 75.9 % — SIGNIFICANT CHANGE UP (ref 67–88)
SAO2 % BLDV: 91.8 % — HIGH (ref 67–88)
SAO2 % BLDV: 92 % — HIGH (ref 67–88)
SAO2 % BLDV: 99.3 % — HIGH (ref 67–88)
SARS-COV-2 RNA SPEC QL NAA+PROBE: SIGNIFICANT CHANGE UP
SODIUM SERPL-SCNC: 143 MMOL/L — SIGNIFICANT CHANGE UP (ref 135–145)
SODIUM SERPL-SCNC: 143 MMOL/L — SIGNIFICANT CHANGE UP (ref 135–145)
SODIUM SERPL-SCNC: 144 MMOL/L — SIGNIFICANT CHANGE UP (ref 135–145)
SODIUM SERPL-SCNC: 144 MMOL/L — SIGNIFICANT CHANGE UP (ref 135–145)
SODIUM SERPL-SCNC: 145 MMOL/L — SIGNIFICANT CHANGE UP (ref 135–145)
SODIUM SERPL-SCNC: 148 MMOL/L — HIGH (ref 135–145)
SP GR SPEC: 1.02 — SIGNIFICANT CHANGE UP (ref 1–1.03)
SP GR SPEC: 1.02 — SIGNIFICANT CHANGE UP (ref 1–1.03)
SPECIMEN SOURCE: SIGNIFICANT CHANGE UP
SPECIMEN SOURCE: SIGNIFICANT CHANGE UP
T3 SERPL-MCNC: 33 NG/DL — LOW (ref 80–200)
T4 AB SER-ACNC: 4.4 UG/DL — LOW (ref 4.6–12)
T4 FREE SERPL-MCNC: 1.4 NG/DL — SIGNIFICANT CHANGE UP (ref 0.9–1.8)
TOTAL HEMOGLOBIN, PRE MEMBRANE VENOUS: 7.7 G/DL — LOW (ref 12.6–17.4)
TROPONIN T, HIGH SENSITIVITY RESULT: 11 NG/L — SIGNIFICANT CHANGE UP (ref 0–51)
TROPONIN T, HIGH SENSITIVITY RESULT: 11 NG/L — SIGNIFICANT CHANGE UP (ref 0–51)
TROPONIN T, HIGH SENSITIVITY RESULT: 21 NG/L — SIGNIFICANT CHANGE UP (ref 0–51)
TSH SERPL-MCNC: 0.55 UIU/ML — SIGNIFICANT CHANGE UP (ref 0.27–4.2)
UFH PPP CHRO-ACNC: 0.43 IU/ML — SIGNIFICANT CHANGE UP (ref 0.3–0.7)
UROBILINOGEN FLD QL: 1 MG/DL — SIGNIFICANT CHANGE UP (ref 0.2–1)
UROBILINOGEN FLD QL: 1 MG/DL — SIGNIFICANT CHANGE UP (ref 0.2–1)
VANCOMYCIN TROUGH SERPL-MCNC: 10.3 UG/ML — SIGNIFICANT CHANGE UP (ref 10–20)
VANCOMYCIN TROUGH SERPL-MCNC: 10.3 UG/ML — SIGNIFICANT CHANGE UP (ref 10–20)
VANCOMYCIN TROUGH SERPL-MCNC: 21.1 UG/ML — HIGH (ref 10–20)
VANCOMYCIN TROUGH SERPL-MCNC: 23.4 UG/ML — HIGH (ref 10–20)
WBC # BLD: 11.32 K/UL — HIGH (ref 3.8–10.5)
WBC # BLD: 12.19 K/UL — HIGH (ref 3.8–10.5)
WBC # BLD: 12.5 K/UL — HIGH (ref 3.8–10.5)
WBC # BLD: 13.41 K/UL — HIGH (ref 3.8–10.5)
WBC # BLD: 13.93 K/UL — HIGH (ref 3.8–10.5)
WBC # BLD: 8.98 K/UL — SIGNIFICANT CHANGE UP (ref 3.8–10.5)
WBC # BLD: 8.98 K/UL — SIGNIFICANT CHANGE UP (ref 3.8–10.5)
WBC # FLD AUTO: 11.32 K/UL — HIGH (ref 3.8–10.5)
WBC # FLD AUTO: 12.19 K/UL — HIGH (ref 3.8–10.5)
WBC # FLD AUTO: 12.5 K/UL — HIGH (ref 3.8–10.5)
WBC # FLD AUTO: 13.41 K/UL — HIGH (ref 3.8–10.5)
WBC # FLD AUTO: 13.93 K/UL — HIGH (ref 3.8–10.5)
WBC # FLD AUTO: 8.98 K/UL — SIGNIFICANT CHANGE UP (ref 3.8–10.5)
WBC # FLD AUTO: 8.98 K/UL — SIGNIFICANT CHANGE UP (ref 3.8–10.5)

## 2024-01-01 PROCEDURE — 71045 X-RAY EXAM CHEST 1 VIEW: CPT | Mod: 26,77

## 2024-01-01 PROCEDURE — 99254 IP/OBS CNSLTJ NEW/EST MOD 60: CPT | Mod: GC

## 2024-01-01 PROCEDURE — 71045 X-RAY EXAM CHEST 1 VIEW: CPT | Mod: 26

## 2024-01-01 PROCEDURE — 82947 ASSAY GLUCOSE BLOOD QUANT: CPT

## 2024-01-01 PROCEDURE — 99442: CPT

## 2024-01-01 PROCEDURE — ZZZZZ: CPT

## 2024-01-01 PROCEDURE — 85018 HEMOGLOBIN: CPT

## 2024-01-01 PROCEDURE — 82803 BLOOD GASES ANY COMBINATION: CPT

## 2024-01-01 PROCEDURE — 81003 URINALYSIS AUTO W/O SCOPE: CPT

## 2024-01-01 PROCEDURE — 83605 ASSAY OF LACTIC ACID: CPT

## 2024-01-01 PROCEDURE — 99291 CRITICAL CARE FIRST HOUR: CPT | Mod: 25

## 2024-01-01 PROCEDURE — 74018 RADEX ABDOMEN 1 VIEW: CPT | Mod: 26

## 2024-01-01 PROCEDURE — 92950 HEART/LUNG RESUSCITATION CPR: CPT

## 2024-01-01 PROCEDURE — 99291 CRITICAL CARE FIRST HOUR: CPT

## 2024-01-01 PROCEDURE — 90945 DIALYSIS ONE EVALUATION: CPT | Mod: GC

## 2024-01-01 PROCEDURE — 70544 MR ANGIOGRAPHY HEAD W/O DYE: CPT | Mod: 26

## 2024-01-01 PROCEDURE — 36620 INSERTION CATHETER ARTERY: CPT | Mod: 59

## 2024-01-01 PROCEDURE — 95718 EEG PHYS/QHP 2-12 HR W/VEEG: CPT

## 2024-01-01 PROCEDURE — 70450 CT HEAD/BRAIN W/O DYE: CPT | Mod: 26

## 2024-01-01 PROCEDURE — 33947 ECMO/ECLS INITIATION ARTERY: CPT

## 2024-01-01 PROCEDURE — 80053 COMPREHEN METABOLIC PANEL: CPT

## 2024-01-01 PROCEDURE — 82435 ASSAY OF BLOOD CHLORIDE: CPT

## 2024-01-01 PROCEDURE — 33949 ECMO/ECLS DAILY MGMT ARTERY: CPT

## 2024-01-01 PROCEDURE — 99233 SBSQ HOSP IP/OBS HIGH 50: CPT

## 2024-01-01 PROCEDURE — 70498 CT ANGIOGRAPHY NECK: CPT | Mod: 26,MC

## 2024-01-01 PROCEDURE — 70544 MR ANGIOGRAPHY HEAD W/O DYE: CPT

## 2024-01-01 PROCEDURE — 99212 OFFICE O/P EST SF 10 MIN: CPT | Mod: 95

## 2024-01-01 PROCEDURE — 31500 INSERT EMERGENCY AIRWAY: CPT

## 2024-01-01 PROCEDURE — 99214 OFFICE O/P EST MOD 30 MIN: CPT

## 2024-01-01 PROCEDURE — 84484 ASSAY OF TROPONIN QUANT: CPT

## 2024-01-01 PROCEDURE — 99284 EMERGENCY DEPT VISIT MOD MDM: CPT | Mod: 25

## 2024-01-01 PROCEDURE — 0042T: CPT | Mod: MC

## 2024-01-01 PROCEDURE — 84132 ASSAY OF SERUM POTASSIUM: CPT

## 2024-01-01 PROCEDURE — 85014 HEMATOCRIT: CPT

## 2024-01-01 PROCEDURE — 33952 ECMO/ECLS INSJ PRPH CANNULA: CPT

## 2024-01-01 PROCEDURE — 99292 CRITICAL CARE ADDL 30 MIN: CPT | Mod: 25

## 2024-01-01 PROCEDURE — 85025 COMPLETE CBC W/AUTO DIFF WBC: CPT

## 2024-01-01 PROCEDURE — 93005 ELECTROCARDIOGRAM TRACING: CPT

## 2024-01-01 PROCEDURE — 36556 INSERT NON-TUNNEL CV CATH: CPT | Mod: RT

## 2024-01-01 PROCEDURE — 99285 EMERGENCY DEPT VISIT HI MDM: CPT

## 2024-01-01 PROCEDURE — 84295 ASSAY OF SERUM SODIUM: CPT

## 2024-01-01 PROCEDURE — 93306 TTE W/DOPPLER COMPLETE: CPT | Mod: 26

## 2024-01-01 PROCEDURE — 99215 OFFICE O/P EST HI 40 MIN: CPT

## 2024-01-01 PROCEDURE — 99204 OFFICE O/P NEW MOD 45 MIN: CPT

## 2024-01-01 PROCEDURE — 70496 CT ANGIOGRAPHY HEAD: CPT | Mod: 26,MC

## 2024-01-01 PROCEDURE — 82330 ASSAY OF CALCIUM: CPT

## 2024-01-01 RX ORDER — CHLORHEXIDINE GLUCONATE 213 G/1000ML
15 SOLUTION TOPICAL EVERY 12 HOURS
Refills: 0 | Status: DISCONTINUED | OUTPATIENT
Start: 2024-01-01 | End: 2024-01-01

## 2024-01-01 RX ORDER — LABETALOL HYDROCHLORIDE 100 MG/1
100 TABLET, FILM COATED ORAL 3 TIMES DAILY
Qty: 45 | Refills: 5 | Status: ACTIVE | COMMUNITY
Start: 2024-01-01 | End: 1900-01-01

## 2024-01-01 RX ORDER — INSULIN HUMAN 100 [IU]/ML
2 INJECTION, SOLUTION SUBCUTANEOUS
Qty: 100 | Refills: 0 | Status: DISCONTINUED | OUTPATIENT
Start: 2024-01-01 | End: 2024-01-01

## 2024-01-01 RX ORDER — SODIUM BICARBONATE 1 MEQ/ML
50 SYRINGE (ML) INTRAVENOUS ONCE
Refills: 0 | Status: COMPLETED | OUTPATIENT
Start: 2024-01-01 | End: 2024-01-01

## 2024-01-01 RX ORDER — ESCITALOPRAM OXALATE 20 MG/1
20 TABLET ORAL DAILY
Qty: 30 | Refills: 5 | Status: ACTIVE | COMMUNITY
Start: 2024-01-01 | End: 1900-01-01

## 2024-01-01 RX ORDER — DEXTROSE 50 % IN WATER 50 %
50 SYRINGE (ML) INTRAVENOUS ONCE
Refills: 0 | Status: COMPLETED | OUTPATIENT
Start: 2024-01-01 | End: 2024-01-01

## 2024-01-01 RX ORDER — INSULIN HUMAN 100 [IU]/ML
10 INJECTION, SOLUTION SUBCUTANEOUS ONCE
Refills: 0 | Status: COMPLETED | OUTPATIENT
Start: 2024-01-01 | End: 2024-01-01

## 2024-01-01 RX ORDER — ONABOTULINUMTOXINA 100 [USP'U]/1
100 INJECTION, POWDER, LYOPHILIZED, FOR SOLUTION INTRADERMAL; INTRAMUSCULAR
Qty: 4 | Refills: 0 | Status: ACTIVE | OUTPATIENT
Start: 2024-01-01

## 2024-01-01 RX ORDER — EPINEPHRINE 0.3 MG/.3ML
0.05 INJECTION INTRAMUSCULAR; SUBCUTANEOUS
Qty: 4 | Refills: 0 | Status: DISCONTINUED | OUTPATIENT
Start: 2024-01-01 | End: 2024-01-01

## 2024-01-01 RX ORDER — LABETALOL HYDROCHLORIDE 200 MG/1
200 TABLET, FILM COATED ORAL 3 TIMES DAILY
Qty: 90 | Refills: 5 | Status: ACTIVE | COMMUNITY
Start: 2024-01-01 | End: 1900-01-01

## 2024-01-01 RX ORDER — MEROPENEM 1 G/30ML
1000 INJECTION INTRAVENOUS EVERY 8 HOURS
Refills: 0 | Status: DISCONTINUED | OUTPATIENT
Start: 2024-01-01 | End: 2024-01-01

## 2024-01-01 RX ORDER — DEXTROSE 50 % IN WATER 50 %
25 SYRINGE (ML) INTRAVENOUS
Refills: 0 | Status: DISCONTINUED | OUTPATIENT
Start: 2024-01-01 | End: 2024-01-01

## 2024-01-01 RX ORDER — DEXTROSE 10 % IN WATER 10 %
1000 INTRAVENOUS SOLUTION INTRAVENOUS
Refills: 0 | Status: DISCONTINUED | OUTPATIENT
Start: 2024-01-01 | End: 2024-01-01

## 2024-01-01 RX ORDER — EPINEPHRINE 0.3 MG/.3ML
0.1 INJECTION INTRAMUSCULAR; SUBCUTANEOUS
Qty: 8 | Refills: 0 | Status: DISCONTINUED | OUTPATIENT
Start: 2024-01-01 | End: 2024-01-01

## 2024-01-01 RX ORDER — CALCIUM GLUCONATE 100 MG/ML
2 VIAL (ML) INTRAVENOUS ONCE
Refills: 0 | Status: COMPLETED | OUTPATIENT
Start: 2024-01-01 | End: 2024-01-01

## 2024-01-01 RX ORDER — VASOPRESSIN 20 [USP'U]/ML
0.1 INJECTION INTRAVENOUS
Qty: 40 | Refills: 0 | Status: DISCONTINUED | OUTPATIENT
Start: 2024-01-01 | End: 2024-01-01

## 2024-01-01 RX ORDER — MAGNESIUM SULFATE 500 MG/ML
2 VIAL (ML) INJECTION ONCE
Refills: 0 | Status: COMPLETED | OUTPATIENT
Start: 2024-01-01 | End: 2024-01-01

## 2024-01-01 RX ORDER — EPINEPHRINE 0.3 MG/.3ML
0.4 INJECTION INTRAMUSCULAR; SUBCUTANEOUS ONCE
Refills: 0 | Status: COMPLETED | OUTPATIENT
Start: 2024-01-01 | End: 2024-01-01

## 2024-01-01 RX ORDER — LISINOPRIL 40 MG/1
40 TABLET ORAL DAILY
Qty: 30 | Refills: 0 | Status: ACTIVE | COMMUNITY
Start: 2023-01-01 | End: 1900-01-01

## 2024-01-01 RX ORDER — AMLODIPINE BESYLATE 10 MG/1
10 TABLET ORAL DAILY
Qty: 30 | Refills: 0 | Status: ACTIVE | COMMUNITY
Start: 2024-01-01 | End: 1900-01-01

## 2024-01-01 RX ORDER — POTASSIUM CHLORIDE 20 MEQ
10 PACKET (EA) ORAL ONCE
Refills: 0 | Status: COMPLETED | OUTPATIENT
Start: 2024-01-01 | End: 2024-01-01

## 2024-01-01 RX ORDER — SODIUM CHLORIDE 9 MG/ML
1000 INJECTION INTRAMUSCULAR; INTRAVENOUS; SUBCUTANEOUS
Refills: 0 | Status: DISCONTINUED | OUTPATIENT
Start: 2024-01-01 | End: 2024-01-01

## 2024-01-01 RX ORDER — ATORVASTATIN CALCIUM 80 MG/1
80 TABLET, FILM COATED ORAL
Qty: 90 | Refills: 1 | Status: ACTIVE | COMMUNITY
Start: 1900-01-01 | End: 1900-01-01

## 2024-01-01 RX ORDER — VANCOMYCIN HCL 1 G
500 VIAL (EA) INTRAVENOUS EVERY 12 HOURS
Refills: 0 | Status: DISCONTINUED | OUTPATIENT
Start: 2024-01-01 | End: 2024-01-01

## 2024-01-01 RX ORDER — DEXTROSE 50 % IN WATER 50 %
50 SYRINGE (ML) INTRAVENOUS
Refills: 0 | Status: DISCONTINUED | OUTPATIENT
Start: 2024-01-01 | End: 2024-01-01

## 2024-01-01 RX ORDER — TICAGRELOR 90 MG/1
90 TABLET ORAL ONCE
Refills: 0 | Status: COMPLETED | OUTPATIENT
Start: 2024-01-01 | End: 2024-01-01

## 2024-01-01 RX ORDER — FENTANYL CITRATE 50 UG/ML
200 INJECTION INTRAVENOUS ONCE
Refills: 0 | Status: DISCONTINUED | OUTPATIENT
Start: 2024-01-01 | End: 2024-01-01

## 2024-01-01 RX ORDER — TRAZODONE HYDROCHLORIDE 50 MG/1
50 TABLET ORAL
Qty: 15 | Refills: 0 | Status: ACTIVE | COMMUNITY
Start: 2023-01-01 | End: 1900-01-01

## 2024-01-01 RX ORDER — PANTOPRAZOLE SODIUM 20 MG/1
40 TABLET, DELAYED RELEASE ORAL
Refills: 0 | Status: DISCONTINUED | OUTPATIENT
Start: 2024-01-01 | End: 2024-01-01

## 2024-01-01 RX ORDER — LISINOPRIL 40 MG/1
40 TABLET ORAL
Qty: 30 | Refills: 5 | Status: ACTIVE | COMMUNITY
Start: 2024-01-01 | End: 1900-01-01

## 2024-01-01 RX ORDER — VANCOMYCIN HCL 1 G
1250 VIAL (EA) INTRAVENOUS EVERY 12 HOURS
Refills: 0 | Status: DISCONTINUED | OUTPATIENT
Start: 2024-01-01 | End: 2024-01-01

## 2024-01-01 RX ORDER — PANTOPRAZOLE SODIUM 20 MG/1
8 TABLET, DELAYED RELEASE ORAL
Qty: 80 | Refills: 0 | Status: DISCONTINUED | OUTPATIENT
Start: 2024-01-01 | End: 2024-01-01

## 2024-01-01 RX ORDER — AMLODIPINE BESYLATE 2.5 MG/1
1 TABLET ORAL
Qty: 30 | Refills: 0
Start: 2024-01-01 | End: 2024-01-01

## 2024-01-01 RX ORDER — ALBUMIN HUMAN 25 %
250 VIAL (ML) INTRAVENOUS ONCE
Refills: 0 | Status: COMPLETED | OUTPATIENT
Start: 2024-01-01 | End: 2024-01-01

## 2024-01-01 RX ORDER — MANNITOL
20 POWDER (GRAM) MISCELLANEOUS ONCE
Refills: 0 | Status: COMPLETED | OUTPATIENT
Start: 2024-01-01 | End: 2024-01-01

## 2024-01-01 RX ORDER — MANNITOL
20 POWDER (GRAM) MISCELLANEOUS ONCE
Refills: 0 | Status: DISCONTINUED | OUTPATIENT
Start: 2024-01-01 | End: 2024-01-01

## 2024-01-01 RX ORDER — TIZANIDINE 2 MG/1
2 TABLET ORAL 3 TIMES DAILY
Qty: 90 | Refills: 1 | Status: ACTIVE | COMMUNITY
Start: 2024-01-01 | End: 1900-01-01

## 2024-01-01 RX ORDER — ESCITALOPRAM OXALATE 10 MG/1
10 TABLET ORAL DAILY
Qty: 30 | Refills: 2 | Status: ACTIVE | COMMUNITY
Start: 2023-01-01

## 2024-01-01 RX ORDER — ALBUMIN HUMAN 25 %
100 VIAL (ML) INTRAVENOUS ONCE
Refills: 0 | Status: COMPLETED | OUTPATIENT
Start: 2024-01-01 | End: 2024-01-01

## 2024-01-01 RX ORDER — EPINEPHRINE 0.3 MG/.3ML
0.3 INJECTION INTRAMUSCULAR; SUBCUTANEOUS
Qty: 8 | Refills: 0 | Status: DISCONTINUED | OUTPATIENT
Start: 2024-01-01 | End: 2024-01-01

## 2024-01-01 RX ORDER — NOREPINEPHRINE BITARTRATE/D5W 8 MG/250ML
0.2 PLASTIC BAG, INJECTION (ML) INTRAVENOUS
Qty: 8 | Refills: 0 | Status: DISCONTINUED | OUTPATIENT
Start: 2024-01-01 | End: 2024-01-01

## 2024-01-01 RX ORDER — CALCIUM CHLORIDE
1000 POWDER (GRAM) MISCELLANEOUS ONCE
Refills: 0 | Status: COMPLETED | OUTPATIENT
Start: 2024-01-01 | End: 2024-01-01

## 2024-01-01 RX ORDER — MIDAZOLAM HYDROCHLORIDE 1 MG/ML
2 INJECTION, SOLUTION INTRAMUSCULAR; INTRAVENOUS ONCE
Refills: 0 | Status: DISCONTINUED | OUTPATIENT
Start: 2024-01-01 | End: 2024-01-01

## 2024-01-01 RX ORDER — NOREPINEPHRINE BITARTRATE/D5W 8 MG/250ML
0.05 PLASTIC BAG, INJECTION (ML) INTRAVENOUS
Qty: 16 | Refills: 0 | Status: DISCONTINUED | OUTPATIENT
Start: 2024-01-01 | End: 2024-01-01

## 2024-01-01 RX ORDER — CLOPIDOGREL BISULFATE 75 MG/1
75 TABLET, FILM COATED ORAL DAILY
Qty: 69 | Refills: 0 | Status: DISCONTINUED | COMMUNITY
Start: 2023-01-01 | End: 2024-01-01

## 2024-01-01 RX ORDER — TICAGRELOR 90 MG/1
90 TABLET ORAL
Qty: 180 | Refills: 1 | Status: ACTIVE | COMMUNITY
Start: 2024-01-01 | End: 1900-01-01

## 2024-01-01 RX ORDER — SODIUM CHLORIDE 5 G/100ML
500 INJECTION, SOLUTION INTRAVENOUS
Refills: 0 | Status: DISCONTINUED | OUTPATIENT
Start: 2024-01-01 | End: 2024-01-01

## 2024-01-01 RX ORDER — CLOPIDOGREL BISULFATE 75 MG/1
75 TABLET, FILM COATED ORAL
Refills: 0 | Status: DISCONTINUED | COMMUNITY
End: 2024-01-01

## 2024-01-01 RX ORDER — PREGABALIN 75 MG/1
75 CAPSULE ORAL
Qty: 30 | Refills: 5 | Status: ACTIVE | COMMUNITY
Start: 2024-01-01 | End: 1900-01-01

## 2024-01-01 RX ORDER — SODIUM BICARBONATE 1 MEQ/ML
50 SYRINGE (ML) INTRAVENOUS
Refills: 0 | Status: COMPLETED | OUTPATIENT
Start: 2024-01-01 | End: 2024-01-01

## 2024-01-01 RX ORDER — METFORMIN HYDROCHLORIDE 500 MG/1
500 TABLET, COATED ORAL
Qty: 180 | Refills: 0 | Status: ACTIVE | COMMUNITY
Start: 2023-01-01 | End: 1900-01-01

## 2024-01-01 RX ORDER — SODIUM CHLORIDE 9 MG/ML
500 INJECTION, SOLUTION INTRAVENOUS ONCE
Refills: 0 | Status: COMPLETED | OUTPATIENT
Start: 2024-01-01 | End: 2024-01-01

## 2024-01-01 RX ORDER — CARVEDILOL 25 MG/1
25 TABLET, FILM COATED ORAL TWICE DAILY
Qty: 60 | Refills: 2 | Status: DISCONTINUED | COMMUNITY
Start: 2023-01-01 | End: 2024-01-01

## 2024-01-01 RX ORDER — VANCOMYCIN HCL 500 MG
5 VIAL (EA) INTRAVENOUS ONCE
Refills: 0 | Status: COMPLETED | OUTPATIENT
Start: 2024-01-01 | End: 2024-01-01

## 2024-01-01 RX ORDER — CHLORHEXIDINE GLUCONATE 213 G/1000ML
1 SOLUTION TOPICAL
Refills: 0 | Status: DISCONTINUED | OUTPATIENT
Start: 2024-01-01 | End: 2024-01-01

## 2024-01-01 RX ORDER — TICAGRELOR 90 MG/1
80 TABLET ORAL ONCE
Refills: 0 | Status: DISCONTINUED | OUTPATIENT
Start: 2024-01-01 | End: 2024-01-01

## 2024-01-01 RX ORDER — TIZANIDINE 2 MG/1
2 TABLET ORAL
Qty: 60 | Refills: 0 | Status: ACTIVE | COMMUNITY
Start: 2024-01-01 | End: 1900-01-01

## 2024-01-01 RX ORDER — ONDANSETRON 8 MG/1
4 TABLET, FILM COATED ORAL ONCE
Refills: 0 | Status: COMPLETED | OUTPATIENT
Start: 2024-01-01 | End: 2024-01-01

## 2024-01-01 RX ORDER — PANTOPRAZOLE SODIUM 20 MG/1
40 TABLET, DELAYED RELEASE ORAL EVERY 12 HOURS
Refills: 0 | Status: DISCONTINUED | OUTPATIENT
Start: 2024-01-01 | End: 2024-01-01

## 2024-01-01 RX ORDER — VANCOMYCIN HCL 1 G
1000 VIAL (EA) INTRAVENOUS EVERY 12 HOURS
Refills: 0 | Status: DISCONTINUED | OUTPATIENT
Start: 2024-01-01 | End: 2024-01-01

## 2024-01-01 RX ORDER — DOBUTAMINE HCL 250MG/20ML
5 VIAL (ML) INTRAVENOUS
Qty: 500 | Refills: 0 | Status: DISCONTINUED | OUTPATIENT
Start: 2024-01-01 | End: 2024-01-01

## 2024-01-01 RX ORDER — SODIUM ZIRCONIUM CYCLOSILICATE 10 G/10G
10 POWDER, FOR SUSPENSION ORAL ONCE
Refills: 0 | Status: COMPLETED | OUTPATIENT
Start: 2024-01-01 | End: 2024-01-01

## 2024-01-01 RX ADMIN — Medication 50 MILLILITER(S): at 02:53

## 2024-01-01 RX ADMIN — CHLORHEXIDINE GLUCONATE 15 MILLILITER(S): 213 SOLUTION TOPICAL at 17:57

## 2024-01-01 RX ADMIN — Medication 50 MILLIEQUIVALENT(S): at 01:56

## 2024-01-01 RX ADMIN — ONDANSETRON 4 MILLIGRAM(S): 8 TABLET, FILM COATED ORAL at 13:00

## 2024-01-01 RX ADMIN — Medication 1 DROP(S): at 06:54

## 2024-01-01 RX ADMIN — Medication 50 MILLIEQUIVALENT(S): at 00:46

## 2024-01-01 RX ADMIN — SODIUM CHLORIDE 15 MILLILITER(S): 5 INJECTION, SOLUTION INTRAVENOUS at 19:33

## 2024-01-01 RX ADMIN — Medication 1 DROP(S): at 17:59

## 2024-01-01 RX ADMIN — SODIUM ZIRCONIUM CYCLOSILICATE 10 GRAM(S): 10 POWDER, FOR SUSPENSION ORAL at 01:06

## 2024-01-01 RX ADMIN — INSULIN HUMAN 10 UNIT(S): 100 INJECTION, SOLUTION SUBCUTANEOUS at 18:14

## 2024-01-01 RX ADMIN — Medication 3.98 MICROGRAM(S)/KG/MIN: at 01:36

## 2024-01-01 RX ADMIN — Medication 31.9 MICROGRAM(S)/KG/MIN: at 22:30

## 2024-01-01 RX ADMIN — Medication 125 MILLILITER(S): at 00:56

## 2024-01-01 RX ADMIN — Medication 300 MILLIGRAM(S): at 19:25

## 2024-01-01 RX ADMIN — CHLORHEXIDINE GLUCONATE 15 MILLILITER(S): 213 SOLUTION TOPICAL at 19:03

## 2024-01-01 RX ADMIN — Medication 3.98 MICROGRAM(S)/KG/MIN: at 19:51

## 2024-01-01 RX ADMIN — Medication 50 MILLILITER(S): at 04:38

## 2024-01-01 RX ADMIN — Medication 200 GRAM(S): at 10:54

## 2024-01-01 RX ADMIN — VASOPRESSIN 15 UNIT(S)/MIN: 20 INJECTION INTRAVENOUS at 07:44

## 2024-01-01 RX ADMIN — SODIUM CHLORIDE 15 MILLILITER(S): 5 INJECTION, SOLUTION INTRAVENOUS at 01:54

## 2024-01-01 RX ADMIN — Medication 50 MILLIEQUIVALENT(S): at 18:30

## 2024-01-01 RX ADMIN — Medication 150 MILLIGRAM(S): at 08:53

## 2024-01-01 RX ADMIN — EPINEPHRINE 47.8 MICROGRAM(S)/KG/MIN: 0.3 INJECTION INTRAMUSCULAR; SUBCUTANEOUS at 07:44

## 2024-01-01 RX ADMIN — MIDAZOLAM HYDROCHLORIDE 2 MILLIGRAM(S): 1 INJECTION, SOLUTION INTRAMUSCULAR; INTRAVENOUS at 15:00

## 2024-01-01 RX ADMIN — Medication 1 DROP(S): at 12:00

## 2024-01-01 RX ADMIN — INSULIN HUMAN 2 UNIT(S)/HR: 100 INJECTION, SOLUTION SUBCUTANEOUS at 18:52

## 2024-01-01 RX ADMIN — Medication 50 MILLIEQUIVALENT(S): at 17:50

## 2024-01-01 RX ADMIN — Medication 1 DROP(S): at 00:36

## 2024-01-01 RX ADMIN — SODIUM CHLORIDE 15 MILLILITER(S): 5 INJECTION, SOLUTION INTRAVENOUS at 19:54

## 2024-01-01 RX ADMIN — CHLORHEXIDINE GLUCONATE 15 MILLILITER(S): 213 SOLUTION TOPICAL at 07:16

## 2024-01-01 RX ADMIN — PANTOPRAZOLE SODIUM 10 MG/HR: 20 TABLET, DELAYED RELEASE ORAL at 19:53

## 2024-01-01 RX ADMIN — Medication 50 MILLILITER(S): at 19:53

## 2024-01-01 RX ADMIN — MEROPENEM 100 MILLIGRAM(S): 1 INJECTION INTRAVENOUS at 13:36

## 2024-01-01 RX ADMIN — INSULIN HUMAN 10 UNIT(S): 100 INJECTION, SOLUTION SUBCUTANEOUS at 18:15

## 2024-01-01 RX ADMIN — Medication 50 MILLIEQUIVALENT(S): at 23:30

## 2024-01-01 RX ADMIN — Medication 50 MILLILITER(S): at 18:15

## 2024-01-01 RX ADMIN — INSULIN HUMAN 2 UNIT(S)/HR: 100 INJECTION, SOLUTION SUBCUTANEOUS at 01:35

## 2024-01-01 RX ADMIN — Medication 50 MILLIEQUIVALENT(S): at 07:45

## 2024-01-01 RX ADMIN — EPINEPHRINE 47.8 MICROGRAM(S)/KG/MIN: 0.3 INJECTION INTRAMUSCULAR; SUBCUTANEOUS at 19:32

## 2024-01-01 RX ADMIN — MEROPENEM 100 MILLIGRAM(S): 1 INJECTION INTRAVENOUS at 04:17

## 2024-01-01 RX ADMIN — INSULIN HUMAN 2 UNIT(S)/HR: 100 INJECTION, SOLUTION SUBCUTANEOUS at 19:22

## 2024-01-01 RX ADMIN — EPINEPHRINE 0.4 MILLIGRAM(S): 0.3 INJECTION INTRAMUSCULAR; SUBCUTANEOUS at 17:40

## 2024-01-01 RX ADMIN — INSULIN HUMAN 2 UNIT(S)/HR: 100 INJECTION, SOLUTION SUBCUTANEOUS at 19:35

## 2024-01-01 RX ADMIN — Medication 1 DROP(S): at 12:35

## 2024-01-01 RX ADMIN — Medication 50 MILLIEQUIVALENT(S): at 10:54

## 2024-01-01 RX ADMIN — Medication 25 GRAM(S): at 12:35

## 2024-01-01 RX ADMIN — PANTOPRAZOLE SODIUM 10 MG/HR: 20 TABLET, DELAYED RELEASE ORAL at 19:21

## 2024-01-01 RX ADMIN — Medication 50 MILLIEQUIVALENT(S): at 18:00

## 2024-01-01 RX ADMIN — MEROPENEM 100 MILLIGRAM(S): 1 INJECTION INTRAVENOUS at 21:11

## 2024-01-01 RX ADMIN — Medication 50 MILLILITER(S): at 07:45

## 2024-01-01 RX ADMIN — Medication 200 GRAM(S): at 00:30

## 2024-01-01 RX ADMIN — Medication 50 MILLIEQUIVALENT(S): at 23:00

## 2024-01-01 RX ADMIN — Medication 200 GRAM(S): at 01:53

## 2024-01-01 RX ADMIN — CHLORHEXIDINE GLUCONATE 15 MILLILITER(S): 213 SOLUTION TOPICAL at 06:54

## 2024-01-01 RX ADMIN — Medication 200 GRAM(S): at 18:31

## 2024-01-01 RX ADMIN — Medication 50 MILLIEQUIVALENT(S): at 18:36

## 2024-01-01 RX ADMIN — CHLORHEXIDINE GLUCONATE 1 APPLICATION(S): 213 SOLUTION TOPICAL at 19:38

## 2024-01-01 RX ADMIN — Medication 50 MILLIEQUIVALENT(S): at 20:00

## 2024-01-01 RX ADMIN — TICAGRELOR 90 MILLIGRAM(S): 90 TABLET ORAL at 21:04

## 2024-01-01 RX ADMIN — SODIUM CHLORIDE 10 MILLILITER(S): 9 INJECTION INTRAMUSCULAR; INTRAVENOUS; SUBCUTANEOUS at 18:54

## 2024-01-01 RX ADMIN — MEROPENEM 100 MILLIGRAM(S): 1 INJECTION INTRAVENOUS at 12:38

## 2024-01-01 RX ADMIN — INSULIN HUMAN 2 UNIT(S)/HR: 100 INJECTION, SOLUTION SUBCUTANEOUS at 22:30

## 2024-01-01 RX ADMIN — Medication 1 DROP(S): at 05:44

## 2024-01-01 RX ADMIN — Medication 200 GRAM(S): at 18:54

## 2024-01-01 RX ADMIN — VASOPRESSIN 15 UNIT(S)/MIN: 20 INJECTION INTRAVENOUS at 22:30

## 2024-01-01 RX ADMIN — CHLORHEXIDINE GLUCONATE 1 APPLICATION(S): 213 SOLUTION TOPICAL at 07:16

## 2024-01-01 RX ADMIN — VASOPRESSIN 15 UNIT(S)/MIN: 20 INJECTION INTRAVENOUS at 19:52

## 2024-01-01 RX ADMIN — Medication 1000 MILLIGRAM(S): at 17:45

## 2024-01-01 RX ADMIN — Medication 200 GRAM(S): at 08:00

## 2024-01-01 RX ADMIN — MEROPENEM 100 MILLIGRAM(S): 1 INJECTION INTRAVENOUS at 19:25

## 2024-01-01 RX ADMIN — FENTANYL CITRATE 200 MICROGRAM(S): 50 INJECTION INTRAVENOUS at 15:00

## 2024-01-01 RX ADMIN — Medication 50 MILLILITER(S): at 16:36

## 2024-01-01 RX ADMIN — CHLORHEXIDINE GLUCONATE 15 MILLILITER(S): 213 SOLUTION TOPICAL at 08:53

## 2024-01-01 RX ADMIN — INSULIN HUMAN 10 UNIT(S): 100 INJECTION, SOLUTION SUBCUTANEOUS at 17:55

## 2024-01-01 RX ADMIN — Medication 800 GRAM(S): at 18:30

## 2024-01-01 RX ADMIN — PANTOPRAZOLE SODIUM 10 MG/HR: 20 TABLET, DELAYED RELEASE ORAL at 22:32

## 2024-01-01 RX ADMIN — EPINEPHRINE 15.9 MICROGRAM(S)/KG/MIN: 0.3 INJECTION INTRAMUSCULAR; SUBCUTANEOUS at 01:36

## 2024-01-01 RX ADMIN — Medication 12.8 MICROGRAM(S)/KG/MIN: at 01:38

## 2024-01-01 RX ADMIN — MEROPENEM 100 MILLIGRAM(S): 1 INJECTION INTRAVENOUS at 04:42

## 2024-01-01 RX ADMIN — Medication 3.98 MICROGRAM(S)/KG/MIN: at 19:32

## 2024-01-01 RX ADMIN — Medication 12.8 MICROGRAM(S)/KG/MIN: at 19:34

## 2024-01-01 RX ADMIN — Medication 12.8 MICROGRAM(S)/KG/MIN: at 07:44

## 2024-01-01 RX ADMIN — SODIUM CHLORIDE 15 MILLILITER(S): 5 INJECTION, SOLUTION INTRAVENOUS at 07:45

## 2024-01-01 RX ADMIN — Medication 50 MILLILITER(S): at 04:30

## 2024-01-01 RX ADMIN — PANTOPRAZOLE SODIUM 10 MG/HR: 20 TABLET, DELAYED RELEASE ORAL at 19:36

## 2024-01-01 RX ADMIN — PANTOPRAZOLE SODIUM 10 MG/HR: 20 TABLET, DELAYED RELEASE ORAL at 01:35

## 2024-01-01 RX ADMIN — PANTOPRAZOLE SODIUM 10 MG/HR: 20 TABLET, DELAYED RELEASE ORAL at 07:44

## 2024-01-01 RX ADMIN — Medication 12.8 MICROGRAM(S)/KG/MIN: at 19:53

## 2024-01-01 RX ADMIN — VASOPRESSIN 15 UNIT(S)/MIN: 20 INJECTION INTRAVENOUS at 18:52

## 2024-01-01 RX ADMIN — SODIUM CHLORIDE 2000 MILLILITER(S): 9 INJECTION, SOLUTION INTRAVENOUS at 18:00

## 2024-01-01 RX ADMIN — Medication 50 MILLIEQUIVALENT(S): at 17:40

## 2024-01-01 RX ADMIN — Medication 31.9 MICROGRAM(S)/KG/MIN: at 19:27

## 2024-01-01 RX ADMIN — CHLORHEXIDINE GLUCONATE 1 APPLICATION(S): 213 SOLUTION TOPICAL at 06:55

## 2024-01-01 RX ADMIN — Medication 1 DROP(S): at 12:37

## 2024-01-01 RX ADMIN — Medication 200 GRAM(S): at 00:46

## 2024-01-01 RX ADMIN — VASOPRESSIN 15 UNIT(S)/MIN: 20 INJECTION INTRAVENOUS at 19:33

## 2024-01-01 RX ADMIN — Medication 3.98 MICROGRAM(S)/KG/MIN: at 07:43

## 2024-01-01 RX ADMIN — EPINEPHRINE 15.9 MICROGRAM(S)/KG/MIN: 0.3 INJECTION INTRAMUSCULAR; SUBCUTANEOUS at 22:30

## 2024-01-01 RX ADMIN — Medication 100 MILLIGRAM(S): at 05:44

## 2024-01-01 RX ADMIN — Medication 50 MILLILITER(S): at 17:55

## 2024-01-01 RX ADMIN — CHLORHEXIDINE GLUCONATE 15 MILLILITER(S): 213 SOLUTION TOPICAL at 17:59

## 2024-01-01 RX ADMIN — Medication 250 MILLIGRAM(S): at 18:14

## 2024-01-01 RX ADMIN — EPINEPHRINE 15.9 MICROGRAM(S)/KG/MIN: 0.3 INJECTION INTRAMUSCULAR; SUBCUTANEOUS at 18:53

## 2024-01-01 RX ADMIN — Medication 1 DROP(S): at 17:57

## 2024-01-01 RX ADMIN — Medication 1 DROP(S): at 00:47

## 2024-01-01 RX ADMIN — FENTANYL CITRATE 200 MICROGRAM(S): 50 INJECTION INTRAVENOUS at 15:15

## 2024-01-01 RX ADMIN — Medication 100 MILLIGRAM(S): at 21:12

## 2024-01-01 RX ADMIN — VASOPRESSIN 15 UNIT(S)/MIN: 20 INJECTION INTRAVENOUS at 01:36

## 2024-01-11 NOTE — ED ADULT TRIAGE NOTE - CHIEF COMPLAINT QUOTE
H/o CVA x 2 months ago HTN  Skurred speech from old CVA x 2 months as well as off gait. Pt awake alert and oriented x4 Resp even and nonlab

## 2024-01-11 NOTE — ED PROVIDER NOTE - ATTENDING CONTRIBUTION TO CARE
This is a 43-year-old fellow who recently had a stroke and physical therapist told him that his blood pressure is high and he should go to the hospital.  He called his doctor who agreed with the plan.  Patient has no symptoms that are new.  Patient has left-sided facial droop and slurring of the speech.  Regular rate and rhythm with no murmur.  EKG CBC CMP urinalysis and likely discharge with outpatient follow-up.  No signs or symptoms of stroke acutely.

## 2024-01-11 NOTE — ED PROVIDER NOTE - NSFOLLOWUPINSTRUCTIONS_ED_ALL_ED_FT
All of your blood work and urine testing was normal today.  The results of all the testing that was performed today are including of this paperwork. I sent a prescription for amlodipine 10 mg to your pharmacy.      Take this once per day until you follow-up with your doctor.  Continue all of your other medications as currently prescribed. Call Dr. Myrick's office tomorrow to schedule a follow-up visit.     Hypertension    WHAT YOU NEED TO KNOW:    Hypertension is high blood pressure. Your blood pressure is the force of your blood moving against the walls of your arteries. Hypertension causes your blood pressure to get so high that your heart has to work much harder than normal. This can damage your heart. Hypertension that does not respond to medicines and lifestyle changes is called resistant hypertension. Hypertension is considered chronic when it continues for 3 months or longer.    DISCHARGE INSTRUCTIONS:    Call your local emergency number ([phone] in the ) or have someone call if:   •You have chest pain.    •You have any of the following signs of a heart attack: •Squeezing, pressure, or pain in your chest      •You may also have any of the following:   •Discomfort or pain in your back, neck, jaw, stomach, or arm    •Shortness of breath    •Nausea or vomiting    •Lightheadedness or a sudden cold sweat    •You become confused or have trouble speaking.    •You suddenly feel lightheaded or have trouble breathing.      Return to the emergency department if:   •You have a severe headache or vision loss.    •You have weakness in an arm or leg.      Call your doctor or cardiologist if:   •You feel faint, dizzy, confused, or drowsy.    •You have been taking your blood pressure medicine but your pressure is higher than your provider says it should be.    •You have questions or concerns about your condition or care.      Medicines: You may need any of the following:  •Antihypertensives may be used to help lower your blood pressure. Several kinds of medicines are available. Your healthcare provider will choose medicines based on the kind of hypertension you have. You may need more than one type of medicine. Take the medicine exactly as directed.    •Diuretics help decrease extra fluid that collects in your body. This will help lower your blood pressure. You may urinate more often while you take this medicine.    •Cholesterol medicine helps lower your cholesterol level. A low cholesterol level helps prevent heart disease and makes it easier to control your blood pressure.    •Take your medicine as directed. Contact your healthcare provider if you think your medicine is not helping or if you have side effects. Tell your provider if you are allergic to any medicine. Keep a list of the medicines, vitamins, and herbs you take. Include the amounts, and when and why you take them. Bring the list or the pill bottles to follow-up visits. Carry your medicine list with you in case of an emergency.      Follow up with your doctor or cardiologist as directed: You will need to return to have your blood pressure checked and to have other lab tests done. Write down your questions so you remember to ask them during your visits.    Stages of hypertension: Your healthcare provider will give you a blood pressure goal based on your age, health, and risk for cardiovascular disease. The following are general guidelines on the stages of hypertension:  •Normal blood pressure is 119/79 or lower. Your healthcare provider may only check your blood pressure each year if it stays at a normal level.    •Elevated blood pressure is 120/79 to 129/79. This is sometimes called prehypertension. Your healthcare provider may suggest lifestyle changes to help lower your blood pressure to a normal level. He or she may then check it again in 3 to 6 months.    •Stage 1 hypertension is 130/80 to 139/89. Your provider may recommend lifestyle changes, medication, and checks every 3 to 6 months until your blood pressure is controlled.    •Stage 2 hypertension is 140/90 or higher. Your provider will recommend lifestyle changes and have you take 2 kinds of hypertension medicines. You will also need to have your blood pressure checked monthly until it is controlled.      Manage hypertension:   •Check your blood pressure at home. Do not smoke, have caffeine, or exercise for at least 30 minutes before you check your blood pressure. Sit and rest for 5 minutes before you check your blood pressure. Extend your arm and support it on a flat surface. Your arm should be at the same level as your heart. Follow the directions that came with your blood pressure monitor. Check your blood pressure 2 times, 1 minute apart, before you take your medicine in the morning. Also check your blood pressure before your evening meal. Keep a record of your readings and bring it to your follow-up visits. Ask your healthcare provider what your blood pressure should be.    •Manage any other health conditions you have. Health conditions such as diabetes can increase your risk for hypertension. Follow your healthcare provider's instructions and take all your medicines as directed.    •Ask about all medicines. Certain medicines can increase your blood pressure. Examples include oral birth control pills, decongestants, herbal supplements, and NSAIDs, such as ibuprofen. Your healthcare provider can tell you which medicines are safe for you to take. This includes prescription and over-the-counter medicines.    Lifestyle changes you can make to manage hypertension: Your healthcare provider may recommend you work with a team to manage hypertension. The team may include medical experts such as a dietitian, an exercise or physical therapist, and a behavior therapist. Your family members may be included in helping you create lifestyle changes.    Ways to Lower Your Blood Pressure     •Limit sodium (salt) as directed. Too much sodium can affect your fluid balance. Check labels to find low-sodium or no-salt-added foods. Some low-sodium foods use potassium salts for flavor. Too much potassium can also cause health problems. Your healthcare provider will tell you how much sodium and potassium are safe for you to have in a day. He or she may recommend that you limit sodium to 2,300 mg a day.    •Follow the meal plan recommended by your healthcare provider. A dietitian or your provider can give you more information on low-sodium plans or the DASH (Dietary Approaches to Stop Hypertension) eating plan. The DASH plan is low in sodium, processed sugar, unhealthy fats, and total fat. It is high in potassium, calcium, and fiber. These can be found in vegetables, fruit, and whole-grain foods.    •Be physically active throughout the day. Physical activity, such as exercise, can help control your blood pressure and your weight. Be physically active for at least 30 minutes per day, on most days of the week. Include aerobic activity, such as walking or riding a bicycle. Also include strength training at least 2 times each week. Your healthcare providers can help you create a physical activity plan.    •Decrease stress. This may help lower your blood pressure. Learn ways to relax, such as deep breathing or listening to music.    •Limit alcohol as directed. Alcohol can increase your blood pressure. A drink of alcohol is 12 ounces of beer, 5 ounces of wine, or 1½ ounces of liquor.    •Do not smoke. Nicotine and other chemicals in cigarettes and cigars can increase your blood pressure and also cause lung damage. Ask your healthcare provider for information if you currently smoke and need help to quit. E-cigarettes or smokeless tobacco still contain nicotine. Talk to your healthcare provider before you use these products. All of your blood work and urine testing was normal today.  The results of all the testing that was performed today are including of this paperwork. I sent a prescription for amlodipine 10 mg to your pharmacy.      Take this once per day until you follow-up with your doctor.  Continue all of your other medications as currently prescribed. Call Dr. Myrick's office tomorrow to schedule a follow-up visit.     Hypertension    WHAT YOU NEED TO KNOW:    Hypertension is high blood pressure. Your blood pressure is the force of your blood moving against the walls of your arteries. Hypertension causes your blood pressure to get so high that your heart has to work much harder than normal. This can damage your heart. Hypertension that does not respond to medicines and lifestyle changes is called resistant hypertension. Hypertension is considered chronic when it continues for 3 months or longer.    DISCHARGE INSTRUCTIONS:    Call your local emergency number ([phone] in the ) or have someone call if:   •You have chest pain.    •You have any of the following signs of a heart attack: •Squeezing, pressure, or pain in your chest      •You may also have any of the following:   •Discomfort or pain in your back, neck, jaw, stomach, or arm    •Shortness of breath    •Nausea or vomiting    •Lightheadedness or a sudden cold sweat    •You become confused or have trouble speaking.    •You suddenly feel lightheaded or have trouble breathing.      Return to the emergency department if:   •You have a severe headache or vision loss.    •You have weakness in an arm or leg.      Call your doctor or cardiologist if:   •You feel faint, dizzy, confused, or drowsy.    •You have been taking your blood pressure medicine but your pressure is higher than your provider says it should be.    •You have questions or concerns about your condition or care.      Medicines: You may need any of the following:  •Antihypertensives may be used to help lower your blood pressure. Several kinds of medicines are available. Your healthcare provider will choose medicines based on the kind of hypertension you have. You may need more than one type of medicine. Take the medicine exactly as directed.    •Diuretics help decrease extra fluid that collects in your body. This will help lower your blood pressure. You may urinate more often while you take this medicine.    •Cholesterol medicine helps lower your cholesterol level. A low cholesterol level helps prevent heart disease and makes it easier to control your blood pressure.    •Take your medicine as directed. Contact your healthcare provider if you think your medicine is not helping or if you have side effects. Tell your provider if you are allergic to any medicine. Keep a list of the medicines, vitamins, and herbs you take. Include the amounts, and when and why you take them. Bring the list or the pill bottles to follow-up visits. Carry your medicine list with you in case of an emergency.      Follow up with your doctor or cardiologist as directed: You will need to return to have your blood pressure checked and to have other lab tests done. Write down your questions so you remember to ask them during your visits.    Stages of hypertension: Your healthcare provider will give you a blood pressure goal based on your age, health, and risk for cardiovascular disease. The following are general guidelines on the stages of hypertension:  •Normal blood pressure is 119/79 or lower. Your healthcare provider may only check your blood pressure each year if it stays at a normal level.    •Elevated blood pressure is 120/79 to 129/79. This is sometimes called prehypertension. Your healthcare provider may suggest lifestyle changes to help lower your blood pressure to a normal level. He or she may then check it again in 3 to 6 months.    •Stage 1 hypertension is 130/80 to 139/89. Your provider may recommend lifestyle changes, medication, and checks every 3 to 6 months until your blood pressure is controlled.    •Stage 2 hypertension is 140/90 or higher. Your provider will recommend lifestyle changes and have you take 2 kinds of hypertension medicines. You will also need to have your blood pressure checked monthly until it is controlled.      Manage hypertension:   •Check your blood pressure at home. Do not smoke, have caffeine, or exercise for at least 30 minutes before you check your blood pressure. Sit and rest for 5 minutes before you check your blood pressure. Extend your arm and support it on a flat surface. Your arm should be at the same level as your heart. Follow the directions that came with your blood pressure monitor. Check your blood pressure 2 times, 1 minute apart, before you take your medicine in the morning. Also check your blood pressure before your evening meal. Keep a record of your readings and bring it to your follow-up visits. Ask your healthcare provider what your blood pressure should be.    •Manage any other health conditions you have. Health conditions such as diabetes can increase your risk for hypertension. Follow your healthcare provider's instructions and take all your medicines as directed.    •Ask about all medicines. Certain medicines can increase your blood pressure. Examples include oral birth control pills, decongestants, herbal supplements, and NSAIDs, such as ibuprofen. Your healthcare provider can tell you which medicines are safe for you to take. This includes prescription and over-the-counter medicines.    Lifestyle changes you can make to manage hypertension: Your healthcare provider may recommend you work with a team to manage hypertension. The team may include medical experts such as a dietitian, an exercise or physical therapist, and a behavior therapist. Your family members may be included in helping you create lifestyle changes.    Ways to Lower Your Blood Pressure     •Limit sodium (salt) as directed. Too much sodium can affect your fluid balance. Check labels to find low-sodium or no-salt-added foods. Some low-sodium foods use potassium salts for flavor. Too much potassium can also cause health problems. Your healthcare provider will tell you how much sodium and potassium are safe for you to have in a day. He or she may recommend that you limit sodium to 2,300 mg a day.    •Follow the meal plan recommended by your healthcare provider. A dietitian or your provider can give you more information on low-sodium plans or the DASH (Dietary Approaches to Stop Hypertension) eating plan. The DASH plan is low in sodium, processed sugar, unhealthy fats, and total fat. It is high in potassium, calcium, and fiber. These can be found in vegetables, fruit, and whole-grain foods.    •Be physically active throughout the day. Physical activity, such as exercise, can help control your blood pressure and your weight. Be physically active for at least 30 minutes per day, on most days of the week. Include aerobic activity, such as walking or riding a bicycle. Also include strength training at least 2 times each week. Your healthcare providers can help you create a physical activity plan.    •Decrease stress. This may help lower your blood pressure. Learn ways to relax, such as deep breathing or listening to music.    •Limit alcohol as directed. Alcohol can increase your blood pressure. A drink of alcohol is 12 ounces of beer, 5 ounces of wine, or 1½ ounces of liquor.    •Do not smoke. Nicotine and other chemicals in cigarettes and cigars can increase your blood pressure and also cause lung damage. Ask your healthcare provider for information if you currently smoke and need help to quit. E-cigarettes or smokeless tobacco still contain nicotine. Talk to your healthcare provider before you use these products. All of your blood work and urine testing was normal today.  The results of all the testing that was performed today are including of this paperwork. I sent a prescription for amlodipine 10 mg to your pharmacy.      Take this once per day until you follow-up with your doctor.  Continue all of your other medications as currently prescribed. Call Dr. Myrick's office tomorrow to schedule a follow-up visit.     Hypertension    WHAT YOU NEED TO KNOW:    Hypertension is high blood pressure. Your blood pressure is the force of your blood moving against the walls of your arteries. Hypertension causes your blood pressure to get so high that your heart has to work much harder than normal. This can damage your heart. Hypertension that does not respond to medicines and lifestyle changes is called resistant hypertension. Hypertension is considered chronic when it continues for 3 months or longer.    DISCHARGE INSTRUCTIONS:    Call your local emergency number ([phone] in the ) or have someone call if:   •You have chest pain.    •You have any of the following signs of a heart attack: •Squeezing, pressure, or pain in your chest      •You may also have any of the following:   •Discomfort or pain in your back, neck, jaw, stomach, or arm    •Shortness of breath    •Nausea or vomiting    •Lightheadedness or a sudden cold sweat    •You become confused or have trouble speaking.    •You suddenly feel lightheaded or have trouble breathing.      Return to the emergency department if:   •You have a severe headache or vision loss.    •You have weakness in an arm or leg.      Call your doctor or cardiologist if:   •You feel faint, dizzy, confused, or drowsy.    •You have been taking your blood pressure medicine but your pressure is higher than your provider says it should be.    •You have questions or concerns about your condition or care.      Medicines: You may need any of the following:  •Antihypertensives may be used to help lower your blood pressure. Several kinds of medicines are available. Your healthcare provider will choose medicines based on the kind of hypertension you have. You may need more than one type of medicine. Take the medicine exactly as directed.    •Diuretics help decrease extra fluid that collects in your body. This will help lower your blood pressure. You may urinate more often while you take this medicine.    •Cholesterol medicine helps lower your cholesterol level. A low cholesterol level helps prevent heart disease and makes it easier to control your blood pressure.    •Take your medicine as directed. Contact your healthcare provider if you think your medicine is not helping or if you have side effects. Tell your provider if you are allergic to any medicine. Keep a list of the medicines, vitamins, and herbs you take. Include the amounts, and when and why you take them. Bring the list or the pill bottles to follow-up visits. Carry your medicine list with you in case of an emergency.      Follow up with your doctor or cardiologist as directed: You will need to return to have your blood pressure checked and to have other lab tests done. Write down your questions so you remember to ask them during your visits.    Stages of hypertension: Your healthcare provider will give you a blood pressure goal based on your age, health, and risk for cardiovascular disease. The following are general guidelines on the stages of hypertension:  •Normal blood pressure is 119/79 or lower. Your healthcare provider may only check your blood pressure each year if it stays at a normal level.    •Elevated blood pressure is 120/79 to 129/79. This is sometimes called prehypertension. Your healthcare provider may suggest lifestyle changes to help lower your blood pressure to a normal level. He or she may then check it again in 3 to 6 months.    •Stage 1 hypertension is 130/80 to 139/89. Your provider may recommend lifestyle changes, medication, and checks every 3 to 6 months until your blood pressure is controlled.    •Stage 2 hypertension is 140/90 or higher. Your provider will recommend lifestyle changes and have you take 2 kinds of hypertension medicines. You will also need to have your blood pressure checked monthly until it is controlled.      Manage hypertension:   •Check your blood pressure at home. Do not smoke, have caffeine, or exercise for at least 30 minutes before you check your blood pressure. Sit and rest for 5 minutes before you check your blood pressure. Extend your arm and support it on a flat surface. Your arm should be at the same level as your heart. Follow the directions that came with your blood pressure monitor. Check your blood pressure 2 times, 1 minute apart, before you take your medicine in the morning. Also check your blood pressure before your evening meal. Keep a record of your readings and bring it to your follow-up visits. Ask your healthcare provider what your blood pressure should be.    •Manage any other health conditions you have. Health conditions such as diabetes can increase your risk for hypertension. Follow your healthcare provider's instructions and take all your medicines as directed.    •Ask about all medicines. Certain medicines can increase your blood pressure. Examples include oral birth control pills, decongestants, herbal supplements, and NSAIDs, such as ibuprofen. Your healthcare provider can tell you which medicines are safe for you to take. This includes prescription and over-the-counter medicines.    Lifestyle changes you can make to manage hypertension: Your healthcare provider may recommend you work with a team to manage hypertension. The team may include medical experts such as a dietitian, an exercise or physical therapist, and a behavior therapist. Your family members may be included in helping you create lifestyle changes.    Ways to Lower Your Blood Pressure     •Limit sodium (salt) as directed. Too much sodium can affect your fluid balance. Check labels to find low-sodium or no-salt-added foods. Some low-sodium foods use potassium salts for flavor. Too much potassium can also cause health problems. Your healthcare provider will tell you how much sodium and potassium are safe for you to have in a day. He or she may recommend that you limit sodium to 2,300 mg a day.    •Follow the meal plan recommended by your healthcare provider. A dietitian or your provider can give you more information on low-sodium plans or the DASH (Dietary Approaches to Stop Hypertension) eating plan. The DASH plan is low in sodium, processed sugar, unhealthy fats, and total fat. It is high in potassium, calcium, and fiber. These can be found in vegetables, fruit, and whole-grain foods.    •Be physically active throughout the day. Physical activity, such as exercise, can help control your blood pressure and your weight. Be physically active for at least 30 minutes per day, on most days of the week. Include aerobic activity, such as walking or riding a bicycle. Also include strength training at least 2 times each week. Your healthcare providers can help you create a physical activity plan.    •Decrease stress. This may help lower your blood pressure. Learn ways to relax, such as deep breathing or listening to music.    •Limit alcohol as directed. Alcohol can increase your blood pressure. A drink of alcohol is 12 ounces of beer, 5 ounces of wine, or 1½ ounces of liquor.    •Do not smoke. Nicotine and other chemicals in cigarettes and cigars can increase your blood pressure and also cause lung damage. Ask your healthcare provider for information if you currently smoke and need help to quit. E-cigarettes or smokeless tobacco still contain nicotine. Talk to your healthcare provider before you use these products. All of your blood work and urine testing was normal today.  The results of all the testing that was performed today are including of this paperwork. I sent a prescription for amlodipine 10 mg to your pharmacy.      Take this once per day until you follow-up with your doctor.  Continue all of your other medications as currently prescribed. Call Dr. Myrick's office tomorrow to schedule a follow-up visit.     Hypertension    WHAT YOU NEED TO KNOW:    Hypertension is high blood pressure. Your blood pressure is the force of your blood moving against the walls of your arteries. Hypertension causes your blood pressure to get so high that your heart has to work much harder than normal. This can damage your heart. Hypertension that does not respond to medicines and lifestyle changes is called resistant hypertension. Hypertension is considered chronic when it continues for 3 months or longer.    DISCHARGE INSTRUCTIONS:    Call your local emergency number ([phone] in the ) or have someone call if:   •You have chest pain.    •You have any of the following signs of a heart attack: •Squeezing, pressure, or pain in your chest      •You may also have any of the following:   •Discomfort or pain in your back, neck, jaw, stomach, or arm    •Shortness of breath    •Nausea or vomiting    •Lightheadedness or a sudden cold sweat    •You become confused or have trouble speaking.    •You suddenly feel lightheaded or have trouble breathing.      Return to the emergency department if:   •You have a severe headache or vision loss.    •You have weakness in an arm or leg.      Call your doctor or cardiologist if:   •You feel faint, dizzy, confused, or drowsy.    •You have been taking your blood pressure medicine but your pressure is higher than your provider says it should be.    •You have questions or concerns about your condition or care.      Medicines: You may need any of the following:  •Antihypertensives may be used to help lower your blood pressure. Several kinds of medicines are available. Your healthcare provider will choose medicines based on the kind of hypertension you have. You may need more than one type of medicine. Take the medicine exactly as directed.    •Diuretics help decrease extra fluid that collects in your body. This will help lower your blood pressure. You may urinate more often while you take this medicine.    •Cholesterol medicine helps lower your cholesterol level. A low cholesterol level helps prevent heart disease and makes it easier to control your blood pressure.    •Take your medicine as directed. Contact your healthcare provider if you think your medicine is not helping or if you have side effects. Tell your provider if you are allergic to any medicine. Keep a list of the medicines, vitamins, and herbs you take. Include the amounts, and when and why you take them. Bring the list or the pill bottles to follow-up visits. Carry your medicine list with you in case of an emergency.      Follow up with your doctor or cardiologist as directed: You will need to return to have your blood pressure checked and to have other lab tests done. Write down your questions so you remember to ask them during your visits.    Stages of hypertension: Your healthcare provider will give you a blood pressure goal based on your age, health, and risk for cardiovascular disease. The following are general guidelines on the stages of hypertension:  •Normal blood pressure is 119/79 or lower. Your healthcare provider may only check your blood pressure each year if it stays at a normal level.    •Elevated blood pressure is 120/79 to 129/79. This is sometimes called prehypertension. Your healthcare provider may suggest lifestyle changes to help lower your blood pressure to a normal level. He or she may then check it again in 3 to 6 months.    •Stage 1 hypertension is 130/80 to 139/89. Your provider may recommend lifestyle changes, medication, and checks every 3 to 6 months until your blood pressure is controlled.    •Stage 2 hypertension is 140/90 or higher. Your provider will recommend lifestyle changes and have you take 2 kinds of hypertension medicines. You will also need to have your blood pressure checked monthly until it is controlled.      Manage hypertension:   •Check your blood pressure at home. Do not smoke, have caffeine, or exercise for at least 30 minutes before you check your blood pressure. Sit and rest for 5 minutes before you check your blood pressure. Extend your arm and support it on a flat surface. Your arm should be at the same level as your heart. Follow the directions that came with your blood pressure monitor. Check your blood pressure 2 times, 1 minute apart, before you take your medicine in the morning. Also check your blood pressure before your evening meal. Keep a record of your readings and bring it to your follow-up visits. Ask your healthcare provider what your blood pressure should be.    •Manage any other health conditions you have. Health conditions such as diabetes can increase your risk for hypertension. Follow your healthcare provider's instructions and take all your medicines as directed.    •Ask about all medicines. Certain medicines can increase your blood pressure. Examples include oral birth control pills, decongestants, herbal supplements, and NSAIDs, such as ibuprofen. Your healthcare provider can tell you which medicines are safe for you to take. This includes prescription and over-the-counter medicines.    Lifestyle changes you can make to manage hypertension: Your healthcare provider may recommend you work with a team to manage hypertension. The team may include medical experts such as a dietitian, an exercise or physical therapist, and a behavior therapist. Your family members may be included in helping you create lifestyle changes.    Ways to Lower Your Blood Pressure     •Limit sodium (salt) as directed. Too much sodium can affect your fluid balance. Check labels to find low-sodium or no-salt-added foods. Some low-sodium foods use potassium salts for flavor. Too much potassium can also cause health problems. Your healthcare provider will tell you how much sodium and potassium are safe for you to have in a day. He or she may recommend that you limit sodium to 2,300 mg a day.    •Follow the meal plan recommended by your healthcare provider. A dietitian or your provider can give you more information on low-sodium plans or the DASH (Dietary Approaches to Stop Hypertension) eating plan. The DASH plan is low in sodium, processed sugar, unhealthy fats, and total fat. It is high in potassium, calcium, and fiber. These can be found in vegetables, fruit, and whole-grain foods.    •Be physically active throughout the day. Physical activity, such as exercise, can help control your blood pressure and your weight. Be physically active for at least 30 minutes per day, on most days of the week. Include aerobic activity, such as walking or riding a bicycle. Also include strength training at least 2 times each week. Your healthcare providers can help you create a physical activity plan.    •Decrease stress. This may help lower your blood pressure. Learn ways to relax, such as deep breathing or listening to music.    •Limit alcohol as directed. Alcohol can increase your blood pressure. A drink of alcohol is 12 ounces of beer, 5 ounces of wine, or 1½ ounces of liquor.    •Do not smoke. Nicotine and other chemicals in cigarettes and cigars can increase your blood pressure and also cause lung damage. Ask your healthcare provider for information if you currently smoke and need help to quit. E-cigarettes or smokeless tobacco still contain nicotine. Talk to your healthcare provider before you use these products.

## 2024-01-11 NOTE — ED PROVIDER NOTE - CLINICAL SUMMARY MEDICAL DECISION MAKING FREE TEXT BOX
43-year-old male PMH HTN, DM 2, CVA 2 months ago with residual speech deficit and facial droop, presenting with concern for elevated BP readings at home and at physical therapy.  Instructed to come to ED to be evaluated by primary doctor as well as his physical therapist.  Patient appears to be asymptomatic from his high blood pressure.  Plan check basic labs, CXR, and UA to evaluate for evidence of endorgan dysfunction.  Will review patient's medication list and determine if adjustments to his outpatient antihypertensives need to be made prior to discharge.

## 2024-01-11 NOTE — ED ADULT TRIAGE NOTE - HEIGHT IN CM
Diabetes Patient/Family Education RecordFactors That  May Influence Patients Ability  to Learn or  Comply with Recommendations []   Language barrier    []   Cultural needs   []   Motivation  
 []   Cognitive limitation    []   Physical   [x]   Education  
 []   Physiological factors   []   Hearing/vision/speaking impairment   []   Baptist beliefs []   Financial factors   []  Other:   []  No factors identified at this time. Person Instructed: [x]   Patient   [x]   Family: two adult family members who are supporting the patient at home actively participated. []  Other Preference for Learning: 
 [x]   Verbal   [x]   Written   []  Demonstration Level of Comprehension & Competence:   
[]  Good                                      [] Fair                                     []  Poor                             []  Needs Reinforcement  
[]  Teachback completed Education Component:  
:[x]  Medication management, including how to administer insulin (if appropriate) and potential medication interactions : Yes. Patient reported her home diabetes medication: Novolin 70/30 mix insulin 40 units twice daily before meals (breakfast and dinner). Educated patient on when to take it and when to start eating her meals. Patient stated that she is taking a pen insulin. 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 She stated listening to the number of click when dialing the insulin pen. Recommended for patient to consider getting a magnifier to ensure that she's dialing the correct dose. Her family will help get her a magnifying glass. [x]  Nutritional management -obtain usual meal pattern; Yes. Patient reported eating 3 meals daily. Discussed, clarified/emphasized serving size of carbs (starches, fruits, dairy) and limiting intake of concentrated sweets.  Upon further discussion and inquiry, patient stated that she'll need to make changes on frequency of drinking milk and serving size because she discovered that she's consuming a lot more. [x]  Exercise: Yes. Explained/educated patient the benefits of regular walking exercise. Patient stated that she has no problem tolerating walking exercise. [x]  Signs, symptoms, and treatment of hyperglycemia and hypoglycemia: Yes. Educated patient and family about low blood sugar, causes, symptoms, treatment, prevention, and when to contact her medical provider if she's experiencing persistently high blood sugar that she cannot explain. Patient verbalized understanding about the effect of food and addressed serving size/portion control  
[x] Prevention, recognition and treatment of hyperglycemia and hypoglycemia: Yes. Patient and family were educated about low blood sugar, symptoms, causes, treatment, prevention, and when to contact her medical provider for persistent low blood sugar 2-3x/week that she cannot explain. Also educated patient on when to call 911 for emergency medical help when she's unsuccessful in treating low blood sugar above 70. [x]  Importance of blood glucose monitoring and how to obtain a blood glucose meter:  Patient is currently using [employer]'s ReliOn Prime meter. She kept the information about ReliOn Primer with voice feature that I gave her from recent hospital admission. Recommended for patient to consider since she is blind on left eye. Her family is planning to help patient get this type of BG meter.  
[]  Instruction on use of the blood glucose meter [x]  Discuss the importance of HbA1C monitoring: Yes. Educated patient and her family about A1c level. They verbalized understanding that her current A1c level of  7.2% (1/23/2019) is equivalent to estimated average blood glucose of 160 mg/dL. Encouraged patient to follow her diabetes treatment plan: med, nutrition, and regular exercise to help achieve the recommended A1c of <7% [x]  Sick day guidelines: Yes.  Educated patient and family about sick days that blood sugar will be running high when sick/ill therefore it is recommended to continue to take diabetes medication. Get emergency medical help when blood sugar is 300 and high and symptoms include nausea, vomiting, abd pain, fruity breath odor, and shortness of breath. [x]  Proper use and disposal of lancets, needles, syringes or insulin pens (if appropriate):Yes. [x]  Potential long-term complications (retinopathy, kidney disease, neuropathy, foot care):Yes. [x] Information about whom to contact in case of emergency or for more information: Yes. [x]  Goal:  Patient/family will demonstrate understanding of Diabetes Self Management Skills by: 02/05/2019 Plan for post-discharge education or self-management support: 
  [x] Outpatient class schedule provided            [] Patient Declined 
  [] Scheduled for outpatient classes (date) _______ Verify: 
Does patient understand how diabetes medications work? Yes after educating patient. See notes above. Does patient know what their most recent A1c is? Yes. Does patient monitor glucose at home? Yes. See notes above. Does patient have a glucometer, testing supplies or difficulty obtaining diabetes medications? See notes below. Does patient have BG meter and testing supplies? Yes. Educated about using BG meter with voice feauture because she's blind on left eye. Jesus Alberto Tomlinson RN 
pgr [phone]

## 2024-01-11 NOTE — ED PROVIDER NOTE - PHYSICAL EXAMINATION
GENERAL: Awake, alert, NAD  HEAD: NC/AT, neck supple, moist mucous membranes  EYES: PERRL, EOM grossly intact, sclera anicteric  LUNGS: normal WOB on RA, CTAB, no wheezes or crackles   CARDIAC: RRR, no m/r/g  ABDOMEN: Soft, non tender, non distended, no rebound, no guarding  BACK: No midline spinal tenderness, no CVA tenderness  EXT: No edema, no calf tenderness, no deformities.  NEURO: A&Ox3. Moving all extremities. R facial droop and slurred speech noted, reportedly at baseline per wife at bedside  SKIN: Warm and dry. No rash.  PSYCH: Normal affect.

## 2024-01-11 NOTE — ED PROVIDER NOTE - PATIENT PORTAL LINK FT
You can access the FollowMyHealth Patient Portal offered by [facility] by registering at the following website: [email or web address]. By joining [employer]’s FollowMyHealth portal, you will also be able to view your health information using other applications (apps) compatible with our system. You can access the FollowMyHealth Patient Portal offered by [facility] by registering at the following website: [email or web address]. By joining [employer]’s FollowMyHealth portal, you will also be able to view your health information using other applications (apps) compatible with our system. You can access the FollowMyHealth Patient Portal offered by [facility] by registering at the following website: [email or web address]. By joining [employer]’s FollowMyHealth portal, you will also be able to view your health information using other applications (apps) compatible with our system. You can access the FollowMyHealth Patient Portal offered by [facility] by registering at the following website: [email or web address]. By joining [employer]’s FollowMyHealth portal, you will also be able to view your health information using other applications (apps) compatible with our system.

## 2024-01-11 NOTE — ED PROVIDER NOTE - CHILD ABUSE FACILITY
[facility]

## 2024-01-11 NOTE — ED PROVIDER NOTE - PROGRESS NOTE DETAILS
Bianca Galarza MD PGY-2: Contacted patient's PMD Dr. Myrick via teams.  Advised we plan to increase patient's amlodipine from 5 mg to 10 mg and will have him follow-up in the office with his PMD.  PMD and patient are on board with this plan.  He remains asymptomatic at this time.  Blood work, chest x-ray, UA nonactionable.  Strict return precautions were discussed and patient expressed understanding.  He was discharged home in stable condition.

## 2024-01-11 NOTE — ED ADULT NURSE NOTE - OBJECTIVE STATEMENT
44 y/o male PMHx CVA x 3 s/p brain stent, DM, HTN arrives to [facility] ED by car from home with wife with c/o hypertension. Pt states went to PT yesterday and did not complete therapy 2/2 's, today 's, advised to be evaluated in ED Patient states last time BP taken was 1 week ago and normotensive. Patient asymptomatic. Patient is A&Ox4, residual slurred speech and left sided weakness. Respirations spontaneous and unlabored. Denies SOB, CP, abdominal pain, n/v/d, urinary symptoms, fever/chills. Skin intact. 44 y/o male PMHx CVA x 3 s/p brain stent, DM, HTN arrives to [facility] ED by car from home with wife with c/o hypertension. Pt states went to PT yesterday and did not complete therapy 2/2 's, today 's, advised to be evaluated in ED Patient states last time BP taken was 1 week ago and normotensive. Patient asymptomatic. Patient is A&Ox4, residual slurred speech and left sided weakness. Respirations spontaneous and unlabored. Denies SOB, CP, abdominal pain, n/v/d, urinary symptoms, fever/chills. Skin intact.

## 2024-01-11 NOTE — ED PROVIDER NOTE - OBJECTIVE STATEMENT
43Y M PMH CVA with residual speech deficit, 43Y M PMH CVA with residual speech deficit, HTN, DM 2, presenting with concern for hypertension.  Patient was at his regularly scheduled physical therapy appointment today when he had his blood pressure taken, told BP was high and that he should come to the hospital.  He called his primary care doctor who also instructed him to come to the ER to be evaluated.  Wife notes that patient has been intermittently diaphoretic recently, but no other concerns reported.  He is not having any chest pain, difficulty breathing headaches, change in his urination.

## 2024-01-12 PROBLEM — I10 PRIMARY HYPERTENSION: Status: ACTIVE | Noted: 2023-01-01

## 2024-01-12 PROBLEM — F32.A DEPRESSION: Status: ACTIVE | Noted: 2023-01-01

## 2024-01-12 NOTE — ASSESSMENT
[FreeTextEntry1] : Impression: Stroke 2015, 7/2023 and most recently 11/16/23 Successful recanalization, angioplasty and stenting with complete occlusion of the V4 segment of the left vertebral artery on 11/24/24 Maintained on ASA and Plavix Reports Speech is Improving with Time Follow Up with Neurology Scheduled for 1/17/24  Will continue routine follow up.    Plan: Continue Brilinta and ASA - Refill sent MRI Head w/wo 2/2024 MRA Head w/wo NOVA Protocol 2/2024 Follow Up with Dr. Xiao After Imaging

## 2024-01-12 NOTE — HISTORY OF PRESENT ILLNESS
[FreeTextEntry1] : Mr. Suarez is a 42yo male with a PMH of DM2, HTN, former smoker and stroke in 2015 and 7/2023 with mild residual dysarthria.  He presents today for hospital follow up.  He presented to the ED on 11/16/23 as a code stroke with difficulty walking, left facial droop, LLE weakness, pronounced slurred speech.  During evaluation he was found to have extremely poor flow in the basilar artery due to severe distal vertebral artery stenosis.    On 11/24/23, he underwent successful recanalization, angioplasty and stenting with complete occlusion of the V4 segment of the left vertebral artery.  He is maintained on Brilinta on ASA.   Of note, the visit is done with telehealth and due to persistent slurred speech, there is significant difficulty in obtaining an update on his neurological symptoms.  He does states that his speech is improving with time, he is walking with a walker.

## 2024-01-12 NOTE — REASON FOR VISIT
[Home] : at home, [unfilled] , at the time of the visit. [Medical Office: ([facility])___] : at the medical office located in  [Patient] : the patient [Self] : self [Follow-Up: _____] : a [unfilled] follow-up visit

## 2024-01-17 PROBLEM — I10 PAROXYSMAL HYPERTENSION: Status: ACTIVE | Noted: 2024-01-01

## 2024-01-17 PROBLEM — R33.9 URINARY RETENTION: Status: ACTIVE | Noted: 2024-01-01

## 2024-01-17 NOTE — HISTORY OF PRESENT ILLNESS
[FreeTextEntry1] : 43 year old man with recent cerebellar, pontine, and occipital stroke, severe basilar stenosis s/p stenting, L pontine ischemic stroke in July 2023, DM, HTN, HLD, RADHA on CPAP, prior tobacco use, returning to stroke neurology for follow up.  Last seen in this clinic in November 2023.  At the time, reporting doing well, recovering from stroke symptoms.  Unfortunately, readmitted in November with recurrent stroke in the posterior circulation, and noted to have progression of the atherosclerotic narrowing in the basilar, underwent successful stenting of the basilar that admission.  Was transferred to  for rehab and went home December 18.     Returns today to report steady improvement in symptoms.  Does need assistance for daily activities.  Able to walk with a walker.  Does laps around the kitchen island.  Going out requires a wheelchair.  Has been having persistently high BP's.  When he goes to , they send him home due to elevated BP.    Neurologically, denying headahce, visual changes, swallowing difficulty.  Still has dysarthria.  Wife is reporting cognitive changes.  He is feeling strong in the upper extremities, but some residual weakness in the L arm.  Also some clumsiness still in the L arm.  No sensory complaints.    Medically, no new issues.  He continues to have episodes of sweatiness that are not brought on by any particular activity.  These are also associated with spikes in his BP when he measures it.  No fevers, chills, chest pain, shortness of breath.  Did develop urinary retention, and is taking flomax daily.  Was recently sent to the ED from rehab for elevated BP into 170/105 range.    Reports he has not restarted smoking.    PMHx: HTN DM HLD Pontine stroke INtracranial atherosclerosis s/p stent, on ASA/Ticagrelor RADHA on CPAP  SHx: Lives with wife and young daughter. Denies tobacco, ETOH, drugs. 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  On exam: GEN; NAD CV: rRR, S1, S2 PULM: CTAB GI: soft, non tender EXTREM:" no CCE HEENT: no nuchal rigidity SKIN: no rashes NEURO: AWake, alert, oriented to month, date, year, normal naming, repetition, fluency, normal insight, and fund of knowledge. Pupils 3-2mm, symmetric, full VF's, normal EOM, no nystagmus, gaze is conjugate, trace facial asymmetry, NLF on the L is less prominent, dysarthric, tongue midline, palate symmetric. MOTOR: normal bulk and tone. No drift. L arm is 4/5 biceps, deltoids, 4+/5 .  L leg is 4+/5 hip flexors, and knee extensors.  R arm and leg are5/5 symmetrically.   SENSORY: intact symmetrically to light touch COORDINATION: ataxia on FNF on the L.  Symmetric HTS.   REFLEXES: 3+ BB, BR, triceps, [place] present bilaterally, 3+ patellar, ankle clonus bilaterally. GAIT: deferred.

## 2024-01-17 NOTE — ASSESSMENT
[FreeTextEntry1] : 43 year old man with multifocal recurrent posterior circulation stroke, basilar stenosis s/p stenting, HTN, HLD, DM, remote tobacco use, following up in stroke neurology.  On exam, dysarthric, L facial, L arm weakness and ataxia, L leg weakness, wheelchair bound.   MRS at this time is 4.   Etiology most suspicious for large artery athero.  Progression of disease despite medical optimization in Nov 2023, and underwent stent placement.  Now on DAPT.  Still hypertensive.  Is on CPAP for RADHA.    -continue dual antiplatelet per neurosurgery.   -continue lipitor 80mg daily -slight increase in BP medication today: labetalol from 200mg TID to 250mg TID.  Medication sent to pharmacy -refilled: lisinopril, tamsulosin, and escitalopram today.  -follow up with neurosurgery, has an MRA scheduled in March -continue PT/OT -return to see me for follow up in 3  months.

## 2024-01-18 PROBLEM — M79.2 NEUROPATHIC PAIN: Status: ACTIVE | Noted: 2024-01-01

## 2024-02-15 NOTE — CONSULT NOTE ADULT - ASSESSMENT
patient is a 42 y/O RIGHT handed man who presented to [facility] ED on 11/16/2023, as a CODE STROKE, with c/o difficulty walking, LLE weakness, speech changes. PMH significant for: stroke (7/2023 - presenting with dysarthria - left inferior adrienne, b/l parieto-occipital juxtacortical WM w/mild residual dysarthria; 8 years ago - treated with TPA at [facility] - p/w L hemiplegia, facial droop and dysarthria), T2DM, HTN, former smoker (quit in 7/2023). Symptoms beginning at 17:00, 11/16/2023. Noted at the code to have a L facial, LUE ataxia, b/l LE ataxia, slurred speech. No appreciable drift in any of the extremities. Underwent CT scans with out sign of hemorrhage or jayla LVO. Tenecteplase administered at 19:47, with improvement from NIHSS 4 to 2. Patient previously taking clopidogrel and aspirin after last stroke. Currently only taking aspirin at the instructions of his vascular neurologist.    # CVA   neuro management appreciated   CT and MRI noted, multiple embolic strokes   S/P TPA  ASA and statin  Neurosurg eval appreciated  CROW  hypercoag W/U, malignancy W/U  PT/OT, aspiration precautions   plan for angio by neurosurg   patient is medically optimized for angio       # HTN   BP control   Check echo      # DM  sliding scale  A1C 5.5     # HLD    lipid panel  statin     DVT and Gi PPX    x1/wk

## 2024-02-19 PROBLEM — I63.9 OCCIPITAL INFARCTION: Status: ACTIVE | Noted: 2023-01-01

## 2024-03-12 NOTE — HISTORY OF PRESENT ILLNESS
[FreeTextEntry1] : Mr. Suarez is a 43yo male with a PMH of DM2, HTN, former smoker and stroke in 2015 and 7/2023 with mild residual dysarthria.  He presents today for follow up and MRA review.  He presented to the ED on 11/16/23 as a code stroke with difficulty walking, left facial droop, LLE weakness, pronounced slurred speech.  During evaluation he was found to have extremely poor flow in the basilar artery due to severe distal vertebral artery stenosis.    On 11/24/23, he underwent successful recanalization, angioplasty and stenting with complete occlusion of the V4 segment of the left vertebral artery.  He is maintained on Brilinta on ASA.   He does states that his speech is improving with time, he is walking with a walker.

## 2024-03-12 NOTE — ASSESSMENT
[FreeTextEntry1] : Impression: Stroke 2015, 7/2023 and most recently 11/16/23 Successful recanalization, angioplasty and stenting with complete occlusion of the V4 segment of the left vertebral artery on 11/24/24 Maintained on ASA and Brilinta Reports Speech is Improving with Time  MRA Head 3/2024 reveals normal flow distal to the distal V4 segment; patent stent with good flow to the basilar artery (could not tolerate NOVA portion of exam without anesthesia)  Will continue routine follow up.    Plan: Continue Brilinta and ASA until next visit with neurology Okay to stop Brilinta from stent/neurosurgical standpoint, will defer to neurology MRI Head w/wo 10/2024 MRA Head w/wo NOVA Protocol 10/2024 Follow Up with Me After Imaging

## 2024-04-29 PROBLEM — E11.9 TYPE 2 DIABETES MELLITUS: Status: ACTIVE | Noted: 2023-01-01

## 2024-04-29 PROBLEM — E11.9 DIABETES: Status: ACTIVE | Noted: 2023-01-01

## 2024-04-29 PROBLEM — I65.09 VERTEBRAL ARTERY OCCLUSION: Status: ACTIVE | Noted: 2023-01-01

## 2024-04-29 PROBLEM — E78.5 HYPERLIPIDEMIA, UNSPECIFIED HYPERLIPIDEMIA TYPE: Status: ACTIVE | Noted: 2023-01-01

## 2024-05-03 NOTE — HISTORY OF PRESENT ILLNESS
[FreeTextEntry1] : 43 year old man with recent cerebellar, pontine, and occipital stroke, severe basilar stenosis s/p stenting, L pontine ischemic stroke in July 2023, DM, HTN, HLD, RADHA on CPAP, prior tobacco use, returning to stroke neurology for follow up.  Last seen in this clinic in January 2024. At the time, reporting steady improvement in symptoms.  Has been following up with neurosurgery for post-stenting management.    Today, reporting overall, still improving in speech, and functional status.  He has been having more fatigue in the morning, and it does improve somewhat during the day.  Endorses only occasional use of the CPAP.  Experiencing a lot of muscle spasms, that keep him awake at night, and moving during sleep, causing difficulty in using the CPAP.  Continues to be speaking more clearly, having normal strength in arms and legs, and walking using a walker.  Wife reports he is more present, fewer episodes of inattention.    Medically, no new issues. Checking blood pressure daily.  Mostly low values at this point.  Sometimes down to 90's systolic.  One time was lightheaded transiently, without other new neurological complaints.  No fevers, chills, chest pain, shortness of breath.   Reports he has not restarted smoking.  PMHx: HTN DM HLD Pontine stroke INtracranial atherosclerosis s/p stent, on ASA/Ticagrelor RADHA on CPAP  SHx: Lives with wife and young daughter. Denies tobacco, ETOH, drugs. [place].  On exam: GEN; NAD CV: rRR, S1, S2 PULM: CTAB NEURO: Awake, alert, oriented to month, date, year, normal naming, repetition, fluency, normal insight, and fund of knowledge. Pupils 3-2mm, slight asymmetry in pupils, R>L, within physiological variance, full VF's no extinction to double simultaneous stimulation, normal EOM, no nystagmus, gaze is conjugate, trace facial asymmetry, NLF on the L is less prominent, dysarthric, tongue midline, palate symmetric. MOTOR: normal bulk and tone. No drift. L arm is 4+/5 biceps, deltoids, 4+/5 . L leg is 4+/5 hip flexors, and knee extensors. R arm and leg are5/5 symmetrically. SENSORY: intact symmetrically to light touch COORDINATION: ataxia on FNF on the L. Symmetric HTS. REFLEXES: 3+ BB, BR, triceps, [place] present bilaterally, 3+ patellar, ankle clonus bilaterally. GAIT: using a walker.  Short stride length.  L leg outwardly rotated.

## 2024-05-03 NOTE — ASSESSMENT
[FreeTextEntry1] : 43 year old man with multifocal recurrent posterior circulation stroke, basilar stenosis s/p stenting, HTN, HLD, DM, remote tobacco use, following up in stroke neurology. On exam, dysarthric, L facial weakness, trace L arm weakness and ataxia, L leg weakness, walking with walker.  Improved from prior exam.  MRS at this time is 3. Etiology most suspicious for large artery athero. Progression of disease despite medical optimization in Nov 2023, and underwent stent placement. Now on DAPT. Is on CPAP for RADHA but difficulty with adherence due to night time muscle spasms.  -continue dual antiplatelet per neurosurgery.  Will touch base with Dr. Xiao to review duration of DAPT.   (UPDATE 5/3: discussed with Dr. Xiao.  OK to go to antiplatelet monotherapy.  Spoke with patient by phone, instructed to stop Brilinta).   -continue lipitor 80mg daily -decrease in BP medication today: labetalol to 200mg BID , previoulsy 250mg TID, but has been skipping the noon dose.   -refilled: lisinopril, tamsulosin, and escitalopram today. -also on amlodipine -start on tizanidine 2mg nightly.  If not worsening daytime somnolence, add AM dose.   -follow up with neurosurgery, has an MRA scheduled in November -continue PT/OT/SLP -return to see me for follow up in 3 months.        Plan Health Maintenance Continue: Escitalopram Oxalate 10 MG Oral Tablet (Lexapro); Take 1 tablet daily Paroxysmal hypertension Metanephrines, 24 Hour Urine; Status:Active; Requested for:[id number]; Thyroid Stimulating Hormone, Serum w/ FT4 Reflex; Status:Active; Requested for:[id number]; Primary hypertension Start: Labetalol HCl - 100 MG Oral Tablet; TAKE 0.5 TABLET 3 TIMES DAILY Start: Labetalol HCl - 200 MG Oral Tablet; TAKE 1 TABLET 3 TIMES DAILY Type 2 diabetes mellitus Continue: Lisinopril 40 MG Oral Tablet; TAKE 1 TABLET BY MOUTH EVERY DAY

## 2024-05-08 PROBLEM — G81.14 SPASTIC HEMIPARESIS OF LEFT NONDOMINANT SIDE: Status: ACTIVE | Noted: 2024-01-01

## 2024-05-08 PROBLEM — R26.1 HEMIPLEGIC GAIT: Status: ACTIVE | Noted: 2024-01-01

## 2024-05-08 NOTE — PHYSICAL EXAM
[FreeTextEntry1] : General: Well-developed male in no apparent distress.  Patient is awake, alert and oriented x 3.  Cooperative.  Dysarthric speech. HEENT: Normocephalic, atraumatic.  MMM Lungs: Clear to auscultation. Cardiac: Regular rate and rhythm. Abdomen: Bowel sounds present, nondistended. Extremities: No pedal edema.  Motor: Right upper extremity/right lower extremity tone normal active range of motion within functional limits with 5/5 motor power throughout.  Thumb to digit opposition intact.  Left upper extremity: Shoulder flexion to 100 degrees actively, full active range of motion at the elbow, wrist and digits.  4+ to 5/5 motor power throughout.  Thumb to digit opposition intact.  Mild pronator drift. Tone: Left elbow flexors MAS = 2 Left elbow extensors MAS = 2+ Left wrist flexors MAS = 1 to 1+ Left pronators MAS = 1 Left MCP flexors MAS = 0 Left FDS MAS = 0 with the wrist flexed; MAS = 2 with the wrist extended Left FDP MAS = 2 with the wrist flexed; MAS = 1 to 1+ with the wrist extended Left FPL MAS = 0 with the wrist flexed; MAS = 1+ with the wrist extended.  Left lower extremity: Active range of motion within functional limits with 4+ to 5/5 motor power throughout. Left quadriceps tone MAS = 3 Left hamstring tone MAS = 3 to 3+ Left ankle plantarflexion MAS = 2 to 2+.  No clonus.  Sensory: Intact to light touch and pinprick in both upper extremities. Mildly diminished light touch and pinprick in the left lower extremity.  Proprioception intact at the thumb and at the first MTP joint.  No hyperesthesia, no allodynia.  Muscle stretch reflexes: Left upper extremity +3/4, right upper extremity +3.  +2/3 KJ bilaterally.  Positive Babinski on the left.  No ankle clonus.  Functional status: Patient sit to stand transfer independent.  Patient ambulated with a rolling walker with a left uncompensated gluteus medius gait.  Patient's left knee in approximately 10-15 degrees of flexion throughout stance phase, frequent toe strike on the left.

## 2024-05-08 NOTE — ASSESSMENT
[FreeTextEntry1] : Patient is a 44-year-old RHD male PMHx HTN, DM, history of CVA x 3, last CVA November, 2023 (cerebellar/pontine) with residual left spastic hemiparesis and gait impairments noted above.  Patient with post CVA left upper extremity/left lower extremity pain, question whether this is neuropathic pain versus pain secondary to his spasticity.  I am leaning more towards this being a spasticity issue being that it appears excessive tone in the left lower extremity, especially the hamstring muscles in the setting off synergistic flexion patterning and also causing knee flexion during stance phase which is encouraging toe strike.  Will increase tizanidine to 2 mg p.o. 3 times daily, side effects and drug interactions reviewed.  Will consider further increases in the future.  Will obtain a left AFO to prevent foot drop, consistent heel strike and for safe ambulation.  Patient will require the AFO on a permanent basis.  Prescription provided for a left custom molded AFO with [place] ankle joints, adjustable posterior stop, instep strap, full footplate, flexible toe, footplate set in few degrees of dorsiflexion.  Will seek authorization for a Botox injection to the left hamstring muscles to reduce his flexion synergy patterning and also encouraged knee extension at heel strike to improve gait mechanics.  Botox injection protocol:  Left semitendinosis--------------------------- 100 units Left semimembranosus--------------------- 100 units Left biceps femoris (long head)----------- 100 units Left biceps femoris (short head)------------ 70 units  Total------------------------------------------------ 370 units  I spent a total of 45 minutes on the date of the encounter evaluating and treating the patient including a discussion of treatment options

## 2024-05-08 NOTE — HISTORY OF PRESENT ILLNESS
[FreeTextEntry1] : Patient is a 44-year-old right-hand-dominant male with past medical history of hypertension, DM, history of CVA x 3, last CVA November, 2023 (cerebellar/pontine) with residual left-sided weakness who presents today with main complaint of pain and spasm of the left lower extremity greater than the left upper extremity.  Patient received inpatient rehabilitation at [facility] and was discharged home in December, 2023.  Patient been participating in the hospitals program receiving PT/OT/speech therapy 2 times per week.  Patient states that the symptoms started in January, 2024.  And describes them as a spasm-like pain that stiffen up his left upper and left lower extremity.  The symptoms predominantly involve the left lower extremity however over the past few weeks he has been complaining of spasm in the left upper extremity.  The spasm/pain is worse laying in bed and in the morning and also occurs when driving in a motor vehicle.  Patient reports that his left lower extremity flexes at the knee and hip and has been interfering with his progress in physical therapy.  More recently his left elbow is going to a lot of flexion when these episodes occur.  Patient reports a pain score up to 10/10.  Patient reports approximately 5 falls since his discharge home secondary to this flexion of the left lower extremity resulting in knee buckling.  No near falls reported.  Patient denies toe dragging.  Patient reports weakness on the left side, stiffness on the left side and numbness on the left side.  Functionally the patient ambulates at home with a rolling walker with supervision without bracing.  Patient is independent in bed transfers and toilet transfers.  Patient does require some assistance with lower extremity dressing.  Patient was started on tizanidine 2 mg p.o. twice daily last week and has been on Lyrica 75 mg nightly.  Tylenol as needed, avoids NSAIDs.  Patient has taken gabapentin in the past but had difficulty tolerating secondary to sedation.

## 2024-05-27 NOTE — ED ADULT NURSE NOTE - OBJECTIVE STATEMENT
45 yo male with a PMH of 3 strokes, HTN, DM, "stent in his brain" presents to the ED with family from home complaining of difficulty speaking. As per wife, states that yesterday patient was more lethargic than normal in the morning and wanted to sleep more. Patient was given his medications and went back to bed and upon waking up at about 1730, wife noted that patient was more unsteady on his feet and altered. Reports he began vomiting upon waking up and was nauseous. Upon arrival, code stroke initiated. Upon exam, patient is weak on L side which as per wife is baseline after previous strokes. Wife reports that patient has been in PT and has been doing well and able to walk with a walker and has been making vast improvements and that this is not his baseline. Patient's speech noted to be gurgled which is not baseline per wife. Patient A&Ox2 [disoriented to time, not baseline as per wife]. Able to move extremities, PERRL. Patient was vomiting prior to CT scan--verbal order by MD Morley of 4mg of zofran IV administered. Awaiting CT scans, lab work and dispo.

## 2024-05-27 NOTE — H&P ADULT - HISTORY OF PRESENT ILLNESS
44 y.o M w/ PMHx of HTN, DM, multiple prior posterior circulation CVA ( L occipital lobe, cerebellum, b/l brachium pontis, and R adrienne) one treated with tenecteplase and thrombectomy, severe distal vertebral artery stenosis s/p LV4 stent on DAPT p/w worsening of his L facial, dysarthria, LUE dysmetria.    Stroke code called, LKASHANTI 5:30 PM 5/26/24     Patient presents with acute worsening of his known neurologic deficits from prior strokes. Patient reports that at around 5:30 PM on 5/26 he was seated watching television when suddenly he had an episode of vomiting after which he felt generally weak.  His spouse noted worsening of his left-sided facial droop, generalized weakness and difficulty speaking.  This difficulty speaking was worse than his baseline aphasia.  In the ED CT scan was done which revealed concern for worsening of prior known left cerebellar and left occipital strokes and CTA showed some concern for filling deficits in the patient's known left vertebral stent. There were patchy areas of decreased perfusion in the posterior circulation. Patient also noted a cough starting yesterday after am episode of emesis.    Of note, patient was previously on ASA & Birlinta DAPT and he was recently (5/3) taken off his Brilinta per instruction of his neurology team in favor of ASA monotherapy.     Patient reports fatigue, nausea, a cough and malaise. Patient denies headache, dizziness, vision changes, hearing changes, new weakness or numbness.    NIHSS: 3  PreMRS: 3  Patient was not a tenecteplase candidate as he was outside the time window  Pateint was not a thrombectomy as no LVO seen on imaging. 44 y.o M w/ PMHx of HTN, DM, multiple prior posterior circulation CVA ( L occipital lobe, cerebellum, b/l brachium pontis, and R adrienne) one treated with tenecteplase and thrombectomy, severe distal vertebral artery stenosis s/p LV4 stent on DAPT p/w worsening of his L facial, dysarthria, LUE dysmetria.    Stroke code called, LKW 5:30 PM 5/26/24     Patient presents with acute worsening of his known neurologic deficits from prior strokes. Patient reports that at around 5:30 PM on 5/26 he was seated watching television when suddenly he had an episode of vomiting after which he felt generally weak.  His spouse noted worsening of his left-sided facial droop, generalized weakness and difficulty speaking.  This difficulty speaking was worse than his baseline aphasia.  In the ED CT scan was done which revealed concern for worsening of prior known left cerebellar and left occipital strokes and CTA showed some concern for filling deficits in the patient's known left vertebral stent. There were patchy areas of decreased perfusion in the posterior circulation. Patient also noted a cough starting yesterday after am episode of emesis.    Of note, patient was previously on ASA & Birlinta DAPT and he was recently (5/3) taken off his Brilinta per instruction of his neurology team in favor of ASA monotherapy.     Patient reports fatigue, nausea, a cough and malaise. Patient denies headache, dizziness, vision changes, hearing changes, new weakness or numbness.    NIHSS: 3  PreMRS: 3  Patient was not a tenecteplase candidate as he was outside the time window  Pateint was not a thrombectomy as no acute LVO seen on imaging.

## 2024-05-27 NOTE — ED PROVIDER NOTE - CLINICAL SUMMARY MEDICAL DECISION MAKING FREE TEXT BOX
Ahmet, PGY3 - Ahmet, PGY3 - 44-year-old man with prior strokes resulting in left-sided residual deficits presenting due to increased trouble with speaking, sputum production.  Code stroke was called at triage due to speech changes, labs, CTAs, neurology at bedside. Will follow-up neurology recommendations. *The above represents an initial assessment/impression. Please refer to progress notes for potential changes in patient clinical course* Ahmet, PGY3 - 44-year-old man with prior strokes resulting in left-sided residual deficits presenting due to increased trouble with speaking, sputum production.  Code stroke was called at triage due to speech changes, labs, CTAs, neurology at bedside. Will follow-up neurology recommendations. *The above represents an initial assessment/impression. Please refer to progress notes for potential changes in patient clinical course*    GABRIELA Morley MD: Agree with resident/ACP MDM, assessment and plan as above. Pt is a 44M with PMH multiple prior CVAs, vertebral artery stent who presented for worsening L sided weakness and slurred speech, vomiting since yesterday. Code stroke called by triage. Pt seen and evaluated with neurology team. Pt had brain and vessel imaging, code stroke labs including infectious/metabolic w/u and there was concern for re-stenosis of vertebral artery stent. Neurosurgery consulted and pt admitted to NSICU for close monitoring and further ongoing management.

## 2024-05-27 NOTE — ED ADULT TRIAGE NOTE - CHIEF COMPLAINT QUOTE
per wife, pt endorsing worsening difficulty speaking, and unable to stand up on own since 1730pm  pt also endorsing vomiting, lethargy and "increased muscle spasticity"   h/o strokes per wife, pt endorsing worsening difficulty speaking and unable to stand up on own since 1730 yesterday   pt also endorsing vomiting, lethargy and "increased muscle spasticity"   h/o strokes

## 2024-05-27 NOTE — ED ADULT NURSE REASSESSMENT NOTE - NS ED NURSE REASSESS COMMENT FT1
CAROLYN room tech notified staff that pt had desaturation. TIFFANIE Lezama notified primary RN of desat. RN entered room immediately, pt awake and alert, gurgling upper airway sounds unchanged from entire stay thus far. Wife at bedside. Pulse ox with good waveform showing 74%. Pulse ox changed to another finger to ensure accuracy. Increased O2 on nasal cannula. More staff entered room including ED RNs, ED providers. Dr. Theodore at bedside. Pt became pale, diaphoretic, appeared to have seizure-like activity which included turning head to right (pt previously had slight L gaze preference), and tensing of extremities up toward chest. NRB mask placed on pt at 15L/min. Pt then noted to be genet to 47, no pulse felt. CPR initiated at 1516. Code blue called as pt was admitted/inpatient at time of arrest.

## 2024-05-27 NOTE — ED ADULT NURSE NOTE - NSFALLHARMRISKINTERV_ED_ALL_ED

## 2024-05-27 NOTE — STROKE CODE NOTE - NIH STROKE SCALE: 5A. MOTOR ARM, LEFT, QM
(1) Drift; limb holds 90 (or 45) degrees, but drifts down before full 10 seconds; does not hit bed or other support (0) No drift; limb holds 90 (or 45) degrees for full 10 secs

## 2024-05-27 NOTE — RAPID RESPONSE TEAM SUMMARY - NSSITUATIONBACKGROUNDRRT_GEN_ALL_CORE
Responded to RRT in ED for this patient who had been admitted to the neurosurgical ICU. On arrival, a code was being run by the ED team, who assumed responsibility for the code. CT surgery was at bedside planning for ECMO cannulation.

## 2024-05-27 NOTE — ED ADULT NURSE REASSESSMENT NOTE - NS ED NURSE REASSESS COMMENT FT1
wife at bedside, assisting pt with yankeur oral suctioning. pt encouraged to deep breathe and cough to expel secretions. gurgling sounds still heard from upper airway. pt breathing spontaneously but labored and tachypneic at times. dr. jonas and dr. leary aware. wife at bedside, assisting pt with yankeur oral suctioning. pt encouraged to deep breathe and cough to expel secretions. gurgling sounds still heard from upper airway. pt breathing spontaneously but labored and tachypneic at times. dr. jonas and dr. sapphire knowles. RN suggested respiratory therapist maria de jesus.

## 2024-05-27 NOTE — ED ADULT NURSE REASSESSMENT NOTE - NS ED NURSE REASSESS COMMENT FT1
pt frequently suctioned orally and nasally/nasopharangeally. pt appears unchanged. still only able to mumble or whisper few words. wife at bedside.

## 2024-05-27 NOTE — H&P ADULT - ASSESSMENT
Plan:    [] Admit to NSCU for close neurologic monitoring  [] Q2H neuro checks  [] Permissive HTN, Goals per NSCU  [] MRI Brain non contrast  [] MR NOVA  [] Start Heparin drip, PTT goal per NSCU  [] Continue home aspirin  [] Toxic/ metabolic/ Infectious workup pwr primary team     Assessment:  44 y.o M w/ PMHx of HTN, DM, multiple prior posterior circulation CVA ( L occipital lobe, cerebellum, b/l brachium pontis, and R adrienne) one treated with tenecteplase and thrombectomy, severe distal vertebral artery stenosis s/p LV4 stent on DAPT p/w worsening of his L facial, dysarthria, LUE dysmetria. Patient presents with acute worsening of his known neurologic deficits from prior strokes. Patient reports that at around 5:30 PM on 5/26 he was seated watching television when suddenly he had an episode of vomiting after which he felt generally weak.  His spouse noted worsening of his left-sided facial droop, generalized weakness and difficulty speaking.  This difficulty speaking was worse than his baseline aphasia.  In the ED CT scan was done which revealed concern for worsening of prior known left cerebellar and left occipital strokes and CTA showed some concern for filling deficits in the patient's known left vertebral stent. There were patchy areas of decreased perfusion in the posterior circulation. Patient also noted a cough starting yesterday after am episode of emesis.    Impression:  Worsening of known L occipital and cerebellar infarcts i/s/o L vertebral artery stent. CTA concerning for occlusion of L vertebral artery stent.    Plan:    [] Admit to NSCU for close neurologic monitoring  [] Q2H neuro checks  [] Permissive HTN, Goals per NSCU  [] MRI Brain non contrast  [] MR NOVA  [] Start Heparin drip, PTT goal per NSCU  [] Continue home aspirin  [] Toxic/ metabolic/ Infectious workup pwr primary team    Case discussed with stroke fellow Dr. Chastity Inman under the supercision of Dr. Annia Quintero. Discussion had between stroke fellow and Neuro IR and neurosurgery team. Note finalized on attending attestation.

## 2024-05-27 NOTE — ED PROVIDER NOTE - OBJECTIVE STATEMENT
44-year-old man with PMH CAD, diabetes, stroke x 3, once in 2015, twice in 2023, left-sided residual deficits, presenting due to worsening speech, general weakness, wife at bedside.  Wife states that yesterday at 5 PM she noticed these changes, patient takes baby aspirin, no blood thinners. Per neurosurgery note "severe distal VA stenosis w/ extremely poor flow in basilar artery dx November 2023 s/p successful recanalization, angioplasty and stenting w/ complete occlusion of L V4 segment of VA 11/24/23."  Patient and wife denies fevers, chills, nausea, vomiting, diarrhea, chest pain, shortness of breath, abdominal pain.  Patient has also been having increased sputum production that he has been coughing up.

## 2024-05-27 NOTE — ED PROVIDER NOTE - PROGRESS NOTE DETAILS
Ahmet, PGY3 - Neurology recommending neurosurgery evaluation due to concern for thrombosed stent.  Neurosurgery has already evaluated patient, excepting under Dr. Xiao. GABRIELA Morley MD: Was alerted by nursing staff that patient had become hypoxic into 70's, then became unresponsive. Per wife at bedside, pt appeared to have likely aspirated from copious emesis. Pt appeared bradycardic on the monitor and had no pulse, so CPR was started. Pt intubated, placed on GILL device and IO was placed to R tib. A Code ECMO was called, pt was seen and eval at bedside by Dr. Hearn and decision was made to start ECMO. ECMO team to assume care. Ahmet, PGY3 - patient on ECMO, being accepted to the CTU under Dr. Hearn Daniele Spring, DO: Immediately upon coming onto shift was alerted by nursing staff of hypoxia and reduced responsiveness, initiated ACLS and started CPR, patient intubated successfully R tibial IO, No cardiac activity despite acls, ecmo team called and ECMO initiated, patient brought to ICU. Ahmet, PGY3 - Neurology recommending neurosurgery evaluation due to concern for thrombosed stent.  Neurosurgery has already evaluated patient, accepting under Dr. Xiao.

## 2024-05-27 NOTE — H&P ADULT - ATTENDING COMMENTS
I reviewed available diagnostic studies, and reviewed images personally. I agree with resident's history, exam, orders placed, and plan of care. Thirty five minutes of critical care time was spent in caring for this patient who has concern of sudden and clinically significant deterioration requiring a high level of physician preparedness, management of brain supporting interventions, and frequent physician assessments. This excludes any time spent on separate procedures or teaching. Pt seen and service provided on 5/28/2024.     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 EEG pending, (-) pupils, L pupil 7m, R pupil 4mm non-reactive, no corneals, no cough or gag. trace uppers to stimulus.. Suspect possible aspiration leading to hypoxia/and cardiogenic shock leading to hypoperfusion leading to worsening brainstem stroke and diffuse cerebral injury with brain compression in a pt with already somewhat compromised vasculature. Agree with palliative cs, Case discussed at length and all questions answered for wife, support provided.  - reports he would not have wanted to live with artificial life-support measures. Will follow up tomorrow. can hold off on CT/MR at this time.

## 2024-05-27 NOTE — ED ADULT NURSE NOTE - NS_ED_FAMBEDSIDE4_ED_ALL_ED
BONY Guthrie (admin). BONY Becker (ED [facility]). BONY Downs (admin).

## 2024-05-27 NOTE — ED PROVIDER NOTE - CARE PLAN
1 Principal Discharge DX:	Talking trouble   Principal Discharge DX:	Talking trouble  Secondary Diagnosis:	Cardiac arrest  Secondary Diagnosis:	Respiratory arrest

## 2024-05-27 NOTE — ED PROVIDER NOTE - PHYSICAL EXAMINATION
Gen: AOx3, able to make needs known, non-toxic  Head: NCAT  HEENT: EOMI, normal conjunctiva  Lung: CTAB, mild respiratory distress, no wheezes/rhonchi/rales B/L, +coughing up sputum   CV: RRR, no M/R/G, pulses bilaterally   Abd: soft, NTND, no guarding  MSK: no visible bony deformities  Neuro: No focal sensory or motor deficits different from baseline, +L sided weakness, +slurred speech   Skin: Warm, well perfused, no rash

## 2024-05-27 NOTE — ED ADULT NURSE REASSESSMENT NOTE - NS ED NURSE REASSESS COMMENT FT1
pt assisted with urinal, placed between legs. privacy provided with blanket on top. wife at bedside. call bell in reach, reducated on use. requesting moisture barrier cream for groin and moisture swabs for mouth, reminded wife and pt that he is npo at this time/

## 2024-05-27 NOTE — ED ADULT NURSE NOTE - NS ED NURSE REPORT GIVEN TO FT
Bedside report given to BONY Cutler. RN aware of PMH, reason for stay, and has no further questions.

## 2024-05-27 NOTE — ED ADULT TRIAGE NOTE - BEFAST SPEECH PHRASE
Anesthesia Evaluation     Patient summary reviewed and Nursing notes reviewed   no history of anesthetic complications:   NPO Solid Status: > 8 hours  NPO Liquid Status: > 8 hours           Airway   Mallampati: II  TM distance: >3 FB  Neck ROM: full  Dental          Pulmonary     breath sounds clear to auscultation  (+) a smoker Abstained day of surgery,  Cardiovascular   Exercise tolerance: good (4-7 METS)    Rhythm: regular  Rate: normal        Neuro/Psych- negative ROS  GI/Hepatic/Renal/Endo - negative ROS     Musculoskeletal (-) negative ROS    Abdominal     Abdomen: soft.   Substance History - negative use     OB/GYN          Other - negative ROS                   Anesthesia Plan    ASA 2     general with block     (Possible TAP block.)  intravenous induction     Anesthetic plan, risks, benefits, and alternatives have been provided, discussed and informed consent has been obtained with: patient.  Pre-procedure education provided  Use of blood products discussed with  Consented to blood products.    Plan discussed with CRNA.    CODE STATUS:          No

## 2024-05-27 NOTE — PROCEDURE NOTE - NSASSISTBY_GEN_A_CORE
Patient's last menstrual period was 06/27/2022 (approximate). Please reference prenatal and OB flow chart for further information  PT here today for routine prenatal care  Pt endorses fetal movement and denies loss of fluid, contractions or vaginal bleeding  complains of reflux symptoms. Low back pain. To do fetal movement.   She has not done her GTT yet  ROS:  Pt denies headache, vision changes, right upper quadrant pain, dysuria, or nausea/vomiting,     PE:  BP (!) 110/54   Pulse (!) 108   Wt 183 lb (83 kg)   LMP 06/27/2022 (Approximate)   BMI 29.54 kg/m²   Gen - Alert and oriented x 3  HEENT- NC/AT, CVS - RRR, Lungs - CTAB  Abd - FH 32        ASSESSMENT AND PLAN:   IUP at 32 weeks  GERD  Low back pain  Pepcid 20 mg twice daily
Urinary incontinence. Lower and upper abd pressure.  One hour gtt tomorrow
Attending/Anesthesiologist

## 2024-05-27 NOTE — ED ADULT NURSE NOTE - CHIEF COMPLAINT QUOTE
per wife, pt endorsing worsening difficulty speaking and unable to stand up on own since 1730 yesterday   pt also endorsing vomiting, lethargy and "increased muscle spasticity"   h/o strokes

## 2024-05-27 NOTE — PROGRESS NOTE ADULT - SUBJECTIVE AND OBJECTIVE BOX
Patient seen and examined at the bedside.    Remained critically ill on continuous ICU monitoring.    OBJECTIVE:  Vital Signs Last 24 Hrs  T(C): 37.6 (27 May 2024 14:09), Max: 37.6 (27 May 2024 14:09)  T(F): 99.7 (27 May 2024 14:09), Max: 99.7 (27 May 2024 14:09)  HR: 49 (27 May 2024 17:35) (49 - 76)  BP: 157/98 (27 May 2024 14:09) (157/98 - 174/96)  BP(mean): 113 (27 May 2024 14:09) (112 - 113)  RR: 22 (27 May 2024 14:09) (17 - 24)  SpO2: 100% (27 May 2024 17:35) (91% - 100%)    Parameters below as of 27 May 2024 14:09  Patient On (Oxygen Delivery Method): nasal cannula  O2 Flow (L/min): 2        Physical Exam:  General: intubated multiple lines gtt & tubes   Neurology: sedated   Eyes: bilateral pupils equal and reactive   ENT/Neck: +ETT midline, Neck supple, trachea midline, No JVD   Respiratory: Rales noted bilaterally   CV: RRR, S1S2, no murmurs        [] Sternal dressing, [] Mediastinal CT, [] Pleural CT, [] WILFREDO drain        [] Sinus rhythm, [] Afib, [] Temporary pacing, [] PPM  Abdominal: Soft, NT, ND +BS,   Extremities: 1-2+ pedal edema noted, + peripheral pulses   Skin: No Rashes, Hematoma, Ecchymosis                           Assessment:  44 y.o M w/ PMHx of HTN, DM, multiple prior posterior circulation CVA ( L occipital lobe, cerebellum, b/l brachium pontis, and R adrienne) one treated with tenecteplase and thrombectomy, severe distal vertebral artery stenosis s/p LV4 stent on DAPT p/w worsening of his L facial, dysarthria, LUE dysmetria.    Cardiogenic shock s/p VA ECMO intiation 5/27/24   Post-op respiratory failure  Post-op acute blood loss anemia  Lactic Acidosis   Diabetes Mellitus   Hypertension     Plan:   ***Neuro***  [x] Nonfocal    Post operative neuro assessment     ***Cardiovascular***  Invasive hemodynamic monitoring, assess perfusion indices   SR / CVP ___ / MAP ___ / PAP ___ / CI ___ / SvO2 ___ / Hct 34.5/ Lactate 15.5  [x] Levophed 0.2 mcg/kg/min   [x] Vasopressin 0.1 Units/min    [x] Epinephrine 0.05 mcg/kg/min  Reassessment of hemodynamics post resuscitation   Monitor chest tube outputs   [x] Nonbleeding ___   Serial EKG and cardiac enzymes     ***Pulmonary***  Post op vent management  Titration of FiO2 and PEEP, follow SpO2, CXR, blood gases     Mode: AC/ CMV (Assist Control/ Continuous Mandatory Ventilation)  RR (machine): 10  FiO2: 100  PEEP: 10  ITime: 1  MAP: 14  PC: 20  PIP: 31              Will plan to wean & extubate once pt is hemodynamically stable and not bleeding    ***GI***  NPO   [x] Protonix   Bowel regimen with Miralax and Senna     ***Renal***  GFR 88   Continue to monitor I/Os, BUN/Creatinine.   Replete lytes PRN  Mauricio present [x] positive     ***ID***  IV Vancomycin for   Meropenum for     ***Endocrine***  [x] DM2 : HbA1c 5.5%              - [x] Insulin gtt             - Need tight glycemic control to prevent wound infection.        Patient requires continuous monitoring with bedside rhythm monitoring, pulse oximetry monitoring, and continuous central venous and arterial pressure monitoring; and intermittent blood gas analysis. Care plan discussed with the ICU care team.   Patient remained critical, at risk for life threatening decompensation.    I have spent 40 minutes providing critical care management to this patient.    By signing my name below, I, Chanel Fregoso, attest that this documentation has been prepared under the direction and in the presence of Kadie Chiu MD   Electronically signed: Helena Pitts, 05-27-24 @ 17:58    I, Kadie Chiu, personally performed the services described in this documentation. all medical record entries made by the scribe were at my direction and in my presence. I have reviewed the chart and agree that the record reflects my personal performance and is accurate and complete  Electronically signed: Kadie Chiu MD  Patient seen and examined at the bedside.    Remained critically ill on continuous ICU monitoring.    OBJECTIVE:  Vital Signs Last 24 Hrs  T(C): 37.6 (27 May 2024 14:09), Max: 37.6 (27 May 2024 14:09)  T(F): 99.7 (27 May 2024 14:09), Max: 99.7 (27 May 2024 14:09)  HR: 49 (27 May 2024 17:35) (49 - 76)  BP: 157/98 (27 May 2024 14:09) (157/98 - 174/96)  BP(mean): 113 (27 May 2024 14:09) (112 - 113)  RR: 22 (27 May 2024 14:09) (17 - 24)  SpO2: 100% (27 May 2024 17:35) (91% - 100%)    Parameters below as of 27 May 2024 14:09  Patient On (Oxygen Delivery Method): nasal cannula  O2 Flow (L/min): 2        Physical Exam:  General: intubated multiple lines gtt & tubes   Neurology: sedated   Eyes: bilateral pupils equal and reactive   ENT/Neck: +ETT midline, Neck supple, trachea midline, No JVD   Respiratory: Rales noted bilaterally   CV: RRR, S1S2, no murmurs        [x] Sinus rhythm  Abdominal: Soft, NT, ND +BS,   Extremities: 1-2+ pedal edema noted, + peripheral pulses   Skin: No Rashes, Hematoma, Ecchymosis                           Assessment:  44 y.o M w/ PMHx of HTN, DM, multiple prior posterior circulation CVA ( L occipital lobe, cerebellum, b/l brachium pontis, and R adrienne) one treated with tenecteplase and thrombectomy, severe distal vertebral artery stenosis s/p LV4 stent on DAPT p/w worsening of his L facial, dysarthria, LUE dysmetria.    Cardiogenic shock s/p VA ECMO initiation 5/27/24   Post-op respiratory failure  Post-op acute blood loss anemia  Lactic Acidosis   Diabetes Mellitus   Hypertension     Plan:   ***Neuro***  [x] Nonfocal    Post operative neuro assessment     ***Cardiovascular***  Invasive hemodynamic monitoring, assess perfusion indices   SB / CVP 6 / MAP 65 / Hct 34.5/ Lactate 15.5  [x] Levophed 0.2 mcg/kg/min   [x] Vasopressin 0.1 Units/min    [x] Epinephrine 0.05 mcg/kg/min  ECMO at 3400 rpms, flow of 3.81, sweep of 4   Reassessment of hemodynamics post resuscitation   Monitor chest tube outputs   [x] Nonbleeding ___   Serial EKG and cardiac enzymes     ***Pulmonary***  Post op vent management  Titration of FiO2 and PEEP, follow SpO2, CXR, blood gases     Mode: AC/ CMV (Assist Control/ Continuous Mandatory Ventilation)  RR (machine): 10  FiO2: 100  PEEP: 10  ITime: 1  MAP: 14  PC: 20  PIP: 31              Will plan to wean & extubate once pt is hemodynamically stable and not bleeding    ***GI***  NPO   [x] Protonix   Bowel regimen with Miralax and Senna     ***Renal***  GFR 88   Continue to monitor I/Os, BUN/Creatinine.   Replete lytes PRN  Mauricio present [x] positive     ***ID***  IV Vancomycin/ Meropenum for empiric dosing     ***Endocrine***  [x] DM2 : HbA1c 5.5%              - [x] Insulin gtt             - Need tight glycemic control to prevent wound infection.        Patient requires continuous monitoring with bedside rhythm monitoring, pulse oximetry monitoring, and continuous central venous and arterial pressure monitoring; and intermittent blood gas analysis. Care plan discussed with the ICU care team.   Patient remained critical, at risk for life threatening decompensation.    I have spent 40 minutes providing critical care management to this patient.    By signing my name below, I, Chanel Fregoso, attest that this documentation has been prepared under the direction and in the presence of Kadie Chiu MD   Electronically signed: Helena Pitts, 05-27-24 @ 17:58    I, Kadie Chiu, personally performed the services described in this documentation. all medical record entries made by the matildeibareli were at my direction and in my presence. I have reviewed the chart and agree that the record reflects my personal performance and is accurate and complete  Electronically signed: Kadie Chiu MD  Patient seen and examined at the bedside.    Remained critically ill on continuous ICU monitoring.    46 y/o with posterior circulation strokes (last in Nov 2023), Left vertebral artery angioplasty with stent in Nov 2023, presented to ER on 5/27/24 with worsening speech, left facial droop and left gaze preference. CTH done showed worsened left occipital and cerebellar infarcts slightly worsened. Stroke code was called in ER - patient was not a candidate for tpA/thrombectomy. Patient was reported to be hypoxic, bradycardic,? vomiting and aspirated progressing into PEA cardiac arrest requiring intubation, ACLS without ROSC subsequently placed on fem-fem E-CPR ECMO and transferred to CTICU.    Required high dose vasopressors with epi@0.3, norepi@0.5, vaso@0.1 on VA ECMO with flows ~3.5lpm with 3400 rpm, sweep increased to 3.5. Given 5g iv of cyanokit x 1. Minimal pulsatility.   Given calcium chloride x2, 200Meq bicarb for hyperkalemia and started on CRRT for hyperkalemia with K 7.  Given 3 PRBCs, cryo, 2 FFPs. Was noted to have maroon coloured stool and NGT output and drop in flows improving with volume resuscitation. Cannulation sites without hematoma or active bleeding.    Pupils 5mm b/l and non reactive, no cough or gag reflex. Maintained off sedation in ICU.  Repeat portable CTH with diffuse cerebral edema.    Family at bedside - explained to his wife regarding the poor neurological exam post cardiac arrest with signs of cerebral edema likely due to anoxic injury and ongoing multiorgan failure.  Neuorlogy and neurosurgery teams were updated regarding the CT findings.      OBJECTIVE:      _________________________  VITAL SIGNS:  Vital Signs Last 24 Hrs  T(C): 35.4 (28 May 2024 00:00), Max: 37.6 (27 May 2024 14:09)  T(F): 95.7 (28 May 2024 00:00), Max: 99.7 (27 May 2024 14:09)  HR: 96 (28 May 2024 04:00) (42 - 109)  BP: 152/87 (27 May 2024 14:45) (152/87 - 174/96)  BP(mean): 102 (27 May 2024 14:45) (102 - 113)  RR: 10 (28 May 2024 04:00) (6 - 44)  SpO2: 100% (28 May 2024 04:00) (74% - 100%)    Parameters below as of 27 May 2024 20:00  Patient On (Oxygen Delivery Method): ventilator    O2 Concentration (%): 100  I/Os:   I&O's Detail    27 May 2024 07:01  -  28 May 2024 04:54  --------------------------------------------------------  IN:    Albumin 5%  - 250 mL: 250 mL    Albumin 5% - 50 mL: 200 mL    Cryoprecipitate: 150 mL    DOBUTamine: 97.3 mL    EPINEPHrine: 502.4 mL    EPINEPHrine: 382.4 mL    Insulin: 6 mL    IV PiggyBack: 300 mL    IV PiggyBack: 300 mL    IV PiggyBack: 600 mL    Lactated Ringers Bolus: 1500 mL    Norepinephrine: 358.7 mL    Norepinephrine: 55.5 mL    Pantoprazole: 100 mL    Plasma: 600 mL    PRBCs (Packed Red Blood Cells): 600 mL    sodium chloride 0.9%: 75 mL    sodium chloride 3%: 75 mL    Vasopressin: 180 mL  Total IN: 6332.3 mL    OUT:    Indwelling Catheter - Urethral (mL): 1000 mL    Nasogastric/Oral tube (mL): 300 mL    Other (mL): 0 mL  Total OUT: 1300 mL    Total NET: 5032.3 mL          Mode: AC/ CMV (Assist Control/ Continuous Mandatory Ventilation)  RR (machine): 10  FiO2: 100  PEEP: 10  ITime: 1  MAP: 18  PC: 20  PIP: 35      MEDICATIONS:  MEDICATIONS  (STANDING):  chlorhexidine 0.12% Liquid 15 milliLiter(s) Oral Mucosa every 12 hours  CRRT Treatment    <Continuous>  dextrose 50% Injectable 50 milliLiter(s) IV Push every 15 minutes  dextrose 50% Injectable 25 milliLiter(s) IV Push every 15 minutes  DOBUTamine Infusion 5 MICROgram(s)/kG/Min (12.8 mL/Hr) IV Continuous <Continuous>  EPINEPHrine    Infusion 0.1 MICROgram(s)/kG/Min (15.9 mL/Hr) IV Continuous <Continuous>  insulin regular Infusion 2 Unit(s)/Hr (2 mL/Hr) IV Continuous <Continuous>  meropenem  IVPB 1000 milliGRAM(s) IV Intermittent every 8 hours  norepinephrine Infusion 0.05 MICROgram(s)/kG/Min (3.98 mL/Hr) IV Continuous <Continuous>  pantoprazole Infusion 8 mG/Hr (10 mL/Hr) IV Continuous <Continuous>  PrismaSATE Dialysate BGK 4 / 2.5 5000 milliLiter(s) (1500 mL/Hr) CRRT <Continuous>  PrismaSOL Filtration BGK 4 / 2.5 5000 milliLiter(s) (1300 mL/Hr) CRRT <Continuous>  PrismaSOL Filtration BGK 4 / 2.5 5000 milliLiter(s) (200 mL/Hr) CRRT <Continuous>  sodium chloride 0.9%. 1000 milliLiter(s) (10 mL/Hr) IV Continuous <Continuous>  sodium chloride 3%. 500 milliLiter(s) (15 mL/Hr) IV Continuous <Continuous>  vancomycin  IVPB 1250 milliGRAM(s) IV Intermittent every 12 hours  vasopressin Infusion 0.1 Unit(s)/Min (15 mL/Hr) IV Continuous <Continuous>    MEDICATIONS  (PRN):      LABS:  Laboratory data was independently reviewed by me today.                           9.4    13.93 )-----------( 228      ( 28 May 2024 00:27 )             29.0     05-28    145  |  106  |  35<H>  ----------------------------<  130<H>  3.9   |  23  |  1.83<H>    Ca    9.5      28 May 2024 00:30  Phos  4.3     05-28  Mg     2.3     05-28    TPro  5.5<L>  /  Alb  3.6  /  TBili  0.4  /  DBili  x   /  AST  476<H>  /  ALT  534<H>  /  AlkPhos  99  05-28    LIVER FUNCTIONS - ( 28 May 2024 00:30 )  Alb: 3.6 g/dL / Pro: 5.5 g/dL / ALK PHOS: 99 U/L / ALT: 534 U/L / AST: 476 U/L / GGT: x           PT/INR - ( 28 May 2024 00:27 )   PT: 16.2 sec;   INR: 1.49 ratio         PTT - ( 28 May 2024 00:27 )  PTT:32.9 sec  ABG - ( 28 May 2024 03:13 )  pH, Arterial: 7.36  pH, Blood: x     /  pCO2: 45    /  pO2: 285   / HCO3: 25    / Base Excess: -0.2  /  SaO2: 98.8              Urinalysis Basic - ( 28 May 2024 00:30 )    Color: x / Appearance: x / SG: x / pH: x  Gluc: 130 mg/dL / Ketone: x  / Bili: x / Urobili: x   Blood: x / Protein: x / Nitrite: x   Leuk Esterase: x / RBC: x / WBC x   Sq Epi: x / Non Sq Epi: x / Bacteria: x        RADIOLOGY:   Radiology images were independently reviewed by me today. Reports were reviewed by me today.    Xray Chest 1 View- PORTABLE-Urgent:   ACC: [id number] EXAM:  XR CHEST PORTABLE URGENT 1V   ORDERED BY:   MARLINE HOFFMAN     PROCEDURE DATE:  05/27/2024          INTERPRETATION:  TECHNIQUE: Single portable view of the chest.    COMPARISON:  5/27/2024    CLINICAL HISTORY: ecmo    FINDINGS:    Single frontal view of the chest demonstrates mild congestive changes.   The cardiomediastinal silhouette is enlarged. No acute osseous   abnormalities. Overlying EKG leads and wires are noted. Right lung is   clear. Riddle    Physical Exam:  General: intubated multiple lines gtt & tubes   Neurology: off sedation, no cough or gag reflex, withdrawing with tremors and increased tone in RUE, not moving ext to pain otherwise  Eyes: bilateral pupils 5mm equal and nonreactive   ENT/Neck: +ETT midline, Neck supple, trachea midline, No JVD   Respiratory: Rales noted bilaterally   CV: RRR, S1S2, no murmurs        [x] Sinus rhythm  Abdominal: Soft, distended soft  Extremities: 2+ pedal edema noted, cool ext.  Skin: No Rashes, Hematoma, Ecchymosis                           Assessment:  44 y.o M w/ PMHx of HTN, DM, multiple prior posterior circulation CVA ( L occipital lobe, cerebellum, b/l brachium pontis, and R adrienne) one treated with tenecteplase and thrombectomy, severe distal vertebral artery stenosis s/p LV4 stent on DAPT p/w worsening of his L facial, dysarthria, LUE dysmetria.    PEA cardiac arrest, Cardiogenic shock s/p VA ECMO initiation 5/27/24   Acute hypoxemic respiratory failure  Diffuse cerebral edema, anoxic brain injury  Post-op acute blood loss anemia  Coagulopathy  Lactic acidosis  Hyperkalemia, MALI with ATN  GI bleed  Septic/Vasoplegic shock requiring high dose pressors    Plan:   ***Neuro***  Repeat CTH post arrest with signs of diffuse cerebral edema, signs of anoxic injury  NSE sent. Monitor off sedation, hypertonic saline to keep serum sodium 150-155, mannitol, HOB elevated.  Neurology and neurosurgery teams were consulted prior to the cardiac arrest with new worsened left posterior circulation strokes.    Wife was tearful however verbalized understanding of the severe neurological injury. Explained to her that we will continue to support him with ECMO, CRRT, pressors, ABX for multiorgan failure and there will be further ongoing reassessments of neurological exam to better prognosticate and for now he needs time with supportive measures. Neurology requested to help with neuroprognostication int the coming days.    ***Cardiovascular***  Invasive hemodynamic monitoring, assess perfusion indices   SB / CVP 6 / MAP 65 / Hct 34.5/ Lactate 15.5  [x] Levophed 0.2 mcg/kg/min   [x] Vasopressin 0.1 Units/min    [x] Epinephrine 0.05 mcg/kg/min  ECMO at 3400 rpms, flow of 3.81, sweep of 4       ***Pulmonary***  Post op vent management  Titration of FiO2 and PEEP, follow SpO2, CXR, blood gases     Mode: AC/ CMV (Assist Control/ Continuous Mandatory Ventilation)  RR (machine): 10  FiO2: 100  PEEP: 10  ITime: 1  MAP: 14  PC: 20  PIP: 31                ***GI***  NPO   [x] Protonix , OGT to low continuous suction  Started on protonix drip  Lactate trending down to 4 from 16  Bowel regimen with Miralax and Senna     ***Renal***  CRRT with severe hyperkalemia  Continue to monitor I/Os, BUN/Creatinine.   Replete lytes PRN  Mauricio present [x] positive     ***ID***  IV Vancomycin/ Meropenum for empiric dosing     ***Endocrine***  [x] DM2 : HbA1c 5.5%              - [x] Insulin gtt             - Need tight glycemic control to prevent wound infection.    *** Hematology***  Given 3 PRBC, 2 FFPs, Cryo with GI bleeding/low flows on ECMO with low Hct. No signs of bleeding at cannulation sites. Repeat portable CTH without bleeding however with severe edema.  Trend coags, serial CBC        Patient requires continuous monitoring with bedside rhythm monitoring, pulse oximetry monitoring, and continuous central venous and arterial pressure monitoring; and intermittent blood gas analysis. Care plan discussed with the ICU care team.   Patient remained critical, at risk for life threatening decompensation.    I have spent 106 minutes providing critical care management to this patient.    By signing my name below, I, Chanel Fregoso, attest that this documentation has been prepared under the direction and in the presence of Kadie Chiu MD   Electronically signed: Helena Pitts, 05-27-24 @ 17:58    I, Kaide Chiu, personally performed the services described in this documentation. all medical record entries made by the scribe were at my direction and in my presence. I have reviewed the chart and agree that the record reflects my personal performance and is accurate and complete  Electronically signed: Kadie Chiu MD

## 2024-05-27 NOTE — ED ADULT NURSE REASSESSMENT NOTE - NS ED NURSE REASSESS COMMENT FT1
Pt placed on continuous pulse ox. [facility] tech Enedina notified of order and to notify staff if SpO2 is less than 92%.

## 2024-05-27 NOTE — BRIEF OPERATIVE NOTE - COMMENTS
ED call to CTU for ECMO placement in patient with PEA arrest. I advised ED attending to call Shock team. Decision made to place patient on ECMO by cardiac surgeon on call. Patient cannulated and placed on ECMO one hour and 2 minutes after initiation of ACLS for PEA arrest. ROSC never achieved prior to ECMO cannulation.

## 2024-05-27 NOTE — H&P ADULT - NSHPPHYSICALEXAM_GEN_ALL_CORE
General:  Constitutional: Male, appears stated age, nontoxic, not in distress,    Head: Normocephalic;   Eyes: clear sclera;   Extremities: No cyanosis;   Resp: breathing comfortably     Neurological (>12):  MS: Awake, alert.  Oriented person place situation. Follows commands. Attends to examiner  Language: Speech is hypophonic, dysarthric, with increased paucity.  CNs: PERRL (R 3mm, L 3mm). VFF. EOMI L gaze preference, can cross midline. No disconjugate gaze, skew. V1-3 intact LT, L facial droopl. Hearing grossly normal b/l. Tongue midline.     Motor - Normal bulk and tone throughout. No pronator drift.    L/R (out of 5)       Deltoid  4/5    Biceps   4/5      Triceps  4/5          4/5   L/R (out of 5)       Hip Flexion  4+/5    Hip Extension  4+/5  Knee Extension  5/5  Dorsiflexion  5/5      Plantar Flexion 5/5     Sensation: Intact to LT b/l.  Reflexes L/R:  Biceps(C5) 2/2  BR(C6) 2/2   Triceps(C7)  2/2 Patellar(L4)   2/2   Coordination:  LUE FTN dysmetria  Gait: Gait not tested due to patient safety concerns

## 2024-05-27 NOTE — ED ADULT NURSE NOTE - NS ED NURSE TRANSPORT WITH
RN, MD, NP, RT, perfusionist, EDT. Pt on ECMO, intubated, receiving blood and IV medication drips./Cardiac Monitor/Defib/ACLS/Rescue Kit/O2/BVM/oxygen/IV pump/pulse ox/ventilator/Blood Products/ACLS Rescue Kit

## 2024-05-27 NOTE — CHART NOTE - NSCHARTNOTEFT_GEN_A_CORE
Exam ~10 PM on 5/27/2024 (pt on multiple pressors and inotropes but no sedation)  MS: No response to noxious stimuli, not following commands, no verbal output  CN: b/l pupils dilated and unreactive, no BTT b/l, gaze forward but no VOR appreciated with Dolls Eye maneuver, no corneal reflex or gag reflex appreciated  M posturing vs genuine withdrawal b/l UE. No withdrawal b/l LE  S: posturing vs genuine withdrawal b/l UE. No withdrawal b/l LE  R: b/l biceps and triceps 3+, b/l patellar and achilles 0+  C: JULIETA  G: JULIETA    Combining time pt was in cardiac arrest and time pt had achieved ROSC but was still significantly hypotensive (once pt finally cannulated on ECMO, pressure improved), this combined time totals to approximately 1 hour. CTH after showing diffuse loss of grey white differentiation    From neuroprognostication standpoint, would recommend MRI Brain w/wo IV contrast and drawing Neuron Specific Enolase (24-48 hours after ROSC, and then another 48-72 hours after ROSC). Please continue to address medical problems, as you are doing. See H&P written by neurology resident for recommendations from stroke perspective. Appreciate NSGY input as well. Neurology resident contacted because pt had cardiac arrest + post-arrest hypotension, with significant decline in neuro status afterwards.    Exam ~10 PM on 5/27/2024 (pt on multiple pressors and inotropes but no sedation)  MS: No response to noxious stimuli, not following commands, no verbal output  CN: b/l pupils dilated and unreactive, no BTT b/l, gaze forward but no VOR appreciated with Dolls Eye maneuver, no corneal reflex or gag reflex appreciated  M posturing vs genuine withdrawal b/l UE. No withdrawal b/l LE  S: posturing vs genuine withdrawal b/l UE. No withdrawal b/l LE  R: b/l biceps and triceps 3+, b/l patellar and achilles 0+  C: JULIETA  G: JULIETA    Combining time pt was in cardiac arrest and time pt had achieved ROSC but was still significantly hypotensive (once pt finally cannulated on ECMO, pressure improved), this combined time totals to approximately 1 hour. CTH after showing diffuse loss of grey white differentiation    From neuroprognostication standpoint, would recommend MRI Brain w/wo IV contrast and drawing Neuron Specific Enolase (24-48 hours after ROSC, and then another 48-72 hours after ROSC). Please continue to address medical problems, as you are doing. See H&P written by neurology resident for recommendations from stroke perspective. Appreciate NSGY input as well. Neurology resident contacted because pt had cardiac arrest + post-arrest hypotension, with significant decline in neuro status afterwards.    Exam ~10 PM on 5/27/2024 (pt on multiple pressors and inotropes but no sedation)  MS: No response to noxious stimuli, not following commands, no verbal output  CN: b/l pupils dilated and unreactive, no BTT b/l, gaze forward but no VOR appreciated with Dolls Eye maneuver, no corneal reflex or gag reflex appreciated  M posturing vs genuine withdrawal b/l UE. No withdrawal b/l LE  S: posturing vs genuine withdrawal b/l UE. No withdrawal b/l LE  R: b/l biceps and triceps 3+, b/l patellar and achilles 0+  C: JULIETA  G: JULIETA    Combining time pt was in cardiac arrest and time pt had achieved ROSC but was still significantly hypotensive (once pt finally cannulated on ECMO, pressure improved), this combined time totals to approximately 1 hour. CTH after showing diffuse loss of grey white differentiation    From neuroprognostication standpoint, would recommend MRI Brain w/o IV contrast and drawing Neuron Specific Enolase (24-48 hours after ROSC, and then another 48-72 hours after ROSC). Please continue to address medical problems, as you are doing. See H&P written by neurology resident for recommendations from stroke perspective. Appreciate NSGY input as well.

## 2024-05-27 NOTE — ED PROVIDER NOTE - DIFFERENTIAL DIAGNOSIS
Ddx includes, however, is not limited to: CVA, ICH, infectious process, metabolic process, other Differential Diagnosis

## 2024-05-27 NOTE — BRIEF OPERATIVE NOTE - NSICDXBRIEFPROCEDURE_GEN_ALL_CORE_FT
PROCEDURES:  Initiation, venoarterial ECMO 27-May-2024 17:44:54 #19 arterial cannula, #25 venous cannula Geoff Owens

## 2024-05-28 NOTE — CONSULT NOTE ADULT - PROBLEM/RECOMMENDATION-3
Called pt and advised of Edna DUARTE's note.  Pt verbalized understanding.    Follow up appt made with Edna DUARTE 3/27 @1:15pm.      DISPLAY PLAN FREE TEXT

## 2024-05-28 NOTE — PROGRESS NOTE ADULT - SUBJECTIVE AND OBJECTIVE BOX
Patient seen and examined at the bedside.    Remains critically ill on continuous ICU monitoring.      Brief Summary:  44 y.o M w/ PMHx of HTN, DM, multiple prior posterior circulation CVA ( L occipital lobe, cerebellum, b/l brachium pontis, and R adrienne) one treated with tenecteplase and thrombectomy, severe distal vertebral artery stenosis s/p LV4 stent on DAPT p/w worsening of his L facial, dysarthria, LUE dysmetria.PEA cardiac arrest, Cardiogenic shock s/p VA ECMO initiation 5/27/24       24 Hour events:      Objective:  Vital Signs Last 24 Hrs  T(C): 35.4 (28 May 2024 00:00), Max: 37.6 (27 May 2024 14:09)  T(F): 95.7 (28 May 2024 00:00), Max: 99.7 (27 May 2024 14:09)  HR: 96 (28 May 2024 06:15) (42 - 109)  BP: 152/87 (27 May 2024 14:45) (152/87 - 174/96)  BP(mean): 102 (27 May 2024 14:45) (102 - 113)  RR: 14 (28 May 2024 06:15) (6 - 44)  SpO2: 93% (28 May 2024 06:15) (74% - 100%)    Parameters below as of 27 May 2024 20:00  Patient On (Oxygen Delivery Method): ventilator    O2 Concentration (%): 100    Mode: AC/ CMV (Assist Control/ Continuous Mandatory Ventilation)  RR (machine): 10  FiO2: 100  PEEP: 10  ITime: 1  MAP: 18  PC: 20  PIP: 35              Physical Exam:   General: Intubated  Neurology: Oriented, following commands  Respiratory: Clear bilaterally   CV: Sinus bradycardia  VA ECMO: 3400 rpms, 3.59 flow, Sweep of 4.5  Abdominal: Soft, Nontender  Extremities: Warm, well-perfused  -------------------------------------------------------------------------------------------------------------------------------    Labs:                        8.1    11.32 )-----------( 163      ( 28 May 2024 05:04 )             25.7     05-28    145  |  106  |  35<H>  ----------------------------<  130<H>  3.9   |  23  |  1.83<H>    Ca    9.5      28 May 2024 00:30  Phos  4.3     05-28  Mg     2.3     05-28    TPro  5.5<L>  /  Alb  3.6  /  TBili  0.4  /  DBili  x   /  AST  476<H>  /  ALT  534<H>  /  AlkPhos  99  05-28    LIVER FUNCTIONS - ( 28 May 2024 00:30 )  Alb: 3.6 g/dL / Pro: 5.5 g/dL / ALK PHOS: 99 U/L / ALT: 534 U/L / AST: 476 U/L / GGT: x           PT/INR - ( 28 May 2024 05:04 )   PT: 15.3 sec;   INR: 1.40 ratio         PTT - ( 28 May 2024 05:04 )  PTT:30.9 sec  ABG - ( 28 May 2024 06:02 )  pH, Arterial: 7.30  pH, Blood: x     /  pCO2: 47    /  pO2: 435   / HCO3: 23    / Base Excess: -3.2  /  SaO2: 99.6              ------------------------------------------------------------------------------------------------------------------------------  Assessment:  44 y.o M w/ PMHx of HTN, DM, multiple prior posterior circulation CVA ( L occipital lobe, cerebellum, b/l brachium pontis, and R adrienne) one treated with tenecteplase and thrombectomy, severe distal vertebral artery stenosis s/p LV4 stent on DAPT p/w worsening of his L facial, dysarthria, LUE dysmetria.PEA cardiac arrest, Cardiogenic shock s/p VA ECMO initiation 5/27/24       Acute hypoxemic respiratory failure  Diffuse cerebral edema, anoxic brain injury  Post-op acute blood loss anemia  Coagulopathy  Lactic acidosis  Hyperkalemia, MALI with ATN  GI bleed  Septic/Vasoplegic shock requiring high dose pressors      Plan:   ***Neuro***  Hx of CVA x3 (Last 11/23).  Repeat CTH post arrest with signs of diffuse cerebral edema, signs of anoxic injury  NSE sent. Monitor off sedation, hypertonic saline to keep serum sodium 150-155, mannitol, HOB elevated.  Neurology and neurosurgery teams were consulted prior to the cardiac arrest with new worsened left posterior circulation strokes.         ***Cardiovascular***  At high risk for hemodynamic instability and cardiac arrhythmias.  Trend Lactate    Continue VA ECMO support   Wean pressors for MAP > 65 mm Hg *Remove if not on pressors*  Remains on Dobutamine and Epinephrine          ***Pulmonary***  Postoperative acute respiratory failure  Wean ventilator as tolerated.   Deep breathing and coughing exercises.         ***GI***  NPO for now.  OGT to low continuous suction  Protonix for stress ulcer prophylaxis   Bowel regimen.    ***Renal***  CRRT with severe hyperkalemia  Trend Creatinine   Mauricio catheter for strict I/O measurements.   Replete electrolytes as indicated.      ***ID***  IV Vancomycin/ Meropenum for empiric dosing       ***Endocrine***  DM- Insulin infusion.      ***Hematology***  Acute blood loss anemia - Given 3 PRBC, 2 FFPs, Cryo with GI bleeding/low flows on ECMO with low Hct. No signs of bleeding at cannulation sites.            Patient requires continuous monitoring with bedside rhythm monitoring, pulse oximetry monitoring, and continuous central venous and arterial pressure monitoring; and intermittent blood gas analysis. Care plan discussed with the ICU care team.   Patient remains critical, at risk for life threatening decompensation.    I have spent 30 minutes providing critical care management to this patient.    By signing my name below, I, Juan A Mckenna, attest that this documentation has been prepared under the direction and in the presence of Yusra aNgy MD  Electronically signed: Juan A Mckenna, 05-28-24 @ 06:46    I, Yusra Nagy MD, personally performed the services described in this documentation. all medical record entries made by the scribe were at my direction and in my presence. I have reviewed the chart and agree that the record reflects my personal performance and is accurate and complete  Electronically signed: Yusra Nagy MD Patient seen and examined at the bedside.    Remains critically ill on continuous ICU monitoring.      Brief Summary:  43 yo M w/ PMHx of HTN, DM, multiple prior posterior circulation CVA ( L occipital lobe, cerebellum, b/l brachium pontis, and R adrienne) one treated with tenecteplase and thrombectomy, severe distal vertebral artery stenosis s/p LV4 stent on DAPT p/w worsening of his L facial, dysarthria, LUE dysmetria. PEA cardiac arrest, Cardiogenic shock s/p VA ECMO initiation 5/27/24       24 Hour events:  Unresponsive except possible withdrawal vs posturing in UE.      Objective:  Vital Signs Last 24 Hrs  T(C): 35.4 (28 May 2024 00:00), Max: 37.6 (27 May 2024 14:09)  T(F): 95.7 (28 May 2024 00:00), Max: 99.7 (27 May 2024 14:09)  HR: 96 (28 May 2024 06:15) (42 - 109)  BP: 152/87 (27 May 2024 14:45) (152/87 - 174/96)  BP(mean): 102 (27 May 2024 14:45) (102 - 113)  RR: 14 (28 May 2024 06:15) (6 - 44)  SpO2: 93% (28 May 2024 06:15) (74% - 100%)    Parameters below as of 27 May 2024 20:00  Patient On (Oxygen Delivery Method): ventilator    O2 Concentration (%): 100    Mode: AC/ CMV (Assist Control/ Continuous Mandatory Ventilation)  RR (machine): 10  FiO2: 100  PEEP: 10  ITime: 1  MAP: 18  PC: 20  PIP: 35              Physical Exam:   General: Intubated on VA ECMO and CRRT.  Neurology: Pupils fixed and dilated, not responsive to voice, only moving UE to noxious stimuli.  Respiratory: Breath sounds bilaterally  CV: Sinus   VA ECMO: 3400 rpms, 3.59 flow, Sweep of 5  Abdominal: Soft, Nondistended  Extremities: Warm, well-perfused  Mauricio with minimal output.  -------------------------------------------------------------------------------------------------------------------------------    Labs:                        8.1    11.32 )-----------( 163      ( 28 May 2024 05:04 )             25.7     05-28    145  |  106  |  35<H>  ----------------------------<  130<H>  3.9   |  23  |  1.83<H>    Ca    9.5      28 May 2024 00:30  Phos  4.3     05-28  Mg     2.3     05-28    TPro  5.5<L>  /  Alb  3.6  /  TBili  0.4  /  DBili  x   /  AST  476<H>  /  ALT  534<H>  /  AlkPhos  99  05-28    LIVER FUNCTIONS - ( 28 May 2024 00:30 )  Alb: 3.6 g/dL / Pro: 5.5 g/dL / ALK PHOS: 99 U/L / ALT: 534 U/L / AST: 476 U/L / GGT: x           PT/INR - ( 28 May 2024 05:04 )   PT: 15.3 sec;   INR: 1.40 ratio         PTT - ( 28 May 2024 05:04 )  PTT:30.9 sec  ABG - ( 28 May 2024 06:02 )  pH, Arterial: 7.30  pH, Blood: x     /  pCO2: 47    /  pO2: 435   / HCO3: 23    / Base Excess: -3.2  /  SaO2: 99.6        ------------------------------------------------------------------------------------------------------------------------------  Assessment:  44 y.o M w/ PMHx of HTN, DM, multiple prior posterior circulation CVA ( L occipital lobe, cerebellum, b/l brachium pontis, and R adrienne) one treated with tenecteplase and thrombectomy, severe distal vertebral artery stenosis s/p LV4 stent on DAPT p/w worsening of his L facial, dysarthria, LUE dysmetria. He is s/p PEA cardiac arrest, Cardiogenic shock s/p VA ECMO initiation 5/27/24       Acute hypoxemic respiratory failure  Diffuse cerebral edema, anoxic brain injury  Post-op acute blood loss anemia  Hyperkalemia, MALI with ATN  GI bleed  Cardiogenic shock requiring high dose pressors  Acute blood loss anemia      Plan:   ***Neuro***  Hx of CVA x3 (Last 11/23).  Repeat CTH post arrest with signs of diffuse cerebral edema, signs of anoxic injury  NSE sent. Monitor off sedation, hypertonic saline to keep serum sodium 150-155, mannitol, HOB elevated.  Neurology and neurosurgery teams were consulted prior to the cardiac arrest with new worsened left posterior circulation strokes.    ***Cardiovascular***  At high risk for hemodynamic instability and cardiac arrhythmias.  Trend Lactate    Continue VA ECMO support   Wean pressors for MAP > 65 mm Hg   Remains on Dobutamine and Epinephrine - will transition from Epinephrine to Norepinephrine as able.    ***Pulmonary***  Postoperative acute respiratory failure  Lung protective settings.  Deep breathing and coughing exercises.    ***GI***  NPO for now.  OGT to low continuous suction  Protonix for stress ulcer prophylaxis / GI bleed.  Trend H/H    ***Renal***  CRRT with severe hyperkalemia  Trend Creatinine   Mauricio catheter for strict I/O measurements.   Replete electrolytes as indicated.    ***ID***  Vancomycin/ Meropenum for empiric dosing     ***Endocrine***  Hyperglycemia - Insulin infusion.      ***Hematology***  Acute blood loss anemia - Transfused blood and products yesterday.  Trend CBC and coags and monitor for bleeding.  Anticoagulation on hold.        Patient requires continuous monitoring with bedside rhythm monitoring, pulse oximetry monitoring, and continuous central venous and arterial pressure monitoring; and intermittent blood gas analysis. Care plan discussed with the ICU care team.   Patient remains critical, at risk for life threatening decompensation.    I have spent 55 minutes providing critical care management to this patient.    By signing my name below, I, Juan A Gascapal, attest that this documentation has been prepared under the direction and in the presence of Yusra Nagy MD  Electronically signed: Juan A Clarisa, 05-28-24 @ 06:46    I, Yusra Nagy MD, personally performed the services described in this documentation. all medical record entries made by the scribe were at my direction and in my presence. I have reviewed the chart and agree that the record reflects my personal performance and is accurate and complete  Electronically signed: Yusra Nagy MD

## 2024-05-28 NOTE — CONSULT NOTE ADULT - PROBLEM SELECTOR RECOMMENDATION 3
-baseline hg 13 now ranging 7.4-8.4  -possible aspect of hemolysis on ECMO, no identifiable bleed  -transfuse prn

## 2024-05-28 NOTE — CONSULT NOTE ADULT - ATTENDING COMMENTS
I have seen this patient with the fellow and agree with their assessment and plan. I was physically present for significant portions of the evaluation and management (E/M) service provided.  I agree with the above history, physical, and plan which I have reviewed and edited where appropriate.    Gabbie Corrales MD  Office   Contact me directly via [employer] Teams     (After 5 pm or on weekends please page the on-call fellow/attending, can check [email or web address] for schedule. Login is shi [email or web address], schedule under [facility] medicine, psych, derm)
43 y/o M with HTN, DM2, multiple prior strokes, presented with an acute ischemic stroke. He had PEA arrest and was placed on VA-ECMO. Coded for approximately 1 hour. No signs of neurological recovery at this time. Ongoing discussion with neurology and patient's family.

## 2024-05-28 NOTE — CONSULT NOTE ADULT - PROBLEM SELECTOR RECOMMENDATION 4
-baseline Cr 1.2, now 1.8 in the setting of PEA arrest and cardiogenic shock and significant persistent lactic acidosis  -maintain perfusion with ECMO support and vasoporessors/inotropes, cont with CRRT, nephro on board

## 2024-05-28 NOTE — CONSULT NOTE ADULT - PROBLEM SELECTOR RECOMMENDATION 2
acute CVA in the setting of prior subacute infarcts in the right brachium  pontis/medial aspect of the right cerebellar hemisphere and in the  posterior inferior aspect of the left cerebellar hemisphere evidenced on CT in Nov 2023  -now with new left occipital and cerebellar sites of infarct complicated by generalized symmetric loss of gray-white differentiation with global  cerebral edema and mass effect effacing the cortical sulci as well as the  ventricular system and basal cisterns.  -no brainstem reflexes present

## 2024-05-28 NOTE — PROGRESS NOTE ADULT - SUBJECTIVE AND OBJECTIVE BOX
Patient seen and examined at bedside.    --Anticoagulation--    T(C): 35 (05-28-24 @ 12:00), Max: 37.6 (05-27-24 @ 14:09)  HR: 98 (05-28-24 @ 13:30) (42 - 109)  BP: 152/87 (05-27-24 @ 14:45) (152/87 - 157/98)  RR: 11 (05-28-24 @ 13:30) (4 - 44)  SpO2: 86% (05-28-24 @ 13:15) (70% - 100%)  Wt(kg): --    Exam: pupils b/l 6NR, -cough/gag, -corneals, -VOR

## 2024-05-28 NOTE — CONSULT NOTE ADULT - ASSESSMENT
44 y.o M w/ PMHx of HTN, DM, multiple prior posterior circulation CVA ( L occipital lobe, cerebellum, b/l brachium pontis, and R adrienne) one treated with tenecteplase and thrombectomy, severe distal vertebral artery stenosis s/p LV4 stent on DAPT p/w worsening of his L facial, dysarthria, LUE dysmetria.   Hospital course c/b cardiogenic shock s/p VA ECMO, intubated and MALI with hyperkalemia and acidosis.    Nephrology consulted for MALI with hyperkalemia and acidosis.        #MALI 2' hemodynamics   -Hyperkalemia and severe acidosis  -Cardiogenic shock s/p VA ECMO (5/27)    -No known h/o renal disease; admission Cr 1 (5/27); trended up to 1.63 (5/27)  -K 7.3 on VBG s/p insulin, Ca-gluconate and bicarb with repeat 6.5 and 5.5 on VBG  -Bicarb 16; pH 7.03 with repeat 7.31 s/p bicarb     Recs:  -Will start CRRT, consent obtained and placed in patient's chart  -Obtain bladder scan for PVR. Send urine studies when feasible  -Monitor UOP and signs of renal recovery post ECMO    Case discussed with Dr. Valeriy Bautista  Nephrology Fellow  Feel free to contact me on TEAMS  After 4 pm please contact the on-call Fellow.  
44M hx DM2, HTN, former smoker and stroke in 2015 and July 2023 with mild residual dysarthria, severe distal VA stenosis w/ extremely poor flow in basilar artery dx November 2023 s/p successful recanalization, angioplasty and stenting w/ complete occlusion of L V4 segment of VA 11/24/23. Was maintained on ASA/brillinta; recently taken off brillinta 5/3/24 per clinic notes. Today p/f code stroke s/p L facial droop, worsened difficulty speaking, and generalized weakness. CTH w/ L occipital and cerebellar infarcts (new since 11/2023 CTH). CTA w/ visualized stent in L VA c/f re-occlusion given filling deficits and new infarcts on CTH; chronic R V4 occlusion; small basilar artery. ptt 23.9, plts 356, INR 1.05. Exam: AOx3, hypophonic/dysarthric, PERRL, L gaze preference, L facial , LUE 4/5, RUE 5/5, LLE HF/KE 4/5 DF/PF 5/5, RLE 5/5. SILT. Some L dysmetria.  -adm NSCU under Dr. Xiao  -MR brain w/o today  -MRA/NOVA tomorrow  -heparin drip  -continue ASA  -BP to ride
44 y.o M w/ PMHx of HTN, DM, multiple prior posterior circulation CVA ( L occipital lobe, cerebellum, b/l brachium pontis, and R adrienne) one treated with tenecteplase and thrombectomy, severe distal vertebral artery stenosis s/p LV4 stent on DAPT p/w worsening of his L facial, dysarthria, LUE dysmetria found to have new infarct on CT head with cerebral edema and concern for hypoxic brain injury. Course complicated by PEA arrest and cardiogenic shock now initiated on VA ECMO and vasopressors Epinephrine, dobutamine, levophed and vasopressin.     -patient without signs of brainstem reflexes, neurology and neurosurgery on board. Patient remains in shock state, cont inotropic and vasopressive support and ECMO support as per ICU team. If patient to have meaningful neurological recovery can be considered at later date for advanced therapies. Continued GOC discussion with family.

## 2024-05-28 NOTE — CHART NOTE - NSCHARTNOTEFT_GEN_A_CORE
.prelim Initial 60 minutes of record reviewed.  There are no epileptiform abnormalities noted. Background is diffusely suppressed indicating electrocerebral inactivity.  Full report to follow after review of completed study tomorrow.     MARIETTA Weber.  Attending Physician, [facility]      [facility] EEG Reading Room Ph#: [phone]  Epilepsy Answering Service after 5PM and before 8:30AM: Ph#: [phone]

## 2024-05-28 NOTE — CONSULT NOTE ADULT - PROBLEM SELECTOR RECOMMENDATION 9
-2/2 PEA arrest in the setting of CVA, TTE with severely reduced LVSF (<20%) which is newly diagnosed since last TTE 11/2023  -patient remains dependent on VA ECMO and inotropes and vasopressors; Epinephrine, dobutamine, levophed and vasopressin.   -not a candidate for advanced therapies at this time due to encephalopathy, follow up neurology/neurosurgery. Guarded prognosis, GOC discussions

## 2024-05-28 NOTE — CHART NOTE - NSCHARTNOTEFT_GEN_A_CORE
See full detailed note from late last evening. Basically, an unfortunate 44 y.o M w/ PMHx of HTN, DM, multiple prior posterior circulation CVA ( L occipital lobe, cerebellum, b/l brachium pontis, and R adrienne) one treated with tenecteplase and thrombectomy, severe distal vertebral artery stenosis s/p LV4 stent on DAPT p/w worsening of his L facial, dysarthria, LUE dysmetria.   Hospital course c/b cardiogenic shock s/p VA ECMO, intubated and MALI with hyperkalemia and acidosis- Anuric [place]    Had clotting overnight with the CVVHDF  Blood flow increased to 250cc and should help, can increase to 300 also if worsening clotting issues  Overall, prognosis is guarded  D/w w CTU team    Gabbie Corrales MD  Office   Contact me directly via [employer] Teams     (After 5 pm or on weekends please page the on-call fellow/attending, can check [email or web address] for schedule. Login is shi zamarripa, schedule under [facility] medicine, psych, derm)

## 2024-05-28 NOTE — CONSULT NOTE ADULT - SUBJECTIVE AND OBJECTIVE BOX
[facility] DIVISION OF KIDNEY DISEASES AND HYPERTENSION -- [phone]  -- INITIAL CONSULT NOTE  --------------------------------------------------------------------------------  HPI:  44 y.o M w/ PMHx of HTN, DM, multiple prior posterior circulation CVA ( L occipital lobe, cerebellum, b/l brachium pontis, and R adrienne) one treated with tenecteplase and thrombectomy, severe distal vertebral artery stenosis s/p LV4 stent on DAPT p/w worsening of his L facial, dysarthria, LUE dysmetria.   Hospital course c/b cardiogenic shock s/p VA ECMO, intubated and MALI with hyperkalemia and acidosis.    Nephrology consulted for MALI with hyperkalemia and acidosis.      PAST HISTORY  --------------------------------------------------------------------------------  PAST MEDICAL & SURGICAL HISTORY:  DM (diabetes mellitus)      HTN (hypertension)      CVA (cerebrovascular accident)        FAMILY HISTORY:    PAST SOCIAL HISTORY:    ALLERGIES & MEDICATIONS  --------------------------------------------------------------------------------  Allergies    No Known Allergies    Intolerances      Standing Inpatient Medications  chlorhexidine 0.12% Liquid 15 milliLiter(s) Oral Mucosa every 12 hours  CRRT Treatment    <Continuous>  dextrose 50% Injectable 25 milliLiter(s) IV Push every 15 minutes  dextrose 50% Injectable 50 milliLiter(s) IV Push every 15 minutes  EPINEPHrine    Infusion 0.05 MICROgram(s)/kG/Min IV Continuous <Continuous>  insulin regular Infusion 2 Unit(s)/Hr IV Continuous <Continuous>  meropenem  IVPB 1000 milliGRAM(s) IV Intermittent every 8 hours  norepinephrine Infusion 0.2 MICROgram(s)/kG/Min IV Continuous <Continuous>  pantoprazole Infusion 8 mG/Hr IV Continuous <Continuous>  PrismaSATE Dialysate BK 0 / 3.5 5000 milliLiter(s) CRRT <Continuous>  PrismaSOL Filtration BGK 0 / 2.5 5000 milliLiter(s) CRRT <Continuous>  PrismaSOL Filtration BGK 0 / 2.5 5000 milliLiter(s) CRRT <Continuous>  sodium bicarbonate  Injectable 50 milliEquivalent(s) IV Push once  sodium chloride 0.9%. 1000 milliLiter(s) IV Continuous <Continuous>  vancomycin  IVPB 1250 milliGRAM(s) IV Intermittent every 12 hours  vasopressin Infusion 0.1 Unit(s)/Min IV Continuous <Continuous>    PRN Inpatient Medications      REVIEW OF SYSTEMS  --------------------------------------------------------------------------------  unable to obtain      VITALS/PHYSICAL EXAM  --------------------------------------------------------------------------------  T(C): 37.6 (05-27-24 @ 14:09), Max: 37.6 (05-27-24 @ 14:09)  HR: 54 (05-27-24 @ 19:00) (42 - 92)  BP: 152/87 (05-27-24 @ 14:45) (152/87 - 174/96)  RR: 8 (05-27-24 @ 19:00) (8 - 44)  SpO2: 93% (05-27-24 @ 19:00) (74% - 100%)  Wt(kg): --    Weight (kg): 85 (05-27-24 @ 17:36)      05-27-24 @ 07:01  -  05-27-24 @ 19:51  --------------------------------------------------------  IN: 2081.2 mL / OUT: 1300 mL / NET: 781.2 mL        Physical Exam:  	Gen: Intubated,   	HEENT: Anicteric, +ETT  	Pulm: CTA B/L  	CV: on ECMO  	Abd: Soft, +BS    	Ext: No LE edema B/L  	Neuro: Unresponsive to tactile stimuli   	Skin: Warm and dry  	Dialysis access: ECMO circuit    LABS/STUDIES  --------------------------------------------------------------------------------              7.4    12.19 >-----------<  245      [05-27-24 @ 18:03]              25.7     148  |  100  |  31  ----------------------------<  266      [05-27-24 @ 18:03]  6.5   |  16  |  1.63        Ca     11.8     [05-27-24 @ 18:03]    TPro  5.2  /  Alb  3.4  /  TBili  0.2  /  DBili  x   /  AST  392  /  ALT  614  /  AlkPhos  89  [05-27-24 @ 18:03]    PT/INR: PT 17.6 , INR 1.62       [05-27-24 @ 18:03]  PTT: 137.0      [05-27-24 @ 18:03]          [05-27-24 @ 12:58]        [05-27-24 @ 18:03]    Creatinine Trend:  SCr 1.63 [05-27 @ 18:03]  SCr 1.06 [05-27 @ 12:58]    Urinalysis - [05-27-24 @ 18:03]      Color  / Appearance  / SG  / pH       Gluc 266 / Ketone   / Bili  / Urobili        Blood  / Protein  / Leuk Est  / Nitrite       RBC  / WBC  / Hyaline  / Gran  / Sq Epi  / Non Sq Epi  / Bacteria       HBsAg Nonreact      [11-21-23 @ 04:41]  HCV 0.05, Nonreact      [11-21-23 @ 04:41]    EBONY: titer Negative, pattern --      [11-21-23 @ 04:41]  dsDNA <12      [11-21-23 @ 04:41]  C3 Complement 123      [11-21-23 @ 04:41]  C4 Complement 35      [11-21-23 @ 04:41]  Free Light Chains: kappa 2.52, lambda 1.43, ratio = 1.76      [11-21 @ 04:41]  Immunofixation Serum:   No Monoclonal Band Identified      Reference Range: None Detected      [11-21-23 @ 04:41]  SPEP Interpretation: Normal Electrophoresis Pattern      [11-21-23 @ 04:41]  Cryoglobulin: Negative      [11-21-23 @ 04:40]    Tacrolimus  Cyclosporine  Sirolimus  Mycophenolate  BK PCR  CMV PCR  Parvo PCR  EBV PCR
44M hx DM2, HTN, former smoker and stroke in 2015 and July 2023 with mild residual dysarthria, severe distal VA stenosis w/ extremely poor flow in basilar artery dx November 2023 s/p successful recanalization, angioplasty and stenting w/ complete occlusion of L V4 segment of VA 11/24/23. Was maintained on ASA/brillinta; recently taken off brillinta 5/3/24 per clinic notes. Today p/f code stroke s/p L facial droop, worsened difficulty speaking, and generalized weakness. CTH w/ L occipital and cerebellar infarcts (new since 11/2023 CTH). CTA w/ visualized stent in L VA c/f re-occlusion given filling deficits and new infarcts on CTH; chronic R V4 occlusion; small basilar artery. ptt 23.9, plts 356, INR 1.05. Exam: AOx3, hypophonic/dysarthric, PERRL, L gaze preference, L facial , LUE 4/5, RUE 5/5, LLE HF/KE 4/5 DF/PF 5/5, RLE 5/5. SILT. Some L dysmetria.    --Anticoagulation:    =====================  PAST MEDICAL HISTORY   DM (diabetes mellitus)    HTN (hypertension)    CVA (cerebrovascular accident)      PAST SURGICAL HISTORY     No Known Allergies      MEDICATIONS:  Antibiotics:    Neuro:    Other:      SOCIAL HISTORY:   Occupation:   Marital Status:     FAMILY HISTORY:      ROS: Negative except per HPI    LABS:  PT/INR - ( 27 May 2024 12:58 )   PT: 11.5 sec;   INR: 1.05 ratio         PTT - ( 27 May 2024 12:58 )  PTT:23.9 sec                        10.4   12.50 )-----------( 356      ( 27 May 2024 12:58 )             34.5     05-27    144  |  103  |  30<H>  ----------------------------<  150<H>  4.6   |  23  |  1.06    Ca    10.9<H>      27 May 2024 12:58    TPro  8.4<H>  /  Alb  5.2<H>  /  TBili  0.4  /  DBili  x   /  AST  28  /  ALT  24  /  AlkPhos  69  05-27      
ADVANCED HEART FAILURE - CONSULT NOTE  [address] [place] [address]  Office Phone: [phone]/Fax: [phone]  Service/On Call Phone [phone]  _______________________________________________________________________________________________________    HPI:  44 y.o M w/ PMHx of HTN, DM, multiple prior posterior circulation CVA ( L occipital lobe, cerebellum, b/l brachium pontis, and R adrienne) one treated with tenecteplase and thrombectomy, severe distal vertebral artery stenosis s/p LV4 stent on DAPT p/w worsening of his L facial, dysarthria, LUE dysmetria.    Stroke code called, LKW 5:30 PM 5/26/24     Patient presents with acute worsening of his known neurologic deficits from prior strokes. Patient reports that at around 5:30 PM on 5/26 he was seated watching television when suddenly he had an episode of vomiting after which he felt generally weak.  His spouse noted worsening of his left-sided facial droop, generalized weakness and difficulty speaking.  This difficulty speaking was worse than his baseline aphasia.  In the ED CT scan was done which revealed concern for worsening of prior known left cerebellar and left occipital strokes and CTA showed some concern for filling deficits in the patient's known left vertebral stent. There were patchy areas of decreased perfusion in the posterior circulation. Patient also noted a cough starting yesterday after am episode of emesis.    Of note, patient was previously on ASA & Birlinta DAPT and he was recently (5/3) taken off his Brilinta per instruction of his neurology team in favor of ASA monotherapy.     Patient reports fatigue, nausea, a cough and malaise. Patient denies headache, dizziness, vision changes, hearing changes, new weakness or numbness.    NIHSS: 3  PreMRS: 3  Patient was not a tenecteplase candidate as he was outside the time window  Pateint was not a thrombectomy as no acute LVO seen on imaging. (27 May 2024 14:15)    PAST MEDICAL & SURGICAL HISTORY:  DM (diabetes mellitus)      HTN (hypertension)      CVA (cerebrovascular accident)        REVIEW OF SYSTEMS:  14 point ROS negative in detail apart from as documented in HPI.    MEDICATIONS  (STANDING):  artificial  tears Solution 1 Drop(s) Both EYES four times a day  chlorhexidine 0.12% Liquid 15 milliLiter(s) Oral Mucosa every 12 hours  CRRT Treatment    <Continuous>  dextrose 50% Injectable 25 milliLiter(s) IV Push every 15 minutes  dextrose 50% Injectable 50 milliLiter(s) IV Push every 15 minutes  DOBUTamine Infusion 5 MICROgram(s)/kG/Min (12.8 mL/Hr) IV Continuous <Continuous>  EPINEPHrine    Infusion 0.3 MICROgram(s)/kG/Min (47.8 mL/Hr) IV Continuous <Continuous>  insulin regular Infusion 2 Unit(s)/Hr (2 mL/Hr) IV Continuous <Continuous>  meropenem  IVPB 1000 milliGRAM(s) IV Intermittent every 8 hours  norepinephrine Infusion 0.05 MICROgram(s)/kG/Min (3.98 mL/Hr) IV Continuous <Continuous>  pantoprazole Infusion 8 mG/Hr (10 mL/Hr) IV Continuous <Continuous>  PrismaSATE Dialysate BGK 4 / 2.5 5000 milliLiter(s) (1500 mL/Hr) CRRT <Continuous>  PrismaSOL Filtration BGK 4 / 2.5 5000 milliLiter(s) (1300 mL/Hr) CRRT <Continuous>  PrismaSOL Filtration BGK 4 / 2.5 5000 milliLiter(s) (200 mL/Hr) CRRT <Continuous>  sodium chloride 0.9%. 1000 milliLiter(s) (10 mL/Hr) IV Continuous <Continuous>  sodium chloride 3%. 500 milliLiter(s) (15 mL/Hr) IV Continuous <Continuous>  vancomycin  IVPB 1250 milliGRAM(s) IV Intermittent every 12 hours  vasopressin Infusion 0.1 Unit(s)/Min (15 mL/Hr) IV Continuous <Continuous>    MEDICATIONS  (PRN):    Home Medications:  acetaminophen 325 mg oral tablet: 2 tab(s) orally every 6 hours As needed Temp greater or equal to 38.5C (101.3F), Mild Pain (1 - 3) (18 Dec 2023 13:20)  aspirin 81 mg oral tablet, chewable: 1 tab(s) orally once a day (18 Dec 2023 13:20)  polyethylene glycol 3350 oral powder for reconstitution: 17 gram(s) orally 2 times a day As needed Constipation (18 Dec 2023 13:20)  senna leaf extract oral tablet: 2 tab(s) orally once a day (at bedtime) (18 Dec 2023 13:20)    Allergies    No Known Allergies    Intolerances      ICU Vital Signs Last 24 Hrs  T(C): 35, Max: 36.1 (05-28 @ 08:00)  HR: 100 (42 - 109)  BP: 152/87 (152/87 - 152/87)  BP(mean): 102 (102 - 102)  ABP: 89/68 (41/23 - 123/84)  ABP(mean): 76 (22 - 95)  RR: 10 (4 - 44)  SpO2: 100% (70% - 100%)        I&O's Summary Last 24 Hrs    IN: 6665.9 mL / OUT: 3150 mL / NET: 3515.9 mL      Physical Exam:    General: Intubated on VA ECMO and CRRT.  Neurology: Pupils fixed and dilated, not responsive to voice, only moving UE to noxious stimuli.  Respiratory: Breath sounds bilaterally  CV: RRR, no G/R/M  Abdominal: Soft, Nondistended  Extremities: Warm, well-perfused  Mauricio with minimal output.    Labs:    ( 05-28-24 @ 05:04 )               8.1    11.32 )--------( 163                  25.7     ( 05-28-24 @ 00:30 )     145  |  106  |  35  ---------------------<  130  3.9  |  23  |  1.83    Ca 9.5  Phos 4.3  Mg 2.3    ( 05-28-24 @ 00:30 )  TPro  5.5  /  Alb  3.6  /  TBili  0.4  /  DBili  x   /  AST  476  /  ALT  534  /  AlkPhos  99  ( 05-27-24 @ 18:03 )  TPro  5.2  /  Alb  3.4  /  TBili  0.2  /  DBili  x   /  AST  392  /  ALT  614  /  AlkPhos  89    PTT/PT/INR - ( 05-28-24 @ 05:04 )  PTT: 30.9 sec / PT: 15.3 sec / INR: 1.40 ratio    ( 05-27-24 @ 12:58 )  TropHS 21    /    / CKMB x            VBG - ( 05-28-24 @ 12:55 )  pH: 7.27  / pCO2: 50    / pO2: 41    / Oxygen saturation: 71.9    VBG - ( 05-28-24 @ 12:35 )  pH: 7.36  / pCO2: 43    / pO2: 398   / Oxygen saturation: 99.3    VBG - ( 05-28-24 @ 10:27 )  pH: 7.22  / pCO2: 58    / pO2: 42    / Oxygen saturation: 69.3    VBG - ( 05-28-24 @ 06:02 )  pH: 7.22  / pCO2: 61    / pO2: 47    / Oxygen saturation: 75.9    VBG - ( 05-28-24 @ 04:47 )  pH: 7.26  / pCO2: 61    / pO2: 38    / Oxygen saturation: 60.9    VBG - ( 05-28-24 @ 03:13 )  pH: 7.26  / pCO2: 61    / pO2: 39    / Oxygen saturation: 62.7    VBG - ( 05-28-24 @ 00:01 )  pH: 7.31  / pCO2: 52    / pO2: 37    / Oxygen saturation: 59.8      ABG - ( 05-28-24 @ 12:55 )  pH: 7.36  / pCO2: 41    / pO2: 428   / HCO3: 23    / Base Excess: -2.1  / SaO2: 100.0    Blood Gas Venous - Lactate: 4.1 (05-28-24 @ 12:35)  Blood Gas Venous - Lactate: 4.5 (05-28-24 @ 10:27)    Lactate Dehydrogenase, Serum: 852 (05-28-24 @ 00:30)  Lactate Dehydrogenase, Serum: 746 (05-27-24 @ 18:03)      RADIOLOGY & ADDITIONAL STUDIES:    TTE 5/28: 1. Left ventricular systolic function is severely decreased.   2. A VA ECMO device is present. The left ventricular cavity size appears decompressed.   3. Trace pericardial effusion noted adjacent to the anterior right ventricle.

## 2024-05-28 NOTE — PROGRESS NOTE ADULT - SUBJECTIVE AND OBJECTIVE BOX
----------------------------------------------------------------------------------------------------  ECMO Daily Management Note   ----------------------------------------------------------------------------------------------------  INTERVAL EVENTS:     ----------------------------------------------------------------------------------------------------  44yM   VA  ECMO  for:  Refractory cardiogenic shock   Date of initiation:        Cannulae:   25Fr R Fem Venous,  19Fr L Fem Arterial with 6Fr Distal reperfusion catheter     Continue current support,   Wean trial as tolerated   Anticoagulation w/hep gtt, goal Xa 0.2-0.4, PTT 50-60    =============================================================  Weight (kg): 85 (05-27-24)                       ECMO  RPM:  3400 (28 May 2024 06:00)      Pump Flow (Lpm):  3.59 (28 May 2024 06:00)              Sweep  (L/min):   4.5 (28 May 2024 06:00)       FiO2 (%):  1 (28 May 2024 06:00)  Pre-membrane -  Pressure (mm/Hg):   158 (28 May 2024 06:00)  27 May 2024 17:39  pH: 7.09  / pCO2: 65    /  pO2: 80    /  HCO3: 20    /   Base Excess: -9.7  / SvO2: 95.4       Post-membrane - Pressure (mm/Hg):  140 (28 May 2024 06:00)   ( 28 May 2024 06:26 )  pH: 7.27  /  pCO2: 47    / pO2: 448   /  HCO3: 22    /  Base Excess: -5.4  /  SaO2: 99.8           DOBUTamine Infusion 5 MICROgram(s)/kG/Min IV Continuous <Continuous>  EPINEPHrine    Infusion 0.1 MICROgram(s)/kG/Min IV Continuous <Continuous>  norepinephrine Infusion 0.05 MICROgram(s)/kG/Min IV Continuous <Continuous>  vasopressin Infusion 0.1 Unit(s)/Min IV Continuous <Continuous>    Vent Mode: AC/ CMV (Assist Control/ Continuous Mandatory Ventilation)  RR (machine): 10, FiO2: 100, PEEP: 10, MAP: 18, PC: 20, PIP: 35    (28 May 2024 06:02) pH, Art: 7.30/pCO2: 47/pO2: 435/HCO3: 23/BE: -3.2/SaO2: 99.6/LAC: 3.0, --   (28 May 2024 04:47) pH, Art: 7.29/pCO2: 52/pO2: 400/HCO3: 25/BE: -1.7/SaO2: 100.0/LAC: 3.2, --   (28 May 2024 03:13) pH, Art: 7.36/pCO2: 45/pO2: 285/HCO3: 25/BE: -0.2/SaO2: 98.8/LAC: 3.2, --   (28 May 2024 00:50) pH, Art: 7.40/pCO2: 41/pO2: 277/HCO3: 25/BE: 0.5/SaO2: 99.0/LAC: 4.2, --   (28 May 2024 00:00) pH, Art: 7.37/pCO2: 42/pO2: 464/HCO3: 24/BE: -1.0/SaO2: 98.6/LAC: 5.3, --     (28 May 2024 06:02) pH, Earl: 7.22/pCO2: 61/pO2: 47 /HCO3: 25/BE: -2.9/MVO2: /SvO2: 75.9/LAC: 4.0,   (28 May 2024 04:47) pH, Earl: 7.26/pCO2: 61/pO2: 38 /HCO3: 27/BE: -0.1/MVO2: /SvO2: 60.9/LAC: 2.8,   (28 May 2024 03:13) pH, Earl: 7.26/pCO2: 61/pO2: 39 /HCO3: 27/BE: -1.0/MVO2: /SvO2: 62.7/LAC: ,   (28 May 2024 00:01) pH, Earl: 7.31/pCO2: 52/pO2: 37 /HCO3: 26/BE: -0.8/MVO2: /SvO2: 59.8/LAC: ,   (27 May 2024 12:59) pH, Earl: 7.41/pCO2: 51/pO2: 36 /HCO3: 32/BE: 6.6/MVO2: /SvO2: 57.9/LAC: 1.7,     ----------------------------------------------------------------------------------------------------  ANTICOAGULATION    (28 May 2024 05:04)  aPTT: 30.9  Xa: <0.04  PT: 15.3  INR: 1.40   Fibrinogen: 109   Platelets: 163   (28 May 2024 00:27)  aPTT: 32.9  Xa: 0.07  PT: 16.2  INR: 1.49   Fibrinogen: 70    Platelets: 228   (27 May 2024 18:03)  aPTT: 137.0  Xa: 0.43  PT: 17.6  INR: 1.62   Fibrinogen: 45    Platelets: 245   (27 May 2024 12:58)  aPTT: 23.9  Xa: -----  PT: 11.5  INR: 1.05   Fibrinogen: -----  Platelets: 356     (28 May 2024 05:04)  Hemoglobin: 8.1     LDH: -----    Haptoglobin: -----   PFH:  -----  (28 May 2024 00:30)  Hemoglobin: -----    LDH: 852     Haptoglobin: 145    PFH:  -----  (28 May 2024 00:27)  Hemoglobin: 9.4     LDH: -----    Haptoglobin: -----   PFH:  -----  (27 May 2024 18:03)  Hemoglobin: 7.4     LDH: 746     Haptoglobin: -----   PFH:  -----    ----------------------------------------------------------------------------------------------------  Daily ECMO Checklist:   Progress report received from perfusionist   Circuit checked/inspected   Emergency equipment and supplies checked   Cannulation site inspection     I have reviewed all of the above information as well as pertinent labs/imaging/testing and care plan with the bedside nurse, perfusionist and care team members and provided my recommendations for support.   ECMO Management was separate from critical care billing time.     ----------------------------------------------------------------------------------------------------  ECMO Daily Management Note   ----------------------------------------------------------------------------------------------------  INTERVAL EVENTS:   Poor neuro exam and CT head with anoxic changes.  ----------------------------------------------------------------------------------------------------  45 yo M s/p stroke and PEA arrest - cannulated for VA ECMO  VA  ECMO  for:  Refractory cardiogenic shock   Date of initiation: 5/27/24  Cannulae:   25Fr R Fem Venous,  19Fr L Fem Arterial   Cannulas examined and well-secured.    Continue current support for now.  Goals of care discussions ongoing.  Anticoagulation on hold for bleeding.    =============================================================  Weight (kg): 85 (05-27-24)                       ECMO  RPM:  3400 (28 May 2024 06:00)      Pump Flow (Lpm):  3.59 (28 May 2024 06:00)              Sweep  (L/min):   4.5 (28 May 2024 06:00)       FiO2 (%):  1 (28 May 2024 06:00)  Pre-membrane -  Pressure (mm/Hg):   158 (28 May 2024 06:00)  27 May 2024 17:39  pH: 7.09  / pCO2: 65    /  pO2: 80    /  HCO3: 20    /   Base Excess: -9.7  / SvO2: 95.4       Post-membrane - Pressure (mm/Hg):  140 (28 May 2024 06:00)   ( 28 May 2024 06:26 )  pH: 7.27  /  pCO2: 47    / pO2: 448   /  HCO3: 22    /  Base Excess: -5.4  /  SaO2: 99.8           DOBUTamine Infusion 5 MICROgram(s)/kG/Min IV Continuous <Continuous>  EPINEPHrine    Infusion 0.1 MICROgram(s)/kG/Min IV Continuous <Continuous>  norepinephrine Infusion 0.05 MICROgram(s)/kG/Min IV Continuous <Continuous>  vasopressin Infusion 0.1 Unit(s)/Min IV Continuous <Continuous>    Vent Mode: AC/ CMV (Assist Control/ Continuous Mandatory Ventilation)  RR (machine): 10, FiO2: 100, PEEP: 10, MAP: 18, PC: 20, PIP: 35    (28 May 2024 06:02) pH, Art: 7.30/pCO2: 47/pO2: 435/HCO3: 23/BE: -3.2/SaO2: 99.6/LAC: 3.0, --   (28 May 2024 04:47) pH, Art: 7.29/pCO2: 52/pO2: 400/HCO3: 25/BE: -1.7/SaO2: 100.0/LAC: 3.2, --   (28 May 2024 03:13) pH, Art: 7.36/pCO2: 45/pO2: 285/HCO3: 25/BE: -0.2/SaO2: 98.8/LAC: 3.2, --   (28 May 2024 00:50) pH, Art: 7.40/pCO2: 41/pO2: 277/HCO3: 25/BE: 0.5/SaO2: 99.0/LAC: 4.2, --   (28 May 2024 00:00) pH, Art: 7.37/pCO2: 42/pO2: 464/HCO3: 24/BE: -1.0/SaO2: 98.6/LAC: 5.3, --     (28 May 2024 06:02) pH, Earl: 7.22/pCO2: 61/pO2: 47 /HCO3: 25/BE: -2.9/MVO2: /SvO2: 75.9/LAC: 4.0,   (28 May 2024 04:47) pH, Earl: 7.26/pCO2: 61/pO2: 38 /HCO3: 27/BE: -0.1/MVO2: /SvO2: 60.9/LAC: 2.8,   (28 May 2024 03:13) pH, Earl: 7.26/pCO2: 61/pO2: 39 /HCO3: 27/BE: -1.0/MVO2: /SvO2: 62.7/LAC: ,   (28 May 2024 00:01) pH, Earl: 7.31/pCO2: 52/pO2: 37 /HCO3: 26/BE: -0.8/MVO2: /SvO2: 59.8/LAC: ,   (27 May 2024 12:59) pH, Earl: 7.41/pCO2: 51/pO2: 36 /HCO3: 32/BE: 6.6/MVO2: /SvO2: 57.9/LAC: 1.7,     ----------------------------------------------------------------------------------------------------  ANTICOAGULATION    (28 May 2024 05:04)  aPTT: 30.9  Xa: <0.04  PT: 15.3  INR: 1.40   Fibrinogen: 109   Platelets: 163   (28 May 2024 00:27)  aPTT: 32.9  Xa: 0.07  PT: 16.2  INR: 1.49   Fibrinogen: 70    Platelets: 228   (27 May 2024 18:03)  aPTT: 137.0  Xa: 0.43  PT: 17.6  INR: 1.62   Fibrinogen: 45    Platelets: 245   (27 May 2024 12:58)  aPTT: 23.9  Xa: -----  PT: 11.5  INR: 1.05   Fibrinogen: -----  Platelets: 356     (28 May 2024 05:04)  Hemoglobin: 8.1     LDH: -----    Haptoglobin: -----   PFH:  -----  (28 May 2024 00:30)  Hemoglobin: -----    LDH: 852     Haptoglobin: 145    PFH:  -----  (28 May 2024 00:27)  Hemoglobin: 9.4     LDH: -----    Haptoglobin: -----   PFH:  -----  (27 May 2024 18:03)  Hemoglobin: 7.4     LDH: 746     Haptoglobin: -----   PFH:  -----    ----------------------------------------------------------------------------------------------------  Daily ECMO Checklist:   Progress report received from perfusionist   Circuit checked/inspected   Emergency equipment and supplies checked   Cannulation site inspection     I have reviewed all of the above information as well as pertinent labs/imaging/testing and care plan with the bedside nurse, perfusionist and care team members and provided my recommendations for support.   ECMO Management was separate from critical care billing time.

## 2024-05-28 NOTE — CHART NOTE - NSCHARTNOTEFT_GEN_A_CORE
***Vascular Neurology Follow-up Chart Note***    Patient seen today with attending on rounds. Please also refer to attending addendum on initial consult note dated 5/27/24    Exam worsened s/p PEA arrest  Patient now on VA ECMO, intubated.  Withdrawal to pain in b/l UE  No withdrawal to pain in b/l LE  L pupil 6 mm, R pupil 4 mm, both non-reactive  Doll's eyes reflex absent  Cough & gag absent  Corneal blink absent    Impression:   Worsening of known L occipital and cerebellar infarcts i/s/o L vertebral artery stent. CTA concerning for occlusion of L vertebral artery stent. Course complicated by PEA, cause under investigation by primary team    Plan:    [] ANTITHROMBOTIC THERAPY: Held per primary team  [] MRI - When able per primary team  [] Vessel imaging - CTA: Allowing for limited arterial bolus, there is similar appearance of left vertebral artery stented portion, suspected to be re-occluded given new sites of infarct. Chronic right V4 occlusion, though with patent right PICA and collateral flow filling small caliber basilar artery and PCAs.  [] TTE - LVEF <20%, ECMO in place                [] A1C, LDL  [] Atorvastatin 80 [X] not on high intensity statin due to current management of other medical issues  [] Physical therapy/OT/Speech Language    SBP goal: Per primary team  Other:   Monitor on EEG  ABX, ECMO management per primary team      Case & Plan discussed with patient and family. All questions answered. Plan discussed with primary team       CORE MEASURES:         Admission NIHSS: 3      Tenecteplase: [] YES [X] NO     Statin Therapy: [] YES [X] NO     Smoking [] YES [X] NO     Afib [] YES [X] NO     Stroke Education [X] YES [] NO    Dysphagia screen : [] Passed [] Failed- S&S pending [X] Pending  (Ensure medications are ordered via appropriate route)    DVT ppx: Venodynes [] Heparin s.c [] LMWH [X] Not on chemical DVT ppx due to: ***Vascular Neurology Follow-up Chart Note***    Patient seen today with attending on rounds. Please also refer to attending addendum on initial consult note dated 5/27/24    Exam worsened s/p PEA arrest  Patient now on VA ECMO, intubated.  Withdrawal to pain in b/l UE  No withdrawal to pain in b/l LE  L pupil 6 mm, R pupil 4 mm, both non-reactive  Doll's eyes reflex absent  Cough & gag absent  Corneal blink absent    Impression:   Worsening of known L occipital and cerebellar infarcts i/s/o L vertebral artery stent. CTA concerning for occlusion of L vertebral artery stent. Course complicated by PEA, cause under investigation by primary team    Plan:    [] ANTITHROMBOTIC THERAPY: Held per primary team  [] MRI - When able per primary team  [] Vessel imaging - CTA: Allowing for limited arterial bolus, there is similar appearance of left vertebral artery stented portion, suspected to be re-occluded given new sites of infarct. Chronic right V4 occlusion, though with patent right PICA and collateral flow filling small caliber basilar artery and PCAs.  [] TTE - LVEF <20%, ECMO in place                [] A1C, LDL  [] Atorvastatin 80 [X] not on high intensity statin due to current management of other medical issues  [] Physical therapy/OT/Speech Language    SBP goal: Per primary team  Other:   Monitor on EEG  ABX, ECMO management per primary team      Case & Plan discussed with patient and family. All questions answered. Plan discussed with primary team       CORE MEASURES:         Admission NIHSS: 3      Tenecteplase: [] YES [X] NO     Statin Therapy: [] YES [X] NO     Smoking [] YES [X] NO     Afib [] YES [X] NO     Stroke Education [X] YES [] NO    Dysphagia screen : [] Passed [] Failed- S&S pending [X] Pending  (Ensure medications are ordered via appropriate route)    DVT ppx: Venodynes [] Heparin s.c [] LMWH [X] Not on chemical DVT ppx due to:        PLEASE SEE ATTENDING ADDENDUM ON INITIAL HPI.

## 2024-05-29 NOTE — PROGRESS NOTE ADULT - SUBJECTIVE AND OBJECTIVE BOX
Patient seen and examined at the bedside.    Remains critically ill on continuous ICU monitoring.      Brief Summary:  45 yo M w/ PMHx of HTN, DM, multiple prior posterior circulation CVA ( L occipital lobe, cerebellum, b/l brachium pontis, and R adrienne) one treated with tenecteplase and thrombectomy, severe distal vertebral artery stenosis s/p LV4 stent on DAPT p/w worsening of his L facial, dysarthria, LUE dysmetria. PEA cardiac arrest, Cardiogenic shock s/p VA ECMO initiation 5/27/24       24 Hour events:  F/u on NSE      Objective:  Vital Signs Last 24 Hrs  T(C): 36.2 (29 May 2024 04:00), Max: 36.2 (29 May 2024 04:00)  T(F): 97.2 (29 May 2024 04:00), Max: 97.2 (29 May 2024 04:00)  HR: 112 (29 May 2024 07:00) (92 - 116)  BP: --  BP(mean): --  RR: 10 (29 May 2024 07:00) (4 - 31)  SpO2: 100% (29 May 2024 04:30) (70% - 100%)    Parameters below as of 29 May 2024 00:00  Patient On (Oxygen Delivery Method): ventilator    O2 Concentration (%): 50    Mode: AC/ CMV (Assist Control/ Continuous Mandatory Ventilation)  RR (machine): 10  FiO2: 50  PEEP: 12  ITime: 1  MAP: 14  PC: 12  PIP: 25              Physical Exam:   General: Intubated on VA ECMO and CRRT.  Neurology: Pupils fixed and dilated, not responsive to voice, only moving UE to noxious stimuli.  Respiratory: Breath sounds bilaterally  CV: Sinus Tachycardia  VA ECMO: 3900 rpms, 4.62 flow, Sweep of 5.5  Abdominal: Soft, Nondistended  Extremities: Warm, well-perfused  Mauricio with minimal output.  -------------------------------------------------------------------------------------------------------------------------------    Labs:                        7.1    13.41 )-----------( 68       ( 29 May 2024 00:25 )             23.1     05-29    144  |  105  |  32<H>  ----------------------------<  84  5.9<H>   |  20<L>  |  2.72<H>    Ca    8.8      29 May 2024 00:18  Phos  6.6     05-29  Mg     2.2     05-29    TPro  5.3<L>  /  Alb  3.6  /  TBili  0.8  /  DBili  x   /  AST  1028<H>  /  ALT  908<H>  /  AlkPhos  71  05-29    LIVER FUNCTIONS - ( 29 May 2024 00:18 )  Alb: 3.6 g/dL / Pro: 5.3 g/dL / ALK PHOS: 71 U/L / ALT: 908 U/L / AST: 1028 U/L / GGT: x           PT/INR - ( 29 May 2024 00:25 )   PT: 15.8 sec;   INR: 1.52 ratio         PTT - ( 29 May 2024 00:25 )  PTT:32.9 sec  ABG - ( 29 May 2024 06:33 )  pH, Arterial: 7.26  pH, Blood: x     /  pCO2: 39    /  pO2: 221   / HCO3: 18    / Base Excess: -8.9  /  SaO2: 99.7              ------------------------------------------------------------------------------------------------------------------------------  Assessment:  44 y.o M w/ PMHx of HTN, DM, multiple prior posterior circulation CVA ( L occipital lobe, cerebellum, b/l brachium pontis, and R adrienne) one treated with tenecteplase and thrombectomy, severe distal vertebral artery stenosis s/p LV4 stent on DAPT p/w worsening of his L facial, dysarthria, LUE dysmetria. He is s/p PEA cardiac arrest, Cardiogenic shock s/p VA ECMO initiation 5/27/24     Acute hypoxemic respiratory failure  Diffuse cerebral edema, anoxic brain injury  Post-op acute blood loss anemia  Hyperkalemia, MALI with ATN  GI bleed  Cardiogenic shock requiring high dose pressors  Lactic Acidosis   Leukocytosis  Thrombocytopenia    Plan:   ***Neuro***  Hx of CVA x3 (Last 11/23).  Repeat CTH post arrest with signs of diffuse cerebral edema, signs of anoxic injury  NSE sent. Monitor off sedation, hypertonic saline to keep serum sodium 150-155, mannitol, HOB elevated.  Neurology and neurosurgery teams were consulted prior to the cardiac arrest with new worsened left posterior circulation strokes.    ***Cardiovascular***  At high risk for hemodynamic instability and cardiac arrhythmias.  Trend Lactate    Continue VA ECMO support   Wean pressors for MAP > 65 mm Hg   Remains on Dobutamine and Epinephrine - will transition from Epinephrine to Norepinephrine as able.    ***Pulmonary***  Postoperative acute respiratory failure  Lung protective settings.  Deep breathing and coughing exercises.    ***GI***  NPO for now.  OGT to low continuous suction  Protonix for stress ulcer prophylaxis / GI bleed.  Trend H/H    ***Renal***  CRRT with severe hyperkalemia  Trend Creatinine   Mauricio catheter for strict I/O measurements.   Replete electrolytes as indicated.    ***ID***  Vancomycin/ Meropenum for empiric dosing     ***Endocrine***  Hyperglycemia - Insulin infusion.      ***Hematology***  Acute blood loss anemia - Transfused blood and products yesterday.  Trend CBC and coags and monitor for bleeding.  Anticoagulation on hold.            Patient requires continuous monitoring with bedside rhythm monitoring, pulse oximetry monitoring, and continuous central venous and arterial pressure monitoring; and intermittent blood gas analysis. Care plan discussed with the ICU care team.   Patient remains critical, at risk for life threatening decompensation.    I have spent 30 minutes providing critical care management to this patient.    By signing my name below, I, Telma Stafford, attest that this documentation has been prepared under the direction and in the presence of Yusra Nagy MD  Electronically signed: Telma Stafford, 05-29-24 @ 07:36    I, Yusra Nagy MD, personally performed the services described in this documentation. all medical record entries made by the scribe were at my direction and in my presence. I have reviewed the chart and agree that the record reflects my personal performance and is accurate and complete  Electronically signed: Yusra Nagy MD Patient seen and examined at the bedside.    Remains critically ill on continuous ICU monitoring.      Brief Summary:  43 yo M w/ PMHx of HTN, DM, multiple prior posterior circulation CVA ( L occipital lobe, cerebellum, b/l brachium pontis, and R adrienne) one treated with tenecteplase and thrombectomy, severe distal vertebral artery stenosis s/p LV4 stent on DAPT p/w worsening of his L facial, dysarthria, LUE dysmetria. PEA cardiac arrest, Cardiogenic shock s/p VA ECMO initiation 5/27/24       24 Hour events:  Increased lactate and pressor requirement overnight.  Remains unresponsive.      Objective:  Vital Signs Last 24 Hrs  T(C): 36.2 (29 May 2024 04:00), Max: 36.2 (29 May 2024 04:00)  T(F): 97.2 (29 May 2024 04:00), Max: 97.2 (29 May 2024 04:00)  HR: 112 (29 May 2024 07:00) (92 - 116)  BP: --  BP(mean): --  RR: 10 (29 May 2024 07:00) (4 - 31)  SpO2: 100% (29 May 2024 04:30) (70% - 100%)    Parameters below as of 29 May 2024 00:00  Patient On (Oxygen Delivery Method): ventilator    O2 Concentration (%): 50    Mode: AC/ CMV (Assist Control/ Continuous Mandatory Ventilation)  RR (machine): 10  FiO2: 50  PEEP: 12  ITime: 1  MAP: 14  PC: 12  PIP: 25              Physical Exam:   General: Intubated on VA ECMO and CRRT.  Neurology: Pupils fixed and dilated, not responsive to voice, no movement to noxious stimuli  Respiratory: Breath sounds bilaterally  CV: Sinus Tachycardia  VA ECMO: 3900 rpms, 4.62 flow, Sweep of 5.5  Abdominal: Soft, Nondistended  Extremities: Warm, well-perfused    -------------------------------------------------------------------------------------------------------------------------------    Labs:                        7.1    13.41 )-----------( 68       ( 29 May 2024 00:25 )             23.1     05-29    144  |  105  |  32<H>  ----------------------------<  84  5.9<H>   |  20<L>  |  2.72<H>    Ca    8.8      29 May 2024 00:18  Phos  6.6     05-29  Mg     2.2     05-29    TPro  5.3<L>  /  Alb  3.6  /  TBili  0.8  /  DBili  x   /  AST  1028<H>  /  ALT  908<H>  /  AlkPhos  71  05-29    LIVER FUNCTIONS - ( 29 May 2024 00:18 )  Alb: 3.6 g/dL / Pro: 5.3 g/dL / ALK PHOS: 71 U/L / ALT: 908 U/L / AST: 1028 U/L / GGT: x           PT/INR - ( 29 May 2024 00:25 )   PT: 15.8 sec;   INR: 1.52 ratio         PTT - ( 29 May 2024 00:25 )  PTT:32.9 sec  ABG - ( 29 May 2024 06:33 )  pH, Arterial: 7.26  pH, Blood: x     /  pCO2: 39    /  pO2: 221   / HCO3: 18    / Base Excess: -8.9  /  SaO2: 99.7          ------------------------------------------------------------------------------------------------------------------------------  Assessment:  44 y.o M w/ PMHx of HTN, DM, multiple prior posterior circulation CVA ( L occipital lobe, cerebellum, b/l brachium pontis, and R adrienne) one treated with tenecteplase and thrombectomy, severe distal vertebral artery stenosis s/p LV4 stent on DAPT p/w worsening of his L facial, dysarthria, LUE dysmetria. He is s/p PEA cardiac arrest, Cardiogenic shock s/p VA ECMO initiation 5/27/24     Acute hypoxemic respiratory failure  Diffuse cerebral edema, anoxic brain injury  Post-op acute blood loss anemia  Hyperkalemia, MALI with ATN  GI bleed  Cardiogenic shock requiring high dose pressors  Lactic Acidosis   Leukocytosis  Thrombocytopenia      Plan:   ***Neuro***  Hx of CVA x3 (Last 11/23).  Repeat CTH post arrest with signs of diffuse cerebral edema, signs of anoxic injury    ***Cardiovascular***  At high risk for hemodynamic instability and cardiac arrhythmias.  Trend Lactate    Continue VA ECMO support   Wean pressors for MAP > 65 mm Hg   Remains on Dobutamine and Epinephrine    ***Pulmonary***  Acute respiratory failure  Lung protective settings.  Deep breathing and coughing exercises.    ***GI***  OGT to low continuous suction  Protonix for stress ulcer prophylaxis / GI bleed.  Trend H/H    ***Renal***  MALI with severe hyperkalemia  CRRT    ***ID***  Vancomycin/ Meropenum    ***Endocrine***  Hyperglycemia - Insulin infusion.      ***Hematology***  Acute blood loss anemia - Transfused blood and products yesterday.  Trend CBC and coags and monitor for bleeding.  Anticoagulation on hold.      Goals of care discussions ongoing - Likely palliative withdrawal of support tomorrow.      Patient requires continuous monitoring with bedside rhythm monitoring, pulse oximetry monitoring, and continuous central venous and arterial pressure monitoring; and intermittent blood gas analysis. Care plan discussed with the ICU care team.   Patient remains critical, at risk for life threatening decompensation.    I have spent 50 minutes providing critical care management to this patient.    By signing my name below, I, Telma Stafford, attest that this documentation has been prepared under the direction and in the presence of Yusra Nagy MD  Electronically signed: Telma Stafford 05-29-24 @ 07:36    I, Yusra Nagy MD, personally performed the services described in this documentation. all medical record entries made by the scribe were at my direction and in my presence. I have reviewed the chart and agree that the record reflects my personal performance and is accurate and complete  Electronically signed: Yusra Nagy MD

## 2024-05-29 NOTE — PROGRESS NOTE ADULT - PROBLEM SELECTOR PLAN 1
Pt. with MALI in the setting of cardiac arrest. Admission Cr: 1 (5/27); trended up to 1.63 (5/27). Pt. was initiated on CRRT on 5/27. Pt. is anuric. Pt. remains hemodynamically unstable requiring ECMO and IV vasopressor support in CT-ICU. Plan to continue with CRRT today, no UF. Monitor labs and urine output. Avoid nephrotoxins. Dose medications to CRRT. Pt. with MALI in the setting of cardiac arrest. Admission Cr: 1 (5/27); trended up to 1.63 (5/27). Pt. was initiated on CRRT on 5/27. Pt. is anuric. Pt. remains hemodynamically unstable requiring ECMO and IV vasopressor support in CT-ICU.     Plan to continue with CRRT today, no UF. Monitor labs and urine output. Avoid nephrotoxins. Dose medications to CRRT.

## 2024-05-29 NOTE — DIETITIAN INITIAL EVALUATION ADULT - REASON INDICATOR FOR ASSESSMENT
Consult received for pressure injury stage II or greater.  Information obtained from interdisciplinary team, medical record.

## 2024-05-29 NOTE — DIETITIAN INITIAL EVALUATION ADULT - ADD RECOMMEND
Current medical/surgical condition precludes nutrition interventions at this time. RD remains available to provide nutrition recommendations if clinical course changes.

## 2024-05-29 NOTE — PROGRESS NOTE ADULT - ASSESSMENT
43 y/o male with a PMH of HTN, DM, Multiple posterior circulation (CVAs including L occipital, L cerebellum, bilateral brachium pontis) previously treated with thrombectomy and tenecteplase N LV4 stenting presented with L facial droop, dysarthria, LUE dysmetria. While in the ED patient had a PEA arrest and now with cardiopulmonary respiratory failure, intubated and on ECMO. Exam with poor prognosis.       Impression: Ischemic stroke likely secondary to global hypoperfusion in the setting of cardio-respiratory failure.  CT head with global cerebral edema and loss of gray white differentiation EEG with generalized suppression and cerebral inactivity.     [] Prognosis is guarded given neurologic exam and recent imaging and EEG results  [] Palliative consult and GOC discussion with the family  [] Continue medical management per primary team  [] No utility for MR brain at this time  [] Q2h neuro checks  [] HTN goals per CTU    Case seen on rounds with Neurology attending Dr Quintero and stroke team.  45 y/o male with a PMH of HTN, DM, Multiple posterior circulation (CVAs including L occipital, L cerebellum, bilateral brachium pontis) previously treated with thrombectomy and tenecteplase N LV4 stenting presented with L facial droop, dysarthria, LUE dysmetria. While in the ED patient had a PEA arrest and now with cardiopulmonary respiratory failure, intubated and on ECMO. Exam with poor prognosis.       Impression: Ischemic stroke likely secondary to global hypoperfusion in the setting of cardio-respiratory failure.  CT head with global cerebral edema and loss of gray white differentiation EEG with generalized suppression and cerebral inactivity.     [] Prognosis is guarded given neurologic exam and recent imaging and EEG results  [] Discontinue EEG  [] Palliative consult and GOC discussion with the family  [] Continue medical management per primary team  [] No utility for MR brain at this time  [] Q2h neuro checks  [] HTN goals per CTU    Case seen on rounds with Neurology attending Dr Quintero and stroke team.

## 2024-05-29 NOTE — DIETITIAN INITIAL EVALUATION ADULT - ORAL INTAKE PTA/DIET HISTORY
Unable to obtain subjective information from pt at this time; pt currently intubated. Per chart, NKFA, no micronutrient supplementation PTA. Unknown chewing/swallowing function PTA at this time.

## 2024-05-29 NOTE — DIETITIAN INITIAL EVALUATION ADULT - PERTINENT MEDS FT
MEDICATIONS  (STANDING):  artificial  tears Solution 1 Drop(s) Both EYES four times a day  chlorhexidine 0.12% Liquid 15 milliLiter(s) Oral Mucosa every 12 hours  chlorhexidine 2% Cloths 1 Application(s) Topical <User Schedule>  CRRT Treatment    <Continuous>  dextrose 10%. 1000 milliLiter(s) (50 mL/Hr) IV Continuous <Continuous>  dextrose 50% Injectable 25 milliLiter(s) IV Push every 15 minutes  dextrose 50% Injectable 50 milliLiter(s) IV Push every 15 minutes  DOBUTamine Infusion 5 MICROgram(s)/kG/Min (12.8 mL/Hr) IV Continuous <Continuous>  EPINEPHrine    Infusion 0.3 MICROgram(s)/kG/Min (47.8 mL/Hr) IV Continuous <Continuous>  insulin regular Infusion 2 Unit(s)/Hr (2 mL/Hr) IV Continuous <Continuous>  meropenem  IVPB 1000 milliGRAM(s) IV Intermittent every 8 hours  norepinephrine Infusion 0.05 MICROgram(s)/kG/Min (3.98 mL/Hr) IV Continuous <Continuous>  pantoprazole Infusion 8 mG/Hr (10 mL/Hr) IV Continuous <Continuous>  PrismaSATE Dialysate BK 0 / 3.5 5000 milliLiter(s) (2000 mL/Hr) CRRT <Continuous>  PrismaSOL Filtration BGK 0 / 2.5 5000 milliLiter(s) (1300 mL/Hr) CRRT <Continuous>  PrismaSOL Filtration BGK 0 / 2.5 5000 milliLiter(s) (200 mL/Hr) CRRT <Continuous>  sodium chloride 0.9%. 1000 milliLiter(s) (10 mL/Hr) IV Continuous <Continuous>  sodium chloride 3%. 500 milliLiter(s) (15 mL/Hr) IV Continuous <Continuous>  vancomycin  IVPB 500 milliGRAM(s) IV Intermittent every 12 hours  vasopressin Infusion 0.1 Unit(s)/Min (15 mL/Hr) IV Continuous <Continuous>    MEDICATIONS  (PRN):

## 2024-05-29 NOTE — DIETITIAN INITIAL EVALUATION ADULT - NSFNSPHYEXAMSKINFT_GEN_A_CORE
Pressure Injury 1: Right:, heel, Suspected deep tissue injury  Pressure Injury 2: Left:, heel, Suspected deep tissue injury  Pressure Injury 3: sacrum, Suspected deep tissue injury

## 2024-05-29 NOTE — DIETITIAN INITIAL EVALUATION ADULT - PERTINENT LABORATORY DATA
05-29    144  |  105  |  32<H>  ----------------------------<  84  5.9<H>   |  20<L>  |  2.72<H>    Ca    8.8      29 May 2024 00:18  Phos  6.6     05-29  Mg     2.2     05-29    TPro  5.3<L>  /  Alb  3.6  /  TBili  0.8  /  DBili  x   /  AST  1028<H>  /  ALT  908<H>  /  AlkPhos  71  05-29  POCT Blood Glucose.: 104 mg/dL (05-29-24 @ 13:59)  A1C with Estimated Average Glucose Result: 5.6 % (05-28-24 @ 12:46)  A1C with Estimated Average Glucose Result: 5.5 % (11-17-23 @ 06:59)  A1C with Estimated Average Glucose Result: 7.0 % (07-25-23 @ 10:18)

## 2024-05-29 NOTE — INITIAL ORGAN DONATION REFERRAL - NSORGANDONATIONCLINICALTRIGGER_GEN_ALL_CORE
Absence of TWO or more brain stem reflexes/[place] Coma Scale is less than or equal to 5/Family discussion withdrawal of life-sustaining therapies is anticipated

## 2024-05-29 NOTE — PROGRESS NOTE ADULT - SUBJECTIVE AND OBJECTIVE BOX
Neurology Progress Note    SUBJECTIVE/OBJECTIVE/INTERVAL EVENTS: Patient seen and examined at bedside w/ neuro attending and team.     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 While in the ED patient had a PEA arrest and now with cardiopulmonary respiratory failure.     REVIEW OF SYSTEMS:  Per the HPI.    VITALS & EXAMINATION:  Vital Signs Last 24 Hrs  T(C): 36.4 (29 May 2024 08:00), Max: 36.4 (29 May 2024 08:00)  T(F): 97.5 (29 May 2024 08:00), Max: 97.5 (29 May 2024 08:00)  HR: 112 (29 May 2024 11:00) (92 - 116)  BP: --  BP(mean): --  RR: 28 (29 May 2024 10:15) (4 - 31)  SpO2: 92% (29 May 2024 09:45) (78% - 100%)    Parameters below as of 29 May 2024 08:00  Patient On (Oxygen Delivery Method): ventilator    O2 Concentration (%): 50    General:  Constitutional: Male, appears stated age,  intubated and on ventilator and ECMO  Head: Normocephalic;   Eyes: clear sclera;   Extremities: No cyanosis;       Neurological (>12):  MS:  Comatose. Eyes closed at baseline. Does not respond to noxious stimulus or sternal rub. Does not attempt to engage with examiner, does not attempt to track. Does not open eyes.   CNs: Fixed pupils bilaterally, non-reactive ~3mm . Does not blink to threat bilaterally. Dolls eye negative. No disconjugate gaze, facial sensation, tongue cannot be assessed. Cough reflex absent, gag absent. Corneal reflex absent bilaterally.    Motor - 0/5 bilateral upper and lower extremities.   Sensation: Does not withdraw to noxious stimulus in all 4 extremities  Reflexes areflexic all over  Toes: mute b/l  no ankle clonus  Coordination: No ataxia could be demonstrated  Gait: Not assessed due to critical illness    LABORATORY:  CBC                       7.1    13.41 )-----------( 68       ( 29 May 2024 00:25 )             23.1     Chem 05-29    144  |  105  |  32<H>  ----------------------------<  84  5.9<H>   |  20<L>  |  2.72<H>    Ca    8.8      29 May 2024 00:18  Phos  6.6     05-29  Mg     2.2     05-29    TPro  5.3<L>  /  Alb  3.6  /  TBili  0.8  /  DBili  x   /  AST  1028<H>  /  ALT  908<H>  /  AlkPhos  71  05-29    LFTs LIVER FUNCTIONS - ( 29 May 2024 00:18 )  Alb: 3.6 g/dL / Pro: 5.3 g/dL / ALK PHOS: 71 U/L / ALT: 908 U/L / AST: 1028 U/L / GGT: x           Coagulopathy PT/INR - ( 29 May 2024 00:25 )   PT: 15.8 sec;   INR: 1.52 ratio         PTT - ( 29 May 2024 00:25 )  PTT:32.9 sec  Lipid Panel   A1c   Cardiac enzymes CARDIAC MARKERS ( 27 May 2024 12:58 )  x     / x     / 336 U/L / x     / 8.1 ng/mL      U/A Urinalysis Basic - ( 29 May 2024 00:18 )    Color: x / Appearance: x / SG: x / pH: x  Gluc: 84 mg/dL / Ketone: x  / Bili: x / Urobili: x   Blood: x / Protein: x / Nitrite: x   Leuk Esterase: x / RBC: x / WBC x   Sq Epi: x / Non Sq Epi: x / Bacteria: x      STUDIES & IMAGING: (EEG, CT, MR, U/S, TTE/CROW):    CT head 5/27  IMPRESSION:  Limited CT of the brain due to portable technique.    Generalized symmetric loss of gray-white differentiation with global cerebral edema and mass effect effacing the cortical sulci as well as the ventricular system and basal cisterns. Findings suggestive of hypoxic brain injury, which is new since 5/27/2024 1:00 PM study. MRI brain recommended for further characterization.    Patchy hypoattenuation within the cerebellum and left occipital region is present, although poorly demonstrated due to imaging technique.      CTA head and neck/ CTP: 5/27    IMPRESSION:    CT PERFUSION:  Posterior circulation deficits redemonstrated.    CTA INTRACRANIAL:  Allowing for limited arterial bolus, there is similar appearance of left vertebral artery stented portion, suspected to be re-occluded given new sites of infarct.    Chronic right V4 occlusion, though with patent right PICA and collateral flow filling small caliber basilar artery and PCAs.    Follow-up angiogram advised.    CTA EXTRACRANIAL:  Allowing for limited arterial bolus, no arterial occlusion or high-grade stenosis in the neck. Limited view of V3 segments.

## 2024-05-29 NOTE — EEG REPORT - NS EEG TEXT BOX
REPORT OF CONTINUOUS VIDEO EEG      [facility]: [address], [place] [address], Phone: [phone]  [facility]: [address] [facility] [address], [place] [address], Phone: [phone]  [facility]: [address] [place] [address], Phone: [phone]    Patient Name: Jamari Suarez    Age: 44 year, : 1980  Carr: -4 CTU 9  EEG #: [id number]    Study Date: 2024   Start Time: 14:31:05 PM      End Date: 2024         End Time: 07:29:04 PM     Study Duration: 4 h 52 m    Study Information:    EEG Recording Technique:  The patient underwent continuous Video-EEG monitoring, using Telemetry System hardware on the XLTek Digital System. EEG and video data were stored on a computer hard drive with important events saved in digital archive files. The material was reviewed by a physician (electroencephalographer / epileptologist) on a daily basis. Ted and seizure detection algorithms were utilized and reviewed. An EEG Technician attended to the patient, and was available throughout daytime work hours.  The epilepsy center neurologist was available in person or on call 24-hours per day.    EEG Placement and Labeling of Electrodes:  The EEG was performed utilizing 20 channel referential EEG connections (coronal over temporal over parasagittal montage) using all standard 10-20 electrode placements with EKG, with additional electrodes placed in the inferior temporal region using the modified 10-10 montage electrode placements for elective admissions, or if deemed necessary. Recording was at a sampling rate of 256 samples per second per channel. Time synchronized digital video recording was done simultaneously with EEG recording. A low light infrared camera was used for low light recording.     History: -  44 year old Male is here to rule out seizures    Medication  Levophed      Interpretation:    [[[Abbreviation Key:  PDR=alpha rhythm/posterior dominant rhythm. A-P=anterior posterior.  Amplitude: ‘very low’:<20; ‘low’:20-49; ‘medium’:; ‘high’:>150uV.  Persistence for periodic/rhythmic patterns (% of epoch) ‘rare’:<1%; ‘occasional’:1-10%; ‘frequent’:10-50%; ‘abundant’:50-90%; ‘continuous’:>90%.  Persistence for sporadic discharges: ‘rare’:<1/hr; ‘occasional’:1/min-1/hr; ‘frequent’:>1/min; ‘abundant’:>1/10 sec.  RPP=rhythmic and periodic patterns; GRDA=generalized rhythmic delta activity; FIRDA=frontal intermittent GRDA; LRDA=lateralized rhythmic delta activity; TIRDA=temporal intermittent rhythmic delta activity;  LPD=PLED=lateralized periodic discharges; GPD=generalized periodic discharges; BIPDs =bilateral independent periodic discharges; Mf=multifocal; SIRPDs=stimulus induced rhythmic, periodic, or ictal appearing discharges; BIRDs=brief potentially ictal rhythmic discharges >4 Hz, lasting .5-10s; PFA (paroxysmal bursts >13 Hz or =8 Hz <10s).  Modifiers: +F=with fast component; +S=with spike component; +R=with rhythmic component.  S-B=burst suppression pattern.  Max=maximal. N1-drowsy; N2-stage II sleep; N3-slow wave sleep. SSS/BETS=small sharp spikes/benign epileptiform transients of sleep. HV=hyperventilation; PS=photic stimulation]]]      Daily EEG Visual Analysis    FINDINGS:      Background:  Symmetry: symmetric  Continuous: Diffuse suppression ( less than 10 uV) even at low sensitivity  PDR: absent  Reactivity: absent  Voltage: suppressed, [less than 10 uV]  Anterior Posterior Gradient: absent  Breach: absent    Background Slowing:  Generalized slowing: present. as mentioned above  Focal slowing: none was present.    State Changes:   absent    Sporadic Epileptiform Discharges:   None    Rhythmic and Periodic Patterns (RPPs):  None     Electrographic and Electroclinical seizures:  None    Other Clinical Events:  None    Activation Procedures:   -Hyperventilation was not performed.    -Photic stimulation was not performed.      Artifacts:  Intermittent myogenic and movement artifacts were noted.    ECG:  Tachycardia noted    EEG Summary / Classification:  Abnormal EEG in the encephalopathic.  •	Background suppression    EEG Impression / Clinical Correlate:  Abnormal prolonged EEG study due to Diffuse suppression that is persistent and nonreactive to stimulation which indicates very severe diffuse cerebral dysfunction    No epileptic discharges recorded.  No seizures recorded.  •	    ________________________________________    MARIETTA Weber  Attending Physician, [facility] [place]    ------------------------------------  EEG Reading Room: [phone]  On Call Service After Hours: [phone]

## 2024-05-29 NOTE — DIETITIAN INITIAL EVALUATION ADULT - OTHER INFO
- s/p stroke and PEA arrest - cannulated for [facility] ECMO on 5/27  - CVVHD   - Multiple pressors for hemodynamic support (Levophed @ 0.4mcgs/kg/min, Vasopressin @ 0.1 units/min, Epinephrine @ 0.1mcgs/kg/min)     [facility]. Dosing weight 85kg - ?accuracy of weight, unclear how it was obtained.

## 2024-05-29 NOTE — PROGRESS NOTE ADULT - SUBJECTIVE AND OBJECTIVE BOX
----------------------------------------------------------------------------------------------------  ECMO Daily Management Note   ----------------------------------------------------------------------------------------------------  INTERVAL EVENTS:     ----------------------------------------------------------------------------------------------------  43 yo M s/p stroke and PEA arrest - cannulated for VA ECMO  VA  ECMO  for:  Refractory cardiogenic shock   Date of initiation: 5/27/24  Cannulae:   25Fr R Fem Venous,  19Fr L Fem Arterial with 6Fr Distal reperfusion catheter     Continue current support,   Wean trial as tolerated   Anticoagulation w/Argatroban gtt, goal Xa 0.2-0.4, PTT 50-60    =============================================================  Weight (kg): 85 (05-27-24)                       ECMO  RPM:  3900 (29 May 2024 07:00)      Pump Flow (Lpm):  4.62 (29 May 2024 07:00)              Sweep  (L/min):   5.5 (29 May 2024 07:00)       FiO2 (%):  1 (29 May 2024 07:00)  Pre-membrane -  Pressure (mm/Hg):   210 (29 May 2024 07:00)      Post-membrane - Pressure (mm/Hg):  185 (29 May 2024 07:00)   ( 29 May 2024 06:24 )  pH: 7.24  /  pCO2: 42    / pO2: 397   /  HCO3: 18    /  Base Excess: -9.0  /  SaO2: 100.0          DOBUTamine Infusion 5 MICROgram(s)/kG/Min IV Continuous <Continuous>  EPINEPHrine    Infusion 0.3 MICROgram(s)/kG/Min IV Continuous <Continuous>  norepinephrine Infusion 0.05 MICROgram(s)/kG/Min IV Continuous <Continuous>  vasopressin Infusion 0.1 Unit(s)/Min IV Continuous <Continuous>    Vent Mode: AC/ CMV (Assist Control/ Continuous Mandatory Ventilation)  RR (machine): 10, FiO2: 50, PEEP: 12, MAP: 14, PC: 12, PIP: 25    (29 May 2024 06:33) pH, Art: 7.26/pCO2: 39/pO2: 221/HCO3: 18/BE: -8.9/SaO2: 99.7/LAC: 8.5, --   (29 May 2024 04:01) pH, Art: 7.21/pCO2: 45/pO2: 424/HCO3: 18/BE: -9.3/SaO2: 98.8/LAC: 11.0, --   (29 May 2024 02:40) pH, Art: 7.25/pCO2: 40/pO2: 271/HCO3: 18/BE: -9.0/SaO2: 99.8/LAC: 9.9, --   (29 May 2024 00:00) pH, Art: 7.31/pCO2: 42/pO2: 330/HCO3: 21/BE: -4.8/SaO2: 98.9/LAC: 7.4, --   (28 May 2024 21:29) pH, Art: 7.34/pCO2: 41/pO2: 267/HCO3: 22/BE: -3.4/SaO2: 98.6/LAC: 5.7, --     (29 May 2024 06:33) pH, Earl: 7.17/pCO2: 53/pO2: 61 /HCO3: 19/BE: -8.9/MVO2: /SvO2: 91.8/LAC: 8.9,   (29 May 2024 04:01) pH, Earl: 7.15/pCO2: 50/pO2: 64 /HCO3: 17/BE: -10.7/MVO2: /SvO2: 92.0/LAC: 9.5,   (29 May 2024 00:00) pH, Earl: 7.24/pCO2: 48/pO2: 35 /HCO3: 21/BE: -6.9/MVO2: /SvO2: 59.9/LAC: ,   (28 May 2024 12:55) pH, Earl: 7.27/pCO2: 50/pO2: 41 /HCO3: 23/BE: -4.3/MVO2: /SvO2: 71.9/LAC: ,   (28 May 2024 12:35) pH, Aerl: 7.36/pCO2: 43/pO2: 398 /HCO3: 24/BE: -1.1/MVO2: /SvO2: 99.3/LAC: 4.1,     ----------------------------------------------------------------------------------------------------  ANTICOAGULATION    (29 May 2024 00:25)  aPTT: 32.9  Xa: <0.04  PT: 15.8  INR: 1.52   Fibrinogen: 305   Platelets: 68    (28 May 2024 05:04)  aPTT: 30.9  Xa: <0.04  PT: 15.3  INR: 1.40   Fibrinogen: 109   Platelets: 163   (28 May 2024 00:27)  aPTT: 32.9  Xa: 0.07  PT: 16.2  INR: 1.49   Fibrinogen: 70    Platelets: 228   (27 May 2024 18:03)  aPTT: 137.0  Xa: 0.43  PT: 17.6  INR: 1.62   Fibrinogen: 45    Platelets: 245     (29 May 2024 00:25)  Hemoglobin: 7.1     LDH: -----    Haptoglobin: -----   PFH:  -----  (29 May 2024 00:18)  Hemoglobin: -----    LDH: 1137    Haptoglobin: -----   PFH:  -----  (28 May 2024 05:04)  Hemoglobin: 8.1     LDH: -----    Haptoglobin: -----   PFH:  -----  (28 May 2024 00:30)  Hemoglobin: -----    LDH: 852     Haptoglobin: 145    PFH:  -----    ----------------------------------------------------------------------------------------------------  Daily ECMO Checklist:   Progress report received from perfusionist   Circuit checked/inspected   Emergency equipment and supplies checked   Cannulation site inspection     I have reviewed all of the above information as well as pertinent labs/imaging/testing and care plan with the bedside nurse, perfusionist and care team members and provided my recommendations for support.   ECMO Management was separate from critical care billing time.     ----------------------------------------------------------------------------------------------------  ECMO Daily Management Note   ----------------------------------------------------------------------------------------------------  INTERVAL EVENTS:   Increased lactate and pressor requirement overnight.  Flows increased.  Remains on high dose pressors with poor neuro exam.  ----------------------------------------------------------------------------------------------------  43 yo M s/p stroke and PEA arrest - cannulated for VA ECMO  VA  ECMO  for:  Refractory cardiogenic shock   Date of initiation: 5/27/24  Cannulae:   25Fr R Fem Venous,  19Fr L Fem Arterial   Cannulas examined and well-secured.    Continue current support for now.  Goals of care discussions ongoing - likely palliative withdrawal of support tomorrow.  Anticoagulation on hold for bleeding.    =============================================================  Weight (kg): 85 (05-27-24)                       ECMO  RPM:  3900 (29 May 2024 07:00)      Pump Flow (Lpm):  4.62 (29 May 2024 07:00)              Sweep  (L/min):   5.5 (29 May 2024 07:00)       FiO2 (%):  1 (29 May 2024 07:00)  Pre-membrane -  Pressure (mm/Hg):   210 (29 May 2024 07:00)      Post-membrane - Pressure (mm/Hg):  185 (29 May 2024 07:00)   ( 29 May 2024 06:24 )  pH: 7.24  /  pCO2: 42    / pO2: 397   /  HCO3: 18    /  Base Excess: -9.0  /  SaO2: 100.0          DOBUTamine Infusion 5 MICROgram(s)/kG/Min IV Continuous <Continuous>  EPINEPHrine    Infusion 0.3 MICROgram(s)/kG/Min IV Continuous <Continuous>  norepinephrine Infusion 0.05 MICROgram(s)/kG/Min IV Continuous <Continuous>  vasopressin Infusion 0.1 Unit(s)/Min IV Continuous <Continuous>    Vent Mode: AC/ CMV (Assist Control/ Continuous Mandatory Ventilation)  RR (machine): 10, FiO2: 50, PEEP: 12, MAP: 14, PC: 12, PIP: 25    (29 May 2024 06:33) pH, Art: 7.26/pCO2: 39/pO2: 221/HCO3: 18/BE: -8.9/SaO2: 99.7/LAC: 8.5, --   (29 May 2024 04:01) pH, Art: 7.21/pCO2: 45/pO2: 424/HCO3: 18/BE: -9.3/SaO2: 98.8/LAC: 11.0, --   (29 May 2024 02:40) pH, Art: 7.25/pCO2: 40/pO2: 271/HCO3: 18/BE: -9.0/SaO2: 99.8/LAC: 9.9, --   (29 May 2024 00:00) pH, Art: 7.31/pCO2: 42/pO2: 330/HCO3: 21/BE: -4.8/SaO2: 98.9/LAC: 7.4, --   (28 May 2024 21:29) pH, Art: 7.34/pCO2: 41/pO2: 267/HCO3: 22/BE: -3.4/SaO2: 98.6/LAC: 5.7, --     (29 May 2024 06:33) pH, Earl: 7.17/pCO2: 53/pO2: 61 /HCO3: 19/BE: -8.9/MVO2: /SvO2: 91.8/LAC: 8.9,   (29 May 2024 04:01) pH, Earl: 7.15/pCO2: 50/pO2: 64 /HCO3: 17/BE: -10.7/MVO2: /SvO2: 92.0/LAC: 9.5,   (29 May 2024 00:00) pH, Earl: 7.24/pCO2: 48/pO2: 35 /HCO3: 21/BE: -6.9/MVO2: /SvO2: 59.9/LAC: ,   (28 May 2024 12:55) pH, Earl: 7.27/pCO2: 50/pO2: 41 /HCO3: 23/BE: -4.3/MVO2: /SvO2: 71.9/LAC: ,   (28 May 2024 12:35) pH, Earl: 7.36/pCO2: 43/pO2: 398 /HCO3: 24/BE: -1.1/MVO2: /SvO2: 99.3/LAC: 4.1,     ----------------------------------------------------------------------------------------------------  ANTICOAGULATION    (29 May 2024 00:25)  aPTT: 32.9  Xa: <0.04  PT: 15.8  INR: 1.52   Fibrinogen: 305   Platelets: 68    (28 May 2024 05:04)  aPTT: 30.9  Xa: <0.04  PT: 15.3  INR: 1.40   Fibrinogen: 109   Platelets: 163   (28 May 2024 00:27)  aPTT: 32.9  Xa: 0.07  PT: 16.2  INR: 1.49   Fibrinogen: 70    Platelets: 228   (27 May 2024 18:03)  aPTT: 137.0  Xa: 0.43  PT: 17.6  INR: 1.62   Fibrinogen: 45    Platelets: 245     (29 May 2024 00:25)  Hemoglobin: 7.1     LDH: -----    Haptoglobin: -----   PFH:  -----  (29 May 2024 00:18)  Hemoglobin: -----    LDH: 1137    Haptoglobin: -----   PFH:  -----  (28 May 2024 05:04)  Hemoglobin: 8.1     LDH: -----    Haptoglobin: -----   PFH:  -----  (28 May 2024 00:30)  Hemoglobin: -----    LDH: 852     Haptoglobin: 145    PFH:  -----    ----------------------------------------------------------------------------------------------------  Daily ECMO Checklist:   Progress report received from perfusionist   Circuit checked/inspected   Emergency equipment and supplies checked   Cannulation site inspection     I have reviewed all of the above information as well as pertinent labs/imaging/testing and care plan with the bedside nurse, perfusionist and care team members and provided my recommendations for support.   ECMO Management was separate from critical care billing time.

## 2024-05-29 NOTE — PROGRESS NOTE ADULT - SUBJECTIVE AND OBJECTIVE BOX
[facility] Division of Kidney Diseases & Hypertension  FOLLOW UP NOTE  [phone]--------------------------------------------------------------------------------    Chief Complaint: MALI requiring CRRT    24 hour events/subjective: Pt. was seen and evaluated in the CT-ICU this morning. Pt. remains hemodynamically unstable requiring ECMO and IV vasopressor support. Receiving CRRT, tolerating well thus far. Pt. is intubated/sedated, unable to provide ROS.        PAST HISTORY  --------------------------------------------------------------------------------  No significant changes to PMH, PSH, FHx, SHx, unless otherwise noted    ALLERGIES & MEDICATIONS  --------------------------------------------------------------------------------  Allergies    No Known Allergies    Intolerances      Standing Inpatient Medications  artificial  tears Solution 1 Drop(s) Both EYES four times a day  chlorhexidine 0.12% Liquid 15 milliLiter(s) Oral Mucosa every 12 hours  chlorhexidine 2% Cloths 1 Application(s) Topical <User Schedule>  CRRT Treatment    <Continuous>  dextrose 10%. 1000 milliLiter(s) IV Continuous <Continuous>  dextrose 50% Injectable 25 milliLiter(s) IV Push every 15 minutes  dextrose 50% Injectable 50 milliLiter(s) IV Push every 15 minutes  DOBUTamine Infusion 5 MICROgram(s)/kG/Min IV Continuous <Continuous>  EPINEPHrine    Infusion 0.3 MICROgram(s)/kG/Min IV Continuous <Continuous>  insulin regular Infusion 2 Unit(s)/Hr IV Continuous <Continuous>  meropenem  IVPB 1000 milliGRAM(s) IV Intermittent every 8 hours  norepinephrine Infusion 0.05 MICROgram(s)/kG/Min IV Continuous <Continuous>  pantoprazole Infusion 8 mG/Hr IV Continuous <Continuous>  PrismaSATE Dialysate BK 0 / 3.5 5000 milliLiter(s) CRRT <Continuous>  PrismaSOL Filtration BGK 0 / 2.5 5000 milliLiter(s) CRRT <Continuous>  PrismaSOL Filtration BGK 0 / 2.5 5000 milliLiter(s) CRRT <Continuous>  sodium chloride 0.9%. 1000 milliLiter(s) IV Continuous <Continuous>  sodium chloride 3%. 500 milliLiter(s) IV Continuous <Continuous>  vancomycin  IVPB 1000 milliGRAM(s) IV Intermittent every 12 hours  vasopressin Infusion 0.1 Unit(s)/Min IV Continuous <Continuous>    PRN Inpatient Medications      REVIEW OF SYSTEMS  --------------------------------------------------------------------------------  Unable to obtain ROS, see HPI    VITALS/PHYSICAL EXAM  --------------------------------------------------------------------------------  T(C): 36.4 (05-29-24 @ 08:00), Max: 36.4 (05-29-24 @ 08:00)  HR: 116 (05-29-24 @ 08:30) (92 - 116)  BP: --  RR: 4 (05-29-24 @ 08:30) (4 - 31)  SpO2: 93% (05-29-24 @ 08:30) (70% - 100%)  Wt(kg): --    Weight (kg): 85 (05-27-24 @ 17:36)      05-28-24 @ 07:01 - 05-29-24 @ 07:00  --------------------------------------------------------  IN: 3729.8 mL / OUT: 0 mL / NET: 3729.8 mL    05-29-24 @ 07:01  -  05-29-24 @ 08:52  --------------------------------------------------------  IN: 157.2 mL / OUT: 0 mL / NET: 157.2 mL      Physical Exam:  Gen: Intubated,   	HEENT: Anicteric, +ETT  	Pulm: Mechanical breath sounds BL  	CV: on ECMO  	Abd: Soft, +BS    	Ext: No LE edema B/L  	Neuro: Unresponsive to tactile stimuli   	Skin: Warm and dry  	Dialysis access: ECMO circuit    LABS/STUDIES  --------------------------------------------------------------------------------              7.1    13.41 >-----------<  68       [05-29-24 @ 00:25]              23.1     144  |  105  |  32  ----------------------------<  84      [05-29-24 @ 00:18]  5.9   |  20  |  2.72        Ca     8.8     [05-29-24 @ 00:18]      Mg     2.2     [05-29-24 @ 00:18]      Phos  6.6     [05-29-24 @ 00:18]    TPro  5.3  /  Alb  3.6  /  TBili  0.8  /  DBili  x   /  AST  1028  /  ALT  908  /  AlkPhos  71  [05-29-24 @ 00:18]    PT/INR: PT 15.8 , INR 1.52       [05-29-24 @ 00:25]  PTT: 32.9       [05-29-24 @ 00:25]          [05-27-24 @ 12:58]  LDH 1137      [05-29-24 @ 00:18]    Creatinine Trend:  SCr 2.72 [05-29 @ 00:18]  SCr 1.83 [05-28 @ 00:30]  SCr 1.63 [05-27 @ 18:03]  SCr 1.06 [05-27 @ 12:58]    TSH 0.55      [05-28-24 @ 12:46]

## 2024-05-30 NOTE — PROGRESS NOTE ADULT - PROBLEM SELECTOR PLAN 2
Pt. with hyperkalemia in the setting of cardiac arrest and renal failure. Serum potassium elevated at 5.9 this morning. Plan to continue CRRT with 0K fluid. Monitor serum potassium.    If you have any questions, please feel free to contact me  Kalani Landers  Nephrology Fellow  [phone] / [employer] Teams(Preferred)  (After 5pm or on weekends please page the on-call fellow)
Pt. with hyperkalemia in the setting of cardiac arrest and renal failure. Serum potassium elevated at 5.9 yesterday. No labs available for review from today. Plan to continue CRRT with 0K fluid. Monitor serum potassium.    If you have any questions, please feel free to contact me  Kalani Landers  Nephrology Fellow  [phone] / [employer] Teams(Preferred)  (After 5pm or on weekends please page the on-call fellow)

## 2024-05-30 NOTE — PROGRESS NOTE ADULT - SUBJECTIVE AND OBJECTIVE BOX
----------------------------------------------------------------------------------------------------  ECMO Daily Management Note   ----------------------------------------------------------------------------------------------------  INTERVAL EVENTS:     ----------------------------------------------------------------------------------------------------  45 yo M s/p stroke and PEA arrest - cannulated for VA ECMO  VA  ECMO  for:  Refractory cardiogenic shock   Date of initiation: 5/27/24  Cannulae:   25Fr R Fem Venous,  19Fr L Fem Arterial   Cannulas examined and well-secured.    Continue current support for now.  Goals of care discussions ongoing - likely palliative withdrawal of support tomorrow.  Anticoagulation on hold for bleeding.    =============================================================  Weight (kg): 85 (05-27-24)                       ECMO  RPM:  3900 (30 May 2024 07:00)      Pump Flow (Lpm):  4.45 (30 May 2024 07:00)              Sweep  (L/min):   5.5 (30 May 2024 07:00)       FiO2 (%):  1 (30 May 2024 07:00)  Pre-membrane -  Pressure (mm/Hg):   215 (30 May 2024 07:00)      Post-membrane - Pressure (mm/Hg):  192 (30 May 2024 07:00)           DOBUTamine Infusion 5 MICROgram(s)/kG/Min IV Continuous <Continuous>  EPINEPHrine    Infusion 0.3 MICROgram(s)/kG/Min IV Continuous <Continuous>  norepinephrine Infusion 0.05 MICROgram(s)/kG/Min IV Continuous <Continuous>  vasopressin Infusion 0.1 Unit(s)/Min IV Continuous <Continuous>    Vent Mode: AC/ CMV (Assist Control/ Continuous Mandatory Ventilation)  RR (machine): 10, FiO2: 50, PEEP: 12, MAP: 14, PC: 12, PIP: 24    (29 May 2024 23:58) pH, Art: 7.23/pCO2: 44/pO2: 443/HCO3: 18/BE: -8.6/SaO2: 99.4/LAC: 8.3, --   (29 May 2024 09:00) pH, Art: 7.30/pCO2: 42/pO2: 460/HCO3: 21/BE: -5.4/SaO2: 99.8/LAC: 8.4, --       ----------------------------------------------------------------------------------------------------  ANTICOAGULATION    (29 May 2024 00:25)  aPTT: 32.9  Xa: <0.04  PT: 15.8  INR: 1.52   Fibrinogen: 305   Platelets: 68      (29 May 2024 00:25)  Hemoglobin: 7.1     LDH: -----    Haptoglobin: -----   PFH:  -----  (29 May 2024 00:18)  Hemoglobin: -----    LDH: 1137    Haptoglobin: 139    PFH:  -----    ----------------------------------------------------------------------------------------------------  Daily ECMO Checklist:   Progress report received from perfusionist   Circuit checked/inspected   Emergency equipment and supplies checked   Cannulation site inspection     I have reviewed all of the above information as well as pertinent labs/imaging/testing and care plan with the bedside nurse, perfusionist and care team members and provided my recommendations for support.   ECMO Management was separate from critical care billing time.

## 2024-05-30 NOTE — PROGRESS NOTE ADULT - SUBJECTIVE AND OBJECTIVE BOX
Patient seen and examined at the bedside.    Remains critically ill on continuous ICU monitoring.      Brief Summary:  45 yo M w/ PMHx of HTN, DM, multiple prior posterior circulation CVA ( L occipital lobe, cerebellum, b/l brachium pontis, and R adrienne) one treated with tenecteplase and thrombectomy, severe distal vertebral artery stenosis s/p LV4 stent on DAPT p/w worsening of his L facial, dysarthria, LUE dysmetria. PEA cardiac arrest, Cardiogenic shock s/p VA ECMO initiation 5/27/24       24 Hour events:      Objective:  Vital Signs Last 24 Hrs  T(C): 36.6 (30 May 2024 04:00), Max: 36.7 (29 May 2024 16:00)  T(F): 97.9 (30 May 2024 04:00), Max: 98.1 (29 May 2024 16:00)  HR: 105 (30 May 2024 07:00) (100 - 116)  BP: --  BP(mean): --  RR: 14 (30 May 2024 05:45) (4 - 40)  SpO2: 100% (30 May 2024 01:45) (87% - 100%)    Parameters below as of 30 May 2024 04:00  Patient On (Oxygen Delivery Method): ventilator  O2 Flow (L/min): 50      Mode: AC/ CMV (Assist Control/ Continuous Mandatory Ventilation)  RR (machine): 10  FiO2: 50  PEEP: 12  ITime: 1  MAP: 14  PC: 12  PIP: 24              Physical Exam:   General: Intubated on VA ECMO and CRRT.  Neurology: Pupils fixed and dilated, not responsive to voice, no movement to noxious stimuli  Respiratory: Breath sounds bilaterally  CV: Sinus Tachycardia  VA ECMO: 3900 rpms, 4.45 flow, Sweep of 5.5  Abdominal: Soft, Nondistended  Extremities: Warm, well-perfused  Mauricio    -------------------------------------------------------------------------------------------------------------------------------    Labs:                        7.1    13.41 )-----------( 68       ( 29 May 2024 00:25 )             23.1     05-29    144  |  105  |  32<H>  ----------------------------<  84  5.9<H>   |  20<L>  |  2.72<H>    Ca    8.8      29 May 2024 00:18  Phos  6.6     05-29  Mg     2.2     05-29    TPro  5.3<L>  /  Alb  3.6  /  TBili  0.8  /  DBili  x   /  AST  1028<H>  /  ALT  908<H>  /  AlkPhos  71  05-29    LIVER FUNCTIONS - ( 29 May 2024 00:18 )  Alb: 3.6 g/dL / Pro: 5.3 g/dL / ALK PHOS: 71 U/L / ALT: 908 U/L / AST: 1028 U/L / GGT: x           PT/INR - ( 29 May 2024 00:25 )   PT: 15.8 sec;   INR: 1.52 ratio         PTT - ( 29 May 2024 00:25 )  PTT:32.9 sec  ABG - ( 29 May 2024 23:58 )  pH, Arterial: 7.23  pH, Blood: x     /  pCO2: 44    /  pO2: 443   / HCO3: 18    / Base Excess: -8.6  /  SaO2: 99.4          ------------------------------------------------------------------------------------------------------------------------------  Assessment:  44 y.o M w/ PMHx of HTN, DM, multiple prior posterior circulation CVA ( L occipital lobe, cerebellum, b/l brachium pontis, and R adrienne) one treated with tenecteplase and thrombectomy, severe distal vertebral artery stenosis s/p LV4 stent on DAPT p/w worsening of his L facial, dysarthria, LUE dysmetria. He is s/p PEA cardiac arrest, Cardiogenic shock s/p VA ECMO initiation 5/27/24     Acute hypoxemic respiratory failure  Diffuse cerebral edema, anoxic brain injury  Post-op acute blood loss anemia  Hyperkalemia, MALI with ATN  GI bleed  Cardiogenic shock requiring high dose pressors  Lactic Acidosis   Leukocytosis  Thrombocytopenia      Plan:   ***Neuro***  Hx of CVA x3 (Last 11/23).  Repeat CTH post arrest with signs of diffuse cerebral edema, signs of anoxic injury    ***Cardiovascular***  At high risk for hemodynamic instability and cardiac arrhythmias.  Trend Lactate    Continue VA ECMO support   Wean pressors for MAP > 65 mm Hg   Remains on Dobutamine and Epinephrine    ***Pulmonary***  Acute respiratory failure  Lung protective settings.  Deep breathing and coughing exercises.    ***GI***  OGT to low continuous suction  Protonix for stress ulcer prophylaxis / GI bleed.  Trend H/H    ***Renal***  MALI with severe hyperkalemia  CRRT    ***ID***  Vancomycin/ Meropenum    ***Endocrine***  Hyperglycemia - Insulin infusion.      ***Hematology***  Acute blood loss anemia and thrombocytopenia  Trend CBC and coags and monitor for bleeding.  Anticoagulation on hold.          Patient requires continuous monitoring with bedside rhythm monitoring, pulse oximetry monitoring, and continuous central venous and arterial pressure monitoring; and intermittent blood gas analysis. Care plan discussed with the ICU care team.   Patient remains critical, at risk for life threatening decompensation.    I have spent 30 minutes providing critical care management to this patient.    By signing my name below, I, Willa Price, attest that this documentation has been prepared under the direction and in the presence of Yusra Nagy MD  Electronically signed: Willa Price, 05-30-24 @ 07:44    I, Yusra Nagy MD, personally performed the services described in this documentation. all medical record entries made by the scribe were at my direction and in my presence. I have reviewed the chart and agree that the record reflects my personal performance and is accurate and complete  Electronically signed: Yusra Nagy MD Patient seen and examined at the bedside.    Remains critically ill on continuous ICU monitoring.      Brief Summary:  45 yo M w/ PMHx of HTN, DM, multiple prior posterior circulation CVA ( L occipital lobe, cerebellum, b/l brachium pontis, and R adrienne) one treated with tenecteplase and thrombectomy, severe distal vertebral artery stenosis s/p LV4 stent on DAPT p/w worsening of his L facial, dysarthria, LUE dysmetria. PEA cardiac arrest, Cardiogenic shock s/p VA ECMO initiation 5/27/24       24 Hour events:  Family at bedside for withdrawal of life-sustaining support.      Objective:  Vital Signs Last 24 Hrs  T(C): 36.6 (30 May 2024 04:00), Max: 36.7 (29 May 2024 16:00)  T(F): 97.9 (30 May 2024 04:00), Max: 98.1 (29 May 2024 16:00)  HR: 105 (30 May 2024 07:00) (100 - 116)  BP: --  BP(mean): --  RR: 14 (30 May 2024 05:45) (4 - 40)  SpO2: 100% (30 May 2024 01:45) (87% - 100%)    Parameters below as of 30 May 2024 04:00  Patient On (Oxygen Delivery Method): ventilator  O2 Flow (L/min): 50      Mode: AC/ CMV (Assist Control/ Continuous Mandatory Ventilation)  RR (machine): 10  FiO2: 50  PEEP: 12  ITime: 1  MAP: 14  PC: 12  PIP: 24              Physical Exam:   General: Intubated on VA ECMO and CRRT.  Neurology: Pupils fixed and dilated, not responsive to voice, no movement to noxious stimuli  Respiratory: Breath sounds bilaterally  CV: Sinus Tachycardia  VA ECMO: 3900 rpms, 4.45 flow, Sweep of 5.5  Abdominal: Soft, Nondistended  Extremities: Warm, well-perfused    -------------------------------------------------------------------------------------------------------------------------------    Labs:                        7.1    13.41 )-----------( 68       ( 29 May 2024 00:25 )             23.1     05-29    144  |  105  |  32<H>  ----------------------------<  84  5.9<H>   |  20<L>  |  2.72<H>    Ca    8.8      29 May 2024 00:18  Phos  6.6     05-29  Mg     2.2     05-29    TPro  5.3<L>  /  Alb  3.6  /  TBili  0.8  /  DBili  x   /  AST  1028<H>  /  ALT  908<H>  /  AlkPhos  71  05-29    LIVER FUNCTIONS - ( 29 May 2024 00:18 )  Alb: 3.6 g/dL / Pro: 5.3 g/dL / ALK PHOS: 71 U/L / ALT: 908 U/L / AST: 1028 U/L / GGT: x           PT/INR - ( 29 May 2024 00:25 )   PT: 15.8 sec;   INR: 1.52 ratio         PTT - ( 29 May 2024 00:25 )  PTT:32.9 sec  ABG - ( 29 May 2024 23:58 )  pH, Arterial: 7.23  pH, Blood: x     /  pCO2: 44    /  pO2: 443   / HCO3: 18    / Base Excess: -8.6  /  SaO2: 99.4          ------------------------------------------------------------------------------------------------------------------------------  Assessment:  44 y.o M w/ PMHx of HTN, DM, multiple prior posterior circulation CVA ( L occipital lobe, cerebellum, b/l brachium pontis, and R adrienne) one treated with tenecteplase and thrombectomy, severe distal vertebral artery stenosis s/p LV4 stent on DAPT p/w worsening of his L facial, dysarthria, LUE dysmetria. He is s/p PEA cardiac arrest, Cardiogenic shock s/p VA ECMO initiation 5/27/24     Acute hypoxemic respiratory failure  Diffuse cerebral edema, anoxic brain injury  Post-op acute blood loss anemia  Hyperkalemia, MALI with ATN  GI bleed  Cardiogenic shock requiring high dose pressors  Lactic Acidosis   Leukocytosis  Thrombocytopenia      Plan:   Family is at bedside for withdrawal of life-sustaining support.  Comfort measures provided.  Extubated and ECMO support discontinued.  Time of death 3:19 pm.      By signing my name below, I, Willa Albert, attest that this documentation has been prepared under the direction and in the presence of Yusra Nagy MD  Electronically signed: Willa Price, 05-30-24 @ 07:44    I, Yusra Nagy MD, personally performed the services described in this documentation. all medical record entries made by the scribe were at my direction and in my presence. I have reviewed the chart and agree that the record reflects my personal performance and is accurate and complete  Electronically signed: Yusra Nagy MD

## 2024-05-30 NOTE — PROGRESS NOTE ADULT - PROBLEM SELECTOR PLAN 1
Pt. with MALI in the setting of cardiac arrest. Admission Cr: 1 (5/27); trended up to 1.63 (5/27). Pt. was initiated on CRRT on 5/27. Pt. is anuric. Pt. remains hemodynamically unstable requiring ECMO and IV vasopressor support in CT-ICU.     Plan to continue with CRRT today, no UF. Monitor labs and urine output. Avoid nephrotoxins. Dose medications to CRRT.

## 2024-05-30 NOTE — PROGRESS NOTE ADULT - REASON FOR ADMISSION
Stroke c/f stent occlusion

## 2024-05-30 NOTE — PROGRESS NOTE ADULT - SUBJECTIVE AND OBJECTIVE BOX
[facility] Division of Kidney Diseases & Hypertension  FOLLOW UP NOTE  [phone]--------------------------------------------------------------------------------    Chief Complaint: MALI requiring CRRT    24 hour events/subjective: Pt. was seen and evaluated in the CT-ICU this morning. Pt. remains hemodynamically unstable requiring ECMO and IV vasopressor support. Receiving CRRT, tolerating well thus far. Pt. is intubated/sedated, unable to provide ROS.      PAST HISTORY  --------------------------------------------------------------------------------  No significant changes to PMH, PSH, FHx, SHx, unless otherwise noted    ALLERGIES & MEDICATIONS  --------------------------------------------------------------------------------  Allergies    No Known Allergies    Intolerances      Standing Inpatient Medications  artificial  tears Solution 1 Drop(s) Both EYES four times a day  chlorhexidine 0.12% Liquid 15 milliLiter(s) Oral Mucosa every 12 hours  chlorhexidine 2% Cloths 1 Application(s) Topical <User Schedule>  CRRT Treatment    <Continuous>  dextrose 10%. 1000 milliLiter(s) IV Continuous <Continuous>  dextrose 50% Injectable 50 milliLiter(s) IV Push every 15 minutes  dextrose 50% Injectable 25 milliLiter(s) IV Push every 15 minutes  DOBUTamine Infusion 5 MICROgram(s)/kG/Min IV Continuous <Continuous>  EPINEPHrine    Infusion 0.3 MICROgram(s)/kG/Min IV Continuous <Continuous>  insulin regular Infusion 2 Unit(s)/Hr IV Continuous <Continuous>  norepinephrine Infusion 0.05 MICROgram(s)/kG/Min IV Continuous <Continuous>  pantoprazole Infusion 8 mG/Hr IV Continuous <Continuous>  PrismaSATE Dialysate BK 0 / 3.5 5000 milliLiter(s) CRRT <Continuous>  PrismaSOL Filtration BGK 0 / 2.5 5000 milliLiter(s) CRRT <Continuous>  PrismaSOL Filtration BGK 0 / 2.5 5000 milliLiter(s) CRRT <Continuous>  sodium chloride 0.9%. 1000 milliLiter(s) IV Continuous <Continuous>  sodium chloride 3%. 500 milliLiter(s) IV Continuous <Continuous>  vancomycin  IVPB 500 milliGRAM(s) IV Intermittent every 12 hours  vasopressin Infusion 0.1 Unit(s)/Min IV Continuous <Continuous>    PRN Inpatient Medications      REVIEW OF SYSTEMS  --------------------------------------------------------------------------------  Unable to obtain ROS, see HPI    VITALS/PHYSICAL EXAM  --------------------------------------------------------------------------------  T(C): 36.3 (05-30-24 @ 08:00), Max: 36.7 (05-29-24 @ 16:00)  HR: 110 (05-30-24 @ 09:05) (100 - 114)  BP: --  RR: 14 (05-30-24 @ 08:30) (5 - 40)  SpO2: 100% (05-30-24 @ 08:00) (87% - 100%)  Wt(kg): --    05-29-24 @ 07:01  -  05-30-24 @ 07:00  --------------------------------------------------------  IN: 3937 mL / OUT: 0 mL / NET: 3937 mL    05-30-24 @ 07:01  -  05-30-24 @ 09:17  --------------------------------------------------------  IN: 310.8 mL / OUT: 0 mL / NET: 310.8 mL    Physical Exam:  Gen: Intubated,   	HEENT: Anicteric, +ETT  	Pulm: Mechanical breath sounds BL  	CV: on ECMO  	Abd: Soft, +BS    	Ext: No LE edema B/L  	Neuro: Unresponsive to tactile stimuli   	Skin: Warm and dry  	Dialysis access: ECMO circuit    LABS/STUDIES  --------------------------------------------------------------------------------              7.1    13.41 >-----------<  68       [05-29-24 @ 00:25]              23.1     144  |  105  |  32  ----------------------------<  84      [05-29-24 @ 00:18]  5.9   |  20  |  2.72        Ca     8.8     [05-29-24 @ 00:18]      Mg     2.2     [05-29-24 @ 00:18]      Phos  6.6     [05-29-24 @ 00:18]    TPro  5.3  /  Alb  3.6  /  TBili  0.8  /  DBili  x   /  AST  1028  /  ALT  908  /  AlkPhos  71  [05-29-24 @ 00:18]    PT/INR: PT 15.8 , INR 1.52       [05-29-24 @ 00:25]  PTT: 32.9       [05-29-24 @ 00:25]    LDH 1137      [05-29-24 @ 00:18]    Creatinine Trend:  SCr 2.72 [05-29 @ 00:18]  SCr 1.83 [05-28 @ 00:30]  SCr 1.63 [05-27 @ 18:03]  SCr 1.06 [05-27 @ 12:58]    TSH 0.55      [05-28-24 @ 12:46]

## 2024-05-30 NOTE — PROGRESS NOTE ADULT - PROVIDER SPECIALTY LIST ADULT
Critical Care
Critical Care
Neurology
Critical Care
Neurosurgery
Nephrology-Cardiorenal
Nephrology-Cardiorenal

## 2024-05-30 NOTE — PROGRESS NOTE ADULT - ATTENDING COMMENTS
I reviewed available diagnostic studies, and reviewed images personally. I agree with resident's history, exam, orders placed, and plan of care. Total care time spent, 35 min. This excludes any time spent on separate procedures or teaching.  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 EEG with diffuse suppression / overall inactivity. (-)pupillary reflex, no corneals, no cough or gag. no movement to painful stimuli. Suspect possible aspiration leading to hypoxia/and cardiogenic shock leading to hypoperfusion leading to worsening brainstem stroke and diffuse cerebral injury with brain compression in a pt with already somewhat compromised vasculature. Case discussed with wife at bedside - wife & family getting ready for comfort measures.
I have seen this patient with the fellow and agree with their assessment and plan. I was physically present for significant portions of the evaluation and management (E/M) service provided.  I agree with the above history, physical, and plan which I have reviewed and edited where appropriate.    Cont CRRT till family withdrawal of care later today    d/.w with [facility]    Gabbie Corrales MD  Office   Contact me directly via [employer] Teams     (After 5 pm or on weekends please page the on-call fellow/attending, can check [email or web address] for schedule. Login is shi [email or web address], schedule under [facility] medicine, psych, derm)
I have seen this patient with the fellow and agree with their assessment and plan. I was physically present for significant portions of the evaluation and management (E/M) service provided.  I agree with the above history, physical, and plan which I have reviewed and edited where appropriate.    Dialysate bath was adjusted and K is better  Monitor as we continue CVVHDF  Prognosis guarded    d/w with CTU    Gabbie Corrales MD  Office   Contact me directly via [employer] Teams     (After 5 pm or on weekends please page the on-call fellow/attending, can check [email or web address] for schedule. Login is shi [email or web address], schedule under [facility] medicine, psych, derm)

## 2024-05-30 NOTE — AIRWAY REMOVAL NOTE  ADULT & PEDS - ARTIFICAL AIRWAY REMOVAL COMMENTS
Written order for extubation verified. The patient was identified by full name and birth date compared to the identification band. Patient was compassionately extubated to room air. Present during the procedure was BONY BENNETT

## 2024-05-30 NOTE — DISCHARGE NOTE FOR THE EXPIRED PATIENT - HOSPITAL COURSE
: ED L facial droop & dysarthria – CT worsening of known L cerebellar & occipital strokes & filing defects in known L vertebral stent. PEA arrest 1hr --> VA ECMO cannulation, cyanokit given rpt CTH: Generalized symmetric loss of gray-white differentiation w/ global cerebral edema and mass effect effacing the cortical sulci, ventricular system and basal cisterns. Suggestive of hypoxic brain injury, Patchy hypoattenuation w/in cerebellum and L occipital region, ?GIB, +/- pulsatility, CVVH started for hyperkalemia.  Family decided to withdraw care as the patient did not show any improvement in brain function. Patient was compassionately extubated and ECMO turned off. Patient  shortly after.

## 2024-05-31 PROCEDURE — 80053 COMPREHEN METABOLIC PANEL: CPT

## 2024-05-31 PROCEDURE — 84100 ASSAY OF PHOSPHORUS: CPT

## 2024-05-31 PROCEDURE — 85384 FIBRINOGEN ACTIVITY: CPT

## 2024-05-31 PROCEDURE — 85018 HEMOGLOBIN: CPT

## 2024-05-31 PROCEDURE — 87637 SARSCOV2&INF A&B&RSV AMP PRB: CPT

## 2024-05-31 PROCEDURE — P9047: CPT

## 2024-05-31 PROCEDURE — 74018 RADEX ABDOMEN 1 VIEW: CPT

## 2024-05-31 PROCEDURE — 82550 ASSAY OF CK (CPK): CPT

## 2024-05-31 PROCEDURE — 84484 ASSAY OF TROPONIN QUANT: CPT

## 2024-05-31 PROCEDURE — 82947 ASSAY GLUCOSE BLOOD QUANT: CPT

## 2024-05-31 PROCEDURE — C1769: CPT

## 2024-05-31 PROCEDURE — 86923 COMPATIBILITY TEST ELECTRIC: CPT

## 2024-05-31 PROCEDURE — 85610 PROTHROMBIN TIME: CPT

## 2024-05-31 PROCEDURE — 36430 TRANSFUSION BLD/BLD COMPNT: CPT

## 2024-05-31 PROCEDURE — 84439 ASSAY OF FREE THYROXINE: CPT

## 2024-05-31 PROCEDURE — 85520 HEPARIN ASSAY: CPT

## 2024-05-31 PROCEDURE — P9016: CPT

## 2024-05-31 PROCEDURE — 31500 INSERT EMERGENCY AIRWAY: CPT

## 2024-05-31 PROCEDURE — P9012: CPT

## 2024-05-31 PROCEDURE — 84295 ASSAY OF SERUM SODIUM: CPT

## 2024-05-31 PROCEDURE — P9059: CPT

## 2024-05-31 PROCEDURE — 0042T: CPT | Mod: MC

## 2024-05-31 PROCEDURE — 84436 ASSAY OF TOTAL THYROXINE: CPT

## 2024-05-31 PROCEDURE — 84145 PROCALCITONIN (PCT): CPT

## 2024-05-31 PROCEDURE — 85730 THROMBOPLASTIN TIME PARTIAL: CPT

## 2024-05-31 PROCEDURE — 82962 GLUCOSE BLOOD TEST: CPT

## 2024-05-31 PROCEDURE — 86850 RBC ANTIBODY SCREEN: CPT

## 2024-05-31 PROCEDURE — 93306 TTE W/DOPPLER COMPLETE: CPT

## 2024-05-31 PROCEDURE — 99291 CRITICAL CARE FIRST HOUR: CPT | Mod: 25

## 2024-05-31 PROCEDURE — 85014 HEMATOCRIT: CPT

## 2024-05-31 PROCEDURE — 85025 COMPLETE CBC W/AUTO DIFF WBC: CPT

## 2024-05-31 PROCEDURE — 70450 CT HEAD/BRAIN W/O DYE: CPT | Mod: MC

## 2024-05-31 PROCEDURE — 94002 VENT MGMT INPAT INIT DAY: CPT

## 2024-05-31 PROCEDURE — 95711 VEEG 2-12 HR UNMONITORED: CPT

## 2024-05-31 PROCEDURE — 86965 POOLING BLOOD PLATELETS: CPT

## 2024-05-31 PROCEDURE — 83615 LACTATE (LD) (LDH) ENZYME: CPT

## 2024-05-31 PROCEDURE — 84443 ASSAY THYROID STIM HORMONE: CPT

## 2024-05-31 PROCEDURE — C1889: CPT

## 2024-05-31 PROCEDURE — 85379 FIBRIN DEGRADATION QUANT: CPT

## 2024-05-31 PROCEDURE — 83036 HEMOGLOBIN GLYCOSYLATED A1C: CPT

## 2024-05-31 PROCEDURE — 71045 X-RAY EXAM CHEST 1 VIEW: CPT

## 2024-05-31 PROCEDURE — 70498 CT ANGIOGRAPHY NECK: CPT | Mod: MC

## 2024-05-31 PROCEDURE — 95700 EEG CONT REC W/VID EEG TECH: CPT

## 2024-05-31 PROCEDURE — 96374 THER/PROPH/DIAG INJ IV PUSH: CPT

## 2024-05-31 PROCEDURE — 83735 ASSAY OF MAGNESIUM: CPT

## 2024-05-31 PROCEDURE — 36415 COLL VENOUS BLD VENIPUNCTURE: CPT

## 2024-05-31 PROCEDURE — 84480 ASSAY TRIIODOTHYRONINE (T3): CPT

## 2024-05-31 PROCEDURE — 86901 BLOOD TYPING SEROLOGIC RH(D): CPT

## 2024-05-31 PROCEDURE — 86900 BLOOD TYPING SEROLOGIC ABO: CPT

## 2024-05-31 PROCEDURE — 82803 BLOOD GASES ANY COMBINATION: CPT

## 2024-05-31 PROCEDURE — 83010 ASSAY OF HAPTOGLOBIN QUANT: CPT

## 2024-05-31 PROCEDURE — 84132 ASSAY OF SERUM POTASSIUM: CPT

## 2024-05-31 PROCEDURE — 82435 ASSAY OF BLOOD CHLORIDE: CPT

## 2024-05-31 PROCEDURE — 83605 ASSAY OF LACTIC ACID: CPT

## 2024-05-31 PROCEDURE — 82330 ASSAY OF CALCIUM: CPT

## 2024-05-31 PROCEDURE — 85027 COMPLETE CBC AUTOMATED: CPT

## 2024-05-31 PROCEDURE — 82553 CREATINE MB FRACTION: CPT

## 2024-05-31 PROCEDURE — 83520 IMMUNOASSAY QUANT NOS NONAB: CPT

## 2024-05-31 PROCEDURE — 87040 BLOOD CULTURE FOR BACTERIA: CPT

## 2024-05-31 PROCEDURE — 80202 ASSAY OF VANCOMYCIN: CPT

## 2024-05-31 PROCEDURE — 82565 ASSAY OF CREATININE: CPT

## 2024-05-31 PROCEDURE — P9045: CPT

## 2024-05-31 PROCEDURE — 70496 CT ANGIOGRAPHY HEAD: CPT | Mod: MC

## 2024-05-31 PROCEDURE — 87070 CULTURE OTHR SPECIMN AEROBIC: CPT

## 2024-05-31 PROCEDURE — 94003 VENT MGMT INPAT SUBQ DAY: CPT

## 2024-06-02 LAB
CULTURE RESULTS: SIGNIFICANT CHANGE UP
CULTURE RESULTS: SIGNIFICANT CHANGE UP
SPECIMEN SOURCE: SIGNIFICANT CHANGE UP
SPECIMEN SOURCE: SIGNIFICANT CHANGE UP

## 2024-06-10 ENCOUNTER — APPOINTMENT (OUTPATIENT)
Dept: PHYSICAL MEDICINE AND REHAB | Facility: CLINIC | Age: 44
End: 2024-06-10

## 2024-06-10 NOTE — PATIENT PROFILE ADULT - FUNCTIONAL ASSESSMENT - BASIC MOBILITY 3.
[FreeTextEntry1] : This is a 75 yo male with a PMhx of Neuroendocrine Ca of Liver, CAD s/p NURIA, PVD, ED, Afib, Dilated aorta, CKD, HLD, HTN, HoTN, T2DM, Gout, Hypogonadism, Obesity who presents for follow up.  His BP was borderline, seen by DR. Vogel recently, and midodrine increased to 15 mg TID. He also reports of dyspnea on exertion. Recent CTA chest negative for PE. He reports of worsening dyspnea on exertion, fatigue and diarrhea. he also reports of intermittent chest tightness.   Denies chest pain, palpitations, diaphoresis, vision changes, HA, dizziness, syncope, cough, wheezing, SOB/RAINES, edema, fever, chills, infection. 2 = A lot of assistance

## 2024-07-04 ENCOUNTER — RX RENEWAL (OUTPATIENT)
Age: 44
End: 2024-07-04

## 2024-08-12 ENCOUNTER — APPOINTMENT (OUTPATIENT)
Dept: NEUROLOGY | Facility: CLINIC | Age: 44
End: 2024-08-12

## 2024-10-03 NOTE — PATIENT PROFILE ADULT - NSPROHMDIABETBLDGLCUSUAL_GEN_A_NUR
Hpi Title: Evaluation of Skin Lesions
Additional History: Pt states he had a cyst excised several years ago by Dr. Anival Stein
unknown

## 2024-10-09 NOTE — PROGRESS NOTE ADULT - ASSESSMENT
44yo RHD M with PMH significant for HTN, type 2 DM, strokes (2015 abd 7/2023), former smoker (quit 7/2023),  presented to [facility] ED (11/16) with c/o difficulty walking, LLE weakness, slurred speech (worse from baseline). Images with acute/subacute infarcts.    # CVA (2015, 7/2023, 11/2023)   - S/p TPA  - ASA 81mg + Ticagrelor 90 BID  - Atorvastatin 80 (LDL 36)  - continue Comprehensive Rehab Program: PT/OT/ST, 3hours daily and 5 days weekly.     # MALI-- improved  - Bun/Cr improved-- 21/1.23 (12/4) > 27/1.33 (12/7 AM) -- repeat 24/1.29 (12/7PM) >23/1.23 (12/11)    - s/p IVF 1000 ml on 12/7  - Now on thin liquids, can encourage oral intake     # Mood/sleep  - Lexapro inc 20 (11/29)  - Trazodone 25mg at bedtime  - Neuropsychology support  - Recreation therapy     # HTN  - Labetalol 200 TID  - Amlodipine 5  - Lisinopril 40  - /73 (12/11 AM)    # DM II   - A1C 5.5  - Previously on metformin  - FS: <120  - Glucose via lab: 122 (12/11)     # Respiratory health/RADHA  - Incentive Spirometry during the day time  - CPAP nightly    # Fever on admission --since resolved  - Noted with temp of 101.3 on admission: cbc, cmp, lactate, procalcitonin, UA (neg), blood CXR X 2 (unremarkable)  - blood culture no growth-final  -  WBC 6.53 (12/4)    # Pain management  - Tylenol PRN    # FEN   - Previously on regular and mildly thick with supervision in meals 12/2+ free water protocol  -  MBS 12/8- upgraded to thin liquids, continue with regular diet    # GI/Bowel Mgmt - having daily BM  - Senna,  Miralax BID  - Mineral oil enema x 1 (11/30)   - Golytely bowel prep x 1 (12/1) and suppository.   - GI ppx: Protonix    #Bladder management/Urinary retention-- previously needing to be straight cath   - UA (11/27) negative  - continue flomax 0.4  - No longer retaining-- dc'd bladder scans 12/8    # DVT ppx  - Lovenox 30 BID    IDT 12/6  NSG: urinary retention with ISC.  SW: lives with wife and daughter in [place], no step inside  SLP: reg/mod thick liquid, free water protocol. cog and language WFL, mod dysarthria, dec coordination of resp/swallowing  OT: overall min A.  Goal: mod i for adls, except SV bath/transfers  PT: min  A bed mob, min - CGA transfer, min A 75' RW/8 steps 2 HR. Goal: SV   TDD: 12/19 Home    Follow ups:  Neuro: Dr. Kumar Guzman  Rad: Dr. Phoebe Xiao  Card: Dr. Talib Dsouza  IM: Appt 12/19/2023 44yo RHD M with PMH significant for HTN, type 2 DM, strokes (2015 abd 7/2023), former smoker (quit 7/2023),  presented to [facility] ED (11/16) with c/o difficulty walking, LLE weakness, slurred speech (worse from baseline). Images with acute/subacute infarcts.    # CVA (2015, 7/2023, 11/2023)   - S/p TPA  - ASA 81mg + Ticagrelor 90 BID  - Atorvastatin 80 (LDL 36)  - continue Comprehensive Rehab Program: PT/OT/ST, 3hours daily and 5 days weekly.     # MALI-- improved  - Bun/Cr improved-- 21/1.23 (12/4) > 27/1.33 (12/7 AM) -- repeat 24/1.29 (12/7PM) >23/1.23 (12/11)    - s/p IVF 1000 ml on 12/7  - Now on thin liquids, can encourage oral intake     # Mood/sleep  - Lexapro inc 20 (11/29)  - Trazodone 25mg at bedtime  - Neuropsychology support  - Recreation therapy     # HTN  - Labetalol 200 TID  - Amlodipine 5  - Lisinopril 40  - /73 (12/11 AM)    # DM II   - A1C 5.5  - Previously on metformin  - FS: <120  - Glucose via lab: 122 (12/11)     # Respiratory health/RADAH  - Incentive Spirometry during the day time  - CPAP nightly    # Fever on admission --since resolved  - Noted with temp of 101.3 on admission: cbc, cmp, lactate, procalcitonin, UA (neg), blood CXR X 2 (unremarkable)  - blood culture no growth-final  -  WBC 6.53 (12/4)    # Pain management  - Tylenol PRN    # FEN   - Previously on regular and mildly thick with supervision in meals 12/2+ free water protocol  -  MBS 12/8- upgraded to thin liquids, continue with regular diet    # GI/Bowel Mgmt - having daily BM  - Senna,  Miralax BID  - Mineral oil enema x 1 (11/30)   - Golytely bowel prep x 1 (12/1) and suppository.   - GI ppx: Protonix    #Bladder management/Urinary retention-- previously needing to be straight cath   - UA (11/27) negative  - continue flomax 0.4  - No longer retaining-- dc'd bladder scans 12/8    # DVT ppx  - Lovenox 30 BID    IDT 12/6  NSG: urinary retention with ISC.  SW: lives with wife and daughter in [place], no step inside  SLP: reg/mod thick liquid, free water protocol. cog and language WFL, mod dysarthria, dec coordination of resp/swallowing  OT: overall min A.  Goal: mod i for adls, except SV bath/transfers  PT: min  A bed mob, min - CGA transfer, min A 75' RW/8 steps 2 HR. Goal: SV   TDD: 12/19 Home    Follow ups:  Neuro: Dr. Kumar Guzman  Rad: Dr. Phoebe Xiao  Card: Dr. Talib Dsouza  IM: Appt 12/19/2023 44yo RHD M with PMH significant for HTN, type 2 DM, strokes (2015 abd 7/2023), former smoker (quit 7/2023),  presented to [facility] ED (11/16) with c/o difficulty walking, LLE weakness, slurred speech (worse from baseline). Images with acute/subacute infarcts.    # CVA (2015, 7/2023, 11/2023)   - S/p TPA  - ASA 81mg + Ticagrelor 90 BID  - Atorvastatin 80 (LDL 36)  - continue Comprehensive Rehab Program: PT/OT/ST, 3hours daily and 5 days weekly.     # MALI-- improved  - Bun/Cr improved-- 21/1.23 (12/4) > 27/1.33 (12/7 AM) -- repeat 24/1.29 (12/7PM) >23/1.23 (12/11)    - s/p IVF 1000 ml on 12/7  - Now on thin liquids, can encourage oral intake     # Mood/sleep  - Lexapro inc 20 (11/29)  - Trazodone 25mg at bedtime  - Neuropsychology support  - Recreation therapy     # HTN  - Labetalol 200 TID  - Amlodipine 5  - Lisinopril 40  - /73 (12/11 AM)    # DM II   - A1C 5.5  - Previously on metformin  - FS: <120  - Glucose via lab: 122 (12/11)     # Respiratory health/ARDHA  - Incentive Spirometry during the day time  - CPAP nightly    # Fever on admission --since resolved  - Noted with temp of 101.3 on admission: cbc, cmp, lactate, procalcitonin, UA (neg), blood CXR X 2 (unremarkable)  - blood culture no growth-final  -  WBC wnl     # Pain management  - Tylenol PRN    # FEN   - Previously on regular and mildly thick with supervision in meals 12/2+ free water protocol  -  MBS 12/8- upgraded to thin liquids, continue with regular diet    # GI/Bowel Mgmt - having daily BM  - Senna,  Miralax BID  - Mineral oil enema x 1 (11/30)   - Golytely bowel prep x 1 (12/1) and suppository.   - GI ppx: Protonix    #Bladder management/Urinary retention-- previously needing to be straight cath   - UA (11/27) negative  - continue flomax 0.4  - No longer retaining-- dc'd bladder scans 12/8    # DVT ppx  - Lovenox 30 BID    IDT 12/6  NSG: urinary retention with ISC.  SW: lives with wife and daughter in [place], no step inside  SLP: reg/mod thick liquid, free water protocol. cog and language WFL, mod dysarthria, dec coordination of resp/swallowing  OT: overall min A.  Goal: mod i for adls, except SV bath/transfers  PT: min  A bed mob, min - CGA transfer, min A 75' RW/8 steps 2 HR. Goal: SV   TDD: 12/19 Home    Follow ups:  Neuro: Dr. Kumar Guzman  Rad: Dr. Phoebe Xiao  Card: Dr. Talib Dsouza  IM: Appt 12/19/2023 44yo RHD M with PMH significant for HTN, type 2 DM, strokes (2015 abd 7/2023), former smoker (quit 7/2023),  presented to [facility] ED (11/16) with c/o difficulty walking, LLE weakness, slurred speech (worse from baseline). Images with acute/subacute infarcts.    # CVA (2015, 7/2023, 11/2023)   - S/p TPA  - ASA 81mg + Ticagrelor 90 BID  - Atorvastatin 80 (LDL 36)  - continue Comprehensive Rehab Program: PT/OT/ST, 3hours daily and 5 days weekly.     # MALI-- improved  - Bun/Cr improved-- 21/1.23 (12/4) > 27/1.33 (12/7 AM) -- repeat 24/1.29 (12/7PM) >23/1.23 (12/11)    - s/p IVF 1000 ml on 12/7  - Now on thin liquids, can encourage oral intake     # Mood/sleep  - Lexapro inc 20 (11/29)  - Trazodone 25mg at bedtime  - Neuropsychology support  - Recreation therapy     # HTN  - Labetalol 200 TID  - Amlodipine 5  - Lisinopril 40  - /73 (12/11 AM)    # DM II   - A1C 5.5  - Previously on metformin  - FS: <120  - Glucose via lab: 122 (12/11)     # Respiratory health/RADHA  - Incentive Spirometry during the day time  - CPAP nightly    # Fever on admission --since resolved  - Noted with temp of 101.3 on admission: cbc, cmp, lactate, procalcitonin, UA (neg), blood CXR X 2 (unremarkable)  - blood culture no growth-final  -  WBC wnl     # Pain management  - Tylenol PRN    # FEN   - Previously on regular and mildly thick with supervision in meals 12/2+ free water protocol  -  MBS 12/8- upgraded to thin liquids, continue with regular diet    # GI/Bowel Mgmt - having daily BM  - Senna,  Miralax BID  - Mineral oil enema x 1 (11/30)   - Golytely bowel prep x 1 (12/1) and suppository.   - GI ppx: Protonix    #Bladder management/Urinary retention-- previously needing to be straight cath   - UA (11/27) negative  - continue flomax 0.4  - No longer retaining-- dc'd bladder scans 12/8    # DVT ppx  - Lovenox 30 BID    IDT 12/6  NSG: urinary retention with ISC.  SW: lives with wife and daughter in [place], no step inside  SLP: reg/mod thick liquid, free water protocol. cog and language WFL, mod dysarthria, dec coordination of resp/swallowing  OT: overall min A.  Goal: mod i for adls, except SV bath/transfers  PT: min  A bed mob, min - CGA transfer, min A 75' RW/8 steps 2 HR. Goal: SV   TDD: 12/19 Home    Follow ups:  Neuro: Dr. Kumar Guzman  Rad: Dr. Phoebe Xiao  Card: Dr. Talib Dsouza  IM: Appt 12/19/2023 within normal limits

## 2024-10-22 ENCOUNTER — APPOINTMENT (OUTPATIENT)
Dept: NEUROSURGERY | Facility: CLINIC | Age: 44
End: 2024-10-22

## 2024-11-07 NOTE — DISCHARGE NOTE FOR THE EXPIRED PATIENT - NS DECEASED NEXTOFKIN_RELATIONSHIP
----- Message from Karina Hyde NP sent at 11/7/2024 12:50 PM CST -----  Can you call, mammogram is normal.   
Left message with result note  
Spouse

## 2024-12-04 NOTE — DISCHARGE NOTE NURSING/CASE MANAGEMENT/SOCIAL WORK - NSSCCONTNUM_GEN_ALL_CORE
What Type Of Note Output Would You Prefer (Optional)?: Standard Output How Severe Are Your Spot(S)?: mild Have Your Spot(S) Been Treated In The Past?: has not been treated Hpi Title: Evaluation of Skin Lesions [phone]

## 2025-06-09 NOTE — ED ADULT TRIAGE NOTE - BEFAST EYES
[facility] PROFESSIONAL SERVS  [facility] INTERNAL MEDICINE  [address] [place] [address]  [address] [place], [place] [id number]  Dept: [phone]  Dept Fax: [phone]  Loc: [phone]                 
No